# Patient Record
Sex: FEMALE | Race: WHITE | NOT HISPANIC OR LATINO | Employment: OTHER | URBAN - METROPOLITAN AREA
[De-identification: names, ages, dates, MRNs, and addresses within clinical notes are randomized per-mention and may not be internally consistent; named-entity substitution may affect disease eponyms.]

---

## 2017-02-06 ENCOUNTER — TRANSFERRED RECORDS (OUTPATIENT)
Dept: FAMILY MEDICINE | Facility: CLINIC | Age: 52
End: 2017-02-06

## 2017-05-15 ENCOUNTER — TRANSFERRED RECORDS (OUTPATIENT)
Dept: FAMILY MEDICINE | Facility: CLINIC | Age: 52
End: 2017-05-15

## 2017-05-18 DIAGNOSIS — F33.42 MAJOR DEPRESSIVE DISORDER, RECURRENT EPISODE, IN FULL REMISSION (H): ICD-10-CM

## 2017-05-18 RX ORDER — CITALOPRAM HYDROBROMIDE 20 MG/1
20 TABLET ORAL DAILY
Qty: 90 TABLET | Refills: 1 | Status: SHIPPED | OUTPATIENT
Start: 2017-05-18 | End: 2017-11-08

## 2017-05-18 NOTE — TELEPHONE ENCOUNTER
Betty,     Please look at this while Dr. Andrew is away.  Thank you.     Pt is requesting the following prescription and is to return in 1 year which will be 11/16/17.   Last O.V was: 11/16/16    Pending Prescriptions:                       Disp   Refills    citalopram (CELEXA) 20 MG tablet          90 tab*1            Sig: Take 1 tablet (20 mg) by mouth daily    Pharmacy has been selected.     Laly.LORAINE Castillo ([facility])

## 2017-08-21 ENCOUNTER — TRANSFERRED RECORDS (OUTPATIENT)
Dept: FAMILY MEDICINE | Facility: CLINIC | Age: 52
End: 2017-08-21

## 2017-11-08 ENCOUNTER — TELEPHONE (OUTPATIENT)
Dept: FAMILY MEDICINE | Facility: CLINIC | Age: 52
End: 2017-11-08

## 2017-11-08 DIAGNOSIS — F33.42 MAJOR DEPRESSIVE DISORDER, RECURRENT EPISODE, IN FULL REMISSION (H): ICD-10-CM

## 2017-11-08 DIAGNOSIS — E03.8 OTHER SPECIFIED HYPOTHYROIDISM: ICD-10-CM

## 2017-11-08 RX ORDER — LEVOTHYROXINE SODIUM 112 UG/1
112 TABLET ORAL DAILY
Qty: 30 TABLET | Refills: 0 | COMMUNITY
Start: 2017-11-08 | End: 2018-01-04

## 2017-11-08 RX ORDER — CITALOPRAM HYDROBROMIDE 20 MG/1
20 TABLET ORAL DAILY
Qty: 30 TABLET | Refills: 0 | COMMUNITY
Start: 2017-11-08 | End: 2018-02-05

## 2017-11-08 NOTE — TELEPHONE ENCOUNTER
Patient is due for a medication recheck for the following medication Levothyroxine ,and meets the qualifications for a physician approved 30 day extension.    A #30 day refill has been called into the pharmacy listed.  Called into the [facility] in [place].      staff, per the refill protocol can you please call the patient and help assist in setting up a Fasting medication recheck.  Please attempt to reach the patient to schedule that appointment, and if you are unable to reach them, please forward back to the prescribing physician.      Telephone Information:   Mobile [phone] (home)       Thank You  LORAINE Torres ([facility])

## 2017-11-21 ENCOUNTER — TRANSFERRED RECORDS (OUTPATIENT)
Dept: FAMILY MEDICINE | Facility: CLINIC | Age: 52
End: 2017-11-21

## 2017-12-22 ENCOUNTER — TELEPHONE (OUTPATIENT)
Dept: FAMILY MEDICINE | Facility: CLINIC | Age: 52
End: 2017-12-22

## 2018-01-04 DIAGNOSIS — E03.8 OTHER SPECIFIED HYPOTHYROIDISM: ICD-10-CM

## 2018-01-04 RX ORDER — LEVOTHYROXINE SODIUM 112 UG/1
112 TABLET ORAL DAILY
Qty: 90 TABLET | Refills: 0 | Status: SHIPPED | OUTPATIENT
Start: 2018-01-04 | End: 2018-03-01

## 2018-01-04 NOTE — TELEPHONE ENCOUNTER
Pt made an appointment for a CPX March 1st and called clinic support today asking if we could refill her medication until she make it to her appointment.    Pending Prescriptions:                       Disp   Refills    levothyroxine (SYNTHROID/LEVOTHROID) 112 *90 tab*0            Sig: Take 1 tablet (112 mcg) by mouth daily    Will this be ok?   I will call pt back with response.    Thank you  Brittany

## 2018-02-05 DIAGNOSIS — F33.42 MAJOR DEPRESSIVE DISORDER, RECURRENT EPISODE, IN FULL REMISSION (H): ICD-10-CM

## 2018-02-05 RX ORDER — CITALOPRAM HYDROBROMIDE 20 MG/1
20 TABLET ORAL DAILY
Qty: 30 TABLET | Refills: 0 | Status: SHIPPED | OUTPATIENT
Start: 2018-02-05 | End: 2018-03-01

## 2018-02-05 NOTE — TELEPHONE ENCOUNTER
Pt last OV was on 11/16/16. Pt has CPX for 3/1/18. Are you willing to send in another 30 day refill?    Karlene Yun is requesting a refill of:    Pending Prescriptions:                       Disp   Refills    citalopram (CELEXA) 20 MG tablet          30 tab*0            Sig: Take 1 tablet (20 mg) by mouth daily

## 2018-02-06 NOTE — TELEPHONE ENCOUNTER
Call patient- additional 30 day refill  Does she have barriers to schedule an appointment?  Overdue

## 2018-03-01 ENCOUNTER — OFFICE VISIT (OUTPATIENT)
Dept: FAMILY MEDICINE | Facility: CLINIC | Age: 53
End: 2018-03-01

## 2018-03-01 VITALS
WEIGHT: 293 LBS | RESPIRATION RATE: 20 BRPM | HEIGHT: 70 IN | HEART RATE: 72 BPM | SYSTOLIC BLOOD PRESSURE: 122 MMHG | BODY MASS INDEX: 41.95 KG/M2 | TEMPERATURE: 98.2 F | DIASTOLIC BLOOD PRESSURE: 74 MMHG

## 2018-03-01 DIAGNOSIS — E66.01 MORBID OBESITY (H): ICD-10-CM

## 2018-03-01 DIAGNOSIS — Z00.00 ROUTINE HISTORY AND PHYSICAL EXAMINATION OF ADULT: Primary | ICD-10-CM

## 2018-03-01 DIAGNOSIS — E78.2 MIXED HYPERLIPIDEMIA: ICD-10-CM

## 2018-03-01 DIAGNOSIS — F33.42 MAJOR DEPRESSIVE DISORDER, RECURRENT EPISODE, IN FULL REMISSION (H): ICD-10-CM

## 2018-03-01 DIAGNOSIS — E03.9 ACQUIRED HYPOTHYROIDISM: ICD-10-CM

## 2018-03-01 DIAGNOSIS — R73.09 ABNORMAL GLUCOSE: ICD-10-CM

## 2018-03-01 LAB — HBA1C MFR BLD: 5.5 % (ref 4–7)

## 2018-03-01 PROCEDURE — 84439 ASSAY OF FREE THYROXINE: CPT | Mod: 90 | Performed by: FAMILY MEDICINE

## 2018-03-01 PROCEDURE — 83036 HEMOGLOBIN GLYCOSYLATED A1C: CPT | Performed by: FAMILY MEDICINE

## 2018-03-01 PROCEDURE — 80048 BASIC METABOLIC PNL TOTAL CA: CPT | Mod: 90 | Performed by: FAMILY MEDICINE

## 2018-03-01 PROCEDURE — 86803 HEPATITIS C AB TEST: CPT | Mod: 90 | Performed by: FAMILY MEDICINE

## 2018-03-01 PROCEDURE — 99396 PREV VISIT EST AGE 40-64: CPT | Performed by: FAMILY MEDICINE

## 2018-03-01 PROCEDURE — 80061 LIPID PANEL: CPT | Mod: 90 | Performed by: FAMILY MEDICINE

## 2018-03-01 PROCEDURE — 84443 ASSAY THYROID STIM HORMONE: CPT | Mod: 90 | Performed by: FAMILY MEDICINE

## 2018-03-01 PROCEDURE — 36415 COLL VENOUS BLD VENIPUNCTURE: CPT | Performed by: FAMILY MEDICINE

## 2018-03-01 RX ORDER — TRAMADOL HYDROCHLORIDE 200 MG/1
20 TABLET, EXTENDED RELEASE ORAL DAILY
Refills: 0 | COMMUNITY
Start: 2018-02-15 | End: 2019-05-03 | Stop reason: DRUGHIGH

## 2018-03-01 RX ORDER — TRAZODONE HYDROCHLORIDE 50 MG/1
100 TABLET, FILM COATED ORAL AT BEDTIME
Refills: 0 | COMMUNITY
Start: 2017-12-13

## 2018-03-01 RX ORDER — LEVOTHYROXINE SODIUM 112 UG/1
112 TABLET ORAL DAILY
Qty: 90 TABLET | Refills: 3 | Status: SHIPPED | OUTPATIENT
Start: 2018-03-01 | End: 2019-02-22

## 2018-03-01 RX ORDER — CITALOPRAM HYDROBROMIDE 20 MG/1
20 TABLET ORAL DAILY
Qty: 90 TABLET | Refills: 3 | Status: SHIPPED | OUTPATIENT
Start: 2018-03-01 | End: 2019-02-21

## 2018-03-01 NOTE — PROGRESS NOTES
"Chief Complaint: Karlene Yun is an 52 year old woman who presents for preventive health visit.      Besides routine health maintenance,  she would like to discuss:     She has a history of fibromyalgia, which tends to flare during the winter months. She states she has \"good days and bad days.\" Today, she is in a lot of pain. She is still working, tolerates sitting and standing requirements at work.     The patient currently lives alone. She does not cook very often and picks up food from restaurants frequently. Her sister lives in the same apartment complex and they try to cook for each other occasionally. She does not exercise regularly.     Depression symptoms controlled- enjoys living alone with support of family nearby.    Healthy Habits:  Do you get at least three servings of calcium containing foods daily (dairy, green leafy vegetables, etc.)? no  Outside of work or daily activities, how many days per week do you exercise for 30 minutes or longer? 0  Have you had an eye exam in the past two years? yes  Do you see a dentist twice per year? yes    PHQ-2  Over the last two weeks- Have you been bothered by little interest or pleasure in doing things?  Yes  Over the last two weeks- Have you been feeling down, depressed, or hopeless?  Yes    PHQ-9 score:    PHQ-9 SCORE 3/1/2018   Total Score -   Total Score 4       Advance Care Planning 3/1/2018: ACP Review of Chart / Resources Provided:  Reviewed chart for advance care plan.  Karlene Yun has been provided information and resources to begin or update their advance care plan.  Added by Brian Smalls      Social History   Substance Use Topics     Smoking status: Never Smoker     Smokeless tobacco: Never Used     Alcohol use 0.0 oz/week     0 drink(s) per week      Comment: very rare occasions       Reviewed orders with patient.  Reviewed health maintenance and updated orders accordingly - Yes      History of abnormal Pap smear: NO - age 30- 65 PAP every 3 " "years recommended  All Histories reviewed and updated in Epic.      ROS:  C: NEGATIVE for fever, chills. POSITIVE for change in weight-gain.  I: NEGATIVE for worrisome rashes, moles or lesions  E: NEGATIVE for vision changes or irritation  ENT: NEGATIVE for ear, mouth and throat problems  R: NEGATIVE for significant cough or SOB  B: NEGATIVE for masses, tenderness or discharge  CV: NEGATIVE for chest pain, palpitations or peripheral edema  GI: NEGATIVE for nausea, abdominal pain, heartburn, or change in bowel habits  : NEGATIVE for unusual urinary or vaginal symptoms. No vaginal bleeding.  M: POSITIVE for significant arthralgias or myalgia- hx of fibromyalgia  N: NEGATIVE for weakness, dizziness or paresthesias  P: NEGATIVE for changes in mood or affect    PHQ9 scores 4      OBJECTIVE:  /74 (BP Location: Right arm, Patient Position: Chair, Cuff Size: Adult Large)  Pulse 72  Temp 98.2  F (36.8  C) (Oral)  Resp 20  Ht 1.778 m (5' 10\")  Wt (!) 140.6 kg (310 lb)  Breastfeeding? No  BMI 44.48 kg/m2  Constitutional: Alert, oriented, well hydrated. Skin turgor normal.  HEENT: External ears  and canals clear bilaterally. TM's normal bilaterally. Nose normal without lesions or discharge. Oropharynx normal. Neck supple without palpable adenopathy.  Cardiovascular: Regular rate and  rhythm. S1 and S2 normal, no murmurs, clicks, gallops or rubs. No edema or JVD.   Pulmonary: Chest wall normal to inspection and palpation. Good excursion bilaterally. Lungs clear to auscultation. Good air movement bilaterally without rales, wheezes, or rhonchi.  Breast: Breasts are symmetric.  No dominant, discrete, fixed  or suspicious masses are noted.  No skin or nipple changes or axillary nodes. Self exam is taught and encouraged.  Abdomen: The abdomen is soft without tenderness, guarding, mass or organomegaly. Bowel sounds are normal. No CVA tenderness or inguinal adenopathy noted.  : deferred  Neuro: No focal neuro deficets " (reflexes, motor, sensory, cranial nerves, gait, mentation, ) is normal.  Skin: Skin color, temperature, and turgor are normal. No rashes or suspicious skin lesions noted.        COUNSELING:  Reviewed preventive health counseling, as reflected in patient instructions  Special attention given to:        Regular exercise       Healthy diet/nutrition       Vision screening       Consider Hep C screening for patients born between 1945 and 1965       Advance Care Planning       Dental screening  ATP III Guidelines  ICSI Preventive Guidelines    ASSESSMENT/PLAN:  (Z00.00) Routine history and physical examination of adult  (primary encounter diagnosis)  Comment: Patient due for below routine labs today. I will contact her with the results.   Plan: Lipid Profile, BASIC METABOLIC PANEL ([facility]),         Hepatits C antibody (QUEST)            (E03.9) Acquired hypothyroidism  Comment: Due for follow up labs today. Will adjust dose as clinically indicated.   Plan: TSH (QUEST), T4 free ([facility]), VENOUS COLLECTION, levothyroxine (SYNTHROID/LEVOTHROID) 112 MCG         tablet          (E78.2) Mixed hyperlipidemia  Comment: Due for lipid panel today.   Plan: Depending on results, patient may need statin therapy.     (R73.09) Abnormal glucose (elevated fasting glucose)  Comment: A1c is normal today at 5.5%.  Plan: VENOUS COLLECTION, BASIC METABOLIC PANEL         (QUEST), Hemoglobin A1c (BFP)            (F33.42) Major depressive disorder, recurrent episode, in full remission (H)  Comment: Stable. Refill given.   Plan: citalopram (CELEXA) 20 MG tablet            (E66.01) Morbid obesity (H)  Comment: Patient is interested in learning more about weight loss medication and will be referred to the weight loss clinic.   She is not interested in bariatric surgery  She has been obese since the birth of her daughter- over two decades ago.  Despite her attempts of loosing weight with diet and exercise, she has not been successful.  Plan:  "BARIATRIC ADULT REFERRAL          BMI:   Estimated body mass index is 44.48 kg/(m^2) as calculated from the following:    Height as of this encounter: 1.778 m (5' 10\").    Weight as of this encounter: 140.6 kg (310 lb).   Weight management plan: Patient referred to endocrine and/or weight management specialty      Scribe Statement: Brian BARBER, PSS, am scribing for and in the presence of Melissa Andrew MD. 3/1/2018 7:21 AM    Provider Statement: I personally performed this service and the scribe documentation above accurately reflects this service. Melissa Andrew MD 3/1/2018 7:21 AM    "

## 2018-03-01 NOTE — MR AVS SNAPSHOT
After Visit Summary   3/1/2018    Karlene Yun    MRN: [id number]           Patient Information     Date Of Birth          1965        Visit Information        Provider Department      3/1/2018 10:00 AM Melissa Andrew MD [facility]        Today's Diagnoses     Routine history and physical examination of adult    -  1    Acquired hypothyroidism        Mixed hyperlipidemia        Abnormal glucose        Major depressive disorder, recurrent episode, in full remission (H)        Other specified hypothyroidism        Morbid obesity (H)           Follow-ups after your visit        Additional Services     BARIATRIC ADULT REFERRAL       Your provider has referred you to: [facility]: Medical and Surgical Weight Loss Clinic [facility] [phone]. [email or web address]    Zulema: please see if this is covered under [employer] insurance-  Other options     Please be aware that coverage of these services is subject to the terms and limitations of your health insurance plan.  Call member services at your health plan with any benefit or coverage questions.      Please bring the following with you to your appointment:      (1) List of current medications   (2) This referral request   (3) Any documents/labs given to you for this referral                  Who to contact     If you have questions or need follow up information about today's clinic visit or your schedule please contact [facility] directly at [phone].  Normal or non-critical lab and imaging results will be communicated to you by MyChart, letter or phone within 4 business days after the clinic has received the results. If you do not hear from us within 7 days, please contact the clinic through MyChart or phone. If you have a critical or abnormal lab result, we will notify you by phone as soon as possible.  Submit refill requests  "through TwitChat or call your pharmacy and they will forward the refill request to us. Please allow 3 business days for your refill to be completed.          Additional Information About Your Visit        [employer] Information     TwitChat gives you secure access to your electronic health record. If you see a primary care provider, you can also send messages to your care team and make appointments. If you have questions, please call your primary care clinic.  If you do not have a primary care provider, please call [phone] and they will assist you.        Care EveryWhere ID     This is your Care EveryWhere ID. This could be used by other organizations to access your [facility] medical records  [id number]        Your Vitals Were     Pulse Temperature Respirations Height Breastfeeding? BMI (Body Mass Index)    72 98.2  F (36.8  C) (Oral) 20 1.778 m (5' 10\") No 44.48 kg/m2       Blood Pressure from Last 3 Encounters:   03/01/18 122/74   11/16/16 120/80   10/19/15 114/78    Weight from Last 3 Encounters:   03/01/18 (!) 140.6 kg (310 lb)   11/16/16 131.5 kg (290 lb)   10/19/15 132.9 kg (293 lb)              We Performed the Following     BARIATRIC ADULT REFERRAL     BASIC METABOLIC PANEL ([facility])     Hemoglobin A1c (BFP)     Hepatits C antibody (QUEST)     Lipid Profile     T4 free ([facility])     TSH ([facility])     VENOUS COLLECTION          Today's Medication Changes          These changes are accurate as of 3/1/18 10:50 AM.  If you have any questions, ask your nurse or doctor.               These medicines have changed or have updated prescriptions.        Dose/Directions    traZODone 50 MG tablet   Commonly known as:  DESYREL   This may have changed:  Another medication with the same name was removed. Continue taking this medication, and follow the directions you see here.   Changed by:  Melissa Andrew MD        Dose:  100 mg   Take 100 mg by mouth At Bedtime   Refills:  0            Where to get your medicines    "   These medications were sent to [facility] [id number] [facility] - [place], [place] - [address]  [address] [place] [address]     Phone:  [phone]     citalopram 20 MG tablet    levothyroxine 112 MCG tablet                Primary Care Provider Office Phone # Fax #    Melissa Andrew [phone] [address] [facility] [address]  [facility] [address]        Equal Access to Services     [facility] : Hadii aad ku hadasho Soomaali, waaxda luqadaha, qaybta kaalmada adeegyada, waxay idiin hayaan adeeg kharash lasophia . So [facility] [phone].    ATENCIÓN: Si habla español, tiene a maddox disposición servicios gratuitos de asistencia lingüística. [facility] [phone].    We comply with applicable federal civil rights laws and [place] laws. We do not discriminate on the basis of race, color, national origin, age, disability, sex, sexual orientation, or gender identity.            Thank you!     Thank you for choosing [facility]  for your care. Our goal is always to provide you with excellent care. Hearing back from our patients is one way we can continue to improve our services. Please take a few minutes to complete the written survey that you may receive in the mail after your visit with us. Thank you!             Your Updated Medication List - Protect others around you: Learn how to safely use, store and throw away your medicines at www.disposemymeds.org.          This list is accurate as of 3/1/18 10:50 AM.  Always use your most recent med list.                   Brand Name Dispense Instructions for use Diagnosis    aspirin 81 MG tablet      1 TABLET DAILY        citalopram 20 MG tablet    celeXA    90 tablet    Take 1 tablet (20 mg) by mouth daily    Major depressive disorder, recurrent episode, in full remission (H)       folic acid 1 MG tablet    FOLVITE    90 tablet    Take 1 tablet by mouth daily.    Psoriatic arthropathy (H)       gabapentin 300 MG capsule     NEURONTIN     Take 300 mg by mouth 2 times daily        indomethacin 50 MG capsule    INDOCIN    0    1 capsule twice a day        levothyroxine 112 MCG tablet    SYNTHROID/LEVOTHROID    90 tablet    Take 1 tablet (112 mcg) by mouth daily    Other specified hypothyroidism       lidocaine 2 % topical gel    XYLOCAINE     Apply topically as needed for moderate pain        methotrexate 2.5 MG tablet CHEMO      Take 8 tablets by mouth once a week.        * traMADol 50 MG tablet    ULTRAM     Take 50 mg by mouth every 6 hours as needed for moderate pain        * traMADol 200 MG ER-tablet    ULTRAM-ER     Take 20 mg by mouth daily        traZODone 50 MG tablet    DESYREL     Take 100 mg by mouth At Bedtime        vitamin D 1000 UNITS capsule      Take 1 capsule by mouth daily.        * Notice:  This list has 2 medication(s) that are the same as other medications prescribed for you. Read the directions carefully, and ask your doctor or other care provider to review them with you.

## 2018-03-01 NOTE — NURSING NOTE
Karlene ROSALBA Yun is here for a CPX.    Pre-visit planning  Immunizations -up to date  Colonoscopy -is up to date  Mammogram -is due and to be scheduled by patient for later completion  Asthma test --  PHQ9 -  RONALD 7 -    Questioned patient about current smoking habits.  Pt. has never smoked.  Body mass index is 44.48 kg/(m^2).  PULSE regular  My Chart: active  CLASSIFICATION OF OVERWEIGHT AND OBESITY BY BMI                        Obesity Class           BMI(kg/m2)  Underweight                                    < 18.5  Normal                                         18.5-24.9  Overweight                                     25.0-29.9  OBESITY                     I                  30.0-34.9                             II                 35.0-39.9  EXTREME OBESITY             III                >40                            Patient's  BMI Body mass index is 44.48 kg/(m^2).  Http://hin.nhlbi.nih.gov/menuplanner/menu.cgi  ETOH screening:  Questions:  1-How often do you have a drink containing alcohol?                             2 times per year(s)  2-How many drinks containing alcohol do you have on a typical day when you are         Drinking?                              1   3- How often do you have 5 or more drinks on one occasion?                              never per     Have you ever:  None of the patient's responses to the CAGE screening were positive / Negative CAGE score

## 2018-03-02 LAB
BUN SERPL-MCNC: 20 MG/DL (ref 7–25)
BUN/CREATININE RATIO: ABNORMAL (CALC) (ref 6–22)
CALCIUM SERPL-MCNC: 9 MG/DL (ref 8.6–10.4)
CHLORIDE SERPLBLD-SCNC: 102 MMOL/L (ref 98–110)
CHOLEST SERPL-MCNC: 213 MG/DL
CHOLEST/HDLC SERPL: 4.6 (CALC)
CO2 SERPL-SCNC: 22 MMOL/L (ref 20–31)
CREAT SERPL-MCNC: 0.76 MG/DL (ref 0.5–1.05)
EGFR AFRICAN AMERICAN - QUEST: 104 ML/MIN/1.73M2
GFR SERPL CREATININE-BSD FRML MDRD: 90 ML/MIN/1.73M2
GLUCOSE - QUEST: 108 MG/DL (ref 65–99)
HCV AB - QUEST: NORMAL
HDLC SERPL-MCNC: 46 MG/DL
LDLC SERPL CALC-MCNC: 138 MG/DL (CALC)
NONHDLC SERPL-MCNC: 167 MG/DL (CALC)
POTASSIUM SERPL-SCNC: 4.3 MMOL/L (ref 3.5–5.3)
SIGNAL TO CUT OFF - QUEST: 0.04
SODIUM SERPL-SCNC: 137 MMOL/L (ref 135–146)
T4, FREE, NON-DIALYSIS - QUEST: 1.1 NG/DL (ref 0.8–1.8)
TRIGL SERPL-MCNC: 157 MG/DL
TSH SERPL-ACNC: 2.32 MIU/L

## 2018-03-02 ASSESSMENT — PATIENT HEALTH QUESTIONNAIRE - PHQ9: SUM OF ALL RESPONSES TO PHQ QUESTIONS 1-9: 4

## 2018-10-12 ENCOUNTER — TELEPHONE (OUTPATIENT)
Dept: FAMILY MEDICINE | Facility: CLINIC | Age: 53
End: 2018-10-12

## 2018-10-12 NOTE — TELEPHONE ENCOUNTER
I would encourage her to talk to her rheumatologist for alternatives for treatment of her fibromyalgia- which I believe is her pain diagnosis    There are pain clinics that would be out of her network- she should get information from her insurance on out of network benefits.

## 2018-10-12 NOTE — TELEPHONE ENCOUNTER
Do you have any other suggestions on what she can do?    Please advise    Please send to nurse pool

## 2018-10-12 NOTE — TELEPHONE ENCOUNTER
Karlene ROSALBA Yun called the clinic support line with the following:     Was getting tramadol from her Rheumatologist, but insurance will not cover any more coming from them. Looked into pain clinic, the only one in network is for short term pain management.    Her dosage is Tramadol 200 mg XR 1 a day, 50 mg 1-3x a day.    Please advise if you are willing to follow and to help Pt schedule an appointment.  [phone] (home)     Please send to nurse pool.  Thanks,Erika

## 2019-02-21 ENCOUNTER — TELEPHONE (OUTPATIENT)
Dept: FAMILY MEDICINE | Facility: CLINIC | Age: 54
End: 2019-02-21

## 2019-02-21 DIAGNOSIS — F33.42 MAJOR DEPRESSIVE DISORDER, RECURRENT EPISODE, IN FULL REMISSION (H): ICD-10-CM

## 2019-02-21 RX ORDER — CITALOPRAM HYDROBROMIDE 20 MG/1
20 TABLET ORAL DAILY
Qty: 30 TABLET | Refills: 0 | COMMUNITY
Start: 2019-02-21 | End: 2019-05-03

## 2019-02-21 NOTE — TELEPHONE ENCOUNTER
Received incoming refill request for   Pending Prescriptions:                       Disp   Refills    citalopram (CELEXA) 20 MG tablet          90 tab*3            Sig: Take 1 tablet (20 mg) by mouth daily    Patient last had a refill of this medication on 3/1/18 for a year supply and this was the last time the patient was seen in clinic. She is now due so a 30 day supply was faxed into the pharmacy for patient as extension. Routing to  to call and schedule the patient.

## 2019-02-22 DIAGNOSIS — E03.9 ACQUIRED HYPOTHYROIDISM: ICD-10-CM

## 2019-02-22 RX ORDER — LEVOTHYROXINE SODIUM 112 UG/1
112 TABLET ORAL DAILY
Qty: 30 TABLET | Refills: 0 | COMMUNITY
Start: 2019-02-22 | End: 2019-05-03

## 2019-02-22 NOTE — TELEPHONE ENCOUNTER
Received incoming refill request for   Pending Prescriptions:                       Disp   Refills    levothyroxine (SYNTHROID/LEVOTHROID) 112 *90 tab*3            Sig: Take 1 tablet (112 mcg) by mouth daily    Patient last had a refill of this medication on 3/1/18 and that is when the patient last was seen for a medication check. She is now due so a 30 day supply was faxed into the pharmacy.  has already attempted to reach patient to inform that she is due for an office visit from other refill request for other medications.

## 2019-05-03 ENCOUNTER — OFFICE VISIT (OUTPATIENT)
Dept: FAMILY MEDICINE | Facility: CLINIC | Age: 54
End: 2019-05-03

## 2019-05-03 VITALS
BODY MASS INDEX: 45.2 KG/M2 | HEART RATE: 64 BPM | SYSTOLIC BLOOD PRESSURE: 110 MMHG | RESPIRATION RATE: 20 BRPM | DIASTOLIC BLOOD PRESSURE: 78 MMHG | OXYGEN SATURATION: 95 % | WEIGHT: 293 LBS

## 2019-05-03 DIAGNOSIS — E03.9 ACQUIRED HYPOTHYROIDISM: ICD-10-CM

## 2019-05-03 DIAGNOSIS — M79.7 CHRONIC FATIGUE FIBROMYALGIA SYNDROME: ICD-10-CM

## 2019-05-03 DIAGNOSIS — E66.01 MORBID OBESITY (H): ICD-10-CM

## 2019-05-03 DIAGNOSIS — G93.32 CHRONIC FATIGUE FIBROMYALGIA SYNDROME: ICD-10-CM

## 2019-05-03 DIAGNOSIS — F33.42 MAJOR DEPRESSIVE DISORDER, RECURRENT EPISODE, IN FULL REMISSION (H): ICD-10-CM

## 2019-05-03 LAB — HBA1C MFR BLD: 5.7 % (ref 4–7)

## 2019-05-03 PROCEDURE — 83036 HEMOGLOBIN GLYCOSYLATED A1C: CPT | Performed by: FAMILY MEDICINE

## 2019-05-03 PROCEDURE — 36415 COLL VENOUS BLD VENIPUNCTURE: CPT | Performed by: FAMILY MEDICINE

## 2019-05-03 PROCEDURE — 99214 OFFICE O/P EST MOD 30 MIN: CPT | Performed by: FAMILY MEDICINE

## 2019-05-03 RX ORDER — LEVOTHYROXINE SODIUM 112 UG/1
112 TABLET ORAL DAILY
Qty: 90 TABLET | Refills: 3 | Status: SHIPPED | OUTPATIENT
Start: 2019-05-03 | End: 2020-04-22

## 2019-05-03 RX ORDER — CITALOPRAM HYDROBROMIDE 20 MG/1
20 TABLET ORAL DAILY
Qty: 90 TABLET | Refills: 3 | Status: SHIPPED | OUTPATIENT
Start: 2019-05-03 | End: 2020-04-22

## 2019-05-03 ASSESSMENT — PATIENT HEALTH QUESTIONNAIRE - PHQ9: SUM OF ALL RESPONSES TO PHQ QUESTIONS 1-9: 4

## 2019-05-03 NOTE — NURSING NOTE
Chief Complaint   Patient presents with     Recheck Medication     medication check      Pre-visit Screening:  Immunizations:  up to date  Colonoscopy:  is up to date  Mammogram: is up to date  Asthma Action Test/Plan:  NA  PHQ9:  Given today   GAD7:  Given today   Questioned patient about current smoking habits Pt. has never smoked.  Ok to leave detailed message on voice mail for today's visit only Yes, phone # [phone]

## 2019-05-03 NOTE — PROGRESS NOTES
SUBJECTIVE:   Karlene Yun is a 53 year old female who presents to clinic today for the following   health issues:      Depression Followup    Status since last visit: Stable      See PHQ-9 for current symptoms.  Other associated symptoms: None    Complicating factors:   Significant life event:  Yes-  , financial stress  Chronic pain- fibromyalgia   Current substance abuse:  None  Anxiety or Panic symptoms:  No    PHQ 11/16/2016 3/1/2018 5/3/2019   PHQ-9 Total Score 2 4 4   Q9: Thoughts of better off dead/self-harm past 2 weeks Not at all Not at all Not at all        Hypothyroidism Follow-up      Since last visit, patient describes the following symptoms: Weight stable, no hair loss, no skin changes, no constipation, no loose stools    Gets multiple stools, loose once a day- no cramps, no       Amount of exercise or physical activity: sedentary- Sister lives in the same building as her- will walk with her on occasion    Problems taking medications regularly: No    Medication side effects: none    Diet: regular (no restrictions)    TSH 2.79 - lab result at  Rheumatologist office    Other problems  Fibromyalgia:  Currently weaning from tramadol  250 mg daily dose reduced to 150 mg tramadol    PT advised  She would like to participate in [employer] if her insurance- allowed    Additional history: as documented    Reviewed  and updated as needed this visit by clinical staff         Reviewed and updated as needed this visit by Provider         Patient Active Problem List   Diagnosis     Psoriatic arthropathy (H)     Major depressive disorder, recurrent episode, in full remission (H)     Pulmonary embolism and infarction (H)     Hypothyroidism     Allergic rhinitis     Rosacea     Hyperlipidemia     Living will, counseling/discussion     Health Care Home     Chronic fatigue fibromyalgia syndrome     Past Surgical History:   Procedure Laterality Date     COLONOSCOPY  2/11/2016    mild colitis/ repeat 10  "yrs     HC REMOVAL OF TONSILS,<12 Y/O      Tonsils <11y.o.     LASIK  2004    \"lasek\"       Social History     Tobacco Use     Smoking status: Never Smoker     Smokeless tobacco: Never Used   Substance Use Topics     Alcohol use: Yes     Alcohol/week: 0.0 oz     Comment: very rare occasions     Family History   Problem Relation Age of Onset     Cancer Sister         cervical     Arthritis Paternal Grandmother      Diabetes Father          Current Outpatient Medications   Medication Sig Dispense Refill     ASPIRIN 81 MG OR TABS 1 TABLET DAILY       citalopram (CELEXA) 20 MG tablet Take 1 tablet (20 mg) by mouth daily 30 tablet 0     folic acid (FOLVITE) 1 MG tablet Take 1 tablet by mouth daily. 90 tablet 3     gabapentin (NEURONTIN) 300 MG capsule Take 300 mg by mouth 2 times daily       INDOMETHACIN 50 MG OR CAPS 1 capsule twice a day 0 0     levothyroxine (SYNTHROID/LEVOTHROID) 112 MCG tablet Take 1 tablet (112 mcg) by mouth daily 30 tablet 0     methotrexate 2.5 MG tablet Take 8 tablets by mouth once a week.       traMADol (ULTRAM) 50 MG tablet Take 150 mg by mouth every 6 hours as needed for moderate pain        traZODone (DESYREL) 50 MG tablet Take 100 mg by mouth At Bedtime  0     Picky eater- bland-   ROS:  Constitutional, HEENT, cardiovascular, pulmonary, GI, , musculoskeletal, neuro, skin, endocrine and psych systems are negative, except as otherwise noted.    OBJECTIVE:     /78 (BP Location: Left arm, Patient Position: Sitting, Cuff Size: Adult Large)   Pulse 64   Resp 20   Wt 142.9 kg (315 lb)   SpO2 95%   BMI 45.20 kg/m    Body mass index is 45.2 kg/m .     Myofascial tension- upper neck    GENERAL: healthy, alert and no distress  NECK: no adenopathy, no asymmetry, masses, or scars and thyroid normal to palpation  RESP: lungs clear to auscultation - no rales, rhonchi or wheezes  CV: regular rate and rhythm,  no murmur, click or rub, no peripheral edema   MS: no gross musculoskeletal defects " "noted, no edema    Diagnostic Test Results:  Results for orders placed or performed in visit on 05/03/19   Hemoglobin A1c (BFP)   Result Value Ref Range    Hemoglobin A1C 5.7 4.0 - 7.0 %       traszodone refilled by rheumatolgoy    ASSESSMENT/PLAN:     (F33.42) Major depressive disorder, recurrent episode, in full remission (H)  Comment:  Refilled at current dose for one year.  Plan: citalopram (CELEXA) 20 MG tablet             (E03.9) Acquired hypothyroidism  Comment: lab result from rheumatologist in therapeutic range  Levothyroxine refilled at current dose      Plan: levothyroxine (SYNTHROID/LEVOTHROID) 112 MCG         tablet           (E66.01) Morbid obesity (H)  Comment: encouraged to increase her activity as tolerates    Plan: VENOUS COLLECTION, Hemoglobin A1c (BFP)             (R53.82,  M79.7) Chronic fatigue fibromyalgia syndrome  Comment:   Plan: encouraged her to follow up with  PT         BMI:   Estimated body mass index is 45.2 kg/m  as calculated from the following:    Height as of 3/1/18: 1.778 m (5' 10\").    Weight as of this encounter: 142.9 kg (315 lb).         FUTURE APPOINTMENTS:       - Follow-up visit in one year    Melissa Andrew MD  [facility]        "

## 2019-05-12 PROBLEM — E66.01 MORBID OBESITY (H): Status: ACTIVE | Noted: 2019-05-12

## 2019-10-01 ENCOUNTER — HEALTH MAINTENANCE LETTER (OUTPATIENT)
Age: 54
End: 2019-10-01

## 2019-11-08 ENCOUNTER — TRANSFERRED RECORDS (OUTPATIENT)
Dept: FAMILY MEDICINE | Facility: CLINIC | Age: 54
End: 2019-11-08

## 2020-04-14 DIAGNOSIS — E03.9 ACQUIRED HYPOTHYROIDISM: ICD-10-CM

## 2020-04-14 DIAGNOSIS — F33.42 MAJOR DEPRESSIVE DISORDER, RECURRENT EPISODE, IN FULL REMISSION (H): ICD-10-CM

## 2020-04-14 RX ORDER — CITALOPRAM HYDROBROMIDE 20 MG/1
TABLET ORAL
Qty: 90 TABLET | Refills: 3 | COMMUNITY
Start: 2020-04-14

## 2020-04-14 RX ORDER — LEVOTHYROXINE SODIUM 112 UG/1
TABLET ORAL
Qty: 90 TABLET | Refills: 3 | COMMUNITY
Start: 2020-04-14

## 2020-04-14 NOTE — TELEPHONE ENCOUNTER
Karlene Yun is requesting a refill of:    Refused Prescriptions:                       Disp   Refills    citalopram (CELEXA) 20 MG tablet [Pharmacy*90 tab*3        Sig: TAKE 1 TABLET BY MOUTH EVERY DAY  Refused By: LYNDSEY RICH  Reason for Refusal: Patient needs appointment    levothyroxine (SYNTHROID/LEVOTHROID) 112 M*90 tab*3        Sig: TAKE 1 TABLET BY MOUTH EVERY DAY  Refused By: LYNDSEY RICH  Reason for Refusal: Patient needs appointment    Pt last seen 05/03/19 thyroid was not checked at that time. Pt needs OV

## 2020-04-15 DIAGNOSIS — E03.9 ACQUIRED HYPOTHYROIDISM: ICD-10-CM

## 2020-04-15 RX ORDER — CITALOPRAM HYDROBROMIDE 20 MG/1
TABLET ORAL
Qty: 90 TABLET | Refills: 3 | COMMUNITY
Start: 2020-04-15

## 2020-04-15 RX ORDER — LEVOTHYROXINE SODIUM 112 UG/1
TABLET ORAL
Qty: 90 TABLET | Refills: 3 | COMMUNITY
Start: 2020-04-15

## 2020-04-15 NOTE — TELEPHONE ENCOUNTER
Karlene Yun is requesting a refill of:    Refused Prescriptions:                       Disp   Refills    levothyroxine (SYNTHROID/LEVOTHROID) 112 M*90 tab*3        Sig: TAKE 1 TABLET BY MOUTH EVERY DAY  Refused By: LYNDSEY RICH  Reason for Refusal: Patient needs appointment    citalopram (CELEXA) 20 MG tablet [Pharmacy*90 tab*3        Sig: TAKE 1 TABLET BY MOUTH EVERY DAY  Refused By: LYNDSEY RICH  Reason for Refusal: Patient needs appointment

## 2020-04-15 NOTE — TELEPHONE ENCOUNTER
Karlene Yun is requesting a refill of:    Refused Prescriptions:                       Disp   Refills    levothyroxine (SYNTHROID/LEVOTHROID) 112 M*90 tab*3        Sig: TAKE 1 TABLET BY MOUTH EVERY DAY  Refused By: LYNDSEY RICH  Reason for Refusal: Patient needs appointment    Last seen 05/03/19 pt due for yearly med recheck. Thyroid has not been checked since 03/01/18

## 2020-04-17 DIAGNOSIS — E03.9 ACQUIRED HYPOTHYROIDISM: ICD-10-CM

## 2020-04-17 RX ORDER — LEVOTHYROXINE SODIUM 112 UG/1
TABLET ORAL
Qty: 90 TABLET | Refills: 3 | COMMUNITY
Start: 2020-04-17

## 2020-04-17 NOTE — TELEPHONE ENCOUNTER
Karlene Yun is requesting a refill of:    Refused Prescriptions:                       Disp   Refills    levothyroxine (SYNTHROID/LEVOTHROID) 112 M*90 tab*3        Sig: TAKE 1 TABLET BY MOUTH EVERY DAY  Refused By: LYNDSEY RICH  Reason for Refusal: Patient needs appointment  Reason for Refusal Comment: Has upcoming appt scheduled

## 2020-04-22 ENCOUNTER — OFFICE VISIT (OUTPATIENT)
Dept: FAMILY MEDICINE | Facility: CLINIC | Age: 55
End: 2020-04-22

## 2020-04-22 VITALS
BODY MASS INDEX: 41.95 KG/M2 | WEIGHT: 293 LBS | TEMPERATURE: 98.2 F | SYSTOLIC BLOOD PRESSURE: 118 MMHG | HEART RATE: 67 BPM | OXYGEN SATURATION: 97 % | HEIGHT: 70 IN | RESPIRATION RATE: 18 BRPM | DIASTOLIC BLOOD PRESSURE: 82 MMHG

## 2020-04-22 DIAGNOSIS — G47.9 SLEEP DISORDER: ICD-10-CM

## 2020-04-22 DIAGNOSIS — Z79.899 ENCOUNTER FOR LONG-TERM (CURRENT) USE OF MEDICATIONS: ICD-10-CM

## 2020-04-22 DIAGNOSIS — E03.9 ACQUIRED HYPOTHYROIDISM: ICD-10-CM

## 2020-04-22 DIAGNOSIS — L40.50 PSORIATIC ARTHROPATHY (H): ICD-10-CM

## 2020-04-22 DIAGNOSIS — Z23 NEED FOR DTAP VACCINE: ICD-10-CM

## 2020-04-22 DIAGNOSIS — E66.01 MORBID OBESITY (H): ICD-10-CM

## 2020-04-22 DIAGNOSIS — F33.42 MAJOR DEPRESSIVE DISORDER, RECURRENT EPISODE, IN FULL REMISSION (H): Primary | ICD-10-CM

## 2020-04-22 PROBLEM — M79.7 FIBROMYALGIA: Status: ACTIVE | Noted: 2018-11-12

## 2020-04-22 LAB
% GRANULOCYTES: 65.7 %
HCT VFR BLD AUTO: 48 % (ref 35–47)
HEMOGLOBIN: 16.1 G/DL (ref 11.7–15.7)
LYMPHOCYTES NFR BLD AUTO: 26.6 %
MCH RBC QN AUTO: 32.3 PG (ref 26–33)
MCHC RBC AUTO-ENTMCNC: 33.5 G/DL (ref 31–36)
MCV RBC AUTO: 96.4 FL (ref 78–100)
MONOCYTES NFR BLD AUTO: 7.7 %
PLATELET COUNT - QUEST: 207 10^9/L (ref 150–375)
RBC # BLD AUTO: 4.98 10*12/L (ref 3.8–5.2)
WBC # BLD AUTO: 5.7 10*9/L (ref 4–11)

## 2020-04-22 PROCEDURE — 90471 IMMUNIZATION ADMIN: CPT | Performed by: FAMILY MEDICINE

## 2020-04-22 PROCEDURE — 90714 TD VACC NO PRESV 7 YRS+ IM: CPT | Performed by: FAMILY MEDICINE

## 2020-04-22 PROCEDURE — 84443 ASSAY THYROID STIM HORMONE: CPT | Mod: 90 | Performed by: FAMILY MEDICINE

## 2020-04-22 PROCEDURE — 36415 COLL VENOUS BLD VENIPUNCTURE: CPT | Performed by: FAMILY MEDICINE

## 2020-04-22 PROCEDURE — 99214 OFFICE O/P EST MOD 30 MIN: CPT | Mod: 25 | Performed by: FAMILY MEDICINE

## 2020-04-22 PROCEDURE — 85025 COMPLETE CBC W/AUTO DIFF WBC: CPT | Performed by: FAMILY MEDICINE

## 2020-04-22 RX ORDER — CITALOPRAM HYDROBROMIDE 20 MG/1
20 TABLET ORAL DAILY
Qty: 90 TABLET | Refills: 3 | Status: SHIPPED | OUTPATIENT
Start: 2020-04-22

## 2020-04-22 RX ORDER — TRIAMCINOLONE ACETONIDE 1 MG/G
CREAM TOPICAL PRN
COMMUNITY
Start: 2020-01-09

## 2020-04-22 RX ORDER — TACROLIMUS 1 MG/G
OINTMENT TOPICAL PRN
COMMUNITY
Start: 2020-01-09

## 2020-04-22 RX ORDER — LEVOTHYROXINE SODIUM 112 UG/1
112 TABLET ORAL DAILY
Qty: 90 TABLET | Refills: 3 | Status: SHIPPED | OUTPATIENT
Start: 2020-04-22

## 2020-04-22 SDOH — ECONOMIC STABILITY: TRANSPORTATION INSECURITY
IN THE PAST 12 MONTHS, HAS LACK OF TRANSPORTATION KEPT YOU FROM MEETINGS, WORK, OR FROM GETTING THINGS NEEDED FOR DAILY LIVING?: NO

## 2020-04-22 SDOH — ECONOMIC STABILITY: INCOME INSECURITY: HOW HARD IS IT FOR YOU TO PAY FOR THE VERY BASICS LIKE FOOD, HOUSING, MEDICAL CARE, AND HEATING?: NOT VERY HARD

## 2020-04-22 SDOH — ECONOMIC STABILITY: FOOD INSECURITY: WITHIN THE PAST 12 MONTHS, YOU WORRIED THAT YOUR FOOD WOULD RUN OUT BEFORE YOU GOT MONEY TO BUY MORE.: NEVER TRUE

## 2020-04-22 SDOH — ECONOMIC STABILITY: TRANSPORTATION INSECURITY
IN THE PAST 12 MONTHS, HAS THE LACK OF TRANSPORTATION KEPT YOU FROM MEDICAL APPOINTMENTS OR FROM GETTING MEDICATIONS?: NO

## 2020-04-22 SDOH — ECONOMIC STABILITY: FOOD INSECURITY: WITHIN THE PAST 12 MONTHS, THE FOOD YOU BOUGHT JUST DIDN'T LAST AND YOU DIDN'T HAVE MONEY TO GET MORE.: NEVER TRUE

## 2020-04-22 ASSESSMENT — MIFFLIN-ST. JEOR: SCORE: 2181.19

## 2020-04-22 ASSESSMENT — PATIENT HEALTH QUESTIONNAIRE - PHQ9: SUM OF ALL RESPONSES TO PHQ QUESTIONS 1-9: 3

## 2020-04-22 NOTE — NURSING NOTE
Karlene is here today for a med recheck.    Pre-visit Screening:  Immunizations:  up to date  Colonoscopy:  is up to date  Mammogram: is up to date  Asthma Action Test/Plan:  NA  PHQ9:  Done today  GAD7:  Done today  Questioned patient about current smoking habits Pt. has never smoked.  Ok to leave detailed message on voice mail for today's visit only Yes, phone # [phone]

## 2020-04-22 NOTE — PATIENT INSTRUCTIONS
Major depressive disorder, recurrent episode, in full remission (H)  (primary encounter diagnosis)  Comment: continue therapy  Plan: citalopram (CELEXA) 20 MG tablet        I've explained to her that drugs of the SSRI class can have side effects such as weight gain, sexual dysfunction, insomnia, headache, nausea. These medications are generally effective at alleviating symptoms of anxiety and/or depression. Let me know if significant side effects do occur.  Refilled one year    (E03.9) Acquired hypothyroidism  Plan: TSH with free T4 reflex ([facility]), VENOUS         COLLECTION, levothyroxine         (SYNTHROID/LEVOTHROID) 112 MCG tablet        Await labs    (E66.01) Morbid obesity (H)  Plan: PULMONARY MEDICINE REFERRAL        Reviewed concepts of diabetes self-management stressing the primary   role of the patient in monitoring and maintaining control of   diabetes.        (G47.9) Sleep disorder  Comment: encourage getting back on CPAP  Plan: PULMONARY MEDICINE REFERRAL, CL AFF         HEMOGRAM/PLATE/DIFF (BFP)

## 2020-04-22 NOTE — PROGRESS NOTES
Two issues    1. Depression/anxiety  2. Hypothyroid    1. SUBJECTIVE:  Karlene Yun is an 54 year old female who presents for follow-up   of depressive symptoms.  Initially evaluated in her 20's postpartum. Current symptoms include   none. Symptoms that have subjectively improved include depressed mood, hopelessness, diminished interest or pleasure in activities, weight gain, insomnia, psychomotor retardation (slowness of thought and reaction), fatigue, difficulty with concentration, anxiety, irritablility, sleep disturbance, early morning awakening.  Previous and current treatment modalities   employed include individual therapy and medication(s) Zoloft (sertraline) and Celexa (citalopram).     Organic causes of depression present: hypothyroidism    Current Outpatient Medications   Medication     ASPIRIN 81 MG OR TABS     citalopram (CELEXA) 20 MG tablet     folic acid (FOLVITE) 1 MG tablet     gabapentin (NEURONTIN) 300 MG capsule     HUMIRA *CF* PEN-PS/UV STARTER 80 MG/0.8ML & 40MG/0.4ML pen kit     INDOMETHACIN 50 MG OR CAPS     levothyroxine (SYNTHROID/LEVOTHROID) 112 MCG tablet     methotrexate 2.5 MG tablet     tacrolimus (PROTOPIC) 0.1 % external ointment     traZODone (DESYREL) 50 MG tablet     triamcinolone (KENALOG) 0.1 % external cream     No current facility-administered medications for this visit.      Allergies   Allergen Reactions     Sumatriptan Succinate      IMITREX     Side effects of medication: none    Social History     Tobacco Use     Smoking status: Never Smoker     Smokeless tobacco: Never Used   Substance Use Topics     Alcohol use: Yes     Alcohol/week: 0.0 standard drinks     Comment: very rare occasions         Neurologic: negative  Psychiatric: excessive stress-chronic pain psoriasis  Endocrine: thyroid disorder  RESPI: poor sleep/ sleep apnea not treated      OBJECTIVE:  /82 (BP Location: Left arm, Patient Position: Sitting, Cuff Size: Adult Large)   Pulse 67   Temp 98.2  " F (36.8  C) (Oral)   Ht 1.778 m (5' 10\")   Wt (!) 150.6 kg (332 lb)   SpO2 97%   BMI 47.64 kg/m    Mental Status Examination  Posture and motor behavior: negative  Dress, grooming, personal hygiene: negative  Facial expression: negative  Speech: negative  Mood: negative  Coherency and relevance of thought: negative  Thought content: negative  Perceptions: negative  Orientation: negative  Attention and concentration: negative  Memory: : negative  Information: negative  Vocabulary: negative  Abstract reasoning: negative  Judgment: negative    General appearance: healthy, alert, no distress, cooperative, smiling and over weight  Eyes: conjunctivae/corneas clear. PERRL, EOM's intact. Fundi benign  Ears: negative  Oropharynx: Lips, mucosa, and tongue normal. Teeth and gums normal.  Neck: Neck supple. No adenopathy. Thyroid symmetric, normal size,, Carotids without bruits.  Lungs: negative, Percussion normal. Good diaphragmatic excursion. Lungs clear  Heart: negative, PMI normal. No lifts, heaves, or thrills. RRR. No murmurs, clicks gallops or rub  Abdomen: Abdomen soft, non-tender. BS normal. No masses, organomegaly  Neuro: Gait normal. Reflexes normal and symmetric. Sensation grossly WNL.  BMI : Body mass index is 47.64 kg/m .    ASSESSMENT:(F33.42) Major depressive disorder, recurrent episode, in full remission (H)  (primary encounter diagnosis)  Comment: continue therapy  Plan: citalopram (CELEXA) 20 MG tablet        I've explained to her that drugs of the SSRI class can have side effects such as weight gain, sexual dysfunction, insomnia, headache, nausea. These medications are generally effective at alleviating symptoms of anxiety and/or depression. Let me know if significant side effects do occur.  Refilled one year    (E03.9) Acquired hypothyroidism  Plan: TSH with free T4 reflex ([facility]), VENOUS         COLLECTION, levothyroxine         (SYNTHROID/LEVOTHROID) 112 MCG tablet        Await labs    (E66.01) Morbid " obesity (H)  Plan: PULMONARY MEDICINE REFERRAL        Reviewed concepts of diabetes self-management stressing the primary   role of the patient in monitoring and maintaining control of   diabetes.        (G47.9) Sleep disorder  Comment: encourage getting back on CPAP  Plan: PULMONARY MEDICINE REFERRAL, CL AFF         HEMOGRAM/PLATE/DIFF (BFP)            (L40.50) Psoriatic arthropathy (H)  Plan: CL AFF HEMOGRAM/PLATE/DIFF (BFP)        I have reviewed the patient's medical history in detail and updated the computerized patient record.      (Z31.266) Encounter for long-term (current) use of medications  Plan: TSH with free T4 reflex ([facility]), VENOUS         COLLECTION, CL AFF HEMOGRAM/PLATE/DIFF (BFP)                      2. SUBJECTIVE: 54 year old female complaining of hypothyroidism for 25 year(s).   The patient describes feeling well on arthritis medications for chronic pain.   The patient denies a history of thyroid symptoms.   Smoking history: No.   Relevant past medical history: positive for obesity.    OBJECTIVE: The patient appears healthy, alert, no distress, severe distress and smiling.   EARS: negative  NOSE/SINUS: Nares normal. Septum midline. Mucosa normal. No drainage or sinus tenderness.   THROAT: normal   NECK:Neck supple. No adenopathy. Thyroid symmetric, normal size,, Carotids without bruits.   CHEST: Clear

## 2020-04-23 LAB — TSH SERPL-ACNC: 1.96 MIU/L

## 2020-12-31 ENCOUNTER — TELEPHONE (OUTPATIENT)
Dept: FAMILY MEDICINE | Facility: CLINIC | Age: 55
End: 2020-12-31

## 2020-12-31 NOTE — TELEPHONE ENCOUNTER
Karlene Yun called the clinic support line with the following:    Is wondering if she should get a COVID test. Was exposed for less then a few minutes to her niece Both were wearing masks and more then 6ft apart. Advised that she stay home for at least 7 days. If Sx to get tested.     She is fine with this.

## 2021-01-15 ENCOUNTER — HEALTH MAINTENANCE LETTER (OUTPATIENT)
Age: 56
End: 2021-01-15

## 2021-03-23 DIAGNOSIS — E03.9 ACQUIRED HYPOTHYROIDISM: ICD-10-CM

## 2021-03-23 DIAGNOSIS — F33.42 MAJOR DEPRESSIVE DISORDER, RECURRENT EPISODE, IN FULL REMISSION (H): ICD-10-CM

## 2021-03-23 RX ORDER — LEVOTHYROXINE SODIUM 112 UG/1
TABLET ORAL
Qty: 90 TABLET | Refills: 3 | COMMUNITY
Start: 2021-03-23

## 2021-03-23 RX ORDER — CITALOPRAM HYDROBROMIDE 20 MG/1
TABLET ORAL
Qty: 90 TABLET | Refills: 3 | COMMUNITY
Start: 2021-03-23

## 2021-03-23 NOTE — TELEPHONE ENCOUNTER
Karlene Yun is requesting a refill of:    Refused Prescriptions:                       Disp   Refills    levothyroxine (SYNTHROID/LEVOTHROID) 112 M*90 tab*3        Sig: TAKE 1 TABLET BY MOUTH DAILY  Refused By: LYNDSEY RICH  Reason for Refusal: Patient needs appointment  Reason for Refusal Comment: Pt due for OV next month    citalopram (CELEXA) 20 MG tablet [Pharmacy*90 tab*3        Sig: TAKE 1 TABLET BY MOUTH EVERY DAY  Refused By: LYNDSEY RICH  Reason for Refusal: Patient needs appointment  Reason for Refusal Comment: Pt due for OV next month      Pt last seen 04/22/20 due for yearly OV next month

## 2021-09-04 ENCOUNTER — HEALTH MAINTENANCE LETTER (OUTPATIENT)
Age: 56
End: 2021-09-04

## 2021-10-30 ENCOUNTER — HEALTH MAINTENANCE LETTER (OUTPATIENT)
Age: 56
End: 2021-10-30

## 2022-02-19 ENCOUNTER — HEALTH MAINTENANCE LETTER (OUTPATIENT)
Age: 57
End: 2022-02-19

## 2022-10-16 ENCOUNTER — HEALTH MAINTENANCE LETTER (OUTPATIENT)
Age: 57
End: 2022-10-16

## 2023-04-01 ENCOUNTER — HEALTH MAINTENANCE LETTER (OUTPATIENT)
Age: 58
End: 2023-04-01

## 2023-09-18 NOTE — TELEPHONE ENCOUNTER
Denied refill of levothyroxine. Pt was given 30 day extension on 11/8/17. Pt is overdue for yearly OV.   OPERATIVE REPORT  September 18, 2023      PREOPERATIVE DIAGNOSIS:  OAB, mixed urinary incontinence    POSTOPERATIVE DIAGNOSIS: Same    PROCEDURES PERFORMED:   1. Cystourethroscopy with injection of botulinum toxin A 100 units in 10mL sterile saline    STAFF SURGEON: Dr. Mishel Zendejas available for entire case.     RESIDENT(S):  Kavon Al MD    ANESTHESIA: Choice    ESTIMATED BLOOD LOSS: 1mL    DRAINS: None     SIGNIFICANT FINDINGS:  1. Unremarkable botox injection     OPERATIVE INDICATIONS:   Chasity Hodgson is a(n) 75 year old female with a history of neurogenic bladder. The patient was counseled on the alternatives, risks, and benefits and elected to proceed with the above stated procedure.    DESCRIPTION OF PROCEDURE:   After verification of informed consent was obtained, the patient was brought to the operating room, and moved to the operating table. After adequate anesthesia was induced, the patient was repositioned in the dorsal lithotomy position in supportive yellowfin stirrups with care in neural safety in positioning all four extremities.  She was then prepped and draped in the usual sterile fashion. A formal timeout was performed to confirm the correct patient, procedure and operative site.     A 21-Kiswahili Marsh injection cystoscope was placed into the bladder.  The bladder mucosa appeared to be normal without stones, tumors or diverticulum on 360 degree inspection. The ureteral orifices were in the normal orthotopic position bilaterally.  We mixed 1 vials of 100 U botulinum toxin A into 10 mL of injectable saline for a total of (10 units/mL).  We performed a template injection procedure with 5 columns of 4 injections. All injections were placed deep to the mucosa into the detrusor muscle.  We then emptied her bladder to re-inspect our injection sites and found that there was a minimal amount of blood. The patient's bladder was then emptied again. The patient was returned to supine position and  transported to recovery in stable condition.    The procedure was then concluded. The patient was transferred to the postanesthesia care unit in stable condition and tolerated the procedure well.    POSTOP PLAN:  - Follow up for symptom check and PVR in about 4 weeks      Addendum:    I, Mishel Zendejas, was present for the entire case.  I agree with the note as above with changes made as needed.    Mishel Zendejas MD MPH  (she/her/hers)   of Urology  [facility]

## 2023-11-04 ENCOUNTER — HEALTH MAINTENANCE LETTER (OUTPATIENT)
Age: 58
End: 2023-11-04

## 2024-05-01 ENCOUNTER — MEDICAL CORRESPONDENCE (OUTPATIENT)
Dept: HEALTH INFORMATION MANAGEMENT | Facility: CLINIC | Age: 59
End: 2024-05-01
Payer: COMMERCIAL

## 2024-05-02 ENCOUNTER — TRANSCRIBE ORDERS (OUTPATIENT)
Dept: OTHER | Age: 59
End: 2024-05-02

## 2024-05-02 DIAGNOSIS — K74.60 CIRRHOSIS OF LIVER WITHOUT ASCITES, UNSPECIFIED HEPATIC CIRRHOSIS TYPE (H): Primary | ICD-10-CM

## 2024-05-03 ENCOUNTER — TELEPHONE (OUTPATIENT)
Dept: GASTROENTEROLOGY | Facility: CLINIC | Age: 59
End: 2024-05-03
Payer: COMMERCIAL

## 2024-05-03 DIAGNOSIS — K74.60 LIVER CIRRHOSIS SECONDARY TO NASH (H): Primary | ICD-10-CM

## 2024-05-03 DIAGNOSIS — K75.81 LIVER CIRRHOSIS SECONDARY TO NASH (H): Primary | ICD-10-CM

## 2024-05-03 NOTE — TELEPHONE ENCOUNTER
Lab orders faxed to [facility] per pt request.    Sydnie DAN LPN  Hepatology Clinic      Health Call Center    Phone Message    May a detailed message be left on voicemail: yes     Reason for Call: Other: Liver lab orders needed to be sent to  at [address]      Action Taken: Message routed to:  [facility] Hep.     Travel Screening: Not Applicable

## 2024-05-10 NOTE — CONFIDENTIAL NOTE
DIAGNOSIS:   Cirrhosis of liver without ascites, unspecified hepatic cirrhosis type (H)    Appt Date:  07.08.2024     NOTES STATUS DETAILS   OFFICE NOTE from referring provider Care Everywhere 05.01.2024  Lyn Martinez NP [facility]   OFFICE NOTES from other specialists Care Everywhere 02.14.2024 Calli Naidu APRN, CNP  [facility]    01.10.2024 Femi King MD  [facility]   DISCHARGE SUMMARY from hospital     MEDICATION LIST Care Everywhere    LIVER BIOSPY (IF APPLICABLE)      PATHOLOGY REPORTS      IMAGING     ENDOSCOPY (IF AVAILABLE) Care Everywhere 02.09.2024    COLONOSCOPY (IF AVAILABLE)     ULTRASOUND LIVER Care Everywhere 03.28.2024 US Abd RUQ   CT OF ABDOMEN Care Everywhere 10.10.2023, 06.28.2024 CT Abd Pelvis   MRI OF LIVER Care Everywhere 09.06.2023 MR Abd   FIBROSCAN, US ELASTOGRAPHY, FIBROSIS SCAN, MR ELASTOGRAPHY     LABS     HEPATIC PANEL (LIVER PANEL) Care Everywhere 03.28.2024    BASIC METABOLIC PANEL Care Everywhere 03.28.2024    COMPLETE METABOLIC PANEL Care Everywhere 04.03.2024   COMPLETE BLOOD COUNT (CBC) Care Everywhere 04.03.2024   INTERNATIONAL NORMALIZED RATIO (INR) Care Everywhere 04.03.2024   HEPATITIS C ANTIBODY Care Everywhere 08.02.20232   HEPATITIS C VIRAL LOAD/PCR     HEPATITIS C GENOTYPE     HEPATITIS B SURFACE ANTIGEN Care Everywhere 08.02.2022   HEPATITIS B SURFACE ANTIBODY Care Everywhere 08.02.2022   HEPATITIS B DNA QUANT LEVEL     HEPATITIS B CORE ANTIBODY Care Everywhere 01.09.2020

## 2024-05-24 ENCOUNTER — TELEPHONE (OUTPATIENT)
Dept: GASTROENTEROLOGY | Facility: CLINIC | Age: 59
End: 2024-05-24
Payer: COMMERCIAL

## 2024-06-01 ENCOUNTER — HEALTH MAINTENANCE LETTER (OUTPATIENT)
Age: 59
End: 2024-06-01

## 2024-06-26 DIAGNOSIS — K74.60 LIVER CIRRHOSIS SECONDARY TO NASH (H): Primary | ICD-10-CM

## 2024-06-26 DIAGNOSIS — K75.81 LIVER CIRRHOSIS SECONDARY TO NASH (H): Primary | ICD-10-CM

## 2024-07-02 ENCOUNTER — LAB (OUTPATIENT)
Dept: LAB | Facility: CLINIC | Age: 59
End: 2024-07-02
Payer: COMMERCIAL

## 2024-07-02 DIAGNOSIS — K74.60 LIVER CIRRHOSIS SECONDARY TO NASH (H): ICD-10-CM

## 2024-07-02 DIAGNOSIS — K75.81 LIVER CIRRHOSIS SECONDARY TO NASH (H): ICD-10-CM

## 2024-07-02 LAB
AFP SERPL-MCNC: 3.8 NG/ML
ALBUMIN SERPL BCG-MCNC: 2.8 G/DL (ref 3.5–5.2)
ALP SERPL-CCNC: 81 U/L (ref 40–150)
ALT SERPL W P-5'-P-CCNC: 25 U/L (ref 0–50)
ANION GAP SERPL CALCULATED.3IONS-SCNC: 7 MMOL/L (ref 7–15)
AST SERPL W P-5'-P-CCNC: 65 U/L (ref 0–45)
BILIRUB DIRECT SERPL-MCNC: 1.09 MG/DL (ref 0–0.3)
BILIRUB SERPL-MCNC: 3.5 MG/DL
BUN SERPL-MCNC: 8.2 MG/DL (ref 8–23)
CALCIUM SERPL-MCNC: 8.3 MG/DL (ref 8.6–10)
CHLORIDE SERPL-SCNC: 103 MMOL/L (ref 98–107)
CREAT SERPL-MCNC: 0.86 MG/DL (ref 0.51–0.95)
DEPRECATED HCO3 PLAS-SCNC: 26 MMOL/L (ref 22–29)
EGFRCR SERPLBLD CKD-EPI 2021: 77 ML/MIN/1.73M2
ERYTHROCYTE [DISTWIDTH] IN BLOOD BY AUTOMATED COUNT: 15.6 % (ref 10–15)
GLUCOSE SERPL-MCNC: 100 MG/DL (ref 70–99)
HCT VFR BLD AUTO: 38.9 % (ref 35–47)
HGB BLD-MCNC: 13.3 G/DL (ref 11.7–15.7)
MCH RBC QN AUTO: 32.9 PG (ref 26.5–33)
MCHC RBC AUTO-ENTMCNC: 34.2 G/DL (ref 31.5–36.5)
MCV RBC AUTO: 96 FL (ref 78–100)
PLATELET # BLD AUTO: 68 10E3/UL (ref 150–450)
POTASSIUM SERPL-SCNC: 3.6 MMOL/L (ref 3.4–5.3)
PROT SERPL-MCNC: 7 G/DL (ref 6.4–8.3)
RBC # BLD AUTO: 4.04 10E6/UL (ref 3.8–5.2)
SODIUM SERPL-SCNC: 136 MMOL/L (ref 135–145)
WBC # BLD AUTO: 4.9 10E3/UL (ref 4–11)

## 2024-07-02 PROCEDURE — 80053 COMPREHEN METABOLIC PANEL: CPT

## 2024-07-02 PROCEDURE — 82248 BILIRUBIN DIRECT: CPT

## 2024-07-02 PROCEDURE — 36415 COLL VENOUS BLD VENIPUNCTURE: CPT

## 2024-07-02 PROCEDURE — 82105 ALPHA-FETOPROTEIN SERUM: CPT

## 2024-07-02 PROCEDURE — 85610 PROTHROMBIN TIME: CPT

## 2024-07-02 PROCEDURE — 85027 COMPLETE CBC AUTOMATED: CPT

## 2024-07-03 LAB — INR PPP: 1.62 (ref 0.85–1.15)

## 2024-07-08 ENCOUNTER — PRE VISIT (OUTPATIENT)
Dept: GASTROENTEROLOGY | Facility: CLINIC | Age: 59
End: 2024-07-08
Payer: COMMERCIAL

## 2024-07-10 ENCOUNTER — VIRTUAL VISIT (OUTPATIENT)
Dept: GASTROENTEROLOGY | Facility: CLINIC | Age: 59
End: 2024-07-10
Attending: NURSE PRACTITIONER
Payer: COMMERCIAL

## 2024-07-10 VITALS — BODY MASS INDEX: 46.96 KG/M2 | HEIGHT: 71 IN

## 2024-07-10 DIAGNOSIS — K74.60 LIVER CIRRHOSIS SECONDARY TO NASH (H): ICD-10-CM

## 2024-07-10 DIAGNOSIS — G93.32 CHRONIC FATIGUE SYNDROME WITH FIBROMYALGIA: Primary | ICD-10-CM

## 2024-07-10 DIAGNOSIS — L40.50 PSORIATIC ARTHROPATHY (H): ICD-10-CM

## 2024-07-10 DIAGNOSIS — M79.7 CHRONIC FATIGUE SYNDROME WITH FIBROMYALGIA: Primary | ICD-10-CM

## 2024-07-10 DIAGNOSIS — R53.81 PHYSICAL DECONDITIONING: ICD-10-CM

## 2024-07-10 DIAGNOSIS — K75.81 LIVER CIRRHOSIS SECONDARY TO NASH (H): ICD-10-CM

## 2024-07-10 PROCEDURE — 99417 PROLNG OP E/M EACH 15 MIN: CPT | Mod: 95 | Performed by: PHYSICIAN ASSISTANT

## 2024-07-10 PROCEDURE — 99205 OFFICE O/P NEW HI 60 MIN: CPT | Mod: 95 | Performed by: PHYSICIAN ASSISTANT

## 2024-07-10 RX ORDER — BUMETANIDE 1 MG/1
1 TABLET ORAL DAILY
COMMUNITY
Start: 2024-05-10 | End: 2024-07-18

## 2024-07-10 ASSESSMENT — PAIN SCALES - GENERAL: PAINLEVEL: EXTREME PAIN (8)

## 2024-07-10 NOTE — Clinical Note
- EGD + EUS w/ with liver biopsy +/- 1.5 cm subcarinal lymph biopsy to rule out granulomatous disease / lymphoma

## 2024-07-10 NOTE — Clinical Note
Please give a call and apologize for delay.  I believe we discussed  - Increase to bumex 2 mg twice daily  - Start spironolactone 100 mg spironolactone  If she wants to do that, please have her repeat BMP in one week. Please fax Petsandra, HUSAM, A1AT genotype  - Please also [facility]

## 2024-07-10 NOTE — PROGRESS NOTES
Virtual Visit Details    Type of service:  Video Visit   Joined the call at 7/10/2024, 10:45:15 am.  Left the call at 7/10/2024, 11:47:56 am.  Originating Location (pt. Location): Home  Distant Location (provider location):  Off-site  Platform used for Video Visit: [facility]    Hepatology Clinic note  Karlene Yun   Date of Birth 1965    REASON FOR CONSULTATION: cirrhosis   REFERRING PROVIDER:Lyn Martinez          Assessment/plan:   Karlene Yun is a 58 year old female with history of cirrhosis thought to be secondary to metabolic associated steatohepatitis. Metabolic risk factors include: obesity, psoriasis, dyslipidemia, HLD     # Liver dysfunction, moderate:   - MELD 3.0 19 driven by Tbili 3.5 (primarily indirect), Albumin 2.8 and INR of 1.62.     #Metabolic associated fatty liver disease (MALFD):   - Recommend more aggressive optimization blood glucose/insulin resistance as needed. Agree with GLP-1 agonist (semliglutide or liraglutide) for both insulin resistance and help with weight loss or pioglitizone   - Improve nutrition: continue limiting carbohydrates, especially simple carbohydrates, and following Mediterranean eating patten  - Increase physical activity as able, ideally 150 minutes weekly  - Avoid alcohol     #Liver transplantation:   -Discussed indications for liver transplantation.  She currently does not have any decompensating problems aside from some lower extremity fluid overload.   - Discussed importance of improving her overall function status and conditioning    # Protein calorie malnutrition:   - Increase protein intake to  gm protein day    # History of lower extremity edema:  # No history of ascites.    # History of bilateral pleural effusions  - Increase to bumex 2 mg twice daily   - Start spironolactone 100 mg spironolactone   - Repeat BMP in one week     # Variceal screening:   - EGD + EUS w/ with liver biopsy +/- 1.5 cm subcarinal lymph biopsy to rule out  granulomatous disease or infiltrative disease     # History of elevated transaminases, unclear etiology   Risk factors: prediabetes, obesity   - Biopsy as above     # Deconditioning:   - PT for optimization as able     # History of cardiomegaly/ thought to be due to HFpEF:  # Small bilateral pleural effusions w/ groundglass opacity:  # Enlarged mediastinal and 1.5 cm subcarinal lymph node (upper limit of normal):   - Continue optimization of heart function as needed    # History of psoriatic arthritis :  # Osteoarthritis:  - Currently on Skirizi through dermatology   - Recommend following with a rheumatology for more confirmation and optimization of arthropathy as able    # Peth, BMP    # Follow-up in clinic with a hepatologist in 3 months     Latricia Orta PA-C   [facility]at[facility]     Total time for E/M services performed on the date of the encounter 75 minutes.  This included review of previous: clinic visits, hospital records, lab results, imaging studies, and procedural documentation. Time also includes patient visit, documentation and discussion with other providers.  The findings from this review are summarized in the above note.     -----------------------------------------------------       HPI:   Karlene Yun is a 58 year old female  presenting for the evaluation of cirrhosis.    Cirrhosis:  HAMPTON:   Diagnosis: August 2022 on MR elastography through [facility]  EGD: 8/5/2022, normal esophagus without varices.    Patient reports she was diagnosed with cirrhosis about a year and a half ago (2022).  Thought to be due to fatty liver disease.    Skyrizi 150 q 3 months  Wegovy - just started   Gabapentin 300 mg twice daily   Levothyroxine 112 mcg    Diagnosed with cirrhosis within the past few years for the evaluation of persistently low months.  She had MR elastography that showed: This yields an average liver stiffness: 10.5 kPa which indicates cirrhosis.  Nodular appearing liver,  portosystemic collaterals and splenomegaly.    Long history of weight problems.  Per chart speaking 325 pounds, BMI 47.   Appetite is good. Try to limit sodium intake.   Having swelling in legs.   Worked great for awhile   1 mg bumex once daily, about 5-6 days a week.   Can't take arthritis medicine.     Methotrexate - stopped when found out had cirrhosis. 25 years - 2.5 x 8. Currently on SkiRizi for psoriasis and psoriatic arthritis.  Muscle pain, muscle weakness and joint pain (every joint that you have). Some is osteoarthritis. Dermatologist prescribes SkiRizi. Keeps getting harder and harder.     In 2002, had a pulmonary embolism. Was on birth control at that time.   Has rosacea veins and blemishes.     Once daily, formed. Black stools - once a month. Patient also denies hematochezia hematemesis. Has NEREYDA. Need a new mouth piece.     Patient denies jaundice, lower extremity edema, abdominal distension or confusion.  Patient denies fevers, sweats or chills.    PMH:    has a past medical history of Hyperlipidemia (3/17/2005), Hypothyroidism (6/26/2002), Major depressive disorder, recurrent episode, in full remission (H24) (1/21/2002), Morbid obesity (H) (5/12/2019), and Psoriatic arthropathy (H) (1/21/2002).  Fibromyalgia , history of pulmonary embolism, rosacea    SMH:    has a past surgical history that includes REMOVAL OF TONSILS,<11 Y/O; lasik (2004); colonoscopy (2/11/2016); and IR Lumbar Puncture (6/30/2023).     No previous tobacco use. No history of heavy alcohol use. Maybe 1-2 a month previously. No previous IV/IN drug use.  Currently working part-time in yarn shop in retail. Patient currently lives alone. Patrica lives about 15-20 minutes. No known family history of liver disease.       Previous work-up:   Lab Results   Component Value Date    TSH 1.96 04/22/2020    CHOL 213 (H) 03/01/2018    HDL 46 (L) 03/01/2018     (H) 03/01/2018    TRIG 157 (H) 03/01/2018    A1C 5.7 05/03/2019   Chronic hepatitis  C antibody negative  Hepatitis B surface antigen negative  KATINA positive, 1: 640  Smooth muscle antibody negative   AMA negative  A1AT - MZ   IgG - 1655   TTG negative   Iron panel     Recent Labs   Lab Test 07/02/24  1448   ALKPHOS 81   ALT 25   AST 65*   BILITOTAL 3.5*             Allergies:     Allergies   Allergen Reactions    Sumatriptan Succinate      IMITREX            Social History:     Social History     Socioeconomic History    Marital status:      Spouse name: Not on file    Number of children: 1    Years of education: Not on file    Highest education level: Not on file   Occupational History    Not on file   Tobacco Use    Smoking status: Never    Smokeless tobacco: Never   Substance and Sexual Activity    Alcohol use: Yes     Alcohol/week: 0.0 standard drinks of alcohol     Comment: very rare occasions    Drug use: No    Sexual activity: Not on file   Other Topics Concern    Not on file   Social History Narrative    Not on file     Social Determinants of Health     Financial Resource Strain: Low Risk  (4/22/2020)    Overall Financial Resource Strain (CARDIA)     Difficulty of Paying Living Expenses: Not very hard   Food Insecurity: No Food Insecurity (10/11/2023)    Received from [facility]    Hunger Vital Sign     Worried About Running Out of Food in the Last Year: Never true     Ran Out of Food in the Last Year: Never true   Transportation Needs: No Transportation Needs (4/22/2020)    PRAPARE - Transportation     Lack of Transportation (Medical): No     Lack of Transportation (Non-Medical): No   Physical Activity: Not on file   Stress: Not on file   Social Connections: Not on file   Interpersonal Safety: Not on file   Housing Stability: Not on file            Family History:     Family History   Problem Relation Age of Onset    Cancer Sister         cervical    Arthritis Paternal Grandmother     Diabetes Father             Review of Systems:   Gen: See HPI     HEENT: No change in vision or  "hearing, mouth sores, dysphagia, lymph nodes  Resp: No shortness of breath, coughing, hx of asthma  CV: No chest pain, palpitations, syncope   GI: See HPI  : No dysuria, history of stones, urine color    Skin: No rash; no pruritus or psoriasis  MS: No arthralgias, myalgias, joint swelling  Neuro: No memory changes, confusion, numbness    Heme: No difficulty clotting, bruising, bleeding  Psych:  No anxiety, depression, agitation          Physical Exam:   VS:  Ht 1.791 m (5' 10.5\")   BMI 46.96 kg/m        GENERAL: healthy, alert and no distress  EYES: Eyes grossly normal to inspection, conjunctivae and sclerae normal  RESP: no audible wheeze, cough, or visible cyanosis.  No visible retractions or increased work of breathing.  Able to speak fully in complete sentences  NEURO: Cranial nerves grossly intact, mentation intact and speech normal  PSYCH: mentation appears normal, affect normal/bright, judgement and insight intact, normal speech and appearance well-groomed         Data:   Reviewed in person and significant for:    Lab Results   Component Value Date     07/02/2024     03/01/2018      Lab Results   Component Value Date    POTASSIUM 3.6 07/02/2024    POTASSIUM 4.3 03/01/2018     Lab Results   Component Value Date    CHLORIDE 103 07/02/2024    CHLORIDE 102 03/01/2018     Lab Results   Component Value Date    CO2 26 07/02/2024    CO2 22 03/01/2018     Lab Results   Component Value Date    BUN 8.2 07/02/2024    BUN 20 03/01/2018    BUN NOT APPLICABLE 03/01/2018     Lab Results   Component Value Date    CR 0.86 07/02/2024    CR 0.76 03/01/2018       Lab Results   Component Value Date    WBC 4.9 07/02/2024    WBC 5.7 04/22/2020     Lab Results   Component Value Date    HGB 13.3 07/02/2024    HGB 16.1 04/22/2020     Lab Results   Component Value Date    HCT 38.9 07/02/2024    HCT 48.0 04/22/2020     Lab Results   Component Value Date    MCV 96 07/02/2024    MCV 96.4 04/22/2020     Lab Results " "  Component Value Date    PLT 68 07/02/2024     04/22/2020       Lab Results   Component Value Date    AST 65 07/02/2024    AST 35 02/15/2012     Lab Results   Component Value Date    ALT 25 07/02/2024    ALT 40 02/15/2012     No results found for: \"BILICONJ\"   Lab Results   Component Value Date    BILITOTAL 3.5 07/02/2024    BILITOTAL 0.5 06/22/2010       Lab Results   Component Value Date    ALBUMIN 2.8 07/02/2024    ALBUMIN 4.2 11/14/2011     Lab Results   Component Value Date    PROTTOTAL 7.0 07/02/2024    PROTTOTAL 6.6 06/22/2010      Lab Results   Component Value Date    ALKPHOS 81 07/02/2024    ALKPHOS 55 06/22/2010       Lab Results   Component Value Date    INR 1.62 07/02/2024    INR 2.27 06/11/2002         Imaging:      US Abd RUQ Organs    Anatomical Region Laterality Modality   Abdomen -- Ultrasound     Impression    COMPARISON:  CT abdomen and pelvis 10/10/2023, abdominal MRI 09/06/2020. ultrasound 07/27/2022    FINDINGS:    Pancreas: Partially obscured but visualized portions are unremarkable.    Liver: Nodular contour. Inhomogeneous echotexture. No focal hepatic lesion.    Gallbladder: No stones, sludge, wall thickening, or pericholecystic fluid.    Sonographic Dias's Sign: No.    CBD: 0.5 cm.    Right Kidney: Measures 13.0 x 5.7 x 4.8 cm. 3.1 x 3.1 x 2.6 cm cyst in the superior pole.    Ascites: None.    IMPRESSION: Cirrhotic morphology of the liver without a focal hepatic lesion.  Procedure Note    Lin Lee MD - 03/28/2024  Formatting of this note might be different from the original.  IMPRESSION  COMPARISON:  CT abdomen and pelvis 10/10/2023, abdominal MRI 09/06/2020. ultrasound 07/27/2022    FINDINGS:    Pancreas: Partially obscured but visualized portions are unremarkable.    Liver: Nodular contour. Inhomogeneous echotexture. No focal hepatic lesion.    Gallbladder: No stones, sludge, wall thickening, or pericholecystic fluid.    Sonographic Dias's Sign: No.    CBD: 0.5 " cm.    Right Kidney: Measures 13.0 x 5.7 x 4.8 cm. 3.1 x 3.1 x 2.6 cm cyst in the superior pole.    Ascites: None.    IMPRESSION: Cirrhotic morphology of the liver without a focal hepatic lesion.      Findings:       The esophagus was normal.        A few dispersed diminutive erosions with no bleeding        and no stigmata of recent bleeding were found in the        gastric antrum.        The exam of the stomach was otherwise normal.        The examined duodenum was normal.   Moderate Sedation:        Moderate (conscious) sedation was administered by the        endoscopy nurse and supervised by the endoscopist.        The patient's oxygen saturation, heart rate, blood        pressure and response to care were monitored. Total        physician intraservice time was 10 minutes.   Impression:            - Normal esophagus. No varices.                          - Mild erosive gastropathy due to                          daily low dose aspirin and regular                          nsaid use.                          - No gastric varices and no obvious                          PHG.                          - Normal examined duodenum.                          - No specimens collected.       Follow-up MR elastography on August 1, 2022 showed a cirrhotic appearing liver. Average stiffness levels were 10.5 kilopascal consistent with cirrhosis there was no significant iron deposition. She had a sequela portal hypertension including splenomegaly, recanalization of the umbilical vein, and left upper quadrants 1 renal shunts.     COMPARISON: None.     TECHNIQUE: CT of the chest following the administration of 100 cc Isovue-370 intravenous contrast administration per pulmonary embolism protocol. Multiplanar reconstructions.     FINDINGS:     Diagnostic quality: Adequate.     Pulmonary vasculature: Patent. No pulmonary artery embolism.     Right heart strain: None evident.     Lower neck: Unremarkable.     Lungs and pleura: Small  bilateral pleural effusions, both with overlying atelectasis and groundglass opacity. Fluid also extends into the superior aspect of the right major fissure. Scattered smooth interlobular septal thickening in both lungs. No pneumothorax.     Mediastinum/cami/axilla: The heart is enlarged. No pericardial effusion. There are mildly enlarged mediastinal and left axillary lymph nodes, including a 1.5 cm subcarinal lymph node on image 24 of series 14.. Nonenlarged right axillary lymph nodes are noted.     Upper abdomen: See concurrently obtained separately dictated abdomen and pelvis CT.     Musculoskeletal: No aggressive appearing bone lesion.     IMPRESSION:   1. No PE.   2. Small bilateral pleural effusions, both with overlying atelectasis and groundglass opacity.   3. Smooth interlobular septal thickening in both lungs, most likely edema.   4. Enlarged heart.   5. Mildly enlarged mediastinal and left axillary lymph nodes, nonspecific.       COMPARISON:  MRI 02/08/2023.    TECHNIQUE:  Multiplanar, multisequence MR images of the abdomen were obtained before and following the administration of 15 mL GADOBUTROL 1 MMOL/ML IV SOLN with dynamic acquisition.    FINDINGS: Cirrhotic appearance of the liver. Sequelae of portal hypertension include distention of the portal vein and splenomegaly measuring up to approximately 17 cm caudal dimension which is similar to prior. Trace bilateral pleural effusions without ascites. The hepatic and portal veins are patent. Small collateral vessels within the left upper quadrant.    Reticular T2 signal throughout the liver with reticular pattern of enhancement indicating fibrosis. No focal arterial hyperenhancing observation to suggest HCC-spectrum lesion.    Gallbladder unremarkable. No bile duct dilation. Pancreas unremarkable. Adrenals unremarkable. Right renal cortical cyst. Kidneys otherwise unremarkable. No lymph node enlargement or focal osseous lesion.    IMPRESSION:  1. Cirrhotic  liver with portal hypertension, including splenomegaly.  2. No focal liver observation.  3. Trace bilateral pleural effusions.  Exam End: 09/06/23 11:43 AM    Specimen Collected: 09/06/23 10:50 AM Last Resulted: 09/06/23  2:06 PM     NM MPI WITH LEXISCAN  Order: [id number]  Narrative    Summary    1. A regadenoson infusion pharmacologic stress test was performed.    2. The patient experienced dyspnea during the stress test.    3. Negative stress ECG for ST segment depression.    4. Myocardial perfusion images are normal without evidence of infarct or  ischemia.    5. Normal left ventricular systolic function. Calculated LVEF 69 %.    6. Based on this study, the annual cardiovascular mortality rate is low  (less than 1%).    Prior Study Comparison    No prior study for comparison.      Report Signatures  Stress ECG Electronically signed by Pacheco Solano MD on 10/12/2023  12:09 PM  MPI SPECT Electronically signed by Pacheco Solano MD on 10/12/2023  12:09  PM  Exam End: 10/12/23  8:58 AM         echo: EF 55%; nuclear lexiscan negative for ischemia       CT ABDOMEN PELVIS W CONTRAST 10/10/23   Order: [id number]  Impression    COMPARISON: CT 6/28/2023    TECHNIQUE: CT of the abdomen and pelvis with contrast. 100 cc Isovue-370 given intravenously. No oral contrast. Multiplanar reconstructions.    FINDINGS:    Image quality degraded by body habitus.    LOWER CHEST: See concurrently obtained separately dictated CT pulmonary angiogram      ABDOMEN/PELVIS:    Peritoneal cavity and retroperitoneum: No free air or significant free fluid.    Liver: Cirrhotic appearing liver with a nodular surface contour and portosystemic collateralization.    Gallbladder and biliary tree: Unremarkable.    Pancreas: Unremarkable.    Spleen: Spleen measures 16.5 cm in craniocaudad dimension.    Adrenal glands: Unremarkable.    Kidneys and ureters: 3 cm right renal cyst. No hydronephrosis.    Pelvic organs: Suboptimally visualized due  to body habitus related artifact, no definite acute findings.    Stomach and bowel: No bowel obstruction. Mild stranding adjacent to the right colon. There are a few scattered colonic diverticula, without convincing evidence for diverticulitis.    Lymph nodes: No new or enlarging lymph node.    Vasculature: No abdominal aortic aneurysm.    Abdominal wall: Midline ventral wall eventration.      MUSCULOSKELETAL: No aggressive appearing bone lesion.      IMPRESSION:    1. Cirrhotic appearing liver with splenomegaly and portosystemic collateralization, similar to prior.  2. Mild stranding adjacent to the right colon. This may reflect nonspecific colitis or portal colopathy.      MR ABDOMEN W/O & W CONTRAST - 09/06/23   Order: [id number]  Impression    COMPARISON:  MRI 02/08/2023.    TECHNIQUE:  Multiplanar, multisequence MR images of the abdomen were obtained before and following the administration of 15 mL GADOBUTROL 1 MMOL/ML IV SOLN with dynamic acquisition.    FINDINGS: Cirrhotic appearance of the liver. Sequelae of portal hypertension include distention of the portal vein and splenomegaly measuring up to approximately 17 cm caudal dimension which is similar to prior. Trace bilateral pleural effusions without ascites. The hepatic and portal veins are patent. Small collateral vessels within the left upper quadrant.    Reticular T2 signal throughout the liver with reticular pattern of enhancement indicating fibrosis. No focal arterial hyperenhancing observation to suggest HCC-spectrum lesion.    Gallbladder unremarkable. No bile duct dilation. Pancreas unremarkable. Adrenals unremarkable. Right renal cortical cyst. Kidneys otherwise unremarkable. No lymph node enlargement or focal osseous lesion.    IMPRESSION:  1. Cirrhotic liver with portal hypertension, including splenomegaly.  2. No focal liver observation.  3. Trace bilateral pleural effusions.    US Abdomen Limited Portable - 3/28/2024  Impression    COMPARISON:   CT abdomen and pelvis 10/10/2023, abdominal MRI 09/06/2020. ultrasound 07/27/2022    FINDINGS:    Pancreas: Partially obscured but visualized portions are unremarkable.    Liver: Nodular contour. Inhomogeneous echotexture. No focal hepatic lesion.    Gallbladder: No stones, sludge, wall thickening, or pericholecystic fluid.    Sonographic Dias's Sign: No.    CBD: 0.5 cm.    Right Kidney: Measures 13.0 x 5.7 x 4.8 cm. 3.1 x 3.1 x 2.6 cm cyst in the superior pole.    Ascites: None.    IMPRESSION: Cirrhotic morphology of the liver without a focal hepatic lesion.

## 2024-07-10 NOTE — NURSING NOTE
Current patient location: [address] [place] [address] RD   [facility] [address]    Is the patient currently in the state of [place]? YES    Visit mode:VIDEO    If the visit is dropped, the patient can be reconnected by: VIDEO VISIT: Send to e-mail at: [email or web address]    Will anyone else be joining the visit? YES: How would they like to receive their invitation? Text to cell phone: Daughter  (If patient encounters technical issues they should call [phone]    How would you like to obtain your AVS? MyChart    Are changes needed to the allergy or medication list? No    Are refills needed on medications prescribed by this physician? NO    Reason for visit: Consult    Mike HERNANDEZ

## 2024-07-16 NOTE — PATIENT INSTRUCTIONS
- We will have you repeat an EGD (scope of your stomach) and do a liver biopsy and possible lymph node biopsy     You have currently have compensated cirrhosis.    [email or web address]    We look at something called the MELD score and different problems related to cirrhosis (ascites, GI bleeds and confusion) to determine if liver transplantation is ever needed and the benefits outweigh the risks. You are not close to this. We will continue to monitor labs over the years.     - Good nutrition (increase protein to  gm protein daily)   - Sobriety from alcohol remains very important.     - Keep up the good work with weight and optimizing your blood glucose, cholesterol and blood pressure.     [email or web address]    It was a pleasure to see you at your recent visit.   Please let me know if you have any questions or concerns.     Clinic Staff - [phone] option 3     Sincerely,     Latricia Orta PA-C     [address] [facility], [id number]  [place]  [address].

## 2024-07-18 ENCOUNTER — TELEPHONE (OUTPATIENT)
Dept: GASTROENTEROLOGY | Facility: CLINIC | Age: 59
End: 2024-07-18
Payer: COMMERCIAL

## 2024-07-18 DIAGNOSIS — K75.81 LIVER CIRRHOSIS SECONDARY TO NASH (H): Primary | ICD-10-CM

## 2024-07-18 DIAGNOSIS — K74.60 LIVER CIRRHOSIS SECONDARY TO NASH (H): Primary | ICD-10-CM

## 2024-07-18 DIAGNOSIS — R60.0 BILATERAL EDEMA OF LOWER EXTREMITY: ICD-10-CM

## 2024-07-18 RX ORDER — BUMETANIDE 1 MG/1
1 TABLET ORAL DAILY
Qty: 60 TABLET | Refills: 5 | Status: SHIPPED | OUTPATIENT
Start: 2024-07-18

## 2024-07-18 RX ORDER — SPIRONOLACTONE 50 MG/1
100 TABLET, FILM COATED ORAL DAILY
Qty: 60 TABLET | Refills: 0 | Status: SHIPPED | OUTPATIENT
Start: 2024-07-18

## 2024-07-18 NOTE — TELEPHONE ENCOUNTER
Called patient back. Went over that Latricia sent updated prescriptions for both Bumex and Spironolactone to [facility]. Patient aware she will need to get labs checked in one week, orders are in.  Updated patient that one of the [facility] schedulers will reach out to get the biopsy scheduled.    Lin DAN LPN  Hepatology Clinic     ----------------  [facility]    Phone Message    May a detailed message be left on voicemail: yes     Reason for Call: Other: Patient called and had questions for Latricia, in regard to her last visit:  1) liver biopsy referral and 2) prescription for diuretic - spironolactone  Please follow up with patient.     Action Taken: Message routed to:  [facility] ([facility]    Travel Screening: Not Applicable

## 2024-07-19 DIAGNOSIS — K75.81 LIVER CIRRHOSIS SECONDARY TO NASH (H): Primary | ICD-10-CM

## 2024-07-19 DIAGNOSIS — R60.0 BILATERAL EDEMA OF LOWER EXTREMITY: ICD-10-CM

## 2024-07-19 DIAGNOSIS — K74.60 LIVER CIRRHOSIS SECONDARY TO NASH (H): Primary | ICD-10-CM

## 2024-07-19 RX ORDER — SPIRONOLACTONE 50 MG/1
100 TABLET, FILM COATED ORAL DAILY
Qty: 60 TABLET | Refills: 11 | Status: SHIPPED | OUTPATIENT
Start: 2024-07-19

## 2024-07-22 ENCOUNTER — PREP FOR PROCEDURE (OUTPATIENT)
Dept: GASTROENTEROLOGY | Facility: CLINIC | Age: 59
End: 2024-07-22
Payer: COMMERCIAL

## 2024-07-22 ENCOUNTER — PATIENT OUTREACH (OUTPATIENT)
Dept: GASTROENTEROLOGY | Facility: CLINIC | Age: 59
End: 2024-07-22
Payer: COMMERCIAL

## 2024-07-22 DIAGNOSIS — K74.60 CIRRHOSIS OF LIVER (H): Primary | ICD-10-CM

## 2024-07-22 NOTE — PROGRESS NOTES
Please assist in scheduling:     Procedure/Imaging/Clinic: EUS guided liver and lymph node biopsy  Physician: Negrita  Timing: Next available  Scope time: Provider average  Anesthesia: General  Dx: Cirrhosis of liver  Tier:3  Location: [facility]  Patient communication letter header:EUS (Endoscopic Ultrasound) guided liver and lymph node biopsies.

## 2024-07-22 NOTE — PROGRESS NOTES
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     I would like to rule out sarcoidosis / granulomatous and lower suspicion lymphoma.     She has a diagnosis of CHFpEF and cardiomegaly with persistent small bilateral pleural effusion w/ groundglass opacity and mildly enlarged mediastinal and left axillary lymph nodes     MELD 3.0: 19 at 7/2/2024  2:48 PM     Following review of referral per Dr. Rees : Please schedule patient for EUS guided liver biopsy and subcarinal node fine-needle biopsy in the OR. Patient okay to be scheduled when I am on-call.    Please assist in scheduling:     Procedure/Imaging/Clinic: EGD for variceal surveillance and EUS guided liver and lymph node biopsy  Physician: Negrita  Timing: Next available  Scope time: Provider average  Anesthesia: General  Dx: Esophageal variceal surveillance; Cirrhosis of liver  Tier:3  Location: [facility]  Patient communication letter header:EGD (esophagogastroduodenoscopy) and EUS (Endoscopic Ultrasound) guided liver and lymph node biopsies.      Called to discuss with patient. Offered 8/9/24; patient agreeable to schedule.     Patient will need a , someone to stay with them for 24 hours and should stay in town for 24 hours (within 45 min of Hospital) post procedure.    Patient will need a pre-op physical within 30 days of procedure. If outside  Health system, will need physical faxed to number [phone]   If you do not get a preop physical, your procedure could be cancelled, patient voiced understanding*    Preop Plan: PAC scheduling number provided.     Does patient have any history of gastric bypass/gastric surgery/altered panc/bili anatomy? No    Does patient have [employer] insurance? No    Med Review    Blood  thinner -  None  ASA - None  Diabetic - None   Injectable or oral medications for weight loss - None    Patient Education r/t procedure: Discussion / MyChart    A pre-op nurse will call 1-2 days prior to the procedure.    NPO/Prep: No solid food 8 hours prior to arrival at the hospital. Clear liquids okay until 2 hours prior to arrival.     Other specific details/comments: Message to referring provider notes need for EGD for variceal surveillance.      Advised to contact clinic in the event of Covid symptoms or known exposure within 14 days of procedure: Pre-procedure information.     Verbalized understanding of all instructions. All questions answered. Clinic contact and scheduling numbers verified for future questions/concerns. Message routed to OR scheduling.     Fang Snow RN, BSN  Care Coordinator  [facility]

## 2024-07-22 NOTE — NURSING NOTE
PT referral faxed to CAROLIN Joiner @ [phone]. Patient updated.    LYNNETTE MartínezN, RN, PHN  Hepatology Clinic  [facility]  [facility]

## 2024-07-22 NOTE — PROGRESS NOTES
Please assist in scheduling:     Procedure/Imaging/Clinic: EGD for variceal surveillance and EUS guided liver and lymph node biopsy  Physician: Negrita  Timing: Next available  Scope time: Provider average  Anesthesia: General  Dx: Esophageal variceal surveillance; Cirrhosis of liver  Tier:3  Location: [facility]  Patient communication letter header:EGD (esophagogastroduodenoscopy) and EUS (Endoscopic Ultrasound) guided liver and lymph node biopsies.

## 2024-07-23 NOTE — TELEPHONE ENCOUNTER
FUTURE VISIT INFORMATION      SURGERY INFORMATION:  Date: 8/9/24  [facility]  Surgeon:  Guru Jacquelyn Rees MD   Anesthesia Type:  general  Procedure: ESOPHAGOGASTRODUODENOSCOPY for variceal surveillance ESOPHAGOGASTRODUODENOSCOPY, WITH FINE NEEDLE ASPIRATION BIOPSY, WITH ENDOSCOPIC ULTRASOUND GUIDANCE liver and lymph node biopsy     RECORDS REQUESTED FROM:       Primary Care Provider: Femi King MD - [facility]    Most recent EKG+ Tracing: 10/10/23- [facility]    Most recent ECHO: 10/11/23- [facility]    Most recent Sleep Study:  10/31/23- [facility]

## 2024-07-25 ENCOUNTER — LAB (OUTPATIENT)
Dept: LAB | Facility: CLINIC | Age: 59
End: 2024-07-25
Payer: COMMERCIAL

## 2024-07-25 DIAGNOSIS — K74.60 LIVER CIRRHOSIS SECONDARY TO NASH (H): ICD-10-CM

## 2024-07-25 DIAGNOSIS — K75.81 LIVER CIRRHOSIS SECONDARY TO NASH (H): ICD-10-CM

## 2024-07-25 LAB
ANION GAP SERPL CALCULATED.3IONS-SCNC: 5 MMOL/L (ref 7–15)
BUN SERPL-MCNC: 11 MG/DL (ref 8–23)
CALCIUM SERPL-MCNC: 8 MG/DL (ref 8.8–10.4)
CHLORIDE SERPL-SCNC: 105 MMOL/L (ref 98–107)
CREAT SERPL-MCNC: 0.77 MG/DL (ref 0.51–0.95)
EGFRCR SERPLBLD CKD-EPI 2021: 88 ML/MIN/1.73M2
GLUCOSE SERPL-MCNC: 114 MG/DL (ref 70–99)
HCO3 SERPL-SCNC: 25 MMOL/L (ref 22–29)
POTASSIUM SERPL-SCNC: 3.9 MMOL/L (ref 3.4–5.3)
SODIUM SERPL-SCNC: 135 MMOL/L (ref 135–145)

## 2024-07-25 PROCEDURE — G0480 DRUG TEST DEF 1-7 CLASSES: HCPCS | Mod: 90

## 2024-07-25 PROCEDURE — 80048 BASIC METABOLIC PNL TOTAL CA: CPT

## 2024-07-25 PROCEDURE — 36415 COLL VENOUS BLD VENIPUNCTURE: CPT

## 2024-07-27 LAB
LABORATORY REPORT: NORMAL
PETH INTERPRETATION: NORMAL
PLPETH BLD-MCNC: <10 NG/ML
POPETH BLD-MCNC: <10 NG/ML

## 2024-07-29 ENCOUNTER — ANESTHESIA EVENT (OUTPATIENT)
Dept: SURGERY | Facility: CLINIC | Age: 59
End: 2024-07-29
Payer: COMMERCIAL

## 2024-07-29 ENCOUNTER — VIRTUAL VISIT (OUTPATIENT)
Dept: SURGERY | Facility: CLINIC | Age: 59
End: 2024-07-29
Payer: COMMERCIAL

## 2024-07-29 ENCOUNTER — PRE VISIT (OUTPATIENT)
Dept: SURGERY | Facility: CLINIC | Age: 59
End: 2024-07-29

## 2024-07-29 VITALS — WEIGHT: 293 LBS | BODY MASS INDEX: 41.02 KG/M2 | HEIGHT: 71 IN

## 2024-07-29 DIAGNOSIS — Z01.818 PRE-OP EVALUATION: Primary | ICD-10-CM

## 2024-07-29 DIAGNOSIS — K75.81 LIVER CIRRHOSIS SECONDARY TO NASH (H): ICD-10-CM

## 2024-07-29 DIAGNOSIS — R54 FRAILTY: Primary | ICD-10-CM

## 2024-07-29 DIAGNOSIS — K74.60 LIVER CIRRHOSIS SECONDARY TO NASH (H): ICD-10-CM

## 2024-07-29 PROCEDURE — 99204 OFFICE O/P NEW MOD 45 MIN: CPT | Mod: 95 | Performed by: NURSE PRACTITIONER

## 2024-07-29 RX ORDER — SEMAGLUTIDE 1 MG/.5ML
1 INJECTION, SOLUTION SUBCUTANEOUS WEEKLY
COMMUNITY

## 2024-07-29 RX ORDER — RISANKIZUMAB-RZAA 150 MG/ML
150 INJECTION SUBCUTANEOUS
COMMUNITY
Start: 2024-07-15

## 2024-07-29 ASSESSMENT — PAIN SCALES - GENERAL: PAINLEVEL: SEVERE PAIN (7)

## 2024-07-29 ASSESSMENT — ENCOUNTER SYMPTOMS: SEIZURES: 0

## 2024-07-29 ASSESSMENT — LIFESTYLE VARIABLES: TOBACCO_USE: 0

## 2024-07-29 NOTE — PROGRESS NOTES
Karlene is a 58 year old who is being evaluated via a billable video visit.    How would you like to obtain your AVS? MyChart  If the video visit is dropped, the invitation should be resent by: Text to cell phone: [phone]        Objective    Vitals - Patient Reported  Pain Score: Severe Pain (7)  Pain Loc: Low Back

## 2024-07-29 NOTE — PATIENT INSTRUCTIONS
Preparing for Your Surgery      Name:  Karlene Yun   MRN:  [id number]   :  1965   Today's Date:  2024       Arriving for surgery:  Surgery date:  24  Arrival time:  05:30 am        Please come to:      [facility]    [address] [place]  [address]     The [facility] [place] Patient/Visitor Ramp is at [address]. Patients and visitors who self-park will receive the reduced hospital parking rate. If the Patient /Visitor Ramp is full, please follow the signs to the [employer] car park located at the main hospital entrance.       parking is available (24 hours/ 7 days a week)      Discounted parking pass options are available for patients and visitors. They can be purchased at the [employer] desk at the [facility] hospital entrance.     -    Stop at the security desk and they will direct surgery patients to the Surgery Check in and Family Lounge. [phone]        - If you need directions, a wheelchair or an escort please stop at the Information/security desk in the lobby.     What can I eat or drink?  -  You may eat and drink normally up to 8 hours prior to arrival time.   -  You may have clear liquids until 2 hours prior to arrival time.     Examples of clear liquids:  Water  Clear broth  Juices (apple, white grape, white cranberry  and cider) without pulp  Noncarbonated, powder based beverages  (lemonade and Brandyn-Aid)  Sodas (Sprite, 7-Up, ginger ale and seltzer)  Coffee or tea (without milk or cream)  Gatorade    -  No Alcohol or cannabis products for at least 24 hours before surgery.     Which medicines can I take?    Hold Aspirin for 7 days before surgery.   Hold Multivitamins for 7 days before surgery.  Hold Supplements for 7 days before surgery.  Hold Ibuprofen (Advil, Motrin) for 1 day(s) before surgery--unless otherwise directed by surgeon.  Hold Naproxen (Aleve)  for 4 days before surgery.    HOLD WEGOVY X 7 DAYS BEFORE SURGERY - LAST DOSE 8/1/24.    -  DO NOT take these medications the day of surgery:  Bumex and spironolactone.    -  PLEASE TAKE these medications the day of surgery:  Tylenol if needed; take levothyroxine, celexa and gabapentin.    How do I prepare myself?  - Please take 2 showers (one the night prior to surgery and one the morning of surgery) using Scrubcare or Hibiclens soap.    Use this soap only from the neck to your toes.     Leave the soap on your skin for one minute--then rinse thoroughly.      You may use your own shampoo and conditioner. No other hair products.   - Please remove all jewelry and body piercings.  - No lotions, deodorants or fragrance.  - No makeup or fingernail polish.   - Bring your ID and insurance card.    -If you use a CPAP machine, please bring the CPAP machine, tubing, and mask to hospital.    -If you have a Deep Brain Stimulator, Spinal Cord Stimulator, or any Neuro Stimulator device---you must bring the remote control to the hospital.      ALL PATIENTS GOING HOME THE SAME DAY OF SURGERY ARE REQUIRED TO HAVE A RESPONSIBLE ADULT TO DRIVE AND BE IN ATTENDANCE WITH THEM FOR 24 HOURS FOLLOWING SURGERY.    Covid testing policy as of 12/06/2022  Your surgeon will notify and schedule you for a COVID test if one is needed before surgery--please direct any questions or COVID symptoms to your surgeon      Questions or Concerns:    - For any questions regarding the day of surgery or your hospital stay, please contact the Pre Admission Nursing Office at [phone].       - If you have health changes between today and your surgery, please call your surgeon.       - For questions after surgery, please call your surgeons office.           Current Visitor Guidelines    You may have 2 visitors in the pre op area.    Visiting hours: 8 a.m. to 8:30 p.m.    Patients confirmed or suspected to have symptoms of COVID 19 or flu:     No visitors  allowed for adult patients.   Children (under age 18) can have 1 named visitor.     People who are sick or showing symptoms of COVID 19 or flu:    Are not allowed to visit patients--we can only make exceptions in special situations.       Please follow these guidelines for your visit:          Please maintain social distance          Masking is optional--however at times you may be asked to wear a mask for the safety of yourself and others     Clean your hands with alcohol hand . Do this when you arrive at and leave the building and patient room,    And again after you touch your mask or anything in the room.     Go directly to and from the room you are visiting.     Stay in the patient s room during your visit. Limit going to other places in the hospital as much as possible     Leave bags and jackets at home or in the car.     For everyone s health, please don t come and go during your visit. That includes for smoking   during your visit.

## 2024-07-29 NOTE — H&P
Pre-Operative H & P     CC:  Preoperative exam to assess for increased cardiopulmonary risk while undergoing surgery and anesthesia.    Date of Encounter: 7/29/2024  Primary Care Physician:  Femi King     Reason for visit:   Encounter Diagnoses   Name Primary?    Pre-op evaluation Yes    Liver cirrhosis secondary to HAMPTON (H)        HPI  Karlene Yun is a 58 year old female who presents for pre-operative H & P in preparation for  Procedure Information       Case: [id number] Date/Time: 08/09/24 0730    Procedures:       ESOPHAGOGASTRODUODENOSCOPY for variceal surveillance (Esophagus)      ESOPHAGOGASTRODUODENOSCOPY, WITH FINE NEEDLE ASPIRATION BIOPSY, WITH ENDOSCOPIC ULTRASOUND GUIDANCE liver and lymph node biopsy (Esophagus)    Anesthesia type: General    Diagnosis: Cirrhosis of liver (H) [K74.60]    Pre-op diagnosis: Cirrhosis of liver (H) [K74.60]    Location:  OR [address] [place] [address] OR    Providers: Guru Jacquelyn Rees MD            The patient has been scheduled for the procedure as listed above for variceal surveillance.    The patient presents to the PAC virtually today in preparation for the above scheduled procedure with comorbid conditions including history of cirrhosis thought to be secondary to metabolic associated steatohepatitis. Metabolic risk factors include: obesity, psoriasis and dyslipidemia.  Additional comorbid conditions include  hypothyrotid, depression and fibromyalgia.              History is obtained from the patient and chart review    Hx of abnormal bleeding or anti-platelet use: denies    Menstrual history: No LMP recorded (lmp unknown). Patient has had an ablation.:      Past Medical History  Past Medical History:   Diagnosis Date    Hyperlipidemia 3/17/2005     Problem list name updated by automated process. Provider to review    Hypothyroidism 6/26/2002     Problem list name updated by automated process. Provider to review    Major depressive disorder, recurrent  "episode, in full remission (H24) 1/21/2002    Morbid obesity (H) 5/12/2019    Psoriatic arthropathy (H) 1/21/2002       Past Surgical History  Past Surgical History:   Procedure Laterality Date    COLONOSCOPY  2/11/2016    mild colitis/ repeat 10 yrs    HC REMOVAL OF TONSILS,<12 Y/O      Tonsils <11y.o.    IR LUMBAR PUNCTURE  6/30/2023    LASIK  2004    \"lasek\"       Prior to Admission Medications  Current Outpatient Medications   Medication Sig Dispense Refill    bumetanide (BUMEX) 1 MG tablet Take 1 tablet (1 mg) by mouth daily (Patient taking differently: Take 1 mg by mouth every morning) 60 tablet 5    gabapentin (NEURONTIN) 300 MG capsule Take 600 mg by mouth every evening      Semaglutide-Weight Management (WEGOVY) 1 MG/0.5ML pen Inject 1 mg subcutaneously once a week Thursdays      SKYRIZI  MG/ML subcutaneous Inject 150 mg subcutaneously once every twelve weeks      spironolactone (ALDACTONE) 50 MG tablet Take 2 tablets (100 mg) by mouth daily (Patient taking differently: Take 100 mg by mouth every morning) 60 tablet 11    spironolactone (ALDACTONE) 50 MG tablet Take 2 tablets (100 mg) by mouth daily (Patient taking differently: Take 100 mg by mouth every morning) 60 tablet 0    tacrolimus (PROTOPIC) 0.1 % external ointment Apply topically as needed      traZODone (DESYREL) 50 MG tablet Take 100 mg by mouth At Bedtime  0    triamcinolone (KENALOG) 0.1 % external cream Apply topically as needed      citalopram (CELEXA) 20 MG tablet Take 1 tablet (20 mg) by mouth daily (Patient taking differently: Take 20 mg by mouth every morning) 90 tablet 3    levothyroxine (SYNTHROID/LEVOTHROID) 112 MCG tablet Take 1 tablet (112 mcg) by mouth daily (Patient taking differently: Take 112 mcg by mouth every morning) 90 tablet 3       Allergies  Allergies   Allergen Reactions    Sumatriptan Succinate Nausea and Vomiting     IMITREX       Social History  Social History     Socioeconomic History    Marital status:  "     Spouse name: Not on file    Number of children: 1    Years of education: Not on file    Highest education level: Not on file   Occupational History    Not on file   Tobacco Use    Smoking status: Never    Smokeless tobacco: Never   Substance and Sexual Activity    Alcohol use: Not Currently     Comment: very rare occasions    Drug use: No    Sexual activity: Not on file   Other Topics Concern    Not on file   Social History Narrative    Not on file     Social Determinants of Health     Financial Resource Strain: Low Risk  (4/22/2020)    Overall Financial Resource Strain (CARDIA)     Difficulty of Paying Living Expenses: Not very hard   Food Insecurity: No Food Insecurity (10/11/2023)    [facility]    Hunger Vital Sign     Worried About Running Out of Food in the Last Year: Never true     Ran Out of Food in the Last Year: Never true   Transportation Needs: No Transportation Needs (4/22/2020)    PRAPARE - Transportation     Lack of Transportation (Medical): No     Lack of Transportation (Non-Medical): No   Physical Activity: Not on file   Stress: Not on file   Social Connections: Not on file   Interpersonal Safety: Not on file   Housing Stability: Not on file       Family History  Family History   Problem Relation Age of Onset    Cancer Sister         cervical    Arthritis Paternal Grandmother     Diabetes Father        Review of Systems  The complete review of systems is negative other than noted in the HPI or here.   Anesthesia Evaluation   Pt has had prior anesthetic. Type: MAC.    No history of anesthetic complications       ROS/MED HX  ENT/Pulmonary:     (+) sleep apnea, doesn't use CPAP,         allergic rhinitis,                          (-) tobacco use and asthma   Neurologic: Comment: Fibromyalgia- gabapentin   (-) no seizures, no CVA, no TIA and migraines   Cardiovascular:     (+)  - -   -  - -                                 Previous cardiac testing   Echo: Date: 10/2023  Results:  ECHOCARDIOGRAM.   Date: 10/11/2023 Start: 08:00 AM [facility]     CONCLUSIONS   Left ventricular ejection fraction is visually estimated at 55%.   Normal right ventricle size and normal global function.   No hemodynamically significant valvular abnormalities.   There is no pericardial effusion.     Compared with the previous study dated 8/16/23, no significant   interval changes have occurred.     Stress Test:  Date: 10/2023 Results:  NM MPI WITH LEXISCAN  Order: [id number]  Narrative    Summary    1. A regadenoson infusion pharmacologic stress test was performed.    2. The patient experienced dyspnea during the stress test.    3. Negative stress ECG for ST segment depression.    4. Myocardial perfusion images are normal without evidence of infarct or  ischemia.    5. Normal left ventricular systolic function. Calculated LVEF 69 %.    6. Based on this study, the annual cardiovascular mortality rate is low  (less than 1%).    Prior Study Comparison    No prior study for comparison.    ECG Reviewed:  Date: Results:    Cath:  Date: Results:      METS/Exercise Tolerance:  Comment: Works in a yarn shop.     Hematologic:     (+) History of blood clots (h/o pulmonary embolism 2002.  Completed warfarin therapy.),    pt is anticoagulated,        (-) anemia and history of blood transfusion   Musculoskeletal: Comment: Psoriatic arthropathy  Osteoarthritis  (+)  arthritis,             GI/Hepatic: Comment: cirrhosis thought to be secondary to metabolic associated steatohepatitis. Metabolic risk factors include: obesity, psoriasis and dyslipidemia.    (+)             liver disease,    (-) GERD, appendicitis and cholecystitis/cholelithiasis   Renal/Genitourinary:    (-) renal disease and nephrolithiasis   Endo:     (+)          thyroid problem, hypothyroidism,    Obesity,    (-) Type I DM, Type II DM and chronic steroid usage   Psychiatric/Substance Use:     (+) psychiatric history (Stable on  currently stable.) depression    (-) alcohol abuse history and chronic opioid use history   Infectious Disease:  - neg infectious disease ROS     Malignancy:  - neg malignancy ROS     Other:      (+)  , H/O Chronic Pain,, other significant disability (Uses a cane for mobility.) Other (comment)         Virtual visit -  No vitals were obtained    Physical Exam  Constitutional: Awake, alert, cooperative, no apparent distress, and appears stated age.  Eyes: Pupils equal  HENT: Normocephalic  Respiratory: non labored breathing   Neurologic: Awake, alert, oriented to name, place and time.   Neuropsychiatric: Calm, cooperative. Normal affect.      Prior Labs/Diagnostic Studies   All labs and imaging personally reviewed    Latest Reference Range & Units 07/25/24 10:58   Sodium 135 - 145 mmol/L 135   Potassium 3.4 - 5.3 mmol/L 3.9   Chloride 98 - 107 mmol/L 105   Carbon Dioxide (CO2) 22 - 29 mmol/L 25   Urea Nitrogen 8.0 - 23.0 mg/dL 11.0   Creatinine 0.51 - 0.95 mg/dL 0.77   GFR Estimate >60 mL/min/1.73m2 88   Calcium 8.8 - 10.4 mg/dL 8.0 (L)   Anion Gap 7 - 15 mmol/L 5 (L)   Glucose 70 - 99 mg/dL 114 (H)   PEth 16:0/18:1 (POPEth) ng/mL <10   PEth 16:0/18:2 (PLPEth) ng/mL <10   PEth Interpretation  See Comment   EER Phosphatidylethanol (PETH)  See Note   (L): Data is abnormally low  (H): Data is abnormally high     Latest Reference Range & Units 07/02/24 14:48   Sodium 135 - 145 mmol/L 136   Potassium 3.4 - 5.3 mmol/L 3.6   Chloride 98 - 107 mmol/L 103   Carbon Dioxide (CO2) 22 - 29 mmol/L 26   Urea Nitrogen 8.0 - 23.0 mg/dL 8.2   Creatinine 0.51 - 0.95 mg/dL 0.86   GFR Estimate >60 mL/min/1.73m2 77   Calcium 8.6 - 10.0 mg/dL 8.3 (L)   Anion Gap 7 - 15 mmol/L 7   Albumin 3.5 - 5.2 g/dL 2.8 (L)   Protein Total 6.4 - 8.3 g/dL 7.0   Alkaline Phosphatase 40 - 150 U/L 81   ALT 0 - 50 U/L 25   AST 0 - 45 U/L 65 (H)   AFP tumor marker <=8.3 ng/mL 3.8   Bilirubin Direct 0.00 - 0.30 mg/dL 1.09 (H)   Bilirubin Total <=1.2 mg/dL 3.5  (H)   Glucose 70 - 99 mg/dL 100 (H)   WBC 4.0 - 11.0 10e3/uL 4.9   Hemoglobin 11.7 - 15.7 g/dL 13.3   Hematocrit 35.0 - 47.0 % 38.9   Platelet Count 150 - 450 10e3/uL 68 (L)   RBC Count 3.80 - 5.20 10e6/uL 4.04   MCV 78 - 100 fL 96   MCH 26.5 - 33.0 pg 32.9   MCHC 31.5 - 36.5 g/dL 34.2   RDW 10.0 - 15.0 % 15.6 (H)   INR 0.85 - 1.15  1.62 (H)   (L): Data is abnormally low  (H): Data is abnormally high    Hemoglobin A1C   Date Value Ref Range Status   05/03/2019 5.7 4.0 - 7.0 % Final       EKG/ stress test - if available please see in ROS above   No results found.       No data to display                  The patient's records and results personally reviewed by this provider.     Outside records reviewed from: Care Everywhere      Assessment    Karlene Yun is a 58 year old female seen as a PAC referral for risk assessment and optimization for anesthesia.    Plan/Recommendations  Pt will be optimized for the proposed procedure.  See below for details on the assessment, risk, and preoperative recommendations    NEUROLOGY  - No history of TIA, CVA or seizure    - Fibromyalgia   ~ gabapentin    -Post Op delirium risk factors:  No risk identified    ENT  - No current airway concerns.  Will need to be reassessed day of surgery.  Mallampati: Unable to assess  TM: Unable to assess    CARDIAC  - No history of CAD, Hypertension, and Afib    - HFpEF newly diagnosed last October 2023.  Reports ~30 pound weight loss after starting diuretics last fall.  Per cardiology's note, liver disease may play a role also.  Today reports feeling well and weight has remained stable. Hold bumex and spironolactone DOS 2/2 general anesthesia.     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  Lots of bending and walking. Denies cardiac symptoms.     - RCRI-Low risk: Class 2 0.9% complication rate             Total Score: 1    RCRI: CHF     "    PULMONARY  - Obstructive Sleep Apnea, untreated   ~ reports she is in the process of being fitted for a CPAP.     - Denies asthma or inhaler use    - Tobacco History    History   Smoking Status    Never   Smokeless Tobacco    Never       GI  - Cirrhosis of liver   ~ Thought to be secondary to metabolic associated steatohepatitis.   ~ Metabolic risk factors include: obesity, psoriasis and dyslipidemia.    ~ above procedure scheduled     - Denies h/o GERD    PONV High Risk  Total Score: 3           1 AN PONV: Pt is Female    1 AN PONV: Patient is not a current smoker    1 AN PONV: Intended Post Op Opioids        /RENAL  - Baseline Creatinine  see above     ENDOCRINE    - BMI: Estimated body mass index is 45.27 kg/m  as calculated from the following:    Height as of this encounter: 1.791 m (5' 10.5\").    Weight as of this encounter: 145.2 kg (320 lb).  Class 3 Obesity (BMI > 40)   ~ Wegovy taken every Thursday.  Last dose will be on 8/1/2024 prior to procedure    - Thyroid disorder   ~ Continue home replacement while hospitalized.    HEME  - VTE Low Risk 0.5%             Total Score: 4    VTE: BMI greater than 39    VTE: Pt history of VTE      - No history of abnormal bleeding or antiplatelet use.    - Denies a h/o anemia or previous blood transfusion    - Thrombocytopenia in liver disease    MSK  - Psoriatic arthritis   ~ Skirizi prescribed by her dermatologist with last dose on 5/13/2024.  Next dose due in August after  this procedure.     - Patient IS Frail             Total Score: 3    Frailty: Slower walking speed    Frailty: Decrease in strength    Frailty: Overall lack of energy      Due to the patient's risk, a referral to Physical Medicine and Rehabilitation has been made.  We are making you aware, as rehabilitation will be recommended before surgery and all recommendations from the PMR physician should be in collaboration with you as the patient's primary care provider (PCP).    PSYCH  - Depression, " stable on trazodone and Celexa.     Different anesthesia methods/types have been discussed with the patient, but they are aware that the final plan will be decided by the assigned anesthesia provider on the date of service.    The patient is optimized for their procedure. AVS with information on surgery time/arrival time, meds and NPO status given by nursing staff. No further diagnostic testing indicated.    Please refer to the physical examination documented by the anesthesiologist in the anesthesia record on the day of surgery.    Video-Visit Details    Type of service:  Video Visit    Provider received verbal consent for a Video Visit from the patient? Yes     Originating Location (pt. Location): Home    Distant Location (provider location):  Off-site  Mode of Communication:  Video Conference via [employer]  On the day of service:     Prep time: 14 minutes  Visit time: 16 minutes  Documentation time: 15 minutes  ------------------------------------------  Total time: 45 minutes      MERRITT Brown CNP  Preoperative Assessment Center  [facility]  Phone: [phone]  Fax: [phone]

## 2024-08-01 NOTE — PROGRESS NOTES
Contacted patient to offer earlier procedure date of 8/5. Patient is unable to change the date of her appointment as her  has taken off work. Will maintain procedure as scheduled on 8/9/24.    Fang Snow RN Care Coordinator

## 2024-08-09 ENCOUNTER — HOSPITAL ENCOUNTER (OUTPATIENT)
Facility: CLINIC | Age: 59
Discharge: HOME | End: 2024-08-09
Attending: INTERNAL MEDICINE | Admitting: INTERNAL MEDICINE
Payer: COMMERCIAL

## 2024-08-09 ENCOUNTER — ANESTHESIA (OUTPATIENT)
Dept: SURGERY | Facility: CLINIC | Age: 59
End: 2024-08-09
Payer: COMMERCIAL

## 2024-08-09 VITALS
BODY MASS INDEX: 41.95 KG/M2 | SYSTOLIC BLOOD PRESSURE: 129 MMHG | HEIGHT: 70 IN | HEART RATE: 75 BPM | TEMPERATURE: 97.6 F | RESPIRATION RATE: 16 BRPM | DIASTOLIC BLOOD PRESSURE: 58 MMHG | WEIGHT: 293 LBS | OXYGEN SATURATION: 97 %

## 2024-08-09 LAB — UPPER EUS: NORMAL

## 2024-08-09 PROCEDURE — 250N000009 HC RX 250: Performed by: INTERNAL MEDICINE

## 2024-08-09 PROCEDURE — 88313 SPECIAL STAINS GROUP 2: CPT | Mod: 26 | Performed by: PATHOLOGY

## 2024-08-09 PROCEDURE — 272N000001 HC OR GENERAL SUPPLY STERILE: Performed by: INTERNAL MEDICINE

## 2024-08-09 PROCEDURE — 250N000011 HC RX IP 250 OP 636: Performed by: NURSE ANESTHETIST, CERTIFIED REGISTERED

## 2024-08-09 PROCEDURE — 88313 SPECIAL STAINS GROUP 2: CPT | Mod: TC | Performed by: INTERNAL MEDICINE

## 2024-08-09 PROCEDURE — 88173 CYTOPATH EVAL FNA REPORT: CPT | Mod: 26 | Performed by: PATHOLOGY

## 2024-08-09 PROCEDURE — 250N000009 HC RX 250: Performed by: NURSE ANESTHETIST, CERTIFIED REGISTERED

## 2024-08-09 PROCEDURE — 88305 TISSUE EXAM BY PATHOLOGIST: CPT | Mod: 26 | Performed by: PATHOLOGY

## 2024-08-09 PROCEDURE — 360N000083 HC SURGERY LEVEL 3 W/ FLUORO, PER MIN: Performed by: INTERNAL MEDICINE

## 2024-08-09 PROCEDURE — 250N000011 HC RX IP 250 OP 636: Performed by: INTERNAL MEDICINE

## 2024-08-09 PROCEDURE — 250N000011 HC RX IP 250 OP 636: Performed by: ANESTHESIOLOGY

## 2024-08-09 PROCEDURE — 43235 EGD DIAGNOSTIC BRUSH WASH: CPT | Performed by: NURSE ANESTHETIST, CERTIFIED REGISTERED

## 2024-08-09 PROCEDURE — C1889 IMPLANT/INSERT DEVICE, NOC: HCPCS | Performed by: INTERNAL MEDICINE

## 2024-08-09 PROCEDURE — 258N000003 HC RX IP 258 OP 636: Performed by: INTERNAL MEDICINE

## 2024-08-09 PROCEDURE — 999N000141 HC STATISTIC PRE-PROCEDURE NURSING ASSESSMENT: Performed by: INTERNAL MEDICINE

## 2024-08-09 PROCEDURE — 370N000017 HC ANESTHESIA TECHNICAL FEE, PER MIN: Performed by: INTERNAL MEDICINE

## 2024-08-09 PROCEDURE — 710N000012 HC RECOVERY PHASE 2, PER MINUTE: Performed by: INTERNAL MEDICINE

## 2024-08-09 PROCEDURE — 88305 TISSUE EXAM BY PATHOLOGIST: CPT | Mod: TC | Performed by: INTERNAL MEDICINE

## 2024-08-09 PROCEDURE — 43235 EGD DIAGNOSTIC BRUSH WASH: CPT | Performed by: ANESTHESIOLOGY

## 2024-08-09 PROCEDURE — 258N000003 HC RX IP 258 OP 636: Performed by: NURSE ANESTHETIST, CERTIFIED REGISTERED

## 2024-08-09 PROCEDURE — 272N000002 HC OR SUPPLY OTHER OPNP: Performed by: INTERNAL MEDICINE

## 2024-08-09 PROCEDURE — 88307 TISSUE EXAM BY PATHOLOGIST: CPT | Mod: 26 | Performed by: PATHOLOGY

## 2024-08-09 RX ORDER — LIDOCAINE HYDROCHLORIDE 20 MG/ML
INJECTION, SOLUTION INFILTRATION; PERINEURAL PRN
Status: DISCONTINUED | OUTPATIENT
Start: 2024-08-09 | End: 2024-08-09

## 2024-08-09 RX ORDER — ONDANSETRON 4 MG/1
4 TABLET, ORALLY DISINTEGRATING ORAL EVERY 6 HOURS PRN
Status: DISCONTINUED | OUTPATIENT
Start: 2024-08-09 | End: 2024-08-09 | Stop reason: HOSPADM

## 2024-08-09 RX ORDER — NALOXONE HYDROCHLORIDE 0.4 MG/ML
0.1 INJECTION, SOLUTION INTRAMUSCULAR; INTRAVENOUS; SUBCUTANEOUS
Status: DISCONTINUED | OUTPATIENT
Start: 2024-08-09 | End: 2024-08-09 | Stop reason: HOSPADM

## 2024-08-09 RX ORDER — ONDANSETRON 2 MG/ML
INJECTION INTRAMUSCULAR; INTRAVENOUS PRN
Status: DISCONTINUED | OUTPATIENT
Start: 2024-08-09 | End: 2024-08-09

## 2024-08-09 RX ORDER — NALOXONE HYDROCHLORIDE 0.4 MG/ML
0.2 INJECTION, SOLUTION INTRAMUSCULAR; INTRAVENOUS; SUBCUTANEOUS
Status: DISCONTINUED | OUTPATIENT
Start: 2024-08-09 | End: 2024-08-09 | Stop reason: HOSPADM

## 2024-08-09 RX ORDER — OXYCODONE HYDROCHLORIDE 5 MG/1
5 TABLET ORAL
Status: DISCONTINUED | OUTPATIENT
Start: 2024-08-09 | End: 2024-08-09 | Stop reason: HOSPADM

## 2024-08-09 RX ORDER — ONDANSETRON 2 MG/ML
4 INJECTION INTRAMUSCULAR; INTRAVENOUS EVERY 6 HOURS PRN
Status: DISCONTINUED | OUTPATIENT
Start: 2024-08-09 | End: 2024-08-09 | Stop reason: HOSPADM

## 2024-08-09 RX ORDER — FENTANYL CITRATE 50 UG/ML
INJECTION, SOLUTION INTRAMUSCULAR; INTRAVENOUS PRN
Status: DISCONTINUED | OUTPATIENT
Start: 2024-08-09 | End: 2024-08-09

## 2024-08-09 RX ORDER — ONDANSETRON 2 MG/ML
4 INJECTION INTRAMUSCULAR; INTRAVENOUS EVERY 30 MIN PRN
Status: DISCONTINUED | OUTPATIENT
Start: 2024-08-09 | End: 2024-08-09 | Stop reason: HOSPADM

## 2024-08-09 RX ORDER — NALOXONE HYDROCHLORIDE 0.4 MG/ML
0.4 INJECTION, SOLUTION INTRAMUSCULAR; INTRAVENOUS; SUBCUTANEOUS
Status: DISCONTINUED | OUTPATIENT
Start: 2024-08-09 | End: 2024-08-09 | Stop reason: HOSPADM

## 2024-08-09 RX ORDER — SODIUM CHLORIDE, SODIUM LACTATE, POTASSIUM CHLORIDE, CALCIUM CHLORIDE 600; 310; 30; 20 MG/100ML; MG/100ML; MG/100ML; MG/100ML
INJECTION, SOLUTION INTRAVENOUS CONTINUOUS PRN
Status: DISCONTINUED | OUTPATIENT
Start: 2024-08-09 | End: 2024-08-09

## 2024-08-09 RX ORDER — DEXAMETHASONE SODIUM PHOSPHATE 4 MG/ML
4 INJECTION, SOLUTION INTRA-ARTICULAR; INTRALESIONAL; INTRAMUSCULAR; INTRAVENOUS; SOFT TISSUE
Status: DISCONTINUED | OUTPATIENT
Start: 2024-08-09 | End: 2024-08-09 | Stop reason: HOSPADM

## 2024-08-09 RX ORDER — ONDANSETRON 4 MG/1
4 TABLET, ORALLY DISINTEGRATING ORAL EVERY 30 MIN PRN
Status: DISCONTINUED | OUTPATIENT
Start: 2024-08-09 | End: 2024-08-09 | Stop reason: HOSPADM

## 2024-08-09 RX ORDER — OXYCODONE HYDROCHLORIDE 10 MG/1
10 TABLET ORAL
Status: DISCONTINUED | OUTPATIENT
Start: 2024-08-09 | End: 2024-08-09 | Stop reason: HOSPADM

## 2024-08-09 RX ORDER — HEPARIN SODIUM (PORCINE) LOCK FLUSH IV SOLN 100 UNIT/ML 100 UNIT/ML
SOLUTION INTRAVENOUS PRN
Status: DISCONTINUED | OUTPATIENT
Start: 2024-08-09 | End: 2024-08-09 | Stop reason: HOSPADM

## 2024-08-09 RX ORDER — FLUMAZENIL 0.1 MG/ML
0.2 INJECTION, SOLUTION INTRAVENOUS
Status: DISCONTINUED | OUTPATIENT
Start: 2024-08-09 | End: 2024-08-09 | Stop reason: HOSPADM

## 2024-08-09 RX ORDER — LIDOCAINE 40 MG/G
CREAM TOPICAL
Status: DISCONTINUED | OUTPATIENT
Start: 2024-08-09 | End: 2024-08-09 | Stop reason: HOSPADM

## 2024-08-09 RX ORDER — PROPOFOL 10 MG/ML
INJECTION, EMULSION INTRAVENOUS CONTINUOUS PRN
Status: DISCONTINUED | OUTPATIENT
Start: 2024-08-09 | End: 2024-08-09

## 2024-08-09 RX ADMIN — FENTANYL CITRATE 25 MCG: 50 INJECTION INTRAMUSCULAR; INTRAVENOUS at 07:34

## 2024-08-09 RX ADMIN — PROPOFOL 100 MCG/KG/MIN: 10 INJECTION, EMULSION INTRAVENOUS at 07:41

## 2024-08-09 RX ADMIN — MIDAZOLAM 2 MG: 1 INJECTION INTRAMUSCULAR; INTRAVENOUS at 07:27

## 2024-08-09 RX ADMIN — ONDANSETRON 4 MG: 2 INJECTION INTRAMUSCULAR; INTRAVENOUS at 07:34

## 2024-08-09 RX ADMIN — SODIUM CHLORIDE, POTASSIUM CHLORIDE, SODIUM LACTATE AND CALCIUM CHLORIDE: 600; 310; 30; 20 INJECTION, SOLUTION INTRAVENOUS at 07:32

## 2024-08-09 RX ADMIN — SODIUM CHLORIDE, POTASSIUM CHLORIDE, SODIUM LACTATE AND CALCIUM CHLORIDE 1000 ML: 600; 310; 30; 20 INJECTION, SOLUTION INTRAVENOUS at 09:00

## 2024-08-09 RX ADMIN — ONDANSETRON 4 MG: 4 TABLET, ORALLY DISINTEGRATING ORAL at 10:20

## 2024-08-09 RX ADMIN — LIDOCAINE HYDROCHLORIDE 40 MG: 20 INJECTION, SOLUTION INFILTRATION; PERINEURAL at 07:34

## 2024-08-09 RX ADMIN — TOPICAL ANESTHETIC 1 EACH: 200 SPRAY DENTAL; PERIODONTAL at 07:32

## 2024-08-09 ASSESSMENT — ACTIVITIES OF DAILY LIVING (ADL)
ADLS_ACUITY_SCORE: 29
ADLS_ACUITY_SCORE: 30
ADLS_ACUITY_SCORE: 29

## 2024-08-09 ASSESSMENT — LIFESTYLE VARIABLES: TOBACCO_USE: 0

## 2024-08-09 ASSESSMENT — ENCOUNTER SYMPTOMS: SEIZURES: 0

## 2024-08-09 NOTE — ANESTHESIA POSTPROCEDURE EVALUATION
Patient: Karlene Yun    Procedure: Procedure(s):  ESOPHAGOGASTRODUODENOSCOPY for variceal surveillance  ESOPHAGOGASTRODUODENOSCOPY, WITH FINE NEEDLE ASPIRATION BIOPSY, WITH ENDOSCOPIC ULTRASOUND GUIDANCE liver and lymph node biopsy       Anesthesia Type:  MAC    Note:  Disposition: Outpatient   Postop Pain Control: Uneventful            Sign Out: Well controlled pain   PONV: No   Neuro/Psych: Uneventful            Sign Out: Acceptable/Baseline neuro status   Airway/Respiratory: Uneventful            Sign Out: Acceptable/Baseline resp. status   CV/Hemodynamics: Uneventful            Sign Out: Acceptable CV status; No obvious hypovolemia; No obvious fluid overload   Other NRE: NONE   DID A NON-ROUTINE EVENT OCCUR? No           Last vitals:  Vitals Value Taken Time   /69 08/09/24 1015   Temp 36.4  C (97.6  F) 08/09/24 0850   Pulse 75 08/09/24 0850   Resp 15 08/09/24 1015   SpO2 97 % 08/09/24 1015       Electronically Signed By: Camila Jones MD  August 9, 2024  10:23 AM

## 2024-08-09 NOTE — ANESTHESIA PREPROCEDURE EVALUATION
"Anesthesia Pre-Procedure Evaluation    Patient: Karlene Yun   MRN: [id number] : 1965        Procedure : Procedure(s):  ESOPHAGOGASTRODUODENOSCOPY for variceal surveillance  ESOPHAGOGASTRODUODENOSCOPY, WITH FINE NEEDLE ASPIRATION BIOPSY, WITH ENDOSCOPIC ULTRASOUND GUIDANCE liver and lymph node biopsy          Past Medical History:   Diagnosis Date    Hyperlipidemia 3/17/2005     Problem list name updated by automated process. Provider to review    Hypothyroidism 2002     Problem list name updated by automated process. Provider to review    Major depressive disorder, recurrent episode, in full remission (H24) 2002    Morbid obesity (H) 2019    Psoriatic arthropathy (H) 2002      Past Surgical History:   Procedure Laterality Date    COLONOSCOPY  2016    mild colitis/ repeat 10 yrs    HC REMOVAL OF TONSILS,<12 Y/O      Tonsils <11y.o.    IR LUMBAR PUNCTURE  2023    LASIK      \"lasek\"      Allergies   Allergen Reactions    Sumatriptan Succinate Nausea and Vomiting     IMITREX      Social History     Tobacco Use    Smoking status: Never    Smokeless tobacco: Never   Substance Use Topics    Alcohol use: Not Currently     Comment: very rare occasions      Wt Readings from Last 1 Encounters:   24 144.6 kg (318 lb 12.6 oz)        Anesthesia Evaluation   Pt has had prior anesthetic. Type: MAC.    No history of anesthetic complications       ROS/MED HX  ENT/Pulmonary:     (+) sleep apnea, doesn't use CPAP,         allergic rhinitis,                          (-) tobacco use and asthma   Neurologic: Comment: Fibromyalgia- gabapentin   (-) no seizures, no CVA, no TIA and migraines   Cardiovascular:     (+)  - -   -  - -                                 Previous cardiac testing   Echo: Date: 10/2023 Results:  ECHOCARDIOGRAM.   Date: 10/11/2023 Start: 08:00 AM [facility]     CONCLUSIONS   Left ventricular ejection fraction is visually estimated at 55%. "   Normal right ventricle size and normal global function.   No hemodynamically significant valvular abnormalities.   There is no pericardial effusion.     Compared with the previous study dated 8/16/23, no significant   interval changes have occurred.     Stress Test:  Date: 10/2023 Results:  NM MPI WITH LEXISCAN  Order: [id number]  Narrative    Summary    1. A regadenoson infusion pharmacologic stress test was performed.    2. The patient experienced dyspnea during the stress test.    3. Negative stress ECG for ST segment depression.    4. Myocardial perfusion images are normal without evidence of infarct or  ischemia.    5. Normal left ventricular systolic function. Calculated LVEF 69 %.    6. Based on this study, the annual cardiovascular mortality rate is low  (less than 1%).    Prior Study Comparison    No prior study for comparison.    ECG Reviewed:  Date: Results:    Cath:  Date: Results:      METS/Exercise Tolerance:  Comment: Works in a yarn shop.     Hematologic:     (+) History of blood clots (h/o pulmonary embolism 2002.  Completed warfarin therapy.),    pt is anticoagulated,        (-) anemia and history of blood transfusion   Musculoskeletal: Comment: Psoriatic arthropathy  Osteoarthritis  (+)  arthritis,             GI/Hepatic: Comment: cirrhosis thought to be secondary to metabolic associated steatohepatitis. Metabolic risk factors include: obesity, psoriasis and dyslipidemia.    (+)             liver disease,    (-) GERD, appendicitis and cholecystitis/cholelithiasis   Renal/Genitourinary:    (-) renal disease and nephrolithiasis   Endo:     (+)          thyroid problem, hypothyroidism,    Obesity,    (-) Type I DM, Type II DM and chronic steroid usage   Psychiatric/Substance Use:     (+) psychiatric history (Stable on currently stable.) depression    (-) alcohol abuse history and chronic opioid use history   Infectious Disease:  - neg infectious disease ROS     Malignancy:  - neg malignancy ROS    "  Other:      (+)  , H/O Chronic Pain,, other significant disability (Uses a cane for mobility.) Other (comment)         Physical Exam    Airway        Mallampati: II   TM distance: > 3 FB   Neck ROM: full   Mouth opening: > 3 cm    Respiratory Devices and Support         Dental     Comment: wnl        Cardiovascular          Rhythm and rate: regular and normal     Pulmonary           breath sounds clear to auscultation           OUTSIDE LABS:  CBC:   Lab Results   Component Value Date    WBC 4.9 07/02/2024    WBC 5.7 04/22/2020    HGB 13.3 07/02/2024    HGB 16.1 (A) 04/22/2020    HCT 38.9 07/02/2024    HCT 48.0 (A) 04/22/2020    PLT 68 (L) 07/02/2024     04/22/2020     BMP:   Lab Results   Component Value Date     07/25/2024     07/02/2024    POTASSIUM 3.9 07/25/2024    POTASSIUM 3.6 07/02/2024    CHLORIDE 105 07/25/2024    CHLORIDE 103 07/02/2024    CO2 25 07/25/2024    CO2 26 07/02/2024    BUN 11.0 07/25/2024    BUN 8.2 07/02/2024    CR 0.77 07/25/2024    CR 0.86 07/02/2024     (H) 07/25/2024     (H) 07/02/2024     COAGS:   Lab Results   Component Value Date    INR 1.62 (H) 07/02/2024     POC: No results found for: \"BGM\", \"HCG\", \"HCGS\"  HEPATIC:   Lab Results   Component Value Date    ALBUMIN 2.8 (L) 07/02/2024    PROTTOTAL 7.0 07/02/2024    ALT 25 07/02/2024    AST 65 (H) 07/02/2024    ALKPHOS 81 07/02/2024    BILITOTAL 3.5 (H) 07/02/2024     OTHER:   Lab Results   Component Value Date    PH 6.0 06/21/2010    A1C 5.7 05/03/2019    SANDY 8.0 (L) 07/25/2024    TSH 1.96 04/22/2020    T4 1.1 03/01/2018    CRP 0.52 11/14/2011    SED 8 11/14/2011       Anesthesia Plan    ASA Status:  3    NPO Status:  NPO Appropriate    Anesthesia Type: MAC.     - Reason for MAC: straight local not clinically adequate              Consents    Anesthesia Plan(s) and associated risks, benefits, and realistic alternatives discussed. Questions answered and patient/representative(s) expressed understanding.   " "  - Discussed:     - Discussed with:  Patient      - Extended Intubation/Ventilatory Support Discussed: No.      - Patient is DNR/DNI Status: No     Use of blood products discussed: No .     Postoperative Care            Comments:    Other Comments: NPO. Meds reviewed. No recent URI. No CP or SOB. No problems with anesthesia in past. GI MD requesting MAC. GA as backup.           Camila Jones MD    I have reviewed the pertinent notes and labs in the chart from the past 30 days and (re)examined the patient.  Any updates or changes from those notes are reflected in this note.     # Hyponatremia: Lowest Na = 135 mmol/L in last 30 days, will monitor as appropriate          # Severe Obesity: Estimated body mass index is 45.74 kg/m  as calculated from the following:    Height as of this encounter: 1.778 m (5' 10\").    Weight as of this encounter: 144.6 kg (318 lb 12.6 oz).      "

## 2024-08-09 NOTE — ANESTHESIA CARE TRANSFER NOTE
Patient: Karlene Yun    Procedure: Procedure(s):  ESOPHAGOGASTRODUODENOSCOPY for variceal surveillance  ESOPHAGOGASTRODUODENOSCOPY, WITH FINE NEEDLE ASPIRATION BIOPSY, WITH ENDOSCOPIC ULTRASOUND GUIDANCE liver and lymph node biopsy       Diagnosis: Cirrhosis of liver (H) [K74.60]  Diagnosis Additional Information: No value filed.    Anesthesia Type:   MAC     Note:    Oropharynx: oropharynx clear of all foreign objects and spontaneously breathing  Level of Consciousness: awake  Oxygen Supplementation: room air    Independent Airway: airway patency satisfactory and stable  Dentition: dentition unchanged  Vital Signs Stable: post-procedure vital signs reviewed and stable  Report to RN Given: handoff report given  Patient transferred to: Phase II    Handoff Report: Identifed the Patient, Identified the Reponsible Provider, Reviewed the pertinent medical history, Discussed the surgical course, Reviewed Intra-OP anesthesia mangement and issues during anesthesia, Set expectations for post-procedure period and Allowed opportunity for questions and acknowledgement of understanding      Vitals:  Vitals Value Taken Time   /69 08/09/24 1015   Temp 36.4  C (97.6  F) 08/09/24 0850   Pulse 75 08/09/24 0850   Resp 15 08/09/24 1015   SpO2 97 % 08/09/24 1015       Electronically Signed By: MERRITT Lechuga CRNA  August 9, 2024  10:25 AM

## 2024-08-09 NOTE — BRIEF OP NOTE
[facility]    Brief Operative Note    Pre-operative diagnosis: Cirrhosis of liver (H) [K74.60]  Post-operative diagnosis Same as pre-operative diagnosis    Procedure: ESOPHAGOGASTRODUODENOSCOPY for variceal surveillance, N/A - Esophagus  ESOPHAGOGASTRODUODENOSCOPY, WITH FINE NEEDLE ASPIRATION BIOPSY, WITH ENDOSCOPIC ULTRASOUND GUIDANCE liver and lymph node biopsy, N/A - Esophagus    Surgeon: Surgeons and Role:     * Guru Jacquelyn Rees MD - Primary     * Darci Frances MD - Fellow - Assisting  Anesthesia: MAC   Estimated Blood Loss: 0 mL from 8/9/2024  7:30 AM to 8/9/2024  8:45 AM      Drains: None  Specimens:   ID Type Source Tests Collected by Time Destination   1 : Liver Biopsy Biopsy Liver SURGICAL PATHOLOGY EXAM Guru Jacquelyn Rees MD 8/9/2024  8:07 AM    2 : Subcarina Lymph Node Fine Needle Aspiration Lymph Node(s) FINE NEEDLE ASPIRATE Guru Jacquelyn Rees MD 8/9/2024  8:29 AM      Findings:     - EGD: Normal exam    - EUS: Diffuse abnormal echotexture of the liver status post fine needle biopsy using a 19 g needle (sample adequate for site, pathology pending).     Recommendations:  - Await cytology results and await path results.   - Further follow up and management with Grete You JOCELIN  - Repeat upper endoscopy in 2-3 years for variceal surveillance or an earlier endoscopy if there is new decompensation    Complications: None.  Implants: * No implants in log *

## 2024-08-09 NOTE — OR NURSING
Dr. Rees at bedside in phase II. Plan to monitor patient for 2 hours for any signs of bleeding then discharge to home.

## 2024-08-09 NOTE — DISCHARGE INSTRUCTIONS
Recommendations:  - Await cytology results and await path results.   - Further follow up and management with Latricia Orta JOCELIN  - Repeat upper endoscopy in 2-3 years for variceal surveillance or an earlier endoscopy if there is new decompensation    Contacting your Doctor -   To contact a doctor, call Dr. Guru Rees @ [phone] (GI Clinic) or [phone] or:  [phone] and ask for the resident on call for Gastroenterology (answered 24 hours a day)   Emergency Department:  [facility]: [phone] 911 if you are in need of immediate or emergent help

## 2024-08-10 LAB
PATH REPORT.COMMENTS IMP SPEC: NORMAL
PATH REPORT.FINAL DX SPEC: NORMAL
PATH REPORT.GROSS SPEC: NORMAL
PATH REPORT.MICROSCOPIC SPEC OTHER STN: NORMAL
PATH REPORT.RELEVANT HX SPEC: NORMAL
PHOTO IMAGE: NORMAL

## 2024-08-12 LAB
PATH REPORT.COMMENTS IMP SPEC: NORMAL
PATH REPORT.FINAL DX SPEC: NORMAL
PATH REPORT.GROSS SPEC: NORMAL
PATH REPORT.MICROSCOPIC SPEC OTHER STN: NORMAL
PATH REPORT.RELEVANT HX SPEC: NORMAL

## 2024-08-12 NOTE — RESULT ENCOUNTER NOTE
EUS guided liver biopsy suggests stage 4C fibrosis suggestive of MASLD    Will recommend further follow up with Latricia GAINES

## 2024-08-16 ENCOUNTER — TELEPHONE (OUTPATIENT)
Dept: GASTROENTEROLOGY | Facility: CLINIC | Age: 59
End: 2024-08-16
Payer: COMMERCIAL

## 2024-08-16 NOTE — TELEPHONE ENCOUNTER
Patient Contacted for the patient to call back and schedule the following:    Appointment type: Return Liver  Provider: Dr. Nora Morejon  Return date: 10/22/24  Specialty phone number: [phone]  Additional appointment(s) needed: Labs scheduled  Additonal Notes: rescheduled from Dr. Selam Mccoy, per Dr. Morejon

## 2024-08-20 ENCOUNTER — PATIENT OUTREACH (OUTPATIENT)
Dept: GASTROENTEROLOGY | Facility: CLINIC | Age: 59
End: 2024-08-20
Payer: COMMERCIAL

## 2024-08-20 NOTE — PROGRESS NOTES
Post Upper EUS on 8/9/24 with Dr. Rees.      Follow-up recommendations:   - Await cytology results and await path results.   - Will recommend further follow up and management with Latricia Orta JOCELIN   - Repeat upper endoscopy in 2-3 years for variceal surveillance or an earlier endoscopy if there is new decompensation     Following review of cytology, per Dr. Rees:  -No evidence of malignancy in the lymph nodes -EUS guided liver biopsy suggests stage 4C fibrosis suggestive of MASLD   -Will recommend further follow up with Latricia Orta PA     Result notes sent to Latricia Orta to communicate. Patient is scheduled for hepatology follow-up with Dr. Morejon.     Patient states: Unable to reach at this time. LVM and sent MyChart with clinic contact information in the event of any procedural related questions or concerns.     Orders placed: None indicated.      Fang Snow, RN Care Coordinator

## 2024-08-27 NOTE — CONFIDENTIAL NOTE
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

## 2024-10-15 ENCOUNTER — DOCUMENTATION ONLY (OUTPATIENT)
Dept: GASTROENTEROLOGY | Facility: CLINIC | Age: 59
End: 2024-10-15
Payer: COMMERCIAL

## 2024-10-15 DIAGNOSIS — K74.60 LIVER CIRRHOSIS SECONDARY TO NASH (H): Primary | ICD-10-CM

## 2024-10-15 DIAGNOSIS — K75.81 LIVER CIRRHOSIS SECONDARY TO NASH (H): Primary | ICD-10-CM

## 2024-10-22 ENCOUNTER — LAB (OUTPATIENT)
Dept: LAB | Facility: CLINIC | Age: 59
End: 2024-10-22
Attending: INTERNAL MEDICINE
Payer: COMMERCIAL

## 2024-10-22 ENCOUNTER — OFFICE VISIT (OUTPATIENT)
Dept: GASTROENTEROLOGY | Facility: CLINIC | Age: 59
End: 2024-10-22
Attending: PHYSICIAN ASSISTANT
Payer: COMMERCIAL

## 2024-10-22 VITALS
BODY MASS INDEX: 46.32 KG/M2 | OXYGEN SATURATION: 96 % | TEMPERATURE: 98.2 F | SYSTOLIC BLOOD PRESSURE: 122 MMHG | DIASTOLIC BLOOD PRESSURE: 77 MMHG | WEIGHT: 293 LBS | HEART RATE: 71 BPM

## 2024-10-22 DIAGNOSIS — G93.32 CHRONIC FATIGUE SYNDROME WITH FIBROMYALGIA: ICD-10-CM

## 2024-10-22 DIAGNOSIS — K75.81 LIVER CIRRHOSIS SECONDARY TO NASH (H): Primary | ICD-10-CM

## 2024-10-22 DIAGNOSIS — E66.813 CLASS 3 SEVERE OBESITY DUE TO EXCESS CALORIES WITH SERIOUS COMORBIDITY AND BODY MASS INDEX (BMI) OF 45.0 TO 49.9 IN ADULT (H): ICD-10-CM

## 2024-10-22 DIAGNOSIS — K74.60 LIVER CIRRHOSIS SECONDARY TO NASH (H): Primary | ICD-10-CM

## 2024-10-22 DIAGNOSIS — L40.50 PSORIATIC ARTHROPATHY (H): ICD-10-CM

## 2024-10-22 DIAGNOSIS — M79.7 CHRONIC FATIGUE SYNDROME WITH FIBROMYALGIA: ICD-10-CM

## 2024-10-22 DIAGNOSIS — E66.01 CLASS 3 SEVERE OBESITY DUE TO EXCESS CALORIES WITH SERIOUS COMORBIDITY AND BODY MASS INDEX (BMI) OF 45.0 TO 49.9 IN ADULT (H): ICD-10-CM

## 2024-10-22 DIAGNOSIS — K75.81 LIVER CIRRHOSIS SECONDARY TO NASH (H): ICD-10-CM

## 2024-10-22 DIAGNOSIS — K74.60 LIVER CIRRHOSIS SECONDARY TO NASH (H): ICD-10-CM

## 2024-10-22 LAB
ALBUMIN SERPL BCG-MCNC: 2.6 G/DL (ref 3.5–5.2)
ALP SERPL-CCNC: 69 U/L (ref 40–150)
ALT SERPL W P-5'-P-CCNC: 26 U/L (ref 0–50)
ANION GAP SERPL CALCULATED.3IONS-SCNC: 7 MMOL/L (ref 7–15)
AST SERPL W P-5'-P-CCNC: 60 U/L (ref 0–45)
BILIRUB DIRECT SERPL-MCNC: 1.16 MG/DL (ref 0–0.3)
BILIRUB SERPL-MCNC: 3.5 MG/DL
BUN SERPL-MCNC: 11.3 MG/DL (ref 8–23)
CALCIUM SERPL-MCNC: 8.7 MG/DL (ref 8.8–10.4)
CHLORIDE SERPL-SCNC: 104 MMOL/L (ref 98–107)
CREAT SERPL-MCNC: 0.9 MG/DL (ref 0.51–0.95)
EGFRCR SERPLBLD CKD-EPI 2021: 73 ML/MIN/1.73M2
ERYTHROCYTE [DISTWIDTH] IN BLOOD BY AUTOMATED COUNT: 14.8 % (ref 10–15)
GLUCOSE SERPL-MCNC: 114 MG/DL (ref 70–99)
HCO3 SERPL-SCNC: 24 MMOL/L (ref 22–29)
HCT VFR BLD AUTO: 39.6 % (ref 35–47)
HGB BLD-MCNC: 13.9 G/DL (ref 11.7–15.7)
INR PPP: 1.55 (ref 0.85–1.15)
MCH RBC QN AUTO: 33.7 PG (ref 26.5–33)
MCHC RBC AUTO-ENTMCNC: 35.1 G/DL (ref 31.5–36.5)
MCV RBC AUTO: 96 FL (ref 78–100)
PLATELET # BLD AUTO: 67 10E3/UL (ref 150–450)
POTASSIUM SERPL-SCNC: 4.1 MMOL/L (ref 3.4–5.3)
PROT SERPL-MCNC: 7 G/DL (ref 6.4–8.3)
RBC # BLD AUTO: 4.12 10E6/UL (ref 3.8–5.2)
SODIUM SERPL-SCNC: 135 MMOL/L (ref 135–145)
WBC # BLD AUTO: 4 10E3/UL (ref 4–11)

## 2024-10-22 PROCEDURE — 85027 COMPLETE CBC AUTOMATED: CPT | Performed by: PATHOLOGY

## 2024-10-22 PROCEDURE — 99000 SPECIMEN HANDLING OFFICE-LAB: CPT | Performed by: PATHOLOGY

## 2024-10-22 PROCEDURE — 80053 COMPREHEN METABOLIC PANEL: CPT | Performed by: PATHOLOGY

## 2024-10-22 PROCEDURE — 82248 BILIRUBIN DIRECT: CPT | Performed by: PATHOLOGY

## 2024-10-22 PROCEDURE — 36415 COLL VENOUS BLD VENIPUNCTURE: CPT | Performed by: PATHOLOGY

## 2024-10-22 PROCEDURE — 99213 OFFICE O/P EST LOW 20 MIN: CPT | Performed by: INTERNAL MEDICINE

## 2024-10-22 PROCEDURE — 99417 PROLNG OP E/M EACH 15 MIN: CPT | Performed by: INTERNAL MEDICINE

## 2024-10-22 PROCEDURE — 99215 OFFICE O/P EST HI 40 MIN: CPT | Performed by: INTERNAL MEDICINE

## 2024-10-22 PROCEDURE — 86431 RHEUMATOID FACTOR QUANT: CPT | Performed by: INTERNAL MEDICINE

## 2024-10-22 PROCEDURE — 86140 C-REACTIVE PROTEIN: CPT | Performed by: PATHOLOGY

## 2024-10-22 PROCEDURE — 86200 CCP ANTIBODY: CPT | Performed by: INTERNAL MEDICINE

## 2024-10-22 PROCEDURE — 85610 PROTHROMBIN TIME: CPT | Performed by: PATHOLOGY

## 2024-10-22 RX ORDER — LACTULOSE 10 G/15ML
10 SOLUTION ORAL EVERY MORNING
Qty: 946 ML | Refills: 4 | Status: SHIPPED | OUTPATIENT
Start: 2024-10-22

## 2024-10-22 RX ORDER — LACTULOSE 10 G/15ML
20 SOLUTION ORAL 3 TIMES DAILY
Qty: 15 ML | Refills: 3 | Status: SHIPPED | OUTPATIENT
Start: 2024-10-22 | End: 2024-10-22

## 2024-10-22 ASSESSMENT — PAIN SCALES - GENERAL: PAINLEVEL: NO PAIN (0)

## 2024-10-22 NOTE — LETTER
10/22/2024      Karlene Yun  [address]  [facility] [address]      Dear Colleague,    Thank you for referring your patient, Karlene Yun, to the [facility] HEPATOLOGY [facility]INIC [place]. Please see a copy of my visit note below.    Hepatology Clinic note  Karlene Yun   Date of Birth 1965     REASON FOR CONSULTATION:  MASH cirrhosis   REFERRING PROVIDER: Lyn Martinez      Karlene Yun is a 58 year old female with history of compensated MASLD/MASH cirrhosis. Other medical history: obesity BMI 47, psoriasis, fibromyalgia HLD MELD 20 ABO O     MELD is high enough that it is time to consider LT evaluation. Referral placed. Issues will be BMI, deconditioning, question of HFpEF.     Obesity class 3 severe  On GLP-1 agonist  Discussed combined LT/sleeve    Constipation add lactulose 15 ml daily    ANGELICA, long standing   On Bumex and spironolactone. Continue     Variceal screening EUS/EGD 8/9/24 no varices    Deconditioning  PT for optimization as able      Enlarged mediastinal and 1.5 cm subcarinal lymph node EUS 8/9/24 with FNA: Polymorphous population of lymphocyte     NEREYDA not wearing any device and thinks she should. She has seen sleep medicine.     History of cardiomegaly/thought to be due to HFpEF unclear  Small bilateral pleural effusions w/ groundglass opacity on CT 10/10/23 no follow up on file. Will need      Psoriatic arthritis   Osteoarthritis  Currently on Skirizi through dermatology   LV rheum 3/2023     Nora Morejon MD    Hepatology/Liver Transplant  Medical Director, Liver Transplantation  [facility]    Appointments [phone]  Clinic Fax [phone]  Transplant Care [phone] Option 4  Transplant Fax [phone]  Administrative Office [phone]  Administrative Fax [phone]  ===================================================================      -----------------------------------------------------  Karlene  "ROSALBA Yun is a 58 year old female with compensated MASLD cirrhosis. MELD 20 ABO A     She was diagnosed with cirrhosis 2022 via MRE. This was done when she was noted to have thrombocytopenia and elevated LFTs    Today she would like to discuss the possibility of transplant.  In terms of cirrhosis, she states the uncertainty is the biggest issue. No clinical issues    She relates she has has \"brain fog\". She thinks she has had this for long years off and on. Thinks this is getting worse. BM has \"1 if I'm tony\". Since being on Wegovy, she has been constipated.   She also has frequent nose bleeds and if she gets a cut it will \"bleed for hours\"    Obesity  Wegovy starting around April. Has not lost weight. Has not had consultation for weight loss surgery.    ANGELICA  On bumetanide and spirolactone for this  Was orginally told she had CHF, then cardiologist said she does not    Variceal screening:   EGD/EUS on 8/9/24 at [facility]. There were no esophageal of gastric varices. There was a nodular appearing liver which was heterogenous and hyperechoic. EUS guided FNA performed on the liver. There were abnormally enlarged subcarinal nodes and one mediastinal lymph node, FNA performed. Path of the subcarinal lymph node FNA was neg for malignancy. Her liver was biopsied as well which noted cirrhosis stage 4c. She had increased alpha 1 antitrypsin globules which is typically silent, but possible it could have modified rate of fibrosis due to MASH/MASLD.   EGD: 8/5/2022, normal esophagus without varices      RF for MASLD obesity BMI 47, HLD    Psoriatic arthropathy   Methotrexate - stopped when found out had cirrhosis. 25 years - 2.5 x 8. Currently on SkiRizi for psoriasis and psoriatic arthritis.  Muscle pain, muscle weakness and joint pain (every joint that you have). Some is osteoarthritis. Dermatologist prescribes SkiRizi. Keeps getting harder and harder.     Liver Function Studies -   Recent Labs   Lab Test 10/22/24  0843 " "  PROTTOTAL 7.0   ALBUMIN 2.6*   BILITOTAL 3.5*   ALKPHOS 69   AST 60*   ALT 26     CBC RESULTS:   Recent Labs   Lab Test 10/22/24  0843   WBC 4.0   RBC 4.12   HGB 13.9   HCT 39.6   MCV 96   MCH 33.7*   MCHC 35.1   RDW 14.8   PLT 67*     MELD 3.0: 20 at 10/22/2024  8:43 AM  MELD-Na: 18 at 10/22/2024  8:43 AM  Calculated from:  Serum Creatinine: 0.90 mg/dL (Using min of 1 mg/dL) at 10/22/2024  8:43 AM  Serum Sodium: 135 mmol/L at 10/22/2024  8:43 AM  Total Bilirubin: 3.5 mg/dL at 10/22/2024  8:43 AM  Serum Albumin: 2.6 g/dL at 10/22/2024  8:43 AM  INR(ratio): 1.55 at 10/22/2024  8:43 AM  Age at listing (hypothetical): 58 years  Sex: Female at 10/22/2024  8:43 AM            Lab Results   Component Value Date     TSH 1.96 04/22/2020     CHOL 213 (H) 03/01/2018     HDL 46 (L) 03/01/2018      (H) 03/01/2018     TRIG 157 (H) 03/01/2018     A1C 5.7 05/03/2019   Chronic hepatitis C antibody negative  Hepatitis B surface antigen negative  KATINA positive, 1: 640  Smooth muscle antibody negative   AMA negative  A1AT - MZ   IgG - 1655   TTG negative   Iron panel     PMH   HLD  Hypothyroidism   Major depressive disorder  Morbid obesity  Psoriatic arthropathy 1/21/2002  Fibromyalgia  NEREYDA not wearing any device \"but I should be\". Gives her anxiety.   History of pulmonary embolism 200 Was on birth control at that time.   Rosacea  Tonsillectomy   Meds  Skyrizi 150 q 3 months  Wegovy starting around April. Has not lost weight. Has not had consultation for weight loss surgery.  Gabapentin 300 mg twice daily   Levothyroxine 112 mcg  SHX  , lives alone  No previous tobacco use. No history of heavy alcohol use. Maybe 1-2 a month previously. No previous IV/IN drug use.  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 Stopped due to all over body pain. She is doing PT. Not surshe would RTW in any capacity.  Dtr oscar lives about 15-20 minutes.          Family History:    No known family history of liver " disease.   Family History         Family History   Problem Relation Age of Onset     Cancer Sister           cervical     Arthritis Paternal Grandmother       Diabetes Father           EXAM  /77   Pulse 71   Temp 98.2  F (36.8  C) (Oral)   Wt 146.4 kg (322 lb 12.8 oz)   SpO2 96%   BMI 46.32 kg/m     Exam  Gen Alert pleasant NAD  Resp No difficulty breathing. No cough clear  CV RRR  Skin No Jaundice  Abd obese  Neuro MEZA  MSK no muscle wasting  Psyche Pleasant, appropriate. Well groomed.    Time today in minutes 90  Record review 20  Communication with care team 5  Face to face with patient 45  Documentation 20           Again, thank you for allowing me to participate in the care of your patient.        Sincerely,        Nora Morejon MD

## 2024-10-22 NOTE — PROGRESS NOTES
Hepatology Clinic note  Karlene Yun   Date of Birth 1965     REASON FOR CONSULTATION:  MASH cirrhosis   REFERRING PROVIDER: Lyn Martinez      Karlene Yun is a 58 year old female with history of compensated MASLD/MASH cirrhosis. Other medical history: obesity BMI 47, psoriasis, fibromyalgia HLD MELD 20 ABO O     MELD is high enough that it is time to consider LT evaluation. Referral placed. Issues will be BMI, deconditioning, question of HFpEF.     Obesity class 3 severe  On GLP-1 agonist  Discussed combined LT/sleeve    Constipation add lactulose 15 ml daily    ANGELICA, long standing   On Bumex and spironolactone. Continue     Variceal screening EUS/EGD 8/9/24 no varices    Deconditioning  PT for optimization as able      Enlarged mediastinal and 1.5 cm subcarinal lymph node EUS 8/9/24 with FNA: Polymorphous population of lymphocyte     NEREYDA not wearing any device and thinks she should. She has seen sleep medicine.     History of cardiomegaly/thought to be due to HFpEF unclear  Small bilateral pleural effusions w/ groundglass opacity on CT 10/10/23 no follow up on file. Will need      Psoriatic arthritis   Osteoarthritis  Currently on Skirizi through dermatology   LV rheum 3/2023     Nora Morejon MD    Hepatology/Liver Transplant  Medical Director, Liver Transplantation  [facility]    Appointments [phone]  Clinic Fax [phone]  Transplant Care [phone] Option 4  Transplant Fax [phone]  Administrative Office [phone]  Administrative Fax [phone]  ===================================================================      -----------------------------------------------------  Karlene Yun is a 58 year old female with compensated MASLD cirrhosis. MELD 20 ABO A     She was diagnosed with cirrhosis 2022 via MRE. This was done when she was noted to have thrombocytopenia and elevated LFTs    Today she would like to discuss the possibility of  "transplant.  In terms of cirrhosis, she states the uncertainty is the biggest issue. No clinical issues    She relates she has has \"brain fog\". She thinks she has had this for long years off and on. Thinks this is getting worse. BM has \"1 if I'm tony\". Since being on Wegovy, she has been constipated.   She also has frequent nose bleeds and if she gets a cut it will \"bleed for hours\"    Obesity  Wegovy starting around April. Has not lost weight. Has not had consultation for weight loss surgery.    ANGELICA  On bumetanide and spirolactone for this  Was orginally told she had CHF, then cardiologist said she does not    Variceal screening:   EGD/EUS on 8/9/24 at [facility]. There were no esophageal of gastric varices. There was a nodular appearing liver which was heterogenous and hyperechoic. EUS guided FNA performed on the liver. There were abnormally enlarged subcarinal nodes and one mediastinal lymph node, FNA performed. Path of the subcarinal lymph node FNA was neg for malignancy. Her liver was biopsied as well which noted cirrhosis stage 4c. She had increased alpha 1 antitrypsin globules which is typically silent, but possible it could have modified rate of fibrosis due to MASH/MASLD.   EGD: 8/5/2022, normal esophagus without varices      RF for MASLD obesity BMI 47, HLD    Psoriatic arthropathy   Methotrexate - stopped when found out had cirrhosis. 25 years - 2.5 x 8. Currently on SkiRizi for psoriasis and psoriatic arthritis.  Muscle pain, muscle weakness and joint pain (every joint that you have). Some is osteoarthritis. Dermatologist prescribes SkiRizi. Keeps getting harder and harder.     Liver Function Studies -   Recent Labs   Lab Test 10/22/24  0843   PROTTOTAL 7.0   ALBUMIN 2.6*   BILITOTAL 3.5*   ALKPHOS 69   AST 60*   ALT 26     CBC RESULTS:   Recent Labs   Lab Test 10/22/24  0843   WBC 4.0   RBC 4.12   HGB 13.9   HCT 39.6   MCV 96   MCH 33.7*   MCHC 35.1   RDW 14.8   PLT 67*     MELD 3.0: 20 at 10/22/2024  " "8:43 AM  MELD-Na: 18 at 10/22/2024  8:43 AM  Calculated from:  Serum Creatinine: 0.90 mg/dL (Using min of 1 mg/dL) at 10/22/2024  8:43 AM  Serum Sodium: 135 mmol/L at 10/22/2024  8:43 AM  Total Bilirubin: 3.5 mg/dL at 10/22/2024  8:43 AM  Serum Albumin: 2.6 g/dL at 10/22/2024  8:43 AM  INR(ratio): 1.55 at 10/22/2024  8:43 AM  Age at listing (hypothetical): 58 years  Sex: Female at 10/22/2024  8:43 AM            Lab Results   Component Value Date     TSH 1.96 04/22/2020     CHOL 213 (H) 03/01/2018     HDL 46 (L) 03/01/2018      (H) 03/01/2018     TRIG 157 (H) 03/01/2018     A1C 5.7 05/03/2019   Chronic hepatitis C antibody negative  Hepatitis B surface antigen negative  KATINA positive, 1: 640  Smooth muscle antibody negative   AMA negative  A1AT - MZ   IgG - 1655   TTG negative   Iron panel     PMH   HLD  Hypothyroidism   Major depressive disorder  Morbid obesity  Psoriatic arthropathy 1/21/2002  Fibromyalgia  NEREYDA not wearing any device \"but I should be\". Gives her anxiety.   History of pulmonary embolism 200 Was on birth control at that time.   Rosacea  Tonsillectomy   Meds  Skyrizi 150 q 3 months  Wegovy starting around April. Has not lost weight. Has not had consultation for weight loss surgery.  Gabapentin 300 mg twice daily   Levothyroxine 112 mcg  SHX  , lives alone  No previous tobacco use. No history of heavy alcohol use. Maybe 1-2 a month previously. No previous IV/IN drug use.  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 Stopped due to all over body pain. She is doing PT. Not surshe would RTW in any capacity.  Dtr oscar lives about 15-20 minutes.          Family History:    No known family history of liver disease.   Family History         Family History   Problem Relation Age of Onset    Cancer Sister           cervical    Arthritis Paternal Grandmother      Diabetes Father           EXAM  /77   Pulse 71   Temp 98.2  F (36.8  C) (Oral)   Wt 146.4 kg (322 lb 12.8 " oz)   SpO2 96%   BMI 46.32 kg/m     Exam  Gen Alert pleasant NAD  Resp No difficulty breathing. No cough clear  CV RRR  Skin No Jaundice  Abd obese  Neuro MEZA  MSK no muscle wasting  Psyche Pleasant, appropriate. Well groomed.    Time today in minutes 90  Record review 20  Communication with care team 5  Face to face with patient 45  Documentation 20

## 2024-10-22 NOTE — NURSING NOTE
Chief Complaint   Patient presents with    RECHECK     Cirrhosis of liver without ascites       /77   Pulse 71   Temp 98.2  F (36.8  C) (Oral)   Wt 146.4 kg (322 lb 12.8 oz)   SpO2 96%   BMI 46.32 kg/m      Robert Crane on 10/22/2024 at 9:14 AM

## 2024-10-23 ENCOUNTER — TELEPHONE (OUTPATIENT)
Dept: RHEUMATOLOGY | Facility: CLINIC | Age: 59
End: 2024-10-23
Payer: COMMERCIAL

## 2024-10-23 NOTE — TELEPHONE ENCOUNTER
Call placed to patient to complete previsit planning for appointment with Dr. Mccann on 10/24/2024 at 8am. Verified correct patient, date of birth, address, reason for visit and directions to the clinic. Patient stated she will be at the appointment.     Sarah Caputo CMA   10/23/2024 9:14 AM

## 2024-10-24 ENCOUNTER — PRE VISIT (OUTPATIENT)
Dept: RHEUMATOLOGY | Facility: CLINIC | Age: 59
End: 2024-10-24
Payer: COMMERCIAL

## 2024-10-24 ENCOUNTER — TELEPHONE (OUTPATIENT)
Dept: RHEUMATOLOGY | Facility: CLINIC | Age: 59
End: 2024-10-24

## 2024-10-24 ENCOUNTER — OFFICE VISIT (OUTPATIENT)
Dept: RHEUMATOLOGY | Facility: CLINIC | Age: 59
End: 2024-10-24
Attending: INTERNAL MEDICINE
Payer: COMMERCIAL

## 2024-10-24 ENCOUNTER — REFERRAL (OUTPATIENT)
Dept: TRANSPLANT | Facility: CLINIC | Age: 59
End: 2024-10-24

## 2024-10-24 VITALS
OXYGEN SATURATION: 94 % | HEIGHT: 70 IN | DIASTOLIC BLOOD PRESSURE: 70 MMHG | TEMPERATURE: 98.4 F | SYSTOLIC BLOOD PRESSURE: 114 MMHG | BODY MASS INDEX: 41.95 KG/M2 | HEART RATE: 67 BPM | WEIGHT: 293 LBS

## 2024-10-24 DIAGNOSIS — K74.60 LIVER CIRRHOSIS SECONDARY TO NASH (H): ICD-10-CM

## 2024-10-24 DIAGNOSIS — K74.60 CIRRHOSIS (H): ICD-10-CM

## 2024-10-24 DIAGNOSIS — M79.7 CHRONIC FATIGUE SYNDROME WITH FIBROMYALGIA: ICD-10-CM

## 2024-10-24 DIAGNOSIS — K75.81 NASH (NONALCOHOLIC STEATOHEPATITIS): Primary | ICD-10-CM

## 2024-10-24 DIAGNOSIS — G93.32 CHRONIC FATIGUE SYNDROME WITH FIBROMYALGIA: ICD-10-CM

## 2024-10-24 DIAGNOSIS — K75.81 LIVER CIRRHOSIS SECONDARY TO NASH (H): ICD-10-CM

## 2024-10-24 DIAGNOSIS — M17.12 PRIMARY OSTEOARTHRITIS OF LEFT KNEE: Primary | ICD-10-CM

## 2024-10-24 DIAGNOSIS — L40.50 PSORIATIC ARTHROPATHY (H): ICD-10-CM

## 2024-10-24 LAB
CRP SERPL-MCNC: 3.09 MG/L
RHEUMATOID FACT SERPL-ACNC: <10 IU/ML

## 2024-10-24 PROCEDURE — 99213 OFFICE O/P EST LOW 20 MIN: CPT | Performed by: INTERNAL MEDICINE

## 2024-10-24 PROCEDURE — 99205 OFFICE O/P NEW HI 60 MIN: CPT | Performed by: INTERNAL MEDICINE

## 2024-10-24 ASSESSMENT — PAIN SCALES - GENERAL: PAINLEVEL_OUTOF10: MODERATE PAIN (5)

## 2024-10-24 NOTE — TELEPHONE ENCOUNTER
PA Initiation    Medication: TALTZ 80 MG/ML SC SOAJ  Insurance Company: [employer] - Phone [phone] Fax [phone]  Pharmacy Filling the Rx: [facility] - [place], [place] - [facility] [address]  Filling Pharmacy Phone:    Filling Pharmacy Fax:    Start Date: 10/24/2024    SALINAS

## 2024-10-24 NOTE — Clinical Note
Karlene Yun  [address]  [place] [address]          Dear Karlene,    Thank you for your interest in the Transplant Center at [facility]. We look forward to being a part of your care team and assisting you through the transplant process.    As we discussed, your transplant coordinator is Candelario Baker (Liver).  You may call your coordinator at any time with questions or concerns.  Your first scheduled call will be on *** at ***.  If this needs to change, call [phone].    Please complete the following.    1. Fill out and return the enclosed forms    { SOT INTAKE ENCLOSURES:[id number]}    2. Sign up for:    [employer], access to your electronic medical record (see enclosed pamphlet)    [employer], a transplant education website    You can use these tools to learn more about your transplant, communicate with your care team, and track your medical details      Sincerely,      Solid Organ Transplant  [facility]    cc: {UC TRANSPLANT CC LIST:[id number]}

## 2024-10-24 NOTE — LETTER
10/24/2024       RE: Karlene Yun  [address]  [facility] [address]     Dear Colleague,    Thank you for referring your patient, Karlene Yun, to the [facility]HEUM[facility]OLOG[facility] [place] at [facility]. Please see a copy of my visit note below.    Rheumatology Consult Note    Reason for referral: PsA    Referring physician: Latricia Otra        HPI:    Karlene Yun is a 58 year old female who was referred to our clinic for evaluation and management of her PsA.    Has h/o PsA x 30 years.    Has h/o OA.    On Wegovy and not losing weight x 6 months, no weight loss and no SE.    Arthritis is getting worse.    She has h/o cirrhosis.    Psoriasis age 15, diffuse all over scalp, face, arms and legs. Tried topical, photo.    Has some psoriasis spots on eyelids, scalp, it was ok most recently.    About 30 yr ago, was diagnosed with PsA affecting large joints. Not sure if methotrexate helped with joint, rash, but it might have helped.     Today, has pain over knees, ankles, feet. Hard to tell the source of pain with Dx of FMS. No joint swelling.    Fell on L knee 2 winter ago.    Not seeing ortho, no steroid inj.    Steroid helps, wonder drug for her.    AM stiffness is there, not too long, an hour maybe.    Gets hands/feet and hip cramps.    With h/o liver cirrhosis in 2022, came off methotrexate.    On diuretic, ankles came down, swelling is better, no longer uses cane.    Energy level is low.    No oral ulcers, seizures, photosensitivity, CP, SOB, cough, Raynaud's, diarrhea, cramps.    Gets numbness over feet.    Not sure if skyrizi helps with joints, skin.        ROS: A comprehensive ROS was done. Positives are per HPI.        Past Medical History:   Diagnosis Date     Hyperlipidemia 3/17/2005     Problem list name updated by automated process. Provider to review     Hypothyroidism 6/26/2002     Problem list name updated by  "automated process. Provider to review     Major depressive disorder, recurrent episode, in full remission (H) 2002     Morbid obesity (H) 2019     Psoriatic arthropathy (H) 2002   Liver cirrhosis    Past Surgical History:   Procedure Laterality Date     COLONOSCOPY  2016    mild colitis/ repeat 10 yrs     ESOPHAGOSCOPY, GASTROSCOPY, DUODENOSCOPY (EGD), COMBINED N/A 2024    Procedure: ESOPHAGOGASTRODUODENOSCOPY for variceal surveillance;  Surgeon: Guru Jacquelyn Rees MD;  Location: [facility] OR     ESOPHAGOSCOPY, GASTROSCOPY, DUODENOSCOPY (EGD), COMBINED N/A 2024    Procedure: ESOPHAGOGASTRODUODENOSCOPY, WITH FINE NEEDLE ASPIRATION BIOPSY, WITH ENDOSCOPIC ULTRASOUND GUIDANCE liver and lymph node biopsy;  Surgeon: Guru Jacquelyn Rees MD;  Location: [facility] OR     HC REMOVAL OF TONSILS,<11 Y/O      Tonsils <11y.o.     IR LUMBAR PUNCTURE  2023     LASIK      \"lasek\"     Family History   Problem Relation Age of Onset     Cancer Sister         cervical     Arthritis Paternal Grandmother      Diabetes Father    PGM had RA    Parents  young.    Older sister has OA.    No fh/o lupus, IBD.      SHx: no tobacco, ETOH use.      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        Social History     Socioeconomic History     Marital status:      Spouse name: Not on file     Number of children: 1     Years of education: Not on file     Highest education level: Not on file   Occupational History     Not on file   Tobacco Use     Smoking status: Never     Smokeless tobacco: Never   Substance and Sexual Activity     Alcohol use: Not Currently     Comment: very rare occasions     Drug use: No     Sexual activity: Not on file   Other Topics Concern     Not on file   Social History Narrative     Not on file     Social Drivers of Health     Financial Resource Strain: Low Risk  (2020)    Overall Financial Resource Strain (CARDIA)      " Difficulty of Paying Living Expenses: Not very hard   Food Insecurity: No Food Insecurity (10/11/2023)    Received from [facility]    Hunger Vital Sign      Worried About Running Out of Food in the Last Year: Never true      Ran Out of Food in the Last Year: Never true   Transportation Needs: No Transportation Needs (4/22/2020)    PRAPARE - Transportation      Lack of Transportation (Medical): No      Lack of Transportation (Non-Medical): No   Physical Activity: Not on file   Stress: Not on file   Social Connections: Not on file   Interpersonal Safety: Not on file   Housing Stability: Not on file     Patient Active Problem List   Diagnosis     Psoriatic arthropathy (H)     Major depressive disorder, recurrent episode, in full remission (H)     Pulmonary embolism and infarction (H)     Hypothyroidism     Allergic rhinitis     Rosacea     Hyperlipidemia     Living will, counseling/discussion     Chronic fatigue fibromyalgia syndrome     Morbid obesity (H)     Controlled substance agreement signed     Fibromyalgia     Allergies   Allergen Reactions     Sumatriptan Succinate Nausea and Vomiting     IMITREX       Outpatient Encounter Medications as of 10/24/2024   Medication Sig Dispense Refill     bumetanide (BUMEX) 1 MG tablet Take 1 tablet (1 mg) by mouth daily (Patient taking differently: Take 1 mg by mouth every morning.) 60 tablet 5     citalopram (CELEXA) 20 MG tablet Take 1 tablet (20 mg) by mouth daily (Patient taking differently: Take 20 mg by mouth every morning.) 90 tablet 3     gabapentin (NEURONTIN) 300 MG capsule Take 600 mg by mouth every evening       lactulose 20 GM/30ML solution Take 15 mLs (10 g) by mouth every morning. 946 mL 4     levothyroxine (SYNTHROID/LEVOTHROID) 112 MCG tablet Take 1 tablet (112 mcg) by mouth daily (Patient taking differently: Take 112 mcg by mouth every morning.) 90 tablet 3     Semaglutide-Weight Management (WEGOVY) 1 MG/0.5ML pen Inject 1 mg subcutaneously once a week  Thursdays       JEFF  MG/ML subcutaneous Inject 150 mg subcutaneously once every twelve weeks       spironolactone (ALDACTONE) 50 MG tablet Take 2 tablets (100 mg) by mouth daily (Patient taking differently: Take 100 mg by mouth every morning.) 60 tablet 11     tacrolimus (PROTOPIC) 0.1 % external ointment Apply topically as needed       traZODone (DESYREL) 50 MG tablet Take 100 mg by mouth At Bedtime  0     triamcinolone (KENALOG) 0.1 % external cream Apply topically as needed       [DISCONTINUED] spironolactone (ALDACTONE) 50 MG tablet Take 2 tablets (100 mg) by mouth daily (Patient taking differently: Take 100 mg by mouth every morning) 60 tablet 0     No facility-administered encounter medications on file as of 10/24/2024.         Component      Latest Ref Rng 10/22/2024  8:43 AM   Sodium      135 - 145 mmol/L 135    Potassium      3.4 - 5.3 mmol/L 4.1    Chloride      98 - 107 mmol/L 104    Carbon Dioxide (CO2)      22 - 29 mmol/L 24    Anion Gap      7 - 15 mmol/L 7    Urea Nitrogen      8.0 - 23.0 mg/dL 11.3    Creatinine      0.51 - 0.95 mg/dL 0.90    GFR Estimate      >60 mL/min/1.73m2 73    Calcium      8.8 - 10.4 mg/dL 8.7 (L)    Glucose      70 - 99 mg/dL 114 (H)    WBC      4.0 - 11.0 10e3/uL 4.0    RBC Count      3.80 - 5.20 10e6/uL 4.12    Hemoglobin      11.7 - 15.7 g/dL 13.9    Hematocrit      35.0 - 47.0 % 39.6    MCV      78 - 100 fL 96    MCH      26.5 - 33.0 pg 33.7 (H)    MCHC      31.5 - 36.5 g/dL 35.1    RDW      10.0 - 15.0 % 14.8    Platelet Count      150 - 450 10e3/uL 67 (L)    Protein Total      6.4 - 8.3 g/dL 7.0    Albumin      3.5 - 5.2 g/dL 2.6 (L)    Bilirubin Total      <=1.2 mg/dL 3.5 (H)    Alkaline Phosphatase      40 - 150 U/L 69    AST      0 - 45 U/L 60 (H)    ALT      0 - 50 U/L 26    Bilirubin Direct      0.00 - 0.30 mg/dL 1.16 (H)    INR      0.85 - 1.15  1.55 (H)       Legend:  (L) Low  (H) High      Ph.E:    /70   Pulse 67   Temp 98.4  F (36.9  C)   Ht  "1.778 m (5' 10\")   Wt 146.1 kg (322 lb)   SpO2 94%   BMI 46.20 kg/m        Constitutional: WD/WN. Pleasant. In no acute distress.  Eyes: EOM intact, sclera anicteric, conj not injected  HEENT: No oral ulcers or thrush. Normal salivary pool.  Neck: No cervical LAP or thyromegaly  Chest: Clear to auscultation bilaterally  CV: RRR, no murmurs/ rubs or gallops. No edema, clubbing or cyanosis.   GI: Abdomen is soft and non tender.   MS: No synovitis. Cool joints. No tenderness of the joints. OA changes of hands.  Skin: Psoriasis over eyelids.  Neuro: A&O x 3. Grossly non focal, muscular power 5/5 in all ext  Psych: NL affect     Assessment/ plan:    Active psoriasis, PsA, h/o liver cirrhosis. Recommend switching skyrizi to taltz; risks were discussed. Could get assistance to have manjaro covered by switching to taltz, now eight loss on wegovy. Discussed OA mx.    Plan:      Will switch skyrizi to taltz     Loulou our pharmacist will contact you    You could add gabapentin 300 mg in the morning    Recommend Ventura chi, voltaren gel for the knee pain    Refer to ortho for knee pain        Return in about 3-4 months (video)      TT 60 min was spent on date of the encounter doing chart review, history and exam, documentation and further activities as noted above. Any prior notes, outside records, laboratory results, and imaging studies were reviewed if relevant.      Lisette Mccann MD    Again, thank you for allowing me to participate in the care of your patient.      Sincerely,    Lisette Mccann MD    "

## 2024-10-24 NOTE — NURSING NOTE
"Chief Complaint   Patient presents with    RECHECK     Follow up     /70   Pulse 67   Temp 98.4  F (36.9  C)   Ht 1.778 m (5' 10\")   Wt 146.1 kg (322 lb)   SpO2 94%   BMI 46.20 kg/m    Yandy Stringer on 10/24/2024 at 8:29 AM    " - - -

## 2024-10-24 NOTE — PATIENT INSTRUCTIONS
Will switch skyrizi to jasen Jamil our pharmacist will contact you    You could add gabapentin 300 mg in the morning    Recommend Ventura chi, voltaren gel for the knee pain    Refer to ortho for knee pain    Return in about 3-4 months (video)

## 2024-10-24 NOTE — LETTER
Karlene Yun  [address]  [facility] [address]    Dear Karlene,    Thank you for your interest in the Transplant Center at [facility]. We look forward to being a part of your care team and assisting you through the transplant process.    As we discussed, your transplant coordinator is Candelario Baker (Liver).  You may call your coordinator at any time with questions or concerns.  Your first scheduled call will be on 11/11/24 between 10:00 AM- 12:50 PM.  If this needs to change, call [phone].    Please complete the following.    Fill out and return the enclosed forms  Authorization for Electronic Communication  Authorization to Discuss Protected Health Information  Authorization for Release of Protected Health Information    Sign up for:  [employer], access to your electronic medical record (see enclosed pamphlet)  [employer], a transplant education website    My Transplant Place    You can use these tools to learn more about your transplant, communicate with your care team, and track your medical details    Sincerely,  Solid Organ Transplant  [facility]     cc: Referring Physician

## 2024-10-24 NOTE — PROGRESS NOTES
Rheumatology Consult Note    Reason for referral: PsA    Referring physician: Latricia Orta        HPI:    Karlene Yun is a 58 year old female who was referred to our clinic for evaluation and management of her PsA.    Has h/o PsA x 30 years.    Has h/o OA.    On Wegovy and not losing weight x 6 months, no weight loss and no SE.    Arthritis is getting worse.    She has h/o cirrhosis.    Psoriasis age 15, diffuse all over scalp, face, arms and legs. Tried topical, photo.    Has some psoriasis spots on eyelids, scalp, it was ok most recently.    About 30 yr ago, was diagnosed with PsA affecting large joints. Not sure if methotrexate helped with joint, rash, but it might have helped.     Today, has pain over knees, ankles, feet. Hard to tell the source of pain with Dx of FMS. No joint swelling.    Fell on L knee 2 winter ago.    Not seeing ortho, no steroid inj.    Steroid helps, wonder drug for her.    AM stiffness is there, not too long, an hour maybe.    Gets hands/feet and hip cramps.    With h/o liver cirrhosis in 2022, came off methotrexate.    On diuretic, ankles came down, swelling is better, no longer uses cane.    Energy level is low.    No oral ulcers, seizures, photosensitivity, CP, SOB, cough, Raynaud's, diarrhea, cramps.    Gets numbness over feet.    Not sure if skyrizi helps with joints, skin.        ROS: A comprehensive ROS was done. Positives are per HPI.        Past Medical History:   Diagnosis Date    Hyperlipidemia 3/17/2005     Problem list name updated by automated process. Provider to review    Hypothyroidism 6/26/2002     Problem list name updated by automated process. Provider to review    Major depressive disorder, recurrent episode, in full remission (H) 1/21/2002    Morbid obesity (H) 5/12/2019    Psoriatic arthropathy (H) 1/21/2002   Liver cirrhosis    Past Surgical History:   Procedure Laterality Date    COLONOSCOPY  2/11/2016    mild colitis/ repeat 10 yrs     "ESOPHAGOSCOPY, GASTROSCOPY, DUODENOSCOPY (EGD), COMBINED N/A 2024    Procedure: ESOPHAGOGASTRODUODENOSCOPY for variceal surveillance;  Surgeon: Guru Jacquelyn Rees MD;  Location: [facility] OR    ESOPHAGOSCOPY, GASTROSCOPY, DUODENOSCOPY (EGD), COMBINED N/A 2024    Procedure: ESOPHAGOGASTRODUODENOSCOPY, WITH FINE NEEDLE ASPIRATION BIOPSY, WITH ENDOSCOPIC ULTRASOUND GUIDANCE liver and lymph node biopsy;  Surgeon: Guru Jacquelyn Rees MD;  Location: [facility] OR    HC REMOVAL OF TONSILS,<12 Y/O      Tonsils <11y.o.    IR LUMBAR PUNCTURE  2023    LASIK      \"lasek\"     Family History   Problem Relation Age of Onset    Cancer Sister         cervical    Arthritis Paternal Grandmother     Diabetes Father    PGM had RA    Parents  young.    Older sister has OA.    No fh/o lupus, IBD.      SHx: no tobacco, ETOH use.      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        Social History     Socioeconomic History    Marital status:      Spouse name: Not on file    Number of children: 1    Years of education: Not on file    Highest education level: Not on file   Occupational History    Not on file   Tobacco Use    Smoking status: Never    Smokeless tobacco: Never   Substance and Sexual Activity    Alcohol use: Not Currently     Comment: very rare occasions    Drug use: No    Sexual activity: Not on file   Other Topics Concern    Not on file   Social History Narrative    Not on file     Social Drivers of Health     Financial Resource Strain: Low Risk  (2020)    Overall Financial Resource Strain (CARDIA)     Difficulty of Paying Living Expenses: Not very hard   Food Insecurity: No Food Insecurity (10/11/2023)    [facility]    Hunger Vital Sign     Worried About Running Out of Food in the Last Year: Never true     Ran Out of Food in the Last Year: Never true   Transportation Needs: No Transportation Needs (2020)    PRAPARE - " Transportation     Lack of Transportation (Medical): No     Lack of Transportation (Non-Medical): No   Physical Activity: Not on file   Stress: Not on file   Social Connections: Not on file   Interpersonal Safety: Not on file   Housing Stability: Not on file     Patient Active Problem List   Diagnosis    Psoriatic arthropathy (H)    Major depressive disorder, recurrent episode, in full remission (H)    Pulmonary embolism and infarction (H)    Hypothyroidism    Allergic rhinitis    Rosacea    Hyperlipidemia    Living will, counseling/discussion    Chronic fatigue fibromyalgia syndrome    Morbid obesity (H)    Controlled substance agreement signed    Fibromyalgia     Allergies   Allergen Reactions    Sumatriptan Succinate Nausea and Vomiting     IMITREX       Outpatient Encounter Medications as of 10/24/2024   Medication Sig Dispense Refill    bumetanide (BUMEX) 1 MG tablet Take 1 tablet (1 mg) by mouth daily (Patient taking differently: Take 1 mg by mouth every morning.) 60 tablet 5    citalopram (CELEXA) 20 MG tablet Take 1 tablet (20 mg) by mouth daily (Patient taking differently: Take 20 mg by mouth every morning.) 90 tablet 3    gabapentin (NEURONTIN) 300 MG capsule Take 600 mg by mouth every evening      lactulose 20 GM/30ML solution Take 15 mLs (10 g) by mouth every morning. 946 mL 4    levothyroxine (SYNTHROID/LEVOTHROID) 112 MCG tablet Take 1 tablet (112 mcg) by mouth daily (Patient taking differently: Take 112 mcg by mouth every morning.) 90 tablet 3    Semaglutide-Weight Management (WEGOVY) 1 MG/0.5ML pen Inject 1 mg subcutaneously once a week Thursdays      SKYRIZI  MG/ML subcutaneous Inject 150 mg subcutaneously once every twelve weeks      spironolactone (ALDACTONE) 50 MG tablet Take 2 tablets (100 mg) by mouth daily (Patient taking differently: Take 100 mg by mouth every morning.) 60 tablet 11    tacrolimus (PROTOPIC) 0.1 % external ointment Apply topically as needed      traZODone (DESYREL) 50  "MG tablet Take 100 mg by mouth At Bedtime  0    triamcinolone (KENALOG) 0.1 % external cream Apply topically as needed      [DISCONTINUED] spironolactone (ALDACTONE) 50 MG tablet Take 2 tablets (100 mg) by mouth daily (Patient taking differently: Take 100 mg by mouth every morning) 60 tablet 0     No facility-administered encounter medications on file as of 10/24/2024.         Component      Latest Ref Rng 10/22/2024  8:43 AM   Sodium      135 - 145 mmol/L 135    Potassium      3.4 - 5.3 mmol/L 4.1    Chloride      98 - 107 mmol/L 104    Carbon Dioxide (CO2)      22 - 29 mmol/L 24    Anion Gap      7 - 15 mmol/L 7    Urea Nitrogen      8.0 - 23.0 mg/dL 11.3    Creatinine      0.51 - 0.95 mg/dL 0.90    GFR Estimate      >60 mL/min/1.73m2 73    Calcium      8.8 - 10.4 mg/dL 8.7 (L)    Glucose      70 - 99 mg/dL 114 (H)    WBC      4.0 - 11.0 10e3/uL 4.0    RBC Count      3.80 - 5.20 10e6/uL 4.12    Hemoglobin      11.7 - 15.7 g/dL 13.9    Hematocrit      35.0 - 47.0 % 39.6    MCV      78 - 100 fL 96    MCH      26.5 - 33.0 pg 33.7 (H)    MCHC      31.5 - 36.5 g/dL 35.1    RDW      10.0 - 15.0 % 14.8    Platelet Count      150 - 450 10e3/uL 67 (L)    Protein Total      6.4 - 8.3 g/dL 7.0    Albumin      3.5 - 5.2 g/dL 2.6 (L)    Bilirubin Total      <=1.2 mg/dL 3.5 (H)    Alkaline Phosphatase      40 - 150 U/L 69    AST      0 - 45 U/L 60 (H)    ALT      0 - 50 U/L 26    Bilirubin Direct      0.00 - 0.30 mg/dL 1.16 (H)    INR      0.85 - 1.15  1.55 (H)       Legend:  (L) Low  (H) High      Ph.E:    /70   Pulse 67   Temp 98.4  F (36.9  C)   Ht 1.778 m (5' 10\")   Wt 146.1 kg (322 lb)   SpO2 94%   BMI 46.20 kg/m        Constitutional: WD/WN. Pleasant. In no acute distress.  Eyes: EOM intact, sclera anicteric, conj not injected  HEENT: No oral ulcers or thrush. Normal salivary pool.  Neck: No cervical LAP or thyromegaly  Chest: Clear to auscultation bilaterally  CV: RRR, no murmurs/ rubs or gallops. No edema, " clubbing or cyanosis.   GI: Abdomen is soft and non tender.   MS: No synovitis. Cool joints. No tenderness of the joints. OA changes of hands.  Skin: Psoriasis over eyelids.  Neuro: A&O x 3. Grossly non focal, muscular power 5/5 in all ext  Psych: NL affect     Assessment/ plan:    Active psoriasis, PsA, h/o liver cirrhosis. Recommend switching skyrizi to taltz; risks were discussed. Could get assistance to have manaba covered by switching to taltz, now eight loss on wegovy. Discussed OA mx.    Plan:      Will switch skyrizi to taltz     Loulou our pharmacist will contact you    You could add gabapentin 300 mg in the morning    Recommend Ventura chi, voltaren gel for the knee pain    Refer to ortho for knee pain        Return in about 3-4 months (video)      TT 60 min was spent on date of the encounter doing chart review, history and exam, documentation and further activities as noted above. Any prior notes, outside records, laboratory results, and imaging studies were reviewed if relevant.      Lisette Mccann MD

## 2024-10-25 ENCOUNTER — PATIENT OUTREACH (OUTPATIENT)
Dept: CARE COORDINATION | Facility: CLINIC | Age: 59
End: 2024-10-25
Payer: COMMERCIAL

## 2024-10-25 VITALS — HEIGHT: 70 IN | BODY MASS INDEX: 41.95 KG/M2 | WEIGHT: 293 LBS

## 2024-10-25 LAB — CCP AB SER IA-ACNC: 6.2 U/ML

## 2024-10-25 NOTE — TELEPHONE ENCOUNTER
Patient was asked the following questions during liver intake call.     [facility]OT LIVER INTAKE      PCP: Femi King MD    Referring Provider: Lyn Martinez NP   Specialities: Nora Morejon MD  Referring Diagnosis:  MASLD       Insurance information: [employer] ID: [id number] Group: [id number]      1)Do you know why you have liver disease: Yes, Fatty liver disease      If Alcoholic Cirrhosis is present when was your last drink:  3 years ago      Have you ever been through treatment for alcohol: No  2) Presence of Ascites: no Paracentesis: no  3) Presence of Hepatic Encephalopathy: no  Medications:   4) History of GI Bleeding: No  5) Oxygen Use: no  6) EGD: yes, 9/6/23  7) Colonoscopy: 2/11/2016  8) MELD Score: 20  9) Labs available for review from PCP/GI:  Yes    10)HCC Diagnosis: No (if no, go to #11)                                      Referral intake process completed.  Patient is aware that after financial approval is received, medical records will be requested.   Patient confirmed for a callback from transplant coordinator on 9/6/2023.  Tentative evaluation date TBD.     Confirmed coordinator will discuss evaluation process in more detail at the time of their call.   Patient is aware of the need to arrange age appropriate cancer screening, vaccinations, and dental care.  Reminded patient to complete questionnaire, complete medical records release, and review packet prior to evaluation visit .  Assessed patient for special needs (ie--wheelchair, assistance, guardian, and ):   No  Patient instructed to call [phone] with questions.      Patient gave verbal consent during intake call to obtain medical records and documents outside of [facility]:   Yes  Patient agrees to docusign Yes

## 2024-10-25 NOTE — TELEPHONE ENCOUNTER
DIAGNOSIS: Primary osteoarthritis of left knee/Lisette Mccann MD/HP/NO IMAGES     APPOINTMENT DATE: 10.30.24   NOTES STATUS DETAILS   OFFICE NOTE from referring provider Internal 10.24.24  Fabrady  Rheum   MEDICATION LIST Internal

## 2024-10-28 ENCOUNTER — VIRTUAL VISIT (OUTPATIENT)
Dept: PHARMACY | Facility: CLINIC | Age: 59
End: 2024-10-28
Attending: INTERNAL MEDICINE
Payer: COMMERCIAL

## 2024-10-28 DIAGNOSIS — E66.9 OBESITY, UNSPECIFIED CLASS, UNSPECIFIED OBESITY TYPE, UNSPECIFIED WHETHER SERIOUS COMORBIDITY PRESENT: ICD-10-CM

## 2024-10-28 DIAGNOSIS — M25.561 PAIN IN BOTH KNEES, UNSPECIFIED CHRONICITY: Primary | ICD-10-CM

## 2024-10-28 DIAGNOSIS — M19.90 OSTEOARTHRITIS, UNSPECIFIED OSTEOARTHRITIS TYPE, UNSPECIFIED SITE: ICD-10-CM

## 2024-10-28 DIAGNOSIS — L40.50 PSORIATIC ARTHROPATHY (H): Primary | ICD-10-CM

## 2024-10-28 DIAGNOSIS — L40.9 PSORIASIS: ICD-10-CM

## 2024-10-28 DIAGNOSIS — M25.562 PAIN IN BOTH KNEES, UNSPECIFIED CHRONICITY: Primary | ICD-10-CM

## 2024-10-28 RX ORDER — SECUKINUMAB 150 MG/ML
150 INJECTION SUBCUTANEOUS WEEKLY
Qty: 5 ML | Refills: 0 | Status: SHIPPED | OUTPATIENT
Start: 2024-10-28

## 2024-10-28 RX ORDER — SECUKINUMAB 150 MG/ML
150 INJECTION SUBCUTANEOUS
Qty: 3 ML | Refills: 1 | Status: SHIPPED | OUTPATIENT
Start: 2024-10-28

## 2024-10-29 ENCOUNTER — TELEPHONE (OUTPATIENT)
Dept: RHEUMATOLOGY | Facility: CLINIC | Age: 59
End: 2024-10-29
Payer: COMMERCIAL

## 2024-10-29 NOTE — TELEPHONE ENCOUNTER
PA Initiation    Medication: COSENTYX SENSOREADY  MG/ML SC SOAJ  Insurance Company: [email or web address] - Phone [phone] Fax [phone]  Pharmacy Filling the Rx: [facility] - [place], [place] - [address]  Filling Pharmacy Phone:    Filling Pharmacy Fax:    Start Date: 10/29/2024    Faxed request to plan to change to Cosenty[employer].

## 2024-10-29 NOTE — TELEPHONE ENCOUNTER
Prior Authorization Approval    Medication: COSENTYX SENSOREADY  MG/ML SC SOAJ  Authorization Effective Date: 9/29/2024  Authorization Expiration Date: 10/29/2025  Approved Dose/Quantity: Loading dose then 1 per 28 days  Reference #: [id number]   Insurance Company: [employer] - Phone [phone] Fax [phone]  Expected CoPay: $ 0  CoPay Card Available: No    Financial Assistance Needed: Not needed  Which Pharmacy is filling the prescription: [facility] - [place], [place] - [facility] [address]   Pharmacy Notified: Yes

## 2024-10-30 ENCOUNTER — ANCILLARY PROCEDURE (OUTPATIENT)
Dept: GENERAL RADIOLOGY | Facility: CLINIC | Age: 59
End: 2024-10-30
Attending: FAMILY MEDICINE
Payer: COMMERCIAL

## 2024-10-30 ENCOUNTER — PRE VISIT (OUTPATIENT)
Dept: ORTHOPEDICS | Facility: CLINIC | Age: 59
End: 2024-10-30

## 2024-10-30 ENCOUNTER — OFFICE VISIT (OUTPATIENT)
Dept: ORTHOPEDICS | Facility: CLINIC | Age: 59
End: 2024-10-30
Attending: INTERNAL MEDICINE
Payer: COMMERCIAL

## 2024-10-30 DIAGNOSIS — M25.562 PAIN IN BOTH KNEES, UNSPECIFIED CHRONICITY: ICD-10-CM

## 2024-10-30 DIAGNOSIS — L40.50 PSORIATIC ARTHROPATHY (H): ICD-10-CM

## 2024-10-30 DIAGNOSIS — M25.561 PAIN IN BOTH KNEES, UNSPECIFIED CHRONICITY: ICD-10-CM

## 2024-10-30 DIAGNOSIS — M17.12 PATELLOFEMORAL ARTHRITIS OF LEFT KNEE: Primary | ICD-10-CM

## 2024-10-30 PROCEDURE — 20610 DRAIN/INJ JOINT/BURSA W/O US: CPT | Mod: LT | Performed by: FAMILY MEDICINE

## 2024-10-30 PROCEDURE — 73562 X-RAY EXAM OF KNEE 3: CPT | Mod: LT | Performed by: RADIOLOGY

## 2024-10-30 PROCEDURE — 99203 OFFICE O/P NEW LOW 30 MIN: CPT | Mod: 25 | Performed by: FAMILY MEDICINE

## 2024-10-30 RX ORDER — TRIAMCINOLONE ACETONIDE 40 MG/ML
40 INJECTION, SUSPENSION INTRA-ARTICULAR; INTRAMUSCULAR
Status: COMPLETED | OUTPATIENT
Start: 2024-10-30 | End: 2024-10-30

## 2024-10-30 RX ORDER — LIDOCAINE HYDROCHLORIDE 10 MG/ML
4 INJECTION, SOLUTION EPIDURAL; INFILTRATION; INTRACAUDAL; PERINEURAL
Status: COMPLETED | OUTPATIENT
Start: 2024-10-30 | End: 2024-10-30

## 2024-10-30 RX ADMIN — LIDOCAINE HYDROCHLORIDE 4 ML: 10 INJECTION, SOLUTION EPIDURAL; INFILTRATION; INTRACAUDAL; PERINEURAL at 13:27

## 2024-10-30 RX ADMIN — TRIAMCINOLONE ACETONIDE 40 MG: 40 INJECTION, SUSPENSION INTRA-ARTICULAR; INTRAMUSCULAR at 13:27

## 2024-10-30 NOTE — NURSING NOTE
[address] [address]  Dept: [phone]  ______________________________________________________________________________    Patient: Karlene Yun   : 1965   MRN: [id number]   2024    INVASIVE PROCEDURE SAFETY CHECKLIST    Date: 2024   Procedure: Left knee CSI   Patient Name: Karlene Yun  MRN: [id number]  YOB: 1965    Action: Complete sections as appropriate. Any discrepancy results in a HARD COPY until resolved.     PRE PROCEDURE:  Patient ID verified with 2 identifiers (name and  or MRN): Yes  Procedure and site verified with patient/designee (when able): Yes  Accurate consent documentation in medical record: Yes  H&P (or appropriate assessment) documented in medical record: Yes  H&P must be up to 20 days prior to procedure and updates within 24 hours of procedure as applicable: NA  Relevant diagnostic and radiology test results appropriately labeled and displayed as applicable: Yes  Procedure site(s) marked with provider initials: NA    TIMEOUT:  Time-Out performed immediately prior to starting procedure, including verbal and active participation of all team members addressing the following:Yes  * Correct patient identify  * Confirmed that the correct side and site are marked  * An accurate procedure consent form  * Agreement on the procedure to be done  * Correct patient position  * Relevant images and results are properly labeled and appropriately displayed  * The need to administer antibiotics or fluids for irrigation purposes during the procedure as applicable   * Safety precautions based on patient history or medication use    DURING PROCEDURE: Verification of correct person, site, and procedures any time the responsibility for care of the patient is transferred to another member of the care team.       Prior to injection, verified patient identity using patient's name and date of  birth.  Due to injection administration, patient instructed to remain in clinic for 15 minutes  afterwards, and to report any adverse reaction to me immediately.    Joint injection was performed.      Drug Amount Wasted:  Yes: 1 mg/ml lidocaine   Vial/Syringe: Single dose vial  Expiration Date:  04/28       Nadia Weinstein [facility]  October 30, 2024

## 2024-10-30 NOTE — PROGRESS NOTES
Sports Medicine Clinic Visit    PCP: Femi King ROSALBA Yun is a 58 year old female who is seen  in consultation at the request of Dr. Mccann presenting with left knee pain.     Injury: Pain started after a fall in the winter, 2 years ago    Location of Pain: left knee; anterior   Duration of Pain: 2 years ago   Rating of Pain: 6/10  Pain is better with: Nothing  Pain is worse with: squatting and walking   Additional Features: No   Treatment so far consists of: Physical therapy currently, working on core strength  Prior History of related problems: No     There were no vitals taken for this visit.       Patient follows with rheumatology, most recent clinic note 10/24/24 reviewed by me.  Patient history of psoriatic arthritis for the past 30 years as well as osteoarthritis.  Patient has been on a weight loss medicine over the past 6 months, with no significant weight loss to this point.  Past history of cirrhosis.  Patient has active psoriasis and recently has switched medicines from her rheumatologist, from skyrizi to taltz.  Patient referred by rheumatology for left-sided knee DJD.  She was encouraged to consider kaylan chi, and Voltaren gel for the knee.    Patient's medicines include Celexa, gabapentin, Synthroid, Wegovy, Aldactone    10/22/2024 normal CRP, rheumatoid factor, anti-CCP.  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    Patient remote history of pulmonary embolism 1/31/2002.      Imaging studies below reviewed by me:    XR KNEE BILATERAL 3 VIEWS [facility], 3/2/2023    COMPARISON:  None.    FINDINGS:      Right knee: 4 views. Mild patellofemoral compartment narrowing and slight medial compartment narrowing. No significant joint effusion. No fracture or dislocation.    Left knee: 4 views. Mild patellofemoral compartment narrowing and slight medial compartment narrowing. No significant joint effusion. No  fracture or dislocation.      XR PELVIS AND HIP BILATERAL 2 VIEWS [facility], 3/2/2023    COMPARISON:  None.    FINDINGS:  5 images. No displaced fracture and no dislocation. Symmetric appearance of the hip joints with mild degenerative change. Mild sclerosis and spurring inferiorly at the sacroiliac joints. Degenerative change in the lower lumbar spine were imaged. Ossification lateral to the proximal left hip            EXAM: MRI OF THE LUMBAR SPINE WITHOUT CONTRAST 4/4/2023 [facility]    CLINICAL INFORMATION: Mid and low back pain ongoing for 30 years. Patient attempted physical therapy and oral medical therapy.    TECHNICAL INFORMATION: Sagittal fast spin-echo T2-weighted, T1-weighted, STIR , coronal T1-weighted as well as axial fast spin-echo T1 and T2-weighted images of the lumbar spine were obtained on a tree 0.0 Janell MRI scanner. SEDATION: None. CONTRAST: None.    INTERPRETATION: Comparison MRI lumbar spine examination dated 4/3/2023.    Generalized dextrocurvature with 1 mm retrolisthesis of L5 on S1. No acute fracture or spondylolysis, although edematous degenerative endplate changes are demonstrated at L5-S1 and ventrally at L2-3. Conus is normal and terminates at L1. Imaged upper sacrum and paraspinal soft tissue structures are unremarkable save for an incidental right renal cyst. Disc desiccation and annular bulging are demonstrated at L5-S1 through L2-3 with isolated annular bulging at L1-2 and T11-12. Disc space narrowing is moderate at L5-S1, mild at L3-4 and moderate at L2-3.    L5-S1: Unchanged annular bulging without central stenosis or traversing neural compression. Mild bilateral facet hypertrophy with moderate bilateral chronic foraminal narrowing.    L4-5: Unchanged left foraminal annular fissure and 2 mm left inferior foraminal disc protrusion with mild to moderate left foraminal narrowing, but no exiting ganglionic compression. Mild left subarticular recess narrowing without traversing  neural compression or central stenosis. Mild bilateral facet arthrosis with mild right chronic foraminal narrowing.    L3-4: Unchanged annular bulging without central stenosis or traversing neural compression. Mild bilateral facet hypertrophy with mild bilateral chronic foraminal narrowing.    L2-3: Unchanged annular bulging without central stenosis or traversing neural compression. Mild bilateral facet hypertrophy with mild right chronic foraminal narrowing.    L1-2: Unchanged minimal annular bulging without central stenosis or traversing neural compression. Facets and foramina are unremarkable.    T12-L1: Normal posterior disc margins and facets. Patent foramina.    T11-12: No central stenosis or cord compression. Facets and foramina are unremarkable.    CONCLUSION: Multilevel degenerative lumbar spondylosis with the following notable findings:    1. Unchanged, 2 mm left inferior foraminal L4-5 disc protrusion without exiting ganglionic compression.    2. No acute fracture or spondylolysis, although edematous degenerative endplate changes are demonstrated at L5-S1 and ventrally at L2-3.    3. Mild bilateral L4-5 facet arthrosis.    4. Multilevel chronic foraminal narrowing that varies from mild to moderate without ganglionic compression as detailed above.          EXAM: MRI OF THE THORACIC SPINE WITHOUT CONTRAST  4/4/2023    CLINICAL INFORMATION: Mid and low back pain ongoing for 30 years. Patient attempted physical therapy and oral medical therapy.    TECHNICAL INFORMATION: Sagittal fast spin echo T2-weighted, T1-weighted FLAIR, inversion recovery, T2 weighted space, axial T2-weighted images of the thoracic spine were obtained on a 3.0 Janell MR scanner. Axial and coronal reformatted images were generated based on the sagittally acquired T2-weighted space data. SEDATION: None. CONTRAST: None.    INTERPRETATION: Comparison MRI thoracic spine examination dated 12/11/2015.    Normal alignment. No acute fracture.  Chronic Scheuermann's-type changes are demonstrated throughout the mid to lower thoracic spine. Thoracic cord maintains normal signal and the imaged portions of lungs are unremarkable. Imaged paraspinal soft tissue structures are notable for an unchanged, well-circumscribed area of T1 and T2 prolongation on the right adjacent to the T5 and T6 vertebral bodies most apparent on series 6 image 3, series 12 image 24 and series 11 image 67 where it measures 1.1 x 1.3 x 2.1 cm in AP, transverse and craniocaudal dimension. This is likely of lymphatic origin. Annular bulging is demonstrated at T11-12 and T10-11, T8-9 through T4-5, C7-T1 and C6-7.    T12-L1: No central stenosis or cord compression. Facets and foramina are unremarkable.    T11-12: Unchanged annular bulging without central stenosis or cord compression. Facets are unremarkable and the foramina are patent.    T10-11: Unchanged annular bulging and mild right facet hypertrophy. Right chronic foraminal narrowing.    T9-10: Normal posterior disc margins and facets. Patent foramina.    T8-9: Unchanged annular bulging without central stenosis or cord compression. Mild bilateral facet hypertrophy with patent foramina.    T7-8: Unchanged annular bulging without central stenosis or cord compression. Mild bilateral facet hypertrophy with mild left chronic foraminal narrowing.    T6-7: Unchanged mild annular bulging with mild right facet arthrosis and moderate right chronic foraminal narrowing.    T5-6: Unchanged 1 to 2 mm right paracentral disc protrusion without central stenosis or cord compression. Mild bilateral facet hypertrophy with patent foramina.    T4-5: Unchanged minimal annular bulging without central stenosis or cord compression. Mild left facet arthrosis with mild left chronic foraminal narrowing.    T3-4: No central stenosis or cord compression. Mild right facet hypertrophy with patent foramina.    T2-3: Normal posterior disc margins and mild right facet  hypertrophy. Patent foramina.    T1-2: No central stenosis or cord compression. Mild right facet hypertrophy with mild right chronic foraminal narrowing. Localizer images also demonstrate annular bulging at C3-4 through C5-6, but no central stenosis or cord compression are evident at the imaged portions of the cervical spine. Mild right C7-T1 facet arthrosis is evident.    CONCLUSION: Multilevel degenerative facet and lower cervical spondylosis, chronic Scheuermann's-type changes and the following notable findings:    1. Unchanged, 1 to 2 mm right paracentral T5-6 disc protrusion without central stenosis or cord compression.    2. No acute fracture or intrinsic cord lesion.    3. Mild right T6-7, left T4-5 and right C7-T1 facet arthrosis.    4. Multilevel level chronic foraminal narrowing that varies from mild to moderate as detailed above.            EXAM: 1.5 T MRI OF THE CERVICAL SPINE  12/3/2015 [facility]    CLINICAL INFORMATION: Chronic neck and back pain.    TECHNICAL INFORMATION: T1, T2 GRE, T2 FSE and STIR sagittal thin sections through the cervical spine with T2 GRE and FSE axial sections at selected levels. No comparison.    INTERPRETATION: Normal signal intensity within the cervical and upper thoracic cord. No Chiari malformation or syrinx, no intrinsic cord lesion and no intradural mass. Normal flow void within small vertebral arteries.    Cervical and upper thoracic spondylosis with mild disc space narrowing at C7-T1, C6-7 and C5-6, and lordotic alignment of the cervical vertebrae. There is nonspecific thickening and increased signal intensity within the supraspinous ligament at T1. No associated spinous process avulsion.    T3-4, T2-3 and T1-2: Dorsal disc margins normal with mild facet arthropathy on the right at each level and no stenosis or impingement.    C7-T1: Mild dorsal bulging with normal facet joints and no stenosis or impingement.    C6-7 and C5-6: Dorsal bulging and uncovertebral arthrosis with  normal facet joints and no stenosis or impingement.    C4-5, C3-4 and C2-3: Dorsal disc margins unremarkable with mild facet arthropathy bilaterally at C2-3 and no stenosis or impingement.    No degenerative or erosive changes within the atlantoaxial or cervico-occipital joints.    Normal signal intensity within the vertebral marrow spaces. No destructive bone lesions and no paraspinous soft tissue mass.    CONCLUSION:    1. Mild C7-T1, C6-7 and C5-6 disc degeneration and mild facet arthrosis at multiple levels without stenosis or neural impingement.    2. Nonspecific thickening and increased signal intensity within the supraspinous ligament at T1 which may be degenerative or posttraumatic in origin. Correlation with physical exam is recommended.    3. No cord abnormality, and no neoplasm, fracture or infection.                        PMH:  Past Medical History:   Diagnosis Date    Hyperlipidemia 3/17/2005     Problem list name updated by automated process. Provider to review    Hypothyroidism 6/26/2002     Problem list name updated by automated process. Provider to review    Major depressive disorder, recurrent episode, in full remission (H) 1/21/2002    Morbid obesity (H) 5/12/2019    Psoriatic arthropathy (H) 1/21/2002       Active problem list:  Patient Active Problem List   Diagnosis    Psoriatic arthropathy (H)    Major depressive disorder, recurrent episode, in full remission (H)    Pulmonary embolism and infarction (H)    Hypothyroidism    Allergic rhinitis    Rosacea    Hyperlipidemia    Living will, counseling/discussion    Chronic fatigue fibromyalgia syndrome    Morbid obesity (H)    Controlled substance agreement signed    Fibromyalgia       FH:  Family History   Problem Relation Age of Onset    Cancer Sister         cervical    Arthritis Paternal Grandmother     Diabetes Father        SH:  Social History     Socioeconomic History    Marital status:      Spouse name: Not on file    Number of  children: 1    Years of education: Not on file    Highest education level: Not on file   Occupational History    Not on file   Tobacco Use    Smoking status: Never    Smokeless tobacco: Never   Substance and Sexual Activity    Alcohol use: Not Currently     Comment: last drink 3 years ago    Drug use: No    Sexual activity: Not on file   Other Topics Concern    Not on file   Social History Narrative    Not on file     Social Drivers of Health     Financial Resource Strain: Low Risk  (4/22/2020)    Overall Financial Resource Strain (CARDIA)     Difficulty of Paying Living Expenses: Not very hard   Food Insecurity: No Food Insecurity (10/11/2023)    Received from [facility]    Hunger Vital Sign     Worried About Running Out of Food in the Last Year: Never true     Ran Out of Food in the Last Year: Never true   Transportation Needs: No Transportation Needs (4/22/2020)    PRAPARE - Transportation     Lack of Transportation (Medical): No     Lack of Transportation (Non-Medical): No   Physical Activity: Not on file   Stress: Not on file   Social Connections: Not on file   Interpersonal Safety: Not on file   Housing Stability: Not on file       MEDS:  See EMR, reviewed  ALL:  See EMR, reviewed    REVIEW OF SYSTEMS:  CONSTITUTIONAL:NEGATIVE for fever, chills, change in weight  INTEGUMENTARY/SKIN: NEGATIVE for worrisome rashes, moles or lesions  EYES: NEGATIVE for vision changes or irritation  ENT/MOUTH: NEGATIVE for ear, mouth and throat problems  RESP:NEGATIVE for significant cough or SOB  BREAST: NEGATIVE for masses, tenderness or discharge  CV: NEGATIVE for chest pain, palpitations or peripheral edema  GI: NEGATIVE for nausea, abdominal pain, heartburn, or change in bowel habits  :NEGATIVE for frequency, dysuria, or hematuria  :NEGATIVE for frequency, dysuria, or hematuria  NEURO: NEGATIVE for weakness, dizziness or paresthesias  ENDOCRINE: NEGATIVE for temperature intolerance, skin/hair  changes  HEME/ALLERGY/IMMUNE: NEGATIVE for bleeding problems  PSYCHIATRIC: NEGATIVE for changes in mood or affect        Objective: The left knee reveals no effusion.  I can flex and extend it fully.  Mildly tender over the medial and lateral patellar facets.  Nontender over the medial or lateral joint lines.  No swelling in the popliteal space or tenderness in the calf.  Nontender over the patellar tendon or pes anserine bursa.  Overlying skin intact.  Appropriate conversation and affect.    Reviewed with the patient x-rays of the bilateral knees that suggest mild patellofemoral DJD, left greater than right.  Mild tibiofemoral DJD      Assessment: Left-sided knee DJD, primarily patellofemoral.  History of psoriatic arthritis.    Plan: Patient avoids Tylenol and nonsteroidals because of a history of liver dysfunction.  She has been using topical Voltaren.  Patient continues to see physical therapy.  We discussed pull-up knee sleeve, cortisone injection, Synvisc injection.  I did these options she wanted to try cortisone shot.  After informed consent above bleeding, infection, steroid flare after prepping with surgical scrub she was injected in her left knee from a lateral approach in the seated position with 1 cc of Kenalog 40 and 4 cc of 1% lidocaine.  The medicine went in easily, she left the clinic ambulatory, she will look for improved with the injection follow-up as needed.      Large Joint Injection/Arthocentesis: L knee joint    Date/Time: 10/30/2024 1:27 PM    Performed by: Femi Tamayo MD  Authorized by: Femi Tamayo MD    Indications:  Pain and osteoarthritis  Needle Size:  22 G  Guidance: landmark guided    Approach:  Anterolateral  Location:  Knee      Medications:  40 mg triamcinolone 40 MG/ML; 4 mL lidocaine (PF) 1 %  Outcome:  Tolerated well, no immediate complications  Procedure discussed: discussed risks, benefits, and alternatives    Consent Given by:  Patient  Timeout: timeout  called immediately prior to procedure    Prep: patient was prepped and draped in usual sterile fashion

## 2024-10-30 NOTE — LETTER
10/30/2024      RE: Karlene Yun  [address]  [facility] [address]     Dear Colleague,    Thank you for referring your patient, Karlene Yun, to the [facility] [place]. Please see a copy of my visit note below.    Sports Medicine Clinic Visit    PCP: Femi King    Karlene Yun is a 58 year old female who is seen  in consultation at the request of Dr. Mccann presenting with left knee pain.     Injury: Pain started after a fall in the winter, 2 years ago    Location of Pain: left knee; anterior   Duration of Pain: 2 years ago   Rating of Pain: 6/10  Pain is better with: Nothing  Pain is worse with: squatting and walking   Additional Features: No   Treatment so far consists of: Physical therapy currently, working on core strength  Prior History of related problems: No     There were no vitals taken for this visit.       Patient follows with rheumatology, most recent clinic note 10/24/24 reviewed by me.  Patient history of psoriatic arthritis for the past 30 years as well as osteoarthritis.  Patient has been on a weight loss medicine over the past 6 months, with no significant weight loss to this point.  Past history of cirrhosis.  Patient has active psoriasis and recently has switched medicines from her rheumatologist, from skyrizi to taltz.  Patient referred by rheumatology for left-sided knee DJD.  She was encouraged to consider kaylan chi, and Voltaren gel for the knee.    Patient's medicines include Celexa, gabapentin, Synthroid, Wegovy, Aldactone    10/22/2024 normal CRP, rheumatoid factor, anti-CCP.  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    Patient remote history of pulmonary embolism 1/31/2002.      Imaging studies below reviewed by me:    XR KNEE BILATERAL 3 VIEWS [facility], 3/2/2023    COMPARISON:  None.    FINDINGS:      Right knee: 4 views. Mild  patellofemoral compartment narrowing and slight medial compartment narrowing. No significant joint effusion. No fracture or dislocation.    Left knee: 4 views. Mild patellofemoral compartment narrowing and slight medial compartment narrowing. No significant joint effusion. No fracture or dislocation.      XR PELVIS AND HIP BILATERAL 2 VIEWS [facility], 3/2/2023    COMPARISON:  None.    FINDINGS:  5 images. No displaced fracture and no dislocation. Symmetric appearance of the hip joints with mild degenerative change. Mild sclerosis and spurring inferiorly at the sacroiliac joints. Degenerative change in the lower lumbar spine were imaged. Ossification lateral to the proximal left hip            EXAM: MRI OF THE LUMBAR SPINE WITHOUT CONTRAST 4/4/2023 [facility]    CLINICAL INFORMATION: Mid and low back pain ongoing for 30 years. Patient attempted physical therapy and oral medical therapy.    TECHNICAL INFORMATION: Sagittal fast spin-echo T2-weighted, T1-weighted, STIR , coronal T1-weighted as well as axial fast spin-echo T1 and T2-weighted images of the lumbar spine were obtained on a tree 0.0 Janell MRI scanner. SEDATION: None. CONTRAST: None.    INTERPRETATION: Comparison MRI lumbar spine examination dated 4/3/2023.    Generalized dextrocurvature with 1 mm retrolisthesis of L5 on S1. No acute fracture or spondylolysis, although edematous degenerative endplate changes are demonstrated at L5-S1 and ventrally at L2-3. Conus is normal and terminates at L1. Imaged upper sacrum and paraspinal soft tissue structures are unremarkable save for an incidental right renal cyst. Disc desiccation and annular bulging are demonstrated at L5-S1 through L2-3 with isolated annular bulging at L1-2 and T11-12. Disc space narrowing is moderate at L5-S1, mild at L3-4 and moderate at L2-3.    L5-S1: Unchanged annular bulging without central stenosis or traversing neural compression. Mild bilateral facet hypertrophy with moderate bilateral  chronic foraminal narrowing.    L4-5: Unchanged left foraminal annular fissure and 2 mm left inferior foraminal disc protrusion with mild to moderate left foraminal narrowing, but no exiting ganglionic compression. Mild left subarticular recess narrowing without traversing neural compression or central stenosis. Mild bilateral facet arthrosis with mild right chronic foraminal narrowing.    L3-4: Unchanged annular bulging without central stenosis or traversing neural compression. Mild bilateral facet hypertrophy with mild bilateral chronic foraminal narrowing.    L2-3: Unchanged annular bulging without central stenosis or traversing neural compression. Mild bilateral facet hypertrophy with mild right chronic foraminal narrowing.    L1-2: Unchanged minimal annular bulging without central stenosis or traversing neural compression. Facets and foramina are unremarkable.    T12-L1: Normal posterior disc margins and facets. Patent foramina.    T11-12: No central stenosis or cord compression. Facets and foramina are unremarkable.    CONCLUSION: Multilevel degenerative lumbar spondylosis with the following notable findings:    1. Unchanged, 2 mm left inferior foraminal L4-5 disc protrusion without exiting ganglionic compression.    2. No acute fracture or spondylolysis, although edematous degenerative endplate changes are demonstrated at L5-S1 and ventrally at L2-3.    3. Mild bilateral L4-5 facet arthrosis.    4. Multilevel chronic foraminal narrowing that varies from mild to moderate without ganglionic compression as detailed above.          EXAM: MRI OF THE THORACIC SPINE WITHOUT CONTRAST  4/4/2023    CLINICAL INFORMATION: Mid and low back pain ongoing for 30 years. Patient attempted physical therapy and oral medical therapy.    TECHNICAL INFORMATION: Sagittal fast spin echo T2-weighted, T1-weighted FLAIR, inversion recovery, T2 weighted space, axial T2-weighted images of the thoracic spine were obtained on a 3.0 Janell MR  scanner. Axial and coronal reformatted images were generated based on the sagittally acquired T2-weighted space data. SEDATION: None. CONTRAST: None.    INTERPRETATION: Comparison MRI thoracic spine examination dated 12/11/2015.    Normal alignment. No acute fracture. Chronic Scheuermann's-type changes are demonstrated throughout the mid to lower thoracic spine. Thoracic cord maintains normal signal and the imaged portions of lungs are unremarkable. Imaged paraspinal soft tissue structures are notable for an unchanged, well-circumscribed area of T1 and T2 prolongation on the right adjacent to the T5 and T6 vertebral bodies most apparent on series 6 image 3, series 12 image 24 and series 11 image 67 where it measures 1.1 x 1.3 x 2.1 cm in AP, transverse and craniocaudal dimension. This is likely of lymphatic origin. Annular bulging is demonstrated at T11-12 and T10-11, T8-9 through T4-5, C7-T1 and C6-7.    T12-L1: No central stenosis or cord compression. Facets and foramina are unremarkable.    T11-12: Unchanged annular bulging without central stenosis or cord compression. Facets are unremarkable and the foramina are patent.    T10-11: Unchanged annular bulging and mild right facet hypertrophy. Right chronic foraminal narrowing.    T9-10: Normal posterior disc margins and facets. Patent foramina.    T8-9: Unchanged annular bulging without central stenosis or cord compression. Mild bilateral facet hypertrophy with patent foramina.    T7-8: Unchanged annular bulging without central stenosis or cord compression. Mild bilateral facet hypertrophy with mild left chronic foraminal narrowing.    T6-7: Unchanged mild annular bulging with mild right facet arthrosis and moderate right chronic foraminal narrowing.    T5-6: Unchanged 1 to 2 mm right paracentral disc protrusion without central stenosis or cord compression. Mild bilateral facet hypertrophy with patent foramina.    T4-5: Unchanged minimal annular bulging without  central stenosis or cord compression. Mild left facet arthrosis with mild left chronic foraminal narrowing.    T3-4: No central stenosis or cord compression. Mild right facet hypertrophy with patent foramina.    T2-3: Normal posterior disc margins and mild right facet hypertrophy. Patent foramina.    T1-2: No central stenosis or cord compression. Mild right facet hypertrophy with mild right chronic foraminal narrowing. Localizer images also demonstrate annular bulging at C3-4 through C5-6, but no central stenosis or cord compression are evident at the imaged portions of the cervical spine. Mild right C7-T1 facet arthrosis is evident.    CONCLUSION: Multilevel degenerative facet and lower cervical spondylosis, chronic Scheuermann's-type changes and the following notable findings:    1. Unchanged, 1 to 2 mm right paracentral T5-6 disc protrusion without central stenosis or cord compression.    2. No acute fracture or intrinsic cord lesion.    3. Mild right T6-7, left T4-5 and right C7-T1 facet arthrosis.    4. Multilevel level chronic foraminal narrowing that varies from mild to moderate as detailed above.            EXAM: 1.5 T MRI OF THE CERVICAL SPINE  12/3/2015 [facility]    CLINICAL INFORMATION: Chronic neck and back pain.    TECHNICAL INFORMATION: T1, T2 GRE, T2 FSE and STIR sagittal thin sections through the cervical spine with T2 GRE and FSE axial sections at selected levels. No comparison.    INTERPRETATION: Normal signal intensity within the cervical and upper thoracic cord. No Chiari malformation or syrinx, no intrinsic cord lesion and no intradural mass. Normal flow void within small vertebral arteries.    Cervical and upper thoracic spondylosis with mild disc space narrowing at C7-T1, C6-7 and C5-6, and lordotic alignment of the cervical vertebrae. There is nonspecific thickening and increased signal intensity within the supraspinous ligament at T1. No associated spinous process avulsion.    T3-4, T2-3 and  T1-2: Dorsal disc margins normal with mild facet arthropathy on the right at each level and no stenosis or impingement.    C7-T1: Mild dorsal bulging with normal facet joints and no stenosis or impingement.    C6-7 and C5-6: Dorsal bulging and uncovertebral arthrosis with normal facet joints and no stenosis or impingement.    C4-5, C3-4 and C2-3: Dorsal disc margins unremarkable with mild facet arthropathy bilaterally at C2-3 and no stenosis or impingement.    No degenerative or erosive changes within the atlantoaxial or cervico-occipital joints.    Normal signal intensity within the vertebral marrow spaces. No destructive bone lesions and no paraspinous soft tissue mass.    CONCLUSION:    1. Mild C7-T1, C6-7 and C5-6 disc degeneration and mild facet arthrosis at multiple levels without stenosis or neural impingement.    2. Nonspecific thickening and increased signal intensity within the supraspinous ligament at T1 which may be degenerative or posttraumatic in origin. Correlation with physical exam is recommended.    3. No cord abnormality, and no neoplasm, fracture or infection.                        PMH:  Past Medical History:   Diagnosis Date     Hyperlipidemia 3/17/2005     Problem list name updated by automated process. Provider to review     Hypothyroidism 6/26/2002     Problem list name updated by automated process. Provider to review     Major depressive disorder, recurrent episode, in full remission (H) 1/21/2002     Morbid obesity (H) 5/12/2019     Psoriatic arthropathy (H) 1/21/2002       Active problem list:  Patient Active Problem List   Diagnosis     Psoriatic arthropathy (H)     Major depressive disorder, recurrent episode, in full remission (H)     Pulmonary embolism and infarction (H)     Hypothyroidism     Allergic rhinitis     Rosacea     Hyperlipidemia     Living will, counseling/discussion     Chronic fatigue fibromyalgia syndrome     Morbid obesity (H)     Controlled substance agreement signed      Fibromyalgia       FH:  Family History   Problem Relation Age of Onset     Cancer Sister         cervical     Arthritis Paternal Grandmother      Diabetes Father        SH:  Social History     Socioeconomic History     Marital status:      Spouse name: Not on file     Number of children: 1     Years of education: Not on file     Highest education level: Not on file   Occupational History     Not on file   Tobacco Use     Smoking status: Never     Smokeless tobacco: Never   Substance and Sexual Activity     Alcohol use: Not Currently     Comment: last drink 3 years ago     Drug use: No     Sexual activity: Not on file   Other Topics Concern     Not on file   Social History Narrative     Not on file     Social Drivers of Health     Financial Resource Strain: Low Risk  (4/22/2020)    Overall Financial Resource Strain (CARDIA)      Difficulty of Paying Living Expenses: Not very hard   Food Insecurity: No Food Insecurity (10/11/2023)    [facility]    Hunger Vital Sign      Worried About Running Out of Food in the Last Year: Never true      Ran Out of Food in the Last Year: Never true   Transportation Needs: No Transportation Needs (4/22/2020)    PRAPARE - Transportation      Lack of Transportation (Medical): No      Lack of Transportation (Non-Medical): No   Physical Activity: Not on file   Stress: Not on file   Social Connections: Not on file   Interpersonal Safety: Not on file   Housing Stability: Not on file       MEDS:  See EMR, reviewed  ALL:  See EMR, reviewed    REVIEW OF SYSTEMS:  CONSTITUTIONAL:NEGATIVE for fever, chills, change in weight  INTEGUMENTARY/SKIN: NEGATIVE for worrisome rashes, moles or lesions  EYES: NEGATIVE for vision changes or irritation  ENT/MOUTH: NEGATIVE for ear, mouth and throat problems  RESP:NEGATIVE for significant cough or SOB  BREAST: NEGATIVE for masses, tenderness or discharge  CV: NEGATIVE for chest pain, palpitations or peripheral edema  GI: NEGATIVE  for nausea, abdominal pain, heartburn, or change in bowel habits  :NEGATIVE for frequency, dysuria, or hematuria  :NEGATIVE for frequency, dysuria, or hematuria  NEURO: NEGATIVE for weakness, dizziness or paresthesias  ENDOCRINE: NEGATIVE for temperature intolerance, skin/hair changes  HEME/ALLERGY/IMMUNE: NEGATIVE for bleeding problems  PSYCHIATRIC: NEGATIVE for changes in mood or affect        Objective: The left knee reveals no effusion.  I can flex and extend it fully.  Mildly tender over the medial and lateral patellar facets.  Nontender over the medial or lateral joint lines.  No swelling in the popliteal space or tenderness in the calf.  Nontender over the patellar tendon or pes anserine bursa.  Overlying skin intact.  Appropriate conversation and affect.    Reviewed with the patient x-rays of the bilateral knees that suggest mild patellofemoral DJD, left greater than right.  Mild tibiofemoral DJD      Assessment: Left-sided knee DJD, primarily patellofemoral.  History of psoriatic arthritis.    Plan: Patient avoids Tylenol and nonsteroidals because of a history of liver dysfunction.  She has been using topical Voltaren.  Patient continues to see physical therapy.  We discussed pull-up knee sleeve, cortisone injection, Synvisc injection.  I did these options she wanted to try cortisone shot.  After informed consent above bleeding, infection, steroid flare after prepping with surgical scrub she was injected in her left knee from a lateral approach in the seated position with 1 cc of Kenalog 40 and 4 cc of 1% lidocaine.  The medicine went in easily, she left the clinic ambulatory, she will look for improved with the injection follow-up as needed.      Large Joint Injection/Arthocentesis: L knee joint    Date/Time: 10/30/2024 1:27 PM    Performed by: Femi Tamayo MD  Authorized by: Femi Tamayo MD    Indications:  Pain and osteoarthritis  Needle Size:  22 G  Guidance: landmark guided     Approach:  Anterolateral  Location:  Knee      Medications:  40 mg triamcinolone 40 MG/ML; 4 mL lidocaine (PF) 1 %  Outcome:  Tolerated well, no immediate complications  Procedure discussed: discussed risks, benefits, and alternatives    Consent Given by:  Patient  Timeout: timeout called immediately prior to procedure    Prep: patient was prepped and draped in usual sterile fashion                    Again, thank you for allowing me to participate in the care of your patient.      Sincerely,    Femi Tamayo MD

## 2024-11-05 NOTE — PROGRESS NOTES
Medication Therapy Management (MTM) Encounter    ASSESSMENT:                            Medication Adherence/Access: See below for considerations.    Psoriatic Arthritis/Osteoarthritis: Due to current insurance restrictions, unable to get Taltz at this time. Discussed if patient fails Cosentyx after 3 months can attempt Taltz coverage again. Due to this also unable to apply for Mounjaro program at this time. Patient would like to proceed with Cosentyx now and check for potential coverage again in 2025 when she has new insurance. Provided education on Cosentyx today including dosing, general administration, side effects (both common/serious), precautions, monitoring and time to efficacy. Discussed data on malignancy and risk of serious infection in depth. Encouraged indicated non-live vaccines and avoidance of live vaccines. Discussed potential need to hold therapy in the setting of signs/symptoms of active infection. Encouraged her to contact the rheumatology clinic in the event she has questions on this. Would benefit from starting Cosentyx once it arrives and using 150 mg weekly x 5 weeks then 150 mg once every 28 days as directed.     Obesity: Discussed current compounded semaglutide in depth. Unclear if medication is not working or if compounded preparation is not same medication as advertised. Cannot get Mounjaro affordably at this time so advised patient to continue current medication until she runs out then discontinue. Will plan to attempt to get her on Edwige Mounjaro program in 2025.    PLAN:                            1. We are working on obtaining insurance coverage for Cosentyx. Once this is approved the pharmacy will call you to set up delivery so you can start 150 mg (1 injection) at week 0, 1, 2, 3, 4 then 150 mg every 28 days.     2. A common side effect of Cosentyx is injection site reactions (red, raised, itchy spot at injection site). You can use hydrocortisone cream and ice to treat these  reactions if they occur.     3. Continue your current semaglutide compound until you run out then stop. We will look at getting you Mounjaro in 2025 with your new insurance.    Follow-up: Return in 10 weeks (on 1/6/2025) for MTM Pharmacist Visit.    SUBJECTIVE/OBJECTIVE:                          Karlene Yun is a 58 year old female seen for an initial visit. She was referred to me from Lisette Mccann MD.      Reason for visit: Switch to an IL-17 inhibitor, interested in starting Mounjaro    Allergies/ADRs: Reviewed in chart  Past Medical History: Reviewed in chart  Tobacco: She reports that she has never smoked. She has never used smokeless tobacco.  Alcohol: not currently using    Medication Adherence/Access: Medication barriers: affording medications. Notes she cannot afford cash price for GLP-1s. Was told by another healthcare professional that if she is able to get Taltz, she could potentially get Mounjaro for $25 a month. Very interested in getting on this program if able.    Psoriatic Arthritis/Osteoarthritis:   Skyrizi 150 mg every 12 weeks  Gabapentin 600 mg nightly  Diclofenac gel 1% four times daily as needed     Reports she is having several uncontrolled PsA symptoms currently including joint pain in feet, ankles, knees, and hips, swelling in left knee, joint stiffness lasting several hours daily, and psoriasis in eyebrows, eyelids, and scalp. Rheumatologist recommended switching to an IL-17 biologic - initially tried for Taltz coverage due to possible Mounjaro program however insurance prefers Cosentyx. Notes she is open to either option - planning on getting new insurance next year so notes she may have to switch again at that time.    Reviewed baseline pre-biologic screening.   Hep C antibody non-reactive  Hep B surface antigen non-reactive  Hep B core antibody non-reactive  Quantiferon TB Indeterminate     Liver Function Studies -   Recent Labs   Lab Test 10/22/24  0843   PROTTOTAL 7.0   ALBUMIN  2.6*   BILITOTAL 3.5*   ALKPHOS 69   AST 60*   ALT 26      CBC RESULTS:   Recent Labs   Lab Test 10/22/24  0843   WBC 4.0   RBC 4.12   HGB 13.9   HCT 39.6   MCV 96   MCH 33.7*   MCHC 35.1   RDW 14.8   PLT 67*      Obesity:  Compound semaglutide/Vitamin B12 2 mg weekly    Has been getting compounded semaglutide from [facility] pharmacy. Has been on medication for several months and has seen no weight loss or appetite change. Has not had any side effects at all. Notes this has been the only affordable compound she has found - $50 for 3 months of medication.    Wt Readings from Last 4 Encounters:   10/25/24 322 lb (146.1 kg)   10/24/24 322 lb (146.1 kg)   10/22/24 322 lb 12.8 oz (146.4 kg)   08/09/24 318 lb 12.6 oz (144.6 kg)      Due to time constraints of visit, unable to assess all medications and conditions. Will plan to assess additional medications at follow up visit.    Today's Vitals: There were no vitals taken for this visit.  ----------------    I spent 60 minutes with this patient today. All changes were made via collaborative practice agreement with Lisette Mccann. A copy of the visit note was provided to the patient's provider(s).    A summary of these recommendations was sent via [employer].    Loulou Ho, PharmD  Medication Therapy Management Pharmacist  [facility]  Phone: [phone]    Telemedicine Visit Details  The patient's medications can be safely assessed via a telemedicine encounter.  Type of service:  Video Conference via [employer]  Originating Location (pt. Location): Home    Distant Location (provider location):  Off-site  Start Time: 12:30 PM  End Time: 1:30 PM     Medication Therapy Recommendations  Psoriatic arthropathy (H)   1 Current Medication: secukinumab (COSENTYX SENSOREADY PEN) 150 MG/ML Sensoready pen   Current Medication Sig: Inject 1 mL (150 mg) subcutaneously once a week. At weeks 0, 1, 2, 3, and 4 and every 4 weeks there after loading dose.    Rationale: Does not understand instructions - Adherence - Adherence   Recommendation: Provide Education   Status: Patient Agreed - Adherence/Education   Identified Date: 10/28/2024 Completed Date: 10/28/2024

## 2024-11-05 NOTE — PATIENT INSTRUCTIONS
"Recommendations from today's MTM visit:                                                       1. We are working on obtaining insurance coverage for Cosentyx. Once this is approved the pharmacy will call you to set up delivery so you can start 150 mg (1 injection) at week 0, 1, 2, 3, 4 then 150 mg every 28 days.     2. A common side effect of Cosentyx is injection site reactions (red, raised, itchy spot at injection site). You can use hydrocortisone cream and ice to treat these reactions if they occur.     3. Continue your current semaglutide compound until you run out then stop. We will look at getting you Mounjaro in 2025 with your new insurance.    Follow-up: Return in 10 weeks (on 1/6/2025) for MTM Pharmacist Visit.    It was great speaking with you today.  I value your experience and would be very thankful for your time in providing feedback in our clinic survey. In the next few days, you may receive an email or text message from [employer] with a link to a survey related to your  clinical pharmacist.\"     To schedule another MTM appointment, please call the clinic directly or you may call the MTM scheduling line at [phone].    My Clinical Pharmacist's contact information:                                                      Please feel free to contact me with any questions or concerns you have.      Loulou Ho, PharmD  Medication Therapy Management Pharmacist  [facility]  Phone: [phone]     "

## 2024-11-11 PROBLEM — K74.60 CIRRHOSIS OF LIVER NOT DUE TO ALCOHOL (H): Status: ACTIVE | Noted: 2022-08-05

## 2024-11-11 PROBLEM — M47.812 OSTEOARTHRITIS CERVICAL SPINE: Status: ACTIVE | Noted: 2021-01-21

## 2024-11-11 PROBLEM — R87.610 ATYPICAL SQUAMOUS CELLS OF UNDETERMINED SIGNIFICANCE ON CYTOLOGIC SMEAR OF CERVIX (ASC-US): Status: ACTIVE | Noted: 2022-12-01

## 2024-11-11 PROBLEM — D69.3 IDIOPATHIC THROMBOCYTOPENIA (H): Status: ACTIVE | Noted: 2024-11-11

## 2024-11-11 PROBLEM — G47.33 MODERATE OBSTRUCTIVE SLEEP APNEA: Status: ACTIVE | Noted: 2024-02-28

## 2024-11-11 PROBLEM — M51.369 DDD (DEGENERATIVE DISC DISEASE), LUMBAR: Status: ACTIVE | Noted: 2021-01-08

## 2024-11-11 PROBLEM — I26.99 OTHER PULMONARY EMBOLISM WITHOUT ACUTE COR PULMONALE (H): Status: ACTIVE | Noted: 2021-01-08

## 2024-11-11 PROBLEM — I50.30 UNSPECIFIED DIASTOLIC (CONGESTIVE) HEART FAILURE (H): Status: ACTIVE | Noted: 2024-11-11

## 2024-11-11 PROBLEM — M19.90 OSTEOARTHRITIS: Status: ACTIVE | Noted: 2024-11-11

## 2024-11-11 NOTE — TELEPHONE ENCOUNTER
LIVER DISEASE MAS  REFERRING PROVIDER: Lyn Martinez NP   PRIMARY CARE PROVIDER: Femi King MD   HEALTH SYSTEM:   INSURANCE: [employer] (would be $1k/month)  -----------------------------------------------------------------------------------------------------------------------------  MELD 20 as of 10/22/24 (Na 135, Cr 0.9, Albumin 2.6, T-Bili 3.5, INR 1.55) ABO  A    HISTORY OF LIVER DISEASE  FIRST DX WITH LIVER DISEASE .     Ascites: yes/no, Edilberto 100 mg once a day, Bumex   Keshav No  TIPS no  SBP no  HE/Meds: Yes HE brain fog, Yes Meds, Lactulose X1/day,  having one BM/day  Kidney function/Dialysis: none  Variceal screening Last EGD 24, No, varices.  CRC Screenin16,  GI Bleed/Hematemesis: Yes GIB, dark stools,   Willing to accept blood products/transfusions:   HCC Screening Last imaging and location  Alcohol/Drug use/Legal issues/Chem Dep: last drink 2 years ago, mostly drank beer/wine, once/month, no drugs, no legal problem, 24 PEt neg, no chem dep    Hospitalizations  ---------------------------------------------------------------------------------------------------------------------------------  PMH  Abdominal surgery no/yes type: none  Neuro: fibromlayagia, osteoarthritis in back, arthritis,  Cardiac: 1 year ago had SOB, went to ER, question of CHF, did stressed test/passed, question of HFpEF? HLD  Pulmonary/Smoking/O2: previous PE 24 years, NEREYDA not on CPAP. Never smoked.   Cancer: none  PAP: last pap with OBGYN, [facility]  Mammogram: 1 year ago, due in December,  Vaccinations: Yes COVID, Flu, Shingles,   Diabetes: none  Nutrition: obesity, BMI 47, tried Wegovy, not helping. LT/sleeve discussed?  Dentist: has a dentist, up to date on cleanings.   Mental Health: depression, not doing much for. Taking escitalp.  Physical status: deconditioned    CLAIRE lives in [place], originally from [place], moved to [facility] for [facility].  Family/social support: ,  single, lives alone. Daughter Patrica Liriano, no grandkids. Mother passed at 68 & father passed at 63, 30 years ago from heart issues, rheumatic fever as child. 5 siblings, 4 sisters/1 brother. 3 live in [place]. Many close friends.  Finance: money is a concern.  Work: Is/is not currently working. 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    ---------------------------------------------------------------------------------------------------------------------------------  LDLT Discussed: yes, no potential donors immediately identfieid    PLAN Will plan for patient to come for PLE 11/26.  SCHEDULE/AVAILABILITY Moderately flexible  CONTACT VIA PHONE OR [employer]

## 2024-11-25 LAB
ABO/RH(D): NORMAL
ANTIBODY SCREEN: NEGATIVE
SPECIMEN EXPIRATION DATE: NORMAL

## 2024-11-25 NOTE — PROGRESS NOTES
[facility] SOLID ORGAN TRANSPLANT  OUTPATIENT MNT: LIVER TRANSPLANT EVALUATION    Current BMI: 45.4 (HT 71 in,  lbs/148 kg)  BMI is within recommendation of <45 for liver transplant    Handgrip Strength (average of 3 using dominant hand): 25    FraiLT-- Pre frail (LFI: 3.86)  [email or web address]  Reference Range  Robust <3.2  Pre Frail 3.2-4.3  Frail >4.4    Recommendations:  Recommend weight management referral     TIME SPENT: 30 minutes  VISIT TYPE: Initial   REFERRING PHYSICIAN: Reynaldo  PT ACCOMPANIED BY: her daughter     History of previous txp: none    NUTRITION ASSESSMENT  H/o MAFLD  No DM hx. She started on Wegovy in July (prescribed by GP). Reports no prior attempts at weight loss.   Lab Results   Component Value Date    A1C 4.8 11/26/2024    A1C 5.7 05/03/2019    A1C 5.5 03/01/2018     - Meal prep & grocery shopping: pt does   - Previous RD education: not asked   - Appetite: good/baseline  - Food allergies/intolerances: no  - Issues chewing or swallowing: no  - N/V/D/C: no  - Food access concerns: not asked     Vitamins, Supplements, Pertinent Meds: none   Herbal Medicines/Supplements: none   Protein supplements: no protein drinks lately     Weight hx // fluid retention:   - no ANGELICA, controlled on diuretic  - wt loss of water weight, otherwise dry wt appears stable  Wt Readings from Last 10 Encounters:   11/26/24 147.9 kg (326 lb)   10/25/24 146.1 kg (322 lb)   10/24/24 146.1 kg (322 lb)   10/22/24 146.4 kg (322 lb 12.8 oz)   08/09/24 144.6 kg (318 lb 12.6 oz)   07/29/24 145.2 kg (320 lb)   04/22/20 (!) 150.6 kg (332 lb)   05/03/19 142.9 kg (315 lb)   03/01/18 (!) 140.6 kg (310 lb)   11/16/16 131.5 kg (290 lb)     PHYSICAL ACTIVITY   May walk 20 min, but is limited by arthritis, pain, FM  Does own ADLs, sometimes get tired out easily      - Falls: had 1 in summer    - Physical therapy: PT started September for general strength, balance, has 1 more appt; 1 h/week + does exercises at  home daily- feels PT has helped  - Cane/walker/wheelchair: previously was using a cane until earlier this year- was using short term only d/t lower extremity swelling     DIET RECALL  Breakfast Fig or apple cinnamon breakfast-type bar   Lunch Yogurt (Chobani or Oui) and some kind of fruit    Dinner Soup (canned- not LS); eggs x 2 & toast x 1 (butter); sandwich (egg salad, peanut butter) + carrots     Snacks Dessert of some sort- cookies, ice cream, chocolate    Beverages Water/carbonated water, rare Coke zero   Dining out 1x/week      NUTRITION DIAGNOSIS   No nutrition diagnosis identified at this time    NUTRITION INTERVENTION   Nutrition education provided:  Discussed sodium intake (low sodium foods and drinks, seasoning food without salt and tips for low sodium diet).    Reviewed adequate protein intake. Encouraged receiving protein from both animal and plant based sources.   Aim for protein source at each meal time to help with weight loss and general balance diet. Consider swapping BF bar for protein bar in the AM or opting for Greek yogurt or scrambled eggs, etc.   Also consider increasing non starchy veggie intake.     Would recommend pt meet with wt mgmt for medication suggestions, etc. She has no had success with Wegovy so far despite dose increases.     Reviewed post txp diet guidelines in brief (will review in further detail post txp):  (1) Review of proper food safety measures d/t immunosuppressant therapy post-op and increased risk for food-borne illness    (2) Avoid the following post txp d/t risk for rejection, unknown effects on the organs, and/or potential interactions with immunosuppressants:  - Herbal, Chinese, holistic, chiropractic, natural, alternative medicines and supplements  - Detoxes and cleanses  - Weight loss pills  - Protein powders or other products with extracts or herbs (ie green tea extract)    (3) Med regimen and possible side effects    Patient Understanding: Pt verbalized  understanding of education provided.  Expected Engagement: Good  Follow-Up Plans: PRN     NUTRITION GOALS   No nutrition goals identified at this time    Julita Conn, RD, LD, CCTD

## 2024-11-26 ENCOUNTER — APPOINTMENT (OUTPATIENT)
Dept: TRANSPLANT | Facility: CLINIC | Age: 59
End: 2024-11-26
Attending: INTERNAL MEDICINE
Payer: COMMERCIAL

## 2024-11-26 ENCOUNTER — LAB (OUTPATIENT)
Dept: LAB | Facility: CLINIC | Age: 59
End: 2024-11-26
Attending: INTERNAL MEDICINE
Payer: COMMERCIAL

## 2024-11-26 ENCOUNTER — ANCILLARY PROCEDURE (OUTPATIENT)
Dept: ULTRASOUND IMAGING | Facility: CLINIC | Age: 59
End: 2024-11-26
Attending: INTERNAL MEDICINE
Payer: COMMERCIAL

## 2024-11-26 ENCOUNTER — ANCILLARY PROCEDURE (OUTPATIENT)
Dept: GENERAL RADIOLOGY | Facility: CLINIC | Age: 59
End: 2024-11-26
Attending: INTERNAL MEDICINE
Payer: COMMERCIAL

## 2024-11-26 ENCOUNTER — COMMITTEE REVIEW (OUTPATIENT)
Dept: TRANSPLANT | Facility: CLINIC | Age: 59
End: 2024-11-26

## 2024-11-26 VITALS
SYSTOLIC BLOOD PRESSURE: 167 MMHG | HEIGHT: 71 IN | OXYGEN SATURATION: 95 % | BODY MASS INDEX: 41.02 KG/M2 | DIASTOLIC BLOOD PRESSURE: 72 MMHG | HEART RATE: 63 BPM | WEIGHT: 293 LBS

## 2024-11-26 DIAGNOSIS — K74.60 CIRRHOSIS (H): ICD-10-CM

## 2024-11-26 DIAGNOSIS — K75.81 NASH (NONALCOHOLIC STEATOHEPATITIS): ICD-10-CM

## 2024-11-26 DIAGNOSIS — K75.81 NASH (NONALCOHOLIC STEATOHEPATITIS): Primary | ICD-10-CM

## 2024-11-26 DIAGNOSIS — K74.60 HEPATIC CIRRHOSIS, UNSPECIFIED HEPATIC CIRRHOSIS TYPE, UNSPECIFIED WHETHER ASCITES PRESENT (H): Primary | ICD-10-CM

## 2024-11-26 LAB
ABO/RH(D): NORMAL
AFP SERPL-MCNC: 4.6 NG/ML
ALBUMIN MFR UR ELPH: 15.7 MG/DL
ALBUMIN SERPL BCG-MCNC: 2.6 G/DL (ref 3.5–5.2)
ALBUMIN UR-MCNC: 10 MG/DL
ALP SERPL-CCNC: 74 U/L (ref 40–150)
ALT SERPL W P-5'-P-CCNC: 43 U/L (ref 0–50)
ANION GAP SERPL CALCULATED.3IONS-SCNC: 6 MMOL/L (ref 7–15)
ANTIBODY TITER IGM SCREEN: NEGATIVE
APPEARANCE UR: CLEAR
AST SERPL W P-5'-P-CCNC: 69 U/L (ref 0–45)
ATRIAL RATE - MUSE: 63 BPM
B IGG TITR SERPL: 16 {TITER}
B IGM TITR SERPL: 32 {TITER}
BACTERIA #/AREA URNS HPF: ABNORMAL /HPF
BILIRUB DIRECT SERPL-MCNC: 0.9 MG/DL (ref 0–0.3)
BILIRUB SERPL-MCNC: 2.4 MG/DL
BILIRUB UR QL STRIP: NEGATIVE
BUN SERPL-MCNC: 10.3 MG/DL (ref 8–23)
CALCIUM SERPL-MCNC: 8.4 MG/DL (ref 8.8–10.4)
CHLORIDE SERPL-SCNC: 106 MMOL/L (ref 98–107)
CHOLEST SERPL-MCNC: 175 MG/DL
CMV IGG SERPL IA-ACNC: <0.2 U/ML
CMV IGG SERPL IA-ACNC: NORMAL
COLOR UR AUTO: YELLOW
CREAT SERPL-MCNC: 0.82 MG/DL (ref 0.51–0.95)
CREAT UR-MCNC: 257 MG/DL
DIASTOLIC BLOOD PRESSURE - MUSE: NORMAL MMHG
EBV VCA IGG SER IA-ACNC: >750 U/ML
EBV VCA IGG SER IA-ACNC: POSITIVE
EGFRCR SERPLBLD CKD-EPI 2021: 82 ML/MIN/1.73M2
ERYTHROCYTE [DISTWIDTH] IN BLOOD BY AUTOMATED COUNT: 15.3 % (ref 10–15)
EST. AVERAGE GLUCOSE BLD GHB EST-MCNC: 91 MG/DL
FASTING STATUS PATIENT QL REPORTED: YES
FASTING STATUS PATIENT QL REPORTED: YES
FERRITIN SERPL-MCNC: 762 NG/ML (ref 11–328)
GLUCOSE SERPL-MCNC: 131 MG/DL (ref 70–99)
GLUCOSE UR STRIP-MCNC: NEGATIVE MG/DL
HAV AB SER QL IA: REACTIVE
HBA1C MFR BLD: 4.8 %
HBV CORE AB SERPL QL IA: NONREACTIVE
HBV SURFACE AB SERPL IA-ACNC: <3.5 M[IU]/ML
HBV SURFACE AB SERPL IA-ACNC: NONREACTIVE M[IU]/ML
HBV SURFACE AG SERPL QL IA: NONREACTIVE
HCO3 SERPL-SCNC: 24 MMOL/L (ref 22–29)
HCT VFR BLD AUTO: 36.5 % (ref 35–47)
HCV AB SERPL QL IA: NONREACTIVE
HDLC SERPL-MCNC: 58 MG/DL
HGB BLD-MCNC: 12.8 G/DL (ref 11.7–15.7)
HGB UR QL STRIP: NEGATIVE
HIV 1+2 AB+HIV1 P24 AG SERPL QL IA: NONREACTIVE
HYALINE CASTS: 1 /LPF
INR PPP: 1.69 (ref 0.85–1.15)
INTERPRETATION ECG - MUSE: NORMAL
IRON BINDING CAPACITY (ROCHE): 175 UG/DL (ref 240–430)
IRON SATN MFR SERPL: 64 % (ref 15–46)
IRON SERPL-MCNC: 112 UG/DL (ref 37–145)
KETONES UR STRIP-MCNC: ABNORMAL MG/DL
LDLC SERPL CALC-MCNC: 97 MG/DL
LEUKOCYTE ESTERASE UR QL STRIP: NEGATIVE
MCH RBC QN AUTO: 33.8 PG (ref 26.5–33)
MCHC RBC AUTO-ENTMCNC: 35.1 G/DL (ref 31.5–36.5)
MCV RBC AUTO: 96 FL (ref 78–100)
MUCOUS THREADS #/AREA URNS LPF: PRESENT /LPF
NITRATE UR QL: NEGATIVE
NONHDLC SERPL-MCNC: 117 MG/DL
P AXIS - MUSE: 5 DEGREES
PH UR STRIP: 5.5 [PH] (ref 5–7)
PHOSPHATE SERPL-MCNC: 3.4 MG/DL (ref 2.5–4.5)
PLATELET # BLD AUTO: 64 10E3/UL (ref 150–450)
POTASSIUM SERPL-SCNC: 3.7 MMOL/L (ref 3.4–5.3)
PR INTERVAL - MUSE: 158 MS
PROT SERPL-MCNC: 6.5 G/DL (ref 6.4–8.3)
PROT/CREAT 24H UR: 0.06 MG/MG CR (ref 0–0.2)
QRS DURATION - MUSE: 104 MS
QT - MUSE: 448 MS
QTC - MUSE: 458 MS
R AXIS - MUSE: -28 DEGREES
RBC # BLD AUTO: 3.79 10E6/UL (ref 3.8–5.2)
RBC URINE: 1 /HPF
SODIUM SERPL-SCNC: 136 MMOL/L (ref 135–145)
SP GR UR STRIP: 1.03 (ref 1–1.03)
SPECIMEN EXPIRATION DATE: NORMAL
SPECIMEN EXPIRATION DATE: NORMAL
SQUAMOUS EPITHELIAL: 6 /HPF
SYSTOLIC BLOOD PRESSURE - MUSE: NORMAL MMHG
T AXIS - MUSE: 0 DEGREES
T PALLIDUM AB SER QL: NONREACTIVE
TRANSFERRIN SERPL-MCNC: 151 MG/DL (ref 200–360)
TRIGL SERPL-MCNC: 99 MG/DL
TSH SERPL DL<=0.005 MIU/L-ACNC: 0.4 UIU/ML (ref 0.3–4.2)
UROBILINOGEN UR STRIP-MCNC: 3 MG/DL
VENTRICULAR RATE- MUSE: 63 BPM
VIT D+METAB SERPL-MCNC: 22 NG/ML (ref 20–50)
WBC # BLD AUTO: 4.4 10E3/UL (ref 4–11)
WBC URINE: 1 /HPF

## 2024-11-26 PROCEDURE — 84100 ASSAY OF PHOSPHORUS: CPT | Performed by: PATHOLOGY

## 2024-11-26 PROCEDURE — 71046 X-RAY EXAM CHEST 2 VIEWS: CPT | Mod: GC | Performed by: RADIOLOGY

## 2024-11-26 PROCEDURE — 82248 BILIRUBIN DIRECT: CPT | Performed by: PATHOLOGY

## 2024-11-26 PROCEDURE — 86481 TB AG RESPONSE T-CELL SUSP: CPT | Performed by: INTERNAL MEDICINE

## 2024-11-26 PROCEDURE — 99000 SPECIMEN HANDLING OFFICE-LAB: CPT | Performed by: PATHOLOGY

## 2024-11-26 PROCEDURE — 84443 ASSAY THYROID STIM HORMONE: CPT | Performed by: PATHOLOGY

## 2024-11-26 PROCEDURE — 86780 TREPONEMA PALLIDUM: CPT | Performed by: INTERNAL MEDICINE

## 2024-11-26 PROCEDURE — 86886 COOMBS TEST INDIRECT TITER: CPT

## 2024-11-26 PROCEDURE — 93000 ELECTROCARDIOGRAM COMPLETE: CPT | Performed by: INTERNAL MEDICINE

## 2024-11-26 PROCEDURE — 85027 COMPLETE CBC AUTOMATED: CPT | Performed by: PATHOLOGY

## 2024-11-26 PROCEDURE — 82105 ALPHA-FETOPROTEIN SERUM: CPT | Performed by: INTERNAL MEDICINE

## 2024-11-26 PROCEDURE — 86803 HEPATITIS C AB TEST: CPT | Performed by: INTERNAL MEDICINE

## 2024-11-26 PROCEDURE — 85610 PROTHROMBIN TIME: CPT | Performed by: PATHOLOGY

## 2024-11-26 PROCEDURE — 83036 HEMOGLOBIN GLYCOSYLATED A1C: CPT | Performed by: INTERNAL MEDICINE

## 2024-11-26 PROCEDURE — 86900 BLOOD TYPING SEROLOGIC ABO: CPT

## 2024-11-26 PROCEDURE — 93975 VASCULAR STUDY: CPT | Performed by: RADIOLOGY

## 2024-11-26 PROCEDURE — 86706 HEP B SURFACE ANTIBODY: CPT | Performed by: INTERNAL MEDICINE

## 2024-11-26 PROCEDURE — 82306 VITAMIN D 25 HYDROXY: CPT | Performed by: INTERNAL MEDICINE

## 2024-11-26 PROCEDURE — 99213 OFFICE O/P EST LOW 20 MIN: CPT | Performed by: TRANSPLANT SURGERY

## 2024-11-26 PROCEDURE — 82728 ASSAY OF FERRITIN: CPT | Performed by: PATHOLOGY

## 2024-11-26 PROCEDURE — 86665 EPSTEIN-BARR CAPSID VCA: CPT | Performed by: INTERNAL MEDICINE

## 2024-11-26 PROCEDURE — 80061 LIPID PANEL: CPT | Performed by: PATHOLOGY

## 2024-11-26 PROCEDURE — 86644 CMV ANTIBODY: CPT | Performed by: INTERNAL MEDICINE

## 2024-11-26 PROCEDURE — 87340 HEPATITIS B SURFACE AG IA: CPT | Performed by: INTERNAL MEDICINE

## 2024-11-26 PROCEDURE — 99204 OFFICE O/P NEW MOD 45 MIN: CPT | Performed by: TRANSPLANT SURGERY

## 2024-11-26 PROCEDURE — 83540 ASSAY OF IRON: CPT | Performed by: PATHOLOGY

## 2024-11-26 PROCEDURE — 86704 HEP B CORE ANTIBODY TOTAL: CPT | Performed by: INTERNAL MEDICINE

## 2024-11-26 PROCEDURE — 80053 COMPREHEN METABOLIC PANEL: CPT | Performed by: PATHOLOGY

## 2024-11-26 PROCEDURE — 86708 HEPATITIS A ANTIBODY: CPT | Performed by: INTERNAL MEDICINE

## 2024-11-26 PROCEDURE — 36415 COLL VENOUS BLD VENIPUNCTURE: CPT | Performed by: PATHOLOGY

## 2024-11-26 PROCEDURE — 76700 US EXAM ABDOM COMPLETE: CPT | Mod: XU | Performed by: RADIOLOGY

## 2024-11-26 PROCEDURE — 84466 ASSAY OF TRANSFERRIN: CPT | Performed by: INTERNAL MEDICINE

## 2024-11-26 PROCEDURE — 83550 IRON BINDING TEST: CPT | Performed by: PATHOLOGY

## 2024-11-26 PROCEDURE — G0480 DRUG TEST DEF 1-7 CLASSES: HCPCS | Mod: 90 | Performed by: PATHOLOGY

## 2024-11-26 NOTE — LETTER
11/26/2024      Karlene Yun  [address]  [facility] [address]      Dear Colleague,    Thank you for referring your patient, Karlene Yun, to the [facility]. Please see a copy of my visit note below.      Assessment and Plan:  1. liver transplant evaluation - patient is a good candidate overall. Benefits and surgical risks of a liver transplantation were discussed.  2.  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 Portal Vein:Patent  2. Hepatic Artery: Open  3. TIPS: absent  4. Previous Abdominal Surgery: No  5. Hepatocellular Carcinoma: None  6. Ascites: None  7. Costal Angle: narrow  8. Portopulmonary Hypertension: absent ECHO pending  9. Hepatopulmonary Syndrome: absent ECHO pending  10. Cardiac Evaluation: needs coronary CTA and ECHO  11. Nutritional Status: Moderate  12. Diabetes: no  13.Hypertension no but BP in clinic was 167/72  14. Smoker:no  14: Fraility index:not frail  15. Meets guidelines to receive Living Donor  No, would need to lose weight currently BMI 45, wt 326lbs  16. Potential Living donors Yes -   daughter and son-in-law are interested    Recommendations: overall no surgical contra-indications to proceeding with liver transplant.  Would also be a good candidate for combined liver transplant sleeve but there are apparently insurance coverage issues.  She needs to finish cardiac work-up including CTA and echo.        Patients overall evaluation will be discussed at the Liver Transplant selection committee meeting with a final recommendation on the patients suitability for transplant to be made at that time.    Consult Full  Details:  Karlene Yun was seen in consultation at the request of Dr. Morejon/Anjali Orta for evaluation as a potential liver transplant recipient.    Reason for Visit:  Karlene Yun is a 59 year old year old female with nonalcoholic steatophepatitis/MASLD who presents  "for liver transplant evaluation.     HPI:  Presenting complaint: easy fatiguablity and loss of muscle mass    60 yo female with Cirrhosis secondary to MASLD    Patient notes she was seeing her rheumatologist in  for psoriatic arthritis when she was noted to have elevated LFTs    Ultimately referred to se Dr Morejon and Anjali Orta.  Liver biopsy 2024 revealed cirrhosis    Denies issues with ascites or GI bleeding, does report some \"brain fog\" but no overt HE on lactulose    Has tried to lose weight including Wegovy without much success    MELD 18    PMH  Cirrhosis MASLD  NEREYDA  H/p PE >20yrs ago, 6mo anticoag  Depression  Psoriatic arthritis  Fibromyalgia  ? Previous diagnosis of CHF    PSH  No abd surg    Soc  Here with daughter, lives alone in [place] with family nearby    Tob- denies  EtOH- no heavy drinking, no DUI      Fam  Mother-  68, progressive supranuclear palsy  Father-  63 heart disease  4 older sisters, 1 Brother  1 daughter- healthy              Past Medical History:   Diagnosis Date     Hyperlipidemia 3/17/2005     Problem list name updated by automated process. Provider to review     Hypothyroidism 2002     Problem list name updated by automated process. Provider to review     Major depressive disorder, recurrent episode, in full remission (H) 2002     Morbid obesity (H) 2019     Psoriatic arthropathy (H) 2002     Past Surgical History:   Procedure Laterality Date     COLONOSCOPY  2016    mild colitis/ repeat 10 yrs     ESOPHAGOSCOPY, GASTROSCOPY, DUODENOSCOPY (EGD), COMBINED N/A 2024    Procedure: ESOPHAGOGASTRODUODENOSCOPY for variceal surveillance;  Surgeon: Guru Jacquelyn Rees MD;  Location:  OR     ESOPHAGOSCOPY, GASTROSCOPY, DUODENOSCOPY (EGD), COMBINED N/A 2024    Procedure: ESOPHAGOGASTRODUODENOSCOPY, WITH FINE NEEDLE ASPIRATION BIOPSY, WITH ENDOSCOPIC ULTRASOUND GUIDANCE liver and lymph node biopsy;  " "Surgeon: Guru Jacquelyn Rees MD;  Location: [facility] OR     HC REMOVAL OF TONSILS,<13 Y/O      Tonsils <11y.o.     IR LUMBAR PUNCTURE  6/30/2023     LASIK  2004    \"lasek\"     Past Surgical History:   Procedure Laterality Date     COLONOSCOPY  2/11/2016    mild colitis/ repeat 10 yrs     ESOPHAGOSCOPY, GASTROSCOPY, DUODENOSCOPY (EGD), COMBINED N/A 8/9/2024    Procedure: ESOPHAGOGASTRODUODENOSCOPY for variceal surveillance;  Surgeon: Guru Jacquelyn Rees MD;  Location: [facility] OR     ESOPHAGOSCOPY, GASTROSCOPY, DUODENOSCOPY (EGD), COMBINED N/A 8/9/2024    Procedure: ESOPHAGOGASTRODUODENOSCOPY, WITH FINE NEEDLE ASPIRATION BIOPSY, WITH ENDOSCOPIC ULTRASOUND GUIDANCE liver and lymph node biopsy;  Surgeon: Guru Jacquelyn Rees MD;  Location: [facility] OR     HC REMOVAL OF TONSILS,<13 Y/O      Tonsils <11y.o.     IR LUMBAR PUNCTURE  6/30/2023     LASIK  2004    \"lasek\"     Family History   Problem Relation Age of Onset     Cancer Sister         cervical     Arthritis Paternal Grandmother      Diabetes Father      Allergies   Allergen Reactions     Sumatriptan Succinate Nausea and Vomiting     IMITREX     Prior to Admission medications    Medication Sig Start Date End Date Taking? Authorizing Provider   bumetanide (BUMEX) 1 MG tablet Take 1 tablet (1 mg) by mouth daily  Patient taking differently: Take 1 mg by mouth every morning. 7/18/24  Yes Latricia Orta PA-C   citalopram (CELEXA) 20 MG tablet Take 1 tablet (20 mg) by mouth daily  Patient taking differently: Take 20 mg by mouth every morning. 4/22/20  Yes Raine Crawford MD   gabapentin (NEURONTIN) 300 MG capsule Take 600 mg by mouth every evening   Yes Reported, Patient   lactulose 20 GM/30ML solution Take 15 mLs (10 g) by mouth every morning. 10/22/24  Yes Nora Morejon MD   levothyroxine (SYNTHROID/LEVOTHROID) 112 MCG tablet Take 1 tablet (112 mcg) by mouth daily  Patient taking differently: Take 112 mcg by " "mouth every morning. 4/22/20  Yes Raine Crawford MD   secukinumab (COSENTYX SENSOREADY PEN) 150 MG/ML Sensoready pen Inject 1 mL (150 mg) subcutaneously once a week. At weeks 0, 1, 2, 3, and 4 and every 4 weeks there after loading dose. 10/28/24  Yes Lisette Mccann MD   secukinumab (COSENTYX SENSOREADY PEN) 150 MG/ML Sensoready pen Inject 1 mL (150 mg) subcutaneously every 28 days. Hold for signs of infection, and seek medical attention. 10/28/24  Yes Lisette Mccann MD   Semaglutide-Weight Management (WEGOVY) 1 MG/0.5ML pen Inject 1 mg subcutaneously once a week Thursdays   Yes Reported, Patient   spironolactone (ALDACTONE) 50 MG tablet Take 2 tablets (100 mg) by mouth daily  Patient taking differently: Take 100 mg by mouth every morning. 7/19/24  Yes Latricia Orta PA-C   tacrolimus (PROTOPIC) 0.1 % external ointment Apply topically as needed 1/9/20  Yes Reported, Patient   traZODone (DESYREL) 50 MG tablet Take 100 mg by mouth At Bedtime 12/13/17  Yes Reported, Patient   triamcinolone (KENALOG) 0.1 % external cream Apply topically as needed 1/9/20  Yes Reported, Patient       Previous Transplant Hx: No    Cardiovascular Hx:       h/o Cardiac Issues: No       Exercise Tolerance: no chest pain or shortness of breath with exertion.    Potential Donor(s): No    ROS:    REVIEW OF SYSTEMS (check box if normal)  [x]                GENERAL  [x]                  PULMONARY [x]                 GENITOURINARY  [x]                 CNS                 [x]                  CARDIAC  [x]                  ENDOCRINE  [x]                 EARS,NOSE,THROAT [x]                  GASTROINTESTINAL [x]                  NEUROLOGIC    [x]                 MUSCLOSKELTAL  [x]                   HEMATOLOGY    Examination:     Vitals:  BP (!) 167/72   Pulse 63   Ht 1.803 m (5' 11\")   Wt 147.9 kg (326 lb)   SpO2 95%   BMI 45.47 kg/m      GENERAL APPEARANCE: alert and no distress  GENERAL APPEARANCE: no distress  EYES: " PERRL  HENT: mouth without ulcers or lesions  NECK: supple, no adenopathy  RESP: lungs clear to auscultation - no rales, rhonchi or wheezes  CV: regular rhythm, normal rate, no rub   ABDOMEN:  soft, nontender, no HSM or masses and bowel sounds normal  MS: extremities normal- no gross deformities noted, no evidence of inflammation in joints, no muscle tenderness  SKIN: no rash  NEURO: Normal strength and tone, sensory exam grossly normal, mentation intact and speech normal  PSYCH: mentation appears normal. and affect normal/bright      Results:   Recent Results (from the past week)   Antibody titer red cell    Collection Time: 11/26/24  8:00 AM   Result Value Ref Range    Anti-B IgG Titer 16     Anti-B IgM Titer 32     SPECIMEN EXPIRATION DATE [id number]     ANTIBODY TITER IGM SCREEN Negative    Lipid Profile    Collection Time: 11/26/24  8:00 AM   Result Value Ref Range    Cholesterol 175 <200 mg/dL    Triglycerides 99 <150 mg/dL    Direct Measure HDL 58 >=50 mg/dL    LDL Cholesterol Calculated 97 <100 mg/dL    Non HDL Cholesterol 117 <130 mg/dL    Patient Fasting > 8hrs? Yes    Basic metabolic panel    Collection Time: 11/26/24  8:00 AM   Result Value Ref Range    Sodium 136 135 - 145 mmol/L    Potassium 3.7 3.4 - 5.3 mmol/L    Chloride 106 98 - 107 mmol/L    Carbon Dioxide (CO2) 24 22 - 29 mmol/L    Anion Gap 6 (L) 7 - 15 mmol/L    Urea Nitrogen 10.3 8.0 - 23.0 mg/dL    Creatinine 0.82 0.51 - 0.95 mg/dL    GFR Estimate 82 >60 mL/min/1.73m2    Calcium 8.4 (L) 8.8 - 10.4 mg/dL    Glucose 131 (H) 70 - 99 mg/dL    Patient Fasting > 8hrs? Yes    Hepatic panel    Collection Time: 11/26/24  8:00 AM   Result Value Ref Range    Protein Total 6.5 6.4 - 8.3 g/dL    Albumin 2.6 (L) 3.5 - 5.2 g/dL    Bilirubin Total 2.4 (H) <=1.2 mg/dL    Alkaline Phosphatase 74 40 - 150 U/L    AST 69 (H) 0 - 45 U/L    ALT 43 0 - 50 U/L    Bilirubin Direct 0.90 (H) 0.00 - 0.30 mg/dL   AFP tumor marker    Collection Time: 11/26/24  8:00 AM    Result Value Ref Range    AFP tumor marker 4.6 <=8.3 ng/mL   Ferritin    Collection Time: 11/26/24  8:00 AM   Result Value Ref Range    Ferritin 762 (H) 11 - 328 ng/mL   Hemoglobin A1c    Collection Time: 11/26/24  8:00 AM   Result Value Ref Range    Estimated Average Glucose 91 <117 mg/dL    Hemoglobin A1C 4.8 <5.7 %   Iron and iron binding capacity    Collection Time: 11/26/24  8:00 AM   Result Value Ref Range    Iron 112 37 - 145 ug/dL    Iron Binding Capacity 175 (L) 240 - 430 ug/dL    Iron Sat Index 64 (H) 15 - 46 %   Phosphorus    Collection Time: 11/26/24  8:00 AM   Result Value Ref Range    Phosphorus 3.4 2.5 - 4.5 mg/dL   TSH with free T4 reflex    Collection Time: 11/26/24  8:00 AM   Result Value Ref Range    TSH 0.40 0.30 - 4.20 uIU/mL   Transferrin    Collection Time: 11/26/24  8:00 AM   Result Value Ref Range    Transferrin 151.0 (L) 200.0 - 360.0 mg/dL   Vitamin D Deficiency    Collection Time: 11/26/24  8:00 AM   Result Value Ref Range    Vitamin D, Total (25-Hydroxy) 22 20 - 50 ng/mL   INR    Collection Time: 11/26/24  8:00 AM   Result Value Ref Range    INR 1.69 (H) 0.85 - 1.15   CBC with platelets    Collection Time: 11/26/24  8:00 AM   Result Value Ref Range    WBC Count 4.4 4.0 - 11.0 10e3/uL    RBC Count 3.79 (L) 3.80 - 5.20 10e6/uL    Hemoglobin 12.8 11.7 - 15.7 g/dL    Hematocrit 36.5 35.0 - 47.0 %    MCV 96 78 - 100 fL    MCH 33.8 (H) 26.5 - 33.0 pg    MCHC 35.1 31.5 - 36.5 g/dL    RDW 15.3 (H) 10.0 - 15.0 %    Platelet Count 64 (L) 150 - 450 10e3/uL   CMV Antibody IgG    Collection Time: 11/26/24  8:00 AM   Result Value Ref Range    CMV Rachel IgG Instrument Value <0.20 <0.60 U/mL    CMV Antibody IgG No detectable antibody. No detectable antibody.    EBV Capsid Antibody IgG    Collection Time: 11/26/24  8:00 AM   Result Value Ref Range    EBV Capsid Rachel IgG Instrument Value >750.0 (H) <18.0 U/mL    EBV Capsid Antibody IgG Positive (A) No detectable antibody.   Hepatitis A Antibody Total     Collection Time: 11/26/24  8:00 AM   Result Value Ref Range    Hepatitis A Antibody Total Reactive    Hepatitis B core antibody    Collection Time: 11/26/24  8:00 AM   Result Value Ref Range    Hepatitis B Core Antibody Total Nonreactive Nonreactive   Hepatitis B Surface Antibody    Collection Time: 11/26/24  8:00 AM   Result Value Ref Range    Hepatitis B Surface Antibody Nonreactive     Hepatitis B Surface Antibody Instrument Value <3.50 <8.5 m[IU]/mL   Hepatitis B surface antigen    Collection Time: 11/26/24  8:00 AM   Result Value Ref Range    Hepatitis B Surface Antigen Nonreactive Nonreactive   Hepatitis C antibody    Collection Time: 11/26/24  8:00 AM   Result Value Ref Range    Hepatitis C Antibody Nonreactive Nonreactive   HIV Antigen Antibody Combo Pretransplant Cascade    Collection Time: 11/26/24  8:00 AM   Result Value Ref Range    HIV Antigen Antibody Combo Pretransplant Nonreactive Nonreactive   Treponema Abs w Reflex to RPR and Titer    Collection Time: 11/26/24  8:00 AM   Result Value Ref Range    Treponema Antibody Total Nonreactive Nonreactive   Phosphatidylethanol (PEth), Whole Blood    Collection Time: 11/26/24  8:00 AM   Result Value Ref Range    PEth 16:0/18:1 (POPEth) <10 ng/mL    PEth 16:0/18:2 (PLPEth) <10 ng/mL    EER Phosphatidylethanol (PETH) See Note     PEth Interpretation See Comment    Adult Type and Screen    Collection Time: 11/26/24  8:00 AM   Result Value Ref Range    ABO/RH(D) A POS     Antibody Screen Negative Negative    SPECIMEN EXPIRATION DATE [id number]    Quantiferon TB Gold Plus Grey Tube    Collection Time: 11/26/24  8:00 AM    Specimen: Arm, Right; Blood   Result Value Ref Range    Quantiferon Nil Tube 0.37 IU/mL   Quantiferon TB Gold Plus Green Tube    Collection Time: 11/26/24  8:00 AM    Specimen: Arm, Right; Blood   Result Value Ref Range    Quantiferon TB1 Tube 0.32 IU/mL   Quantiferon TB Gold Plus Yellow Tube    Collection Time: 11/26/24  8:00 AM    Specimen:  Arm, Right; Blood   Result Value Ref Range    Quantiferon TB2 Tube 0.32    Quantiferon TB Gold Plus Purple Tube    Collection Time: 11/26/24  8:00 AM    Specimen: Arm, Right; Blood   Result Value Ref Range    Quantiferon Mitogen 6.39 IU/mL   Quantiferon TB Gold Plus    Collection Time: 11/26/24  8:00 AM    Specimen: Arm, Right; Blood   Result Value Ref Range    Quantiferon-TB Gold Plus Negative Negative    TB1 Ag minus Nil Value -0.05 IU/mL    TB2 Ag minus Nil Value -0.05 IU/mL    Mitogen minus Nil Result 6.02 IU/mL    Nil Result 0.37 IU/mL   ABO and Rh    Collection Time: 11/26/24  8:05 AM   Result Value Ref Range    ABO/RH(D) A POS     SPECIMEN EXPIRATION DATE [id number]    EKG 12-lead, tracing only [EKG1]    Collection Time: 11/26/24  8:16 AM   Result Value Ref Range    Systolic Blood Pressure  mmHg    Diastolic Blood Pressure  mmHg    Ventricular Rate 63 BPM    Atrial Rate 63 BPM    WV Interval 158 ms    QRS Duration 104 ms     ms    QTc 458 ms    P Axis 5 degrees    R AXIS -28 degrees    T Axis 0 degrees    Interpretation ECG       Sinus rhythm  Incomplete right bundle branch block  Borderline ECG  No previous ECGs available  Confirmed by MD CUATE, SAMUEL ([id number]) on 11/26/2024 2:51:18 PM     Protein  random urine    Collection Time: 11/26/24  8:19 AM   Result Value Ref Range    Total Protein Urine mg/dL 15.7   mg/dL    Total Protein Urine mg/mg Creat 0.06 0.00 - 0.20 mg/mg Cr    Creatinine Urine mg/dL 257.0 mg/dL   UA Macroscopic with reflex to Microscopic and Culture    Collection Time: 11/26/24  8:19 AM    Specimen: Urine, Midstream   Result Value Ref Range    Color Urine Yellow Colorless, Straw, Light Yellow, Yellow    Appearance Urine Clear Clear    Glucose Urine Negative Negative mg/dL    Bilirubin Urine Negative Negative    Ketones Urine Trace (A) Negative mg/dL    Specific Gravity Urine 1.026 1.003 - 1.035    Blood Urine Negative Negative    pH Urine 5.5 5.0 - 7.0    Protein Albumin Urine 10  (A) Negative mg/dL    Urobilinogen Urine 3.0 (A) Normal, 2.0 mg/dL    Nitrite Urine Negative Negative    Leukocyte Esterase Urine Negative Negative    Bacteria Urine Few (A) None Seen /HPF    Mucus Urine Present (A) None Seen /LPF    RBC Urine 1 <=2 /HPF    WBC Urine 1 <=5 /HPF    Squamous Epithelials Urine 6 (H) <=1 /HPF    Hyaline Casts Urine 1 <=2 /LPF     I had a long discussion with the patient regarding liver transplantation which included but was not limited to  the following points:    Liver transplant selection committee process.  The federal rules for cadaveric waiting list, the size and blood type matching of the organ. The availability of living-related donor transplantation.  The types of donors: brain death donors, non-heart beating donors, partial liver grafts: splits and living donor grafts  Extended criteria  Donors (older age, steasosis) and the increased  risk of primary non-function using the extended criteria donors  [facility] high risk donors,  Risk of donor transmitted infections and donor transmitted malignancy  The liver transplant operation and the associated risks and technical complications which can include intraoperative death, post operative death,  Primary non-function, bleeding requiring re-operations, arterial and biliary complications, bowel perforations, and intra abdominal abscess. Some of these complicaitons may require a second operation.  The postoperative course, the ICU stay and risk of postoperative complications which can include sepsis, MI, stroke, brain injury, pneumonia, pleural effusions, and renal dysfunction.  The current 1 year and 5 year graft and patient survivals.  The need for life long immunosuppressive therapy and the side effects of these medications, including the possibility of toxicity, opportunistic infections, risk of cancer including lymphoma, and the possibility of rejection even if the patient is taking the medication exactly as prescribed.  The need  for compliance with medications and follow-up visits in the clinic and thereafter.  The patient and family understand these risks and wish to proceed to transplantation        Again, thank you for allowing me to participate in the care of your patient.        Sincerely,        Amauri Jacobs MD

## 2024-11-26 NOTE — PATIENT INSTRUCTIONS
"WEIGHT MAINTENANCE STRATEGIES    According to the National Weight Control Registry there are several things that people who have lost weight and kept it off have in common. Some of them are...    1. 3 MEALS A DAY: Make sure you are eating 3 meals each day. No skipping meals. 80% of people who skip meals are overweight or obese. Missing meals slows the metabolism, making it harder to maintain a healthy weight.    2. FOOD DIARY: Although calorie counting may not be ideal, keep a food and exercise diary to help bring awareness to how often you eat/snack, portion sizes, liquid intake, etc. Consider  reverse measuring  foods you eat. For example, plate up a typical meal and put certain food items back into a measuring cup. If you think you only eat 1 cup of rice at a meal, you may be eating more than you realize, even 2-3 cups. Portion size can be deceiving. Consider paying close attention to the portion size of coffee creamers, honey, nuts/nut butters, salad dressings, condiments, etc.      4. HIGH FIBER/LOW FAT: Lean sources of protein (skim milk, skinless, baked or broiled chicken breast, fish, etc.) will help you meet your protein needs while fruits, vegetables, and whole grains will help you get the fiber that your body needs. This is heart healthy eating and helps to keep calorie levels in balance.  **Some lean proteins: chicken, turkey, tuna, salmon, crab, fish, shrimp, scallops, lobster, lean cuts of beef and pork, luncheon meats, veggie burgers, beans (black, lima, garbanzo, uriostegui, kidney, refried), chile, cottage cheese, string cheese, other cheese, eggs, tofu, peanut butter, nuts, vegan crumbles, greek yogurt    5. 8,000 STEPS PER DAY: This is a \"weight maintenance dose. \" It is essential to get your steps in every day, 7 days a week. You don't have to \"work out \" 7 days a week, but throughout the day, getting 8000 steps will help you maintain the weight you have lost. Parking far away, taking the stairs " "instead of the elevator, and pacing while on the phone are some ways to help achieve this goal.    6. Eat at home 90% OF THE TIME: People who maintain a healthy weight eat at home, or meal prepared at home, 90% of the time. Studies show that people consume an average of 770 amanuel when eating out at a restaurant and 440 amanuel when eating a meal prepared at home. This equates to almost 35 pounds of excess weight for a person who eats out once a day for 1 year.    OTHER HELPFUL HABITS    -Minimize liquid calories. Stick to water, flavored water/carbonated water, black coffee, and unsweetened tea. Soda, juice, and sports drinks are not healthy choices.   -Avoiding \"mindless \"eating, i.e., eating at the TV, in the car, in front of the computer, etc.  -Protect your sleep and Manage your stress. It is more than just  the food . Poor sleep and high stress levels impact us greatly, including weight maintenance.   -Limit restaurant, cafeteria, take out, and/or drive thru to 2 times/week or less.     OPTIMIZING YOUR METABOLISM FOR LIFE    1. MUSCLE MAINTENANCE: Muscle burns calories up to 70% better than fat does. As we age our body composition changes and we lose muscle mass. Weight training can help us keep and even build muscle mass. Dumbbells, pushups, resistance band training, and weight machines are all examples of ways to keep and/or build muscle mass.    2. MOVE: 8000 steps daily has been shown to be a weight maintenance dose. Aim for 10,000 steps each day. Helpful habits include taking the stairs instead of the elevator when possible, parking at the far end of the parking lot, pacing while on the phone, and taking the dog for a walk. Of course using a treadmill, stair climber, elliptical , and bicycle are all ways of getting 10,000 steps.    3. 3 MEALS EACH DAY: Make sure to get your 3 meals each day. Skipping breakfast, working through your lunch, or not eating dinner will lead to a slowing of your metabolism. " "Studies show that 80% of people who skip meals are overweight or obese.    4. ADEQUATE PROTEIN INTAKE: Getting adequate protein is beneficial for a number of reasons: to aid the healing process, to blunt cravings immediately after eating and for a period of time after eating, to help keep blood sugars level, and to help you maintain your muscle mass. See #1 above.       Nutrition & Frailty for Transplant Recipients  With end-stage organ disease, you are at higher risk for malnutrition, deconditioning, frailty, and a reduced functional status. The goal is to have a well-maintained nutritional status (or a good nutritional reserve). Once you \"lose\" your nutritional buffer or functional status, it is very hard to get back. The more functional you are, the better you will do with recovering from surgery, etc.     Functional Status:  - Combined with good nutrition, maintaining functional status will help keep you strong enough for surgery.   - Do what activity you can tolerate. Please ask your Physician for a Physical Therapy referral if you would find this helpful.     Nutrition:  - Protein foods are the building blocks of muscle. Try to get protein at each meal and snack time. Including protein before bed is also helpful to carry you through the night when your muscle is more prone to breakdown.   - Sometimes eating protein is more work than drinking liquids. If this is the case, please supplement with a pre-made protein shake or make your own smoothie with protein powder, milk/water, yogurt, fruit, nuts/brenton seeds, etc  - Small volume protein supplements: Prostat or Liquacel (can be found on Amazon). 1 ounce drink has 15 grams of protein.    Sources of Protein  For animal proteins (chicken, beef, fish), deck of cards size is approximately 3 ounces or 24 grams protein.  Food Portion  Grams of Protein   Animal proteins (chicken, turkey, venison, fish/seafood, red meat) 1 ounce  7-9   Egg  1  6   Cottage cheese  1/4 cup  " 7    Cheese  1 ounce (1 slice, 1 cheese stick) 6   Milk (cow's) 4 ounces or 1/2 cup  4   Dry skim milk powder 2 Tbsp  4   Ricotta cheese  1/4 cup  7    Amity Instant Breakfast (made with milk) 1/2 cup  7   Pudding 1/2 cup  4   Yogurt (ie Yoplait) 1/2 cup  5   Greek yogurt 5 oz container 15   Tofu  1/4 cup  5   Soymilk  1/2 cup  3   Tempeh  1/4 cup  8   Lentils  1/4 cup 5   Kidney beans, black beans, etc 1/4 cup  4   Chickpeas 1/4 cup  4   Veggie burger  1 concepcion  7   Soy nuts  1/4 cup  17   Sunflower seeds  2 Tbsp  8   Peanuts  1 ounce 7   Almonds  15 6   Pistachios 25 6   Peanut/almond butter 2 Tbsp  8    My favorite protein bars are Atkins wafer cookie or Power Crunch protein bars- wafer cookie type texture.     Liver Health & Nutrition      Visit https://cirrhosiscare.ca to review valuable information about nutrition and exercise     Nutrition  - For patients with cirrhosis, it is very important to eat the right types and amounts of foods.  We recommend a diet low in carbohydrates/sugars and high in fresh fruits/vegetables, with the right amount of protein.  We typically recommend  grams of protein every day. Your Registered Dietitian will be able to calculate your estimated protein needs based on your weight and degree of muscle wasting associated with your liver disease.  - In regard to protein intake, you can focus on: red meat, poultry, cooked fish and seafood, vegetable-based protein meat products/meat substitutes (Beyond Burgers, etc), tofu and other soy products, eggs, beans/legumes, dairy products (including milk and yogurt and cheese), unsalted nuts, nut butters, etc.  - You should eat at least three meals a day and three to four snacks between meals. Your goal is to include protein at each meal and snack. Focus on eating protein first, then if still hungry, go for the vegetables, fruit, starches, etc.   - Bedtime snacks are especially important (preferrably something with some protein) - toast  "or fruit with peanut butter, spoonful of peanut butter, Greek yogurt, handful of nuts, string cheese stick, boiled egg, protein bar  - Patients with malnutrition and/or loss of muscle mass can improve their nutrition and muscle mass by drinking at least 2 protein drinks per day. A good time of day to drink one of these is before bed, when your body is preparing to be in the \"fasted\" state all night and muscle mass may break down more readily. Some options include: Ensure, Boost, Glucerna, Premier Protein, FairLife, Orgain, or powdered whey protein (or similar supplements) mixed with milk, yogurt, fruit, peanut butter, etc.   - Clear protein drinks: Premier Protein Clear, Ensure Clear, [id number], Boost Breeze Clear, Wicked Whey Protein Powder  - Please avoid eating raw seafood, especially shellfish, because of risk of serious illness  - We recommend all patients with cirrhosis limit their daily sodium intake to less than 2000 mg. Consider tracking your current sodium intake for 3 days on pen/paper or with an humera like I-Market to understand where most of your sodium is coming from, what you might need to modify in your diet to remain within the 2000 mg/day budget, etc. Consider a lower sodium frozen meal (Healthy Choice, Lean Cuisine, Smart Ones) in a pinch.     Online low sodium store:   https://Hashable/    Low Sodium Recipe Blogs/Websites     https://CodeStreet.us/recipe-index/    https://Euroffice.startuply/category/dinner-recipes/    https://www.Convozine.startuply/recipes    https://WeMonitor/recipes    Julita FONTENOT CCTD  Transplant Dietitian             "

## 2024-11-26 NOTE — PROGRESS NOTES
Psychosocial Assessment    Karlene Yun was seen in the [facility]plant [facility] as part of her evaluation as a potential liver transplant recipient .  She presented with her daughter Patrica today in clinic.     Living Situation: Karlene is a 58 year old female who lives in a apartment in [place], [place]. Karlene resides alone. She does not report any concerns related to their living environment. Karlene and Patrica did discuss potentially moving in with her daughter in [place] to reduce the stress of rent.     Education/Employment:  Karlene graduated with a bachelors degree. She is no longer working due to their health.   . Karlene is not a .     Financial /Income: Karlene's primary source of income is through spousal maintenance. She reports that due to not working and more medical bills, she does report financial strain. She identified this as a stressor, and has some payment plans with health systems for her medical bills. [facility] qualify [facility] [place] due to her income.     Health Insurance: She has [employer] through the marketplace. She is planning to switch to [employer].      This writer talked with Karlene about the financial risks of transplant, particularly about the high cost of transplant related medications and the importance of maintaining adequate health insurance coverage.    Family/Social Support: Karlene is single and has one daughter, Patrica who lives in [place], [place]. She has five siblings, and identified a close relationship with four of them. She has one sister in [place], one in [place] and the rest list in [place]. Her parents are .      This writer stressed the importance of having a stable and involved support network before and after transplant.  Provided Karlene with education about the relationship between a stable support system and better surgical and post-transplant outcomes compared to patients with a limited support system.      Functional Status:  Karlene is independent with all ADL/IADLs. She manages their medications. . She continues to drive.     Chemical Dependency:  Karlene does not have a history of misuse of any substance including alcohol, pain medication, or illicit substances. Karlene's last consumption of alcohol was 2+ years ago and consumed alcohol about once a month. She was described being prescribed pain medications in the past, and needed to trapper off them, but no concerns with misuse. Karlene also reported that she obtained a medical marijuana card and utilized this once for pain. She no longer has an active card.     No history of treatment or legal consequences related to substances.     Mental Health: Karlene has a history of depression and is prescribed Celexa by her PCP. She participated in psychotherapy 14+ years ago. No history of other psychiatric care including any hospitalizations. No history of suicidal thoughts even passive ones.     Adjustment to Illness:  This writer provided Karlene with supportive counseling throughout this interview.  This writer also encouraged Karlene to attend the liver transplant support group for additional support and encouragement.     Impression/Recommendations:   Karlene and Patrica verbalizes understanding the psychosocial risks of transplant and teaching provided during this evaluation.    Karlene last consumption of alcohol was over two years ago. They meet the sobriety criteria recommended for listing. At this time, there are no further recommendations. Will continue to assess patient's stability.  PETh Pending.     Karlene's primary caregiver will be her daughter, Patrica. This writer confirmed with Patrica their ability and willingness to provide post transplant caregiver support.     Karlene has adequate finances and health insurance for transplant, and an intact support system. This writer will remain available to assist patient throughout the evaluation process and will follow  patient through transplant if she is listed.  It was a pleasure to evaluate this patient for liver transplant.    Karlene is a acceptable transplant candidate from a psychosocial perspective.      Teaching completed during assessment:  1.     Housing and relocation needs post transplant.  2.     Caregiver needs post transplant.  3.     Financial issues related to transplant.  4.     Risks of alcohol use post transplant.  5.     Common psychosocial stressors pre/post transplant.        6.     Liver Transplant support group availability.        7.     [facility] Directive - Provided a health care directive today             Psychosocial Risks of Transplant Reviewed:  1.     Increased stress related to your emotional, family, social, employment, or   financial situation.  2.     Affect on work and/or disability benefits.  3.     Affect on future health and life insurance.  4.     Transplant outcome expectations may not be met.  5.     Mental Health risks: anxiety, depression, PTSD, guilt, grief and chronic fatigue.     TRESA Tejeda

## 2024-11-26 NOTE — COMMITTEE REVIEW
Liver Committee Review Note     Evaluation Date: 11/26/2024  Committee Review Date: 11/26/2024    Organ being evaluated for: Liver    Transplant Phase: Evaluation  Transplant Status: Active    Transplant Coordinator: Candelario Baker  Transplant Surgeon:   Amauri Jacobs    Referring Physician: Lyn Martinez    Primary Diagnosis: Cirrhosis: Type A  Secondary Diagnosis:     Transplant Eligibility: Cirrhosis with MELD    Committee Review Decision: Approved    Relative Contraindications:     Absolute Contraindications:     Live Donor: No     Committee Chair Leventhal, Thomas Michael, MD verbally attested to the committee's decision.    Committee Discussion Details: see below    Committee Review Members:  Nutrition Julita Conn, RD   Pharmacist Davida Cardenas, [facility]    - Clinical Fang Johnston, [facility], Neisha Freitas, [facility]   Transplant Camila Goldberg, RN, Sheila Manning, RN, Felicia Cortez, RN, Jr Reagan Wang, FRANSICO, Nazia Alicia, APRN CNP, Yvonne Tang, FRANSICO, Dick Castaneda, FRANSICO, Candelario Baker, RN, Shaila Murillo RN   Transplant Hepatology  Verna Ramos MD, Timoteo Loving MD, Nora Morejon MD, Ilana Rnadall PA-C, Selam Mccoy MD, Nabeel Pascal MD, Stephanie Garibay MD, Thomas M. Leventhal, MD   Transplant Surgery Amauri Jacobs MD       Discussed living donor, would need to lose weight. Approve pending completion of eval. See JT in virtual in January.  
23-Apr-2021 21:55

## 2024-11-27 LAB
GAMMA INTERFERON BACKGROUND BLD IA-ACNC: 0.37 IU/ML
M TB IFN-G BLD-IMP: NEGATIVE
M TB IFN-G CD4+ BCKGRND COR BLD-ACNC: 6.02 IU/ML
MITOGEN IGNF BCKGRD COR BLD-ACNC: -0.05 IU/ML
MITOGEN IGNF BCKGRD COR BLD-ACNC: -0.05 IU/ML
QUANTIFERON MITOGEN: 6.39 IU/ML
QUANTIFERON NIL TUBE: 0.37 IU/ML
QUANTIFERON TB1 TUBE: 0.32 IU/ML
QUANTIFERON TB2 TUBE: 0.32

## 2024-11-30 NOTE — PROGRESS NOTES
Assessment and Plan:  1. liver transplant evaluation - patient is a good candidate overall. Benefits and surgical risks of a liver transplantation were discussed.  2.  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 Portal Vein:Patent  2. Hepatic Artery: Open  3. TIPS: absent  4. Previous Abdominal Surgery: No  5. Hepatocellular Carcinoma: None  6. Ascites: None  7. Costal Angle: narrow  8. Portopulmonary Hypertension: absent ECHO pending  9. Hepatopulmonary Syndrome: absent ECHO pending  10. Cardiac Evaluation: needs coronary CTA and ECHO  11. Nutritional Status: Moderate  12. Diabetes: no  13.Hypertension no but BP in clinic was 167/72  14. Smoker:no  14: Fraility index:not frail  15. Meets guidelines to receive Living Donor  No, would need to lose weight currently BMI 45, wt 326lbs  16. Potential Living donors Yes -   daughter and son-in-law are interested    Recommendations: overall no surgical contra-indications to proceeding with liver transplant.  Would also be a good candidate for combined liver transplant sleeve but there are apparently insurance coverage issues.  She needs to finish cardiac work-up including CTA and echo.        Patients overall evaluation will be discussed at the Liver Transplant selection committee meeting with a final recommendation on the patients suitability for transplant to be made at that time.    Consult Full  Details:  Karlene Yun was seen in consultation at the request of Dr. Morejon/Anjali Orta for evaluation as a potential liver transplant recipient.    Reason for Visit:  Karlene Yun is a 59 year old year old female with nonalcoholic steatophepatitis/MASLD who presents for liver transplant evaluation.     HPI:  Presenting complaint: easy fatiguablity and loss of muscle mass    60 yo female with Cirrhosis secondary to MASLD    Patient notes she was seeing her rheumatologist in 2022 for psoriatic arthritis when she was  "noted to have elevated LFTs    Ultimately referred to se Dr Morejon and Anjali Orta.  Liver biopsy 2024 revealed cirrhosis    Denies issues with ascites or GI bleeding, does report some \"brain fog\" but no overt HE on lactulose    Has tried to lose weight including Wegovy without much success    MELD 18    PMH  Cirrhosis MASLD  NEREYDA  H/p PE >20yrs ago, 6mo anticoag  Depression  Psoriatic arthritis  Fibromyalgia  ? Previous diagnosis of CHF    PSH  No abd surg    Soc  Here with daughter, lives alone in [place] with family nearby    Tob- denies  EtOH- no heavy drinking, no DUI      Fam  Mother-  68, progressive supranuclear palsy  Father-  63 heart disease  4 older sisters, 1 Brother  1 daughter- healthy              Past Medical History:   Diagnosis Date    Hyperlipidemia 3/17/2005     Problem list name updated by automated process. Provider to review    Hypothyroidism 2002     Problem list name updated by automated process. Provider to review    Major depressive disorder, recurrent episode, in full remission (H) 2002    Morbid obesity (H) 2019    Psoriatic arthropathy (H) 2002     Past Surgical History:   Procedure Laterality Date    COLONOSCOPY  2016    mild colitis/ repeat 10 yrs    ESOPHAGOSCOPY, GASTROSCOPY, DUODENOSCOPY (EGD), COMBINED N/A 2024    Procedure: ESOPHAGOGASTRODUODENOSCOPY for variceal surveillance;  Surgeon: Guru Jacquelyn Rees MD;  Location:  OR    ESOPHAGOSCOPY, GASTROSCOPY, DUODENOSCOPY (EGD), COMBINED N/A 2024    Procedure: ESOPHAGOGASTRODUODENOSCOPY, WITH FINE NEEDLE ASPIRATION BIOPSY, WITH ENDOSCOPIC ULTRASOUND GUIDANCE liver and lymph node biopsy;  Surgeon: Guru Jacquelyn Rees MD;  Location: [facility] OR     REMOVAL OF TONSILS,<11 Y/O      Tonsils <11y.o.    IR LUMBAR PUNCTURE  2023    LASIK      \"lasek\"     Past Surgical History:   Procedure Laterality Date    COLONOSCOPY  2016    " "mild colitis/ repeat 10 yrs    ESOPHAGOSCOPY, GASTROSCOPY, DUODENOSCOPY (EGD), COMBINED N/A 8/9/2024    Procedure: ESOPHAGOGASTRODUODENOSCOPY for variceal surveillance;  Surgeon: Guru Jacquelyn Rees MD;  Location: [facility] OR    ESOPHAGOSCOPY, GASTROSCOPY, DUODENOSCOPY (EGD), COMBINED N/A 8/9/2024    Procedure: ESOPHAGOGASTRODUODENOSCOPY, WITH FINE NEEDLE ASPIRATION BIOPSY, WITH ENDOSCOPIC ULTRASOUND GUIDANCE liver and lymph node biopsy;  Surgeon: Guru Jacquelyn Rees MD;  Location: [facility] OR    HC REMOVAL OF TONSILS,<11 Y/O      Tonsils <11y.o.    IR LUMBAR PUNCTURE  6/30/2023    LASIK  2004    \"lasek\"     Family History   Problem Relation Age of Onset    Cancer Sister         cervical    Arthritis Paternal Grandmother     Diabetes Father      Allergies   Allergen Reactions    Sumatriptan Succinate Nausea and Vomiting     IMITREX     Prior to Admission medications    Medication Sig Start Date End Date Taking? Authorizing Provider   bumetanide (BUMEX) 1 MG tablet Take 1 tablet (1 mg) by mouth daily  Patient taking differently: Take 1 mg by mouth every morning. 7/18/24  Yes Latricia Orta PA-C   citalopram (CELEXA) 20 MG tablet Take 1 tablet (20 mg) by mouth daily  Patient taking differently: Take 20 mg by mouth every morning. 4/22/20  Yes Raine Crawford MD   gabapentin (NEURONTIN) 300 MG capsule Take 600 mg by mouth every evening   Yes Reported, Patient   lactulose 20 GM/30ML solution Take 15 mLs (10 g) by mouth every morning. 10/22/24  Yes Nora Morejon MD   levothyroxine (SYNTHROID/LEVOTHROID) 112 MCG tablet Take 1 tablet (112 mcg) by mouth daily  Patient taking differently: Take 112 mcg by mouth every morning. 4/22/20  Yes Raine Crawford MD   secukinumab (COSENTYX SENSOREADY PEN) 150 MG/ML Sensoready pen Inject 1 mL (150 mg) subcutaneously once a week. At weeks 0, 1, 2, 3, and 4 and every 4 weeks there after loading dose. 10/28/24  Yes Lisette Mccann MD " "  secukinumab (COSENTYX SENSOREADY PEN) 150 MG/ML Sensoready pen Inject 1 mL (150 mg) subcutaneously every 28 days. Hold for signs of infection, and seek medical attention. 10/28/24  Yes Lisette Mccann MD   Semaglutide-Weight Management (WEGOVY) 1 MG/0.5ML pen Inject 1 mg subcutaneously once a week Thursdays   Yes Reported, Patient   spironolactone (ALDACTONE) 50 MG tablet Take 2 tablets (100 mg) by mouth daily  Patient taking differently: Take 100 mg by mouth every morning. 7/19/24  Yes Latricia Orta PA-C   tacrolimus (PROTOPIC) 0.1 % external ointment Apply topically as needed 1/9/20  Yes Reported, Patient   traZODone (DESYREL) 50 MG tablet Take 100 mg by mouth At Bedtime 12/13/17  Yes Reported, Patient   triamcinolone (KENALOG) 0.1 % external cream Apply topically as needed 1/9/20  Yes Reported, Patient       Previous Transplant Hx: No    Cardiovascular Hx:       h/o Cardiac Issues: No       Exercise Tolerance: no chest pain or shortness of breath with exertion.    Potential Donor(s): No    ROS:    REVIEW OF SYSTEMS (check box if normal)  [x]                GENERAL  [x]                  PULMONARY [x]                 GENITOURINARY  [x]                 CNS                 [x]                  CARDIAC  [x]                  ENDOCRINE  [x]                 EARS,NOSE,THROAT [x]                  GASTROINTESTINAL [x]                  NEUROLOGIC    [x]                 MUSCLOSKELTAL  [x]                   HEMATOLOGY    Examination:     Vitals:  BP (!) 167/72   Pulse 63   Ht 1.803 m (5' 11\")   Wt 147.9 kg (326 lb)   SpO2 95%   BMI 45.47 kg/m      GENERAL APPEARANCE: alert and no distress  GENERAL APPEARANCE: no distress  EYES: PERRL  HENT: mouth without ulcers or lesions  NECK: supple, no adenopathy  RESP: lungs clear to auscultation - no rales, rhonchi or wheezes  CV: regular rhythm, normal rate, no rub   ABDOMEN:  soft, nontender, no HSM or masses and bowel sounds normal  MS: extremities normal- no gross " deformities noted, no evidence of inflammation in joints, no muscle tenderness  SKIN: no rash  NEURO: Normal strength and tone, sensory exam grossly normal, mentation intact and speech normal  PSYCH: mentation appears normal. and affect normal/bright      Results:   Recent Results (from the past week)   Antibody titer red cell    Collection Time: 11/26/24  8:00 AM   Result Value Ref Range    Anti-B IgG Titer 16     Anti-B IgM Titer 32     SPECIMEN EXPIRATION DATE [id number]     ANTIBODY TITER IGM SCREEN Negative    Lipid Profile    Collection Time: 11/26/24  8:00 AM   Result Value Ref Range    Cholesterol 175 <200 mg/dL    Triglycerides 99 <150 mg/dL    Direct Measure HDL 58 >=50 mg/dL    LDL Cholesterol Calculated 97 <100 mg/dL    Non HDL Cholesterol 117 <130 mg/dL    Patient Fasting > 8hrs? Yes    Basic metabolic panel    Collection Time: 11/26/24  8:00 AM   Result Value Ref Range    Sodium 136 135 - 145 mmol/L    Potassium 3.7 3.4 - 5.3 mmol/L    Chloride 106 98 - 107 mmol/L    Carbon Dioxide (CO2) 24 22 - 29 mmol/L    Anion Gap 6 (L) 7 - 15 mmol/L    Urea Nitrogen 10.3 8.0 - 23.0 mg/dL    Creatinine 0.82 0.51 - 0.95 mg/dL    GFR Estimate 82 >60 mL/min/1.73m2    Calcium 8.4 (L) 8.8 - 10.4 mg/dL    Glucose 131 (H) 70 - 99 mg/dL    Patient Fasting > 8hrs? Yes    Hepatic panel    Collection Time: 11/26/24  8:00 AM   Result Value Ref Range    Protein Total 6.5 6.4 - 8.3 g/dL    Albumin 2.6 (L) 3.5 - 5.2 g/dL    Bilirubin Total 2.4 (H) <=1.2 mg/dL    Alkaline Phosphatase 74 40 - 150 U/L    AST 69 (H) 0 - 45 U/L    ALT 43 0 - 50 U/L    Bilirubin Direct 0.90 (H) 0.00 - 0.30 mg/dL   AFP tumor marker    Collection Time: 11/26/24  8:00 AM   Result Value Ref Range    AFP tumor marker 4.6 <=8.3 ng/mL   Ferritin    Collection Time: 11/26/24  8:00 AM   Result Value Ref Range    Ferritin 762 (H) 11 - 328 ng/mL   Hemoglobin A1c    Collection Time: 11/26/24  8:00 AM   Result Value Ref Range    Estimated Average Glucose 91  <117 mg/dL    Hemoglobin A1C 4.8 <5.7 %   Iron and iron binding capacity    Collection Time: 11/26/24  8:00 AM   Result Value Ref Range    Iron 112 37 - 145 ug/dL    Iron Binding Capacity 175 (L) 240 - 430 ug/dL    Iron Sat Index 64 (H) 15 - 46 %   Phosphorus    Collection Time: 11/26/24  8:00 AM   Result Value Ref Range    Phosphorus 3.4 2.5 - 4.5 mg/dL   TSH with free T4 reflex    Collection Time: 11/26/24  8:00 AM   Result Value Ref Range    TSH 0.40 0.30 - 4.20 uIU/mL   Transferrin    Collection Time: 11/26/24  8:00 AM   Result Value Ref Range    Transferrin 151.0 (L) 200.0 - 360.0 mg/dL   Vitamin D Deficiency    Collection Time: 11/26/24  8:00 AM   Result Value Ref Range    Vitamin D, Total (25-Hydroxy) 22 20 - 50 ng/mL   INR    Collection Time: 11/26/24  8:00 AM   Result Value Ref Range    INR 1.69 (H) 0.85 - 1.15   CBC with platelets    Collection Time: 11/26/24  8:00 AM   Result Value Ref Range    WBC Count 4.4 4.0 - 11.0 10e3/uL    RBC Count 3.79 (L) 3.80 - 5.20 10e6/uL    Hemoglobin 12.8 11.7 - 15.7 g/dL    Hematocrit 36.5 35.0 - 47.0 %    MCV 96 78 - 100 fL    MCH 33.8 (H) 26.5 - 33.0 pg    MCHC 35.1 31.5 - 36.5 g/dL    RDW 15.3 (H) 10.0 - 15.0 %    Platelet Count 64 (L) 150 - 450 10e3/uL   CMV Antibody IgG    Collection Time: 11/26/24  8:00 AM   Result Value Ref Range    CMV Rachel IgG Instrument Value <0.20 <0.60 U/mL    CMV Antibody IgG No detectable antibody. No detectable antibody.    EBV Capsid Antibody IgG    Collection Time: 11/26/24  8:00 AM   Result Value Ref Range    EBV Capsid Rachel IgG Instrument Value >750.0 (H) <18.0 U/mL    EBV Capsid Antibody IgG Positive (A) No detectable antibody.   Hepatitis A Antibody Total    Collection Time: 11/26/24  8:00 AM   Result Value Ref Range    Hepatitis A Antibody Total Reactive    Hepatitis B core antibody    Collection Time: 11/26/24  8:00 AM   Result Value Ref Range    Hepatitis B Core Antibody Total Nonreactive Nonreactive   Hepatitis B Surface Antibody     Collection Time: 11/26/24  8:00 AM   Result Value Ref Range    Hepatitis B Surface Antibody Nonreactive     Hepatitis B Surface Antibody Instrument Value <3.50 <8.5 m[IU]/mL   Hepatitis B surface antigen    Collection Time: 11/26/24  8:00 AM   Result Value Ref Range    Hepatitis B Surface Antigen Nonreactive Nonreactive   Hepatitis C antibody    Collection Time: 11/26/24  8:00 AM   Result Value Ref Range    Hepatitis C Antibody Nonreactive Nonreactive   HIV Antigen Antibody Combo Pretransplant Cascade    Collection Time: 11/26/24  8:00 AM   Result Value Ref Range    HIV Antigen Antibody Combo Pretransplant Nonreactive Nonreactive   Treponema Abs w Reflex to RPR and Titer    Collection Time: 11/26/24  8:00 AM   Result Value Ref Range    Treponema Antibody Total Nonreactive Nonreactive   Phosphatidylethanol (PEth), Whole Blood    Collection Time: 11/26/24  8:00 AM   Result Value Ref Range    PEth 16:0/18:1 (POPEth) <10 ng/mL    PEth 16:0/18:2 (PLPEth) <10 ng/mL    EER Phosphatidylethanol (PETH) See Note     PEth Interpretation See Comment    Adult Type and Screen    Collection Time: 11/26/24  8:00 AM   Result Value Ref Range    ABO/RH(D) A POS     Antibody Screen Negative Negative    SPECIMEN EXPIRATION DATE [id number]    Quantiferon TB Gold Plus Grey Tube    Collection Time: 11/26/24  8:00 AM    Specimen: Arm, Right; Blood   Result Value Ref Range    Quantiferon Nil Tube 0.37 IU/mL   Quantiferon TB Gold Plus Green Tube    Collection Time: 11/26/24  8:00 AM    Specimen: Arm, Right; Blood   Result Value Ref Range    Quantiferon TB1 Tube 0.32 IU/mL   Quantiferon TB Gold Plus Yellow Tube    Collection Time: 11/26/24  8:00 AM    Specimen: Arm, Right; Blood   Result Value Ref Range    Quantiferon TB2 Tube 0.32    Quantiferon TB Gold Plus Purple Tube    Collection Time: 11/26/24  8:00 AM    Specimen: Arm, Right; Blood   Result Value Ref Range    Quantiferon Mitogen 6.39 IU/mL   Quantiferon TB Gold Plus    Collection  Time: 11/26/24  8:00 AM    Specimen: Arm, Right; Blood   Result Value Ref Range    Quantiferon-TB Gold Plus Negative Negative    TB1 Ag minus Nil Value -0.05 IU/mL    TB2 Ag minus Nil Value -0.05 IU/mL    Mitogen minus Nil Result 6.02 IU/mL    Nil Result 0.37 IU/mL   ABO and Rh    Collection Time: 11/26/24  8:05 AM   Result Value Ref Range    ABO/RH(D) A POS     SPECIMEN EXPIRATION DATE [id number]    EKG 12-lead, tracing only [EKG1]    Collection Time: 11/26/24  8:16 AM   Result Value Ref Range    Systolic Blood Pressure  mmHg    Diastolic Blood Pressure  mmHg    Ventricular Rate 63 BPM    Atrial Rate 63 BPM    TX Interval 158 ms    QRS Duration 104 ms     ms    QTc 458 ms    P Axis 5 degrees    R AXIS -28 degrees    T Axis 0 degrees    Interpretation ECG       Sinus rhythm  Incomplete right bundle branch block  Borderline ECG  No previous ECGs available  Confirmed by MD CUATE, SAMUEL ([id number]) on 11/26/2024 2:51:18 PM     Protein  random urine    Collection Time: 11/26/24  8:19 AM   Result Value Ref Range    Total Protein Urine mg/dL 15.7   mg/dL    Total Protein Urine mg/mg Creat 0.06 0.00 - 0.20 mg/mg Cr    Creatinine Urine mg/dL 257.0 mg/dL   UA Macroscopic with reflex to Microscopic and Culture    Collection Time: 11/26/24  8:19 AM    Specimen: Urine, Midstream   Result Value Ref Range    Color Urine Yellow Colorless, Straw, Light Yellow, Yellow    Appearance Urine Clear Clear    Glucose Urine Negative Negative mg/dL    Bilirubin Urine Negative Negative    Ketones Urine Trace (A) Negative mg/dL    Specific Gravity Urine 1.026 1.003 - 1.035    Blood Urine Negative Negative    pH Urine 5.5 5.0 - 7.0    Protein Albumin Urine 10 (A) Negative mg/dL    Urobilinogen Urine 3.0 (A) Normal, 2.0 mg/dL    Nitrite Urine Negative Negative    Leukocyte Esterase Urine Negative Negative    Bacteria Urine Few (A) None Seen /HPF    Mucus Urine Present (A) None Seen /LPF    RBC Urine 1 <=2 /HPF    WBC Urine 1 <=5 /HPF     Squamous Epithelials Urine 6 (H) <=1 /HPF    Hyaline Casts Urine 1 <=2 /LPF     I had a long discussion with the patient regarding liver transplantation which included but was not limited to  the following points:    Liver transplant selection committee process.  The federal rules for cadaveric waiting list, the size and blood type matching of the organ. The availability of living-related donor transplantation.  The types of donors: brain death donors, non-heart beating donors, partial liver grafts: splits and living donor grafts  Extended criteria  Donors (older age, steasosis) and the increased  risk of primary non-function using the extended criteria donors  [facility] high risk donors,  Risk of donor transmitted infections and donor transmitted malignancy  The liver transplant operation and the associated risks and technical complications which can include intraoperative death, post operative death,  Primary non-function, bleeding requiring re-operations, arterial and biliary complications, bowel perforations, and intra abdominal abscess. Some of these complicaitons may require a second operation.  The postoperative course, the ICU stay and risk of postoperative complications which can include sepsis, MI, stroke, brain injury, pneumonia, pleural effusions, and renal dysfunction.  The current 1 year and 5 year graft and patient survivals.  The need for life long immunosuppressive therapy and the side effects of these medications, including the possibility of toxicity, opportunistic infections, risk of cancer including lymphoma, and the possibility of rejection even if the patient is taking the medication exactly as prescribed.  The need for compliance with medications and follow-up visits in the clinic and thereafter.  The patient and family understand these risks and wish to proceed to transplantation

## 2024-12-11 ENCOUNTER — TELEPHONE (OUTPATIENT)
Dept: CARDIOLOGY | Facility: CLINIC | Age: 59
End: 2024-12-11
Payer: COMMERCIAL

## 2024-12-11 NOTE — TELEPHONE ENCOUNTER
Left Voicemail (1st Attempt) and Sent Mychart (1st Attempt) for the patient to call back and schedule the following:    Appointment type: new cardio  Provider: Dr. Aguilar  Return date: 02/10  Specialty phone number: [phone] opt 1  Additional appointment(s) needed: N/A  Additonal Notes: Reschedule patient for nest available

## 2024-12-27 ENCOUNTER — ANCILLARY PROCEDURE (OUTPATIENT)
Dept: CARDIOLOGY | Facility: CLINIC | Age: 59
End: 2024-12-27
Attending: INTERNAL MEDICINE
Payer: COMMERCIAL

## 2024-12-27 ENCOUNTER — ANCILLARY PROCEDURE (OUTPATIENT)
Dept: BONE DENSITY | Facility: CLINIC | Age: 59
End: 2024-12-27
Attending: INTERNAL MEDICINE
Payer: COMMERCIAL

## 2024-12-27 ENCOUNTER — HOSPITAL ENCOUNTER (OUTPATIENT)
Dept: CT IMAGING | Facility: CLINIC | Age: 59
Discharge: HOME | End: 2024-12-27
Attending: INTERNAL MEDICINE | Admitting: INTERNAL MEDICINE
Payer: COMMERCIAL

## 2024-12-27 VITALS — DIASTOLIC BLOOD PRESSURE: 53 MMHG | HEART RATE: 58 BPM | RESPIRATION RATE: 17 BRPM | SYSTOLIC BLOOD PRESSURE: 112 MMHG

## 2024-12-27 DIAGNOSIS — K75.81 NASH (NONALCOHOLIC STEATOHEPATITIS): ICD-10-CM

## 2024-12-27 DIAGNOSIS — K74.60 CIRRHOSIS (H): ICD-10-CM

## 2024-12-27 LAB — LVEF ECHO: NORMAL

## 2024-12-27 PROCEDURE — 93306 TTE W/DOPPLER COMPLETE: CPT | Performed by: INTERNAL MEDICINE

## 2024-12-27 PROCEDURE — 77080 DXA BONE DENSITY AXIAL: CPT

## 2024-12-27 PROCEDURE — 75574 CT ANGIO HRT W/3D IMAGE: CPT

## 2024-12-27 PROCEDURE — 250N000011 HC RX IP 250 OP 636: Performed by: INTERNAL MEDICINE

## 2024-12-27 PROCEDURE — 999N000248 HC STATISTIC IV INSERT WITH US BY RN

## 2024-12-27 PROCEDURE — 250N000013 HC RX MED GY IP 250 OP 250 PS 637: Performed by: INTERNAL MEDICINE

## 2024-12-27 RX ORDER — METOPROLOL TARTRATE 25 MG/1
25-100 TABLET, FILM COATED ORAL
Status: COMPLETED | OUTPATIENT
Start: 2024-12-27 | End: 2024-12-27

## 2024-12-27 RX ORDER — IVABRADINE 5 MG/1
5-15 TABLET, FILM COATED ORAL
Status: DISCONTINUED | OUTPATIENT
Start: 2024-12-27 | End: 2024-12-28 | Stop reason: HOSPADM

## 2024-12-27 RX ORDER — ONDANSETRON 2 MG/ML
4 INJECTION INTRAMUSCULAR; INTRAVENOUS
Status: DISCONTINUED | OUTPATIENT
Start: 2024-12-27 | End: 2024-12-28 | Stop reason: HOSPADM

## 2024-12-27 RX ORDER — LIDOCAINE 40 MG/G
CREAM TOPICAL
Status: DISCONTINUED | OUTPATIENT
Start: 2024-12-27 | End: 2024-12-28 | Stop reason: HOSPADM

## 2024-12-27 RX ORDER — IOPAMIDOL 755 MG/ML
120 INJECTION, SOLUTION INTRAVASCULAR ONCE
Status: COMPLETED | OUTPATIENT
Start: 2024-12-27 | End: 2024-12-27

## 2024-12-27 RX ORDER — METOPROLOL TARTRATE 1 MG/ML
5-20 INJECTION, SOLUTION INTRAVENOUS
Status: DISCONTINUED | OUTPATIENT
Start: 2024-12-27 | End: 2024-12-28 | Stop reason: HOSPADM

## 2024-12-27 RX ORDER — NITROGLYCERIN 0.4 MG/1
.4-.8 TABLET SUBLINGUAL
Status: DISCONTINUED | OUTPATIENT
Start: 2024-12-27 | End: 2024-12-28 | Stop reason: HOSPADM

## 2024-12-27 RX ADMIN — METOPROLOL TARTRATE 25 MG: 25 TABLET, FILM COATED ORAL at 11:23

## 2024-12-27 RX ADMIN — NITROGLYCERIN 0.8 MG: 0.4 TABLET SUBLINGUAL at 12:21

## 2024-12-27 RX ADMIN — IOPAMIDOL 120 ML: 755 INJECTION, SOLUTION INTRAVENOUS at 12:16

## 2024-12-27 NOTE — PROGRESS NOTES
Pt arrived for Coronary CT angiogram. Test, meds and side effects reviewed with pt. Resting HR 63 bpm. Given 25 mg PO Metoprolol per verbal order. Administered 0.8 mg SL nitro on CTA table per order. CTA completed. Patient tolerated procedure well and denies symptoms of allergic reaction. Post monitoring completed and VSS. D/C instructions reviewed with pt whom verbalized understanding of need to increase PO fluids today. D/C to gold waiting room accompanied by staff.

## 2025-01-06 ENCOUNTER — VIRTUAL VISIT (OUTPATIENT)
Dept: PHARMACY | Facility: CLINIC | Age: 60
End: 2025-01-06
Attending: INTERNAL MEDICINE
Payer: COMMERCIAL

## 2025-01-06 DIAGNOSIS — L40.50 PSORIATIC ARTHROPATHY (H): Primary | ICD-10-CM

## 2025-01-06 DIAGNOSIS — M19.90 OSTEOARTHRITIS, UNSPECIFIED OSTEOARTHRITIS TYPE, UNSPECIFIED SITE: ICD-10-CM

## 2025-01-06 NOTE — PATIENT INSTRUCTIONS
"Recommendations from today's MTM visit:                                                       1. Continue Cosentyx 150 mg every 28 days.    2. When you contact [facility] later today please ask for the drug formulary and/or if Cosentyx or Taltz is preferred through this plan. Let me know what you find out via "[employer].    Follow-up: Return in about 9 weeks (around 3/10/2025) for MTM Pharmacist Visit.    It was great speaking with you today.  I value your experience and would be very thankful for your time in providing feedback in our clinic survey. In the next few days, you may receive an email or text message from [employer] with a link to a survey related to your  clinical pharmacist.\"     To schedule another MTM appointment, please call the clinic directly or you may call the MTM scheduling line at [phone].    My Clinical Pharmacist's contact information:                                                      Please feel free to contact me with any questions or concerns you have.      Loulou Ho, [place]  Medication Therapy Management Pharmacist  [facility]  Phone: [phone]     "

## 2025-01-06 NOTE — PROGRESS NOTES
Medication Therapy Management (MTM) Encounter    ASSESSMENT:                            Medication Adherence/Access: Reviewed patient should ask [facility] representative during call this afternoon if Cosentyx or Taltz is preferred through her new insurance. If Taltz is preferred will need to follow up in Feb so new orders can be placed. If Cosentyx is preferred, will follow up in March at 3 month carolyne to determine efficacy as planned.    Psoriatic Arthritis/Osteoarthritis: Patient tolerating initial doses of Cosentyx well. Discussed average time to efficacy of 3 months. Recommend patient continue Cosentyx 150 mg every 28 days for at least 3 months to see full effect.    PLAN:                            1. Continue Cosentyx 150 mg every 28 days.    2. When you contact [facility] later today please ask for the drug formulary and/or if Cosentyx or Taltz is preferred through this plan. Let me know what you find out via [employer].    Follow-up: Return in about 9 weeks (around 3/10/2025) for MTM Pharmacist Visit.    SUBJECTIVE/OBJECTIVE:                          Karlene Yun is a 59 year old female seen for a follow-up visit.       Reason for visit: Cosentyx follow up    Allergies/ADRs: Reviewed in chart  Past Medical History: Reviewed in chart  Tobacco: She reports that she has never smoked. She has never used smokeless tobacco.  Alcohol: not currently using    Medication Adherence/Access: Medication barriers: affording medications. Notes she cannot afford cash price for GLP-1s. Was told by another healthcare professional that if she is able to get Taltz, she could potentially get Mounjaro for $25 a month. Getting new [facility] insurance in Feb 2025. Planning on calling [facility] today to finish set up and confirm information for new plan.    Psoriatic Arthritis/Osteoarthritis:   Cosentyx 150 mg at week 0, 1, 2, 3, 4 and every 4 weeks thereafter  Gabapentin 600 mg nightly  Diclofenac gel 1% four times daily as needed      Reports she is  having several uncontrolled PsA symptoms currently including joint pain in feet, ankles, knees, and hips, swelling in left knee, joint stiffness lasting several hours daily, and psoriasis in eyebrows, eyelids, and scalp. Also having significant fatigue - unsure if this is from psoriatic arthritis, Cosentyx, or something else. No side effects noted. Disappointed that she hasn't seen much improvement yet but knows it typically takes longer to see improvement.     Liver Function Studies -   Recent Labs   Lab Test 11/26/24  0800   PROTTOTAL 6.5   ALBUMIN 2.6*   BILITOTAL 2.4*   ALKPHOS 74   AST 69*   ALT 43      CBC RESULTS:   Recent Labs   Lab Test 11/26/24  0800   WBC 4.4   RBC 3.79*   HGB 12.8   HCT 36.5   MCV 96   MCH 33.8*   MCHC 35.1   RDW 15.3*   PLT 64*      Today's Vitals: There were no vitals taken for this visit.  ----------------    I spent 13 minutes with this patient today. All changes were made via collaborative practice agreement with Lisette Mccann MD.     A summary of these recommendations was sent via [employer].    Maynor SeayD  Medication Therapy Management Pharmacist  [facility]  Phone: [phone]    Telemedicine Visit Details  The patient's medications can be safely assessed via a telemedicine encounter.  Type of service:  Video Conference via [employer]  Originating Location (pt. Location): Home    Distant Location (provider location):  Off-site  Start Time: 12:30 PM  End Time: 12:43 PM     Medication Therapy Recommendations  No medication therapy recommendations to display

## 2025-01-20 NOTE — TELEPHONE ENCOUNTER
RECORDS RECEIVED FROM:    DATE RECEIVED:    NOTES STATUS DETAILS   OFFICE NOTE from referring provider  Internal    OFFICE NOTE from other cardiologists  Care Everywhere Dr. Solomon 11-1-23 PN   RECORDS from hospital/ED N/A    MEDICATION LIST Internal    GENERAL CARDIO RECORDS   (ALL APPOINTMENT TYPES)     LABS (CBC,BMP,CMP, TSH) Internal    EKG (STRIPS & REPORTS) Internal 11-26-24   MONITORS (STRIPS & REPORTS) N/A    ECHOS (IMAGES AND REPORTS) Internal 12-27-24   STRESS TESTS (IMAGES AND REPORTS) In process 10-12-23 Requested   cMRI (IMAGES AND REPORTS) N/A    Cardiac cath (IMAGES AND REPORTS) N/A    CT/CTA (IMAGES AND REPORTS) Internal 12-27-24     Action 1/20/25 PN  Fax #[phone]   Action Taken Requested NM myocard 10-12-23

## 2025-01-22 ENCOUNTER — LAB (OUTPATIENT)
Dept: LAB | Facility: CLINIC | Age: 60
End: 2025-01-22
Payer: COMMERCIAL

## 2025-01-22 DIAGNOSIS — K75.81 NASH (NONALCOHOLIC STEATOHEPATITIS): ICD-10-CM

## 2025-01-22 LAB — INR PPP: 1.62 (ref 0.85–1.15)

## 2025-01-22 PROCEDURE — 85610 PROTHROMBIN TIME: CPT

## 2025-01-22 PROCEDURE — 36415 COLL VENOUS BLD VENIPUNCTURE: CPT

## 2025-01-23 LAB
ALBUMIN SERPL BCG-MCNC: 2.6 G/DL (ref 3.5–5.2)
ALP SERPL-CCNC: 71 U/L (ref 40–150)
ALT SERPL W P-5'-P-CCNC: 45 U/L (ref 0–50)
ANION GAP SERPL CALCULATED.3IONS-SCNC: 7 MMOL/L (ref 7–15)
AST SERPL W P-5'-P-CCNC: 78 U/L (ref 0–45)
BILIRUB DIRECT SERPL-MCNC: 1.04 MG/DL (ref 0–0.3)
BILIRUB SERPL-MCNC: 4 MG/DL
BUN SERPL-MCNC: 8.6 MG/DL (ref 8–23)
CALCIUM SERPL-MCNC: 7.9 MG/DL (ref 8.8–10.4)
CHLORIDE SERPL-SCNC: 104 MMOL/L (ref 98–107)
CREAT SERPL-MCNC: 0.79 MG/DL (ref 0.51–0.95)
EGFRCR SERPLBLD CKD-EPI 2021: 85 ML/MIN/1.73M2
GLUCOSE SERPL-MCNC: 124 MG/DL (ref 70–99)
HCO3 SERPL-SCNC: 24 MMOL/L (ref 22–29)
POTASSIUM SERPL-SCNC: 4 MMOL/L (ref 3.4–5.3)
PROT SERPL-MCNC: 6.7 G/DL (ref 6.4–8.3)
SODIUM SERPL-SCNC: 135 MMOL/L (ref 135–145)

## 2025-01-28 ENCOUNTER — TELEPHONE (OUTPATIENT)
Dept: GASTROENTEROLOGY | Facility: CLINIC | Age: 60
End: 2025-01-28
Payer: COMMERCIAL

## 2025-01-28 NOTE — PROGRESS NOTES
January 28, 2025 4:19 AM - Candelario Baker RN:     Overbook request for RTC in 3-4 months submitted. Message sent to Dr. Morejon and [facility] requesting update on bariatric surgery insurance coverage (patient previously has not had coverage for consult).

## 2025-01-29 ENCOUNTER — OFFICE VISIT (OUTPATIENT)
Dept: CARDIOLOGY | Facility: CLINIC | Age: 60
End: 2025-01-29
Attending: INTERNAL MEDICINE
Payer: COMMERCIAL

## 2025-01-29 VITALS
DIASTOLIC BLOOD PRESSURE: 79 MMHG | SYSTOLIC BLOOD PRESSURE: 138 MMHG | HEART RATE: 75 BPM | WEIGHT: 293 LBS | OXYGEN SATURATION: 95 % | BODY MASS INDEX: 47.42 KG/M2

## 2025-01-29 DIAGNOSIS — K74.60 CIRRHOSIS OF LIVER WITHOUT ASCITES, UNSPECIFIED HEPATIC CIRRHOSIS TYPE (H): ICD-10-CM

## 2025-01-29 DIAGNOSIS — K75.81 NASH (NONALCOHOLIC STEATOHEPATITIS): Primary | ICD-10-CM

## 2025-01-29 DIAGNOSIS — K75.81 NASH (NONALCOHOLIC STEATOHEPATITIS): ICD-10-CM

## 2025-01-29 PROCEDURE — 99213 OFFICE O/P EST LOW 20 MIN: CPT | Performed by: HOSPITALIST

## 2025-01-29 ASSESSMENT — PAIN SCALES - GENERAL: PAINLEVEL_OUTOF10: NO PAIN (0)

## 2025-01-29 NOTE — LETTER
1/29/2025      RE: Karlene Yun  [address]  [place] [address]       Dear Colleague,    Thank you for the opportunity to participate in the care of your patient, Karlene Yun, at the [facility] [place] [facility]. Please see a copy of my visit note below.    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   Patient denies any known cardiac condition.   Walks 4 times a week and 15 minutes at a time. Has difficulty walking up flights of stairs.   States that her SBP normally runs in 110s.     SHx: never smoker, no ETOH, no illicit drugs.  FHx: father had rheumatic fever with rheumatic heart disease.  Meds: reviewed.     PAST MEDICAL HISTORY:     Past Medical History:   Diagnosis Date     Hyperlipidemia 3/17/2005     Problem list name updated by automated process. Provider to review     Hypothyroidism 6/26/2002     Problem list name updated by automated process. Provider to review     Major depressive disorder, recurrent episode, in full remission 1/21/2002     Morbid obesity (H) 5/12/2019     Psoriatic arthropathy (H) 1/21/2002        PAST SURGICAL HISTORY:     Past Surgical History:   Procedure Laterality Date     COLONOSCOPY  2/11/2016    mild colitis/ repeat 10 yrs     ESOPHAGOSCOPY, GASTROSCOPY, DUODENOSCOPY (EGD), COMBINED N/A 8/9/2024    Procedure: ESOPHAGOGASTRODUODENOSCOPY for variceal surveillance;  Surgeon: Guru Jacquelyn Rees MD;  Location:  OR     ESOPHAGOSCOPY, GASTROSCOPY, DUODENOSCOPY (EGD), COMBINED N/A 8/9/2024     "Procedure: ESOPHAGOGASTRODUODENOSCOPY, WITH FINE NEEDLE ASPIRATION BIOPSY, WITH ENDOSCOPIC ULTRASOUND GUIDANCE liver and lymph node biopsy;  Surgeon: Guru Jacquelyn Rees MD;  Location: [facility] OR     HC REMOVAL OF TONSILS,<10 Y/O      Tonsils <11y.o.     IR LUMBAR PUNCTURE  6/30/2023     LASIK  2004    \"lasek\"        CURRENT MEDICATIONS:     Current Outpatient Medications   Medication Sig Dispense Refill     bumetanide (BUMEX) 1 MG tablet Take 1 tablet (1 mg) by mouth daily. 90 tablet 3     citalopram (CELEXA) 20 MG tablet Take 1 tablet (20 mg) by mouth daily 90 tablet 3     gabapentin (NEURONTIN) 300 MG capsule Take 600 mg by mouth every evening       lactulose 20 GM/30ML solution Take 15 mLs (10 g) by mouth every morning. 946 mL 4     levothyroxine (SYNTHROID/LEVOTHROID) 112 MCG tablet Take 1 tablet (112 mcg) by mouth daily 90 tablet 3     secukinumab (COSENTYX SENSOREADY PEN) 150 MG/ML Sensoready pen Inject 1 mL (150 mg) subcutaneously once a week. At weeks 0, 1, 2, 3, and 4 and every 4 weeks there after loading dose. 5 mL 0     secukinumab (COSENTYX SENSOREADY PEN) 150 MG/ML Sensoready pen Inject 1 mL (150 mg) subcutaneously every 28 days. Hold for signs of infection, and seek medical attention. 3 mL 1     spironolactone (ALDACTONE) 50 MG tablet Take 2 tablets (100 mg) by mouth daily. 180 tablet 3     tacrolimus (PROTOPIC) 0.1 % external ointment Apply topically as needed       traZODone (DESYREL) 50 MG tablet Take 100 mg by mouth At Bedtime  0     triamcinolone (KENALOG) 0.1 % external cream Apply topically as needed          ALLERGIES:     Allergies   Allergen Reactions     Sumatriptan Succinate Nausea and Vomiting     IMITREX        FAMILY HISTORY:     Family History   Problem Relation Age of Onset     Cancer Sister         cervical     Arthritis Paternal Grandmother      Diabetes Father         SOCIAL HISTORY:     Social History     Socioeconomic History     Marital status:      Spouse " name: None     Number of children: 1     Years of education: None     Highest education level: None   Tobacco Use     Smoking status: Never     Smokeless tobacco: Never   Substance and Sexual Activity     Alcohol use: Not Currently     Comment: last drink 3 years ago     Drug use: No     Social Drivers of Health     Financial Resource Strain: Low Risk  (4/22/2020)    Overall Financial Resource Strain (CARDIA)      Difficulty of Paying Living Expenses: Not very hard   Food Insecurity: No Food Insecurity (10/11/2023)    [facility]      Worried About Running Out of Food in the Last Year: Never true      Ran Out of Food in the Last Year: Never true   Transportation Needs: No Transportation Needs (4/22/2020)    PRAPARE - Transportation      Lack of Transportation (Medical): No      Lack of Transportation (Non-Medical): No     Karlene  reports that she does not currently use alcohol. and  reports that she has never smoked. She has never used smokeless tobacco..     REVIEW OF SYSTEMS:   A comprehensive review of systems was performed and negative unless otherwise noted in the HPI above.      PHYSICAL EXAMINATION:   /79 (BP Location: Right arm, Patient Position: Chair, Cuff Size: Adult Regular)   Pulse 75   Wt (!) 154.2 kg (340 lb)   SpO2 95%   BMI 47.42 kg/m    Body mass index is 47.42 kg/m .  Wt Readings from Last 2 Encounters:   01/29/25 (!) 154.2 kg (340 lb)   01/24/25 (!) 145.2 kg (320 lb)     Constitutional: no acute distress, pleasant and cooperative, appears overall well.  Eyes: EOMI, PERRLA, sclera white, conjunctiva clear, without icterus or pallor   Ears/Nose/Mouth/Throat: Pinna, tragus, and external canal non-tender, tympanic membranes normal, Nares patent b/l, olfaction intact, dentition good, oropharyx clear  Cardiovascular: RRR nl S1S2, JVP not elevated, extremities with no edema or cyanosis  Respiratory: clear to auscultation and percussion bilaterally  anterior and posterior  Gastrointestinal: soft, nontender, non distended, no hepatosplenomegaly or masses  Musculoskeletal: normal muscle bulk and tone, joints   Skin: normal skin appearance without worrisome lesions.   Neurologic: AOx3,   Psychiatric: appropriate affect, eye contact, intact thought and speech     LABORATORY DATA:     LIPID RESULTS:  Recent Labs   Lab Test 24  0800 18  1054 16  0917   CHOL 175 213* 236*   HDL 58 46* 49   LDL 97 138* 156*   TRIG 99 157* 156*   CHOLHDLRATIO  --  4.6 4.8        LIVER ENZYME RESULTS:  Recent Labs   Lab Test 25  1423 24  0800   AST 78* 69*   ALT 45 43       CBC RESULTS:  Recent Labs   Lab Test 24  0800 10/22/24  0843   WBC 4.4 4.0   HGB 12.8 13.9   HCT 36.5 39.6   PLT 64* 67*       BMP RESULTS:  Recent Labs   Lab Test 25  1423 24  0800    136   POTASSIUM 4.0 3.7   CHLORIDE 104 106   CO2    ANIONGAP 7 6*   * 131*   BUN 8.6 10.3   CR 0.79 0.82   SANDY 7.9* 8.4*       A1C RESULTS:  Lab Results   Component Value Date    A1C 4.8 2024    A1C 5.7 2019       INR RESULTS:  Recent Labs   Lab Test 25  1423 24  0800   INR 1.62* 1.69*          PROCEDURES & FURTHER ASSESSMENTS:     EK2024:  NSR at 63 bpm, incomplete RBBB, no ischemic ST-T changes    ECHO: 2024:  Global and regional left ventricular function is normal with an EF of 60-65%.  Global right ventricular function is normal.  No significant valvular abnormalities present.  Pulmonary artery systolic pressure is normal.  Estimated mean right atrial pressure is normal.  No pericardial effusion is present.    CTA angiogram: 2024  1.  The coronary arteries are widely patent without detectable  atherosclerosis or stenosis.  2.  Total Agatston score 0 placing the patient in the lowest  percentile when compared to an age- and gender-matched control group.  3.  Please review the separate Radiology report for incidental  noncardiac  findings.    CARDIAC CATH:  none    CABG/ Cardiac surgery: none       CLINICAL IMPRESSION:     Karlene Yun is a 59 year old female presents for pre-op cardiovascular evaluation prior to liver transplant.    Revised Cardiac Risk Index (RCRI) of 1, corresponding to 1% perioperative rate of cardiac death, nonfatal myocardial infarction, and nonfatal cardiac arrest.    DIAGNOSES:  Patient Active Problem List    Diagnosis Date Noted     Idiopathic thrombocytopenia (H) 11/11/2024     Priority: Medium     Osteoarthritis 11/11/2024     Priority: Medium     Unspecified diastolic (congestive) heart failure (H) 11/11/2024     Priority: Medium     Moderate obstructive sleep apnea 02/28/2024     Priority: Medium     Setting: Auto 5/15  Supplied by: [employer]  PSG done: 1-17-24  AHI 27  RDI 28  Lowest O2 Sat:  81%       Atypical squamous cells of undetermined significance on cytologic smear of cervix (ASC-US) 12/01/2022     Priority: Medium     CCSM Review:    History:  04/2007: ASCUS  10/2007: ASCUS HPV neg  04/2008: ASCUS HPV neg  10/2008: NILM  06/2009: NILM  06/2010: ASCUS HPV neg  12/2010: NILM  12/2012: ASCUS HPV neg  07/2013: ASCUS HPV neg  01/2014: NILM   09/2014: ASCUS HPV neg  04/2015: ASCUS HPV neg  11/2016: NILM  11/2022: ASCUS HPV neg    Plan, per ASCCP guidelines: Repeat co-test in 3 years (11/2025)       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 Brown. Designated pharmacy: [facility]. Ph #: [phone]. Addison Correia MA  11/12/2018, 11:01 AM       Fibromyalgia 11/12/2018      Priority: Medium     Chronic fatigue fibromyalgia syndrome 12/27/2012     Priority: Medium     Muscle pain 08/20/2012     Priority: Medium     Hyperlipidemia 03/17/2005     Priority: Medium     Problem list name updated by automated process. Provider to review       Rosacea 03/09/2004     Priority: Medium     Allergic rhinitis 09/03/2003     Priority: Medium     Problem list name updated by automated process. Provider to review       Hypothyroidism 06/26/2002     Priority: Medium     Problem list name updated by automated process. Provider to review       Pulmonary embolism and infarction (H) 01/31/2002     Priority: Medium     Problem list name updated by automated process. Provider to review       Psoriatic arthropathy (H) 01/21/2002     Priority: Medium     Major depressive disorder, recurrent episode, in full remission 01/21/2002     Priority: Medium     Living will, counseling/discussion 12/19/2012     Priority: Low     Advance Care Planning:   ACP Review and Resources Provided:  Reviewed chart for advance care plan.  Karlene Yun has no plan or code status on file. Discussed available resources and provided with information. Confirmed code status reflects current choices pending further ACP discussions.  Confirmed/documented designated decision maker(s). See permanent comments section of demographics in clinical tab.   Added by Sue Parada on 9/29/2014                  PLAN:  -Ms. Yun does not have contraindication to the liver transplantation and does not need further cardiovascular evaluation for the transplantation unless her condition significantly changes before the transplant.    Follow-up: as needed    Thank you for allowing us to take part in the care of this very pleasant patient.  Please do not hesitate to call if any further questions or concerns arise.    I spent 30 min reviewing the medical record, meeting with the patient, and completing this note.    Ilhwan Yeo,  MD  Interventional/Critical Care Cardiology    January 29, 2025    CC  Patient Care Team:  Fmei King MD as PCP - General (Internal Medicine)  Nora Morejon MD as MD (Gastroenterology)  Lyn Martinez NP Thomson, Mary, MD as MD (Gastroenterology)  Lyn Martinez NP Goetz, Grete Elizabeth, PA-C as Physician Assistant (Gastroenterology)  Noemi Gupta, APRN CNP as Nurse Practitioner (Anesthesiology)  Loulou Ho, [facility] as Pharmacist (Pharmacist)  Candelario Baker, RN as Transplant Coordinator (Transplant Hepatology )  Amauri Jacobs MD (Transplant Surgery)  Sujatha Conn RD as Registered Dietitian (Dietitian, Registered)  Neisha Freitas, [facility] as   Jacinto Aguilar MD as MD (Cardiovascular Disease)  Loulou Ho, [facility] as Assigned MTM Pharmacist  Femi Tamayo MD as Assigned Musculoskeletal Provider  Lisette Mccann MD as Assigned Rheumatology Provider  José Stinson MD as MD (Cardiovascular Disease)  Amauri Jacobs MD as Assigned Surgical Provider  SELF, REFERRED       Please do not hesitate to contact me if you have any questions/concerns.     Sincerely,     Ilhwan Yeo, MD

## 2025-01-29 NOTE — PROGRESS NOTES
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   Patient denies any known cardiac condition.   Walks 4 times a week and 15 minutes at a time. Has difficulty walking up flights of stairs.   States that her SBP normally runs in 110s.     SHx: never smoker, no ETOH, no illicit drugs.  FHx: father had rheumatic fever with rheumatic heart disease.  Meds: reviewed.     PAST MEDICAL HISTORY:     Past Medical History:   Diagnosis Date    Hyperlipidemia 3/17/2005     Problem list name updated by automated process. Provider to review    Hypothyroidism 6/26/2002     Problem list name updated by automated process. Provider to review    Major depressive disorder, recurrent episode, in full remission 1/21/2002    Morbid obesity (H) 5/12/2019    Psoriatic arthropathy (H) 1/21/2002        PAST SURGICAL HISTORY:     Past Surgical History:   Procedure Laterality Date    COLONOSCOPY  2/11/2016    mild colitis/ repeat 10 yrs    ESOPHAGOSCOPY, GASTROSCOPY, DUODENOSCOPY (EGD), COMBINED N/A 8/9/2024    Procedure: ESOPHAGOGASTRODUODENOSCOPY for variceal surveillance;  Surgeon: Guru Jacquelyn Rees MD;  Location:  OR    ESOPHAGOSCOPY, GASTROSCOPY, DUODENOSCOPY (EGD), COMBINED N/A 8/9/2024    Procedure: ESOPHAGOGASTRODUODENOSCOPY, WITH FINE NEEDLE ASPIRATION BIOPSY, WITH ENDOSCOPIC ULTRASOUND GUIDANCE liver and lymph node biopsy;  Surgeon: Guru Jacquelyn Rees MD;  Location: [facility] OR     REMOVAL OF TONSILS,<10 Y/O      Tonsils <11y.o.    IR LUMBAR PUNCTURE  6/30/2023    LASIK  2004    \"lasek\"        CURRENT MEDICATIONS:     Current " Outpatient Medications   Medication Sig Dispense Refill    bumetanide (BUMEX) 1 MG tablet Take 1 tablet (1 mg) by mouth daily. 90 tablet 3    citalopram (CELEXA) 20 MG tablet Take 1 tablet (20 mg) by mouth daily 90 tablet 3    gabapentin (NEURONTIN) 300 MG capsule Take 600 mg by mouth every evening      lactulose 20 GM/30ML solution Take 15 mLs (10 g) by mouth every morning. 946 mL 4    levothyroxine (SYNTHROID/LEVOTHROID) 112 MCG tablet Take 1 tablet (112 mcg) by mouth daily 90 tablet 3    secukinumab (COSENTYX SENSOREADY PEN) 150 MG/ML Sensoready pen Inject 1 mL (150 mg) subcutaneously once a week. At weeks 0, 1, 2, 3, and 4 and every 4 weeks there after loading dose. 5 mL 0    secukinumab (COSENTYX SENSOREADY PEN) 150 MG/ML Sensoready pen Inject 1 mL (150 mg) subcutaneously every 28 days. Hold for signs of infection, and seek medical attention. 3 mL 1    spironolactone (ALDACTONE) 50 MG tablet Take 2 tablets (100 mg) by mouth daily. 180 tablet 3    tacrolimus (PROTOPIC) 0.1 % external ointment Apply topically as needed      traZODone (DESYREL) 50 MG tablet Take 100 mg by mouth At Bedtime  0    triamcinolone (KENALOG) 0.1 % external cream Apply topically as needed          ALLERGIES:     Allergies   Allergen Reactions    Sumatriptan Succinate Nausea and Vomiting     IMITREX        FAMILY HISTORY:     Family History   Problem Relation Age of Onset    Cancer Sister         cervical    Arthritis Paternal Grandmother     Diabetes Father         SOCIAL HISTORY:     Social History     Socioeconomic History    Marital status:      Spouse name: None    Number of children: 1    Years of education: None    Highest education level: None   Tobacco Use    Smoking status: Never    Smokeless tobacco: Never   Substance and Sexual Activity    Alcohol use: Not Currently     Comment: last drink 3 years ago    Drug use: No     Social Drivers of Health     Financial Resource Strain: Low Risk  (4/22/2020)    Overall Financial  Resource Strain (CARDIA)     Difficulty of Paying Living Expenses: Not very hard   Food Insecurity: No Food Insecurity (10/11/2023)    [facility] Vital Sign     Worried About Running Out of Food in the Last Year: Never true     Ran Out of Food in the Last Year: Never true   Transportation Needs: No Transportation Needs (4/22/2020)    PRAPARE - Transportation     Lack of Transportation (Medical): No     Lack of Transportation (Non-Medical): No     Karlene  reports that she does not currently use alcohol. and  reports that she has never smoked. She has never used smokeless tobacco..     REVIEW OF SYSTEMS:   A comprehensive review of systems was performed and negative unless otherwise noted in the HPI above.      PHYSICAL EXAMINATION:   /79 (BP Location: Right arm, Patient Position: Chair, Cuff Size: Adult Regular)   Pulse 75   Wt (!) 154.2 kg (340 lb)   SpO2 95%   BMI 47.42 kg/m    Body mass index is 47.42 kg/m .  Wt Readings from Last 2 Encounters:   01/29/25 (!) 154.2 kg (340 lb)   01/24/25 (!) 145.2 kg (320 lb)     Constitutional: no acute distress, pleasant and cooperative, appears overall well.  Eyes: EOMI, PERRLA, sclera white, conjunctiva clear, without icterus or pallor   Ears/Nose/Mouth/Throat: Pinna, tragus, and external canal non-tender, tympanic membranes normal, Nares patent b/l, olfaction intact, dentition good, oropharyx clear  Cardiovascular: RRR nl S1S2, JVP not elevated, extremities with no edema or cyanosis  Respiratory: clear to auscultation and percussion bilaterally anterior and posterior  Gastrointestinal: soft, nontender, non distended, no hepatosplenomegaly or masses  Musculoskeletal: normal muscle bulk and tone, joints   Skin: normal skin appearance without worrisome lesions.   Neurologic: AOx3,   Psychiatric: appropriate affect, eye contact, intact thought and speech     LABORATORY DATA:     LIPID RESULTS:  Recent Labs   Lab Test 11/26/24  0800  18  1054 16  0917   CHOL 175 213* 236*   HDL 58 46* 49   LDL 97 138* 156*   TRIG 99 157* 156*   CHOLHDLRATIO  --  4.6 4.8        LIVER ENZYME RESULTS:  Recent Labs   Lab Test 25  1423 24  0800   AST 78* 69*   ALT 45 43       CBC RESULTS:  Recent Labs   Lab Test 24  0800 10/22/24  0843   WBC 4.4 4.0   HGB 12.8 13.9   HCT 36.5 39.6   PLT 64* 67*       BMP RESULTS:  Recent Labs   Lab Test 25  1423 24  0800    136   POTASSIUM 4.0 3.7   CHLORIDE 104 106   CO2  24   ANIONGAP 7 6*   * 131*   BUN 8.6 10.3   CR 0.79 0.82   SANDY 7.9* 8.4*       A1C RESULTS:  Lab Results   Component Value Date    A1C 4.8 2024    A1C 5.7 2019       INR RESULTS:  Recent Labs   Lab Test 25  1423 24  0800   INR 1.62* 1.69*          PROCEDURES & FURTHER ASSESSMENTS:     EK2024:  NSR at 63 bpm, incomplete RBBB, no ischemic ST-T changes    ECHO: 2024:  Global and regional left ventricular function is normal with an EF of 60-65%.  Global right ventricular function is normal.  No significant valvular abnormalities present.  Pulmonary artery systolic pressure is normal.  Estimated mean right atrial pressure is normal.  No pericardial effusion is present.    CTA angiogram: 2024  1.  The coronary arteries are widely patent without detectable  atherosclerosis or stenosis.  2.  Total Agatston score 0 placing the patient in the lowest  percentile when compared to an age- and gender-matched control group.  3.  Please review the separate Radiology report for incidental  noncardiac findings.    CARDIAC CATH:  none    CABG/ Cardiac surgery: none       CLINICAL IMPRESSION:     Karlene Yun is a 59 year old female presents for pre-op cardiovascular evaluation prior to liver transplant.    Revised Cardiac Risk Index (RCRI) of 1, corresponding to 1% perioperative rate of cardiac death, nonfatal myocardial infarction, and nonfatal cardiac  arrest.    DIAGNOSES:  Patient Active Problem List    Diagnosis Date Noted    Idiopathic thrombocytopenia (H) 11/11/2024     Priority: Medium    Osteoarthritis 11/11/2024     Priority: Medium    Unspecified diastolic (congestive) heart failure (H) 11/11/2024     Priority: Medium    Moderate obstructive sleep apnea 02/28/2024     Priority: Medium     Setting: Auto 5/15  Supplied by: [facility]  PSG done: 1-17-24  AHI 27  RDI 28  Lowest O2 Sat:  81%      Atypical squamous cells of undetermined significance on cytologic smear of cervix (ASC-US) 12/01/2022     Priority: Medium     CCSM Review:    History:  04/2007: ASCUS  10/2007: ASCUS HPV neg  04/2008: ASCUS HPV neg  10/2008: NILM  06/2009: NILM  06/2010: ASCUS HPV neg  12/2010: NILM  12/2012: ASCUS HPV neg  07/2013: ASCUS HPV neg  01/2014: NILM   09/2014: ASCUS HPV neg  04/2015: ASCUS HPV neg  11/2016: NILM  11/2022: ASCUS HPV neg    Plan, per ASCCP guidelines: Repeat co-test in 3 years (11/2025)      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 Brown. Designated pharmacy: [facility]. Ph #: [phone]. Addison Correia MA  11/12/2018, 11:01 AM      Fibromyalgia 11/12/2018     Priority: Medium    Chronic fatigue fibromyalgia syndrome 12/27/2012     Priority: Medium    Muscle pain 08/20/2012     Priority: Medium    Hyperlipidemia 03/17/2005     Priority: Medium     Problem list name updated by automated process. Provider to review      Rosacea 03/09/2004     Priority: Medium    Allergic rhinitis 09/03/2003     Priority: Medium     Problem list name  updated by automated process. Provider to review      Hypothyroidism 06/26/2002     Priority: Medium     Problem list name updated by automated process. Provider to review      Pulmonary embolism and infarction (H) 01/31/2002     Priority: Medium     Problem list name updated by automated process. Provider to review      Psoriatic arthropathy (H) 01/21/2002     Priority: Medium    Major depressive disorder, recurrent episode, in full remission 01/21/2002     Priority: Medium    Living will, counseling/discussion 12/19/2012     Priority: Low     Advance Care Planning:   ACP Review and Resources Provided:  Reviewed chart for advance care plan.  Karlene Yun has no plan or code status on file. Discussed available resources and provided with information. Confirmed code status reflects current choices pending further ACP discussions.  Confirmed/documented designated decision maker(s). See permanent comments section of demographics in clinical tab.   Added by Sue Parada on 9/29/2014                  PLAN:  -Ms. Yun does not have contraindication to the liver transplantation and does not need further cardiovascular evaluation for the transplantation unless her condition significantly changes before the transplant.    Follow-up: as needed    Thank you for allowing us to take part in the care of this very pleasant patient.  Please do not hesitate to call if any further questions or concerns arise.    I spent 30 min reviewing the medical record, meeting with the patient, and completing this note.    Ilhwan Yeo, MD  Interventional/Critical Care Cardiology    January 29, 2025    CC  Patient Care Team:  Femi King MD as PCP - General (Internal Medicine)  Nora Morejon MD as MD (Gastroenterology)  Lyn Martinez NP Thomson, Mary, MD as MD (Gastroenterology)  Lyn Martinez NP Goetz, Grete Elizabeth, PA-C as Physician Assistant (Gastroenterology)  Noemi Gupta APRN CNP as Nurse  Practitioner (Anesthesiology)  Loulou Ho RPH as Pharmacist (Pharmacist)  Candelario Baker, RN as Transplant Coordinator (Transplant Hepatology )  Amauri Jacobs MD (Transplant Surgery)  Sujatha Conn, ANETA as Registered Dietitian (Dietitian, Registered)  Neihsa Freitas [facility] as   Jacinto Aguilar MD as MD (Cardiovascular Disease)  Loulou Ho RPH as Assigned MTM Pharmacist  Femi Tamayo MD as Assigned Musculoskeletal Provider  Lisette Mccann MD as Assigned Rheumatology Provider  José Stinson MD as MD (Cardiovascular Disease)  Amauri Jacobs MD as Assigned Surgical Provider  SELF, REFERRED

## 2025-01-29 NOTE — PATIENT INSTRUCTIONS
Patient Instructions:  It was a pleasure to see you in the cardiology clinic today.      If you have any questions, call  Teressa Chen RN, at [phone].   [facility] Cardiology Clinics.  To schedule an appointment or to leave a message for your Care Team Press #1  If you are a physician calling for another physician Press #2  For Billing Press #3  For Medical Records Press #4  We are encouraging the use of [employer] to communicate with your HealthCare Provider    Note the new medications: none  Stop the following medications: none    The results from today include: none  Please follow up with solid organ transplant      If you have an urgent need after hours (8:00 am to 4:30 pm) please call [phone] and ask for the cardiology fellow on call.

## 2025-01-29 NOTE — NURSING NOTE
Chief Complaint   Patient presents with    New Patient     NEW CARDIOLOGY     Vitals were taken and medications reconciled.    Amauri Stewart, EMT  12:57 PM

## 2025-01-30 ENCOUNTER — PRE VISIT (OUTPATIENT)
Dept: CARDIOLOGY | Facility: CLINIC | Age: 60
End: 2025-01-30
Payer: COMMERCIAL

## 2025-02-03 ENCOUNTER — DOCUMENTATION ONLY (OUTPATIENT)
Dept: TRANSPLANT | Facility: CLINIC | Age: 60
End: 2025-02-03
Payer: COMMERCIAL

## 2025-02-03 NOTE — PROGRESS NOTES
Pre-Liver Transplant Evaluation/Teaching    TEACHING TOPICS: Evaluation Process, Evaluation Items, Diagnostic Studies, Consultation, Chemical Dependency Policy, CD Eval, Donor Source, Liver Allocation, MELD System, UNOS, Waiting List, Follow up while on transplant list, Follow up after transplantation, Infection and Rejection, Immunosuppression , Medication Teaching, Lab Recording after transplant, Laboratory Frequency after transplant , Consent for evaluation and One year survival rates    INSTRUCTIONAL MATERIAL USED/GIVEN:   Liver Transplant Handbook, MELD Booklet, Donor Booklet, Web Sites Options, Verbal instructions, Multiple Listing Brochure , Consent for evaluation signed, One year survival rates and SRTR (Scientific Registry) Data    Person(s) involved in teaching: patient, daughter, son-in-law  Asks Questions: YES  Eager to Learn: YES  Cooperative: YES  Receptive (willing/able to accept information): YES  Reason for the appointment, diagnosis and treatment plan: YES  Knowledge of proper use of medications and conditions for which they are ordered (with special attention to potential side effects or drug interactions): YES  Which situations necessitate calling provider and whom to contact: YES    Teaching Concerns Addressed   Comments: very attentive, asking good questions  Nutritional needs and diet plan: YES  How and/when to access community resources: YES  Patient is aware of and agrees to required commitment to post-op care and long term follow-up: YES    Patient open to all Extended Criteria organ offers (underutilized donors): Yes or no: Yes  Details: Open to All Extended Criteria Offers.    Patient has name and phone number of transplant coordinator.   Time Spent over-the-phone teachin minutes.

## 2025-02-04 ENCOUNTER — COMMITTEE REVIEW (OUTPATIENT)
Dept: TRANSPLANT | Facility: CLINIC | Age: 60
End: 2025-02-04
Payer: COMMERCIAL

## 2025-02-04 NOTE — COMMITTEE REVIEW
Liver Committee Review Note     Evaluation Date: 11/26/2024  Committee Review Date: 2/4/2025    Organ being evaluated for: Liver    Transplant Phase: Evaluation  Transplant Status: Active    Transplant Coordinator: Candelario Baker  Transplant Surgeon:   Amauri Jacobs    Referring Physician: Lyn Martinez    Primary Diagnosis: Cirrhosis: Type A  Secondary Diagnosis:     Transplant Eligibility: Cirrhosis with MELD    Committee Review Decision: Approved    Relative Contraindications:     Absolute Contraindications:     Live Donor: No     Committee Chair Selam Mccoy MD verbally attested to the committee's decision.    Committee Discussion Details: Approved for Listing, not eligible for living donor with current weight.    Committee Review Members:  Anesthesiology Colt Aguilera, DO   Nutrition Julita Conn, RD   Pharmacist Jael Ruiz, [facility]    - Clinical Fang Johnston MSW, Daphney Kimble MSW, Neisha Freitas, [facility], Guy Iniguez, [facility]   Transplant Camila Goldberg, RN, Cate Jeong, RN, Krystyna Sandhu, RN, Felicia Cortez, FRANSICO, Tanya Muñoz, FRANSICO, Jr Reagan Wang, FRANSICO, Nazia Alicia APRN CNP, Yvonne Tang, RN, Dick Castaneda, RN, Candelario Baker, RN, Shaila Murillo, RN   Transplant Hepatology  Verna Ramos MD, Nora oMrejon MD, GREG LI MD, Selam Mccoy MD, Thomas M. Leventhal, MD   Transplant Surgery Amauri Jacobs MD, Stefan Tolentino MD, Pedro Luis Perez MD, Max Rebolledo MD

## 2025-02-07 ENCOUNTER — TELEPHONE (OUTPATIENT)
Dept: RHEUMATOLOGY | Facility: CLINIC | Age: 60
End: 2025-02-07

## 2025-02-07 NOTE — TELEPHONE ENCOUNTER
PA Initiation    Medication: TALTZ 80 MG/ML SC SOAJ  Insurance Company: [facility] - Phone [phone] Fax [phone]  Pharmacy Filling the Rx: [facility] - [place], [place] - [address]  Filling Pharmacy Phone:    Filling Pharmacy Fax:    Start Date: 2/7/2025    [id number]

## 2025-02-08 ENCOUNTER — DOCUMENTATION ONLY (OUTPATIENT)
Dept: TRANSPLANT | Facility: CLINIC | Age: 60
End: 2025-02-08
Payer: COMMERCIAL

## 2025-02-08 NOTE — PROGRESS NOTES
NEW LIVER LISTING    Listing MELD: 21  ABO: A  Diagnosis: MASLD    Patient is Open to All Extended Criteria. Not Eligible for Living Donor.

## 2025-02-08 NOTE — LETTER
February 8, 2025    Karlene ROSALBA Jama  [address]  [place] [address]    Dear Ms. Yun,    This letter is sent to confirm that you have completed your transplant work-up and you are a candidate in the liver transplant program at the [facility].  You were placed on the liver active waitlist on 2/8/2025.    Now that the evaluation process is complete, the general hepatology team will take over the management of your cares. This team also works with your hepatologist and will be your main contact for symptom management, appointments, medications, and any questions that are not specific to transplant. You can reach this team by calling [phone] or [employer] message your hepatology provider. The transplant team will continue to monitor MELD labs and notify you when MELD labs are due.       When you are active on the waitlist and an organ becomes available, a coordinator will need to speak to you immediately.  You could be contacted at any time during the day or night as an organ could become available at any time.  Please make certain our office always has your current telephone numbers and address.      Items we will need from you:    We have received approval from your insurance company for the transplant procedure.  It is critical that you notify us if there is any change in your insurance.  It is also important that you familiarize yourself with the details of your specific insurance policy.  Our patient  is available to assist you if you should have any questions regarding your coverage.    You will need to have labs drawn frequently while you are listed. The frequency is based on your MELD score. Your current listing MELD is 21. Below is a chart to help you understand how often to have labs drawn.  If you are uncertain of your MELD score/how often you need labs, please contact your Transplant Coordinator.  Additionally, if  you would like to have labs drawn outside the [facility] system, please notify me to make arrangements to have labs ordered.  It will be your responsibility to remind your physician to forward your results to the Transplant Office.                 MELD 25 and greater - Weekly labs              MELD 18-24 - Monthly labs        MELD 17 or less- every 3 months    During this waiting period, we may request additional periodic laboratory tests with your primary physician.  It will be your responsibility to remind your physician to forward your results to the Transplant Office.    We need to be kept informed of any changes in your medical condition such as:  changes in your medications,   significant changes in your health  significant infections (such as pneumonia or abscesses)  blood transfusions  any condition which requires hospitalization  any surgery    Remember to complete any routine cancer screening tests required before your transplant.  This includes colonoscopy; prostate screening for men, and mammogram and gynecologic testing for women, as well as dental work.  Your primary care clinic can assist you with arranging for these exams.  Remind your caregivers to forward copies of the records and final reports.    We want you to know that the [facility] Transplant Program has physician and surgeon coverage 24 hours a day, 365 days a year, and is readily available to facilitate organ acceptance, procurement, and transplantation in a timely manner. Our additional transplant surgeons and physicians are credentialed by the hospital to provide transplant services and can independently manage the care of transplant patients. Our additional transplant surgeons are also able to independently perform transplant operations and organ procurement procedures. Additionally, our transplant surgeons and physicians will not be on call for two or more transplant programs more than 30 miles apart unless the  circumstances have been reviewed and approved by the United Network for Organ Sharing (UNOS) Membership and Professional Standards Committee (MPSC). Finally, our primary physicians and primary surgeons are not designated as the primary surgeon or primary physician at more than 1 transplant hospital. If this coverage changes or there are substantial program changes, you will be notified in writing by letter.    Attached is a letter from UNOS that describes the services and information offered to patients by UNOS and the Organ Procurement and Transplantation Network (OPTN).    We appreciate having had the opportunity to participate in your care.  If you have questions, please feel free to call the Transplant Office at [phone] or [phone].    Sincerely,  Candelario Baker RN  Pre-Liver Transplant Coordinator  [phone] (direct)  [phone] (fax)  Solid Organ Transplant  [facility], [facility]  Enclosures: UNOS Letter  cc: Care Team    The Organ Procurement and Transplantation Network Toll-free patient services line:  1-894.666.6332  Your resource for organ transplant information    Staffed 8:30 am - 5:00 pm ET Monday - Friday Leave a message 24/7 to receive a call back    The Organ Procurement and Transplantation Network (OPTN) is the national transplant system. It makes the policies that decide how donated organs are matched to patients waiting for a transplant. The OPTN:    Makes sure donated organs get matched to people on the transplant waiting list  Tells people about the donation and transplant processes  Makes sure that the public knows about the need for more organ and tissue donations    The OPTN has a free patient services line that you can call to:  Get more information about:  Organ donation and organ transplants  Donation and transplant policies  Get an information kit with:  A list of transplant  hospitals  Waiting list information  Talk about any questions you may have about your transplant hospital or organ procurement organization. The staff will do their best to help you or point you to others who may help.  Find out how you can volunteer with the OPTN and help shape transplant policy     The patient services line number is: 5-277-817-0289    Patient services line staff CANNOT answer questions about your own medical care, including:  Waiting list status  Test results  Medical records  You will need to call your transplant hospital for this information.    The following websites have more information about transplantation and donation:    OPTN: https://optn.transplant.hrsa.gov/    For potential living donors and transplant recipients:    Living with transplant: https://www.transplantliving.org/    Living donation process: https://optn.transplant.hrsa.gov/living-donation/    Financial assistance: https://www.livingdonorassistance.org/    Transplantation data: https://www.srtr.org/    Organ donation: https://www.organdonor.gov/    Volunteer with the OPTN: https://optn.transplant.hrsa.gov/get-involved/

## 2025-02-12 ENCOUNTER — DOCUMENTATION ONLY (OUTPATIENT)
Dept: TRANSPLANT | Facility: CLINIC | Age: 60
End: 2025-02-12

## 2025-02-12 ENCOUNTER — TELEPHONE (OUTPATIENT)
Dept: RHEUMATOLOGY | Facility: CLINIC | Age: 60
End: 2025-02-12

## 2025-02-12 ENCOUNTER — ANCILLARY PROCEDURE (OUTPATIENT)
Dept: CT IMAGING | Facility: CLINIC | Age: 60
End: 2025-02-12
Attending: INTERNAL MEDICINE
Payer: COMMERCIAL

## 2025-02-12 DIAGNOSIS — K75.81 NASH (NONALCOHOLIC STEATOHEPATITIS): ICD-10-CM

## 2025-02-12 PROCEDURE — 71250 CT THORAX DX C-: CPT | Mod: GC | Performed by: RADIOLOGY

## 2025-02-12 NOTE — TELEPHONE ENCOUNTER
[employer] (1st Attempt) and Patient Contacted for the patient to call back and schedule the following:    Appointment type: Return Rheumatology Video  Provider: Dr. Mccann  Return date: June 2025  Specialty phone number: [phone]  Additional appointment(s) needed: NA  Additonal Notes: Pt was unable to schedule

## 2025-02-14 ENCOUNTER — TELEPHONE (OUTPATIENT)
Dept: RHEUMATOLOGY | Facility: CLINIC | Age: 60
End: 2025-02-14
Payer: COMMERCIAL

## 2025-02-14 NOTE — TELEPHONE ENCOUNTER
[facility] Call Center    Phone Message    May a detailed message be left on voicemail: yes     Reason for Call: Pt was referred by Dr. Mccann to an orthopedic clinic back in October 2024, where she subsequently had a cortisone injection in her left knee. That knee is now bothering her again a great deal, pt is wondering if she would need to have a new referral in order to have another injection? Or where can she go to have this done?    Action Taken: Other: Rheum    Travel Screening: Not Applicable

## 2025-02-17 ENCOUNTER — DOCUMENTATION ONLY (OUTPATIENT)
Dept: TRANSPLANT | Facility: CLINIC | Age: 60
End: 2025-02-17

## 2025-02-17 ENCOUNTER — PRE VISIT (OUTPATIENT)
Dept: PULMONOLOGY | Facility: CLINIC | Age: 60
End: 2025-02-17

## 2025-02-17 DIAGNOSIS — R93.89 ABNORMAL CHEST CT: ICD-10-CM

## 2025-02-17 PROCEDURE — 94729 DIFFUSING CAPACITY: CPT | Performed by: INTERNAL MEDICINE

## 2025-02-17 PROCEDURE — 94150 VITAL CAPACITY TEST: CPT | Performed by: INTERNAL MEDICINE

## 2025-02-17 PROCEDURE — 94726 PLETHYSMOGRAPHY LUNG VOLUMES: CPT | Performed by: INTERNAL MEDICINE

## 2025-02-17 PROCEDURE — 94375 RESPIRATORY FLOW VOLUME LOOP: CPT | Performed by: INTERNAL MEDICINE

## 2025-02-17 NOTE — TELEPHONE ENCOUNTER
Placed call to patient. Left detailed message with call back number regarding knee injection on self-identified VM. Advised that she contact sports medicine clinic as she was seen by them in October.       Kenna Conroy RN  [facility]

## 2025-02-17 NOTE — PROGRESS NOTES
Karlene Yun comes into clinic today at the request of Dr. Voss Ordering Provider for PFT      Timothy Saenz, RRT

## 2025-02-17 NOTE — CONFIDENTIAL NOTE
RECORDS RECEIVED FROM: internal    DATE RECEIVED: 2.17.25    NOTES STATUS DETAILS   OFFICE NOTE from referring provider internal    Nora Morejon MD      DISCHARGE REPORT from the ER internal  10.10.23 Ro[place]    MEDICATION LIST internal     IMAGING  (NEED IMAGES AND REPORTS)     CT SCAN internal  internal 2.12.25,     HP- 10.10.23    CHEST XRAY (CXR) internal  internal 11.26.24    HP- 10.10.23    TESTS     PULMONARY FUNCTION TESTING (PFT) internal  Scheduled 2.17.25

## 2025-02-17 NOTE — PROGRESS NOTES
"Per Dr. Morejon result note messages, patient placed inactive on the list until Pulmonary clears the abnormal Chest CT results.    Pulm order in with requested appt within 4 weeks (by mid-March)    \"Nora Morejon MD Klint, Thomas Jr., RN  Place on hold until we have understanding of this\"      "

## 2025-02-18 LAB
DLCOUNC-%PRED-PRE: 79 %
DLCOUNC-PRE: 18.39 ML/MIN/MMHG
DLCOUNC-PRED: 23.09 ML/MIN/MMHG
ERV-%PRED-PRE: 39 %
ERV-PRE: 0.54 L
ERV-PRED: 1.36 L
EXPTIME-PRE: 6.06 SEC
FEF2575-%PRED-PRE: 95 %
FEF2575-PRE: 2.34 L/SEC
FEF2575-PRED: 2.46 L/SEC
FEFMAX-%PRED-PRE: 112 %
FEFMAX-PRE: 8.26 L/SEC
FEFMAX-PRED: 7.33 L/SEC
FEV1-%PRED-PRE: 82 %
FEV1-PRE: 2.34 L
FEV1FEV6-PRE: 81 %
FEV1FEV6-PRED: 81 %
FEV1FVC-PRE: 80 %
FEV1FVC-PRED: 79 %
FEV1SVC-PRE: 77 %
FEV1SVC-PRED: 72 %
FIFMAX-PRE: 4.3 L/SEC
FRCPLETH-%PRED-PRE: 64 %
FRCPLETH-PRE: 2.22 L
FRCPLETH-PRED: 3.45 L
FVC-%PRED-PRE: 80 %
FVC-PRE: 2.92 L
FVC-PRED: 3.62 L
IC-%PRED-PRE: 92 %
IC-PRE: 2.51 L
IC-PRED: 2.73 L
RVPLETH-%PRED-PRE: 76 %
RVPLETH-PRE: 1.68 L
RVPLETH-PRED: 2.2 L
TLCPLETH-%PRED-PRE: 78 %
TLCPLETH-PRE: 4.73 L
TLCPLETH-PRED: 6.03 L
VA-%PRED-PRE: 81 %
VA-PRE: 4.74 L
VC-%PRED-PRE: 77 %
VC-PRE: 3.05 L
VC-PRED: 3.93 L

## 2025-02-18 NOTE — TELEPHONE ENCOUNTER
Prior Authorization Approval    Medication: TALTZ 80 MG/ML SC SOAJ  Authorization Effective Date: 2/18/2025  Authorization Expiration Date: 2/13/2026  Approved Dose/Quantity: 1  Reference #: [id number]   Insurance Company: [employer] - Phone [phone] Fax [phone]  Expected CoPay: $    CoPay Card Available: No    Financial Assistance Needed: no  [facility] [place] [address]  Pharmacy Notified: yes  Patient Notified: yes

## 2025-02-19 ENCOUNTER — OFFICE VISIT (OUTPATIENT)
Dept: ORTHOPEDICS | Facility: CLINIC | Age: 60
End: 2025-02-19
Payer: COMMERCIAL

## 2025-02-19 DIAGNOSIS — M17.12 PATELLOFEMORAL ARTHRITIS OF LEFT KNEE: Primary | ICD-10-CM

## 2025-02-19 DIAGNOSIS — L40.50 PSORIATIC ARTHROPATHY (H): ICD-10-CM

## 2025-02-19 NOTE — LETTER
2/19/2025      RE: Karlene Yun  [address]  [facility] [address]     Dear Colleague,    Thank you for referring your patient, Karlene Yun, to the [facility] [place]. Please see a copy of my visit note below.      Sports Medicine Clinic Visit  Left knee weightbearing pain, history of DJD, requesting repeat cortisone injection.    Cortisone junction 10/30/2024, four months ago, helpful at that time.  Pt had a subsequent fall on the the anterior left knee a few weeks after the injection.  Pt thinks the injection would have lasted long had she not fallen on the knee.  No recurrent swelling, locking, instability.      Patient follows with rheumatology, most recent clinic note 10/24/24 reviewed by me.  Patient history of psoriatic arthritis for the past 30 years as well as osteoarthritis.  Patient has active psoriasis and recently has switched medicines from her rheumatologist, from skyrizi to taltz.  Patient referred by rheumatology for left-sided knee DJD.  She was encouraged to consider kaylan chi, and Voltaren gel for the knee.    Patient's medicines include Celexa, gabapentin, Synthroid, Wegovy, Aldactone    10/22/2024 normal CRP, rheumatoid factor, anti-CCP.  BUN 11.3 creatinine 0.9, AST 60 ALT 26    Patient has worked part-time in the past at a [employer] shop, not working there currently.    Patient remote history of pulmonary embolism 1/31/2002.          Imaging studies below reviewed by me:    3 views right and left knee radiographs 10/30/2024 12:53 PM     History: Pain in both knees, unspecified chronicity; Pain in both  knees, unspecified chronicity     Comparison: None available.     Findings:     Standing AP view of bilateral knees, 3 right, 1 left lateral and  patellofemoral views of the right and left knee were obtained.      Left: No acute osseous abnormality.  No joint effusion.     Mild medial compartment joint space loss.     Medial patellar  subluxation with associated joint space loss. Soft  tissue is unremarkable.     Right:  No acute osseous abnormality.  No joint effusion.     Mild medial compartment joint space loss. Tiny patellar discopathy.     No patellar tilt or lateral subluxation. Soft tissue is unremarkable.                                                                      Impression:  1. No acute osseous abnormality.  2. Mild medial compartment joint space loss bilaterally.  3. Left medial patellar subluxation with associated joint space loss.     JUAN MIGUEL BOX         XR KNEE BILATERAL 3 VIEWS [facility], 3/2/2023    COMPARISON:  None.    FINDINGS:      Right knee: 4 views. Mild patellofemoral compartment narrowing and slight medial compartment narrowing. No significant joint effusion. No fracture or dislocation.    Left knee: 4 views. Mild patellofemoral compartment narrowing and slight medial compartment narrowing. No significant joint effusion. No fracture or dislocation.      XR PELVIS AND HIP BILATERAL 2 VIEWS [facility], 3/2/2023    COMPARISON:  None.    FINDINGS:  5 images. No displaced fracture and no dislocation. Symmetric appearance of the hip joints with mild degenerative change. Mild sclerosis and spurring inferiorly at the sacroiliac joints. Degenerative change in the lower lumbar spine were imaged. Ossification lateral to the proximal left hip            EXAM: MRI OF THE LUMBAR SPINE WITHOUT CONTRAST 4/4/2023 [facility]    CLINICAL INFORMATION: Mid and low back pain ongoing for 30 years. Patient attempted physical therapy and oral medical therapy.    TECHNICAL INFORMATION: Sagittal fast spin-echo T2-weighted, T1-weighted, STIR , coronal T1-weighted as well as axial fast spin-echo T1 and T2-weighted images of the lumbar spine were obtained on a tree 0.0 Janell MRI scanner. SEDATION: None. CONTRAST: None.    INTERPRETATION: Comparison MRI lumbar spine examination dated 4/3/2023.    Generalized dextrocurvature with 1 mm  retrolisthesis of L5 on S1. No acute fracture or spondylolysis, although edematous degenerative endplate changes are demonstrated at L5-S1 and ventrally at L2-3. Conus is normal and terminates at L1. Imaged upper sacrum and paraspinal soft tissue structures are unremarkable save for an incidental right renal cyst. Disc desiccation and annular bulging are demonstrated at L5-S1 through L2-3 with isolated annular bulging at L1-2 and T11-12. Disc space narrowing is moderate at L5-S1, mild at L3-4 and moderate at L2-3.    L5-S1: Unchanged annular bulging without central stenosis or traversing neural compression. Mild bilateral facet hypertrophy with moderate bilateral chronic foraminal narrowing.    L4-5: Unchanged left foraminal annular fissure and 2 mm left inferior foraminal disc protrusion with mild to moderate left foraminal narrowing, but no exiting ganglionic compression. Mild left subarticular recess narrowing without traversing neural compression or central stenosis. Mild bilateral facet arthrosis with mild right chronic foraminal narrowing.    L3-4: Unchanged annular bulging without central stenosis or traversing neural compression. Mild bilateral facet hypertrophy with mild bilateral chronic foraminal narrowing.    L2-3: Unchanged annular bulging without central stenosis or traversing neural compression. Mild bilateral facet hypertrophy with mild right chronic foraminal narrowing.    L1-2: Unchanged minimal annular bulging without central stenosis or traversing neural compression. Facets and foramina are unremarkable.    T12-L1: Normal posterior disc margins and facets. Patent foramina.    T11-12: No central stenosis or cord compression. Facets and foramina are unremarkable.    CONCLUSION: Multilevel degenerative lumbar spondylosis with the following notable findings:    1. Unchanged, 2 mm left inferior foraminal L4-5 disc protrusion without exiting ganglionic compression.    2. No acute fracture or  spondylolysis, although edematous degenerative endplate changes are demonstrated at L5-S1 and ventrally at L2-3.    3. Mild bilateral L4-5 facet arthrosis.    4. Multilevel chronic foraminal narrowing that varies from mild to moderate without ganglionic compression as detailed above.          EXAM: MRI OF THE THORACIC SPINE WITHOUT CONTRAST  4/4/2023    CLINICAL INFORMATION: Mid and low back pain ongoing for 30 years. Patient attempted physical therapy and oral medical therapy.    TECHNICAL INFORMATION: Sagittal fast spin echo T2-weighted, T1-weighted FLAIR, inversion recovery, T2 weighted space, axial T2-weighted images of the thoracic spine were obtained on a 3.0 Janell MR scanner. Axial and coronal reformatted images were generated based on the sagittally acquired T2-weighted space data. SEDATION: None. CONTRAST: None.    INTERPRETATION: Comparison MRI thoracic spine examination dated 12/11/2015.    Normal alignment. No acute fracture. Chronic Scheuermann's-type changes are demonstrated throughout the mid to lower thoracic spine. Thoracic cord maintains normal signal and the imaged portions of lungs are unremarkable. Imaged paraspinal soft tissue structures are notable for an unchanged, well-circumscribed area of T1 and T2 prolongation on the right adjacent to the T5 and T6 vertebral bodies most apparent on series 6 image 3, series 12 image 24 and series 11 image 67 where it measures 1.1 x 1.3 x 2.1 cm in AP, transverse and craniocaudal dimension. This is likely of lymphatic origin. Annular bulging is demonstrated at T11-12 and T10-11, T8-9 through T4-5, C7-T1 and C6-7.    T12-L1: No central stenosis or cord compression. Facets and foramina are unremarkable.    T11-12: Unchanged annular bulging without central stenosis or cord compression. Facets are unremarkable and the foramina are patent.    T10-11: Unchanged annular bulging and mild right facet hypertrophy. Right chronic foraminal narrowing.    T9-10: Normal  posterior disc margins and facets. Patent foramina.    T8-9: Unchanged annular bulging without central stenosis or cord compression. Mild bilateral facet hypertrophy with patent foramina.    T7-8: Unchanged annular bulging without central stenosis or cord compression. Mild bilateral facet hypertrophy with mild left chronic foraminal narrowing.    T6-7: Unchanged mild annular bulging with mild right facet arthrosis and moderate right chronic foraminal narrowing.    T5-6: Unchanged 1 to 2 mm right paracentral disc protrusion without central stenosis or cord compression. Mild bilateral facet hypertrophy with patent foramina.    T4-5: Unchanged minimal annular bulging without central stenosis or cord compression. Mild left facet arthrosis with mild left chronic foraminal narrowing.    T3-4: No central stenosis or cord compression. Mild right facet hypertrophy with patent foramina.    T2-3: Normal posterior disc margins and mild right facet hypertrophy. Patent foramina.    T1-2: No central stenosis or cord compression. Mild right facet hypertrophy with mild right chronic foraminal narrowing. Localizer images also demonstrate annular bulging at C3-4 through C5-6, but no central stenosis or cord compression are evident at the imaged portions of the cervical spine. Mild right C7-T1 facet arthrosis is evident.    CONCLUSION: Multilevel degenerative facet and lower cervical spondylosis, chronic Scheuermann's-type changes and the following notable findings:    1. Unchanged, 1 to 2 mm right paracentral T5-6 disc protrusion without central stenosis or cord compression.    2. No acute fracture or intrinsic cord lesion.    3. Mild right T6-7, left T4-5 and right C7-T1 facet arthrosis.    4. Multilevel level chronic foraminal narrowing that varies from mild to moderate as detailed above.            EXAM: 1.5 T MRI OF THE CERVICAL SPINE  12/3/2015 [facility]    CLINICAL INFORMATION: Chronic neck and back pain.    TECHNICAL INFORMATION:  T1, T2 GRE, T2 FSE and STIR sagittal thin sections through the cervical spine with T2 GRE and FSE axial sections at selected levels. No comparison.    INTERPRETATION: Normal signal intensity within the cervical and upper thoracic cord. No Chiari malformation or syrinx, no intrinsic cord lesion and no intradural mass. Normal flow void within small vertebral arteries.    Cervical and upper thoracic spondylosis with mild disc space narrowing at C7-T1, C6-7 and C5-6, and lordotic alignment of the cervical vertebrae. There is nonspecific thickening and increased signal intensity within the supraspinous ligament at T1. No associated spinous process avulsion.    T3-4, T2-3 and T1-2: Dorsal disc margins normal with mild facet arthropathy on the right at each level and no stenosis or impingement.    C7-T1: Mild dorsal bulging with normal facet joints and no stenosis or impingement.    C6-7 and C5-6: Dorsal bulging and uncovertebral arthrosis with normal facet joints and no stenosis or impingement.    C4-5, C3-4 and C2-3: Dorsal disc margins unremarkable with mild facet arthropathy bilaterally at C2-3 and no stenosis or impingement.    No degenerative or erosive changes within the atlantoaxial or cervico-occipital joints.    Normal signal intensity within the vertebral marrow spaces. No destructive bone lesions and no paraspinous soft tissue mass.    CONCLUSION:    1. Mild C7-T1, C6-7 and C5-6 disc degeneration and mild facet arthrosis at multiple levels without stenosis or neural impingement.    2. Nonspecific thickening and increased signal intensity within the supraspinous ligament at T1 which may be degenerative or posttraumatic in origin. Correlation with physical exam is recommended.    3. No cord abnormality, and no neoplasm, fracture or infection.                        PMH:  Past Medical History:   Diagnosis Date     Hyperlipidemia 3/17/2005     Problem list name updated by automated process. Provider to review      Hypothyroidism 6/26/2002     Problem list name updated by automated process. Provider to review     Major depressive disorder, recurrent episode, in full remission 1/21/2002     Morbid obesity (H) 5/12/2019     Psoriatic arthropathy (H) 1/21/2002       Active problem list:  Patient Active Problem List   Diagnosis     Psoriatic arthropathy (H)     Major depressive disorder, recurrent episode, in full remission     Pulmonary embolism and infarction (H)     Hypothyroidism     Allergic rhinitis     Rosacea     Hyperlipidemia     Living will, counseling/discussion     Chronic fatigue fibromyalgia syndrome     Morbid obesity (H)     Controlled substance agreement signed     Fibromyalgia     Idiopathic thrombocytopenia (H)     Osteoarthritis cervical spine     Moderate obstructive sleep apnea     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)     DDD (degenerative disc disease), lumbar     Atypical squamous cells of undetermined significance on cytologic smear of cervix (ASC-US)     Muscle pain     Osteoarthritis     Other pulmonary embolism without acute cor pulmonale (H)     Unspecified diastolic (congestive) heart failure (H)       FH:  Family History   Problem Relation Age of Onset     Cancer Sister         cervical     Arthritis Paternal Grandmother      Diabetes Father        SH:  Social History     Socioeconomic History     Marital status:      Spouse name: Not on file     Number of children: 1     Years of education: Not on file     Highest education level: Not on file   Occupational History     Not on file   Tobacco Use     Smoking status: Never     Smokeless tobacco: Never   Substance and Sexual Activity     Alcohol use: Not Currently     Comment: last drink 3 years ago     Drug use: No     Sexual activity: Not on file   Other Topics Concern     Not on file   Social History Narrative     Not on file     Social Drivers of Health     Financial Resource Strain: Low Risk  (4/22/2020)    Overall Financial Resource  Strain (CARDIA)      Difficulty of Paying Living Expenses: Not very hard   Food Insecurity: No Food Insecurity (10/11/2023)    Received from [facility]    Hunger Vital Sign      Worried About Running Out of Food in the Last Year: Never true      Ran Out of Food in the Last Year: Never true   Transportation Needs: No Transportation Needs (4/22/2020)    PRAPARE - Transportation      Lack of Transportation (Medical): No      Lack of Transportation (Non-Medical): No   Physical Activity: Not on file   Stress: Not on file   Social Connections: Not on file   Interpersonal Safety: Not on file   Housing Stability: Not on file       MEDS:  See EMR, reviewed  ALL:  See EMR, reviewed    REVIEW OF SYSTEMS:  CONSTITUTIONAL:NEGATIVE for fever, chills, change in weight  INTEGUMENTARY/SKIN: NEGATIVE for worrisome rashes, moles or lesions  EYES: NEGATIVE for vision changes or irritation  ENT/MOUTH: NEGATIVE for ear, mouth and throat problems  RESP:NEGATIVE for significant cough or SOB  BREAST: NEGATIVE for masses, tenderness or discharge  CV: NEGATIVE for chest pain, palpitations or peripheral edema  GI: NEGATIVE for nausea, abdominal pain, heartburn, or change in bowel habits  :NEGATIVE for frequency, dysuria, or hematuria  :NEGATIVE for frequency, dysuria, or hematuria  NEURO: NEGATIVE for weakness, dizziness or paresthesias  ENDOCRINE: NEGATIVE for temperature intolerance, skin/hair changes  HEME/ALLERGY/IMMUNE: NEGATIVE for bleeding problems  PSYCHIATRIC: NEGATIVE for changes in mood or affect        Objective: Left knee reveals no effusion.  Patient can do an active straight leg raise in the supine position with no extensor lag.  She is nontender over the patellar tendon and nontender over the bony patella.  Nontender over the quadriceps tendon.  I can flex and extend it fully.  Mildly tender over the medial and lateral patellar facets.  Mildly tender over the medial and lateral joint lines.  No swelling in the popliteal  space or tenderness in the calf.    Overlying skin intact.  Above ideal weight.  Appropriate conversation and affect.    X-rays of the bilateral knees that suggest mild patellofemoral DJD, left greater than right.  Mild tibiofemoral DJD      Assessment: Left-sided knee DJD, primarily patellofemoral.  History of psoriatic arthritis.    Plan: Patient avoids Tylenol and nonsteroidals because of a history of liver dysfunction.  She has used topical Voltaren.  Patient has seen physical therapy.  We have discussed pull-up knee sleeve, cortisone injection, Synvisc injection.  Patient would like to try repeat cortisone shot as it was previously helpful.  After informed consent above bleeding, infection, steroid flare after prep with surgical scrub she was injected in the left knee from a lateral approach in the seated position with 1 cc of Kenalog 40 and 4 cc of 1% lidocaine.  The medicine went in easily, she left clinic ambulatory, she will look for improvement the injection follow-up as needed.                    Again, thank you for allowing me to participate in the care of your patient.      Sincerely,    Femi Tamayo MD

## 2025-02-19 NOTE — NURSING NOTE
[address] [address]  Dept: [phone]  ______________________________________________________________________________    Patient: Karlene Yun   : 1965   MRN: [id number]   2025    INVASIVE PROCEDURE SAFETY CHECKLIST    Date: 25   Procedure: Left knee IA CSI  Patient Name: Karlene Yun  MRN: [id number]  YOB: 1965    Action: Complete sections as appropriate. Any discrepancy results in a HARD COPY until resolved.     PRE PROCEDURE:  Patient ID verified with 2 identifiers (name and  or MRN): Yes  Procedure and site verified with patient/designee (when able): Yes  Accurate consent documentation in medical record: Yes  H&P (or appropriate assessment) documented in medical record: Yes  H&P must be up to 20 days prior to procedure and updates within 24 hours of procedure as applicable: NA  Relevant diagnostic and radiology test results appropriately labeled and displayed as applicable: Yes  Procedure site(s) marked with provider initials: NA    TIMEOUT:  Time-Out performed immediately prior to starting procedure, including verbal and active participation of all team members addressing the following:Yes  * Correct patient identify  * Confirmed that the correct side and site are marked  * An accurate procedure consent form  * Agreement on the procedure to be done  * Correct patient position  * Relevant images and results are properly labeled and appropriately displayed  * The need to administer antibiotics or fluids for irrigation purposes during the procedure as applicable   * Safety precautions based on patient history or medication use    DURING PROCEDURE: Verification of correct person, site, and procedures any time the responsibility for care of the patient is transferred to another member of the care team.       Prior to injection, verified patient identity using patient's name and date of  birth.  Due to injection administration, patient instructed to remain in clinic for 15 minutes  afterwards, and to report any adverse reaction to me immediately.    Joint injection was performed.      Lidocaine Amount Wasted:  Yes: 1 mg/ml   Vial/Syringe: Multi dose vial  Expiration Date:  7/1/28      Ole Holt, [facility]  February 19, 2025

## 2025-02-19 NOTE — PROGRESS NOTES
Large Joint Injection: L knee joint    Date/Time: 2/19/2025 4:46 PM    Performed by: Femi Tamayo MD  Authorized by: Femi Tamayo MD    Indications:  Pain and osteoarthritis  Needle Size:  25 G  Guidance: landmark guided    Approach:  Anterolateral  Location:  Knee      Medications:  40 mg triamcinolone 40 MG/ML; 4 mL lidocaine (PF) 1 %  Outcome:  Tolerated well, no immediate complications  Procedure discussed: discussed risks, benefits, and alternatives    Consent Given by:  Patient  Timeout: timeout called immediately prior to procedure    Prep: patient was prepped and draped in usual sterile fashion       Ole Holt M.A., LAT, ATC  Certified Athletic Trainer

## 2025-02-19 NOTE — PROGRESS NOTES
Sports Medicine Clinic Visit  Left knee weightbearing pain, history of DJD, requesting repeat cortisone injection.    Cortisone junction 10/30/2024, four months ago, helpful at that time.  Pt had a subsequent fall on the the anterior left knee a few weeks after the injection.  Pt thinks the injection would have lasted long had she not fallen on the knee.  No recurrent swelling, locking, instability.      Patient follows with rheumatology, most recent clinic note 10/24/24 reviewed by me.  Patient history of psoriatic arthritis for the past 30 years as well as osteoarthritis.  Patient has active psoriasis and recently has switched medicines from her rheumatologist, from skyrizi to taltz.  Patient referred by rheumatology for left-sided knee DJD.  She was encouraged to consider kaylan chi, and Voltaren gel for the knee.    Patient's medicines include Celexa, gabapentin, Synthroid, Wegovy, Aldactone    10/22/2024 normal CRP, rheumatoid factor, anti-CCP.  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    Patient remote history of pulmonary embolism 1/31/2002.          Imaging studies below reviewed by me:    3 views right and left knee radiographs 10/30/2024 12:53 PM     History: Pain in both knees, unspecified chronicity; Pain in both  knees, unspecified chronicity     Comparison: None available.     Findings:     Standing AP view of bilateral knees, 3 right, 1 left lateral and  patellofemoral views of the right and left knee were obtained.      Left: No acute osseous abnormality.  No joint effusion.     Mild medial compartment joint space loss.     Medial patellar subluxation with associated joint space loss. Soft  tissue is unremarkable.     Right:  No acute osseous abnormality.  No joint effusion.     Mild medial compartment joint space loss. Tiny patellar discopathy.     No patellar tilt or lateral subluxation. Soft tissue is  unremarkable.                                                                      Impression:  1. No acute osseous abnormality.  2. Mild medial compartment joint space loss bilaterally.  3. Left medial patellar subluxation with associated joint space loss.     JUAN MIGUEL BOX         XR KNEE BILATERAL 3 VIEWS [facility], 3/2/2023    COMPARISON:  None.    FINDINGS:      Right knee: 4 views. Mild patellofemoral compartment narrowing and slight medial compartment narrowing. No significant joint effusion. No fracture or dislocation.    Left knee: 4 views. Mild patellofemoral compartment narrowing and slight medial compartment narrowing. No significant joint effusion. No fracture or dislocation.      XR PELVIS AND HIP BILATERAL 2 VIEWS [facility], 3/2/2023    COMPARISON:  None.    FINDINGS:  5 images. No displaced fracture and no dislocation. Symmetric appearance of the hip joints with mild degenerative change. Mild sclerosis and spurring inferiorly at the sacroiliac joints. Degenerative change in the lower lumbar spine were imaged. Ossification lateral to the proximal left hip            EXAM: MRI OF THE LUMBAR SPINE WITHOUT CONTRAST 4/4/2023 [facility]    CLINICAL INFORMATION: Mid and low back pain ongoing for 30 years. Patient attempted physical therapy and oral medical therapy.    TECHNICAL INFORMATION: Sagittal fast spin-echo T2-weighted, T1-weighted, STIR , coronal T1-weighted as well as axial fast spin-echo T1 and T2-weighted images of the lumbar spine were obtained on a tree 0.0 Janell MRI scanner. SEDATION: None. CONTRAST: None.    INTERPRETATION: Comparison MRI lumbar spine examination dated 4/3/2023.    Generalized dextrocurvature with 1 mm retrolisthesis of L5 on S1. No acute fracture or spondylolysis, although edematous degenerative endplate changes are demonstrated at L5-S1 and ventrally at L2-3. Conus is normal and terminates at L1. Imaged upper sacrum and paraspinal soft tissue structures are  unremarkable save for an incidental right renal cyst. Disc desiccation and annular bulging are demonstrated at L5-S1 through L2-3 with isolated annular bulging at L1-2 and T11-12. Disc space narrowing is moderate at L5-S1, mild at L3-4 and moderate at L2-3.    L5-S1: Unchanged annular bulging without central stenosis or traversing neural compression. Mild bilateral facet hypertrophy with moderate bilateral chronic foraminal narrowing.    L4-5: Unchanged left foraminal annular fissure and 2 mm left inferior foraminal disc protrusion with mild to moderate left foraminal narrowing, but no exiting ganglionic compression. Mild left subarticular recess narrowing without traversing neural compression or central stenosis. Mild bilateral facet arthrosis with mild right chronic foraminal narrowing.    L3-4: Unchanged annular bulging without central stenosis or traversing neural compression. Mild bilateral facet hypertrophy with mild bilateral chronic foraminal narrowing.    L2-3: Unchanged annular bulging without central stenosis or traversing neural compression. Mild bilateral facet hypertrophy with mild right chronic foraminal narrowing.    L1-2: Unchanged minimal annular bulging without central stenosis or traversing neural compression. Facets and foramina are unremarkable.    T12-L1: Normal posterior disc margins and facets. Patent foramina.    T11-12: No central stenosis or cord compression. Facets and foramina are unremarkable.    CONCLUSION: Multilevel degenerative lumbar spondylosis with the following notable findings:    1. Unchanged, 2 mm left inferior foraminal L4-5 disc protrusion without exiting ganglionic compression.    2. No acute fracture or spondylolysis, although edematous degenerative endplate changes are demonstrated at L5-S1 and ventrally at L2-3.    3. Mild bilateral L4-5 facet arthrosis.    4. Multilevel chronic foraminal narrowing that varies from mild to moderate without ganglionic compression as  detailed above.          EXAM: MRI OF THE THORACIC SPINE WITHOUT CONTRAST  4/4/2023    CLINICAL INFORMATION: Mid and low back pain ongoing for 30 years. Patient attempted physical therapy and oral medical therapy.    TECHNICAL INFORMATION: Sagittal fast spin echo T2-weighted, T1-weighted FLAIR, inversion recovery, T2 weighted space, axial T2-weighted images of the thoracic spine were obtained on a 3.0 Janell MR scanner. Axial and coronal reformatted images were generated based on the sagittally acquired T2-weighted space data. SEDATION: None. CONTRAST: None.    INTERPRETATION: Comparison MRI thoracic spine examination dated 12/11/2015.    Normal alignment. No acute fracture. Chronic Scheuermann's-type changes are demonstrated throughout the mid to lower thoracic spine. Thoracic cord maintains normal signal and the imaged portions of lungs are unremarkable. Imaged paraspinal soft tissue structures are notable for an unchanged, well-circumscribed area of T1 and T2 prolongation on the right adjacent to the T5 and T6 vertebral bodies most apparent on series 6 image 3, series 12 image 24 and series 11 image 67 where it measures 1.1 x 1.3 x 2.1 cm in AP, transverse and craniocaudal dimension. This is likely of lymphatic origin. Annular bulging is demonstrated at T11-12 and T10-11, T8-9 through T4-5, C7-T1 and C6-7.    T12-L1: No central stenosis or cord compression. Facets and foramina are unremarkable.    T11-12: Unchanged annular bulging without central stenosis or cord compression. Facets are unremarkable and the foramina are patent.    T10-11: Unchanged annular bulging and mild right facet hypertrophy. Right chronic foraminal narrowing.    T9-10: Normal posterior disc margins and facets. Patent foramina.    T8-9: Unchanged annular bulging without central stenosis or cord compression. Mild bilateral facet hypertrophy with patent foramina.    T7-8: Unchanged annular bulging without central stenosis or cord compression.  Mild bilateral facet hypertrophy with mild left chronic foraminal narrowing.    T6-7: Unchanged mild annular bulging with mild right facet arthrosis and moderate right chronic foraminal narrowing.    T5-6: Unchanged 1 to 2 mm right paracentral disc protrusion without central stenosis or cord compression. Mild bilateral facet hypertrophy with patent foramina.    T4-5: Unchanged minimal annular bulging without central stenosis or cord compression. Mild left facet arthrosis with mild left chronic foraminal narrowing.    T3-4: No central stenosis or cord compression. Mild right facet hypertrophy with patent foramina.    T2-3: Normal posterior disc margins and mild right facet hypertrophy. Patent foramina.    T1-2: No central stenosis or cord compression. Mild right facet hypertrophy with mild right chronic foraminal narrowing. Localizer images also demonstrate annular bulging at C3-4 through C5-6, but no central stenosis or cord compression are evident at the imaged portions of the cervical spine. Mild right C7-T1 facet arthrosis is evident.    CONCLUSION: Multilevel degenerative facet and lower cervical spondylosis, chronic Scheuermann's-type changes and the following notable findings:    1. Unchanged, 1 to 2 mm right paracentral T5-6 disc protrusion without central stenosis or cord compression.    2. No acute fracture or intrinsic cord lesion.    3. Mild right T6-7, left T4-5 and right C7-T1 facet arthrosis.    4. Multilevel level chronic foraminal narrowing that varies from mild to moderate as detailed above.            EXAM: 1.5 T MRI OF THE CERVICAL SPINE  12/3/2015 [facility]    CLINICAL INFORMATION: Chronic neck and back pain.    TECHNICAL INFORMATION: T1, T2 GRE, T2 FSE and STIR sagittal thin sections through the cervical spine with T2 GRE and FSE axial sections at selected levels. No comparison.    INTERPRETATION: Normal signal intensity within the cervical and upper thoracic cord. No Chiari malformation or syrinx,  no intrinsic cord lesion and no intradural mass. Normal flow void within small vertebral arteries.    Cervical and upper thoracic spondylosis with mild disc space narrowing at C7-T1, C6-7 and C5-6, and lordotic alignment of the cervical vertebrae. There is nonspecific thickening and increased signal intensity within the supraspinous ligament at T1. No associated spinous process avulsion.    T3-4, T2-3 and T1-2: Dorsal disc margins normal with mild facet arthropathy on the right at each level and no stenosis or impingement.    C7-T1: Mild dorsal bulging with normal facet joints and no stenosis or impingement.    C6-7 and C5-6: Dorsal bulging and uncovertebral arthrosis with normal facet joints and no stenosis or impingement.    C4-5, C3-4 and C2-3: Dorsal disc margins unremarkable with mild facet arthropathy bilaterally at C2-3 and no stenosis or impingement.    No degenerative or erosive changes within the atlantoaxial or cervico-occipital joints.    Normal signal intensity within the vertebral marrow spaces. No destructive bone lesions and no paraspinous soft tissue mass.    CONCLUSION:    1. Mild C7-T1, C6-7 and C5-6 disc degeneration and mild facet arthrosis at multiple levels without stenosis or neural impingement.    2. Nonspecific thickening and increased signal intensity within the supraspinous ligament at T1 which may be degenerative or posttraumatic in origin. Correlation with physical exam is recommended.    3. No cord abnormality, and no neoplasm, fracture or infection.                        PMH:  Past Medical History:   Diagnosis Date    Hyperlipidemia 3/17/2005     Problem list name updated by automated process. Provider to review    Hypothyroidism 6/26/2002     Problem list name updated by automated process. Provider to review    Major depressive disorder, recurrent episode, in full remission 1/21/2002    Morbid obesity (H) 5/12/2019    Psoriatic arthropathy (H) 1/21/2002       Active problem  list:  Patient Active Problem List   Diagnosis    Psoriatic arthropathy (H)    Major depressive disorder, recurrent episode, in full remission    Pulmonary embolism and infarction (H)    Hypothyroidism    Allergic rhinitis    Rosacea    Hyperlipidemia    Living will, counseling/discussion    Chronic fatigue fibromyalgia syndrome    Morbid obesity (H)    Controlled substance agreement signed    Fibromyalgia    Idiopathic thrombocytopenia (H)    Osteoarthritis cervical spine    Moderate obstructive sleep apnea    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)    DDD (degenerative disc disease), lumbar    Atypical squamous cells of undetermined significance on cytologic smear of cervix (ASC-US)    Muscle pain    Osteoarthritis    Other pulmonary embolism without acute cor pulmonale (H)    Unspecified diastolic (congestive) heart failure (H)       FH:  Family History   Problem Relation Age of Onset    Cancer Sister         cervical    Arthritis Paternal Grandmother     Diabetes Father        SH:  Social History     Socioeconomic History    Marital status:      Spouse name: Not on file    Number of children: 1    Years of education: Not on file    Highest education level: Not on file   Occupational History    Not on file   Tobacco Use    Smoking status: Never    Smokeless tobacco: Never   Substance and Sexual Activity    Alcohol use: Not Currently     Comment: last drink 3 years ago    Drug use: No    Sexual activity: Not on file   Other Topics Concern    Not on file   Social History Narrative    Not on file     Social Drivers of Health     Financial Resource Strain: Low Risk  (4/22/2020)    Overall Financial Resource Strain (CARDIA)     Difficulty of Paying Living Expenses: Not very hard   Food Insecurity: No Food Insecurity (10/11/2023)    Received from [facility]    Hunger Vital Sign     Worried About Running Out of Food in the Last Year: Never true     Ran Out of Food in the Last Year: Never true    Transportation Needs: No Transportation Needs (4/22/2020)    PRAPARE - Transportation     Lack of Transportation (Medical): No     Lack of Transportation (Non-Medical): No   Physical Activity: Not on file   Stress: Not on file   Social Connections: Not on file   Interpersonal Safety: Not on file   Housing Stability: Not on file       MEDS:  See EMR, reviewed  ALL:  See EMR, reviewed    REVIEW OF SYSTEMS:  CONSTITUTIONAL:NEGATIVE for fever, chills, change in weight  INTEGUMENTARY/SKIN: NEGATIVE for worrisome rashes, moles or lesions  EYES: NEGATIVE for vision changes or irritation  ENT/MOUTH: NEGATIVE for ear, mouth and throat problems  RESP:NEGATIVE for significant cough or SOB  BREAST: NEGATIVE for masses, tenderness or discharge  CV: NEGATIVE for chest pain, palpitations or peripheral edema  GI: NEGATIVE for nausea, abdominal pain, heartburn, or change in bowel habits  :NEGATIVE for frequency, dysuria, or hematuria  :NEGATIVE for frequency, dysuria, or hematuria  NEURO: NEGATIVE for weakness, dizziness or paresthesias  ENDOCRINE: NEGATIVE for temperature intolerance, skin/hair changes  HEME/ALLERGY/IMMUNE: NEGATIVE for bleeding problems  PSYCHIATRIC: NEGATIVE for changes in mood or affect        Objective: Left knee reveals no effusion.  Patient can do an active straight leg raise in the supine position with no extensor lag.  She is nontender over the patellar tendon and nontender over the bony patella.  Nontender over the quadriceps tendon.  I can flex and extend it fully.  Mildly tender over the medial and lateral patellar facets.  Mildly tender over the medial and lateral joint lines.  No swelling in the popliteal space or tenderness in the calf.    Overlying skin intact.  Above ideal weight.  Appropriate conversation and affect.    X-rays of the bilateral knees that suggest mild patellofemoral DJD, left greater than right.  Mild tibiofemoral DJD      Assessment: Left-sided knee DJD, primarily  patellofemoral.  History of psoriatic arthritis.    Plan: Patient avoids Tylenol and nonsteroidals because of a history of liver dysfunction.  She has used topical Voltaren.  Patient has seen physical therapy.  We have discussed pull-up knee sleeve, cortisone injection, Synvisc injection.  Patient would like to try repeat cortisone shot as it was previously helpful.  After informed consent above bleeding, infection, steroid flare after prep with surgical scrub she was injected in the left knee from a lateral approach in the seated position with 1 cc of Kenalog 40 and 4 cc of 1% lidocaine.  The medicine went in easily, she left clinic ambulatory, she will look for improvement the injection follow-up as needed.

## 2025-02-24 ENCOUNTER — TELEPHONE (OUTPATIENT)
Dept: PULMONOLOGY | Facility: CLINIC | Age: 60
End: 2025-02-24
Payer: COMMERCIAL

## 2025-02-24 NOTE — TELEPHONE ENCOUNTER
Patient confirmed scheduled appointment:  Date: 04/02/2025  Time: 1:00PM  Visit type: RETURN PULMONARY  Provider: MELI  Location: [facility]  Testing/imaging: N/A  Additional notes: N/A    Jasmina Dukes on 2/24/2025 at 2:24 PM

## 2025-02-25 ENCOUNTER — VIRTUAL VISIT (OUTPATIENT)
Dept: PHARMACY | Facility: CLINIC | Age: 60
End: 2025-02-25
Attending: INTERNAL MEDICINE
Payer: COMMERCIAL

## 2025-02-25 DIAGNOSIS — M19.90 OSTEOARTHRITIS, UNSPECIFIED OSTEOARTHRITIS TYPE, UNSPECIFIED SITE: ICD-10-CM

## 2025-02-25 DIAGNOSIS — L40.50 PSORIATIC ARTHROPATHY (H): Primary | ICD-10-CM

## 2025-02-25 DIAGNOSIS — L40.0 PLAQUE PSORIASIS: ICD-10-CM

## 2025-02-27 NOTE — PATIENT INSTRUCTIONS
"Recommendations from today's MTM visit:                                                       1. Call [facility] at [phone] to order your Taltz. You will need to provide them the information on your Taltz copay card when you call.    2. Discuss starting Zepbound with Dr. Hanson next week at your primary care visit.    3. After you fill your Taltz prescription and the Zepbound is sent to a pharmacy of your choice:  - Sign up for a Zepbound copay card at [email or web address]  - Take the copay card to your pharmacy and have them use this information instead of your insurance to fill the Zepbound for $25 per month    Follow-up: Return in about 3 months (around 5/25/2025) for MTM Pharmacist Visit.    It was great speaking with you today.  I value your experience and would be very thankful for your time in providing feedback in our clinic survey. In the next few days, you may receive an email or text message from [employer] with a link to a survey related to your  clinical pharmacist.\"     To schedule another MTM appointment, please call the clinic directly or you may call the MTM scheduling line at [phone].    My Clinical Pharmacist's contact information:                                                      Please feel free to contact me with any questions or concerns you have.      Loulou Ho, PharmD  Medication Therapy Management Pharmacist  [facility]  Phone: [phone]     "

## 2025-02-27 NOTE — PROGRESS NOTES
Medication Therapy Management (MTM) Encounter    ASSESSMENT:                            Medication Adherence/Access: See below for considerations.    Psoriatic Arthritis/Psoriasis/Osteoarthritis: Provided education on Taltz today including dosing, general administration, side effects (both common/serious), precautions, monitoring and time to efficacy. Discussed data on malignancy and risk of serious infection in depth. Encouraged indicated non-live vaccines and avoidance of live vaccines. Discussed potential need to hold therapy in the setting of signs/symptoms of active infection. Encouraged her to contact the rheumatology clinic in the event she has questions on this. Would benefit from starting Taltz once it arrives and using 160 mg once then 80 mg at week 2, 4, 6, 10, 12, then 80 mg every 4 weeks as directed. Provided instructions on signing up for Zepbound Savings for Taltz Patients savings card and encouraged patient to work with PCP to discuss and possibly prescribe Zepbound.    PLAN:                            1. Call [facility] at [phone] to order your Taltz. You will need to provide them the information on your Taltz copay card when you call.    2. Discuss starting Zepbound with Dr. Hanson next week at your primary care visit.    3. After you fill your Taltz prescription and the Zepbound is sent to a pharmacy of your choice:  - Sign up for a Zepbound copay card at [email or web address]  - Take the copay card to your pharmacy and have them use this information instead of your insurance to fill the Zepbound for $25 per month    Follow-up: Return in about 3 months (around 5/25/2025) for MTM Pharmacist Visit.    SUBJECTIVE/OBJECTIVE:                          Karlene Yun is a 59 year old female seen for a follow-up visit.       Reason for visit: Taltz education, questions about starting Zepbound through [employer]  program    Allergies/ADRs: Reviewed in chart  Past Medical History: Reviewed in chart  Tobacco: She reports that she has never smoked. She has never used smokeless tobacco.  Alcohol: not currently using    Medication Adherence/Access: Medication barriers: affording medications. Interested in using Zepbound Savings for Taltz Patients savings card program to get lower cost Zepbound without insurance. Wondering how to sign up for this and if she qualifies. Signed up for Taltz copay card already.    Psoriatic Arthritis/Psoriasis/Osteoarthritis:   Taltz 160 mg once then 80 mg at week 2, 4, 6, 10, 12, then 80 mg every 4 weeks (not started yet)  Gabapentin 300 mg every morning and 600 mg every night  Diclofenac gel 1% four times daily as needed      Reports she is having several uncontrolled PsA symptoms currently including joint pain in feet, ankles, knees, and hips, swelling in left knee, joint stiffness lasting several hours daily, and psoriasis in eyebrows, eyelids, and scalp. Also having significant fatigue. Rheumatologist recommended switching from Cosentyx to Taltz to help with these symptoms. Would like to review Taltz in depth today including side effects and how to order the medication.    Pre-Biologic Screening:   Hep B Surface Antibody Non-reactive (11/26/24)    Hep B Core Antibody  Non-reactive (11/26/24)    Hep B Surface Antigen Non-reactive (11/26/24)    Hep C Antibody  Non-reactive (11/26/24)    HIV Antigen Antibody Non-reactive (11/26/24)    Quantiferon TB Gold Negative (11/26/24)      Last lab monitoring completed: 2/7/25    Today's Vitals: There were no vitals taken for this visit.  ----------------    I spent 25 minutes with this patient today. All changes were made via collaborative practice agreement with Lisette Mccann.     A summary of these recommendations was sent via [employer].    Loulou Ho, PharmD  Medication Therapy Management Pharmacist  [facility]  Phone:  [phone]    Telemedicine Visit Details  The patient's medications can be safely assessed via a telemedicine encounter.  Type of service:  Telephone visit  Originating Location (pt. Location): Home    Distant Location (provider location):  Off-site  Start Time: 11:30 AM  End Time: 11:55 AM     Medication Therapy Recommendations  Plaque psoriasis   1 Current Medication: ixekizumab (TALTZ) 80 MG/ML SOAJ auto-injector   Current Medication Sig: 160 mg once, followed by 80 mg at weeks 2, 4, 6, 8, 10, and 12, and then 80 mg every 4 weeks   Rationale: Does not understand instructions - Adherence - Adherence   Recommendation: Provide Education - Taltz education and [employer] Savings for Taltz Patients program reviewed   Status: Patient Agreed - Adherence/Education   Identified Date: 2/25/2025 Completed Date: 2/25/2025

## 2025-03-04 ASSESSMENT — PATIENT HEALTH QUESTIONNAIRE - PHQ9: SUM OF ALL RESPONSES TO PHQ QUESTIONS 1-9: 6

## 2025-03-06 ENCOUNTER — HOSPITAL ENCOUNTER (OUTPATIENT)
Facility: AMBULATORY SURGERY CENTER | Age: 60
Discharge: HOME | End: 2025-03-06
Attending: RADIOLOGY
Payer: COMMERCIAL

## 2025-03-06 ENCOUNTER — LAB (OUTPATIENT)
Dept: LAB | Facility: CLINIC | Age: 60
End: 2025-03-06
Payer: COMMERCIAL

## 2025-03-06 ENCOUNTER — ANCILLARY PROCEDURE (OUTPATIENT)
Dept: RADIOLOGY | Facility: AMBULATORY SURGERY CENTER | Age: 60
End: 2025-03-06
Attending: STUDENT IN AN ORGANIZED HEALTH CARE EDUCATION/TRAINING PROGRAM
Payer: COMMERCIAL

## 2025-03-06 VITALS
RESPIRATION RATE: 17 BRPM | WEIGHT: 293 LBS | DIASTOLIC BLOOD PRESSURE: 52 MMHG | HEIGHT: 71 IN | BODY MASS INDEX: 41.02 KG/M2 | OXYGEN SATURATION: 96 % | SYSTOLIC BLOOD PRESSURE: 133 MMHG | HEART RATE: 67 BPM | TEMPERATURE: 97.6 F

## 2025-03-06 DIAGNOSIS — J90 PLEURAL EFFUSION: ICD-10-CM

## 2025-03-06 DIAGNOSIS — K75.81 NASH (NONALCOHOLIC STEATOHEPATITIS): ICD-10-CM

## 2025-03-06 LAB
ALBUMIN SERPL BCG-MCNC: 2.9 G/DL (ref 3.5–5.2)
ALP SERPL-CCNC: 83 U/L (ref 40–150)
ALT SERPL W P-5'-P-CCNC: 48 U/L (ref 0–50)
ANION GAP SERPL CALCULATED.3IONS-SCNC: 7 MMOL/L (ref 7–15)
APPEARANCE FLD: ABNORMAL
AST SERPL W P-5'-P-CCNC: 76 U/L (ref 0–45)
BILIRUB DIRECT SERPL-MCNC: 1.97 MG/DL (ref 0–0.3)
BILIRUB SERPL-MCNC: 4.9 MG/DL
BUN SERPL-MCNC: 12.9 MG/DL (ref 8–23)
CALCIUM SERPL-MCNC: 8.8 MG/DL (ref 8.8–10.4)
CHLORIDE SERPL-SCNC: 102 MMOL/L (ref 98–107)
COLOR FLD: YELLOW
CREAT SERPL-MCNC: 0.86 MG/DL (ref 0.51–0.95)
EGFRCR SERPLBLD CKD-EPI 2021: 77 ML/MIN/1.73M2
EOSINOPHIL NFR FLD MANUAL: 1 %
ERYTHROCYTE [DISTWIDTH] IN BLOOD BY AUTOMATED COUNT: 16.3 % (ref 10–15)
GLUCOSE BODY FLUID SOURCE: NORMAL
GLUCOSE FLD-MCNC: 128 MG/DL
GLUCOSE SERPL-MCNC: 95 MG/DL (ref 70–99)
GRAM STAIN RESULT: NORMAL
GRAM STAIN RESULT: NORMAL
HCO3 SERPL-SCNC: 27 MMOL/L (ref 22–29)
HCT VFR BLD AUTO: 40.3 % (ref 35–47)
HGB BLD-MCNC: 13.9 G/DL (ref 11.7–15.7)
HOLD SPECIMEN: NORMAL
INR PPP: 1.62 (ref 0.85–1.15)
LD BODY BODY FLUID SOURCE: NORMAL
LDH FLD L TO P-CCNC: 166 U/L
LYMPHOCYTES NFR FLD MANUAL: 38 %
MCH RBC QN AUTO: 33 PG (ref 26.5–33)
MCHC RBC AUTO-ENTMCNC: 34.5 G/DL (ref 31.5–36.5)
MCV RBC AUTO: 96 FL (ref 78–100)
MONOS+MACROS NFR FLD MANUAL: 0 %
NEUTS BAND NFR FLD MANUAL: 1 %
OTHER CELLS FLD MANUAL: 60 %
PLATELET # BLD AUTO: 70 10E3/UL (ref 150–450)
POTASSIUM SERPL-SCNC: 4 MMOL/L (ref 3.4–5.3)
PROT FLD-MCNC: 1.7 G/DL
PROT SERPL-MCNC: 7.3 G/DL (ref 6.4–8.3)
PROTEIN BODY FLUID SOURCE: NORMAL
RBC # BLD AUTO: 4.21 10E6/UL (ref 3.8–5.2)
SODIUM SERPL-SCNC: 136 MMOL/L (ref 135–145)
SPECIMEN SOURCE FLD: ABNORMAL
WBC # BLD AUTO: 6.4 10E3/UL (ref 4–11)
WBC # FLD AUTO: 1898 /UL

## 2025-03-06 PROCEDURE — 88305 TISSUE EXAM BY PATHOLOGIST: CPT | Mod: TC

## 2025-03-06 PROCEDURE — 88305 TISSUE EXAM BY PATHOLOGIST: CPT | Mod: 26 | Performed by: PATHOLOGY

## 2025-03-06 PROCEDURE — 82945 GLUCOSE OTHER FLUID: CPT

## 2025-03-06 PROCEDURE — 83615 LACTATE (LD) (LDH) ENZYME: CPT

## 2025-03-06 PROCEDURE — 80053 COMPREHEN METABOLIC PANEL: CPT | Performed by: PATHOLOGY

## 2025-03-06 PROCEDURE — 32555 ASPIRATE PLEURA W/ IMAGING: CPT | Mod: LT | Performed by: RADIOLOGY

## 2025-03-06 PROCEDURE — 36415 COLL VENOUS BLD VENIPUNCTURE: CPT | Performed by: PATHOLOGY

## 2025-03-06 PROCEDURE — 85610 PROTHROMBIN TIME: CPT | Performed by: PATHOLOGY

## 2025-03-06 PROCEDURE — 87070 CULTURE OTHR SPECIMN AEROBIC: CPT

## 2025-03-06 PROCEDURE — 88112 CYTOPATH CELL ENHANCE TECH: CPT | Mod: 26 | Performed by: PATHOLOGY

## 2025-03-06 PROCEDURE — 88112 CYTOPATH CELL ENHANCE TECH: CPT | Mod: TC

## 2025-03-06 PROCEDURE — 87205 SMEAR GRAM STAIN: CPT

## 2025-03-06 PROCEDURE — 89050 BODY FLUID CELL COUNT: CPT

## 2025-03-06 PROCEDURE — 84157 ASSAY OF PROTEIN OTHER: CPT

## 2025-03-06 PROCEDURE — 99000 SPECIMEN HANDLING OFFICE-LAB: CPT | Performed by: PATHOLOGY

## 2025-03-06 PROCEDURE — 82248 BILIRUBIN DIRECT: CPT | Performed by: PATHOLOGY

## 2025-03-06 PROCEDURE — 89051 BODY FLUID CELL COUNT: CPT

## 2025-03-06 RX ORDER — LIDOCAINE 40 MG/G
CREAM TOPICAL
Status: ACTIVE | OUTPATIENT
Start: 2025-03-06

## 2025-03-06 NOTE — OR NURSING
Pt complains of sharp, spasm pain on left lung where thoracentesis was performed. Lung sounds clear, no SOB, surgical site with small amount of drainage unchanged since arrival to pacu. Dr Martinez notified with no new orders. Pt instructed if pain where to worsen or if any SOB were to develop, to present to emergency room. Pt in agreement with plan.     Glo Goins RN

## 2025-03-06 NOTE — DISCHARGE INSTRUCTIONS
A collaboration between [facility] and [facility]  Experts in minimally invasive, targeted treatments performed using imaging guidance    Paracentesis or Thoracentesis    Today you had extra fluid removed from your abdomen (belly) or chest.    After you go home:  Take pain medicine as directed.  You may remove the dressing 24 - 48 hours after the procedure.  Do not perform strenuous activities for the next 48 hours.    Call our Interventional Radiology (IR) service if:  You start bleeding from the procedure site.  If you do start to bleed from the site, lie down and hold some pressure on the site.  Our radiology provider can help you decide if you need to return to the hospital.  You have more than a small amount of fluid leaking from the puncture site.  You have new or worsening pain related to the procedure.  You have pronounced swelling at the procedure site.  You develop persist nausea or vomiting.  You develop hives or a rash or any unexplained itching.  You have a fever of greater than 100.4  F and chills in the first 5 days after procedure.  You have trouble breating.  You have any other concerns related to your procedure.      [facility]  Interventional Radiology (IR)  [address]  [address] [facility] [place] [address]    Contact Number:  [phone]  (IR control desk)  Monday - Friday 8:00 am - 4:30 pm    After hours for urgent concerns:  [phone]  After 4:30 pm Monday - Friday, Weekends and Holidays.   Ask for Interventional Radiology on-call.  Someone is available 24 hours a day.  [facility] toll free number:  [phone]

## 2025-03-06 NOTE — BRIEF OP NOTE
[facility]    Brief Operative Note    Pre-operative diagnosis: Pleural effusion [J90]  Post-operative diagnosis Same as pre-operative diagnosis    Procedure: Thoracentesis, Left - Back    Surgeon: Surgeons and Role:     * Jaden Martinez MD - Primary  Anesthesia: Local   Estimated Blood Loss: Minimal    Drains: None  Specimens: * No specimens in log *  Findings:   None.  Complications: None.  Implants: * No implants in log *        5 [email or web address] Yueh needle pigtail catheter placed in left effusion under ultrasound guidance. 180 mL aspirated and submitted for the requested labs. Total of 240 mL aspirated.    Sterile airtight dressing applied. No immediate complication.

## 2025-03-11 LAB
BACTERIA PLR CULT: NO GROWTH
GRAM STAIN RESULT: NORMAL
GRAM STAIN RESULT: NORMAL

## 2025-03-19 ASSESSMENT — PATIENT HEALTH QUESTIONNAIRE - PHQ9
10. IF YOU CHECKED OFF ANY PROBLEMS, HOW DIFFICULT HAVE THESE PROBLEMS MADE IT FOR YOU TO DO YOUR WORK, TAKE CARE OF THINGS AT HOME, OR GET ALONG WITH OTHER PEOPLE: VERY DIFFICULT
SUM OF ALL RESPONSES TO PHQ QUESTIONS 1-9: 6

## 2025-03-22 ENCOUNTER — HEALTH MAINTENANCE LETTER (OUTPATIENT)
Age: 60
End: 2025-03-22

## 2025-03-31 ENCOUNTER — OFFICE VISIT (OUTPATIENT)
Dept: INTERNAL MEDICINE | Facility: CLINIC | Age: 60
End: 2025-03-31
Payer: COMMERCIAL

## 2025-03-31 VITALS
BODY MASS INDEX: 45.87 KG/M2 | DIASTOLIC BLOOD PRESSURE: 70 MMHG | WEIGHT: 293 LBS | SYSTOLIC BLOOD PRESSURE: 116 MMHG | OXYGEN SATURATION: 95 % | HEART RATE: 70 BPM

## 2025-03-31 DIAGNOSIS — K74.60 CIRRHOSIS OF LIVER NOT DUE TO ALCOHOL (H): Primary | ICD-10-CM

## 2025-03-31 DIAGNOSIS — Z23 NEED FOR PNEUMOCOCCAL VACCINATION: ICD-10-CM

## 2025-03-31 DIAGNOSIS — F33.42 MAJOR DEPRESSIVE DISORDER, RECURRENT EPISODE, IN FULL REMISSION: ICD-10-CM

## 2025-03-31 DIAGNOSIS — Z71.85 IMMUNIZATION COUNSELING: ICD-10-CM

## 2025-03-31 DIAGNOSIS — Z23 NEED FOR COVID-19 VACCINE: ICD-10-CM

## 2025-03-31 DIAGNOSIS — F51.04 PSYCHOPHYSIOLOGICAL INSOMNIA: ICD-10-CM

## 2025-03-31 DIAGNOSIS — E03.9 ACQUIRED HYPOTHYROIDISM: ICD-10-CM

## 2025-03-31 PROCEDURE — 99203 OFFICE O/P NEW LOW 30 MIN: CPT | Mod: 25 | Performed by: INTERNAL MEDICINE

## 2025-03-31 PROCEDURE — 90480 ADMN SARSCOV2 VAC 1/ONLY CMP: CPT | Performed by: INTERNAL MEDICINE

## 2025-03-31 PROCEDURE — 90677 PCV20 VACCINE IM: CPT | Performed by: INTERNAL MEDICINE

## 2025-03-31 PROCEDURE — 90471 IMMUNIZATION ADMIN: CPT | Performed by: INTERNAL MEDICINE

## 2025-03-31 PROCEDURE — 91320 SARSCV2 VAC 30MCG TRS-SUC IM: CPT | Performed by: INTERNAL MEDICINE

## 2025-03-31 PROCEDURE — 3074F SYST BP LT 130 MM HG: CPT | Performed by: INTERNAL MEDICINE

## 2025-03-31 PROCEDURE — 3078F DIAST BP <80 MM HG: CPT | Performed by: INTERNAL MEDICINE

## 2025-03-31 RX ORDER — CITALOPRAM HYDROBROMIDE 20 MG/1
20 TABLET ORAL DAILY
Qty: 90 TABLET | Refills: 3 | Status: SHIPPED | OUTPATIENT
Start: 2025-03-31

## 2025-03-31 RX ORDER — TRAZODONE HYDROCHLORIDE 100 MG/1
100 TABLET ORAL AT BEDTIME
Qty: 90 TABLET | Refills: 3 | Status: SHIPPED | OUTPATIENT
Start: 2025-03-31

## 2025-03-31 RX ORDER — LEVOTHYROXINE SODIUM 112 UG/1
112 TABLET ORAL DAILY
Qty: 90 TABLET | Refills: 3 | Status: SHIPPED | OUTPATIENT
Start: 2025-03-31

## 2025-03-31 NOTE — PROGRESS NOTES
"  Assessment & Plan     Major depressive disorder, recurrent episode, in full remission  Patient reports that she is doing well on current dose of antidepressant.  Refill was sent to patient's pharmacy.  - citalopram (CELEXA) 20 MG tablet; Take 1 tablet (20 mg) by mouth daily.    Acquired hypothyroidism  Patient is clinically euthyroid.  Recommend to continue with current dose of levothyroxine without any changes.  Patient is in agreement with the plan.  Recent TSH is within acceptable range.  - levothyroxine (SYNTHROID/LEVOTHROID) 112 MCG tablet; Take 1 tablet (112 mcg) by mouth daily.    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  She is on liver transplant.  Recommend checking for measles immunity.  Patient is immunosuppressed and not a candidate for 5 vaccines.  Exposure to measles may warrant treatment with immunoglobulin if patient is not immune.  - Rubeola Antibody IgG; Future    Need for pneumococcal vaccination    - Pneumococcal 20 Valent Conjugate (PCV20)  - REVIEW OF HEALTH MAINTENANCE PROTOCOL ORDERS    Psychophysiological insomnia  Discussed management of insomnia.  Recommend to continue with trazodone as she has been able to tolerate it without any issues.  - traZODone (DESYREL) 100 MG tablet; Take 1 tablet (100 mg) by mouth at bedtime.    Immunization counseling    - COVID-19 12+ (PFIZER)  I spent a total of 30 minutes on the day of the visit.   Time spent by me today doing chart review, history and exam, documentation and further activities per the note      BMI  Estimated body mass index is 45.87 kg/m  as calculated from the following:    Height as of 3/6/25: 1.803 m (5' 11\").    Weight as of this encounter: 149.2 kg (328 lb 14.4 oz).       No follow-ups on file.    Subjective   Karlene Yun is a 59 year old female that presents in clinic today for the following:     Chief Complaint   Patient presents with    [facility]   1      3/31/2025     8:17 " AM   Additional Questions   Roomed by Nu     History of Present Illness   She consumes 0 sweetened beverage(s) daily. She exercises with enough effort to increase her heart rate 3 or less days per week.   She is taking medications regularly.        Patient is here to establish care. She is currently followed by Hepatology and listed for liver transplant.  She is overall very stable.  She denies any acute concerns at present time.  She recently had blood tests and wondering if she should continue with the same medications for thyroid.      Objective    /70 (BP Location: Right arm, Patient Position: Sitting, Cuff Size: Adult Regular)   Pulse 70   Wt (!) 149.2 kg (328 lb 14.4 oz)   SpO2 95%   BMI 45.87 kg/m    Body mass index is 45.87 kg/m .  Physical Exam   GENERAL: alert and no distress  EYES: Eyes grossly normal to inspection, PERRL and conjunctivae and sclerae normal  RESP: lungs clear to auscultation - no rales, rhonchi or wheezes  CV: regular rate and rhythm, normal S1 S2, no S3 or S4, soft systolic murmur at RUSB, no click or rub, no peripheral edema  ABDOMEN: soft, nontender and bowel sounds normal  MS: no gross musculoskeletal defects noted, no edema  SKIN: no suspicious lesions or rashes  NEURO: Normal strength and tone, mentation intact and speech normal            Signed Electronically by: María Hanson MD

## 2025-04-02 ENCOUNTER — OFFICE VISIT (OUTPATIENT)
Dept: PULMONOLOGY | Facility: CLINIC | Age: 60
End: 2025-04-02
Attending: STUDENT IN AN ORGANIZED HEALTH CARE EDUCATION/TRAINING PROGRAM
Payer: COMMERCIAL

## 2025-04-02 ENCOUNTER — LAB (OUTPATIENT)
Dept: LAB | Facility: CLINIC | Age: 60
End: 2025-04-02
Payer: COMMERCIAL

## 2025-04-02 VITALS — HEART RATE: 70 BPM | DIASTOLIC BLOOD PRESSURE: 79 MMHG | OXYGEN SATURATION: 94 % | SYSTOLIC BLOOD PRESSURE: 149 MMHG

## 2025-04-02 DIAGNOSIS — R93.89 ABNORMAL CHEST CT: Primary | ICD-10-CM

## 2025-04-02 DIAGNOSIS — K74.60 CIRRHOSIS OF LIVER NOT DUE TO ALCOHOL (H): ICD-10-CM

## 2025-04-02 DIAGNOSIS — J90 PLEURAL EFFUSION: ICD-10-CM

## 2025-04-02 DIAGNOSIS — K75.81 NASH (NONALCOHOLIC STEATOHEPATITIS): ICD-10-CM

## 2025-04-02 LAB
ALBUMIN SERPL BCG-MCNC: 2.5 G/DL (ref 3.5–5.2)
ALP SERPL-CCNC: 66 U/L (ref 40–150)
ALT SERPL W P-5'-P-CCNC: 53 U/L (ref 0–50)
ANION GAP SERPL CALCULATED.3IONS-SCNC: 5 MMOL/L (ref 7–15)
AST SERPL W P-5'-P-CCNC: 79 U/L (ref 0–45)
BILIRUB DIRECT SERPL-MCNC: 1.67 MG/DL (ref 0–0.3)
BILIRUB SERPL-MCNC: 3.5 MG/DL
BUN SERPL-MCNC: 12.2 MG/DL (ref 8–23)
CALCIUM SERPL-MCNC: 8.1 MG/DL (ref 8.8–10.4)
CHLORIDE SERPL-SCNC: 105 MMOL/L (ref 98–107)
CREAT SERPL-MCNC: 0.76 MG/DL (ref 0.51–0.95)
EGFRCR SERPLBLD CKD-EPI 2021: 89 ML/MIN/1.73M2
GLUCOSE SERPL-MCNC: 172 MG/DL (ref 70–99)
HCO3 SERPL-SCNC: 22 MMOL/L (ref 22–29)
INR PPP: 1.8 (ref 0.85–1.15)
POTASSIUM SERPL-SCNC: 3.9 MMOL/L (ref 3.4–5.3)
PROT SERPL-MCNC: 6.2 G/DL (ref 6.4–8.3)
SODIUM SERPL-SCNC: 132 MMOL/L (ref 135–145)

## 2025-04-02 PROCEDURE — 85610 PROTHROMBIN TIME: CPT | Performed by: PATHOLOGY

## 2025-04-02 PROCEDURE — G0463 HOSPITAL OUTPT CLINIC VISIT: HCPCS | Performed by: STUDENT IN AN ORGANIZED HEALTH CARE EDUCATION/TRAINING PROGRAM

## 2025-04-02 PROCEDURE — 36415 COLL VENOUS BLD VENIPUNCTURE: CPT | Performed by: PATHOLOGY

## 2025-04-02 PROCEDURE — 82248 BILIRUBIN DIRECT: CPT | Performed by: PATHOLOGY

## 2025-04-02 PROCEDURE — 86765 RUBEOLA ANTIBODY: CPT | Performed by: INTERNAL MEDICINE

## 2025-04-02 PROCEDURE — 1125F AMNT PAIN NOTED PAIN PRSNT: CPT | Performed by: STUDENT IN AN ORGANIZED HEALTH CARE EDUCATION/TRAINING PROGRAM

## 2025-04-02 PROCEDURE — 3078F DIAST BP <80 MM HG: CPT | Performed by: STUDENT IN AN ORGANIZED HEALTH CARE EDUCATION/TRAINING PROGRAM

## 2025-04-02 PROCEDURE — 99215 OFFICE O/P EST HI 40 MIN: CPT | Performed by: STUDENT IN AN ORGANIZED HEALTH CARE EDUCATION/TRAINING PROGRAM

## 2025-04-02 PROCEDURE — 3077F SYST BP >= 140 MM HG: CPT | Performed by: STUDENT IN AN ORGANIZED HEALTH CARE EDUCATION/TRAINING PROGRAM

## 2025-04-02 PROCEDURE — 80053 COMPREHEN METABOLIC PANEL: CPT | Performed by: PATHOLOGY

## 2025-04-02 PROCEDURE — 99000 SPECIMEN HANDLING OFFICE-LAB: CPT | Performed by: PATHOLOGY

## 2025-04-02 ASSESSMENT — PAIN SCALES - GENERAL: PAINLEVEL_OUTOF10: MODERATE PAIN (5)

## 2025-04-02 NOTE — NURSING NOTE
Chief Complaint   Patient presents with    Return pulmonary     Medications reviewed and vital signs taken.   Ruiz Ibarra, EMT

## 2025-04-02 NOTE — PROGRESS NOTES
Pulmonology Clinic Appointment     Karlene Yun MRN# [id number]   Age: 59 year old YOB: 1965       Reason for consult:    Karlene Yun is a 59 year old year old female who is being seen for Return pulmonary        Requesting/Referring physician:    Cooper Voss           Assessment and Plan:     1.Pleural effusion, Left, Exudative  2.Candidacy for liver transplant    Again is extremely unclear what the underlying etiology of the pleural effusion on the left that appears to have developed between December 2024 and the February 2025 CT scans.  The pleural fluid studies based on her serum upper limit of normal for her would suggest a fluid LDH of 0.69 relative to the upper limit of normal serum level, which would be consistent with an exudate.  However pleural protein levels relatively low somewhat contrary to what would be expected an exudative process.  Her total nucleated cell were elevated.  However she does not have any constitutional symptoms or smoking history, nor parenchymal lesion that would suggest a malignant process, nor any clinical history that is consistent with infection, and cytology was negative for malignant cells, though a single sampling may be under representative/less sensitive.     -Would favor a nonneoplastic/benign etiology  - Will repeat CT scan of the chest.  If effusion has enlarged or is persistent will consider repeat sampling.  If there are any notable lesions will consider referral for biopsies.  Otherwise, if effusion resolved and no other lesions, would not see any definitive contraindication to liver transplant.         History of Present Illness:   Karlene Yun is a 59-year-old female with osteoarthritis, psoriasis, obesity, NEREYDA, cirrhosis, pending liver transplant, referred due to prior abnormal chest CT for which review is requested and pulmonary clearance requested for liver transplant. After last visit I referred her for sampling of a pleural  effusion and she is here in follow up.      Occupation: worked retail  Smoking: never smoked   Says has intentionally lost maybe around 10lb over course of several months. Denies night sweats, chills.   Again patient denies having had any recent respiratory illness.    Had pulmonary embolism in 2002 and was on a blood thinner for a brief period.   Otherwise without other current respiratory complaints or hx of respiratory disease.   Pulmonary function test only show a mild restrictive defect, normal diffusion and no obstruction.   CT chest available from 2005 show Parenchyma grossly normal, a couple of small micro nodules, 1 in the posterior right upper lobe, and the other at the right lung base.    Review of imaging shows that she has previously had bilateral pleural effusions in October 2023.  With associated dependent zone atelectasis versus less likely consolidation particularly on the left.  Recently a coronary CTA was done December 27.  Interpretation suggested borderline enlarged subcarinal and right hilar lymphadenopathy.  Dedicated chest CT performed 2/12/2025, shows Most notably a left lower lobe possible consolidation vs atelectasis with adjacent pleural effusion.  There is some trace perihepatic ascites as well. There are notably enlarged right paratracheal lymph nodes there is also notably a little bit of interstitial thickening in the right apex.  Patient has a negative prior QuantiFERON in November  Appears that patient has previously had lymph node biopsy of the subcarinal lymph node in August 2024 (via EUS) which only showed polymorphous lymphocytes and no evidence of malignancy.    After last visit had left pleural effusion sampling with interventional radiology on 3/6/2025   pleural Effusion Characteristics:  LDH: pleural 166   Protein: pleural 1.7   Glucose: 128  Gram stain/Culture: negative  Cell count: 1898 TNC  Differential    Cytology:   - No morphologic evidence of malignancy  - Mixed  "inflammation  - Please correlate with microbiologic workup    LDH:             Past Medical History:     Past Medical History:   Diagnosis Date    Hyperlipidemia 3/17/2005     Problem list name updated by automated process. Provider to review    Hypothyroidism 6/26/2002     Problem list name updated by automated process. Provider to review    Major depressive disorder, recurrent episode, in full remission 1/21/2002    Morbid obesity (H) 5/12/2019    Psoriatic arthropathy (H) 1/21/2002          Past Surgical History:     Past Surgical History:   Procedure Laterality Date    COLONOSCOPY  2/11/2016    mild colitis/ repeat 10 yrs    ESOPHAGOSCOPY, GASTROSCOPY, DUODENOSCOPY (EGD), COMBINED N/A 8/9/2024    Procedure: ESOPHAGOGASTRODUODENOSCOPY for variceal surveillance;  Surgeon: Guru Jacquelyn Rees MD;  Location:  OR    ESOPHAGOSCOPY, GASTROSCOPY, DUODENOSCOPY (EGD), COMBINED N/A 8/9/2024    Procedure: ESOPHAGOGASTRODUODENOSCOPY, WITH FINE NEEDLE ASPIRATION BIOPSY, WITH ENDOSCOPIC ULTRASOUND GUIDANCE liver and lymph node biopsy;  Surgeon: Guru Jacquelyn Rees MD;  Location:  OR     REMOVAL OF TONSILS,<11 Y/O      Tonsils <11y.o.    IR LUMBAR PUNCTURE  6/30/2023    IR THORACENTESIS  3/6/2025    LASIK  2004    \"lasek\"    THORACENTESIS Left 3/6/2025    Procedure: Thoracentesis;  Surgeon: Jaden Martinez MD;  Location: [facility] [place] OR          Social History:     Social History     Socioeconomic History    Marital status:      Spouse name: Not on file    Number of children: 1    Years of education: Not on file    Highest education level: Not on file   Occupational History    Not on file   Tobacco Use    Smoking status: Never    Smokeless tobacco: Never   Substance and Sexual Activity    Alcohol use: Not Currently     Comment: last drink 3 years ago    Drug use: Never    Sexual activity: Not Currently     Partners: Male   Other Topics Concern    Parent/sibling w/ CABG, MI or " angioplasty before 65F 55M? Yes     Comment: Father   Social History Narrative    Not on file     Social Drivers of Health     Financial Resource Strain: Low Risk  (3/26/2025)    Financial Resource Strain     Within the past 12 months, have you or your family members you live with been unable to get utilities (heat, electricity) when it was really needed?: No   Food Insecurity: High Risk (3/26/2025)    Food Insecurity     Within the past 12 months, did you worry that your food would run out before you got money to buy more?: Yes     Within the past 12 months, did the food you bought just not last and you didn t have money to get more?: Yes   Transportation Needs: Low Risk  (3/26/2025)    Transportation Needs     Within the past 12 months, has lack of transportation kept you from medical appointments, getting your medicines, non-medical meetings or appointments, work, or from getting things that you need?: No   Physical Activity: Not on file   Stress: Not on file   Social Connections: Not on file   Interpersonal Safety: Low Risk  (3/6/2025)    Interpersonal Safety     Do you feel physically and emotionally safe where you currently live?: Yes     Within the past 12 months, have you been hit, slapped, kicked or otherwise physically hurt by someone?: No     Within the past 12 months, have you been humiliated or emotionally abused in other ways by your partner or ex-partner?: No   Housing Stability: Low Risk  (3/26/2025)    Housing Stability     Do you have housing? : Yes     Are you worried about losing your housing?: No           Family History:     Family History   Problem Relation Age of Onset    Cancer Sister         cervical    Arthritis Paternal Grandmother     Diabetes Father            Immunizations:     Immunization History   Administered Date(s) Administered    COVID-19 12+ (MODERNA) 10/05/2023    COVID-19 12+ (Pfizer) 09/26/2024, 03/31/2025    COVID-19 Bivalent 12+ (Pfizer) 09/20/2022    COVID-19 Monovalent  18+ (Moderna) 03/10/2021, 04/07/2021, 08/01/2021, 08/25/2021, 05/23/2022    Hep B, Adult (Heplisav- B) 09/20/2022    INFLUENZA,TRIVALENT (FLUCELVAX) 09/12/2024    Influenza (IIV3) PF 09/15/2011, 10/19/2012, 10/20/2013    Influenza Vaccine >6 months,quad, PF 09/29/2014, 09/12/2016, 10/03/2019, 10/15/2020, 09/20/2022    Influenza Vaccine, 6+MO IM (QUADRIVALENT W/PRESERVATIVES) 10/05/2016, 09/26/2017    Influenza,INJ,MDCK,PF,Quad >6mo(Flucelvax) 10/09/2018, 10/05/2023    Nasal Influenza Vaccine 2-49 (FluMist) 09/19/2015    Pneumococcal 20 valent Conjugate (Prevnar 20) 03/31/2025    Pneumococcal 23 valent 11/16/2016    TD,PF 7+ (Tenivac) 01/01/1999, 04/22/2020    TDAP Vaccine (Boostrix) 02/12/2010    Zoster recombinant adjuvanted (Shingrix) 11/11/2022, 01/16/2023          Allergies:     Allergies   Allergen Reactions    Sumatriptan Succinate Nausea and Vomiting     IMITREX          Medications:     Current Outpatient Medications   Medication Sig Dispense Refill    bumetanide (BUMEX) 1 MG tablet Take 1 tablet (1 mg) by mouth daily. 90 tablet 3    citalopram (CELEXA) 20 MG tablet Take 1 tablet (20 mg) by mouth daily. 90 tablet 3    gabapentin (NEURONTIN) 300 MG capsule 300 mg qam, 600 mg qpm 270 capsule 1    ixekizumab (TALTZ) 80 MG/ML SOAJ auto-injector 160 mg once, followed by 80 mg at weeks 2, 4, 6, 8, 10, and 12, and then 80 mg every 4 weeks 9 mL 0    lactulose 20 GM/30ML solution Take 15 mLs (10 g) by mouth every morning. 946 mL 4    levothyroxine (SYNTHROID/LEVOTHROID) 112 MCG tablet Take 1 tablet (112 mcg) by mouth daily. 90 tablet 3    spironolactone (ALDACTONE) 50 MG tablet Take 2 tablets (100 mg) by mouth daily. 180 tablet 3    tacrolimus (PROTOPIC) 0.1 % external ointment Apply topically as needed      traZODone (DESYREL) 100 MG tablet Take 1 tablet (100 mg) by mouth at bedtime. 90 tablet 3    traZODone (DESYREL) 50 MG tablet Take 100 mg by mouth At Bedtime  0    triamcinolone (KENALOG) 0.1 % external cream  Apply topically as needed       No current facility-administered medications for this visit.          Review of Systems:   CONSTITUTIONAL: NEGATIVE for fever, chills, change in weight  ENT/MOUTH: NEGATIVE for ear, mouth and throat problems  RESP: NEGATIVE for significant cough or SOB  CV: NEGATIVE for chest pain, palpitations or peripheral edema       Physical Exam:   BP (!) 149/79 (BP Location: Right arm, Patient Position: Sitting, Cuff Size: Adult Regular)   Pulse 70   SpO2 94%   Patient declined being weighed.     GENERAL APPEARANCE:  alert, in no apparent distress.  HEENT: PERRL, EOMI   NECK: supple.  RESP: Good air flow throughout.  Clear to auscultation. No crackles. No rhonchi. No wheezes.   CV: +JVD,  Normal S1, S2, regular rhythm, normal rate. No murmur.    MS: extremities grossly normal. No clubbing. No cyanosis.  SKIN: no rash on limited exam   NEURO: speech normal, normal strength and tone, normal gait and stance  PSYCH: normal mood and affect         Data:   All laboratory data reviewed  All cardiac studies reviewed by me.  All imaging studies reviewed by me.    Recent Results (from the past 26 weeks)   INR    Collection Time: 10/22/24  8:43 AM   Result Value Ref Range    INR 1.55 (H) 0.85 - 1.15   Hepatic function panel    Collection Time: 10/22/24  8:43 AM   Result Value Ref Range    Protein Total 7.0 6.4 - 8.3 g/dL    Albumin 2.6 (L) 3.5 - 5.2 g/dL    Bilirubin Total 3.5 (H) <=1.2 mg/dL    Alkaline Phosphatase 69 40 - 150 U/L    AST 60 (H) 0 - 45 U/L    ALT 26 0 - 50 U/L    Bilirubin Direct 1.16 (H) 0.00 - 0.30 mg/dL   Basic metabolic panel    Collection Time: 10/22/24  8:43 AM   Result Value Ref Range    Sodium 135 135 - 145 mmol/L    Potassium 4.1 3.4 - 5.3 mmol/L    Chloride 104 98 - 107 mmol/L    Carbon Dioxide (CO2) 24 22 - 29 mmol/L    Anion Gap 7 7 - 15 mmol/L    Urea Nitrogen 11.3 8.0 - 23.0 mg/dL    Creatinine 0.90 0.51 - 0.95 mg/dL    GFR Estimate 73 >60 mL/min/1.73m2    Calcium 8.7 (L)  8.8 - 10.4 mg/dL    Glucose 114 (H) 70 - 99 mg/dL   CBC with platelets    Collection Time: 10/22/24  8:43 AM   Result Value Ref Range    WBC Count 4.0 4.0 - 11.0 10e3/uL    RBC Count 4.12 3.80 - 5.20 10e6/uL    Hemoglobin 13.9 11.7 - 15.7 g/dL    Hematocrit 39.6 35.0 - 47.0 %    MCV 96 78 - 100 fL    MCH 33.7 (H) 26.5 - 33.0 pg    MCHC 35.1 31.5 - 36.5 g/dL    RDW 14.8 10.0 - 15.0 %    Platelet Count 67 (L) 150 - 450 10e3/uL   Rheumatoid factor    Collection Time: 10/22/24  8:43 AM   Result Value Ref Range    Rheumatoid Factor <10 <14 IU/mL   Cyclic Citrullinated Peptide Antibody IgG    Collection Time: 10/22/24  8:43 AM   Result Value Ref Range    Cyclic Citrullinated Peptide Antibody IgG 6.2 <7.0 U/mL   CRP inflammation    Collection Time: 10/22/24  8:43 AM   Result Value Ref Range    CRP Inflammation 3.09 <5.00 mg/L   Antibody titer red cell    Collection Time: 11/26/24  8:00 AM   Result Value Ref Range    Anti-B IgG Titer 16     Anti-B IgM Titer 32     SPECIMEN EXPIRATION DATE [id number]     ANTIBODY TITER IGM SCREEN Negative    Lipid Profile    Collection Time: 11/26/24  8:00 AM   Result Value Ref Range    Cholesterol 175 <200 mg/dL    Triglycerides 99 <150 mg/dL    Direct Measure HDL 58 >=50 mg/dL    LDL Cholesterol Calculated 97 <100 mg/dL    Non HDL Cholesterol 117 <130 mg/dL    Patient Fasting > 8hrs? Yes    Basic metabolic panel    Collection Time: 11/26/24  8:00 AM   Result Value Ref Range    Sodium 136 135 - 145 mmol/L    Potassium 3.7 3.4 - 5.3 mmol/L    Chloride 106 98 - 107 mmol/L    Carbon Dioxide (CO2) 24 22 - 29 mmol/L    Anion Gap 6 (L) 7 - 15 mmol/L    Urea Nitrogen 10.3 8.0 - 23.0 mg/dL    Creatinine 0.82 0.51 - 0.95 mg/dL    GFR Estimate 82 >60 mL/min/1.73m2    Calcium 8.4 (L) 8.8 - 10.4 mg/dL    Glucose 131 (H) 70 - 99 mg/dL    Patient Fasting > 8hrs? Yes    Hepatic panel    Collection Time: 11/26/24  8:00 AM   Result Value Ref Range    Protein Total 6.5 6.4 - 8.3 g/dL    Albumin 2.6 (L)  3.5 - 5.2 g/dL    Bilirubin Total 2.4 (H) <=1.2 mg/dL    Alkaline Phosphatase 74 40 - 150 U/L    AST 69 (H) 0 - 45 U/L    ALT 43 0 - 50 U/L    Bilirubin Direct 0.90 (H) 0.00 - 0.30 mg/dL   AFP tumor marker    Collection Time: 11/26/24  8:00 AM   Result Value Ref Range    AFP tumor marker 4.6 <=8.3 ng/mL   Ferritin    Collection Time: 11/26/24  8:00 AM   Result Value Ref Range    Ferritin 762 (H) 11 - 328 ng/mL   Hemoglobin A1c    Collection Time: 11/26/24  8:00 AM   Result Value Ref Range    Estimated Average Glucose 91 <117 mg/dL    Hemoglobin A1C 4.8 <5.7 %   Iron and iron binding capacity    Collection Time: 11/26/24  8:00 AM   Result Value Ref Range    Iron 112 37 - 145 ug/dL    Iron Binding Capacity 175 (L) 240 - 430 ug/dL    Iron Sat Index 64 (H) 15 - 46 %   Phosphorus    Collection Time: 11/26/24  8:00 AM   Result Value Ref Range    Phosphorus 3.4 2.5 - 4.5 mg/dL   TSH with free T4 reflex    Collection Time: 11/26/24  8:00 AM   Result Value Ref Range    TSH 0.40 0.30 - 4.20 uIU/mL   Transferrin    Collection Time: 11/26/24  8:00 AM   Result Value Ref Range    Transferrin 151.0 (L) 200.0 - 360.0 mg/dL   Vitamin D Deficiency    Collection Time: 11/26/24  8:00 AM   Result Value Ref Range    Vitamin D, Total (25-Hydroxy) 22 20 - 50 ng/mL   INR    Collection Time: 11/26/24  8:00 AM   Result Value Ref Range    INR 1.69 (H) 0.85 - 1.15   CBC with platelets    Collection Time: 11/26/24  8:00 AM   Result Value Ref Range    WBC Count 4.4 4.0 - 11.0 10e3/uL    RBC Count 3.79 (L) 3.80 - 5.20 10e6/uL    Hemoglobin 12.8 11.7 - 15.7 g/dL    Hematocrit 36.5 35.0 - 47.0 %    MCV 96 78 - 100 fL    MCH 33.8 (H) 26.5 - 33.0 pg    MCHC 35.1 31.5 - 36.5 g/dL    RDW 15.3 (H) 10.0 - 15.0 %    Platelet Count 64 (L) 150 - 450 10e3/uL   CMV Antibody IgG    Collection Time: 11/26/24  8:00 AM   Result Value Ref Range    CMV Rachel IgG Instrument Value <0.20 <0.60 U/mL    CMV Antibody IgG No detectable antibody. No detectable antibody.     EBV Capsid Antibody IgG    Collection Time: 11/26/24  8:00 AM   Result Value Ref Range    EBV Capsid Rachel IgG Instrument Value >750.0 (H) <18.0 U/mL    EBV Capsid Antibody IgG Positive (A) No detectable antibody.   Hepatitis A Antibody Total    Collection Time: 11/26/24  8:00 AM   Result Value Ref Range    Hepatitis A Antibody Total Reactive    Hepatitis B core antibody    Collection Time: 11/26/24  8:00 AM   Result Value Ref Range    Hepatitis B Core Antibody Total Nonreactive Nonreactive   Hepatitis B Surface Antibody    Collection Time: 11/26/24  8:00 AM   Result Value Ref Range    Hepatitis B Surface Antibody Nonreactive     Hepatitis B Surface Antibody Instrument Value <3.50 <8.5 m[IU]/mL   Hepatitis B surface antigen    Collection Time: 11/26/24  8:00 AM   Result Value Ref Range    Hepatitis B Surface Antigen Nonreactive Nonreactive   Hepatitis C antibody    Collection Time: 11/26/24  8:00 AM   Result Value Ref Range    Hepatitis C Antibody Nonreactive Nonreactive   HIV Antigen Antibody Combo Pretransplant Cascade    Collection Time: 11/26/24  8:00 AM   Result Value Ref Range    HIV Antigen Antibody Combo Pretransplant Nonreactive Nonreactive   Treponema Abs w Reflex to RPR and Titer    Collection Time: 11/26/24  8:00 AM   Result Value Ref Range    Treponema Antibody Total Nonreactive Nonreactive   Phosphatidylethanol (PEth), Whole Blood    Collection Time: 11/26/24  8:00 AM   Result Value Ref Range    PEth 16:0/18:1 (POPEth) <10 ng/mL    PEth 16:0/18:2 (PLPEth) <10 ng/mL    EER Phosphatidylethanol (PETH) See Note     PEth Interpretation See Comment    Adult Type and Screen    Collection Time: 11/26/24  8:00 AM   Result Value Ref Range    ABO/RH(D) A POS     Antibody Screen Negative Negative    SPECIMEN EXPIRATION DATE [id number]    Quantiferon TB Gold Plus Grey Tube    Collection Time: 11/26/24  8:00 AM    Specimen: Arm, Right; Blood   Result Value Ref Range    Quantiferon Nil Tube 0.37 IU/mL    Quantiferon TB Gold Plus Green Tube    Collection Time: 11/26/24  8:00 AM    Specimen: Arm, Right; Blood   Result Value Ref Range    Quantiferon TB1 Tube 0.32 IU/mL   Quantiferon TB Gold Plus Yellow Tube    Collection Time: 11/26/24  8:00 AM    Specimen: Arm, Right; Blood   Result Value Ref Range    Quantiferon TB2 Tube 0.32    Quantiferon TB Gold Plus Purple Tube    Collection Time: 11/26/24  8:00 AM    Specimen: Arm, Right; Blood   Result Value Ref Range    Quantiferon Mitogen 6.39 IU/mL   Quantiferon TB Gold Plus    Collection Time: 11/26/24  8:00 AM    Specimen: Arm, Right; Blood   Result Value Ref Range    Quantiferon-TB Gold Plus Negative Negative    TB1 Ag minus Nil Value -0.05 IU/mL    TB2 Ag minus Nil Value -0.05 IU/mL    Mitogen minus Nil Result 6.02 IU/mL    Nil Result 0.37 IU/mL   ABO and Rh    Collection Time: 11/26/24  8:05 AM   Result Value Ref Range    ABO/RH(D) A POS     SPECIMEN EXPIRATION DATE [id number]    EKG 12-lead, tracing only [EKG1]    Collection Time: 11/26/24  8:16 AM   Result Value Ref Range    Systolic Blood Pressure  mmHg    Diastolic Blood Pressure  mmHg    Ventricular Rate 63 BPM    Atrial Rate 63 BPM    NC Interval 158 ms    QRS Duration 104 ms     ms    QTc 458 ms    P Axis 5 degrees    R AXIS -28 degrees    T Axis 0 degrees    Interpretation ECG       Sinus rhythm  Incomplete right bundle branch block  Borderline ECG  No previous ECGs available  Confirmed by MD CUATE, SAMUEL ([id number]) on 11/26/2024 2:51:18 PM     Protein  random urine    Collection Time: 11/26/24  8:19 AM   Result Value Ref Range    Total Protein Urine mg/dL 15.7   mg/dL    Total Protein Urine mg/mg Creat 0.06 0.00 - 0.20 mg/mg Cr    Creatinine Urine mg/dL 257.0 mg/dL   UA Macroscopic with reflex to Microscopic and Culture    Collection Time: 11/26/24  8:19 AM    Specimen: Urine, Midstream   Result Value Ref Range    Color Urine Yellow Colorless, Straw, Light Yellow, Yellow    Appearance Urine Clear  Clear    Glucose Urine Negative Negative mg/dL    Bilirubin Urine Negative Negative    Ketones Urine Trace (A) Negative mg/dL    Specific Gravity Urine 1.026 1.003 - 1.035    Blood Urine Negative Negative    pH Urine 5.5 5.0 - 7.0    Protein Albumin Urine 10 (A) Negative mg/dL    Urobilinogen Urine 3.0 (A) Normal, 2.0 mg/dL    Nitrite Urine Negative Negative    Leukocyte Esterase Urine Negative Negative    Bacteria Urine Few (A) None Seen /HPF    Mucus Urine Present (A) None Seen /LPF    RBC Urine 1 <=2 /HPF    WBC Urine 1 <=5 /HPF    Squamous Epithelials Urine 6 (H) <=1 /HPF    Hyaline Casts Urine 1 <=2 /LPF   Echocardiogram Complete    Collection Time: 12/27/24 10:21 AM   Result Value Ref Range    LVEF  60-65%    Basic metabolic panel    Collection Time: 01/22/25  2:23 PM   Result Value Ref Range    Sodium 135 135 - 145 mmol/L    Potassium 4.0 3.4 - 5.3 mmol/L    Chloride 104 98 - 107 mmol/L    Carbon Dioxide (CO2) 24 22 - 29 mmol/L    Anion Gap 7 7 - 15 mmol/L    Urea Nitrogen 8.6 8.0 - 23.0 mg/dL    Creatinine 0.79 0.51 - 0.95 mg/dL    GFR Estimate 85 >60 mL/min/1.73m2    Calcium 7.9 (L) 8.8 - 10.4 mg/dL    Glucose 124 (H) 70 - 99 mg/dL   Hepatic panel    Collection Time: 01/22/25  2:23 PM   Result Value Ref Range    Protein Total 6.7 6.4 - 8.3 g/dL    Albumin 2.6 (L) 3.5 - 5.2 g/dL    Bilirubin Total 4.0 (H) <=1.2 mg/dL    Alkaline Phosphatase 71 40 - 150 U/L    AST 78 (H) 0 - 45 U/L    ALT 45 0 - 50 U/L    Bilirubin Direct 1.04 (H) 0.00 - 0.30 mg/dL   INR    Collection Time: 01/22/25  2:23 PM   Result Value Ref Range    INR 1.62 (H) 0.85 - 1.15   Basic metabolic panel    Collection Time: 02/07/25  2:08 PM   Result Value Ref Range    Sodium 135 135 - 145 mmol/L    Potassium 4.1 3.4 - 5.3 mmol/L    Chloride 101 98 - 107 mmol/L    Carbon Dioxide (CO2) 22 22 - 29 mmol/L    Anion Gap 12 7 - 15 mmol/L    Urea Nitrogen 10.4 8.0 - 23.0 mg/dL    Creatinine 0.85 0.51 - 0.95 mg/dL    GFR Estimate 78 >60 mL/min/1.73m2     Calcium 8.5 (L) 8.8 - 10.4 mg/dL    Glucose 96 70 - 99 mg/dL   Hepatic panel    Collection Time: 02/07/25  2:08 PM   Result Value Ref Range    Protein Total 6.8 6.4 - 8.3 g/dL    Albumin 2.6 (L) 3.5 - 5.2 g/dL    Bilirubin Total 4.4 (H) <=1.2 mg/dL    Alkaline Phosphatase 70 40 - 150 U/L    AST 81 (H) 0 - 45 U/L    ALT 41 0 - 50 U/L    Bilirubin Direct 1.24 (H) 0.00 - 0.30 mg/dL   INR    Collection Time: 02/07/25  2:08 PM   Result Value Ref Range    INR 1.69 (H) 0.85 - 1.15   Pulmonary function test    Collection Time: 02/17/25  2:43 PM   Result Value Ref Range    FVC-Pred 3.62 L    FVC-Pre 2.92 L    FVC-%Pred-Pre 80 %    FEV1-Pre 2.34 L    FEV1-%Pred-Pre 82 %    FEV1FVC-Pred 79 %    FEV1FVC-Pre 80 %    FEFMax-Pred 7.33 L/sec    FEFMax-Pre 8.26 L/sec    FEFMax-%Pred-Pre 112 %    FEF2575-Pred 2.46 L/sec    FEF2575-Pre 2.34 L/sec    KKF0198-%Pred-Pre 95 %    ExpTime-Pre 6.06 sec    FIFMax-Pre 4.30 L/sec    VC-Pred 3.93 L    VC-Pre 3.05 L    VC-%Pred-Pre 77 %    IC-Pred 2.73 L    IC-Pre 2.51 L    IC-%Pred-Pre 92 %    ERV-Pred 1.36 L    ERV-Pre 0.54 L    ERV-%Pred-Pre 39 %    FEV1FEV6-Pred 81 %    FEV1FEV6-Pre 81 %    FRCPleth-Pred 3.45 L    FRCPleth-Pre 2.22 L    FRCPleth-%Pred-Pre 64 %    RVPleth-Pred 2.20 L    RVPleth-Pre 1.68 L    RVPleth-%Pred-Pre 76 %    TLCPleth-Pred 6.03 L    TLCPleth-Pre 4.73 L    TLCPleth-%Pred-Pre 78 %    DLCOunc-Pred 23.09 ml/min/mmHg    DLCOunc-Pre 18.39 ml/min/mmHg    DLCOunc-%Pred-Pre 79 %    VA-Pre 4.74 L    VA-%Pred-Pre 81 %    FEV1SVC-Pred 72 %    FEV1SVC-Pre 77 %   CBC with platelets    Collection Time: 03/06/25 10:09 AM   Result Value Ref Range    WBC Count 6.4 4.0 - 11.0 10e3/uL    RBC Count 4.21 3.80 - 5.20 10e6/uL    Hemoglobin 13.9 11.7 - 15.7 g/dL    Hematocrit 40.3 35.0 - 47.0 %    MCV 96 78 - 100 fL    MCH 33.0 26.5 - 33.0 pg    MCHC 34.5 31.5 - 36.5 g/dL    RDW 16.3 (H) 10.0 - 15.0 %    Platelet Count 70 (L) 150 - 450 10e3/uL   Cytology, non-gynecologic    Collection Time:  03/06/25 11:43 AM   Result Value Ref Range    Final Diagnosis       PLEURAL CAVITY, PLEURAL FLUID:  Interpretation -   - No morphologic evidence of malignancy  - Mixed inflammation  - Please correlate with microbiologic workup    Adequacy: Satisfactory for evaluation      Clinical Information       59-year-old woman with left effusion       Gross Description       A(A). Pleural Cavity, :A. Pleural Cavity, , Pleural Fluid:  Received 50 ml of hazy, orange fluid, processed as 1 Pap stained Autocyte,  1 Fontaine stained cytospin and one hematoxylin and eosin stained cell block.       Microscopic Description       Microscopic examination is performed.  The cellblock and SurePath preparation are cellular and are composed of reactive appearing mesothelial cells arranged individually and in clusters.  Background lymphocytes, macrophages and eosinophils are also identified, no distinct two cell epithelioid population is appreciated.    Case was reviewed by the following:  Pathology Fellow: Queta Graham MD  Pathology Fellow: Negrita Marinelli DO  Resident Pathologist: Marvin Segovia MD  A resident or fellow in a training program was involved in the initial review, preparation, and/or interpretation of this case.  I, as the senior physician, attest that I have personally reviewed all specimens and or slides, including the listed special stains, and used them with my medical judgement to determine the final diagnosis.              Performing Labs       The technical component of this testing was completed at [facility].     Stain controls for all stains resulted within this report have been reviewed and show appropriate reactivity.      Lactate dehydrogenase fluid    Collection Time: 03/06/25 11:43 AM   Result Value Ref Range    LD Fluid Source Pleural Cavity, Left     Lactate dehydrogenase fluid 166 U/L   Cell Count Body Fluid    Collection  Time: 03/06/25 11:43 AM   Result Value Ref Range    Color Yellow Colorless, Yellow    Clarity Cloudy (A) Clear    Cell Count Fluid Source Pleural Cavity, Left     Total Nucleated Cells 1,898 /uL   Protein fluid    Collection Time: 03/06/25 11:43 AM   Result Value Ref Range    Protein Fluid Source Pleural Cavity, Left     Protein Total Fluid 1.7 g/dL   Pleural fluid Aerobic Bacterial Culture Routine With Gram Stain    Collection Time: 03/06/25 11:43 AM    Specimen: Pleural Cavity; Pleural fluid   Result Value Ref Range    Culture No Growth     Gram Stain Result No organisms seen     Gram Stain Result 4+ WBC seen    Glucose fluid    Collection Time: 03/06/25 11:43 AM   Result Value Ref Range    Glucose Fluid Source Pleural Cavity, Left     Glucose fluid 128 mg/dL   Differential Body Fluid    Collection Time: 03/06/25 11:43 AM   Result Value Ref Range    % Neutrophils 1 %    % Lymphocytes 38 %    % Monocyte/Macrophages 0 %    % Eosinophils 1 %    % Other Cells 60 %   Extra Body Fluid/CSF Collection    Collection Time: 03/06/25 11:45 AM   Result Value Ref Range    Hold Specimen [facility]    Basic metabolic panel    Collection Time: 03/06/25  1:05 PM   Result Value Ref Range    Sodium 136 135 - 145 mmol/L    Potassium 4.0 3.4 - 5.3 mmol/L    Chloride 102 98 - 107 mmol/L    Carbon Dioxide (CO2) 27 22 - 29 mmol/L    Anion Gap 7 7 - 15 mmol/L    Urea Nitrogen 12.9 8.0 - 23.0 mg/dL    Creatinine 0.86 0.51 - 0.95 mg/dL    GFR Estimate 77 >60 mL/min/1.73m2    Calcium 8.8 8.8 - 10.4 mg/dL    Glucose 95 70 - 99 mg/dL   Hepatic panel    Collection Time: 03/06/25  1:05 PM   Result Value Ref Range    Protein Total 7.3 6.4 - 8.3 g/dL    Albumin 2.9 (L) 3.5 - 5.2 g/dL    Bilirubin Total 4.9 (H) <=1.2 mg/dL    Alkaline Phosphatase 83 40 - 150 U/L    AST 76 (H) 0 - 45 U/L    ALT 48 0 - 50 U/L    Bilirubin Direct 1.97 (H) 0.00 - 0.30 mg/dL   INR    Collection Time: 03/06/25  1:05 PM   Result Value Ref Range    INR 1.62 (H) 0.85 - 1.15                I spent a total of 47 minutes on the day of the encounter dedicated to the office visit with Karlene Yun.    This included review of vitals, labs, imaging, other diagnostic tests (I.e. PFTs, ECHO), face-to-face interaction, physical exam, counseling the patient +/- family members, and/or coordinating care.  This excludes time spent on lung cancer screening or smoking cessation counseling.    Cooper Voss MD

## 2025-04-02 NOTE — LETTER
4/2/2025      Karlene Yun  [address]  [facility] [address]      Dear Colleague,    Thank you for referring your patient, Karlene Yun, to the [facility] [place]. Please see a copy of my visit note below.      Pulmonology Clinic Appointment     Karlene Yun MRN# [id number]   Age: 59 year old YOB: 1965       Reason for consult:    Karlene Yun is a 59 year old year old female who is being seen for Return pulmonary        Requesting/Referring physician:    Cooper Voss           Assessment and Plan:     1.Pleural effusion, Left, Exudative  2.Candidacy for liver transplant    Again is extremely unclear what the underlying etiology of the pleural effusion on the left that appears to have developed between December 2024 and the February 2025 CT scans.  The pleural fluid studies based on her serum upper limit of normal for her would suggest a fluid LDH of 0.69 relative to the upper limit of normal serum level, which would be consistent with an exudate.  However pleural protein levels relatively low somewhat contrary to what would be expected an exudative process.  Her total nucleated cell were elevated.  However she does not have any constitutional symptoms or smoking history, nor parenchymal lesion that would suggest a malignant process, nor any clinical history that is consistent with infection, and cytology was negative for malignant cells, though a single sampling may be under representative/less sensitive.     -Would favor a nonneoplastic/benign etiology  - Will repeat CT scan of the chest.  If effusion has enlarged or is persistent will consider repeat sampling.  If there are any notable lesions will consider referral for biopsies.  Otherwise, if effusion resolved and no other lesions, would not see any definitive contraindication to liver transplant.         History of Present Illness:   Karlene Yun is a  59-year-old female with osteoarthritis, psoriasis, obesity, NEREYDA, cirrhosis, pending liver transplant, referred due to prior abnormal chest CT for which review is requested and pulmonary clearance requested for liver transplant. After last visit I referred her for sampling of a pleural effusion and she is here in follow up.      Occupation: worked retail  Smoking: never smoked   Says has intentionally lost maybe around 10lb over course of several months. Denies night sweats, chills.   Again patient denies having had any recent respiratory illness.    Had pulmonary embolism in 2002 and was on a blood thinner for a brief period.   Otherwise without other current respiratory complaints or hx of respiratory disease.   Pulmonary function test only show a mild restrictive defect, normal diffusion and no obstruction.   CT chest available from 2005 show Parenchyma grossly normal, a couple of small micro nodules, 1 in the posterior right upper lobe, and the other at the right lung base.    Review of imaging shows that she has previously had bilateral pleural effusions in October 2023.  With associated dependent zone atelectasis versus less likely consolidation particularly on the left.  Recently a coronary CTA was done December 27.  Interpretation suggested borderline enlarged subcarinal and right hilar lymphadenopathy.  Dedicated chest CT performed 2/12/2025, shows Most notably a left lower lobe possible consolidation vs atelectasis with adjacent pleural effusion.  There is some trace perihepatic ascites as well. There are notably enlarged right paratracheal lymph nodes there is also notably a little bit of interstitial thickening in the right apex.  Patient has a negative prior QuantiFERON in November  Appears that patient has previously had lymph node biopsy of the subcarinal lymph node in August 2024 (via EUS) which only showed polymorphous lymphocytes and no evidence of malignancy.    After last visit had left pleural  "effusion sampling with interventional radiology on 3/6/2025   pleural Effusion Characteristics:  LDH: pleural 166   Protein: pleural 1.7   Glucose: 128  Gram stain/Culture: negative  Cell count: 1898 TNC  Differential    Cytology:   - No morphologic evidence of malignancy  - Mixed inflammation  - Please correlate with microbiologic workup    LDH:             Past Medical History:     Past Medical History:   Diagnosis Date     Hyperlipidemia 3/17/2005     Problem list name updated by automated process. Provider to review     Hypothyroidism 6/26/2002     Problem list name updated by automated process. Provider to review     Major depressive disorder, recurrent episode, in full remission 1/21/2002     Morbid obesity (H) 5/12/2019     Psoriatic arthropathy (H) 1/21/2002          Past Surgical History:     Past Surgical History:   Procedure Laterality Date     COLONOSCOPY  2/11/2016    mild colitis/ repeat 10 yrs     ESOPHAGOSCOPY, GASTROSCOPY, DUODENOSCOPY (EGD), COMBINED N/A 8/9/2024    Procedure: ESOPHAGOGASTRODUODENOSCOPY for variceal surveillance;  Surgeon: Guru Jacquelyn Rees MD;  Location:  OR     ESOPHAGOSCOPY, GASTROSCOPY, DUODENOSCOPY (EGD), COMBINED N/A 8/9/2024    Procedure: ESOPHAGOGASTRODUODENOSCOPY, WITH FINE NEEDLE ASPIRATION BIOPSY, WITH ENDOSCOPIC ULTRASOUND GUIDANCE liver and lymph node biopsy;  Surgeon: Guru Jacquelyn Rees MD;  Location:  OR      REMOVAL OF TONSILS,<11 Y/O      Tonsils <12y.o.     IR LUMBAR PUNCTURE  6/30/2023     IR THORACENTESIS  3/6/2025     LASIK  2004    \"lasek\"     THORACENTESIS Left 3/6/2025    Procedure: Thoracentesis;  Surgeon: Jaden Martinez MD;  Location: [facility] OR          Social History:     Social History     Socioeconomic History     Marital status:      Spouse name: Not on file     Number of children: 1     Years of education: Not on file     Highest education level: Not on file   Occupational History     Not on file "   Tobacco Use     Smoking status: Never     Smokeless tobacco: Never   Substance and Sexual Activity     Alcohol use: Not Currently     Comment: last drink 3 years ago     Drug use: Never     Sexual activity: Not Currently     Partners: Male   Other Topics Concern     Parent/sibling w/ CABG, MI or angioplasty before 65F 55M? Yes     Comment: Father   Social History Narrative     Not on file     Social Drivers of Health     Financial Resource Strain: Low Risk  (3/26/2025)    Financial Resource Strain      Within the past 12 months, have you or your family members you live with been unable to get utilities (heat, electricity) when it was really needed?: No   Food Insecurity: High Risk (3/26/2025)    Food Insecurity      Within the past 12 months, did you worry that your food would run out before you got money to buy more?: Yes      Within the past 12 months, did the food you bought just not last and you didn t have money to get more?: Yes   Transportation Needs: Low Risk  (3/26/2025)    Transportation Needs      Within the past 12 months, has lack of transportation kept you from medical appointments, getting your medicines, non-medical meetings or appointments, work, or from getting things that you need?: No   Physical Activity: Not on file   Stress: Not on file   Social Connections: Not on file   Interpersonal Safety: Low Risk  (3/6/2025)    Interpersonal Safety      Do you feel physically and emotionally safe where you currently live?: Yes      Within the past 12 months, have you been hit, slapped, kicked or otherwise physically hurt by someone?: No      Within the past 12 months, have you been humiliated or emotionally abused in other ways by your partner or ex-partner?: No   Housing Stability: Low Risk  (3/26/2025)    Housing Stability      Do you have housing? : Yes      Are you worried about losing your housing?: No           Family History:     Family History   Problem Relation Age of Onset     Cancer Sister          cervical     Arthritis Paternal Grandmother      Diabetes Father            Immunizations:     Immunization History   Administered Date(s) Administered     COVID-19 12+ (MODERNA) 10/05/2023     COVID-19 12+ (Pfizer) 09/26/2024, 03/31/2025     COVID-19 Bivalent 12+ (Pfizer) 09/20/2022     COVID-19 Monovalent 18+ (Moderna) 03/10/2021, 04/07/2021, 08/01/2021, 08/25/2021, 05/23/2022     Hep B, Adult (Heplisav- B) 09/20/2022     INFLUENZA,TRIVALENT (FLUCELVAX) 09/12/2024     Influenza (IIV3) PF 09/15/2011, 10/19/2012, 10/20/2013     Influenza Vaccine >6 months,quad, PF 09/29/2014, 09/12/2016, 10/03/2019, 10/15/2020, 09/20/2022     Influenza Vaccine, 6+MO IM (QUADRIVALENT W/PRESERVATIVES) 10/05/2016, 09/26/2017     Influenza,INJ,MDCK,PF,Quad >6mo(Flucelvax) 10/09/2018, 10/05/2023     Nasal Influenza Vaccine 2-49 (FluMist) 09/19/2015     Pneumococcal 20 valent Conjugate (Prevnar 20) 03/31/2025     Pneumococcal 23 valent 11/16/2016     TD,PF 7+ (Tenivac) 01/01/1999, 04/22/2020     TDAP Vaccine (Boostrix) 02/12/2010     Zoster recombinant adjuvanted (Shingrix) 11/11/2022, 01/16/2023          Allergies:     Allergies   Allergen Reactions     Sumatriptan Succinate Nausea and Vomiting     IMITREX          Medications:     Current Outpatient Medications   Medication Sig Dispense Refill     bumetanide (BUMEX) 1 MG tablet Take 1 tablet (1 mg) by mouth daily. 90 tablet 3     citalopram (CELEXA) 20 MG tablet Take 1 tablet (20 mg) by mouth daily. 90 tablet 3     gabapentin (NEURONTIN) 300 MG capsule 300 mg qam, 600 mg qpm 270 capsule 1     ixekizumab (TALTZ) 80 MG/ML SOAJ auto-injector 160 mg once, followed by 80 mg at weeks 2, 4, 6, 8, 10, and 12, and then 80 mg every 4 weeks 9 mL 0     lactulose 20 GM/30ML solution Take 15 mLs (10 g) by mouth every morning. 946 mL 4     levothyroxine (SYNTHROID/LEVOTHROID) 112 MCG tablet Take 1 tablet (112 mcg) by mouth daily. 90 tablet 3     spironolactone (ALDACTONE) 50 MG tablet Take 2  tablets (100 mg) by mouth daily. 180 tablet 3     tacrolimus (PROTOPIC) 0.1 % external ointment Apply topically as needed       traZODone (DESYREL) 100 MG tablet Take 1 tablet (100 mg) by mouth at bedtime. 90 tablet 3     traZODone (DESYREL) 50 MG tablet Take 100 mg by mouth At Bedtime  0     triamcinolone (KENALOG) 0.1 % external cream Apply topically as needed       No current facility-administered medications for this visit.          Review of Systems:   CONSTITUTIONAL: NEGATIVE for fever, chills, change in weight  ENT/MOUTH: NEGATIVE for ear, mouth and throat problems  RESP: NEGATIVE for significant cough or SOB  CV: NEGATIVE for chest pain, palpitations or peripheral edema       Physical Exam:   BP (!) 149/79 (BP Location: Right arm, Patient Position: Sitting, Cuff Size: Adult Regular)   Pulse 70   SpO2 94%   Patient declined being weighed.     GENERAL APPEARANCE:  alert, in no apparent distress.  HEENT: PERRL, EOMI   NECK: supple.  RESP: Good air flow throughout.  Clear to auscultation. No crackles. No rhonchi. No wheezes.   CV: +JVD,  Normal S1, S2, regular rhythm, normal rate. No murmur.    MS: extremities grossly normal. No clubbing. No cyanosis.  SKIN: no rash on limited exam   NEURO: speech normal, normal strength and tone, normal gait and stance  PSYCH: normal mood and affect         Data:   All laboratory data reviewed  All cardiac studies reviewed by me.  All imaging studies reviewed by me.    Recent Results (from the past 26 weeks)   INR    Collection Time: 10/22/24  8:43 AM   Result Value Ref Range    INR 1.55 (H) 0.85 - 1.15   Hepatic function panel    Collection Time: 10/22/24  8:43 AM   Result Value Ref Range    Protein Total 7.0 6.4 - 8.3 g/dL    Albumin 2.6 (L) 3.5 - 5.2 g/dL    Bilirubin Total 3.5 (H) <=1.2 mg/dL    Alkaline Phosphatase 69 40 - 150 U/L    AST 60 (H) 0 - 45 U/L    ALT 26 0 - 50 U/L    Bilirubin Direct 1.16 (H) 0.00 - 0.30 mg/dL   Basic metabolic panel    Collection Time:  10/22/24  8:43 AM   Result Value Ref Range    Sodium 135 135 - 145 mmol/L    Potassium 4.1 3.4 - 5.3 mmol/L    Chloride 104 98 - 107 mmol/L    Carbon Dioxide (CO2) 24 22 - 29 mmol/L    Anion Gap 7 7 - 15 mmol/L    Urea Nitrogen 11.3 8.0 - 23.0 mg/dL    Creatinine 0.90 0.51 - 0.95 mg/dL    GFR Estimate 73 >60 mL/min/1.73m2    Calcium 8.7 (L) 8.8 - 10.4 mg/dL    Glucose 114 (H) 70 - 99 mg/dL   CBC with platelets    Collection Time: 10/22/24  8:43 AM   Result Value Ref Range    WBC Count 4.0 4.0 - 11.0 10e3/uL    RBC Count 4.12 3.80 - 5.20 10e6/uL    Hemoglobin 13.9 11.7 - 15.7 g/dL    Hematocrit 39.6 35.0 - 47.0 %    MCV 96 78 - 100 fL    MCH 33.7 (H) 26.5 - 33.0 pg    MCHC 35.1 31.5 - 36.5 g/dL    RDW 14.8 10.0 - 15.0 %    Platelet Count 67 (L) 150 - 450 10e3/uL   Rheumatoid factor    Collection Time: 10/22/24  8:43 AM   Result Value Ref Range    Rheumatoid Factor <10 <14 IU/mL   Cyclic Citrullinated Peptide Antibody IgG    Collection Time: 10/22/24  8:43 AM   Result Value Ref Range    Cyclic Citrullinated Peptide Antibody IgG 6.2 <7.0 U/mL   CRP inflammation    Collection Time: 10/22/24  8:43 AM   Result Value Ref Range    CRP Inflammation 3.09 <5.00 mg/L   Antibody titer red cell    Collection Time: 11/26/24  8:00 AM   Result Value Ref Range    Anti-B IgG Titer 16     Anti-B IgM Titer 32     SPECIMEN EXPIRATION DATE [id number]     ANTIBODY TITER IGM SCREEN Negative    Lipid Profile    Collection Time: 11/26/24  8:00 AM   Result Value Ref Range    Cholesterol 175 <200 mg/dL    Triglycerides 99 <150 mg/dL    Direct Measure HDL 58 >=50 mg/dL    LDL Cholesterol Calculated 97 <100 mg/dL    Non HDL Cholesterol 117 <130 mg/dL    Patient Fasting > 8hrs? Yes    Basic metabolic panel    Collection Time: 11/26/24  8:00 AM   Result Value Ref Range    Sodium 136 135 - 145 mmol/L    Potassium 3.7 3.4 - 5.3 mmol/L    Chloride 106 98 - 107 mmol/L    Carbon Dioxide (CO2) 24 22 - 29 mmol/L    Anion Gap 6 (L) 7 - 15 mmol/L     Urea Nitrogen 10.3 8.0 - 23.0 mg/dL    Creatinine 0.82 0.51 - 0.95 mg/dL    GFR Estimate 82 >60 mL/min/1.73m2    Calcium 8.4 (L) 8.8 - 10.4 mg/dL    Glucose 131 (H) 70 - 99 mg/dL    Patient Fasting > 8hrs? Yes    Hepatic panel    Collection Time: 11/26/24  8:00 AM   Result Value Ref Range    Protein Total 6.5 6.4 - 8.3 g/dL    Albumin 2.6 (L) 3.5 - 5.2 g/dL    Bilirubin Total 2.4 (H) <=1.2 mg/dL    Alkaline Phosphatase 74 40 - 150 U/L    AST 69 (H) 0 - 45 U/L    ALT 43 0 - 50 U/L    Bilirubin Direct 0.90 (H) 0.00 - 0.30 mg/dL   AFP tumor marker    Collection Time: 11/26/24  8:00 AM   Result Value Ref Range    AFP tumor marker 4.6 <=8.3 ng/mL   Ferritin    Collection Time: 11/26/24  8:00 AM   Result Value Ref Range    Ferritin 762 (H) 11 - 328 ng/mL   Hemoglobin A1c    Collection Time: 11/26/24  8:00 AM   Result Value Ref Range    Estimated Average Glucose 91 <117 mg/dL    Hemoglobin A1C 4.8 <5.7 %   Iron and iron binding capacity    Collection Time: 11/26/24  8:00 AM   Result Value Ref Range    Iron 112 37 - 145 ug/dL    Iron Binding Capacity 175 (L) 240 - 430 ug/dL    Iron Sat Index 64 (H) 15 - 46 %   Phosphorus    Collection Time: 11/26/24  8:00 AM   Result Value Ref Range    Phosphorus 3.4 2.5 - 4.5 mg/dL   TSH with free T4 reflex    Collection Time: 11/26/24  8:00 AM   Result Value Ref Range    TSH 0.40 0.30 - 4.20 uIU/mL   Transferrin    Collection Time: 11/26/24  8:00 AM   Result Value Ref Range    Transferrin 151.0 (L) 200.0 - 360.0 mg/dL   Vitamin D Deficiency    Collection Time: 11/26/24  8:00 AM   Result Value Ref Range    Vitamin D, Total (25-Hydroxy) 22 20 - 50 ng/mL   INR    Collection Time: 11/26/24  8:00 AM   Result Value Ref Range    INR 1.69 (H) 0.85 - 1.15   CBC with platelets    Collection Time: 11/26/24  8:00 AM   Result Value Ref Range    WBC Count 4.4 4.0 - 11.0 10e3/uL    RBC Count 3.79 (L) 3.80 - 5.20 10e6/uL    Hemoglobin 12.8 11.7 - 15.7 g/dL    Hematocrit 36.5 35.0 - 47.0 %    MCV 96 78 -  100 fL    MCH 33.8 (H) 26.5 - 33.0 pg    MCHC 35.1 31.5 - 36.5 g/dL    RDW 15.3 (H) 10.0 - 15.0 %    Platelet Count 64 (L) 150 - 450 10e3/uL   CMV Antibody IgG    Collection Time: 11/26/24  8:00 AM   Result Value Ref Range    CMV Rachel IgG Instrument Value <0.20 <0.60 U/mL    CMV Antibody IgG No detectable antibody. No detectable antibody.    EBV Capsid Antibody IgG    Collection Time: 11/26/24  8:00 AM   Result Value Ref Range    EBV Capsid Rachel IgG Instrument Value >750.0 (H) <18.0 U/mL    EBV Capsid Antibody IgG Positive (A) No detectable antibody.   Hepatitis A Antibody Total    Collection Time: 11/26/24  8:00 AM   Result Value Ref Range    Hepatitis A Antibody Total Reactive    Hepatitis B core antibody    Collection Time: 11/26/24  8:00 AM   Result Value Ref Range    Hepatitis B Core Antibody Total Nonreactive Nonreactive   Hepatitis B Surface Antibody    Collection Time: 11/26/24  8:00 AM   Result Value Ref Range    Hepatitis B Surface Antibody Nonreactive     Hepatitis B Surface Antibody Instrument Value <3.50 <8.5 m[IU]/mL   Hepatitis B surface antigen    Collection Time: 11/26/24  8:00 AM   Result Value Ref Range    Hepatitis B Surface Antigen Nonreactive Nonreactive   Hepatitis C antibody    Collection Time: 11/26/24  8:00 AM   Result Value Ref Range    Hepatitis C Antibody Nonreactive Nonreactive   HIV Antigen Antibody Combo Pretransplant Cascade    Collection Time: 11/26/24  8:00 AM   Result Value Ref Range    HIV Antigen Antibody Combo Pretransplant Nonreactive Nonreactive   Treponema Abs w Reflex to RPR and Titer    Collection Time: 11/26/24  8:00 AM   Result Value Ref Range    Treponema Antibody Total Nonreactive Nonreactive   Phosphatidylethanol (PEth), Whole Blood    Collection Time: 11/26/24  8:00 AM   Result Value Ref Range    PEth 16:0/18:1 (POPEth) <10 ng/mL    PEth 16:0/18:2 (PLPEth) <10 ng/mL    EER Phosphatidylethanol (PETH) See Note     PEth Interpretation See Comment    Adult Type and Screen     Collection Time: 11/26/24  8:00 AM   Result Value Ref Range    ABO/RH(D) A POS     Antibody Screen Negative Negative    SPECIMEN EXPIRATION DATE [id number]    Quantiferon TB Gold Plus Grey Tube    Collection Time: 11/26/24  8:00 AM    Specimen: Arm, Right; Blood   Result Value Ref Range    Quantiferon Nil Tube 0.37 IU/mL   Quantiferon TB Gold Plus Green Tube    Collection Time: 11/26/24  8:00 AM    Specimen: Arm, Right; Blood   Result Value Ref Range    Quantiferon TB1 Tube 0.32 IU/mL   Quantiferon TB Gold Plus Yellow Tube    Collection Time: 11/26/24  8:00 AM    Specimen: Arm, Right; Blood   Result Value Ref Range    Quantiferon TB2 Tube 0.32    Quantiferon TB Gold Plus Purple Tube    Collection Time: 11/26/24  8:00 AM    Specimen: Arm, Right; Blood   Result Value Ref Range    Quantiferon Mitogen 6.39 IU/mL   Quantiferon TB Gold Plus    Collection Time: 11/26/24  8:00 AM    Specimen: Arm, Right; Blood   Result Value Ref Range    Quantiferon-TB Gold Plus Negative Negative    TB1 Ag minus Nil Value -0.05 IU/mL    TB2 Ag minus Nil Value -0.05 IU/mL    Mitogen minus Nil Result 6.02 IU/mL    Nil Result 0.37 IU/mL   ABO and Rh    Collection Time: 11/26/24  8:05 AM   Result Value Ref Range    ABO/RH(D) A POS     SPECIMEN EXPIRATION DATE [id number]    EKG 12-lead, tracing only [EKG1]    Collection Time: 11/26/24  8:16 AM   Result Value Ref Range    Systolic Blood Pressure  mmHg    Diastolic Blood Pressure  mmHg    Ventricular Rate 63 BPM    Atrial Rate 63 BPM    MN Interval 158 ms    QRS Duration 104 ms     ms    QTc 458 ms    P Axis 5 degrees    R AXIS -28 degrees    T Axis 0 degrees    Interpretation ECG       Sinus rhythm  Incomplete right bundle branch block  Borderline ECG  No previous ECGs available  Confirmed by MD CAUTE, SAMUEL ([id number]) on 11/26/2024 2:51:18 PM     Protein  random urine    Collection Time: 11/26/24  8:19 AM   Result Value Ref Range    Total Protein Urine mg/dL 15.7   mg/dL    Total  Protein Urine mg/mg Creat 0.06 0.00 - 0.20 mg/mg Cr    Creatinine Urine mg/dL 257.0 mg/dL   UA Macroscopic with reflex to Microscopic and Culture    Collection Time: 11/26/24  8:19 AM    Specimen: Urine, Midstream   Result Value Ref Range    Color Urine Yellow Colorless, Straw, Light Yellow, Yellow    Appearance Urine Clear Clear    Glucose Urine Negative Negative mg/dL    Bilirubin Urine Negative Negative    Ketones Urine Trace (A) Negative mg/dL    Specific Gravity Urine 1.026 1.003 - 1.035    Blood Urine Negative Negative    pH Urine 5.5 5.0 - 7.0    Protein Albumin Urine 10 (A) Negative mg/dL    Urobilinogen Urine 3.0 (A) Normal, 2.0 mg/dL    Nitrite Urine Negative Negative    Leukocyte Esterase Urine Negative Negative    Bacteria Urine Few (A) None Seen /HPF    Mucus Urine Present (A) None Seen /LPF    RBC Urine 1 <=2 /HPF    WBC Urine 1 <=5 /HPF    Squamous Epithelials Urine 6 (H) <=1 /HPF    Hyaline Casts Urine 1 <=2 /LPF   Echocardiogram Complete    Collection Time: 12/27/24 10:21 AM   Result Value Ref Range    LVEF  60-65%    Basic metabolic panel    Collection Time: 01/22/25  2:23 PM   Result Value Ref Range    Sodium 135 135 - 145 mmol/L    Potassium 4.0 3.4 - 5.3 mmol/L    Chloride 104 98 - 107 mmol/L    Carbon Dioxide (CO2) 24 22 - 29 mmol/L    Anion Gap 7 7 - 15 mmol/L    Urea Nitrogen 8.6 8.0 - 23.0 mg/dL    Creatinine 0.79 0.51 - 0.95 mg/dL    GFR Estimate 85 >60 mL/min/1.73m2    Calcium 7.9 (L) 8.8 - 10.4 mg/dL    Glucose 124 (H) 70 - 99 mg/dL   Hepatic panel    Collection Time: 01/22/25  2:23 PM   Result Value Ref Range    Protein Total 6.7 6.4 - 8.3 g/dL    Albumin 2.6 (L) 3.5 - 5.2 g/dL    Bilirubin Total 4.0 (H) <=1.2 mg/dL    Alkaline Phosphatase 71 40 - 150 U/L    AST 78 (H) 0 - 45 U/L    ALT 45 0 - 50 U/L    Bilirubin Direct 1.04 (H) 0.00 - 0.30 mg/dL   INR    Collection Time: 01/22/25  2:23 PM   Result Value Ref Range    INR 1.62 (H) 0.85 - 1.15   Basic metabolic panel    Collection Time:  02/07/25  2:08 PM   Result Value Ref Range    Sodium 135 135 - 145 mmol/L    Potassium 4.1 3.4 - 5.3 mmol/L    Chloride 101 98 - 107 mmol/L    Carbon Dioxide (CO2) 22 22 - 29 mmol/L    Anion Gap 12 7 - 15 mmol/L    Urea Nitrogen 10.4 8.0 - 23.0 mg/dL    Creatinine 0.85 0.51 - 0.95 mg/dL    GFR Estimate 78 >60 mL/min/1.73m2    Calcium 8.5 (L) 8.8 - 10.4 mg/dL    Glucose 96 70 - 99 mg/dL   Hepatic panel    Collection Time: 02/07/25  2:08 PM   Result Value Ref Range    Protein Total 6.8 6.4 - 8.3 g/dL    Albumin 2.6 (L) 3.5 - 5.2 g/dL    Bilirubin Total 4.4 (H) <=1.2 mg/dL    Alkaline Phosphatase 70 40 - 150 U/L    AST 81 (H) 0 - 45 U/L    ALT 41 0 - 50 U/L    Bilirubin Direct 1.24 (H) 0.00 - 0.30 mg/dL   INR    Collection Time: 02/07/25  2:08 PM   Result Value Ref Range    INR 1.69 (H) 0.85 - 1.15   Pulmonary function test    Collection Time: 02/17/25  2:43 PM   Result Value Ref Range    FVC-Pred 3.62 L    FVC-Pre 2.92 L    FVC-%Pred-Pre 80 %    FEV1-Pre 2.34 L    FEV1-%Pred-Pre 82 %    FEV1FVC-Pred 79 %    FEV1FVC-Pre 80 %    FEFMax-Pred 7.33 L/sec    FEFMax-Pre 8.26 L/sec    FEFMax-%Pred-Pre 112 %    FEF2575-Pred 2.46 L/sec    FEF2575-Pre 2.34 L/sec    [id number]-%Pred-Pre 95 %    ExpTime-Pre 6.06 sec    FIFMax-Pre 4.30 L/sec    VC-Pred 3.93 L    VC-Pre 3.05 L    VC-%Pred-Pre 77 %    IC-Pred 2.73 L    IC-Pre 2.51 L    IC-%Pred-Pre 92 %    ERV-Pred 1.36 L    ERV-Pre 0.54 L    ERV-%Pred-Pre 39 %    FEV1FEV6-Pred 81 %    FEV1FEV6-Pre 81 %    FRCPleth-Pred 3.45 L    FRCPleth-Pre 2.22 L    FRCPleth-%Pred-Pre 64 %    RVPleth-Pred 2.20 L    RVPleth-Pre 1.68 L    RVPleth-%Pred-Pre 76 %    TLCPleth-Pred 6.03 L    TLCPleth-Pre 4.73 L    TLCPleth-%Pred-Pre 78 %    DLCOunc-Pred 23.09 ml/min/mmHg    DLCOunc-Pre 18.39 ml/min/mmHg    DLCOunc-%Pred-Pre 79 %    VA-Pre 4.74 L    VA-%Pred-Pre 81 %    FEV1SVC-Pred 72 %    FEV1SVC-Pre 77 %   CBC with platelets    Collection Time: 03/06/25 10:09 AM   Result Value Ref Range    WBC Count 6.4  4.0 - 11.0 10e3/uL    RBC Count 4.21 3.80 - 5.20 10e6/uL    Hemoglobin 13.9 11.7 - 15.7 g/dL    Hematocrit 40.3 35.0 - 47.0 %    MCV 96 78 - 100 fL    MCH 33.0 26.5 - 33.0 pg    MCHC 34.5 31.5 - 36.5 g/dL    RDW 16.3 (H) 10.0 - 15.0 %    Platelet Count 70 (L) 150 - 450 10e3/uL   Cytology, non-gynecologic    Collection Time: 03/06/25 11:43 AM   Result Value Ref Range    Final Diagnosis       PLEURAL CAVITY, PLEURAL FLUID:  Interpretation -   - No morphologic evidence of malignancy  - Mixed inflammation  - Please correlate with microbiologic workup    Adequacy: Satisfactory for evaluation      Clinical Information       59-year-old woman with left effusion       Gross Description       A(A). Pleural Cavity, :A. Pleural Cavity, , Pleural Fluid:  Received 50 ml of hazy, orange fluid, processed as 1 Pap stained Autocyte,  1 Fontaine stained cytospin and one hematoxylin and eosin stained cell block.       Microscopic Description       Microscopic examination is performed.  The cellblock and SurePath preparation are cellular and are composed of reactive appearing mesothelial cells arranged individually and in clusters.  Background lymphocytes, macrophages and eosinophils are also identified, no distinct two cell epithelioid population is appreciated.    Case was reviewed by the following:  Pathology Fellow: Queta Graham MD  Pathology Fellow: Negrita Marinelli DO  Resident Pathologist: Marvin Segovia MD  A resident or fellow in a training program was involved in the initial review, preparation, and/or interpretation of this case.  I, as the senior physician, attest that I have personally reviewed all specimens and or slides, including the listed special stains, and used them with my medical judgement to determine the final diagnosis.              Performing Labs       The technical component of this testing was completed at [facility] [employer].      Stain controls for all stains resulted within this report have been reviewed and show appropriate reactivity.      Lactate dehydrogenase fluid    Collection Time: 03/06/25 11:43 AM   Result Value Ref Range    LD Fluid Source Pleural Cavity, Left     Lactate dehydrogenase fluid 166 U/L   Cell Count Body Fluid    Collection Time: 03/06/25 11:43 AM   Result Value Ref Range    Color Yellow Colorless, Yellow    Clarity Cloudy (A) Clear    Cell Count Fluid Source Pleural Cavity, Left     Total Nucleated Cells 1,898 /uL   Protein fluid    Collection Time: 03/06/25 11:43 AM   Result Value Ref Range    Protein Fluid Source Pleural Cavity, Left     Protein Total Fluid 1.7 g/dL   Pleural fluid Aerobic Bacterial Culture Routine With Gram Stain    Collection Time: 03/06/25 11:43 AM    Specimen: Pleural Cavity; Pleural fluid   Result Value Ref Range    Culture No Growth     Gram Stain Result No organisms seen     Gram Stain Result 4+ WBC seen    Glucose fluid    Collection Time: 03/06/25 11:43 AM   Result Value Ref Range    Glucose Fluid Source Pleural Cavity, Left     Glucose fluid 128 mg/dL   Differential Body Fluid    Collection Time: 03/06/25 11:43 AM   Result Value Ref Range    % Neutrophils 1 %    % Lymphocytes 38 %    % Monocyte/Macrophages 0 %    % Eosinophils 1 %    % Other Cells 60 %   Extra Body Fluid/CSF Collection    Collection Time: 03/06/25 11:45 AM   Result Value Ref Range    Hold Specimen [facility]    Basic metabolic panel    Collection Time: 03/06/25  1:05 PM   Result Value Ref Range    Sodium 136 135 - 145 mmol/L    Potassium 4.0 3.4 - 5.3 mmol/L    Chloride 102 98 - 107 mmol/L    Carbon Dioxide (CO2) 27 22 - 29 mmol/L    Anion Gap 7 7 - 15 mmol/L    Urea Nitrogen 12.9 8.0 - 23.0 mg/dL    Creatinine 0.86 0.51 - 0.95 mg/dL    GFR Estimate 77 >60 mL/min/1.73m2    Calcium 8.8 8.8 - 10.4 mg/dL    Glucose 95 70 - 99 mg/dL   Hepatic panel    Collection Time: 03/06/25  1:05 PM   Result Value Ref Range    Protein Total  7.3 6.4 - 8.3 g/dL    Albumin 2.9 (L) 3.5 - 5.2 g/dL    Bilirubin Total 4.9 (H) <=1.2 mg/dL    Alkaline Phosphatase 83 40 - 150 U/L    AST 76 (H) 0 - 45 U/L    ALT 48 0 - 50 U/L    Bilirubin Direct 1.97 (H) 0.00 - 0.30 mg/dL   INR    Collection Time: 03/06/25  1:05 PM   Result Value Ref Range    INR 1.62 (H) 0.85 - 1.15               I spent a total of 47 minutes on the day of the encounter dedicated to the office visit with Karlene Yun.    This included review of vitals, labs, imaging, other diagnostic tests (I.e. PFTs, ECHO), face-to-face interaction, physical exam, counseling the patient +/- family members, and/or coordinating care.  This excludes time spent on lung cancer screening or smoking cessation counseling.    Cooper Voss MD                  Again, thank you for allowing me to participate in the care of your patient.        Sincerely,        Cooper Voss MD    Electronically signed

## 2025-04-03 LAB
MEV IGG SER IA-ACNC: >300 AU/ML
MEV IGG SER IA-ACNC: POSITIVE

## 2025-04-08 ENCOUNTER — COMMITTEE REVIEW (OUTPATIENT)
Dept: TRANSPLANT | Facility: CLINIC | Age: 60
End: 2025-04-08
Payer: COMMERCIAL

## 2025-04-08 NOTE — COMMITTEE REVIEW
Liver Committee Review Note     Evaluation Date: 11/26/2024  Committee Review Date: 4/8/2025    Organ being evaluated for: Liver    Transplant Phase: Waitlist  Transplant Status: Inactive    Transplant Coordinator: Candelario Baker  Transplant Surgeon:   Amauri Jacobs    Referring Physician: Lyn Martinez    Primary Diagnosis: Cirrhosis: Type A  Secondary Diagnosis:     Transplant Eligibility: Cirrhosis with MELD    Committee Review Decision: Make Active    Relative Contraindications:     Absolute Contraindications:     Live Donor: No     Committee Chair Nora Morejon MD verbally attested to the committee's decision.    Committee Discussion Details: Make Active once 4/15 Chest CT complete/okay.    Committee Review Members:  Nutrition Julita Conn, RD   Pharmacist Jael Ruiz, [facility]    - Clinical Fangpatricio Johnston, MSW, Daphney Kimble MSW, Neisha Freitas, [facility]   Transplant Camila Goldberg, FRANSICO, Pamela Beckett LPN, Krystyna Sandhu, RN, Tanya Muñoz, RN, Jr Reagan Wang, FRANSICO, Dick Castaneda, FRANSICO, Candelario Baker, FRANSICO, Lin eGrard, JENNIFERN, Sydnie Bundy LPN   Transplant Hepatology  Verna Ramos MD, CAROL KOCH MD, Timoteo Loving MD, Nora Morejon MD, GREG LI MD, Ilana Randall PA-C, Selam Mccoy MD, Lilian Craig MD   Transplant Surgery Grzegorz Castro MD, Gala Rodriguez NP, Martinez London MD

## 2025-04-15 ENCOUNTER — ANCILLARY PROCEDURE (OUTPATIENT)
Dept: CT IMAGING | Facility: CLINIC | Age: 60
End: 2025-04-15
Attending: INTERNAL MEDICINE
Payer: COMMERCIAL

## 2025-04-15 DIAGNOSIS — R93.89 ABNORMAL CHEST CT: ICD-10-CM

## 2025-04-15 PROCEDURE — 71250 CT THORAX DX C-: CPT | Performed by: RADIOLOGY

## 2025-04-21 ENCOUNTER — TELEPHONE (OUTPATIENT)
Dept: TRANSPLANT | Facility: CLINIC | Age: 60
End: 2025-04-21
Payer: COMMERCIAL

## 2025-04-21 NOTE — TELEPHONE ENCOUNTER
Please call patient;  She was inquiring about recent CT Chest 04/15/2025.      Or / question;  Should patient call Pulmonary under Dr. Cooper Voss ?     .    37

## 2025-05-15 ENCOUNTER — LAB (OUTPATIENT)
Dept: LAB | Facility: CLINIC | Age: 60
End: 2025-05-15
Payer: COMMERCIAL

## 2025-05-15 DIAGNOSIS — K75.81 NASH (NONALCOHOLIC STEATOHEPATITIS): ICD-10-CM

## 2025-05-15 LAB
INR PPP: 1.73 (ref 0.85–1.15)
PROTHROMBIN TIME: 20.6 SECONDS (ref 11.8–14.8)

## 2025-05-24 ENCOUNTER — HEALTH MAINTENANCE LETTER (OUTPATIENT)
Age: 60
End: 2025-05-24

## 2025-05-29 DIAGNOSIS — K75.81 NASH (NONALCOHOLIC STEATOHEPATITIS): Primary | ICD-10-CM

## 2025-05-30 PROBLEM — M79.7 FIBROMYALGIA: Status: RESOLVED | Noted: 2018-11-12 | Resolved: 2025-05-30

## 2025-05-30 PROBLEM — R18.8 CIRRHOSIS OF LIVER WITH ASCITES (H): Status: ACTIVE | Noted: 2022-08-05

## 2025-05-30 PROBLEM — K75.81 METABOLIC DYSFUNCTION-ASSOCIATED STEATOHEPATITIS (MASH): Status: ACTIVE | Noted: 2025-05-30

## 2025-05-30 PROBLEM — M19.90 OSTEOARTHRITIS: Status: RESOLVED | Noted: 2024-11-11 | Resolved: 2025-05-30

## 2025-05-30 PROBLEM — K74.60 CIRRHOSIS OF LIVER WITH ASCITES (H): Status: ACTIVE | Noted: 2022-08-05

## 2025-05-31 ENCOUNTER — LAB (OUTPATIENT)
Dept: LAB | Facility: CLINIC | Age: 60
End: 2025-05-31
Payer: COMMERCIAL

## 2025-05-31 DIAGNOSIS — K75.81 NASH (NONALCOHOLIC STEATOHEPATITIS): ICD-10-CM

## 2025-05-31 LAB
ALBUMIN SERPL BCG-MCNC: 2.4 G/DL (ref 3.5–5.2)
ALP SERPL-CCNC: 83 U/L (ref 40–150)
ALT SERPL W P-5'-P-CCNC: 38 U/L (ref 0–50)
ANION GAP SERPL CALCULATED.3IONS-SCNC: 7 MMOL/L (ref 7–15)
AST SERPL W P-5'-P-CCNC: 82 U/L (ref 0–45)
BILIRUB SERPL-MCNC: 3.6 MG/DL
BILIRUBIN DIRECT (ROCHE PRO & PURE): 1.86 MG/DL (ref 0–0.45)
BUN SERPL-MCNC: 10.4 MG/DL (ref 8–23)
CALCIUM SERPL-MCNC: 8.1 MG/DL (ref 8.8–10.4)
CHLORIDE SERPL-SCNC: 102 MMOL/L (ref 98–107)
CREAT SERPL-MCNC: 0.95 MG/DL (ref 0.51–0.95)
EGFRCR SERPLBLD CKD-EPI 2021: 68 ML/MIN/1.73M2
GLUCOSE SERPL-MCNC: 100 MG/DL (ref 70–99)
HCO3 SERPL-SCNC: 24 MMOL/L (ref 22–29)
INR PPP: 1.75 (ref 0.85–1.15)
POTASSIUM SERPL-SCNC: 3.9 MMOL/L (ref 3.4–5.3)
PROT SERPL-MCNC: 6.6 G/DL (ref 6.4–8.3)
PROTHROMBIN TIME: 20.5 SECONDS (ref 11.8–14.8)
SODIUM SERPL-SCNC: 133 MMOL/L (ref 135–145)

## 2025-05-31 PROCEDURE — 85610 PROTHROMBIN TIME: CPT | Performed by: PATHOLOGY

## 2025-05-31 PROCEDURE — 36415 COLL VENOUS BLD VENIPUNCTURE: CPT | Performed by: PATHOLOGY

## 2025-05-31 PROCEDURE — 82248 BILIRUBIN DIRECT: CPT | Performed by: PATHOLOGY

## 2025-05-31 PROCEDURE — 80053 COMPREHEN METABOLIC PANEL: CPT | Performed by: PATHOLOGY

## 2025-06-03 ENCOUNTER — PATIENT OUTREACH (OUTPATIENT)
Dept: CARE COORDINATION | Facility: CLINIC | Age: 60
End: 2025-06-03
Payer: COMMERCIAL

## 2025-06-06 ENCOUNTER — TELEPHONE (OUTPATIENT)
Dept: GASTROENTEROLOGY | Facility: CLINIC | Age: 60
End: 2025-06-06

## 2025-06-06 DIAGNOSIS — R18.8 CIRRHOSIS OF LIVER WITH ASCITES, UNSPECIFIED HEPATIC CIRRHOSIS TYPE (H): ICD-10-CM

## 2025-06-06 DIAGNOSIS — K75.81 METABOLIC DYSFUNCTION-ASSOCIATED STEATOHEPATITIS (MASH): Primary | ICD-10-CM

## 2025-06-06 DIAGNOSIS — K74.60 CIRRHOSIS OF LIVER WITH ASCITES, UNSPECIFIED HEPATIC CIRRHOSIS TYPE (H): ICD-10-CM

## 2025-06-07 ENCOUNTER — HOSPITAL ENCOUNTER (INPATIENT)
Facility: CLINIC | Age: 60
End: 2025-06-07
Attending: EMERGENCY MEDICINE | Admitting: INTERNAL MEDICINE
Payer: COMMERCIAL

## 2025-06-07 ENCOUNTER — NURSE TRIAGE (OUTPATIENT)
Dept: NURSING | Facility: CLINIC | Age: 60
End: 2025-06-07
Payer: COMMERCIAL

## 2025-06-07 DIAGNOSIS — R60.1 ANASARCA: Primary | ICD-10-CM

## 2025-06-07 DIAGNOSIS — R06.02 SHORTNESS OF BREATH: ICD-10-CM

## 2025-06-07 DIAGNOSIS — E87.70 HYPERVOLEMIA, UNSPECIFIED HYPERVOLEMIA TYPE: ICD-10-CM

## 2025-06-07 DIAGNOSIS — K75.81 METABOLIC DYSFUNCTION-ASSOCIATED STEATOHEPATITIS (MASH): ICD-10-CM

## 2025-06-07 DIAGNOSIS — R94.31 PROLONGED Q-T INTERVAL ON ECG: ICD-10-CM

## 2025-06-07 DIAGNOSIS — R79.89 ELEVATED D-DIMER: ICD-10-CM

## 2025-06-07 LAB
ALBUMIN SERPL BCG-MCNC: 2.7 G/DL (ref 3.5–5.2)
ALP SERPL-CCNC: 81 U/L (ref 40–150)
ALT SERPL W P-5'-P-CCNC: 41 U/L (ref 0–50)
ANION GAP SERPL CALCULATED.3IONS-SCNC: 9 MMOL/L (ref 7–15)
AST SERPL W P-5'-P-CCNC: ABNORMAL U/L
BASOPHILS # BLD AUTO: 0.1 10E3/UL (ref 0–0.2)
BASOPHILS NFR BLD AUTO: 2 %
BILIRUB SERPL-MCNC: 4.3 MG/DL
BUN SERPL-MCNC: 10.2 MG/DL (ref 8–23)
C PNEUM DNA SPEC QL NAA+PROBE: NOT DETECTED
CALCIUM SERPL-MCNC: 7.9 MG/DL (ref 8.8–10.4)
CHLORIDE SERPL-SCNC: 100 MMOL/L (ref 98–107)
CREAT SERPL-MCNC: 0.98 MG/DL (ref 0.51–0.95)
D DIMER PPP FEU-MCNC: 8.9 UG/ML FEU (ref 0–0.5)
EGFRCR SERPLBLD CKD-EPI 2021: 66 ML/MIN/1.73M2
EOSINOPHIL # BLD AUTO: 0.3 10E3/UL (ref 0–0.7)
EOSINOPHIL NFR BLD AUTO: 4 %
ERYTHROCYTE [DISTWIDTH] IN BLOOD BY AUTOMATED COUNT: 16.7 % (ref 10–15)
FLUAV H1 2009 PAND RNA SPEC QL NAA+PROBE: NOT DETECTED
FLUAV H1 RNA SPEC QL NAA+PROBE: NOT DETECTED
FLUAV H3 RNA SPEC QL NAA+PROBE: NOT DETECTED
FLUAV RNA SPEC QL NAA+PROBE: NEGATIVE
FLUAV RNA SPEC QL NAA+PROBE: NOT DETECTED
FLUBV RNA RESP QL NAA+PROBE: NEGATIVE
FLUBV RNA SPEC QL NAA+PROBE: NOT DETECTED
GLUCOSE SERPL-MCNC: 96 MG/DL (ref 70–99)
HADV DNA SPEC QL NAA+PROBE: NOT DETECTED
HCO3 SERPL-SCNC: 25 MMOL/L (ref 22–29)
HCOV PNL SPEC NAA+PROBE: NOT DETECTED
HCT VFR BLD AUTO: 34.5 % (ref 35–47)
HGB BLD-MCNC: 11.7 G/DL (ref 11.7–15.7)
HMPV RNA SPEC QL NAA+PROBE: NOT DETECTED
HPIV1 RNA SPEC QL NAA+PROBE: NOT DETECTED
HPIV2 RNA SPEC QL NAA+PROBE: NOT DETECTED
HPIV3 RNA SPEC QL NAA+PROBE: NOT DETECTED
HPIV4 RNA SPEC QL NAA+PROBE: NOT DETECTED
IMM GRANULOCYTES # BLD: 0 10E3/UL
IMM GRANULOCYTES NFR BLD: 0 %
INR PPP: 1.87 (ref 0.85–1.15)
LACTATE SERPL-SCNC: 2 MMOL/L (ref 0.7–2)
LYMPHOCYTES # BLD AUTO: 1.4 10E3/UL (ref 0.8–5.3)
LYMPHOCYTES NFR BLD AUTO: 22 %
M PNEUMO DNA SPEC QL NAA+PROBE: NOT DETECTED
MCH RBC QN AUTO: 32.2 PG (ref 26.5–33)
MCHC RBC AUTO-ENTMCNC: 33.9 G/DL (ref 31.5–36.5)
MCV RBC AUTO: 95 FL (ref 78–100)
MONOCYTES # BLD AUTO: 0.4 10E3/UL (ref 0–1.3)
MONOCYTES NFR BLD AUTO: 6 %
NEUTROPHILS # BLD AUTO: 4.3 10E3/UL (ref 1.6–8.3)
NEUTROPHILS NFR BLD AUTO: 66 %
NRBC # BLD AUTO: 0 10E3/UL
NRBC BLD AUTO-RTO: 0 /100
NT-PROBNP SERPL-MCNC: 344 PG/ML (ref 0–226)
PLATELET # BLD AUTO: 70 10E3/UL (ref 150–450)
POTASSIUM SERPL-SCNC: 4.2 MMOL/L (ref 3.4–5.3)
PROT SERPL-MCNC: 7 G/DL (ref 6.4–8.3)
PROTHROMBIN TIME: 21.7 SECONDS (ref 11.8–14.8)
RBC # BLD AUTO: 3.63 10E6/UL (ref 3.8–5.2)
RSV RNA SPEC NAA+PROBE: NEGATIVE
RSV RNA SPEC QL NAA+PROBE: NOT DETECTED
RSV RNA SPEC QL NAA+PROBE: NOT DETECTED
RV+EV RNA SPEC QL NAA+PROBE: NOT DETECTED
SARS-COV-2 RNA RESP QL NAA+PROBE: NEGATIVE
SODIUM SERPL-SCNC: 134 MMOL/L (ref 135–145)
TROPONIN T SERPL HS-MCNC: 11 NG/L
WBC # BLD AUTO: 6.4 10E3/UL (ref 4–11)

## 2025-06-07 PROCEDURE — 99285 EMERGENCY DEPT VISIT HI MDM: CPT | Mod: 25 | Performed by: EMERGENCY MEDICINE

## 2025-06-07 PROCEDURE — 36415 COLL VENOUS BLD VENIPUNCTURE: CPT | Performed by: EMERGENCY MEDICINE

## 2025-06-07 PROCEDURE — 93010 ELECTROCARDIOGRAM REPORT: CPT | Performed by: EMERGENCY MEDICINE

## 2025-06-07 PROCEDURE — 99285 EMERGENCY DEPT VISIT HI MDM: CPT | Performed by: EMERGENCY MEDICINE

## 2025-06-07 PROCEDURE — 999N000007 HC SITE CHECK

## 2025-06-07 PROCEDURE — 84155 ASSAY OF PROTEIN SERUM: CPT | Performed by: EMERGENCY MEDICINE

## 2025-06-07 PROCEDURE — 83880 ASSAY OF NATRIURETIC PEPTIDE: CPT | Performed by: EMERGENCY MEDICINE

## 2025-06-07 PROCEDURE — 85610 PROTHROMBIN TIME: CPT | Performed by: EMERGENCY MEDICINE

## 2025-06-07 PROCEDURE — 83605 ASSAY OF LACTIC ACID: CPT | Performed by: EMERGENCY MEDICINE

## 2025-06-07 PROCEDURE — 84484 ASSAY OF TROPONIN QUANT: CPT | Performed by: EMERGENCY MEDICINE

## 2025-06-07 PROCEDURE — 84443 ASSAY THYROID STIM HORMONE: CPT

## 2025-06-07 PROCEDURE — 85379 FIBRIN DEGRADATION QUANT: CPT | Performed by: EMERGENCY MEDICINE

## 2025-06-07 PROCEDURE — 87637 SARSCOV2&INF A&B&RSV AMP PRB: CPT | Performed by: EMERGENCY MEDICINE

## 2025-06-07 PROCEDURE — 85025 COMPLETE CBC W/AUTO DIFF WBC: CPT | Performed by: EMERGENCY MEDICINE

## 2025-06-07 PROCEDURE — 84439 ASSAY OF FREE THYROXINE: CPT

## 2025-06-07 PROCEDURE — 87581 M.PNEUMON DNA AMP PROBE: CPT | Performed by: EMERGENCY MEDICINE

## 2025-06-07 PROCEDURE — 120N000002 HC R&B MED SURG/OB UMMC

## 2025-06-07 PROCEDURE — 999N000128 HC STATISTIC PERIPHERAL IV START W/O US GUIDANCE

## 2025-06-07 PROCEDURE — 93005 ELECTROCARDIOGRAM TRACING: CPT | Performed by: EMERGENCY MEDICINE

## 2025-06-07 ASSESSMENT — ACTIVITIES OF DAILY LIVING (ADL)
ADLS_ACUITY_SCORE: 44

## 2025-06-07 ASSESSMENT — COLUMBIA-SUICIDE SEVERITY RATING SCALE - C-SSRS
6. HAVE YOU EVER DONE ANYTHING, STARTED TO DO ANYTHING, OR PREPARED TO DO ANYTHING TO END YOUR LIFE?: NO
2. HAVE YOU ACTUALLY HAD ANY THOUGHTS OF KILLING YOURSELF IN THE PAST MONTH?: NO
1. IN THE PAST MONTH, HAVE YOU WISHED YOU WERE DEAD OR WISHED YOU COULD GO TO SLEEP AND NOT WAKE UP?: NO

## 2025-06-07 NOTE — ED TRIAGE NOTES
Patient to triage accompanied by daughter with c/o cough and fluid retention. Patient stated this started about a week ago and she feels like she is retaining fluid. She reported edema on the extremities and swollen knees. Patient regularly takes her diuretics but stated in the past week, she has less output. Patient is concerned she might have fluids in her lungs. Patient is on the list for liver transplant.     Triage Assessment (Adult)       Row Name 06/07/25 1831          Triage Assessment    Airway WDL WDL        Respiratory WDL    Respiratory WDL X;cough        Peripheral/Neurovascular WDL    Peripheral Neurovascular WDL WDL        Cognitive/Neuro/Behavioral WDL    Cognitive/Neuro/Behavioral WDL WDL        Joice Coma Scale    Best Eye Response 4-->(E4) spontaneous     Best Motor Response 6-->(M6) obeys commands     Best Verbal Response 5-->(V5) oriented     Joice Coma Scale Score 15

## 2025-06-07 NOTE — TELEPHONE ENCOUNTER
Nurse Triage SBAR    Is this a 2nd Level Triage? NO    Situation: Patient calling.     Background: Breathing difficulty.     Assessment: Awaiting liver transplant.  Her diuretic is not working.  She feels she has excess fluid on her body.  She has a cough and is wheezing, which began one week ago.  Diarrhea began two weeks ago.     Protocol Recommended Disposition:   Go to ED Now    Recommendation: Go to ED now.  Pt agreeable.           Does the patient meet one of the following criteria for ADS visit consideration? 16+ years old, with an [facility] PCP     TIP  Providers, please consider if this condition is appropriate for management at one of our Acute and Diagnostic Services sites.     If patient is a good candidate, please use dotphrase <dot>triageresponse and select Refer to ADS to document.    Reason for Disposition   [1] MODERATE difficulty breathing (e.g., speaks in phrases, SOB even at rest, pulse 100-120) AND [2] NEW-onset or WORSE than normal    Additional Information   Negative: SEVERE difficulty breathing (e.g., struggling for each breath, speaks in single words)   Negative: [1] Breathing stopped AND [2] hasn't returned   Negative: Choking on something   Negative: Bluish (or gray) lips or face now   Negative: Difficult to awaken or acting confused (e.g., disoriented, slurred speech)   Negative: Passed out (i.e., lost consciousness, collapsed and was not responding)   Negative: Wheezing started suddenly after medicine, an allergic food or bee sting   Negative: Stridor (harsh sound while breathing in)   Negative: Slow, shallow and weak breathing   Negative: Sounds like a life-threatening emergency to the triager    Protocols used: Breathing Difficulty-A-

## 2025-06-08 ENCOUNTER — APPOINTMENT (OUTPATIENT)
Dept: CARDIOLOGY | Facility: CLINIC | Age: 60
DRG: 442 | End: 2025-06-08
Payer: COMMERCIAL

## 2025-06-08 ENCOUNTER — APPOINTMENT (OUTPATIENT)
Dept: CT IMAGING | Facility: CLINIC | Age: 60
DRG: 442 | End: 2025-06-08
Attending: EMERGENCY MEDICINE
Payer: COMMERCIAL

## 2025-06-08 ENCOUNTER — APPOINTMENT (OUTPATIENT)
Dept: PHYSICAL THERAPY | Facility: CLINIC | Age: 60
End: 2025-06-08
Payer: COMMERCIAL

## 2025-06-08 LAB
ALBUMIN SERPL BCG-MCNC: 2.4 G/DL (ref 3.5–5.2)
ALBUMIN UR-MCNC: NEGATIVE MG/DL
ALP SERPL-CCNC: 69 U/L (ref 40–150)
ALT SERPL W P-5'-P-CCNC: 32 U/L (ref 0–50)
ANION GAP SERPL CALCULATED.3IONS-SCNC: 10 MMOL/L (ref 7–15)
ANION GAP SERPL CALCULATED.3IONS-SCNC: 8 MMOL/L (ref 7–15)
ANION GAP SERPL CALCULATED.3IONS-SCNC: 9 MMOL/L (ref 7–15)
APPEARANCE UR: CLEAR
APTT PPP: 42 SECONDS (ref 22–38)
AST SERPL W P-5'-P-CCNC: 68 U/L (ref 0–45)
BILIRUB SERPL-MCNC: 4.4 MG/DL
BILIRUB UR QL STRIP: NEGATIVE
BUN SERPL-MCNC: 10.5 MG/DL (ref 8–23)
BUN SERPL-MCNC: 10.8 MG/DL (ref 8–23)
BUN SERPL-MCNC: 11.8 MG/DL (ref 8–23)
CALCIUM SERPL-MCNC: 8 MG/DL (ref 8.8–10.4)
CALCIUM SERPL-MCNC: 8 MG/DL (ref 8.8–10.4)
CALCIUM SERPL-MCNC: 8.1 MG/DL (ref 8.8–10.4)
CHLORIDE SERPL-SCNC: 100 MMOL/L (ref 98–107)
CHLORIDE SERPL-SCNC: 98 MMOL/L (ref 98–107)
CHLORIDE SERPL-SCNC: 99 MMOL/L (ref 98–107)
COLOR UR AUTO: YELLOW
CREAT SERPL-MCNC: 0.98 MG/DL (ref 0.51–0.95)
CREAT SERPL-MCNC: 0.98 MG/DL (ref 0.51–0.95)
CREAT SERPL-MCNC: 1.04 MG/DL (ref 0.51–0.95)
EGFRCR SERPLBLD CKD-EPI 2021: 61 ML/MIN/1.73M2
EGFRCR SERPLBLD CKD-EPI 2021: 66 ML/MIN/1.73M2
EGFRCR SERPLBLD CKD-EPI 2021: 66 ML/MIN/1.73M2
ERYTHROCYTE [DISTWIDTH] IN BLOOD BY AUTOMATED COUNT: 16.7 % (ref 10–15)
FIBRINOGEN PPP-MCNC: 128 MG/DL (ref 170–510)
GLUCOSE SERPL-MCNC: 110 MG/DL (ref 70–99)
GLUCOSE SERPL-MCNC: 139 MG/DL (ref 70–99)
GLUCOSE SERPL-MCNC: 91 MG/DL (ref 70–99)
GLUCOSE UR STRIP-MCNC: NEGATIVE MG/DL
HCO3 SERPL-SCNC: 24 MMOL/L (ref 22–29)
HCO3 SERPL-SCNC: 25 MMOL/L (ref 22–29)
HCO3 SERPL-SCNC: 26 MMOL/L (ref 22–29)
HCT VFR BLD AUTO: 32.2 % (ref 35–47)
HGB BLD-MCNC: 10.9 G/DL (ref 11.7–15.7)
HGB UR QL STRIP: NEGATIVE
INR PPP: 2.07 (ref 0.85–1.15)
KETONES UR STRIP-MCNC: NEGATIVE MG/DL
LEUKOCYTE ESTERASE UR QL STRIP: NEGATIVE
LVEF ECHO: NORMAL
MAGNESIUM SERPL-MCNC: 1.6 MG/DL (ref 1.7–2.3)
MAGNESIUM SERPL-MCNC: 1.6 MG/DL (ref 1.7–2.3)
MCH RBC QN AUTO: 32.4 PG (ref 26.5–33)
MCHC RBC AUTO-ENTMCNC: 33.9 G/DL (ref 31.5–36.5)
MCV RBC AUTO: 96 FL (ref 78–100)
NITRATE UR QL: NEGATIVE
PH UR STRIP: 5.5 [PH] (ref 5–7)
PLATELET # BLD AUTO: 61 10E3/UL (ref 150–450)
POTASSIUM SERPL-SCNC: 3.5 MMOL/L (ref 3.4–5.3)
PROT SERPL-MCNC: 6.3 G/DL (ref 6.4–8.3)
PROTHROMBIN TIME: 23.4 SECONDS (ref 11.8–14.8)
RBC # BLD AUTO: 3.36 10E6/UL (ref 3.8–5.2)
SODIUM SERPL-SCNC: 132 MMOL/L (ref 135–145)
SODIUM SERPL-SCNC: 133 MMOL/L (ref 135–145)
SODIUM SERPL-SCNC: 134 MMOL/L (ref 135–145)
SP GR UR STRIP: 1.01 (ref 1–1.03)
T4 FREE SERPL-MCNC: 1.47 NG/DL (ref 0.9–1.7)
TROPONIN T SERPL HS-MCNC: 12 NG/L
TSH SERPL DL<=0.005 MIU/L-ACNC: 4.89 UIU/ML (ref 0.3–4.2)
UROBILINOGEN UR STRIP-MCNC: NORMAL MG/DL
WBC # BLD AUTO: 5.7 10E3/UL (ref 4–11)

## 2025-06-08 PROCEDURE — 93308 TTE F-UP OR LMTD: CPT | Mod: 26 | Performed by: INTERNAL MEDICINE

## 2025-06-08 PROCEDURE — 85610 PROTHROMBIN TIME: CPT

## 2025-06-08 PROCEDURE — 93325 DOPPLER ECHO COLOR FLOW MAPG: CPT | Mod: 26 | Performed by: INTERNAL MEDICINE

## 2025-06-08 PROCEDURE — 36415 COLL VENOUS BLD VENIPUNCTURE: CPT

## 2025-06-08 PROCEDURE — 36415 COLL VENOUS BLD VENIPUNCTURE: CPT | Performed by: STUDENT IN AN ORGANIZED HEALTH CARE EDUCATION/TRAINING PROGRAM

## 2025-06-08 PROCEDURE — 85730 THROMBOPLASTIN TIME PARTIAL: CPT | Performed by: STUDENT IN AN ORGANIZED HEALTH CARE EDUCATION/TRAINING PROGRAM

## 2025-06-08 PROCEDURE — 80048 BASIC METABOLIC PNL TOTAL CA: CPT | Performed by: STUDENT IN AN ORGANIZED HEALTH CARE EDUCATION/TRAINING PROGRAM

## 2025-06-08 PROCEDURE — 85027 COMPLETE CBC AUTOMATED: CPT

## 2025-06-08 PROCEDURE — 81003 URINALYSIS AUTO W/O SCOPE: CPT

## 2025-06-08 PROCEDURE — 71275 CT ANGIOGRAPHY CHEST: CPT | Mod: 26 | Performed by: RADIOLOGY

## 2025-06-08 PROCEDURE — 85384 FIBRINOGEN ACTIVITY: CPT | Performed by: STUDENT IN AN ORGANIZED HEALTH CARE EDUCATION/TRAINING PROGRAM

## 2025-06-08 PROCEDURE — 999N000127 HC STATISTIC PERIPHERAL IV START W US GUIDANCE

## 2025-06-08 PROCEDURE — 120N000002 HC R&B MED SURG/OB UMMC

## 2025-06-08 PROCEDURE — 93325 DOPPLER ECHO COLOR FLOW MAPG: CPT

## 2025-06-08 PROCEDURE — 250N000011 HC RX IP 250 OP 636

## 2025-06-08 PROCEDURE — 83735 ASSAY OF MAGNESIUM: CPT | Performed by: STUDENT IN AN ORGANIZED HEALTH CARE EDUCATION/TRAINING PROGRAM

## 2025-06-08 PROCEDURE — 99223 1ST HOSP IP/OBS HIGH 75: CPT | Mod: GC | Performed by: STUDENT IN AN ORGANIZED HEALTH CARE EDUCATION/TRAINING PROGRAM

## 2025-06-08 PROCEDURE — 250N000011 HC RX IP 250 OP 636: Mod: JZ | Performed by: STUDENT IN AN ORGANIZED HEALTH CARE EDUCATION/TRAINING PROGRAM

## 2025-06-08 PROCEDURE — 99255 IP/OBS CONSLTJ NEW/EST HI 80: CPT | Mod: GC | Performed by: INTERNAL MEDICINE

## 2025-06-08 PROCEDURE — 255N000002 HC RX 255 OP 636: Performed by: INTERNAL MEDICINE

## 2025-06-08 PROCEDURE — 93321 DOPPLER ECHO F-UP/LMTD STD: CPT | Mod: 26 | Performed by: INTERNAL MEDICINE

## 2025-06-08 PROCEDURE — 97530 THERAPEUTIC ACTIVITIES: CPT | Mod: GP

## 2025-06-08 PROCEDURE — 71275 CT ANGIOGRAPHY CHEST: CPT

## 2025-06-08 PROCEDURE — 250N000011 HC RX IP 250 OP 636: Performed by: EMERGENCY MEDICINE

## 2025-06-08 PROCEDURE — 97110 THERAPEUTIC EXERCISES: CPT | Mod: GP

## 2025-06-08 PROCEDURE — 250N000013 HC RX MED GY IP 250 OP 250 PS 637

## 2025-06-08 PROCEDURE — 84484 ASSAY OF TROPONIN QUANT: CPT

## 2025-06-08 PROCEDURE — 84155 ASSAY OF PROTEIN SERUM: CPT

## 2025-06-08 PROCEDURE — 97161 PT EVAL LOW COMPLEX 20 MIN: CPT | Mod: GP

## 2025-06-08 RX ORDER — CHLOROTHIAZIDE SODIUM 500 MG/1
500 INJECTION INTRAVENOUS ONCE
Status: COMPLETED | OUTPATIENT
Start: 2025-06-08 | End: 2025-06-08

## 2025-06-08 RX ORDER — GABAPENTIN 300 MG/1
300 CAPSULE ORAL EVERY MORNING
Status: DISCONTINUED | OUTPATIENT
Start: 2025-06-08 | End: 2025-06-15 | Stop reason: HOSPADM

## 2025-06-08 RX ORDER — GABAPENTIN 600 MG/1
600 TABLET ORAL ONCE
Status: COMPLETED | OUTPATIENT
Start: 2025-06-08 | End: 2025-06-08

## 2025-06-08 RX ORDER — IOPAMIDOL 755 MG/ML
100 INJECTION, SOLUTION INTRAVASCULAR ONCE
Status: COMPLETED | OUTPATIENT
Start: 2025-06-08 | End: 2025-06-08

## 2025-06-08 RX ORDER — CALCIUM CARBONATE 500 MG/1
1000 TABLET, CHEWABLE ORAL 4 TIMES DAILY PRN
Status: DISCONTINUED | OUTPATIENT
Start: 2025-06-08 | End: 2025-06-15 | Stop reason: HOSPADM

## 2025-06-08 RX ORDER — SPIRONOLACTONE 50 MG/1
50 TABLET, FILM COATED ORAL DAILY
Status: DISCONTINUED | OUTPATIENT
Start: 2025-06-08 | End: 2025-06-15

## 2025-06-08 RX ORDER — GABAPENTIN 300 MG/1
600 CAPSULE ORAL EVERY EVENING
Status: DISCONTINUED | OUTPATIENT
Start: 2025-06-08 | End: 2025-06-15 | Stop reason: HOSPADM

## 2025-06-08 RX ORDER — BUMETANIDE 0.25 MG/ML
2 INJECTION, SOLUTION INTRAMUSCULAR; INTRAVENOUS ONCE
Status: COMPLETED | OUTPATIENT
Start: 2025-06-08 | End: 2025-06-08

## 2025-06-08 RX ORDER — BUMETANIDE 0.25 MG/ML
1 INJECTION, SOLUTION INTRAMUSCULAR; INTRAVENOUS
Status: DISCONTINUED | OUTPATIENT
Start: 2025-06-08 | End: 2025-06-09

## 2025-06-08 RX ORDER — LACTULOSE 10 G/15ML
10 SOLUTION ORAL EVERY MORNING
Status: DISCONTINUED | OUTPATIENT
Start: 2025-06-08 | End: 2025-06-15 | Stop reason: HOSPADM

## 2025-06-08 RX ORDER — SPIRONOLACTONE 100 MG/1
100 TABLET, FILM COATED ORAL DAILY
Status: ON HOLD | COMMUNITY
End: 2025-06-15

## 2025-06-08 RX ORDER — LIDOCAINE 40 MG/G
CREAM TOPICAL
Status: DISCONTINUED | OUTPATIENT
Start: 2025-06-08 | End: 2025-06-15 | Stop reason: HOSPADM

## 2025-06-08 RX ORDER — AMOXICILLIN 250 MG
1 CAPSULE ORAL 2 TIMES DAILY PRN
Status: DISCONTINUED | OUTPATIENT
Start: 2025-06-08 | End: 2025-06-15 | Stop reason: HOSPADM

## 2025-06-08 RX ORDER — AMOXICILLIN 250 MG
2 CAPSULE ORAL 2 TIMES DAILY PRN
Status: DISCONTINUED | OUTPATIENT
Start: 2025-06-08 | End: 2025-06-15 | Stop reason: HOSPADM

## 2025-06-08 RX ORDER — MAGNESIUM SULFATE HEPTAHYDRATE 40 MG/ML
2 INJECTION, SOLUTION INTRAVENOUS ONCE
Status: COMPLETED | OUTPATIENT
Start: 2025-06-08 | End: 2025-06-08

## 2025-06-08 RX ORDER — LEVOTHYROXINE SODIUM 112 UG/1
112 TABLET ORAL DAILY
Status: DISCONTINUED | OUTPATIENT
Start: 2025-06-08 | End: 2025-06-15 | Stop reason: HOSPADM

## 2025-06-08 RX ORDER — CITALOPRAM HYDROBROMIDE 20 MG/1
20 TABLET ORAL DAILY
Status: DISCONTINUED | OUTPATIENT
Start: 2025-06-08 | End: 2025-06-15 | Stop reason: HOSPADM

## 2025-06-08 RX ORDER — TRAZODONE HYDROCHLORIDE 100 MG/1
100 TABLET ORAL AT BEDTIME
Status: DISCONTINUED | OUTPATIENT
Start: 2025-06-08 | End: 2025-06-15 | Stop reason: HOSPADM

## 2025-06-08 RX ORDER — BUMETANIDE 0.5 MG/1
1 TABLET ORAL DAILY
Status: DISCONTINUED | OUTPATIENT
Start: 2025-06-08 | End: 2025-06-15

## 2025-06-08 RX ADMIN — HUMAN ALBUMIN MICROSPHERES AND PERFLUTREN 5 ML: 10; .22 INJECTION, SOLUTION INTRAVENOUS at 10:47

## 2025-06-08 RX ADMIN — RIFAXIMIN 550 MG: 550 TABLET ORAL at 22:13

## 2025-06-08 RX ADMIN — CHLOROTHIAZIDE SODIUM 500 MG: 500 INJECTION, POWDER, LYOPHILIZED, FOR SOLUTION INTRAVENOUS at 04:15

## 2025-06-08 RX ADMIN — IOPAMIDOL 100 ML: 755 INJECTION, SOLUTION INTRAVENOUS at 00:21

## 2025-06-08 RX ADMIN — GABAPENTIN 600 MG: 600 TABLET, FILM COATED ORAL at 05:45

## 2025-06-08 RX ADMIN — GABAPENTIN 300 MG: 300 CAPSULE ORAL at 08:38

## 2025-06-08 RX ADMIN — LEVOTHYROXINE SODIUM 112 MCG: 0.11 TABLET ORAL at 08:38

## 2025-06-08 RX ADMIN — GABAPENTIN 600 MG: 300 CAPSULE ORAL at 22:12

## 2025-06-08 RX ADMIN — BUMETANIDE 2 MG: 0.25 INJECTION INTRAMUSCULAR; INTRAVENOUS at 05:12

## 2025-06-08 RX ADMIN — TRAZODONE HYDROCHLORIDE 100 MG: 100 TABLET ORAL at 22:13

## 2025-06-08 RX ADMIN — MAGNESIUM SULFATE HEPTAHYDRATE 2 G: 2 INJECTION, SOLUTION INTRAVENOUS at 22:13

## 2025-06-08 RX ADMIN — BUMETANIDE 1 MG: 0.25 INJECTION INTRAMUSCULAR; INTRAVENOUS at 14:04

## 2025-06-08 RX ADMIN — CITALOPRAM HYDROBROMIDE 20 MG: 20 TABLET ORAL at 08:38

## 2025-06-08 ASSESSMENT — ACTIVITIES OF DAILY LIVING (ADL)
ADLS_ACUITY_SCORE: 44
ADLS_ACUITY_SCORE: 33
ADLS_ACUITY_SCORE: 29
ADLS_ACUITY_SCORE: 33
ADLS_ACUITY_SCORE: 29
ADLS_ACUITY_SCORE: 33
ADLS_ACUITY_SCORE: 29
ADLS_ACUITY_SCORE: 33
ADLS_ACUITY_SCORE: 29
ADLS_ACUITY_SCORE: 44
ADLS_ACUITY_SCORE: 37
ADLS_ACUITY_SCORE: 33
ADLS_ACUITY_SCORE: 44
ADLS_ACUITY_SCORE: 46
ADLS_ACUITY_SCORE: 33
ADLS_ACUITY_SCORE: 44
ADLS_ACUITY_SCORE: 33

## 2025-06-08 NOTE — ED PROVIDER NOTES
History     Chief Complaint   Patient presents with    Cough    Fluid Retention     HPI  Karlene Yun is a 59 year old female who has a PMH notable for  MASH cirrhosis, some chronic left-sided pleural effusion, psoriatic arthritis, HLD, hypothyroidism, NEREYDA, prior PE, idiopathic thrombocytopenia, amongst others presenting today with concern for fluid retention, weight gain (30 pounds) and now some new shortness of breath.    Patient has a known history of MASH cirrhosis, is followed by the Hepatology team and is reportedly on the liver transplant list, but has not yet had a liver transplant.  Because of chronic fluid issue she is on 2 diuretics at baseline (Bumex, spironolactone)    With patient comes in today with her daughter and concern for worsening fluid retention and is up about 30 pounds from her previous baseline based on a recent outpatient clinic visit where they rechecked her weight.  She is noticing swelling in both legs, both arms, some fullness in her abdomen (but without notable abdominal discomfort), and of chief concern presenting today is now developing some shortness of breath and wheezing sensation.  No notable cough/sputum production, no fevers.  She is on the 2 diuretics, but not having as much urine output as previously.  No new traumas or falls.  No fevers or chills.  No new rashes or skin changes.  Otherwise no chest notable chest discomfort, just that shortness of breath, etc.  She is not sure why she would be having wheezing.      No other new symptoms or concerns reported at this time. Full ROS completed w/o additional findings.         Past Medical History  Past Medical History:   Diagnosis Date    Cirrhosis of liver with ascites (H) 08/05/2022    Fibromyalgia 11/12/2018    Hyperlipidemia 03/17/2005     Problem list name updated by automated process. Provider to review    Hypothyroidism 06/26/2002     Problem list name updated by automated process. Provider to review    Major  "depressive disorder, recurrent episode, in full remission 01/21/2002    Metabolic dysfunction-associated steatohepatitis (MASH) 05/30/2025    Morbid obesity (H) 05/12/2019    Muscle pain 08/20/2012    Osteoarthritis 11/11/2024    Psoriatic arthropathy (H) 01/21/2002    Pulmonary embolism and infarction (H) 01/31/2002    Problem list name updated by automated process. Provider to review       Past Surgical History:   Procedure Laterality Date    BIOPSY  Aug 2024    COLONOSCOPY  02/11/2016    mild colitis/ repeat 10 yrs    ESOPHAGOSCOPY, GASTROSCOPY, DUODENOSCOPY (EGD), COMBINED N/A 08/09/2024    Procedure: ESOPHAGOGASTRODUODENOSCOPY for variceal surveillance;  Surgeon: Guru Jacquelyn Rees MD;  Location: [facility] OR    ESOPHAGOSCOPY, GASTROSCOPY, DUODENOSCOPY (EGD), COMBINED N/A 08/09/2024    Procedure: ESOPHAGOGASTRODUODENOSCOPY, WITH FINE NEEDLE ASPIRATION BIOPSY, WITH ENDOSCOPIC ULTRASOUND GUIDANCE liver and lymph node biopsy;  Surgeon: Guru Jacquelyn Rees MD;  Location: [facility] OR     REMOVAL OF TONSILS,<11 Y/O      Tonsils <11y.o.    IR LUMBAR PUNCTURE  06/30/2023    IR THORACENTESIS  03/06/2025    LASIK  01/01/2004    \"lasek\"    THORACENTESIS Left 03/06/2025    Procedure: Thoracentesis;  Surgeon: Jaden Martinez MD;  Location: [facility] OR     No current outpatient medications on file.    Allergies   Allergen Reactions    Sumatriptan Succinate Nausea and Vomiting     IMITREX     Family History  Family History   Problem Relation Age of Onset    Cancer Sister         cervical    Arthritis Paternal Grandmother     Diabetes Father     Asthma Sister     Thyroid Disease Sister     Asthma Daughter     Thyroid Disease Sister      Social History   Social History     Tobacco Use    Smoking status: Never    Smokeless tobacco: Never   Substance Use Topics    Alcohol use: Not Currently     Comment: last drink 3 years ago    Drug use: Never              REVIEW OF SYSTEMS  A complete review of systems " "was performed with pertinent positives and negatives noted in the HPI, and all other systems negative.    Physical Exam   BP: 129/60  Pulse: 69  Temp: 97.8  F (36.6  C)  Resp: 18  Height: 180.3 cm (5' 11\")  Weight: (!) 149 kg (328 lb 7.8 oz)  SpO2: 99 %      Physical Exam  CONSTITUTIONAL: Awake and alert. Non-toxic appearance. No acute distress. Patient is visibly edematous particularly of the lower extremities but also of the upper extremities.  That said has normal work of breathing at rest.  HENT:   - Head: Normocephalic and atraumatic.   - Ears: External ear grossly normal.   - Nose: Nose normal. No rhinorrhea. No epistaxis.   - Mouth/Throat: MMM  EYES: Conjunctivae and lids are normal. No scleral icterus.   NECK: Normal range of motion and phonation normal. Neck supple.  No tracheal deviation, no stridor. No edema or erythema noted.  CARDIOVASCULAR: Normal rate, regular rhythm and no appreciable abnormal heart sounds.  PULMONARY/CHEST: Normal work of breathing. No accessory muscle usage or stridor. No respiratory distress.  No appreciable abnormal breath sounds.  ABDOMEN: Soft. No tenderness.  No palpable masses or abnormal pulsatility appreciated. Does have some mild fullness, perhaps very mild ascites wlong w/ surrounding subcutaneous edema but no discomfort. No peritoneal findings, no rigidity, rebound or guarding.  No clinical findings for SBP  MUSCULOSKELETAL: Extremities warm and seemingly well perfused. Pitting edema noted to all 4 extremities, more notable in the lower extremity than the upper extremities, but no asymmetry from right to left appreciated.  Seemingly warm and well-perfused.  No acute joint finding/effusions, no abnormal warmth or acute skin changes, normal compartments.  NEUROLOGIC: Awake, alert. Not disoriented. No seizure activity. GCS 15  SKIN: Skin is warm and dry. No diaphoresis. No pallor. No rash/acute appearing skin changes noted.  PSYCHIATRIC: Normal mood and affect. Speech and " behavior normal. Thought processes linear. Cognition and memory are normal. No SI/HI reported.    ED Course            EKG Interpretation:      Interpreted by Leona Malhotra MD  Time reviewed: 2057  Symptoms at time of EKG: Shortness of breath, fluid retention/swelling, weight gain  Rhythm: Sinus  Rate: Normal  No obvious ectopy  Conduction: QTc 488 ms, low voltage  ST Segments/ T Waves: Low voltage, no joe ST elevation though does have nonspecific ST/T wave findings  Comparison to prior: Compared to EKG from 26 November 2024, the T wave inversion in lead III is less notable voltage is somewhat decreased from previous, otherwise morphology appears relatively similar to prior  Clinical Impression: sinus, prolonged QTc, low voltage, nonspecific ST/T wave findings      ----------------------------------------------------------------------------------------------------------------------  Critical care was not performed.     Medical Decision Making  The patient's presentation was of high complexity ( ).    The patient's evaluation involved:  review of 3+ test result(s) ordered prior to this encounter (see separate area of note for details)  ordering and/or review of 3+ test(s) in this encounter (see separate area of note for details)  discussion of management or test interpretation with another health professional (see separate area of note for details)    The patient's management necessitated high risk (a decision regarding hospitalization) and further care after sign-out to admitting IM team  (see their note for further management).    Assessments & Plan (with Medical Decision Making)     IMPRESSION:   59 year old female w/ PMH notable for  MASH cirrhosis, some chronic left-sided pleural effusion, psoriatic arthritis, HLD, hypothyroidism, NEREYDA, prior PE, idiopathic thrombocytopenia, amongst others presenting today with concern for fluid retention, weight gain (30 pounds) and now some new shortness of  breath.    Clinically, patient appears fluid overloaded with essentially anasarca and while she is reporting some subjective breathing symptoms her lungs are actually pretty clear, no notable wheezing at this time, protecting airway, no other chest symptoms, no peritonitic findings for the abdomen no other acute findings.    DDx includes, but not limited to, fluid overload in the setting of worsening renal disease, have some additional resistance to previous diuretics, worsening kidney disease or hepatorenal syndrome, considered a primary cardiac etiology/heart failure but think that to be of lower likelihood, considered DVT/PE which may be harder to assess given the patient has somewhat swelling anyway (though I think the swelling is probably, likely just related to fluid overload), amongst others. No peritonitic findings currently to make me think she would have SBP or need emergent diagnostic tap overnight    PLAN:   - Laboratory studies, EKG, imaging of the chest  - I think the patient will likely need IV diuresis, getting the see the CT scan to make sure there is no PE and then given her worsening I think she will likely need admission to medicine for judicious diuresis, etc.  - Specialty evaluation  - Risks/benefits of pursuing imaging reviewed and accepted.   - Dispo pending ED Course    RESULTS:  Labs:   - D-dimer elevated up to 8.9  - WBC 6.4, Hgb of 11.7 is down from previous back in March but again she is quite fluid up  - No obvious infectious findings on influenza/COVID/RSV or respiratory viral panel swabs  - Troponin normal range at 11,   - Lactate 2.0  - Creatinine is 0.98 is relatively near previous  - INR 1.87 is near previous  Imaging: Pending    DISCUSSIONS:  - w/ IM: Reviewed patient/presentation, current state of workup/any pending studies. They will admit for further evaluation/management, F/U pending studies as needed, coordinate w/ consulting services as needed. No additional  requests/recommendations for workup/management for in the ED at this time.      DISPOSITION/PLANNING:  IMPRESSION:   - Fluid overload with anasarca in the setting of MASH cirrhosis  - Elevated D-dimer  - Prolonged QT, low voltage  DISPOSITION:  - Admit to IM for further evaluation management  PENDING:   - Imaging  OTHER RECOMMENDATIONS  - likely diuresis  - Further management of any abnormal CT findings once those results return  - GI/hepatology consult  - Further specialty evaluations as needed  ______________________________________________________________________    I, Noam Chaney, am serving as a trained medical scribe to document services personally performed by Leona Malhotra MD, based on the provider's statements to me.     I, Leona Perez MD, was physically present and have reviewed and verified the accuracy of this note documented by Noam Chaney.       Leona Malhotra MD  6/7/2025   [facility] EMERGENCY DEPARTMENT       Leona Malhotra MD  06/09/25 3175

## 2025-06-08 NOTE — PLAN OF CARE
"RN [id number]:    Vital signs: /48 (BP Location: Right arm)   Pulse 76   Temp 98  F (36.7  C) (Oral)   Resp 18   Ht 1.803 m (5' 11\")   Wt (!) 154.4 kg (340 lb 4.8 oz)   SpO2 92%   BMI 47.46 kg/m      Status: admitted for dyspnea and anasarca   Neuro: AOx4  Pain/Nausea: pain managed with scheduled gabapentin and heat packs. Denies nausea  Cardiac: WNL  Respiratory: WNL on room air  Mobility: SBA with cane  Diet: regular, tolerating  Labs: reviewed  LDAs: L PIV SL  Skin/incisions: no new deficits found  GI/: primofit catheter, continent of BM  Plan: continue with plan of care  "

## 2025-06-08 NOTE — PHARMACY-ADMISSION MEDICATION HISTORY
Pharmacist Admission Medication History    Admission medication history is complete. The information provided in this note is only as accurate as the sources available at the time of the update.    Information Source(s): Patient, Clinic records, and [facility] via in-person    Pertinent Information:   Karlene knew her medications very well  Was struggling with diarrhea so she decreased her lactulose to 5g daily and did not take it for several days  Was prescribed rifaximin but has not picked it up due to concerns about cost. If her copay is very expensive, she does not plan to start this.  Gabapentin prescribed as 300 mg QAM and 600 mg QPM, but she often will omit the morning dose. Always takes the PM dose.  Last dose of Taltz was about a month ago. She is unsure what the plan is for this medication going forward.    Changes made to PTA medication list:  Added: None  Deleted:   Tacrolimus capsules (must have been added in error, has never taken this)  Vitamin D (no longer taking)  Changed:   Spironolactone from 50 mg daily to 100 mg daily per patient/fill history  Lidocaine patches to PRN    Allergies reviewed with patient and updates made in EHR: yes    Medication History Completed By: Ezequiel Gonzalez [facility] 6/8/2025 12:56 PM    PTA Med List   Medication Sig Last Dose/Taking    bumetanide (BUMEX) 1 MG tablet Take 1 tablet (1 mg) by mouth daily. 6/7/2025 Morning    citalopram (CELEXA) 20 MG tablet Take 1 tablet (20 mg) by mouth daily. 6/7/2025 Morning    gabapentin (NEURONTIN) 300 MG capsule 300 mg qam, 600 mg qpm 6/6/2025 Bedtime    ixekizumab (TALTZ) 80 MG/ML SOAJ auto-injector 160 mg once, followed by 80 mg at weeks 2, 4, 6, 8, 10, and 12, and then 80 mg every 4 weeks More than a month    lactulose 20 GM/30ML solution Take 15 mLs (10 g) by mouth every morning. (Patient taking differently: Take 5 g by mouth every morning.) Past Week    levothyroxine (SYNTHROID/LEVOTHROID) 112 MCG tablet Take 1  tablet (112 mcg) by mouth daily. 6/7/2025 Morning    Lidocaine (LIDOCARE) 4 % Patch Place 1 patch onto the skin daily as needed for moderate pain. Taking As Needed    mometasone (ELOCON) 0.1 % external solution Apply thin layer twice a day as needed to rash on scalp Taking as needed    spironolactone (ALDACTONE) 100 MG tablet Take 100 mg by mouth daily. 6/7/2025 Morning    tacrolimus (PROTOPIC) 0.1 % external ointment Apply topically as needed Taking As Needed    traZODone (DESYREL) 100 MG tablet Take 1 tablet (100 mg) by mouth at bedtime. 6/6/2025 Bedtime    triamcinolone (KENALOG) 0.1 % external cream Apply topically as needed Taking As Needed        36.6

## 2025-06-08 NOTE — PLAN OF CARE
Admission to   Diagnosis: anasarca, dyspnea, and edema.  Admitted from: [facility]   Via: stretcher   Accompanied by: family   Belongings: Placed in closet; valuables to be sent home with daughter today.  Admission paperwork: complete   Teaching: Call don't fall, use of console, calling for meals,  orientation to room and the importance of safety.   Access: PIV   Wt.: complete   Two nurse head-to-toe skin assessment performed by FRANSICO Muñoz and myself.  Skin issues noted: slight redness under left breast..    .adm

## 2025-06-08 NOTE — H&P
[facility]    History and Physical - Medicine Service, [facility] TEAM        Date of Admission:  6/7/2025    Assessment & Plan      60 yo female w/ MASH cirrhosis, chronic left-sided pleural effusion, psoriatic arthritis admitted for dyspnea and anasarca.    #dyspnea  #pleural effusion, left-sided, chronic of unclear etiology  Follows pulm, pleural effusion appeared between Dec 2024 and Feb 2025. 3/6/25 pleural fluid studies have been consistent with exudate; however pleural protein levels relatively low. No apparent malignant process with negative cytology. PFTs with mild restriction No signs of infection. CT/PE today negative for PE w/ large left pleural effusion and ascites. . Trops neg, low c/f ACS. Clinically non-toxic appearing. RVP negative. Admitted on RA.  - HELD PTA diuretics (see below)   - If no improvement tomorrow, consider CAPS consult for diagnostic and therapeutic thoracentesis  - spO2 spot checks    #anasarca LE > UE  #chronic LE edema  Recently decreased spironolactone dosage 2/2 hand cramping. TTE 12/27/24 WNL. Pt reports 30 lb weight gain in a few weeks. Consideration for GI loss given diarrhea for past 2 weeks, worsened with lactulose. No hypotension or signs of anaphylaxis. Prior   Dx:  - UA for consideration of renal causes  - Repeat TTE ordered though Dec 2024 WNL  - Added on TSH  - Nutrition consulted given poor albumin and anasarca c/f malnutrition  Tx:  - IV diuresis: IV Diuril 500 mg x1 then IV Bumex 2 mg  - HOLD PTA Bumex 1 mg, spironolactone 50 mg  - Strict I/O  - Purewick given difficulty w/ mobility and increased urination w/ IV diuresis    #cirrhosis 2/2 MASH c/b ascites  #hyperbilirubinemia (around baseline)  #thrombocytopenia (around baseline, 70)  Follows hepatology, on liver transplant list since April 2025. Immunosuppressed and not a candidate for vaccines. MELD 3.0: 23, MELD-NA: 21.  - Cramping: HOLD PTA magnesium, zinc,  vit E supplements  - Constipation: PTA lactulose 15 mL every day  - Tremors: PTA rifaximin 550 mg BID  - Hepatology consulted given patient's status on transplant list, if unnecessary, please cancel    #psoriatic arthritis  #psoriasis  Was on methotrexate for 20+ years, d/c'd d/t cirrhosis. On adalimumab in 2020 (ineffective), risankizumab 2020  - HOLD PTA ixekizumab (IL-17 monoclonal antibody) qMonthly    #obesity  #deconditioning  - PT consulted    #UE pain  #back and neck pain, chronic  #fibromyalgia  Sanchez tenderness when palpating R upper arm> L upper arm  - Bilateral UE duplex    #hx of pulmonary embolism ()  On warfarin briefly, not currently on DOAC. Was on birth control at that time    #acquired hypothyroidism  Clinically euthyroid per PCP. TSH WNL  - PTA levothyroxine 112 mcg qD    #MDD  #insomnia  - PTA citalopram 20 mg every day  - PTA insomnia 100 mg at bedtime    #NEREYDA, moderate  Has seen sleep medicine. Recs APAP 5-15 cm H2O. Does not use CPAP, it gives her anxiety  - Placed CPAP order    #enlarged mediastinal and 1.5 cm subcarinal lymph nodes  EUS 24 w/ FNA: polymorphous lymphocytes      MELD 3.0: 23 at 2025  8:41 PM  MELD-Na: 21 at 2025  8:41 PM  Calculated from:  Serum Creatinine: 0.98 mg/dL (Using min of 1 mg/dL) at 2025  8:41 PM  Serum Sodium: 134 mmol/L at 2025  8:41 PM  Total Bilirubin: 4.3 mg/dL at 2025  8:41 PM  Serum Albumin: 2.7 g/dL at 2025  8:41 PM  INR(ratio): 1.87 at 2025  8:41 PM  Age at listin years  Sex: Female at 2025  8:41 PM        Diet: Combination Diet Regular Diet Adult  DVT Prophylaxis: Pneumatic Compression Devices  Rodriguez Catheter: Not present  Fluids: None  Lines: None     Cardiac Monitoring: None  Code Status: Full Code    Clinically Significant Risk Factors Present on Admission         # Hyponatremia: Lowest Na = 134 mmol/L in last 2 days, will monitor as appropriate       # Hypoalbuminemia: Lowest albumin = 2.4 g/dL at  "6/8/2025  3:49 AM, will monitor as appropriate    # Coagulation Defect: INR = 2.07 (Ref range: 0.85 - 1.15) and/or PTT = N/A, will monitor for bleeding  # Thrombocytopenia: Lowest platelets = 61 in last 2 days, will monitor for bleeding    # Acute heart failure with preserved ejection fraction: heart failure noted on problem list, last echo with EF >50%, and receiving IV diuretics     # Anemia: based on hgb <11       # Morbid Obesity: Estimated body mass index is 45.81 kg/m  as calculated from the following:    Height as of this encounter: 1.803 m (5' 11\").    Weight as of this encounter: 149 kg (328 lb 7.8 oz).              Disposition Plan      Expected Discharge Date: 06/09/2025                Patient to be formally staffed in AM      Antoinette Heath MD  Medicine Service, [facility]  Securely message with [employer] (more info)  Text page via [facility] Paging/Directory   See signed in provider for up to date coverage information  ______________________________________________________________________    Chief Complaint   Shortness of breath    History is obtained from the patient. Daughter also at bedside    History of Present Illness   58 yo female w/ MASH cirrhosis, chronic left-sided pleural effusion, psoriatic arthritis admitted for dyspnea and anasarca.    She reports 1 week ago she started having coughing and wheezing. In AM, she feels that it is productive green sputum. Later in day, drier. She has never been able to sleep on her back d/t chronic back pain. She denies increased orthopnea. She also reports 2 weeks of loose to watery stools coinciding with lactulose usage. Over the past 3 days, she would count the number of bowel movements as 3. On average. She recently stepped on a scale and thinks she's gone up 30 lbs in a couple weeks. She specifically  has noticed water retention and swelling of her extremities (her legs especially but also her upper arms) " "over the past week. She has intermittent chills chronically. She denies shortness of breath at rest, endorses dyspnea with exertion.    She denies sick contacts, headaches, vision changes, fevers, chest pain, abdominal pain, dysuria.    Social:  Has daughter  Lives alone  Occupation: prior retail  Smoking: never  ETOH: hasn't in years  Recreational use: None  Code: Full      Past Medical History    Past Medical History:   Diagnosis Date    Cirrhosis of liver with ascites (H) 08/05/2022    Fibromyalgia 11/12/2018    Hyperlipidemia 03/17/2005     Problem list name updated by automated process. Provider to review    Hypothyroidism 06/26/2002     Problem list name updated by automated process. Provider to review    Major depressive disorder, recurrent episode, in full remission 01/21/2002    Metabolic dysfunction-associated steatohepatitis (MASH) 05/30/2025    Morbid obesity (H) 05/12/2019    Muscle pain 08/20/2012    Osteoarthritis 11/11/2024    Psoriatic arthropathy (H) 01/21/2002    Pulmonary embolism and infarction (H) 01/31/2002    Problem list name updated by automated process. Provider to review         Past Surgical History   Past Surgical History:   Procedure Laterality Date    BIOPSY  Aug 2024    COLONOSCOPY  02/11/2016    mild colitis/ repeat 10 yrs    ESOPHAGOSCOPY, GASTROSCOPY, DUODENOSCOPY (EGD), COMBINED N/A 08/09/2024    Procedure: ESOPHAGOGASTRODUODENOSCOPY for variceal surveillance;  Surgeon: Guru Jacquelyn Rees MD;  Location:  OR    ESOPHAGOSCOPY, GASTROSCOPY, DUODENOSCOPY (EGD), COMBINED N/A 08/09/2024    Procedure: ESOPHAGOGASTRODUODENOSCOPY, WITH FINE NEEDLE ASPIRATION BIOPSY, WITH ENDOSCOPIC ULTRASOUND GUIDANCE liver and lymph node biopsy;  Surgeon: Guru Jacquelyn Rees MD;  Location: [facility] OR     REMOVAL OF TONSILS,<10 Y/O      Tonsils <11y.o.    IR LUMBAR PUNCTURE  06/30/2023    IR THORACENTESIS  03/06/2025    LASIK  01/01/2004    \"lasek\"    THORACENTESIS Left " 2025    Procedure: Thoracentesis;  Surgeon: Jaden Martinez MD;  Location: [facility] OR       Prior to Admission Medications   Prior to Admission Medications   Prescriptions Last Dose Informant Patient Reported? Taking?   Lidocaine (LIDOCARE) 4 % Patch   Yes No   PROGRAF (BRAND) 1 MG capsule   Yes No   bumetanide (BUMEX) 1 MG tablet   No No   Sig: Take 1 tablet (1 mg) by mouth daily.   citalopram (CELEXA) 20 MG tablet   No No   Sig: Take 1 tablet (20 mg) by mouth daily.   gabapentin (NEURONTIN) 300 MG capsule   No No   Si mg qam, 600 mg qpm   ixekizumab (TALTZ) 80 MG/ML SOAJ auto-injector   No No   Sig: 160 mg once, followed by 80 mg at weeks 2, 4, 6, 8, 10, and 12, and then 80 mg every 4 weeks   lactulose 20 GM/30ML solution   No No   Sig: Take 15 mLs (10 g) by mouth every morning.   levothyroxine (SYNTHROID/LEVOTHROID) 112 MCG tablet   No No   Sig: Take 1 tablet (112 mcg) by mouth daily.   mometasone (ELOCON) 0.1 % external solution   Yes No   Sig: Apply thin layer twice a day as needed to rash on scalp   rifaximin (XIFAXAN) 550 MG TABS tablet   No No   Sig: Take 1 tablet (550 mg) by mouth 2 times daily.   spironolactone (ALDACTONE) 50 MG tablet   No No   Sig: Take 1 tablet (50 mg) by mouth daily.   study drug cholecalciferol, , 15 mcg (600 units) CAPS capsule   Yes No   tacrolimus (PROTOPIC) 0.1 % external ointment   Yes No   Sig: Apply topically as needed   traZODone (DESYREL) 100 MG tablet   No No   Sig: Take 1 tablet (100 mg) by mouth at bedtime.   triamcinolone (KENALOG) 0.1 % external cream   Yes No   Sig: Apply topically as needed      Facility-Administered Medications: None          Physical Exam   Vital Signs: Temp: 98  F (36.7  C) Temp src: Oral BP: 127/61 Pulse: 71   Resp: 18 SpO2: 99 % O2 Device: None (Room air)    Weight: 328 lbs 7.77 oz    General: NAD, laying in bed, large body habitus, able to sit up unassisted  HEENT: Atraumatic, normocephalic, MMM, EOMI, PERRLA, no JVD  appreciated, spider angiomas on back of neck/upper traps  Cards: RRR, no m/r/g appreciated  Resp: CTAB, no WOB at rest, no crackles appreciated  Abd: soft, non-distended, non-tender to palpation, mild fluid wave  MSK/Skin: warm, dry, no rashes or lesions appreciated on visible skin  Pain on palpation of upper extremities R>L  3+ pitting edema of the LE, 1+ pitting edema of the UE  Neuro: A&O x3. No focal deficits. Gait not observed

## 2025-06-08 NOTE — PROGRESS NOTES
Medicine Progress Note - Medicine Service, [facility] TEAM 4    Name: Sujatha Yun  Date of Admission:  6/7/2025    Assessment & Plan     Sujatha is A 59-yo female with a history of MASH cirrhosis, chronic left pleural effusion, psoriatic arthritis who presented who presented with cough, dyspnea on exertion, chronic back pain, extremity swelling and weight gain. She was admitted for dyspnea and anasarca.     # Anarsaca  Patient with a history of MASH cirrhosis. Generalized body swelling, bilateral pitting edema grade 4 on both feet. She gained about 30Ib in few weeks despite diarrhea for past 2 weeks 2/2 lactulose. Anasarca most probably due to history of MASH cirrhosis with decompensation (evidence of ascitis on CT chest). Cardiac contribution is possible because of cough and exertional dyspnea but less likely in the absence of orthopnea, PND and heart changes on CT chest. Absence of urinary symptoms and normal UA also makes renal causes unlikely. Patient has not been recently hospitalized nor received massive IV infusion. Patient received a stat dose of bumetanide 2mg this and diuril morning. Overnight urine output 3400ml (furosemide and bumetanide currently on hold -per MAR)  - Resume bumetanide 2mg daily  - Trend electrolytes  - continue with strict I/O  - continue with daily weight     # Cirrhosis 2/2 MASH  # Ascitis  Patient's cirrhosis is decompensated with ascitis. There is no evidence of esophageal varices or UGI bleed, Liver enzymes minimally elevated (AST) but ALP normal. Patient is currently wait-listed for liver transplant  . Resume bumetanide 2mg daily  - Continue to hold spirinolactone for muscle cramps  - Diagnostic paracenthesis  - Hepatology consult.  - Pulmonology consult    # Pleural effusion  Patient has cough and dyspnea on exertion. Also a history of chronic left sided pleural effusion- previously tapped once). Unilateral L pleural effusion possibly related to liver cirrhosis considering  decompensation with ascitis. Pneumonia is possible, given the unilateral nature but not supported by CT chest. There is also no evidence of other local lesion like diaphragmatic irritation on chest CT nor upper abdominal pain referral to the shoulder. CT also showed no evidence of tumor. Mammography 6 months ago was normal. Bilateral pleural effusion would rather be expected from cardiac and renal causes. Pleural effusion has worsened on CT compared to previous (4/2/25)  - Diagnostic thoracocenthesis    # Poor night sleep  Patient reported poor sleep last night. Sleep disruption was outside of her baseline and was attributed to missing her trazodone last night. Change of environment, illness and hospital admission could be contributory.  - Continue with trazodone 100mg at bed time.  - Explore sleep hygiene measures as possible (less practicable in the setting of hospital admission)    # Anemia  Recent HB of 10.9. Anemia could most likely be dilutional given recent onset conciding with development of anasarca. Na is also low at 132mmol/L. last normal Hb 11.7 on 6/7/25. Other possibilities are chronic illness/inflammation.(Patient has decompensated liver cirrhosis and a history of chronic inflammatory disorders: psoriatic arthritis and fibromyalgia), Iron deficiency given the RBC RDW of 16.7, or both. Folate and B12 deficiencies are unlikely as patient is on adequate regular adult diet and with red cell MCV 96)  - Trend CBC daily  - Iron studies (Serum iron, ferritin, TBIC, hepcidin).    # Heart murmur:  Incidental finding on chest exam, likely transient and due to increased fluid. Patient has anasarca. She has no prior history of heart disease and has never been told she has a murmur.  - Continue with diuresis  - Re-evaluate when anasarca is resolved.    Diet: Combination Diet Regular Diet Adult    DVT Prophylaxis: Pneumatic Compression Devices  Rodriguez Catheter: Not present  Lines: Peripheral IV line    Cardiac  Monitoring: None  Code Status: Full Code      The patient's care was discussed with the Attending Physician, Dr. Perez.    Tiff Sarkar  ([employer]ID[employer] Student)  __________________________________________________________    Interval History   Sujatha is A 59-yo female with a history of MASH cirrhosis, chronic left pleural effusion, psoriatic arthritis who presented who presented to the ED on 6/7/25 with cough, dyspnea on exertion, chronic back pain, extremity swelling and weight gain. Cough started a week ago and was initially productive of greenish sputum but soon became dryer. Cough was associated with exertional dyspnea but no SOB at rest, orthopnea or PND. Her chronic back pain has been present for a while now and makes it difficult to lie on her back. Patient also reported frequent loose stools 3x per day which is related to her lactulose intake. She also gained 30Ib in few weeks and also noticed increasing swelling of her legs and arms. She reports feeling chills all the time but denies fever, chest pain, headache, visual changes, abdominal pain, dysuria or sick contacts.  Overnight, she felt better and has no new concerns. She complained of poor sleep last night after missing her trazodone. She states she can't sleep without it. She has no BN overnight, appetite is good and she is on urinary drain.     Physical Exam   Vital Signs: Temp: 97.7  F (36.5  C) Temp src: Oral BP: 128/59 Pulse: 71   Resp: 20 SpO2: 92 % O2 Device: None (Room air)    Weight: 340 lbs 4.8 oz  General: Patient was lying quietly in bed and not in any distress.  HEENT: Mild conjunctival pallor, anicteric  Lungs: diminished breath sound on L Lower lobe, few scattered crackles  Heart: Added heart sounds S3?  Abdomen: large habitus, BS active all 4 quadrants, no area of tenderness.  Extremities: Grade 4 pitting edema both feet. (legs >> arms)    Labs:   CBC: Hb 10.9 (11.7 on admission).   WBC 5.7 (down from 6.4  Platelet: 61 (down from  70)  Chemistry: Na 134 (unchanged)    K 3.5 (down from 4.2)    Cl 100 (unchanged)    Creatinine 0.98 (unchanged)    Ca 8.0 (7.9)  Liver:   Albumin 2.4 (down from 2.7)   ALT 32 (down from 41)   AST 68   Total Sascha 4.4   INR 2.07 (up from 1.07)   PT 23.4 (Up from 21.7)  UA: Negative  Virology panel: Negative    Imaging:   Chest CT:  Negative for pulmonary embolism.   Large L. Pleural effusion (increased compared 4/2/25)  Cirrhosis-appearing liver with ascitis  .  Cardiac Echo: Ordered this morning, pending

## 2025-06-08 NOTE — CONSULTS
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     #MASH Cirrhosis  #Ascites  MELD 3.0: 25 at 2025 12:10 PM  MELD-Na: 23 at 2025 12:10 PM  Calculated from:  Serum Creatinine: 0.98 mg/dL (Using min of 1 mg/dL) at 2025 12:10 PM  Serum Sodium: 132 mmol/L at 2025 12:10 PM  Total Bilirubin: 4.4 mg/dL at 2025  3:49 AM  Serum Albumin: 2.4 g/dL at 2025  3:49 AM  INR(ratio): 2.07 at 2025  3:49 AM  Age at listin years  Sex: Female at 2025 12:10 PM    Patient is significantly volume overloaded on clinical examination. She is noted to have ascites on imaging at the time of admission, never has had a paracentesis before. She has pleural effusions, largest on the left with prior thoracentesis (exudative). No HE on exam. Other laboratory findings are stable.     Plan:   - Recommend Bumex 1 mg TID, goal 1-2 L net negative fluid balance  - Continue Lactulose, okay for patient to refuse if having BM. Goal 1-2 /day  - Continue Rifaximin  - Recommend diagnostic paracentesis   > Send cell count/differential, fluid albumin/protein, fluid culture, cytology  - Pain/nausea management per primary   - Okay to hold PTA spironolactone   - Recommend taurine supplement to help with muscle cramping associated with diuretics    Above was discussed in detail with patient, who agrees with above assessment and plan.    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     The patient states that she has been gaining significant weight over the past few weeks. She has been taking her diuretics but has been limited in its efficacy as she continues to have significant cramping associated with it. The patient endorses loose stools (up to 5 times a day) in the past few weeks. She states that she came into the hospital yesterday for evaluation as she was feeling a bit short of breath. She does not reports any fevers, chills, nausea, vomiting, or blood in the stool. She has no further acute complaints or concerns at this time.             Past Medical History:   Reviewed and edited as appropriate  Past Medical History:   Diagnosis Date    Cirrhosis of liver with ascites (H) 08/05/2022    Fibromyalgia 11/12/2018    Hyperlipidemia 03/17/2005     Problem list name updated by automated process. Provider to review    Hypothyroidism 06/26/2002     Problem list name updated by automated process. Provider to review    Major depressive disorder, recurrent episode, in full remission 01/21/2002    Metabolic dysfunction-associated steatohepatitis (MASH) 05/30/2025    Morbid obesity (H) 05/12/2019    Muscle pain 08/20/2012    Osteoarthritis 11/11/2024    Psoriatic arthropathy (H) 01/21/2002    Pulmonary embolism and infarction (H) 01/31/2002    Problem list name updated by automated process. Provider to review              Past Surgical History:   Reviewed and edited as appropriate   Past Surgical History:   Procedure Laterality Date    BIOPSY  Aug 2024    COLONOSCOPY  02/11/2016    mild colitis/ repeat 10 yrs    ESOPHAGOSCOPY, GASTROSCOPY, DUODENOSCOPY (EGD), COMBINED N/A 08/09/2024    Procedure: ESOPHAGOGASTRODUODENOSCOPY for variceal surveillance;  Surgeon: Guru Jacquelyn Rees MD;  Location:  OR    ESOPHAGOSCOPY, GASTROSCOPY, DUODENOSCOPY (EGD), COMBINED N/A 08/09/2024    Procedure: ESOPHAGOGASTRODUODENOSCOPY, WITH FINE NEEDLE  "ASPIRATION BIOPSY, WITH ENDOSCOPIC ULTRASOUND GUIDANCE liver and lymph node biopsy;  Surgeon: Guru Jacquelyn Rees MD;  Location: [facility] OR     REMOVAL OF TONSILS,<10 Y/O      Tonsils <11y.o.    IR LUMBAR PUNCTURE  06/30/2023    IR THORACENTESIS  03/06/2025    LASIK  01/01/2004    \"lasek\"    THORACENTESIS Left 03/06/2025    Procedure: Thoracentesis;  Surgeon: Jaden Martinez MD;  [place] [facility] [place]              Social History:     Social History     Tobacco Use    Smoking status: Never    Smokeless tobacco: Never   Substance Use Topics    Alcohol use: Not Currently     Comment: last drink 3 years ago    Drug use: Never              Family History:   Reviewed and edited as appropriate  Family History   Problem Relation Age of Onset    Cancer Sister         cervical    Arthritis Paternal Grandmother     Diabetes Father     Asthma Sister     Thyroid Disease Sister     Asthma Daughter     Thyroid Disease Sister              Allergies:   Reviewed and edited as appropriate     Allergies   Allergen Reactions    Sumatriptan Succinate Nausea and Vomiting     IMITREX            Medications:     Medications Prior to Admission   Medication Sig Dispense Refill Last Dose/Taking    bumetanide (BUMEX) 1 MG tablet Take 1 tablet (1 mg) by mouth daily. 90 tablet 3 6/7/2025 Morning    citalopram (CELEXA) 20 MG tablet Take 1 tablet (20 mg) by mouth daily. 90 tablet 3 6/7/2025 Morning    gabapentin (NEURONTIN) 300 MG capsule 300 mg qam, 600 mg qpm 270 capsule 1 6/6/2025 Bedtime    ixekizumab (TALTZ) 80 MG/ML SOAJ auto-injector 160 mg once, followed by 80 mg at weeks 2, 4, 6, 8, 10, and 12, and then 80 mg every 4 weeks 9 mL 0 More than a month    lactulose 20 GM/30ML solution Take 15 mLs (10 g) by mouth every morning. (Patient taking differently: Take 5 g by mouth every morning.) 946 mL 4 Past Week    levothyroxine (SYNTHROID/LEVOTHROID) 112 MCG tablet Take 1 tablet (112 mcg) by mouth daily. 90 tablet 3 6/7/2025 Morning "    Lidocaine (LIDOCARE) 4 % Patch Place 1 patch onto the skin daily as needed for moderate pain.   Taking As Needed    mometasone (ELOCON) 0.1 % external solution Apply thin layer twice a day as needed to rash on scalp   Taking    spironolactone (ALDACTONE) 100 MG tablet Take 100 mg by mouth daily.   6/7/2025 Morning    tacrolimus (PROTOPIC) 0.1 % external ointment Apply topically as needed   Taking As Needed    traZODone (DESYREL) 100 MG tablet Take 1 tablet (100 mg) by mouth at bedtime. 90 tablet 3 6/6/2025 Bedtime    triamcinolone (KENALOG) 0.1 % external cream Apply topically as needed   Taking As Needed    rifaximin (XIFAXAN) 550 MG TABS tablet Take 1 tablet (550 mg) by mouth 2 times daily. 180 tablet 3              Review of Systems:     A complete 10 pt review of systems was performed and is negative except as noted in the HPI           Physical Exam:   Temp: 97.9  F (36.6  C) Temp src: Oral BP: 125/47 Pulse: 74   Resp: 18 SpO2: 96 % O2 Device: None (Room air)    Wt:   Wt Readings from Last 2 Encounters:   06/08/25 (!) 154.4 kg (340 lb 4.8 oz)   03/31/25 (!) 149.2 kg (328 lb 14.4 oz)        General: female in NAD.  Answers appropriately.  Obese habitus.   HEENT: Head is AT/NC. Mild conjunctival icterus   Neck: Full ROM, no cervical lymphadenopathy  Lungs: No respiratory distress   Heart: RRR  Abdomen:  Soft, obese abdomen. Non tender.  No rebound or peritoneal signs  Extremities: 2-3+ pitting edema bilaterally   MSK: Moving all extremities spontaneously   Skin: WWP  Neurologic: Grossly non-focal.            Data:   Labs and imaging below were independently reviewed and interpreted    LAB WORK:    Results for orders placed or performed during the hospital encounter of 06/07/25 (from the past 24 hours)   CBC with platelets differential    Narrative    The following orders were created for panel order CBC with platelets differential.  Procedure                               Abnormality         Status                      ---------                               -----------         ------                     CBC with platelets and ...[[id number]]  Abnormal            Final result                 Please view results for these tests on the individual orders.   INR   Result Value Ref Range    INR 1.87 (H) 0.85 - 1.15    PT 21.7 (H) 11.8 - 14.8 Seconds   Comprehensive metabolic panel   Result Value Ref Range    Sodium 134 (L) 135 - 145 mmol/L    Potassium 4.2 3.4 - 5.3 mmol/L    Carbon Dioxide (CO2) 25 22 - 29 mmol/L    Anion Gap 9 7 - 15 mmol/L    Urea Nitrogen 10.2 8.0 - 23.0 mg/dL    Creatinine 0.98 (H) 0.51 - 0.95 mg/dL    GFR Estimate 66 >60 mL/min/1.73m2    Calcium 7.9 (L) 8.8 - 10.4 mg/dL    Chloride 100 98 - 107 mmol/L    Glucose 96 70 - 99 mg/dL    Alkaline Phosphatase 81 40 - 150 U/L    AST      ALT 41 0 - 50 U/L    Protein Total 7.0 6.4 - 8.3 g/dL    Albumin 2.7 (L) 3.5 - 5.2 g/dL    Bilirubin Total 4.3 (H) <=1.2 mg/dL   Lactic acid whole blood with 1x repeat in 2 hr when >2   Result Value Ref Range    Lactic Acid, Initial 2.0 0.7 - 2.0 mmol/L   Troponin T, High Sensitivity   Result Value Ref Range    Troponin T, High Sensitivity 11 <=14 ng/L   NT-proBNP   Result Value Ref Range    NT-proBNP 344 (H) 0 - 226 pg/mL   Influenza A/B, RSV and SARS-CoV2 PCR (COVID-19) Nasopharyngeal    Specimen: Nasopharyngeal; Swab   Result Value Ref Range    Influenza A PCR Negative Negative    Influenza B PCR Negative Negative    RSV PCR Negative Negative    SARS CoV2 PCR Negative Negative    Narrative    Testing was performed using the Xpert Xpress CoV2/Flu/RSV Assay on the Cepheid GeneXpert Instrument. This test should be ordered for the detection of SARS-CoV2, influenza, and RSV viruses in individuals with signs and symptoms of respiratory tract infection. This test is for in vitro diagnostic use under the [place] FDA for laboratories certified under CLIA to perform high or moderate complexity testing. This test has been [place] FDA cleared. A  negative result does not rule out the presence of PCR inhibitors in the specimen or target RNA in concentration below the limit of detection for the assay. If only one viral target is positive but coinfection with multiple targets is suspected, the sample should be re-tested with another FDA cleared, approved, or authorized test, if coninfection would change clinical management. This test was validated by the [facility]. These laboratories are certified under the Clinical Laboratory Improvement Amendments of 1988 (CLIA-88) as qualified to perfom high complexity laboratory testing.   Respiratory Panel PCR    Specimen: Nasopharyngeal; Swab   Result Value Ref Range    Adenovirus Not Detected Not Detected    Coronavirus Not Detected Not Detected    Human Metapneumovirus Not Detected Not Detected    Human Rhin/Enterovirus Not Detected Not Detected    Influenza A Not Detected Not Detected    Influenza A, H1 Not Detected Not Detected    Influenza A 2009 H1N1 Not Detected Not Detected    Influenza A, H3 Not Detected Not Detected    Influenza B Not Detected Not Detected    Parainfluenza Virus 1 Not Detected Not Detected    Parainfluenza Virus 2 Not Detected Not Detected    Parainfluenza Virus 3 Not Detected Not Detected    Parainfluenza Virus 4 Not Detected Not Detected    Respiratory Syncytial Virus A Not Detected Not Detected    Respiratory Syncytial Virus B Not Detected Not Detected    Chlamydia Pneumoniae Not Detected Not Detected    Mycoplasma Pneumoniae Not Detected Not Detected    Narrative    The ePlex Respiratory Panel is a qualitative nucleic acid, multiplex, in vitro diagnostic test for the simultaneous detection and identification of multiple respiratory viral and bacterial nucleic acids in nasopharyngeal swabs collected in viral transport media from individual exhibiting signs and symptoms of respiratory infection. The assay has received FDA approval for the testing of nasopharyngeal (NP)  swabs only. This test is used for clinical purposes and should not be regarded as investigational or for research. This laboratory is certified under the Clinical Laboratory Improvement Amendments of 1988 (CLIA-88) as qualified to perform high complexity clinical laboratory testing.   D dimer quantitative   Result Value Ref Range    D-Dimer Quantitative 8.90 (H) 0.00 - 0.50 ug/mL FEU    Narrative    This D-dimer assay is intended for use in conjunction with a clinical pretest probability assessment model to exclude pulmonary embolism (PE) and deep venous thrombosis (DVT) in outpatients suspected of PE or DVT. The cut-off value is 0.50 ug/mL FEU.    For patients 50 years of age or older, the application of age-adjusted cut-off values for D-Dimer may increase the specificity without significant effect on sensitivity. The literature suggested calculation age adjusted cut-off in ug/L = age in years x 10 ug/L. The results in this laboratory are reported as ug/mL rather than ug/L. The calculation for age adjusted cut off in ug/mL= age in years x 0.01 ug/mL. For example, the cut off for a 76 year old male is 75 x 0.01 ug/mL = 0.76 ug/mL (760 ug/L).    M Brittanyhidonte et al. Age adjusted D-dimer cut-off levels to rule out pulmonary embolism: The ADJUST-PE Study. LUCINDA 2014;311:3160-9701.; HJ Butch et al. Diagnostic accuracy of conventional or age adjusted D-dimer cutoff values in older patients with suspected venous thromboembolism. Systemic review and meta-analysis. BMJ 2013:346:f2492.   CBC with platelets and differential   Result Value Ref Range    WBC Count 6.4 4.0 - 11.0 10e3/uL    RBC Count 3.63 (L) 3.80 - 5.20 10e6/uL    Hemoglobin 11.7 11.7 - 15.7 g/dL    Hematocrit 34.5 (L) 35.0 - 47.0 %    MCV 95 78 - 100 fL    MCH 32.2 26.5 - 33.0 pg    MCHC 33.9 31.5 - 36.5 g/dL    RDW 16.7 (H) 10.0 - 15.0 %    Platelet Count 70 (L) 150 - 450 10e3/uL    % Neutrophils 66 %    % Lymphocytes 22 %    % Monocytes 6 %    % Eosinophils 4 %     % Basophils 2 %    % Immature Granulocytes 0 %    NRBCs per 100 WBC 0 <1 /100    Absolute Neutrophils 4.3 1.6 - 8.3 10e3/uL    Absolute Lymphocytes 1.4 0.8 - 5.3 10e3/uL    Absolute Monocytes 0.4 0.0 - 1.3 10e3/uL    Absolute Eosinophils 0.3 0.0 - 0.7 10e3/uL    Absolute Basophils 0.1 0.0 - 0.2 10e3/uL    Absolute Immature Granulocytes 0.0 <=0.4 10e3/uL    Absolute NRBCs 0.0 10e3/uL   TSH with free T4 reflex   Result Value Ref Range    TSH 4.89 (H) 0.30 - 4.20 uIU/mL   T4 free   Result Value Ref Range    Free T4 1.47 0.90 - 1.70 ng/dL   EKG 12-lead, tracing only   Result Value Ref Range    Systolic Blood Pressure  mmHg    Diastolic Blood Pressure  mmHg    Ventricular Rate 70 BPM    Atrial Rate 70 BPM    AZ Interval 118 ms    QRS Duration 96 ms     ms    QTc 488 ms    P Axis 9 degrees    R AXIS -5 degrees    T Axis 13 degrees    Interpretation ECG       Sinus rhythm  Low voltage QRS  QTcB >= 480 msec  Abnormal ECG    Unconfirmed report - interpretation of this ECG is computer generated - see medical record for final interpretation     CT Chest Pulmonary Embolism w Contrast    Narrative    EXAM: CT CHEST PULMONARY EMBOLISM W CONTRAST  LOCATION: [facility]  DATE: 6/8/2025    INDICATION: Shortness of breath, elevated D-dimer, LE edema, question PE or other abnormality (history of liver disease, on transplant list).  COMPARISON: 4/15/2025.  TECHNIQUE: CT chest pulmonary angiogram during arterial phase injection of IV contrast. Multiplanar reformats and MIP reconstructions were performed. Dose reduction techniques were used.   CONTRAST: Isovue 370    FINDINGS:  ANGIOGRAM CHEST: Pulmonary arteries are normal caliber and negative for pulmonary emboli. Thoracic aorta is negative for dissection. No CT evidence of right heart strain.    LUNGS AND PLEURA: Interval large left pleural effusion with associated compressive atelectasis. Right lung clear. No concerning or  actionable pulmonary nodules.    MEDIASTINUM/AXILLAE: No lymphadenopathy. Normal esophagus. No significant pericardial effusion. No thoracic aortic aneurysm.    CORONARY ARTERY CALCIFICATION: None.    UPPER ABDOMEN: Ascites. Cirrhotic appearing liver.    MUSCULOSKELETAL: Mild spondylosis.      Impression    IMPRESSION:  1.  Negative for pulmonary embolism.  2.  Interval large left pleural effusion with associated compressive atelectasis.  3.  Cirrhotic appearing liver with ascites.     Troponin T, High Sensitivity   Result Value Ref Range    Troponin T, High Sensitivity 12 <=14 ng/L   Comprehensive metabolic panel   Result Value Ref Range    Sodium 134 (L) 135 - 145 mmol/L    Potassium 3.5 3.4 - 5.3 mmol/L    Carbon Dioxide (CO2) 24 22 - 29 mmol/L    Anion Gap 10 7 - 15 mmol/L    Urea Nitrogen 10.5 8.0 - 23.0 mg/dL    Creatinine 0.98 (H) 0.51 - 0.95 mg/dL    GFR Estimate 66 >60 mL/min/1.73m2    Calcium 8.0 (L) 8.8 - 10.4 mg/dL    Chloride 100 98 - 107 mmol/L    Glucose 91 70 - 99 mg/dL    Alkaline Phosphatase 69 40 - 150 U/L    AST 68 (H) 0 - 45 U/L    ALT 32 0 - 50 U/L    Protein Total 6.3 (L) 6.4 - 8.3 g/dL    Albumin 2.4 (L) 3.5 - 5.2 g/dL    Bilirubin Total 4.4 (H) <=1.2 mg/dL   CBC with platelets   Result Value Ref Range    WBC Count 5.7 4.0 - 11.0 10e3/uL    RBC Count 3.36 (L) 3.80 - 5.20 10e6/uL    Hemoglobin 10.9 (L) 11.7 - 15.7 g/dL    Hematocrit 32.2 (L) 35.0 - 47.0 %    MCV 96 78 - 100 fL    MCH 32.4 26.5 - 33.0 pg    MCHC 33.9 31.5 - 36.5 g/dL    RDW 16.7 (H) 10.0 - 15.0 %    Platelet Count 61 (L) 150 - 450 10e3/uL   INR   Result Value Ref Range    INR 2.07 (H) 0.85 - 1.15    PT 23.4 (H) 11.8 - 14.8 Seconds   UA Macroscopic with reflex to Microscopic and Culture    Specimen: Urine, Clean Catch   Result Value Ref Range    Color Urine Yellow Colorless, Straw, Light Yellow, Yellow    Appearance Urine Clear Clear    Glucose Urine Negative Negative mg/dL    Bilirubin Urine Negative Negative    Ketones Urine  Negative Negative mg/dL    Specific Gravity Urine 1.015 1.003 - 1.035    Blood Urine Negative Negative    pH Urine 5.5 5.0 - 7.0    Protein Albumin Urine Negative Negative mg/dL    Urobilinogen Urine Normal Normal mg/dL    Nitrite Urine Negative Negative    Leukocyte Esterase Urine Negative Negative    Narrative    Microscopic not indicated   Extra Tube ([place] Draw)    Narrative    The following orders were created for panel order Extra Tube ([place] Draw).  Procedure                               Abnormality         Status                     ---------                               -----------         ------                     Extra Green Top (Lithiu...[[id number]]                      In process                 Extra Purple Top Tube[[id number]]                           In process                   Please view results for these tests on the individual orders.   Partial thromboplastin time   Result Value Ref Range    aPTT 42 (H) 22 - 38 Seconds   Fibrinogen activity   Result Value Ref Range    Fibrinogen Activity 128 (L) 170 - 510 mg/dL   Echocardiogram Limited   Result Value Ref Range    LVEF  60-65%     Narrative    [id number]  [id number]  [id number]  [id number]^MICHELLE^BEV^     [facility],[place]  Echocardiography Laboratory  [address] [place] [id number]     Name: JOHNSON NUNES  MRN: [id number]  : 1965  Study Date: 2025 10:42 AM  Age: 59 yrs  Gender: Female  Patient Location: [facility]  Reason For Study: Edema, CHF  Ordering Physician: BEV MARTINEZ  Performed By: Cathie Tatum     BSA: 2.6 m2  Height: 71 in  Weight: 328 lb  BP: 127/61 mmHg  ______________________________________________________________________________  Procedure  Limited Echocardiogram with two-dimensional, color and spectral Doppler.  Contrast Optison. Optison (NDC #5580-7739-62) given intravenously. Patient was  given 5 ml mixture of 3 ml Optison and 6 ml saline. 4 ml  wasted.  ______________________________________________________________________________  Interpretation Summary  Global and regional left ventricular function is normal with an EF of 60-65%.  Global right ventricular function is normal.  No significant valvular abnormalities present.  Pulmonary artery systolic pressure is normal.  The inferior vena cava was normal in size with preserved respiratory  variability.  No pericardial effusion is present.  ______________________________________________________________________________  Left Ventricle  Left ventricular size is normal. Left ventricular wall thickness is normal.  Global and regional left ventricular function is normal with an EF of 60-65%.  No regional wall motion abnormalities are seen.     Right Ventricle  The right ventricle is normal size. Global right ventricular function is  normal.     Atria  Both atria appear normal.     Mitral Valve  The mitral valve is normal.     Aortic Valve  Aortic valve is normal in structure and function.     Tricuspid Valve  The tricuspid valve is normal. Trace to mild tricuspid insufficiency is  present. The right ventricular systolic pressure is approximated at 25.1 mmHg  plus the right atrial pressure. Pulmonary artery systolic pressure is normal.     Pulmonic Valve  The pulmonic valve is normal.     Vessels  The aorta root is normal. The inferior vena cava was normal in size with  preserved respiratory variability.     Pericardium  No pericardial effusion is present.     Miscellaneous  Bilateral pleural effusions persent. No significant valvular abnormalities  present.     Compared to Previous Study  This study was compared with the study from 12.2024 . Biventricular function  is unchanged.  ______________________________________________________________________________  MMode/2D Measurements & Calculations  IVSd: 1.0 cm  LVIDd: 5.1 cm  LVIDs: 3.2 cm  LVPWd: 1.1 cm  FS: 38.6 %  LV mass(C)d: 200.1 grams  LV mass(C)dI: 76.9  grams/m2  RWT: 0.41     Doppler Measurements & Calculations  TR max graham: 250.2 cm/sec  TR max P.1 mmHg     ______________________________________________________________________________  Report approved by: Hui Newman MD on 2025 12:44 PM         Basic metabolic panel   Result Value Ref Range    Sodium 132 (L) 135 - 145 mmol/L    Potassium 3.5 3.4 - 5.3 mmol/L    Chloride 99 98 - 107 mmol/L    Carbon Dioxide (CO2) 25 22 - 29 mmol/L    Anion Gap 8 7 - 15 mmol/L    Urea Nitrogen 10.8 8.0 - 23.0 mg/dL    Creatinine 0.98 (H) 0.51 - 0.95 mg/dL    GFR Estimate 66 >60 mL/min/1.73m2    Calcium 8.1 (L) 8.8 - 10.4 mg/dL    Glucose 110 (H) 70 - 99 mg/dL   Magnesium   Result Value Ref Range    Magnesium 1.6 (L) 1.7 - 2.3 mg/dL         =======================================================================

## 2025-06-08 NOTE — PLAN OF CARE
Goal Outcome Evaluation:      Plan of Care Reviewed With: patient    Overall Patient Progress: improvingOverall Patient Progress: improving    Outcome Evaluation: Pt diuresing well after diuril and bumex. Edema 3 generalized-4+pedal. Gabapentin 600mg ordered by team at her request for chronic pain. Up independently althought she has a cane. Lungs Cta on R/A. VSS. Afebrile. Cont to monitor and notify team w/needs.

## 2025-06-08 NOTE — PROGRESS NOTES
"CLINICAL NUTRITION SERVICES - ASSESSMENT NOTE    RECOMMENDATIONS FOR MDs/PROVIDERS TO ORDER:  -Albumin: 2.4 (L), note Albumin is not used as a reliable indicator of nutrition status. Pt does not currently meet criteria for malnutrition.     Registered Dietitian Interventions:  -nutrition education: provided ONS list for [facility]     Future/Additional Recommendations:  -Monitor PO intake  -Monitor weight trends     REASON FOR ASSESSMENT  Provider order - anasarca and low albumin c/f malnutrtion    INFORMATION OBTAINED  Assessed patient in room. Visit limited today due to pt having an ECHO done at end of visit.    NUTRITION HISTORY  -Per H&P, \"60 yo female w/ MASH cirrhosis, chronic left-sided pleural effusion, psoriatic arthritis admitted for dyspnea and anasarca.\"    Pt confirms NKFA and reports she eats 3 meals + 2 snacks daily at home. Pt previously had ~1 [employer] protein shake daily, however stopped drinking these 6 months ago due to cost. Pt denies N/V, taste changes, mouth pain, or difficulties chewing/swallowing.     CURRENT NUTRITION ORDERS  Diet: Regular      CURRENT INTAKE/TOLERANCE  75% at breakfast this morning     LABS  Nutrition-relevant labs: Reviewed   06/08/25 03:49   Sodium 134 (L)   Creatinine 0.98 (H)   AST 68 (H)   Bilirubin Total 4.4 (H)   (L): Data is abnormally low  (H): Data is abnormally high    MEDICATIONS  Nutrition-relevant medications: Reviewed  Celexa, Lactulose, Synthroid  PRN Tums, Senokot     ANTHROPOMETRICS  Height: 180.3 cm (5' 11\")  Admission Weight: (!) 149 kg (328 lb 7.8 oz) (06/07/25 1826)   Most Recent Weight: (!) 154.4 kg (340 lb 4.8 oz)   IBW: 70.5 kg (219%)  BMI: Body mass index is 48.13 kg/m .   Weight History:   Wt Readings from Last 15 Encounters:   06/08/25 (!) 154.4 kg (340 lb 4.8 oz)   03/31/25 (!) 149.2 kg (328 lb 14.4 oz)   03/06/25 (!) 145.2 kg (320 lb)   02/17/25 (!) 151.6 kg (334 lb 3.2 oz)   02/06/25 (!) 149.7 kg (330 lb)   01/29/25 (!) 154.2 kg (340 lb)   01/24/25 " (!) 145.2 kg (320 lb)   11/26/24 147.9 kg (326 lb)   10/25/24 146.1 kg (322 lb)   10/24/24 146.1 kg (322 lb)   10/22/24 146.4 kg (322 lb 12.8 oz)   08/09/24 144.6 kg (318 lb 12.6 oz)   07/29/24 145.2 kg (320 lb)   04/22/20 (!) 150.6 kg (332 lb)   05/03/19 142.9 kg (315 lb)     Dosing Weight: 91.4 kg, based on adjusted wt    ASSESSED NUTRITION NEEDS  Estimated Energy Needs: 0574-0240 kcals/day (20 - 25 kcals/kg)  Justification: Maintenance and Obese  Estimated Protein Needs:  grams protein/day (1 - 1.2 grams of pro/kg)  Justification: Maintenance  Estimated Fluid Needs: 7603-7527 mL/day (20-25 mL/kg)  Justification: Maintenance    SYSTEM AND PHYSICAL FINDINGS    GI symptoms: Reviewed, no stools recorded so far this admission   Skin/wounds: Reviewed    MALNUTRITION  % Intake: No decreased intake noted  % Weight Loss: Unable to assess - difficult to ascertain w/edema   Subcutaneous Fat Loss: None observed- visual only as pt having an echo done at end of visit   Muscle Loss: None observed- visual only as pt having an echo done at end of visit   Fluid Accumulation/Edema: Moderate to severe, 2-4+  Malnutrition Diagnosis: Patient does not meet two of the established criteria necessary for diagnosing malnutrition but is at risk for malnutrition  Malnutrition Present on Admission: No    NUTRITION DIAGNOSIS  No nutrition diagnosis at this time     INTERVENTIONS  Medical food supplement therapy- provided ONS options list for [facility]     GOALS  Patient to consume % of nutritionally adequate meal trays TID, or the equivalent with supplements/snacks.     MONITORING/EVALUATION  Progress toward goals will be monitored and evaluated per policy.    Brittany Salinas (Maggie), ANETA, LD- 5C Clinical Dietitian   Available on [employer]  No longer available by paging

## 2025-06-09 ENCOUNTER — RESULTS FOLLOW-UP (OUTPATIENT)
Dept: OBGYN | Facility: CLINIC | Age: 60
End: 2025-06-09

## 2025-06-09 ENCOUNTER — APPOINTMENT (OUTPATIENT)
Dept: PHYSICAL THERAPY | Facility: CLINIC | Age: 60
End: 2025-06-09
Payer: COMMERCIAL

## 2025-06-09 ENCOUNTER — RESULTS FOLLOW-UP (OUTPATIENT)
Dept: TRANSPLANT | Facility: CLINIC | Age: 60
End: 2025-06-09

## 2025-06-09 ENCOUNTER — APPOINTMENT (OUTPATIENT)
Dept: GENERAL RADIOLOGY | Facility: CLINIC | Age: 60
DRG: 442 | End: 2025-06-09
Payer: COMMERCIAL

## 2025-06-09 DIAGNOSIS — K74.60 CIRRHOSIS OF LIVER WITH ASCITES, UNSPECIFIED HEPATIC CIRRHOSIS TYPE (H): ICD-10-CM

## 2025-06-09 DIAGNOSIS — K75.81 METABOLIC DYSFUNCTION-ASSOCIATED STEATOHEPATITIS (MASH): Primary | ICD-10-CM

## 2025-06-09 DIAGNOSIS — R18.8 CIRRHOSIS OF LIVER WITH ASCITES, UNSPECIFIED HEPATIC CIRRHOSIS TYPE (H): ICD-10-CM

## 2025-06-09 LAB
ALBUMIN SERPL BCG-MCNC: 2.1 G/DL (ref 3.5–5.2)
ALBUMIN SERPL BCG-MCNC: 2.2 G/DL (ref 3.5–5.2)
ALP SERPL-CCNC: 68 U/L (ref 40–150)
ALT SERPL W P-5'-P-CCNC: 29 U/L (ref 0–50)
ANION GAP SERPL CALCULATED.3IONS-SCNC: 10 MMOL/L (ref 7–15)
ANION GAP SERPL CALCULATED.3IONS-SCNC: 9 MMOL/L (ref 7–15)
APPEARANCE FLD: ABNORMAL
AST SERPL W P-5'-P-CCNC: 68 U/L (ref 0–45)
BILIRUB SERPL-MCNC: 3.6 MG/DL
BUN SERPL-MCNC: 11.4 MG/DL (ref 8–23)
BUN SERPL-MCNC: 12.2 MG/DL (ref 8–23)
CALCIUM SERPL-MCNC: 8.1 MG/DL (ref 8.8–10.4)
CALCIUM SERPL-MCNC: 8.7 MG/DL (ref 8.8–10.4)
CHLORIDE SERPL-SCNC: 99 MMOL/L (ref 98–107)
CHLORIDE SERPL-SCNC: 99 MMOL/L (ref 98–107)
COLOR FLD: ABNORMAL
CREAT SERPL-MCNC: 0.96 MG/DL (ref 0.51–0.95)
CREAT SERPL-MCNC: 1.04 MG/DL (ref 0.51–0.95)
EGFRCR SERPLBLD CKD-EPI 2021: 61 ML/MIN/1.73M2
EGFRCR SERPLBLD CKD-EPI 2021: 67 ML/MIN/1.73M2
ERYTHROCYTE [DISTWIDTH] IN BLOOD BY AUTOMATED COUNT: 16.6 % (ref 10–15)
GLUCOSE SERPL-MCNC: 143 MG/DL (ref 70–99)
GLUCOSE SERPL-MCNC: 96 MG/DL (ref 70–99)
HCO3 SERPL-SCNC: 24 MMOL/L (ref 22–29)
HCO3 SERPL-SCNC: 26 MMOL/L (ref 22–29)
HCT VFR BLD AUTO: 29.4 % (ref 35–47)
HGB BLD-MCNC: 10 G/DL (ref 11.7–15.7)
INR PPP: 2.02 (ref 0.85–1.15)
LD BODY BODY FLUID SOURCE: NORMAL
LDH FLD L TO P-CCNC: 108 U/L
LDH SERPL L TO P-CCNC: 290 U/L (ref 0–250)
LYMPHOCYTES NFR FLD MANUAL: 47 %
MCH RBC QN AUTO: 32.2 PG (ref 26.5–33)
MCHC RBC AUTO-ENTMCNC: 34 G/DL (ref 31.5–36.5)
MCV RBC AUTO: 95 FL (ref 78–100)
MONOS+MACROS NFR FLD MANUAL: 0 %
NEUTS BAND NFR FLD MANUAL: 11 %
OTHER CELLS FLD MANUAL: 43 %
PH BODY FLUID SOURCE: NORMAL
PH FLD: 7.8 PH
PLATELET # BLD AUTO: 50 10E3/UL (ref 150–450)
POTASSIUM SERPL-SCNC: 3.4 MMOL/L (ref 3.4–5.3)
POTASSIUM SERPL-SCNC: 3.7 MMOL/L (ref 3.4–5.3)
PROT FLD-MCNC: 1.4 G/DL
PROT SERPL-MCNC: 5.8 G/DL (ref 6.4–8.3)
PROT SERPL-MCNC: 5.8 G/DL (ref 6.4–8.3)
PROTEIN BODY FLUID SOURCE: NORMAL
PROTHROMBIN TIME: 23 SECONDS (ref 11.8–14.8)
RBC # BLD AUTO: 3.11 10E6/UL (ref 3.8–5.2)
SODIUM SERPL-SCNC: 132 MMOL/L (ref 135–145)
SODIUM SERPL-SCNC: 135 MMOL/L (ref 135–145)
SPECIMEN SOURCE FLD: ABNORMAL
WBC # BLD AUTO: 4.7 10E3/UL (ref 4–11)
WBC # FLD AUTO: 495 /UL

## 2025-06-09 PROCEDURE — 76705 ECHO EXAM OF ABDOMEN: CPT | Performed by: STUDENT IN AN ORGANIZED HEALTH CARE EDUCATION/TRAINING PROGRAM

## 2025-06-09 PROCEDURE — 32555 ASPIRATE PLEURA W/ IMAGING: CPT | Performed by: STUDENT IN AN ORGANIZED HEALTH CARE EDUCATION/TRAINING PROGRAM

## 2025-06-09 PROCEDURE — 97140 MANUAL THERAPY 1/> REGIONS: CPT | Mod: GP

## 2025-06-09 PROCEDURE — 89050 BODY FLUID CELL COUNT: CPT | Performed by: STUDENT IN AN ORGANIZED HEALTH CARE EDUCATION/TRAINING PROGRAM

## 2025-06-09 PROCEDURE — 88112 CYTOPATH CELL ENHANCE TECH: CPT | Mod: 26 | Performed by: PATHOLOGY

## 2025-06-09 PROCEDURE — 85014 HEMATOCRIT: CPT | Performed by: STUDENT IN AN ORGANIZED HEALTH CARE EDUCATION/TRAINING PROGRAM

## 2025-06-09 PROCEDURE — 99233 SBSQ HOSP IP/OBS HIGH 50: CPT | Mod: GC | Performed by: STUDENT IN AN ORGANIZED HEALTH CARE EDUCATION/TRAINING PROGRAM

## 2025-06-09 PROCEDURE — 84157 ASSAY OF PROTEIN OTHER: CPT | Performed by: STUDENT IN AN ORGANIZED HEALTH CARE EDUCATION/TRAINING PROGRAM

## 2025-06-09 PROCEDURE — 71045 X-RAY EXAM CHEST 1 VIEW: CPT | Mod: 26 | Performed by: RADIOLOGY

## 2025-06-09 PROCEDURE — 82947 ASSAY GLUCOSE BLOOD QUANT: CPT

## 2025-06-09 PROCEDURE — 36415 COLL VENOUS BLD VENIPUNCTURE: CPT

## 2025-06-09 PROCEDURE — 87070 CULTURE OTHR SPECIMN AEROBIC: CPT | Performed by: STUDENT IN AN ORGANIZED HEALTH CARE EDUCATION/TRAINING PROGRAM

## 2025-06-09 PROCEDURE — 0W9B3ZX DRAINAGE OF LEFT PLEURAL CAVITY, PERCUTANEOUS APPROACH, DIAGNOSTIC: ICD-10-PCS | Performed by: STUDENT IN AN ORGANIZED HEALTH CARE EDUCATION/TRAINING PROGRAM

## 2025-06-09 PROCEDURE — 250N000011 HC RX IP 250 OP 636

## 2025-06-09 PROCEDURE — 82040 ASSAY OF SERUM ALBUMIN: CPT | Performed by: STUDENT IN AN ORGANIZED HEALTH CARE EDUCATION/TRAINING PROGRAM

## 2025-06-09 PROCEDURE — 120N000002 HC R&B MED SURG/OB UMMC

## 2025-06-09 PROCEDURE — 85610 PROTHROMBIN TIME: CPT | Performed by: STUDENT IN AN ORGANIZED HEALTH CARE EDUCATION/TRAINING PROGRAM

## 2025-06-09 PROCEDURE — 83615 LACTATE (LD) (LDH) ENZYME: CPT | Performed by: STUDENT IN AN ORGANIZED HEALTH CARE EDUCATION/TRAINING PROGRAM

## 2025-06-09 PROCEDURE — 84155 ASSAY OF PROTEIN SERUM: CPT | Performed by: STUDENT IN AN ORGANIZED HEALTH CARE EDUCATION/TRAINING PROGRAM

## 2025-06-09 PROCEDURE — 250N000011 HC RX IP 250 OP 636: Mod: JZ | Performed by: STUDENT IN AN ORGANIZED HEALTH CARE EDUCATION/TRAINING PROGRAM

## 2025-06-09 PROCEDURE — 83986 ASSAY PH BODY FLUID NOS: CPT | Performed by: STUDENT IN AN ORGANIZED HEALTH CARE EDUCATION/TRAINING PROGRAM

## 2025-06-09 PROCEDURE — 88305 TISSUE EXAM BY PATHOLOGIST: CPT | Mod: 26 | Performed by: PATHOLOGY

## 2025-06-09 PROCEDURE — 80053 COMPREHEN METABOLIC PANEL: CPT | Performed by: STUDENT IN AN ORGANIZED HEALTH CARE EDUCATION/TRAINING PROGRAM

## 2025-06-09 PROCEDURE — 87102 FUNGUS ISOLATION CULTURE: CPT | Performed by: STUDENT IN AN ORGANIZED HEALTH CARE EDUCATION/TRAINING PROGRAM

## 2025-06-09 PROCEDURE — 71045 X-RAY EXAM CHEST 1 VIEW: CPT

## 2025-06-09 PROCEDURE — 88112 CYTOPATH CELL ENHANCE TECH: CPT | Mod: TC | Performed by: STUDENT IN AN ORGANIZED HEALTH CARE EDUCATION/TRAINING PROGRAM

## 2025-06-09 PROCEDURE — 36415 COLL VENOUS BLD VENIPUNCTURE: CPT | Performed by: STUDENT IN AN ORGANIZED HEALTH CARE EDUCATION/TRAINING PROGRAM

## 2025-06-09 PROCEDURE — 250N000013 HC RX MED GY IP 250 OP 250 PS 637

## 2025-06-09 PROCEDURE — 99232 SBSQ HOSP IP/OBS MODERATE 35: CPT | Mod: GC | Performed by: INTERNAL MEDICINE

## 2025-06-09 PROCEDURE — 250N000011 HC RX IP 250 OP 636: Mod: JZ

## 2025-06-09 RX ORDER — BUMETANIDE 0.25 MG/ML
2 INJECTION, SOLUTION INTRAMUSCULAR; INTRAVENOUS 3 TIMES DAILY
Status: DISCONTINUED | OUTPATIENT
Start: 2025-06-09 | End: 2025-06-15

## 2025-06-09 RX ORDER — BUMETANIDE 0.25 MG/ML
1 INJECTION, SOLUTION INTRAMUSCULAR; INTRAVENOUS 3 TIMES DAILY
Status: DISCONTINUED | OUTPATIENT
Start: 2025-06-09 | End: 2025-06-09

## 2025-06-09 RX ORDER — CHLOROTHIAZIDE SODIUM 500 MG/1
500 INJECTION INTRAVENOUS ONCE
Status: COMPLETED | OUTPATIENT
Start: 2025-06-09 | End: 2025-06-09

## 2025-06-09 RX ADMIN — BUMETANIDE 2 MG: 0.25 INJECTION INTRAMUSCULAR; INTRAVENOUS at 19:41

## 2025-06-09 RX ADMIN — RIFAXIMIN 550 MG: 550 TABLET ORAL at 19:40

## 2025-06-09 RX ADMIN — CHLOROTHIAZIDE SODIUM 500 MG: 500 INJECTION, POWDER, LYOPHILIZED, FOR SOLUTION INTRAVENOUS at 21:30

## 2025-06-09 RX ADMIN — LEVOTHYROXINE SODIUM 112 MCG: 0.11 TABLET ORAL at 08:57

## 2025-06-09 RX ADMIN — GABAPENTIN 600 MG: 300 CAPSULE ORAL at 19:40

## 2025-06-09 RX ADMIN — CITALOPRAM HYDROBROMIDE 20 MG: 20 TABLET ORAL at 08:57

## 2025-06-09 RX ADMIN — BUMETANIDE 1 MG: 0.25 INJECTION INTRAMUSCULAR; INTRAVENOUS at 14:02

## 2025-06-09 RX ADMIN — GABAPENTIN 300 MG: 300 CAPSULE ORAL at 08:57

## 2025-06-09 RX ADMIN — TRAZODONE HYDROCHLORIDE 100 MG: 100 TABLET ORAL at 21:30

## 2025-06-09 RX ADMIN — BUMETANIDE 1 MG: 0.25 INJECTION INTRAMUSCULAR; INTRAVENOUS at 08:57

## 2025-06-09 RX ADMIN — LACTULOSE 10 G: 20 SOLUTION ORAL at 08:57

## 2025-06-09 RX ADMIN — RIFAXIMIN 550 MG: 550 TABLET ORAL at 08:57

## 2025-06-09 ASSESSMENT — ACTIVITIES OF DAILY LIVING (ADL)
ADLS_ACUITY_SCORE: 37
ADLS_ACUITY_SCORE: 39
ADLS_ACUITY_SCORE: 37
ADLS_ACUITY_SCORE: 39
ADLS_ACUITY_SCORE: 37
ADLS_ACUITY_SCORE: 37
ADLS_ACUITY_SCORE: 39
ADLS_ACUITY_SCORE: 37
ADLS_ACUITY_SCORE: 39
ADLS_ACUITY_SCORE: 37
ADLS_ACUITY_SCORE: 39
ADLS_ACUITY_SCORE: 39
ADLS_ACUITY_SCORE: 37
ADLS_ACUITY_SCORE: 39

## 2025-06-09 NOTE — PROCEDURES
[facility]  CAPS PROCEDURE NOTE  Date of Admission:  6/7/2025  Consult Requested by: Medicine  Reason for Consult: diagnostic evaluation of pleural effusion    Indication/HPI: Pleural effusion    Pre-Procedure Diagnosis: Left Pleural Effusion    Post-Procedure Diagnosis: Left Pleural Effusion    Risk Assessment: Average risk, Technically straightforward; patient's anticoagulation has been held according to guidelines based on the agent and platelets and coags are within guidelines    Procedure Outcome:  Diagnostic thoracentesis performed. Discussed with primary team.  See additional procedure details below.    The primary covering service should follow up and address any lab results as appropriate.    Benny Worthy MD  [facility]  Securely message with Vocera (more info)  Text page via [employer] Paging/Directory   See signed in provider for up to date coverage information      [facility]    Thoracentesis at Bedside    Date/Time: 6/9/2025 3:33 PM    Performed by: Benny Worthy MD  Authorized by: Benny Worthy MD      UNIVERSAL PROTOCOL   Site Marked: Yes  Prior Images Obtained and Reviewed:  Yes  Required items: Required blood products, implants, devices and special equipment available    Patient identity confirmed:  Verbally with patient, arm band, provided demographic data and hospital-assigned identification number  NA - No sedation, light sedation, or local anesthesia  Confirmation Checklist:  Patient's identity using two indicators, relevant allergies, procedure was appropriate and matched the consent or emergent situation and correct equipment/implants were available  Time out: Immediately prior to the procedure a time out was called    Universal Protocol: the Joint Commission Universal Protocol was followed    Preparation: Patient was prepped and draped in usual sterile  fashion    ESBL (mL):  1    Procedure purpose: diagnostic  Indications: pleural effusion       ANESTHESIA    Anesthesia:  Local infiltration  Local Anesthetic:  Lidocaine 1% without epinephrine      SEDATION    Patient Sedated: No      PROCEDURE DETAILS   Preparation: skin prepped with ChloraPrep  Patient position: sitting  Ultrasound guidance: yes  Location: left posterior  Intercostal space: 7th  Puncture method: over-the-needle catheter  Needle gauge: 19.  Catheter size: 5Fr.  Number of attempts: 1  Drainage amount: 100 ml  Drainage characteristics: serous  Chest x-ray performed: no      PROCEDURE    Length of time physician/provider present for 1:1 monitoring during sedation: 0        POC US GUIDE FOR THORACENTESIS     Impression  Limited chest ultrasound was performed and demonstrated an adequate fluid collection on the left hemithorax.    Thoracentesis Indication:pleural effusion    Doppler of the skin demonstrated an area at this site without significant vasculature.  A thoracentesis at this site was subsequently performed.    Benny Worthy MD

## 2025-06-09 NOTE — CONSULTS
[facility]  CAPS NOTE  Date of Admission:  6/7/2025  Consult Requested by: Medicine  Reason for Consult: diagnostic evaluation of ascites    POC US GUIDE FOR THORACENTESIS     Impression  Limited chest ultrasound was performed and demonstrated an adequate fluid collection on the left hemithorax.    Thoracentesis Indication:pleural effusion    Doppler of the skin demonstrated an area at this site without significant vasculature.  A thoracentesis at this site was subsequently performed.    Benny Worthy MD        Assessment and Plan:  Requested procedure was not performed.  Insufficient fluid for safe bedside paracentesis.  Discussed with primary team.    Benny Worthy MD  [facility]  Securely message with [employer] (more info)  Text page via [facility] Paging/Directory   See signed in provider for up to date coverage information

## 2025-06-09 NOTE — PROGRESS NOTES
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      Decompensated cirrhosis (MELD 25)   Etiology: masld, follows with Dr. Ro   EV: no prior EGD, had EUS - EGD due 8/2025   Ascites: present, no prior para - home diuretics: Bumex 1 mg BID, spironolactone 100 mg daily   HE: A&O x4, on lactulose and rifaximin   HCC: Due, none on last U/S 11/2024    Anasarca   Patient is significantly volume overloaded on clinical examination. She is noted to have ascites on imaging at the time of admission, never has had a paracentesis before. She has pleural effusions, largest on the left with prior thoracentesis (exudative). Echocardiogram 6/8 showing normal EF and no valvular or right heart abnormalities (does have hx cardiomegaly).      Recommendations:   - Bumex IV for goal 1-2 L net negative fluid balance  - Recommend diagnostic paracentesis: cell count/differential, fluid albumin/protein, fluid culture, cytology  - Rifaximin and lactulose for goal 1-2 BM a day  - Hold PTA spironolactone   - Recommend taurine supplement to help with muscle cramping associated with diuretics    Patient discussed with attending physician, Dr. Leventhal.      Sergio Carlisle MD  Gastroenterology Fellow        Interval events:    - Patient is net negative 1.3L, doing ok this morning   - No nausea or vomiting, no abdominal pain, no changes in breathing          Medications:     Current Facility-Administered Medications   Medication Dose Route Frequency Provider Last Rate Last Admin    benzocaine-menthol (CHLORASEPTIC MAX) 15-10 MG lozenge 1 lozenge  1 lozenge Buccal Q1H PRN Antoinette Heath MD        bumetanide (BUMEX) injection 1 mg  1 mg Intravenous bid 08 & 14 Rajesh Perez MD   1 mg at 06/09/25 0857    [Held  "by provider] bumetanide (BUMEX) tablet 1 mg  1 mg Oral Daily Antoinette Heath MD        calcium carbonate (TUMS) chewable tablet 1,000 mg  1,000 mg Oral 4x Daily PRN Antoinette Heath MD        citalopram (celeXA) tablet 20 mg  20 mg Oral Daily Antoinette Heath MD   20 mg at 06/09/25 0857    gabapentin (NEURONTIN) capsule 300 mg  300 mg Oral QAM Antoinette Heath MD   300 mg at 06/09/25 0857    And    gabapentin (NEURONTIN) capsule 600 mg  600 mg Oral QPM Antoinette Heath MD   600 mg at 06/08/25 2212    lactulose (CHRONULAC) solution 10 g  10 g Oral JAMEELM Antoinette Heath MD   10 g at 06/09/25 0857    levothyroxine (SYNTHROID/LEVOTHROID) tablet 112 mcg  112 mcg Oral Daily Antoinette Heath MD   112 mcg at 06/09/25 0857    lidocaine (LMX4) cream   Topical Q1H PRN Antoinette Heath MD        lidocaine 1 % 0.1-1 mL  0.1-1 mL Other Q1H PRN Antoinette Heath MD        melatonin tablet 5 mg  5 mg Oral At Bedtime PRN Antoinette Heath MD        rifaximin (XIFAXAN) tablet 550 mg  550 mg Oral BID Antoinette Heath MD   550 mg at 06/09/25 0857    senna-docusate (SENOKOT-S/PERICOLACE) 8.6-50 MG per tablet 1 tablet  1 tablet Oral BID PRN Antoinette Heath MD        Or    senna-docusate (SENOKOT-S/PERICOLACE) 8.6-50 MG per tablet 2 tablet  2 tablet Oral BID PRN Antoinette Heath MD        sodium chloride (PF) 0.9% PF flush 3 mL  3 mL Intracatheter Q8H DARINEL Antoinette Heath MD   3 mL at 06/08/25 2213    sodium chloride (PF) 0.9% PF flush 3 mL  3 mL Intracatheter q1 min prn Antoinette Heath MD        [Held by provider] spironolactone (ALDACTONE) tablet 50 mg  50 mg Oral Daily Antoinette Heath MD        traZODone (DESYREL) tablet 100 mg  100 mg Oral At Bedtime Antoinette Heath MD   100 mg at 06/08/25 3025            Physical Exam:   VS:  /58 (BP Location: Left arm, Cuff Size: Adult Large)   Pulse 87   Temp 97.8  F (36.6  C) (Oral)   Resp 18   Ht 1.803 m (5' 11\")   Wt (!) 155.2 kg (342 lb 1.6 oz)   SpO2 97%   BMI 47.71 kg/m      Wt:   Wt Readings from Last 2 Encounters:   06/09/25 (!) 155.2 kg (342 lb 1.6 oz) " "  25 (!) 149.2 kg (328 lb 14.4 oz)      Physical Examination:  Gen: sitting up in chair, in no acute distress  HEENT: +icteric conjunctiva, moist mucous membranes  Pulm: normal work of breathing on ambient air   Abd: soft, distended, NT, no rebound or guarding   Ext: moving spontaneously   Skin: warm, well perfused   Neuro: alert and oriented, conversant         Reviewed data:     MELD 3.0: 25 at 2025  8:21 AM  MELD-Na: 22 at 2025  5:55 AM  Calculated from:  Serum Creatinine: 1.04 mg/dL at 2025  5:55 AM  Serum Sodium: 132 mmol/L at 2025  5:55 AM  Total Bilirubin: 3.6 mg/dL at 2025  5:55 AM  Serum Albumin: 2.1 g/dL at 2025  8:21 AM  INR(ratio): 2.02 at 2025  5:55 AM  Age at listin years  Sex: Female at 2025  8:21 AM    No ultrasound this admission     CXR 25  \"IMPRESSION:  1. Stable moderate left pleural effusion when compared to CTA  Pulmonary angiogram  2025.  2. Mild pulmonary edema\"    "

## 2025-06-09 NOTE — PROGRESS NOTES
"   06/09/25 1411   Appointment Info   Signing Clinician's Name / Credentials (PT) Verna Vergara DPT   Rehab Comments (PT) PT/Edema   Quick Adds   Quick Adds Edema Eval   General Information   Onset of Illness/Injury or Date of Surgery 06/07/25   Referring Physician Antoinette Heath MD   Pertinent History of Current Problem (include personal factors and/or comorbidities that impact the POC) per chart; 60 yo female w/ MASH cirrhosis, chronic left-sided pleural effusion, psoriatic arthritis admitted for dyspnea and anasarca.   Cognition   Affect/Mental Status (Cognition) WFL   Orientation Status (Cognition) oriented x 4   Follows Commands (Cognition) WFL   Integumentary/Edema   Integumentary/Edema Comments pt with anasarca; increased pitting edema in BLEs and dorsum of bilateral feet   Onset of Edema   (pt reports history of edema off and on)   Affected Body Part(s) Left LE;Right LE;Other (see comments)  (generalized full body anasarca)   Edema Etiology Other (see comments)  (hypoalbuminemia, reduced muscle pump)   Edema Precautions Other (see comments)  (chronic pleural effusion, liver failure with chronic ascites)   Edema Precaution Comments low platelets (50)   General Comments/Previous Edema Treatment/Edema Equipment Pt reports trial of compression stocking in the past, but \"given my size\" reports difficult to find compression stockings that fit appropraiately   Edema Examination/Assessment   Skin Condition Pitting;Dryness;Intact   Scar No   Dorsal Pedal Pulse Symmetrical   Skin Integrity intact bilaterally, no scarring   Pitting Assessment +2-3 pitting bilaterally in anterior shin and bilateral dorsum of feet   Clinical Impression   Criteria for Skilled Therapeutic Intervention Yes, treatment indicated   PT Diagnosis (PT) limited mobility secondary too anasarca   Edema: Patient Presentation Edema   Influenced by the following impairments increased volume status, deconditioning, low lab counts, generalized " weakness, decreased activity tolerance   Functional limitations due to impairments transfers, difficulty/decreased independence with ambulation, reduced activity tolerance for community mobility   Clinical Presentation (PT Evaluation Complexity) stable   Clinical Presentation Rationale clinical judgement   Clinical Decision Making (Complexity) low complexity   Planned Therapy Interventions (PT) balance training;bed mobility training;cryotherapy;gait training;home exercise program;neuromuscular re-education;patient/family education;ROM (range of motion);stair training;strengthening;stretching;progressive activity/exercise;transfer training;home program guidelines;manual therapy techniques   Edema: Planned Interventions Manual lymph drainage;Gradient compression bandaging;Fit for compression garment;Edema exercises;Education;Precautions to prevent infection/exacerbation;Manual therapy;ADL training   Risk & Benefits of therapy have been explained evaluation/treatment results reviewed;risks/benefits reviewed;care plan/treatment goals reviewed;current/potential barriers reviewed;participants voiced agreement with care plan;participants included;patient   Physical Therapy Goals   PT Goals Edema   PT: Edema education to increase ability to manage edema after discharge from the hospital Patient;Caregiver;Verbalize;Demonstrate;[place];Skin care routine;signs/symptoms of intolerance;wear schedule;limb positioning;discharge recommendations;garment/bandage care   PT: Management of edema bandages Patient;Caregiver;Verbalize;Demonstrate;[place];garment(s);quick wrap   PT: Functional edema exercise program to reduce limb volume, increase activity tolerance and improve independence with ADL Patient;Verbalize;Caregiver;Demonstrates;[place];HEP;walking program   Interventions   Interventions Quick Adds Manual Therapy   Therapeutic Activity   Treatment Detail/Skilled Intervention In AM; attempted to see pt for PT/Edema  "and interrupted for bedside para/thoracentesis. Returned in PM for edema GCBs - see below   Manual Therapy   Manual Therapy Minutes (16921) 20   Symptoms Noted During/After Treatment Fatigue   Treatment Detail/Skilled Intervention Pt educated on lymphatic system anatomy/physiology, precautions, and treatment options. See evaluation above for description of BLE edema and skin. Pt is appropriate for use of GCBs to promote fluid mobilization and edema management, for improved skin integrity, functional mobility, and ADL independence. Pt's LE's washed and lotioned with Sween 24. Donned Size L TGSoft base layer, followed by 1 x 8cm short stretch bandage from MTP's to distal shin and 1 x 10 cm short stretch bandage from ankle to knee crease using \"quick wrap technique\" with 50% overlap and 25% stretch - indicating d/t low platelet counts (50 at time of eval) Stretch mesh added over tape for increased hold. Pt reporting comfort. Pt educated on wear 24-48 hr wear schedule and indications for removal (pain, numbness, tingling, soiled, or loose/falling off). Pt educated in conservative strategies for managing BLE edema, including elevation and muscle pump activation. Pt verbalized understanding of all education. Patient care order entered.   PT Discharge Planning   PT Plan Amb OOR, progress activity tolerance, check in on HEP / discuss OP PT- see if pt agreeable (edu on benefit to increase strength in advance of liver transplant), practice tub transfer // G2 check   PT Discharge Recommendation (DC Rec) home with outpatient physical therapy;home   PT Rationale for DC Rec Patient is mobilizing well overall, able to perform bed mobility and transfers mod ind, ambulation with SBA in room without AD; she appears slightly below her baseline with new use of cane in past ~1 week, corresponding with increased edema & also with reduced activity tolerance, though pt reports activity tolerance has declined over the past year or so. She " likely would benefit from OP PT to progress activity tolerance & build strength as well as address chronic back pain. Did not yet discuss rec for OP PT with patient, will plan to do so in future sessions.   Physical Therapy Time and Intention   Timed Code Treatment Minutes 20   Total Session Time (sum of timed and untimed services) 20

## 2025-06-09 NOTE — PLAN OF CARE
"/58 (BP Location: Left arm, Cuff Size: Adult Large)   Pulse 87   Temp 97.8  F (36.6  C) (Oral)   Resp 18   Ht 1.803 m (5' 11\")   Wt (!) 154.4 kg (340 lb 4.8 oz)   SpO2 97%   BMI 47.46 kg/m        Outcome Evaluation: A&O x 4. Reports pain 6/10. Gave scheduled gabapentin and t-pump in use. SBA w/ cane. [place]- michelle colored OP. PIV SL. Brusing on left forearm-previous PIV. Generalized edema.    Goal Outcome Evaluation: Ongoing      Plan of Care Reviewed With: patient    Overall Patient Progress: no change          Problem: Adult Inpatient Plan of Care  Goal: Absence of Hospital-Acquired Illness or Injury  Intervention: Identify and Manage Fall Risk  Recent Flowsheet Documentation  Taken 6/8/2025 2216 by Ana Monson, RN  Safety Promotion/Fall Prevention:   activity supervised   assistive device/personal items within reach   clutter free environment maintained   lighting adjusted   room near nurse's station   room organization consistent   safety round/check completed     Problem: Adult Inpatient Plan of Care  Goal: Absence of Hospital-Acquired Illness or Injury  Intervention: Prevent and Manage VTE (Venous Thromboembolism) Risk  Recent Flowsheet Documentation  Taken 6/8/2025 2216 by Ana Monson RN  VTE Prevention/Management: SCDs off (sequential compression devices)     Problem: Adult Inpatient Plan of Care  Goal: Absence of Hospital-Acquired Illness or Injury  Intervention: Prevent Infection  Recent Flowsheet Documentation  Taken 6/8/2025 2216 by Ana Monson, RN  Infection Prevention:   cohorting utilized   rest/sleep promoted   single patient room provided     Problem: Adult Inpatient Plan of Care  Goal: Optimal Comfort and Wellbeing  Intervention: Monitor Pain and Promote Comfort  Recent Flowsheet Documentation  Taken 6/8/2025 2216 by Ana Monson, RN  Pain Management Interventions: medication (see MAR)               "

## 2025-06-09 NOTE — PLAN OF CARE
Goal Outcome Evaluation:      Plan of Care Reviewed With: patient, family    Overall Patient Progress: no changeOverall Patient Progress: no change    Temp: 98.3  F (36.8  C) Temp src: Oral BP: 135/52 Pulse: 74   Resp: 18 SpO2: 96 % O2 Device: None (Room air)    NEURO: A&Ox4, pleasant/cooperative, able to make needs known  RESPIRATORY: WNL on RA  CARDIAC: WNL, denies chest pain  GI/: Primafit in place for urgency, AUOP, LBM 6/8, denies nausea & abd discomfort  SKIN: +3/+4 generalized and dependent edema, LUE bruise from IV infiltration site  DIET: Regular  PAIN/NAUSEA: Chronic back pain adequately managed per pt w scheduled gabapentin and repositioning  IV ACCESS: R-PIV SL  ACTIVITY: SBA w/ cane  LAB: Reviewed, thoracentesis labs resulted  PLAN: Continue w/ POC, dx thoracentesis done today, discharge plan pending

## 2025-06-09 NOTE — PROGRESS NOTES
[facility]    Medicine Progress Note     Assessment & Plan     60 yo female w/ MASH cirrhosis, chronic left-sided pleural effusion, psoriatic arthritis admitted for dyspnea and anasarca.    Updates:   - Increase Bumex 1g TID with goal -2L   - S/p diagnostic thoracentesis (transudative); unable to do paracentesis given no safe pocket and limited fluid  - Discussed with dietician - no taurine available in hospital      #dyspnea, improved  #pleural effusion, left-sided, chronic of unclear etiology  Follows pulm, pleural effusion appeared between Dec 2024 and Feb 2025. 3/6/25 pleural fluid studies have been consistent with exudate; however pleural protein levels relatively low. No apparent malignant process with negative cytology. PFTs with mild restriction. No signs of infection. CT/PE negative for PE w/ large left pleural effusion and ascites. . Trops neg, low c/f ACS. Clinically non-toxic appearing. RVP negative. Admitted on RA. Improving with diuresis  - Bumex 1g TID   - S/p diagnostic thoracentesis - fluid appears transudate consistent with hepatic hydrothorax      #anasarca LE > UE  #chronic LE edema  #Cramping   Recently decreased spironolactone dosage 2/2 hand cramping. TTE 12/27/24 WNL. Pt reports 30 lb weight gain in a few weeks. TSH mildly elevated at 4.89, less likely to be etiology. Repeat TTE with normal EF. Suspect all 2/2 liver failure. UA withut proteinuria.   - Nutrition consulted given poor albumin and anasarca c/f malnutrition  - IV diuresis: PTA Bumex 1 mg  TID   - Hold PTA spironolactone 50 mg  - Strict I/O  - Purewick given difficulty w/ mobility and increased urination w/ IV diuresis  - Would benefit from taurine supplement as an outpatient; cannot get inpatient   - Unable to do paracentesis due limited windown     #cirrhosis 2/2 MASH c/b ascites  #hyperbilirubinemia (around baseline)  #thrombocytopenia (around baseline, 70)  Follows  hepatology, on liver transplant list since April 2025. Immunosuppressed and not a candidate for vaccines. MELD 3.0: 23, MELD-NA: 21.  - Cramping: HOLD PTA magnesium, zinc, vit E supplements  - Constipation: PTA lactulose 15 mL every day  - Tremors: PTA rifaximin 550 mg BID  - Hepatology consulted     #psoriatic arthritis  #psoriasis  Was on methotrexate for 20+ years, d/c'd d/t cirrhosis. On adalimumab in 1/2020 (ineffective), risankizumab 11/2020  - HOLD PTA ixekizumab (IL-17 monoclonal antibody) qMonthly     #obesity  #deconditioning  - PT consulted     #UE pain  #back and neck pain, chronic  #fibromyalgia  - Tylenol with 2 gram limit     #hx of pulmonary embolism (2002)  On warfarin briefly, not currently on DOAC. Was on birth control at that time     #acquired hypothyroidism  Clinically euthyroid per PCP. TSH WNL  - PTA levothyroxine 112 mcg qD     #MDD  #insomnia  - PTA citalopram 20 mg every day  - PTA insomnia 100 mg at bedtime     #NEREYDA, moderate  Has seen sleep medicine. Recs APAP 5-15 cm H2O. Does not use CPAP, it gives her anxiety  - Placed CPAP order     #enlarged mediastinal and 1.5 cm subcarinal lymph nodes  EUS 8/9/24 w/ FNA: polymorphous lymphocytes         Diet: Combination Diet Regular Diet Adult    DVT Prophylaxis:  holding d/t low platelets  Rodriguez Catheter: Not present  Lines: None     Cardiac Monitoring: None  Code Status: Full Code      Clinically Significant Risk Factors         # Hyponatremia: Lowest Na = 132 mmol/L in last 2 days, will monitor as appropriate     # Hypomagnesemia: Lowest Mg = 1.6 mg/dL in last 2 days, will replace as needed   # Hypoalbuminemia: Lowest albumin = 2.1 g/dL at 6/9/2025  8:21 AM, will monitor as appropriate  # Coagulation Defect: INR = 2.02 (Ref range: 0.85 - 1.15) and/or PTT = 42 Seconds (Ref range: 22 - 38 Seconds), will monitor for bleeding  # Thrombocytopenia: Lowest platelets = 50 in last 2 days, will monitor for bleeding    # Acute heart failure with  "preserved ejection fraction: heart failure noted on problem list, last echo with EF >50%, and receiving IV diuretics           # Morbid Obesity: Estimated body mass index is 47.71 kg/m  as calculated from the following:    Height as of this encounter: 1.803 m (5' 11\").    Weight as of this encounter: 155.2 kg (342 lb 1.6 oz)., PRESENT ON ADMISSION            Social Drivers of Health   Food Insecurity: High Risk (3/26/2025)    Food Insecurity     Within the past 12 months, did you worry that your food would run out before you got money to buy more?: Yes     Within the past 12 months, did the food you bought just not last and you didn t have money to get more?: Yes         Disposition Plan      Expected Discharge Date: 06/12/2025                The patient's care was discussed with the Attending Physician, Dr. Perez.      Deja Coulter MD    ______________________________________________________________________      Interval History   No acute events overnight. Received bumex and was net negatie -1.5L. Continues to endorse significant lower extremity edema. Cramps are improved.     Physical Exam   Vital Signs: Temp: 98.3  F (36.8  C) Temp src: Oral BP: 135/52 Pulse: 74   Resp: 18 SpO2: 96 % O2 Device: None (Room air)    Weight: 342 lbs 1.6 oz    General Appearance: Resting comfortably in bed, lying flat  Respiratory: Lungs clear to asculation bilaterally  Cardiovascular: RRR, Normal S1/S2   GI: Abdomen soft, non-tender, +1/2 pitting edema  Skin: +2 pitting edema in bilateral legs      Medical Decision Making       Please see A&P for additional details of medical decision making.      Data       I have personally reviewed the following data over the past 24 hrs:    4.7  \   10.0 (L)   / 50 (L)     132 (L) 99 12.2 /  96   3.7 24 1.04 (H) \     ALT: 29 AST: 68 (H) AP: 68 TBILI: 3.6 (H)   ALB: 2.1 (L) TOT PROTEIN: 5.8 (L); 5.8 (L) LIPASE: N/A     INR:  2.02 (H) PTT:  N/A   D-dimer:  N/A Fibrinogen:  N/A     Ferritin:  " N/A % Retic:  N/A LDH:  290 (H)       Imaging results reviewed over the past 24 hrs:   Recent Results (from the past 24 hours)   XR Chest Port 1 View    Narrative    EXAM:  XR CHEST PORT 1 VIEW    INDICATION: eval for improvement in pleural effusion    COMPARISON:  CT PE 6/8/2025    FINDINGS:  Single AP view of the chest.    Cardiomediastinal silhouette within normal limits. Bilateral  interstitial prominence. Retrocardiac opacity silhouetting the  hemidiaphragm. No pneumothorax.  Left pleural effusion.       Impression    IMPRESSION:  1. Stable moderate left pleural effusion when compared to CTA  Pulmonary angiogram  6/8/2025.  2. Mild pulmonary edema    I have personally reviewed the examination and initial interpretation  and I agree with the findings.    AMADOR HERRERA MD         SYSTEM ID:  [id number]

## 2025-06-10 ENCOUNTER — APPOINTMENT (OUTPATIENT)
Dept: GENERAL RADIOLOGY | Facility: CLINIC | Age: 60
DRG: 442 | End: 2025-06-10
Payer: COMMERCIAL

## 2025-06-10 ENCOUNTER — APPOINTMENT (OUTPATIENT)
Dept: PHYSICAL THERAPY | Facility: CLINIC | Age: 60
End: 2025-06-10
Payer: COMMERCIAL

## 2025-06-10 ENCOUNTER — COMMITTEE REVIEW (OUTPATIENT)
Dept: TRANSPLANT | Facility: CLINIC | Age: 60
End: 2025-06-10

## 2025-06-10 ENCOUNTER — TELEPHONE (OUTPATIENT)
Dept: GASTROENTEROLOGY | Facility: CLINIC | Age: 60
End: 2025-06-10
Payer: COMMERCIAL

## 2025-06-10 DIAGNOSIS — K75.81 METABOLIC DYSFUNCTION-ASSOCIATED STEATOHEPATITIS (MASH): Primary | ICD-10-CM

## 2025-06-10 DIAGNOSIS — K74.60 CIRRHOSIS OF LIVER WITH ASCITES, UNSPECIFIED HEPATIC CIRRHOSIS TYPE (H): ICD-10-CM

## 2025-06-10 DIAGNOSIS — K74.60 CIRRHOSIS OF LIVER NOT DUE TO ALCOHOL (H): Primary | ICD-10-CM

## 2025-06-10 DIAGNOSIS — R18.8 CIRRHOSIS OF LIVER WITH ASCITES, UNSPECIFIED HEPATIC CIRRHOSIS TYPE (H): ICD-10-CM

## 2025-06-10 DIAGNOSIS — K76.82 HEPATIC ENCEPHALOPATHY (H): ICD-10-CM

## 2025-06-10 LAB
ALBUMIN SERPL BCG-MCNC: 2.2 G/DL (ref 3.5–5.2)
ALP SERPL-CCNC: 69 U/L (ref 40–150)
ALT SERPL W P-5'-P-CCNC: 28 U/L (ref 0–50)
ANION GAP SERPL CALCULATED.3IONS-SCNC: 6 MMOL/L (ref 7–15)
ANION GAP SERPL CALCULATED.3IONS-SCNC: 6 MMOL/L (ref 7–15)
ANION GAP SERPL CALCULATED.3IONS-SCNC: 7 MMOL/L (ref 7–15)
AST SERPL W P-5'-P-CCNC: 61 U/L (ref 0–45)
BILIRUB SERPL-MCNC: 3.1 MG/DL
BUN SERPL-MCNC: 12.6 MG/DL (ref 8–23)
BUN SERPL-MCNC: 13.3 MG/DL (ref 8–23)
BUN SERPL-MCNC: 13.3 MG/DL (ref 8–23)
CALCIUM SERPL-MCNC: 7.7 MG/DL (ref 8.8–10.4)
CALCIUM SERPL-MCNC: 7.7 MG/DL (ref 8.8–10.4)
CALCIUM SERPL-MCNC: 7.8 MG/DL (ref 8.8–10.4)
CHLORIDE SERPL-SCNC: 98 MMOL/L (ref 98–107)
CHLORIDE SERPL-SCNC: 99 MMOL/L (ref 98–107)
CHLORIDE SERPL-SCNC: 99 MMOL/L (ref 98–107)
CREAT SERPL-MCNC: 1.02 MG/DL (ref 0.51–0.95)
CREAT SERPL-MCNC: 1.05 MG/DL (ref 0.51–0.95)
CREAT SERPL-MCNC: 1.05 MG/DL (ref 0.51–0.95)
EGFRCR SERPLBLD CKD-EPI 2021: 61 ML/MIN/1.73M2
EGFRCR SERPLBLD CKD-EPI 2021: 61 ML/MIN/1.73M2
EGFRCR SERPLBLD CKD-EPI 2021: 63 ML/MIN/1.73M2
ERYTHROCYTE [DISTWIDTH] IN BLOOD BY AUTOMATED COUNT: 16.7 % (ref 10–15)
GLUCOSE SERPL-MCNC: 117 MG/DL (ref 70–99)
GLUCOSE SERPL-MCNC: 117 MG/DL (ref 70–99)
GLUCOSE SERPL-MCNC: 154 MG/DL (ref 70–99)
HCO3 SERPL-SCNC: 28 MMOL/L (ref 22–29)
HCT VFR BLD AUTO: 29.2 % (ref 35–47)
HGB BLD-MCNC: 9.9 G/DL (ref 11.7–15.7)
MAGNESIUM SERPL-MCNC: 1.7 MG/DL (ref 1.7–2.3)
MCH RBC QN AUTO: 31.9 PG (ref 26.5–33)
MCHC RBC AUTO-ENTMCNC: 33.9 G/DL (ref 31.5–36.5)
MCV RBC AUTO: 94 FL (ref 78–100)
PATH REPORT.COMMENTS IMP SPEC: NORMAL
PATH REPORT.FINAL DX SPEC: NORMAL
PATH REPORT.GROSS SPEC: NORMAL
PATH REPORT.MICROSCOPIC SPEC OTHER STN: NORMAL
PATH REPORT.RELEVANT HX SPEC: NORMAL
PLATELET # BLD AUTO: 57 10E3/UL (ref 150–450)
POTASSIUM SERPL-SCNC: 3.4 MMOL/L (ref 3.4–5.3)
POTASSIUM SERPL-SCNC: 3.5 MMOL/L (ref 3.4–5.3)
POTASSIUM SERPL-SCNC: 3.5 MMOL/L (ref 3.4–5.3)
PROT SERPL-MCNC: 5.7 G/DL (ref 6.4–8.3)
RBC # BLD AUTO: 3.1 10E6/UL (ref 3.8–5.2)
SODIUM SERPL-SCNC: 133 MMOL/L (ref 135–145)
WBC # BLD AUTO: 4 10E3/UL (ref 4–11)

## 2025-06-10 PROCEDURE — 99233 SBSQ HOSP IP/OBS HIGH 50: CPT | Mod: GC | Performed by: STUDENT IN AN ORGANIZED HEALTH CARE EDUCATION/TRAINING PROGRAM

## 2025-06-10 PROCEDURE — 71045 X-RAY EXAM CHEST 1 VIEW: CPT | Mod: 26 | Performed by: RADIOLOGY

## 2025-06-10 PROCEDURE — 36415 COLL VENOUS BLD VENIPUNCTURE: CPT

## 2025-06-10 PROCEDURE — 82310 ASSAY OF CALCIUM: CPT

## 2025-06-10 PROCEDURE — 82040 ASSAY OF SERUM ALBUMIN: CPT | Performed by: STUDENT IN AN ORGANIZED HEALTH CARE EDUCATION/TRAINING PROGRAM

## 2025-06-10 PROCEDURE — 71045 X-RAY EXAM CHEST 1 VIEW: CPT

## 2025-06-10 PROCEDURE — 97530 THERAPEUTIC ACTIVITIES: CPT | Mod: GP

## 2025-06-10 PROCEDURE — 85027 COMPLETE CBC AUTOMATED: CPT | Performed by: STUDENT IN AN ORGANIZED HEALTH CARE EDUCATION/TRAINING PROGRAM

## 2025-06-10 PROCEDURE — 999N000127 HC STATISTIC PERIPHERAL IV START W US GUIDANCE

## 2025-06-10 PROCEDURE — 97140 MANUAL THERAPY 1/> REGIONS: CPT | Mod: GP

## 2025-06-10 PROCEDURE — 99232 SBSQ HOSP IP/OBS MODERATE 35: CPT | Mod: GC | Performed by: INTERNAL MEDICINE

## 2025-06-10 PROCEDURE — 83735 ASSAY OF MAGNESIUM: CPT | Performed by: STUDENT IN AN ORGANIZED HEALTH CARE EDUCATION/TRAINING PROGRAM

## 2025-06-10 PROCEDURE — 250N000013 HC RX MED GY IP 250 OP 250 PS 637

## 2025-06-10 PROCEDURE — 250N000011 HC RX IP 250 OP 636: Mod: JZ

## 2025-06-10 PROCEDURE — 120N000002 HC R&B MED SURG/OB UMMC

## 2025-06-10 RX ADMIN — LEVOTHYROXINE SODIUM 112 MCG: 0.11 TABLET ORAL at 08:04

## 2025-06-10 RX ADMIN — TRAZODONE HYDROCHLORIDE 100 MG: 100 TABLET ORAL at 22:10

## 2025-06-10 RX ADMIN — BUMETANIDE 2 MG: 0.25 INJECTION INTRAMUSCULAR; INTRAVENOUS at 19:58

## 2025-06-10 RX ADMIN — LACTULOSE 10 G: 20 SOLUTION ORAL at 08:04

## 2025-06-10 RX ADMIN — RIFAXIMIN 550 MG: 550 TABLET ORAL at 19:58

## 2025-06-10 RX ADMIN — BUMETANIDE 2 MG: 0.25 INJECTION INTRAMUSCULAR; INTRAVENOUS at 08:05

## 2025-06-10 RX ADMIN — GABAPENTIN 600 MG: 300 CAPSULE ORAL at 19:58

## 2025-06-10 RX ADMIN — GABAPENTIN 300 MG: 300 CAPSULE ORAL at 08:04

## 2025-06-10 RX ADMIN — CITALOPRAM HYDROBROMIDE 20 MG: 20 TABLET ORAL at 08:04

## 2025-06-10 RX ADMIN — BUMETANIDE 2 MG: 0.25 INJECTION INTRAMUSCULAR; INTRAVENOUS at 14:26

## 2025-06-10 RX ADMIN — RIFAXIMIN 550 MG: 550 TABLET ORAL at 08:03

## 2025-06-10 ASSESSMENT — ACTIVITIES OF DAILY LIVING (ADL)
ADLS_ACUITY_SCORE: 34
ADLS_ACUITY_SCORE: 40
ADLS_ACUITY_SCORE: 34
ADLS_ACUITY_SCORE: 40
ADLS_ACUITY_SCORE: 34
ADLS_ACUITY_SCORE: 38
ADLS_ACUITY_SCORE: 40
ADLS_ACUITY_SCORE: 39
ADLS_ACUITY_SCORE: 40
ADLS_ACUITY_SCORE: 34
ADLS_ACUITY_SCORE: 34
ADLS_ACUITY_SCORE: 38
ADLS_ACUITY_SCORE: 39
ADLS_ACUITY_SCORE: 34
ADLS_ACUITY_SCORE: 38
ADLS_ACUITY_SCORE: 34
ADLS_ACUITY_SCORE: 39
ADLS_ACUITY_SCORE: 39

## 2025-06-10 NOTE — PROGRESS NOTES
[id number]  Status: Admitted for dyspnea and anasarca. Hx of MASH cirrhosis, chronic L sided pleural effusion, psoratic arthritis.   Vitals: VSS on RA.  Neuros: A&Ox4. Pleasant.   IV: R PIV SL  Labs/Electrolytes: Sodium 133, K=3.5  Resp/trach: on RA. Intermittent SOB per pt.   Diet: regular  with good appetite. Strict I&Os  Bowel status: BS+, BM X 3 this shift   : Voiding with urgency, primafit in place. Large output, on diuretics.   Skin: 3-4+ edema t/out. LUE bruise from IV infiltration site. Thoracentesis site, WNL. Lymphedema wrap in place.  Pain: Chronic back pain adequately managed with scheduled meds and repositioning   Activity: SBA/GB/cane.  Social: patient resting in between cares.  Plan: Continue to monitor.

## 2025-06-10 NOTE — PLAN OF CARE
[id number]  Goal Outcome Evaluation:      Plan of Care Reviewed With: patient    Overall Patient Progress: no changeOverall Patient Progress: no change     Vital signs:  Temp: 98.1  F (36.7  C) Temp src: Oral BP: 128/63 Pulse: 76   Resp: 18 SpO2: (!) 87 % O2 Device: None (Room air)      Pt VSS on RA, A&O x4, calm and cooperative. SBA. Up to bedside. Denies pain, nausea, chest pain and SOB. PIV SL. Voiding with purewick due to urgency and frequency, increased UOP due to diuretics. Per pt statement edema seems to be decreasing/ improving. Generalized edema +3/+4. Lymph wraps in BLE. BUE  bruises from lab draws.     No acute changes this shift. Continue POC and Continue with I/O monitoring goal of -2L

## 2025-06-10 NOTE — TELEPHONE ENCOUNTER
Prior Authorization Retail Medication Request    Medication/Dose: Xifaxan 550 mg tablets   Diagnosis and ICD code (if different than what is on RX):  K76.82  New/renewal/insurance change PA/secondary ins. PA:  Previously Tried and Failed:  lactulose alone  Rationale:  Xifaxan helps to lower the toxin build up in the blood while lactulose works by cleansing the toxin build up in the liver. Adjunctive therapy: http://onlinelibrary.cheema.com/doi/10.1111/heydi.57444/full       Insurance   Primary:   Insurance ID:      Secondary (if applicable):  Insurance ID:      Pharmacy Information (if different than what is on RX)  Name:    Phone:    Fax:    Clinic Information  Preferred routing pool for dept communication:  p Hepatology Adult [facility]

## 2025-06-10 NOTE — TELEPHONE ENCOUNTER
Central Prior Authorization Team - Phone: [phone]     PRIOR AUTHORIZATION DENIED    Medication: XIFAXAN 550 MG PO TABS  Insurance Company: [employer] - Phone [phone] Fax [phone]  Denial Date: 6/6/2025  Denial Reason(s):       Appeal Information:       Patient Notified: NO  Unfortunately, we cannot call the patient with denials because we do not know what next steps the MD will take nor can we give medical advice, please notify the patient of what they are to expect for the continuation of their therapy from the provider.

## 2025-06-10 NOTE — PROGRESS NOTES
Hepatology Inpatient Progress Note:          Assessment and Plan:   Karlene Yun is a 59 year old female with a history of decompensated cirrhosis due to metabolic dysfunction associated steatohepatitis complicated by ascites who is listed for liver transplant and presented to the hospital for management of fluid overload. MELD 24. ABO A+.      Decompensated cirrhosis (MELD 25)   Etiology: mash, follows with Dr. Ro   EV: no prior EGD, had EUS - EGD due 8/2025   Ascites: present, no prior para - home diuretics: Bumex 1 mg BID, spironolactone 100 mg daily   HE: A&O x4, on lactulose and rifaximin   HCC: Due, none on last U/S 11/2024    Anasarca   Patient is significantly volume overloaded on clinical examination. She is noted to have ascites on imaging at the time of admission, never has had a paracentesis before. She has pleural effusions, largest on the left with prior thoracentesis (exudative). Echocardiogram 6/8 showing normal EF and no valvular or right heart abnormalities (does have hx cardiomegaly).     L pleural effusion   Thoracentesis 6/9 labs suggesting transudative effusion. Cytology negative for malignancy.      Recommendations:   - Bumex IV for goal 1-2 L net negative fluid balance  - Reassess fluid for diagnostic paracentesis   - Rifaximin and lactulose for goal 1-2 BM a day  - Hold PTA spironolactone   - 2g low sodium diet, high protein supplements TID and at bedtime  - Daily MELD labs   - Recommend taurine supplement to help with muscle cramping associated with diuretics    Patient discussed with attending physician, Dr. Leventhal.      Sergio Carlisle MD  Gastroenterology Fellow        Interval events:    - Patient is net negative 2.2L doing well today   - She had thoracentesis with 100mL drained, no safe pocket for paracentesis   - No nausea or vomiting, no abdominal pain, no changes in breathing          Medications:     Current Facility-Administered Medications   Medication Dose  Route Frequency Provider Last Rate Last Admin    benzocaine-menthol (CHLORASEPTIC MAX) 15-10 MG lozenge 1 lozenge  1 lozenge Buccal Q1H PRN Antoinette Heath MD        bumetanide (BUMEX) injection 2 mg  2 mg Intravenous TID Deja Coulter MD   2 mg at 06/10/25 0805    [Held by provider] bumetanide (BUMEX) tablet 1 mg  1 mg Oral Daily Antoinette Heath MD        calcium carbonate (TUMS) chewable tablet 1,000 mg  1,000 mg Oral 4x Daily PRN Antoinette Heath MD        citalopram (celeXA) tablet 20 mg  20 mg Oral Daily Antoinette Heath MD   20 mg at 06/10/25 0804    gabapentin (NEURONTIN) capsule 300 mg  300 mg Oral QAM Antoinette Heath MD   300 mg at 06/10/25 0804    And    gabapentin (NEURONTIN) capsule 600 mg  600 mg Oral QPM Antoinette Heath MD   600 mg at 06/09/25 1940    lactulose (CHRONULAC) solution 10 g  10 g Oral JAMEELM Antoinette Heath MD   10 g at 06/10/25 0804    levothyroxine (SYNTHROID/LEVOTHROID) tablet 112 mcg  112 mcg Oral Daily Antoinette Heath MD   112 mcg at 06/10/25 0804    lidocaine (LMX4) cream   Topical Q1H PRN Antoinette Heath MD        lidocaine 1 % 0.1-1 mL  0.1-1 mL Other Q1H PRN Antoinette Heath MD        melatonin tablet 5 mg  5 mg Oral At Bedtime PRN Antoinette Heath MD        rifaximin (XIFAXAN) tablet 550 mg  550 mg Oral BID Antoinette Heath MD   550 mg at 06/10/25 0803    senna-docusate (SENOKOT-S/PERICOLACE) 8.6-50 MG per tablet 1 tablet  1 tablet Oral BID PRN Antoinette Heath MD        Or    senna-docusate (SENOKOT-S/PERICOLACE) 8.6-50 MG per tablet 2 tablet  2 tablet Oral BID PRN Antoinette Heath MD        sodium chloride (PF) 0.9% PF flush 3 mL  3 mL Intracatheter Q8H DARINEL Antoinette Heath MD   3 mL at 06/10/25 0807    sodium chloride (PF) 0.9% PF flush 3 mL  3 mL Intracatheter q1 min prn Antoinette Heath MD   3 mL at 06/09/25 2130    [Held by provider] spironolactone (ALDACTONE) tablet 50 mg  50 mg Oral Daily Antoinette Heath MD        traZODone (DESYREL) tablet 100 mg  100 mg Oral At Bedtime Antoinette Heath MD   100 mg at 06/09/25 2130            Physical Exam:   VS:  BP  "128/47 (BP Location: Right arm)   Pulse 73   Temp 98.1  F (36.7  C) (Oral)   Resp 16   Ht 1.803 m (5' 11\")   Wt (!) 152 kg (335 lb)   SpO2 (!) 87%   BMI 46.72 kg/m      Wt:   Wt Readings from Last 2 Encounters:   06/10/25 (!) 152 kg (335 lb)   25 (!) 149.2 kg (328 lb 14.4 oz)      Physical Examination:  Gen: sitting up in chair, in no acute distress  HEENT: +icteric conjunctiva, moist mucous membranes  Pulm: normal work of breathing on ambient air   Abd: soft, distended, NT, no rebound or guarding   Ext: moving spontaneously   Skin: warm, well perfused   Neuro: alert and oriented, conversant         Reviewed data:     MELD 3.0: 24 at 6/10/2025  6:08 AM  MELD-Na: 22 at 6/10/2025  6:08 AM  Calculated from:  Serum Creatinine: 1.05 mg/dL at 6/10/2025  6:08 AM  Serum Sodium: 133 mmol/L at 6/10/2025  6:08 AM  Total Bilirubin: 3.1 mg/dL at 6/10/2025  6:08 AM  Serum Albumin: 2.2 g/dL at 6/10/2025  6:08 AM  INR(ratio): 2.02 at 2025  5:55 AM  Age at listin years  Sex: Female at 6/10/2025  6:08 AM    No ultrasound this admission     CXR 25  \"IMPRESSION:  1. Stable moderate left pleural effusion when compared to CTA  Pulmonary angiogram  2025.  2. Mild pulmonary edema\"    "

## 2025-06-10 NOTE — COMMITTEE REVIEW
Liver Committee Review Note     Evaluation Date: 11/26/2024  Committee Review Date: 6/10/2025    Organ being evaluated for: Liver    Transplant Phase: Waitlist  Transplant Status: Active    Transplant Coordinator: Reagan Wang Jr.  Transplant Surgeon:   Amauri Jacobs    Referring Physician: Lyn Martinez    Primary Diagnosis: Cirrhosis: Type A  Secondary Diagnosis:     Transplant Eligibility: Cirrhosis with MELD, [place]    Committee Review Decision: Remain Active    Relative Contraindications:     Absolute Contraindications:     Live Donor: Unchanged status     Committee Chair Leventhal, Thomas Michael, MD verbally attested to the committee's decision.    Committee Discussion Details: Remain active, but have consultation with Transplant Surgery and Physical Therapy Consult to review with Primary Hepatology.    Committee Review Members:  Pharmacist Jael Ruiz, [facility]    - Clinical Fang Johnston MSW, Daphney Kimble MSW, Neisha Freitas, [facility]   Transplant Camila Goldberg, FRANSICO, Cate Jeong, RN, Pamela Beckett, LPN, Krystyna Sandhu, RN, Felicia Cortez, FRANSICO, Jael Jacobs, FRANSICO, Nazia Alicia, APRN CNP, Dick Castaneda, FRANSICO, Shaila Murillo, RN   Transplant Hepatology  Verna Ramos MD, Timoteo Loving MD, Nora Morejon MD, GREG LI MD, Ilana Randall PA-C, Selam Mccoy MD, Thomas M. Leventhal, MD   Transplant Surgery Nadine Crane PA-C, Francoise Mcclure PA-C, Pedro Luis Perez MD

## 2025-06-10 NOTE — PLAN OF CARE
"[id number]    BP (!) 145/56 (BP Location: Right arm)   Pulse 72   Temp 97.9  F (36.6  C) (Oral)   Resp 18   Ht 1.803 m (5' 11\")   Wt (!) 152 kg (335 lb)   SpO2 93%   BMI 46.72 kg/m      Goal Outcome Evaluation:      Plan of Care Reviewed With: patient    Overall Patient Progress: no changeOverall Patient Progress: no change     A&Ox4. VSS on RA. Pain managed w/ heating pad. PIV SL. Denies nausea. No BM this shift. Purewick intact. Regular diet. SBA. Labs reviewed.           "

## 2025-06-10 NOTE — TELEPHONE ENCOUNTER
Previous prescription had incorrect diagnosis code and previous prior authorization did not include pertinent documentation. New prescription sent and new prior authorization initiated.

## 2025-06-10 NOTE — PLAN OF CARE
Goal Outcome Evaluation:      Plan of Care Reviewed With: patient    Overall Patient Progress: no changeOverall Patient Progress: no change    Outcome Evaluation: increased bumex and one time dose of IV chlorothiazide given.      [id number]  Status: Admitted for dyspnea and anasarca. Hxof MASH cirrhosis, chronic L sided pleural effusion, psoratic arthritis.   Vitals: VSS on RA.  Neuros: A&Ox4. Pleasant.   IV: R PIV SL  Labs/Electrolytes: BMP trending q12hr, 1900 Na+ improved. Next @ 0700.   Resp/trach: on RA. Intermittent SOB per pt.   Diet: regular, Strict I&Os  Bowel status: BS+, LBM 6/8.   : Voiding with urgency, primafit in place. Large output, on diuretics.   Skin: 3-4+ edema t/out. LUE bruise from IV infiltration site. Thoracentesis site, WNL. Lymphedema wrap in place.  Pain: Chronic back pain adequately managed with scheduled meds and repositioning   Activity: SBA/GB/cane.  Social: patient resting in between cares.  Plan: continue to monitor and follow POC. Increased IV bumex dose and given one time dose IV chlorothiazide. Continue with I/O monitoring goal of -2L. To discharge home with outpatient PT when ready.

## 2025-06-10 NOTE — PROGRESS NOTES
[facility]    Medicine Progress Note - Maroon 4     Assessment & Plan     58 yo female w/ MASH cirrhosis, chronic left-sided pleural effusion, psoriatic arthritis admitted for dyspnea and anasarca improving with IV diuresis.     Updates:   - Increase Bumex 2g TID with goal -2L   - lymphedema consult for leg swelling     #dyspnea, improved  #pleural effusion, left-sided, chronic of unclear etiology  Follows pulm, pleural effusion appeared between Dec 2024 and Feb 2025. 3/6/25 pleural fluid studies have been consistent with exudate; however pleural protein levels relatively low. No apparent malignant process with negative cytology. PFTs with mild restriction. No signs of infection. CT/PE negative for PE w/ large left pleural effusion and ascites. . Trops neg, low c/f ACS. Clinically non-toxic appearing. RVP negative. Admitted on RA. Improving with diuresis. 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   - Bumex 2g TID goal net negative -2L   - Q12H BMP    #anasarca LE > UE  #chronic LE edema  #Cramping, resolved   Recently decreased spironolactone dosage 2/2 hand cramping. TTE 12/27/24 WNL. Pt reports 30 lb weight gain in a few weeks. TSH mildly elevated at 4.89, less likely to be etiology. Repeat TTE with normal EF. Suspect all 2/2 liver failure. UA withut proteinuria.   - Nutrition consulted given poor albumin and anasarca c/f malnutrition  - IV diuresis:   - Hold PTA PO Bumex 1 mg TID   - Hold PTA spironolactone 50 mg  - Strict I/O  - Purewick given difficulty w/ mobility and increased urination w/ IV diuresis  - Would benefit from taurine supplement as an outpatient; cannot get inpatient   - Unable to do paracentesis due limited windown     #cirrhosis 2/2 MASH c/b ascites  #hyperbilirubinemia (around baseline)  #thrombocytopenia (around baseline, 70)  Follows hepatology, on  liver transplant list since April 2025. Immunosuppressed and not a candidate for vaccines. MELD 3.0: 23, MELD-NA: 21.  - Cramping: HOLD PTA magnesium, zinc, vit E supplements  - Constipation: PTA lactulose 15 mL every day  - Tremors: PTA rifaximin 550 mg BID  - Hepatology consulted     #psoriatic arthritis  #psoriasis  Was on methotrexate for 20+ years, d/c'd d/t cirrhosis. On adalimumab in 1/2020 (ineffective), risankizumab 11/2020  - HOLD PTA ixekizumab (IL-17 monoclonal antibody) qMonthly     #obesity  #deconditioning  - PT consulted     #UE pain  #back and neck pain, chronic  #fibromyalgia  - Tylenol with 2 gram limit     #hx of pulmonary embolism (2002)  On warfarin briefly, not currently on DOAC. Was on birth control at that time     #acquired hypothyroidism  Clinically euthyroid per PCP. TSH WNL  - PTA levothyroxine 112 mcg qD     #MDD  #insomnia  - PTA citalopram 20 mg every day  - PTA insomnia 100 mg at bedtime     #NEREYDA, moderate  Has seen sleep medicine. Recs APAP 5-15 cm H2O. Does not use CPAP, it gives her anxiety  - Placed CPAP order     #enlarged mediastinal and 1.5 cm subcarinal lymph nodes  EUS 8/9/24 w/ FNA: polymorphous lymphocytes         Diet: Combination Diet Regular Diet Adult    DVT Prophylaxis:  holding d/t low platelets  Rodriguez Catheter: Not present  Lines: None     Cardiac Monitoring: None  Code Status: Full Code      Clinically Significant Risk Factors         # Hyponatremia: Lowest Na = 132 mmol/L in last 2 days, will monitor as appropriate     # Hypomagnesemia: Lowest Mg = 1.6 mg/dL in last 2 days, will replace as needed   # Hypoalbuminemia: Lowest albumin = 2.1 g/dL at 6/9/2025  8:21 AM, will monitor as appropriate    # Coagulation Defect: INR = 2.02 (Ref range: 0.85 - 1.15) and/or PTT = 42 Seconds (Ref range: 22 - 38 Seconds), will monitor for bleeding  # Thrombocytopenia: Lowest platelets = 50 in last 2 days, will monitor for bleeding    # Acute heart failure with preserved ejection  "fraction: heart failure noted on problem list, last echo with EF >50%, and receiving IV diuretics           # Morbid Obesity: Estimated body mass index is 46.72 kg/m  as calculated from the following:    Height as of this encounter: 1.803 m (5' 11\").    Weight as of this encounter: 152 kg (335 lb)., PRESENT ON ADMISSION            Social Drivers of Health   Food Insecurity: High Risk (3/26/2025)    Food Insecurity     Within the past 12 months, did you worry that your food would run out before you got money to buy more?: Yes     Within the past 12 months, did the food you bought just not last and you didn t have money to get more?: Yes         Disposition Plan      Expected Discharge Date: 06/12/2025                The patient's care was discussed with the Attending Physician, Dr. Hylton.      Deja Coulter MD    ______________________________________________________________________      Interval History   No acute events overnight. Received bumex and additional diuril overnight with net -2L. This morning feeling well and reports leg swelling slightly improved. No shortness of breath.      Physical Exam   Vital Signs: Temp: 98.1  F (36.7  C) Temp src: Oral BP: 128/47 Pulse: 73   Resp: 16 SpO2: (!) 87 % O2 Device: None (Room air)    Weight: 335 lbs 0 oz    General Appearance: Resting comfortably in bed, lying flat  Respiratory: Lungs clear to asculation bilaterally with decreased breath sounds in the L base   Cardiovascular: RRR, Normal S1/S2   GI: Abdomen soft, non-tender  Skin: +2 pitting edema in bilateral legs  (mild improvement compared to yesterday)     Medical Decision Making       Please see A&P for additional details of medical decision making.      Data       I have personally reviewed the following data over the past 24 hrs:    4.0  \   9.9 (L)   / 57 (L)     133 (L); 133 (L) 99; 99 13.3; 13.3 /  117 (H); 117 (H)   3.5; 3.5 28; 28 1.05 (H); 1.05 (H) \     ALT: 28 AST: 61 (H) AP: 69 TBILI: 3.1 (H) "   ALB: 2.2 (L) TOT PROTEIN: 5.7 (L) LIPASE: N/A       Imaging results reviewed over the past 24 hrs:   No results found for this or any previous visit (from the past 24 hours).

## 2025-06-11 LAB
ALBUMIN SERPL BCG-MCNC: 2.2 G/DL (ref 3.5–5.2)
ALP SERPL-CCNC: 64 U/L (ref 40–150)
ALT SERPL W P-5'-P-CCNC: 28 U/L (ref 0–50)
ANION GAP SERPL CALCULATED.3IONS-SCNC: 6 MMOL/L (ref 7–15)
ANION GAP SERPL CALCULATED.3IONS-SCNC: 7 MMOL/L (ref 7–15)
AST SERPL W P-5'-P-CCNC: 63 U/L (ref 0–45)
BILIRUB SERPL-MCNC: 3.9 MG/DL
BILIRUBIN DIRECT (ROCHE PRO & PURE): 1.92 MG/DL (ref 0–0.45)
BUN SERPL-MCNC: 12.8 MG/DL (ref 8–23)
BUN SERPL-MCNC: 14.2 MG/DL (ref 8–23)
CALCIUM SERPL-MCNC: 7.8 MG/DL (ref 8.8–10.4)
CALCIUM SERPL-MCNC: 7.8 MG/DL (ref 8.8–10.4)
CHLORIDE SERPL-SCNC: 97 MMOL/L (ref 98–107)
CHLORIDE SERPL-SCNC: 98 MMOL/L (ref 98–107)
CREAT SERPL-MCNC: 0.93 MG/DL (ref 0.51–0.95)
CREAT SERPL-MCNC: 0.96 MG/DL (ref 0.51–0.95)
CYSTATIN C (ROCHE): 1.7 MG/L (ref 0.6–1)
EGFRCR SERPLBLD CKD-EPI 2021: 67 ML/MIN/1.73M2
EGFRCR SERPLBLD CKD-EPI 2021: 70 ML/MIN/1.73M2
ERYTHROCYTE [DISTWIDTH] IN BLOOD BY AUTOMATED COUNT: 16.4 % (ref 10–15)
GFR/BSA.PRED SERPLBLD CYS-BASED-ARV: 36 ML/MIN/1.73M2
GLUCOSE SERPL-MCNC: 142 MG/DL (ref 70–99)
GLUCOSE SERPL-MCNC: 99 MG/DL (ref 70–99)
HCO3 SERPL-SCNC: 28 MMOL/L (ref 22–29)
HCO3 SERPL-SCNC: 29 MMOL/L (ref 22–29)
HCT VFR BLD AUTO: 30.6 % (ref 35–47)
HGB BLD-MCNC: 10.4 G/DL (ref 11.7–15.7)
INR PPP: 2.01 (ref 0.85–1.15)
MAGNESIUM SERPL-MCNC: 1.4 MG/DL (ref 1.7–2.3)
MAGNESIUM SERPL-MCNC: 2 MG/DL (ref 1.7–2.3)
MCH RBC QN AUTO: 32.4 PG (ref 26.5–33)
MCHC RBC AUTO-ENTMCNC: 34 G/DL (ref 31.5–36.5)
MCV RBC AUTO: 95 FL (ref 78–100)
PLATELET # BLD AUTO: 55 10E3/UL (ref 150–450)
POTASSIUM SERPL-SCNC: 3.2 MMOL/L (ref 3.4–5.3)
POTASSIUM SERPL-SCNC: 3.6 MMOL/L (ref 3.4–5.3)
POTASSIUM SERPL-SCNC: 3.6 MMOL/L (ref 3.4–5.3)
PROT SERPL-MCNC: 5.8 G/DL (ref 6.4–8.3)
PROTHROMBIN TIME: 22.9 SECONDS (ref 11.8–14.8)
RBC # BLD AUTO: 3.21 10E6/UL (ref 3.8–5.2)
SODIUM SERPL-SCNC: 131 MMOL/L (ref 135–145)
SODIUM SERPL-SCNC: 134 MMOL/L (ref 135–145)
WBC # BLD AUTO: 3.8 10E3/UL (ref 4–11)

## 2025-06-11 PROCEDURE — 80048 BASIC METABOLIC PNL TOTAL CA: CPT

## 2025-06-11 PROCEDURE — 250N000011 HC RX IP 250 OP 636: Mod: JZ

## 2025-06-11 PROCEDURE — 250N000011 HC RX IP 250 OP 636: Performed by: STUDENT IN AN ORGANIZED HEALTH CARE EDUCATION/TRAINING PROGRAM

## 2025-06-11 PROCEDURE — 36415 COLL VENOUS BLD VENIPUNCTURE: CPT

## 2025-06-11 PROCEDURE — 250N000011 HC RX IP 250 OP 636

## 2025-06-11 PROCEDURE — 84132 ASSAY OF SERUM POTASSIUM: CPT | Performed by: STUDENT IN AN ORGANIZED HEALTH CARE EDUCATION/TRAINING PROGRAM

## 2025-06-11 PROCEDURE — 85610 PROTHROMBIN TIME: CPT | Performed by: STUDENT IN AN ORGANIZED HEALTH CARE EDUCATION/TRAINING PROGRAM

## 2025-06-11 PROCEDURE — 85018 HEMOGLOBIN: CPT | Performed by: STUDENT IN AN ORGANIZED HEALTH CARE EDUCATION/TRAINING PROGRAM

## 2025-06-11 PROCEDURE — 82248 BILIRUBIN DIRECT: CPT

## 2025-06-11 PROCEDURE — 120N000002 HC R&B MED SURG/OB UMMC

## 2025-06-11 PROCEDURE — 250N000013 HC RX MED GY IP 250 OP 250 PS 637: Performed by: STUDENT IN AN ORGANIZED HEALTH CARE EDUCATION/TRAINING PROGRAM

## 2025-06-11 PROCEDURE — 83735 ASSAY OF MAGNESIUM: CPT | Performed by: STUDENT IN AN ORGANIZED HEALTH CARE EDUCATION/TRAINING PROGRAM

## 2025-06-11 PROCEDURE — 82610 CYSTATIN C: CPT | Performed by: STUDENT IN AN ORGANIZED HEALTH CARE EDUCATION/TRAINING PROGRAM

## 2025-06-11 PROCEDURE — 250N000013 HC RX MED GY IP 250 OP 250 PS 637

## 2025-06-11 PROCEDURE — 99232 SBSQ HOSP IP/OBS MODERATE 35: CPT | Mod: GC | Performed by: STUDENT IN AN ORGANIZED HEALTH CARE EDUCATION/TRAINING PROGRAM

## 2025-06-11 RX ORDER — MAGNESIUM SULFATE HEPTAHYDRATE 40 MG/ML
4 INJECTION, SOLUTION INTRAVENOUS ONCE
Status: COMPLETED | OUTPATIENT
Start: 2025-06-11 | End: 2025-06-11

## 2025-06-11 RX ORDER — POTASSIUM CHLORIDE 750 MG/1
40 TABLET, EXTENDED RELEASE ORAL ONCE
Status: COMPLETED | OUTPATIENT
Start: 2025-06-11 | End: 2025-06-11

## 2025-06-11 RX ORDER — CHLOROTHIAZIDE SODIUM 500 MG/1
500 INJECTION INTRAVENOUS ONCE
Status: COMPLETED | OUTPATIENT
Start: 2025-06-11 | End: 2025-06-11

## 2025-06-11 RX ADMIN — LACTULOSE 10 G: 20 SOLUTION ORAL at 09:31

## 2025-06-11 RX ADMIN — RIFAXIMIN 550 MG: 550 TABLET ORAL at 19:45

## 2025-06-11 RX ADMIN — CITALOPRAM HYDROBROMIDE 20 MG: 20 TABLET ORAL at 09:32

## 2025-06-11 RX ADMIN — GABAPENTIN 300 MG: 300 CAPSULE ORAL at 09:31

## 2025-06-11 RX ADMIN — LEVOTHYROXINE SODIUM 112 MCG: 0.11 TABLET ORAL at 09:32

## 2025-06-11 RX ADMIN — BUMETANIDE 2 MG: 0.25 INJECTION INTRAMUSCULAR; INTRAVENOUS at 19:45

## 2025-06-11 RX ADMIN — MAGNESIUM SULFATE HEPTAHYDRATE 4 G: 40 INJECTION, SOLUTION INTRAVENOUS at 16:16

## 2025-06-11 RX ADMIN — CHLOROTHIAZIDE SODIUM 500 MG: 500 INJECTION, POWDER, LYOPHILIZED, FOR SOLUTION INTRAVENOUS at 21:31

## 2025-06-11 RX ADMIN — BUMETANIDE 2 MG: 0.25 INJECTION INTRAMUSCULAR; INTRAVENOUS at 16:13

## 2025-06-11 RX ADMIN — TRAZODONE HYDROCHLORIDE 100 MG: 100 TABLET ORAL at 19:45

## 2025-06-11 RX ADMIN — RIFAXIMIN 550 MG: 550 TABLET ORAL at 09:31

## 2025-06-11 RX ADMIN — GABAPENTIN 600 MG: 300 CAPSULE ORAL at 19:45

## 2025-06-11 RX ADMIN — BUMETANIDE 2 MG: 0.25 INJECTION INTRAMUSCULAR; INTRAVENOUS at 09:32

## 2025-06-11 RX ADMIN — POTASSIUM CHLORIDE 40 MEQ: 750 TABLET, EXTENDED RELEASE ORAL at 15:24

## 2025-06-11 ASSESSMENT — ACTIVITIES OF DAILY LIVING (ADL)
ADLS_ACUITY_SCORE: 38

## 2025-06-11 NOTE — TELEPHONE ENCOUNTER
Central Prior Authorization Team - Phone: [phone]     [facility] appeal already in process- please refer to original TE dated 6/6/25 for appeal tracking.  Thank you.

## 2025-06-11 NOTE — PROGRESS NOTES
"CLINICAL NUTRITION SERVICES - REASSESSMENT NOTE    Transplant team requested RD visit patient (per pt request)     RECOMMENDATIONS FOR MDs/PROVIDERS TO ORDER:  Recommend patient aim for ~110 grams protein daily.     Consider adding on 2 gram sodium restriction using \"combination diet\" function in EPIC.     Registered Dietitian Interventions:  Provided protein content and sodium content of menu items.     Provided education on protein sources/needs, tips to increase protein intake.     Reviewed importance of sodium restriction.     Add Ensure Max Protein BID (with breakfast, HS snack time).  Provided coupons for supplements for outpatient.        INFORMATION OBTAINED  Assessed patient in room.    CURRENT NUTRITION ORDERS  Diet: High Kcal/High Protein + 2500 ml fluid restriction    CURRENT INTAKE/TOLERANCE  Pt reports mainly wanting to eat fruits/vegetables to eat a healthier diet.  Will eat some meat/fish, but not much.  Was using [employer] protein drinks but stopped d/t the cost.     Eating well, 100% of 3 meals/day.  Average protein intake over the last 3 days from food as been ~70 grams daily (68% needs).       NEW FINDINGS  GI symptoms: Reviewed    Skin/wounds: Reviewed    Nutrition-relevant labs: Na 134 (low), Albumin 2.2 (not true measurement of nutritional status, influenced by fluid status/inflammation)  Nutrition-relevant medications: Bumex TID, Aldactone, Lactulose  Weight: 152 kg, which is above admission weight of 149 kg.  Of note, had ~30# weight gain in a few weeks - likely this is fluid related and dry weight is much lower.      ASSESSED NUTRITION NEEDS  Estimated Energy Needs: 2465-2136 kcals/day (20 - 25 kcals/kg)  Justification: Maintenance and Obese  Estimated Protein Needs: 109-137 grams protein/day (1.2-1.5 grams of pro/kg)+  Justification: Maintenance  Estimated Fluid Needs: per MD    MALNUTRITION  % Intake: No decreased intake noted  % Weight Loss: None noted  Subcutaneous Fat Loss: None " observed  Muscle Loss: Difficult to assess d/t fluid accumulation  Fluid Accumulation/Edema: Moderate to severe, 2-4+  Malnutrition Diagnosis: Patient does not meet two of the established criteria necessary for diagnosing malnutrition  Malnutrition Present on Admission: No    EVALUATION OF THE PROGRESS TOWARD GOALS   Previous Goals  Patient to consume % of nutritionally adequate meal trays TID, or the equivalent with supplements/snacks.   Evaluation: Progressing - not met for protein    Previous Nutrition Diagnosis  None    NUTRITION DIAGNOSIS  Inadequate protein intake related to food preferences as evidenced by patient getting ~68% protein needs met daily in 3 meals/day.     INTERVENTIONS  Medical food supplement therapy  Nutrition education content    GOALS  Patient to consume % of nutritionally adequate meal trays TID, or the equivalent with supplements/snacks.     MONITORING/EVALUATION  Progress toward goals will be monitored and evaluated per policy.    Gala Patel, MS, RD, LD, CCTD, [facility]  7A + 7B (beds 7640-8374)  Available on Vocera [7A or 7B Clinical Dietitian]   Weekend/Holiday: Vocera [Weekend Clinical Dietitian]

## 2025-06-11 NOTE — PLAN OF CARE
"Goal Outcome Evaluation:      Plan of Care Reviewed With: patient    Overall Patient Progress: no change      Shift: 9685-4635    Blood pressure 138/49, pulse 70, temperature 97.9  F (36.6  C), temperature source Oral, resp. rate 18, height 1.803 m (5' 11\"), weight (!) 152 kg (335 lb), SpO2 95%, not currently breastfeeding.      Reason for admission: Admitted on 6/7 for dyspnea, fluid retention, weight gain (30 lb), SOB. Hx: MASH cirrhosis, chronic left-sided pleural effusion, and psoriatic arthritis, HLD, hypothyroidism, NEREYDA, prior PE, idiopathic thrombocytopenia    Iso: n/a  Pain: 5/10, tolerable. Pain located in back for chronic back pain.    Neuro: Alert and oriented x 4              Cardiac: WDL        Respiratory: WDL, on RA   Labs: K+ and mag e- protocol. Awaiting AM lab redraw  GI: 1 BM during shift, BS+   : Uses primafit, AUOP  Diet: Combination R diet   Activity: SBA with cane  Lines: R PIV SL   Wounds/Incisions/ Drains: Bruising on upper extremities. Lymph wraps on BLE.      Shift Updates: Possible discharge tomorrow. POC ongoing    "

## 2025-06-11 NOTE — PROGRESS NOTES
Liver transplant progress note    Agreed with continued diuresis and physical therapy  Insurance denied sleeve gastrectomy  Ok for continued listing    Transplant surgery will continue to follow    Seen with Dr. Anna Castro MD  Transplant Surgery Fellow

## 2025-06-11 NOTE — PROGRESS NOTES
[facility]    Medicine Progress Note - Maroon 4     Assessment & Plan     60 yo female w/ MASH cirrhosis, chronic left-sided pleural effusion, psoriatic arthritis admitted for dyspnea and anasarca improving with IV diuresis.     Updates:   - Continue Bumex 2mg TID; goal negative -2L today     #dyspnea, improved  #pleural effusion, left-sided, chronic of unclear etiology  Follows pulm, pleural effusion appeared between Dec 2024 and Feb 2025. 3/6/25 pleural fluid studies have been consistent with exudate; however pleural protein levels relatively low. No apparent malignant process with negative cytology. PFTs with mild restriction. No signs of infection. CT/PE negative for PE w/ large left pleural effusion and ascites. . Trops neg, low c/f ACS. Clinically non-toxic appearing. RVP negative. Admitted on RA. Improving with diuresis. 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 Patient reports her EDW may be around 330lbs.   - Bumex 2g TID goal net negative -2L   - Q12H BMP    #anasarca LE > UE  #chronic LE edema  #Cramping, resolved   Recently decreased spironolactone dosage 2/2 hand cramping. TTE 12/27/24 WNL. Pt reports 30 lb weight gain in a few weeks. TSH mildly elevated at 4.89, less likely to be etiology. Repeat TTE with normal EF. Suspect all 2/2 liver failure. UA withut proteinuria.   - Nutrition consulted given poor albumin and anasarca c/f malnutrition  - IV diuresis:   - Hold PTA PO Bumex 1 mg TID   - Hold PTA spironolactone 50 mg  - Strict I/O  - Purewick given difficulty w/ mobility and increased urination w/ IV diuresis  - Would benefit from taurine supplement as an outpatient; cannot get inpatient   - Unable to do paracentesis due limited windown     #cirrhosis 2/2 MASH c/b ascites  #hyperbilirubinemia (around baseline)  #thrombocytopenia (around baseline,  70)  Follows hepatology, on liver transplant list since April 2025. Immunosuppressed and not a candidate for vaccines. MELD 3.0: 23, MELD-NA: 21.  - Cramping: HOLD PTA magnesium, zinc, vit E supplements  - Constipation: PTA lactulose 15 mL every day  - Tremors: PTA rifaximin 550 mg BID  - Hepatology consulted     #psoriatic arthritis  #psoriasis  Was on methotrexate for 20+ years, d/c'd d/t cirrhosis. On adalimumab in 1/2020 (ineffective), risankizumab 11/2020  - HOLD PTA ixekizumab (IL-17 monoclonal antibody) qMonthly     #obesity  #deconditioning  - PT consulted     #UE pain  #back and neck pain, chronic  #fibromyalgia  - Tylenol with 2 gram limit     #hx of pulmonary embolism (2002)  On warfarin briefly, not currently on DOAC. Was on birth control at that time     #acquired hypothyroidism  Clinically euthyroid per PCP. TSH WNL  - PTA levothyroxine 112 mcg qD     #MDD  #insomnia  - PTA citalopram 20 mg every day  - PTA insomnia 100 mg at bedtime     #NEREYDA, moderate  Has seen sleep medicine. Recs APAP 5-15 cm H2O. Does not use CPAP, it gives her anxiety  - Placed CPAP order     #enlarged mediastinal and 1.5 cm subcarinal lymph nodes  EUS 8/9/24 w/ FNA: polymorphous lymphocytes         Diet: Fluid restriction 2000 ML FLUID  High Kcal/High Protein Diet, ADULT    DVT Prophylaxis:  holding d/t low platelets  Rodriguez Catheter: Not present  Lines: None     Cardiac Monitoring: None  Code Status: Full Code      Clinically Significant Risk Factors        # Hypokalemia: Lowest K = 3.2 mmol/L in last 2 days, will replace as needed  # Hyponatremia: Lowest Na = 133 mmol/L in last 2 days, will monitor as appropriate     # Hypomagnesemia: Lowest Mg = 1.4 mg/dL in last 2 days, will replace as needed   # Hypoalbuminemia: Lowest albumin = 2.1 g/dL at 6/9/2025  8:21 AM, will monitor as appropriate    # Coagulation Defect: INR = 2.01 (Ref range: 0.85 - 1.15) and/or PTT = 42 Seconds (Ref range: 22 - 38 Seconds), will monitor for  "bleeding  # Thrombocytopenia: Lowest platelets = 55 in last 2 days, will monitor for bleeding    # Acute heart failure with preserved ejection fraction: heart failure noted on problem list, last echo with EF >50%, and receiving IV diuretics           # Morbid Obesity: Estimated body mass index is 46.72 kg/m  as calculated from the following:    Height as of this encounter: 1.803 m (5' 11\").    Weight as of this encounter: 152 kg (335 lb)., PRESENT ON ADMISSION            Social Drivers of Health   Food Insecurity: High Risk (3/26/2025)    Food Insecurity     Within the past 12 months, did you worry that your food would run out before you got money to buy more?: Yes     Within the past 12 months, did the food you bought just not last and you didn t have money to get more?: Yes         Disposition Plan      Expected Discharge Date: 06/11/2025                The patient's care was discussed with the Attending Physician, Dr. Hylton.      Deja Coulter MD    ______________________________________________________________________      Interval History   No acute events overnight. Doing well. Reports leg swelling improved. Having significant UOP with diuretics.       Physical Exam   Vital Signs: Temp: 97.6  F (36.4  C) Temp src: Oral BP: 99/80 Pulse: 70   Resp: 18 SpO2: 98 % O2 Device: None (Room air)    Weight: 335 lbs 0 oz    General Appearance: Resting comfortably in bed, lying flat  Respiratory: Lungs clear to asculation bilaterally with decreased breath sounds in the L base   Cardiovascular: RRR, Normal S1/S2   GI: Abdomen soft, non-tender  Skin: +2 pitting edema in bilateral legs up to the mid leg (improvement compared to yesterday)     Medical Decision Making       Please see A&P for additional details of medical decision making.      Data       I have personally reviewed the following data over the past 24 hrs:    3.8 (L)  \   10.4 (L)   / 55 (L)     134 (L) 98 12.8 /  99   3.2 (L) 29 0.93 \     ALT: 28 AST: 63 " (H) AP: 64 TBILI: 3.9 (H)   ALB: 2.2 (L) TOT PROTEIN: 5.8 (L) LIPASE: N/A     INR:  2.01 (H) PTT:  N/A   D-dimer:  N/A Fibrinogen:  N/A       Imaging results reviewed over the past 24 hrs:   No results found for this or any previous visit (from the past 24 hours).

## 2025-06-11 NOTE — PLAN OF CARE
Goal Outcome Evaluation:      Plan of Care Reviewed With: patient    Overall Patient Progress: no changeOverall Patient Progress: no change     [id number]  Afebrile. VSS on RA. Denies shortness of breath or chest pain. +3 BLE edema. +2 BUE edema. Lymph wraps in place. Voiding via purewick. X2 BMs during shift. 2.5 L FR w/ ongoing duresis. Pt aware of plan. Plan on K+ and Mag replacements this afternoon. Continue POC

## 2025-06-11 NOTE — TELEPHONE ENCOUNTER
Central Prior Authorization Team - Phone: [phone]     Medication Appeal Initiation    Medication: XIFAXAN 550 MG PO TABS  Appeal Start Date:  6/11/2025  Insurance Company: [employer]  Insurance Phone: [phone]  Insurance Fax: [phone]  Comments:   LMN SENT VIA FAX

## 2025-06-12 ENCOUNTER — TELEPHONE (OUTPATIENT)
Dept: OBGYN | Facility: CLINIC | Age: 60
End: 2025-06-12
Payer: COMMERCIAL

## 2025-06-12 ENCOUNTER — APPOINTMENT (OUTPATIENT)
Dept: PHYSICAL THERAPY | Facility: CLINIC | Age: 60
End: 2025-06-12
Payer: COMMERCIAL

## 2025-06-12 ENCOUNTER — PATIENT OUTREACH (OUTPATIENT)
Dept: OBGYN | Facility: CLINIC | Age: 60
End: 2025-06-12
Payer: COMMERCIAL

## 2025-06-12 VITALS
WEIGHT: 293 LBS | RESPIRATION RATE: 18 BRPM | TEMPERATURE: 99 F | SYSTOLIC BLOOD PRESSURE: 133 MMHG | OXYGEN SATURATION: 91 % | HEIGHT: 71 IN | BODY MASS INDEX: 41.02 KG/M2 | HEART RATE: 74 BPM | DIASTOLIC BLOOD PRESSURE: 45 MMHG

## 2025-06-12 PROBLEM — R87.610 ATYPICAL SQUAMOUS CELLS OF UNDETERMINED SIGNIFICANCE ON CYTOLOGIC SMEAR OF CERVIX (ASC-US): Status: ACTIVE | Noted: 2022-12-01

## 2025-06-12 LAB
ALBUMIN SERPL BCG-MCNC: 2.2 G/DL (ref 3.5–5.2)
ALP SERPL-CCNC: 67 U/L (ref 40–150)
ALT SERPL W P-5'-P-CCNC: 28 U/L (ref 0–50)
ANION GAP SERPL CALCULATED.3IONS-SCNC: 5 MMOL/L (ref 7–15)
ANION GAP SERPL CALCULATED.3IONS-SCNC: 7 MMOL/L (ref 7–15)
AST SERPL W P-5'-P-CCNC: 60 U/L (ref 0–45)
ATRIAL RATE - MUSE: 70 BPM
BACTERIA PLR CULT: NORMAL
BACTERIA PLR CULT: NORMAL
BILIRUB SERPL-MCNC: 3.6 MG/DL
BILIRUBIN DIRECT (ROCHE PRO & PURE): 1.9 MG/DL (ref 0–0.45)
BUN SERPL-MCNC: 16.2 MG/DL (ref 8–23)
BUN SERPL-MCNC: 18.7 MG/DL (ref 8–23)
CALCIUM SERPL-MCNC: 7.9 MG/DL (ref 8.8–10.4)
CALCIUM SERPL-MCNC: 8.1 MG/DL (ref 8.8–10.4)
CHLORIDE SERPL-SCNC: 97 MMOL/L (ref 98–107)
CHLORIDE SERPL-SCNC: 97 MMOL/L (ref 98–107)
CREAT SERPL-MCNC: 1.01 MG/DL (ref 0.51–0.95)
CREAT SERPL-MCNC: 1.02 MG/DL (ref 0.51–0.95)
DIASTOLIC BLOOD PRESSURE - MUSE: NORMAL MMHG
EGFRCR SERPLBLD CKD-EPI 2021: 63 ML/MIN/1.73M2
EGFRCR SERPLBLD CKD-EPI 2021: 63 ML/MIN/1.73M2
GLUCOSE SERPL-MCNC: 102 MG/DL (ref 70–99)
GLUCOSE SERPL-MCNC: 119 MG/DL (ref 70–99)
GRAM STAIN RESULT: NORMAL
GRAM STAIN RESULT: NORMAL
HCO3 SERPL-SCNC: 27 MMOL/L (ref 22–29)
HCO3 SERPL-SCNC: 28 MMOL/L (ref 22–29)
INR PPP: 2.13 (ref 0.85–1.15)
INTERPRETATION ECG - MUSE: NORMAL
P AXIS - MUSE: 9 DEGREES
POTASSIUM SERPL-SCNC: 3.4 MMOL/L (ref 3.4–5.3)
POTASSIUM SERPL-SCNC: 3.7 MMOL/L (ref 3.4–5.3)
POTASSIUM SERPL-SCNC: 3.9 MMOL/L (ref 3.4–5.3)
PR INTERVAL - MUSE: 118 MS
PROT SERPL-MCNC: 5.8 G/DL (ref 6.4–8.3)
PROTHROMBIN TIME: 24 SECONDS (ref 11.8–14.8)
QRS DURATION - MUSE: 96 MS
QT - MUSE: 452 MS
QTC - MUSE: 488 MS
R AXIS - MUSE: -5 DEGREES
SODIUM SERPL-SCNC: 130 MMOL/L (ref 135–145)
SODIUM SERPL-SCNC: 131 MMOL/L (ref 135–145)
SYSTOLIC BLOOD PRESSURE - MUSE: NORMAL MMHG
T AXIS - MUSE: 13 DEGREES
VENTRICULAR RATE- MUSE: 70 BPM

## 2025-06-12 PROCEDURE — 99233 SBSQ HOSP IP/OBS HIGH 50: CPT | Mod: GC | Performed by: INTERNAL MEDICINE

## 2025-06-12 PROCEDURE — 80048 BASIC METABOLIC PNL TOTAL CA: CPT

## 2025-06-12 PROCEDURE — 85610 PROTHROMBIN TIME: CPT | Performed by: STUDENT IN AN ORGANIZED HEALTH CARE EDUCATION/TRAINING PROGRAM

## 2025-06-12 PROCEDURE — 84132 ASSAY OF SERUM POTASSIUM: CPT | Performed by: STUDENT IN AN ORGANIZED HEALTH CARE EDUCATION/TRAINING PROGRAM

## 2025-06-12 PROCEDURE — 99233 SBSQ HOSP IP/OBS HIGH 50: CPT | Mod: GC | Performed by: STUDENT IN AN ORGANIZED HEALTH CARE EDUCATION/TRAINING PROGRAM

## 2025-06-12 PROCEDURE — 97110 THERAPEUTIC EXERCISES: CPT | Mod: GP

## 2025-06-12 PROCEDURE — 250N000011 HC RX IP 250 OP 636: Mod: JZ

## 2025-06-12 PROCEDURE — 36415 COLL VENOUS BLD VENIPUNCTURE: CPT | Performed by: STUDENT IN AN ORGANIZED HEALTH CARE EDUCATION/TRAINING PROGRAM

## 2025-06-12 PROCEDURE — 97140 MANUAL THERAPY 1/> REGIONS: CPT | Mod: GP | Performed by: PHYSICAL THERAPIST

## 2025-06-12 PROCEDURE — 36415 COLL VENOUS BLD VENIPUNCTURE: CPT

## 2025-06-12 PROCEDURE — 250N000013 HC RX MED GY IP 250 OP 250 PS 637

## 2025-06-12 PROCEDURE — 250N000013 HC RX MED GY IP 250 OP 250 PS 637: Performed by: STUDENT IN AN ORGANIZED HEALTH CARE EDUCATION/TRAINING PROGRAM

## 2025-06-12 PROCEDURE — 120N000002 HC R&B MED SURG/OB UMMC

## 2025-06-12 PROCEDURE — 82248 BILIRUBIN DIRECT: CPT | Performed by: INTERNAL MEDICINE

## 2025-06-12 RX ORDER — POTASSIUM CHLORIDE 750 MG/1
40 TABLET, EXTENDED RELEASE ORAL ONCE
Status: COMPLETED | OUTPATIENT
Start: 2025-06-12 | End: 2025-06-12

## 2025-06-12 RX ORDER — MAGNESIUM OXIDE 400 MG/1
400 TABLET ORAL EVERY 4 HOURS
Status: COMPLETED | OUTPATIENT
Start: 2025-06-12 | End: 2025-06-12

## 2025-06-12 RX ADMIN — LACTULOSE 10 G: 20 SOLUTION ORAL at 09:07

## 2025-06-12 RX ADMIN — GABAPENTIN 600 MG: 300 CAPSULE ORAL at 19:47

## 2025-06-12 RX ADMIN — BUMETANIDE 2 MG: 0.25 INJECTION INTRAMUSCULAR; INTRAVENOUS at 14:16

## 2025-06-12 RX ADMIN — MAGNESIUM OXIDE TAB 400 MG (241.3 MG ELEMENTAL MG) 400 MG: 400 (241.3 MG) TAB at 12:27

## 2025-06-12 RX ADMIN — RIFAXIMIN 550 MG: 550 TABLET ORAL at 09:07

## 2025-06-12 RX ADMIN — MAGNESIUM OXIDE TAB 400 MG (241.3 MG ELEMENTAL MG) 400 MG: 400 (241.3 MG) TAB at 09:08

## 2025-06-12 RX ADMIN — GABAPENTIN 300 MG: 300 CAPSULE ORAL at 09:07

## 2025-06-12 RX ADMIN — BUMETANIDE 2 MG: 0.25 INJECTION INTRAMUSCULAR; INTRAVENOUS at 09:07

## 2025-06-12 RX ADMIN — CITALOPRAM HYDROBROMIDE 20 MG: 20 TABLET ORAL at 09:07

## 2025-06-12 RX ADMIN — POTASSIUM CHLORIDE 40 MEQ: 750 TABLET, EXTENDED RELEASE ORAL at 09:07

## 2025-06-12 RX ADMIN — RIFAXIMIN 550 MG: 550 TABLET ORAL at 19:47

## 2025-06-12 RX ADMIN — LEVOTHYROXINE SODIUM 112 MCG: 0.11 TABLET ORAL at 09:07

## 2025-06-12 RX ADMIN — BUMETANIDE 2 MG: 0.25 INJECTION INTRAMUSCULAR; INTRAVENOUS at 19:47

## 2025-06-12 RX ADMIN — TRAZODONE HYDROCHLORIDE 100 MG: 100 TABLET ORAL at 19:47

## 2025-06-12 ASSESSMENT — ACTIVITIES OF DAILY LIVING (ADL)
ADLS_ACUITY_SCORE: 38
ADLS_ACUITY_SCORE: 48
ADLS_ACUITY_SCORE: 38
ADLS_ACUITY_SCORE: 48
ADLS_ACUITY_SCORE: 38
ADLS_ACUITY_SCORE: 38
ADLS_ACUITY_SCORE: 48
ADLS_ACUITY_SCORE: 38
ADLS_ACUITY_SCORE: 48
ADLS_ACUITY_SCORE: 38
ADLS_ACUITY_SCORE: 48
ADLS_ACUITY_SCORE: 48
ADLS_ACUITY_SCORE: 38
ADLS_ACUITY_SCORE: 48
ADLS_ACUITY_SCORE: 48
ADLS_ACUITY_SCORE: 38
ADLS_ACUITY_SCORE: 38
ADLS_ACUITY_SCORE: 48
ADLS_ACUITY_SCORE: 38

## 2025-06-12 NOTE — PROGRESS NOTES
[facility]    Medicine Progress Note - Maroon 4     Assessment & Plan     60 yo female w/ MASH cirrhosis, chronic left-sided pleural effusion, psoriatic arthritis admitted for dyspnea and anasarca improving with IV diuresis.     Updates:   - Continue Bumex 2mg TID; goal negative -2L today    > received diuril x1 yesterday with improvement, weight downtrending   - Anticipate 2-3 more days of diuresis until at dry weight then will transition to PO diuretics     #dyspnea, improved  #pleural effusion, left-sided, chronic of unclear etiology  Follows pulm, pleural effusion appeared between Dec 2024 and Feb 2025. 3/6/25 pleural fluid studies have been consistent with exudate; however pleural protein levels relatively low. No apparent malignant process with negative cytology. PFTs with mild restriction. No signs of infection. CT/PE negative for PE w/ large left pleural effusion and ascites. . Trops neg, low c/f ACS. Clinically non-toxic appearing. RVP negative. Admitted on RA. Improving with diuresis. 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 Patient reports her EDW may be around 330lbs.   - Bumex 2g TID goal net negative -2L   > Give additional Diuril PRN   - Q12H BMP    #anasarca LE > UE  #chronic LE edema  #Cramping, resolved   Recently decreased spironolactone dosage 2/2 hand cramping. TTE 12/27/24 WNL. Pt reports 30 lb weight gain in a few weeks. TSH mildly elevated at 4.89, less likely to be etiology. Repeat TTE with normal EF. Suspect all 2/2 liver failure. UA withut proteinuria.   - Nutrition consulted given poor albumin and anasarca c/f malnutrition  - IV diuresis:   - Hold PTA PO Bumex 1 mg TID   - Hold PTA spironolactone 50 mg  - Strict I/O  - Purewick given difficulty w/ mobility and increased urination w/ IV diuresis  - Would benefit from taurine supplement as an  outpatient; cannot get inpatient   - Unable to do paracentesis due limited windown     #cirrhosis 2/2 MASH c/b ascites  #hyperbilirubinemia (around baseline)  #thrombocytopenia (around baseline, 70)  Follows hepatology, on liver transplant list since April 2025. Immunosuppressed and not a candidate for vaccines. MELD 3.0: 23, MELD-NA: 21.  - Cramping: HOLD PTA magnesium, zinc, vit E supplements  - Constipation: PTA lactulose 15 mL every day  - Tremors: PTA rifaximin 550 mg BID  - Hepatology consulted     #psoriatic arthritis  #psoriasis  Was on methotrexate for 20+ years, d/c'd d/t cirrhosis. On adalimumab in 1/2020 (ineffective), risankizumab 11/2020  - HOLD PTA ixekizumab (IL-17 monoclonal antibody) qMonthly     #obesity  #deconditioning  - PT consulted     #UE pain  #back and neck pain, chronic  #fibromyalgia  - Tylenol with 2 gram limit     #hx of pulmonary embolism (2002)  On warfarin briefly, not currently on DOAC. Was on birth control at that time     #acquired hypothyroidism  Clinically euthyroid per PCP. TSH WNL  - PTA levothyroxine 112 mcg qD     #MDD  #insomnia  - PTA citalopram 20 mg every day  - PTA insomnia 100 mg at bedtime     #NEREYDA, moderate  Has seen sleep medicine. Recs APAP 5-15 cm H2O. Does not use CPAP, it gives her anxiety  - Placed CPAP order     #enlarged mediastinal and 1.5 cm subcarinal lymph nodes  EUS 8/9/24 w/ FNA: polymorphous lymphocytes         Diet: Fluid restriction OTHER (SEE COMMENTS) (2500 mL)  Snacks/Supplements Adult: Ensure Max Protein (bariatric); With Meals  2 Gram Sodium Diet    DVT Prophylaxis:  holding d/t low platelets  Rodriguez Catheter: Not present  Lines: None     Cardiac Monitoring: None  Code Status: Full Code      Clinically Significant Risk Factors        # Hypokalemia: Lowest K = 3.2 mmol/L in last 2 days, will replace as needed  # Hyponatremia: Lowest Na = 130 mmol/L in last 2 days, will monitor as appropriate  # Hypochloremia: Lowest Cl = 97 mmol/L in last 2 days,  "will monitor as appropriate    # Hypomagnesemia: Lowest Mg = 1.4 mg/dL in last 2 days, will replace as needed   # Hypoalbuminemia: Lowest albumin = 2.1 g/dL at 6/9/2025  8:21 AM, will monitor as appropriate    # Coagulation Defect: INR = 2.13 (Ref range: 0.85 - 1.15) and/or PTT = 42 Seconds (Ref range: 22 - 38 Seconds), will monitor for bleeding  # Thrombocytopenia: Lowest platelets = 55 in last 2 days, will monitor for bleeding    # Acute heart failure with preserved ejection fraction: heart failure noted on problem list, last echo with EF >50%, and receiving IV diuretics           # Morbid Obesity: Estimated body mass index is 46.44 kg/m  as calculated from the following:    Height as of this encounter: 1.803 m (5' 11\").    Weight as of this encounter: 151 kg (333 lb).             Social Drivers of Health   Food Insecurity: High Risk (3/26/2025)    Food Insecurity     Within the past 12 months, did you worry that your food would run out before you got money to buy more?: Yes     Within the past 12 months, did the food you bought just not last and you didn t have money to get more?: Yes         Disposition Plan      Expected Discharge Date: 06/15/2025                The patient's care was discussed with the Attending Physician, Dr. Hylton.      Deja Coulter MD    ______________________________________________________________________      Interval History   No acute events overnight. Doing well. Reports leg swelling improved - states the leg wraps are itchy. Having significant UOP with diuretics - required diuril overnight.       Physical Exam   Vital Signs: Temp: 98.1  F (36.7  C) Temp src: Oral BP: 128/45 Pulse: 69   Resp: 18 SpO2: 95 % O2 Device: None (Room air)    Weight: 333 lbs 0 oz    General Appearance: Resting comfortably in bed, lying flat  Respiratory: Lungs clear to asculation bilaterally with decreased breath sounds in the L base   Cardiovascular: RRR, Normal S1/S2   GI: Abdomen soft, " non-tender  Skin: +2 pitting edema in bilateral legs up to the mid leg (similar compared to yesterday)     Medical Decision Making       Please see A&P for additional details of medical decision making.      Data       I have personally reviewed the following data over the past 24 hrs:    N/A  \   N/A   / N/A     130 (L) 97 (L) 16.2 /  102 (H)   3.4 28 1.02 (H) \     ALT: 28 AST: 60 (H) AP: 67 TBILI: 3.6 (H)   ALB: 2.2 (L) TOT PROTEIN: 5.8 (L) LIPASE: N/A     INR:  2.13 (H) PTT:  N/A   D-dimer:  N/A Fibrinogen:  N/A       Imaging results reviewed over the past 24 hrs:   No results found for this or any previous visit (from the past 24 hours).

## 2025-06-12 NOTE — PROGRESS NOTES
Hepatology Inpatient Progress Note:          Assessment and Plan:   Karlene Yun is a 59 year old female with a history of decompensated cirrhosis due to metabolic dysfunction associated steatohepatitis complicated by ascites who is listed for liver transplant and presented to the hospital for management of fluid overload. MELD 26. ABO A+.      Decompensated cirrhosis (MELD 26)   Etiology: mash, follows with Dr. Ro   EV: no prior EGD, had EUS - EGD due 8/2025   Ascites: present, no prior para - home diuretics: Bumex 1 mg BID, spironolactone 100 mg daily   HE: A&O x4, on lactulose and rifaximin   HCC: Due, none on last U/S 11/2024  Transplant candidacy: listed     Anasarca   Patient is significantly volume overloaded on clinical examination. She is noted to have ascites on imaging at the time of admission, never has had a paracentesis before. She has pleural effusions, largest on the left with prior thoracentesis (exudative). Echocardiogram 6/8 showing normal EF and no valvular or right heart abnormalities (does have hx cardiomegaly).     L pleural effusion   Thoracentesis 6/9 labs suggesting transudative effusion. Cytology negative for malignancy.      Recommendations:   - Encouraged ambulation three times daily   - Bumex IV for goal 1-2 L net negative fluid balance  - Rifaximin and lactulose for goal 1-2 BM a day  - Hold PTA spironolactone   - 2g low sodium diet, high protein supplements TID and at bedtime  - Daily MELD labs   - Hepatology will continue to follow     Patient discussed with attending physician, Dr. Ramos.      Sergio Carlisle MD  Gastroenterology Fellow        Interval events:    - Patient is net negative 1.3L   - Seen by transplant surgery team yesterday   - She feels well today, has been walking around the room   - Denies any abdominal pain, N/V         Medications:     Current Facility-Administered Medications   Medication Dose Route Frequency Provider Last Rate Last Admin     benzocaine-menthol (CHLORASEPTIC MAX) 15-10 MG lozenge 1 lozenge  1 lozenge Buccal Q1H PRN Antoinette Heath MD        bumetanide (BUMEX) injection 2 mg  2 mg Intravenous TID Deja Coulter MD   2 mg at 06/11/25 1945    [Held by provider] bumetanide (BUMEX) tablet 1 mg  1 mg Oral Daily Antoinette Heath MD        calcium carbonate (TUMS) chewable tablet 1,000 mg  1,000 mg Oral 4x Daily PRN Antoinette Heath MD        citalopram (celeXA) tablet 20 mg  20 mg Oral Daily Antoinette Heath MD   20 mg at 06/11/25 0932    gabapentin (NEURONTIN) capsule 300 mg  300 mg Oral QAM Antoinette Heath MD   300 mg at 06/11/25 0931    And    gabapentin (NEURONTIN) capsule 600 mg  600 mg Oral QPM Antoinette Heath MD   600 mg at 06/11/25 1945    lactulose (CHRONULAC) solution 10 g  10 g Oral JAMEELM Antoinette Heath MD   10 g at 06/11/25 0931    levothyroxine (SYNTHROID/LEVOTHROID) tablet 112 mcg  112 mcg Oral Daily Antoinette Heath MD   112 mcg at 06/11/25 0932    lidocaine (LMX4) cream   Topical Q1H PRN Antoinette Heath MD        lidocaine 1 % 0.1-1 mL  0.1-1 mL Other Q1H PRN Antoinette Heath MD        magnesium oxide (MAG-OX) tablet 400 mg  400 mg Oral Q4H Sydney Hylton MD        melatonin tablet 5 mg  5 mg Oral At Bedtime PRN Antoinette Heath MD        potassium chloride avery ER (KLOR-CON M10) CR tablet 40 mEq  40 mEq Oral Once Sydney Hylton MD        rifaximin (XIFAXAN) tablet 550 mg  550 mg Oral BID Antoinette Heath MD   550 mg at 06/11/25 1945    senna-docusate (SENOKOT-S/PERICOLACE) 8.6-50 MG per tablet 1 tablet  1 tablet Oral BID PRN Antoinette Heath MD        Or    senna-docusate (SENOKOT-S/PERICOLACE) 8.6-50 MG per tablet 2 tablet  2 tablet Oral BID PRN Antoinette Heath MD        sodium chloride (PF) 0.9% PF flush 3 mL  3 mL Intracatheter Q8H DARINEL Antoinette Heath MD   3 mL at 06/11/25 1946    sodium chloride (PF) 0.9% PF flush 3 mL  3 mL Intracatheter q1 min prn Antoinette Heath MD   3 mL at 06/09/25 2130    [Held by provider] spironolactone (ALDACTONE) tablet 50 mg  50 mg Oral Daily Antoinette Heath MD     "    traZODone (DESYREL) tablet 100 mg  100 mg Oral At Bedtime Antoinette Heath MD   100 mg at 25            Physical Exam:   VS:  /45 (BP Location: Left arm)   Pulse 69   Temp 98.1  F (36.7  C) (Oral)   Resp 18   Ht 1.803 m (5' 11\")   Wt (!) 152 kg (335 lb)   SpO2 95%   BMI 46.72 kg/m      Wt:   Wt Readings from Last 2 Encounters:   06/10/25 (!) 152 kg (335 lb)   25 (!) 149.2 kg (328 lb 14.4 oz)      Physical Examination:  Gen: sitting up in bed, in no acute distress  HEENT: +icteric conjunctiva, moist mucous membranes  Pulm: normal work of breathing on ambient air   Abd: soft, distended, NT, no rebound or guarding   Ext: moving spontaneously   Skin: warm, well perfused   Neuro: alert and oriented, conversant         Reviewed data:     MELD 3.0: 26 at 2025  6:28 AM  MELD-Na: 25 at 2025  6:28 AM  Calculated from:  Serum Creatinine: 1.02 mg/dL at 2025  6:28 AM  Serum Sodium: 130 mmol/L at 2025  6:28 AM  Total Bilirubin: 3.9 mg/dL at 2025  5:37 AM  Serum Albumin: 2.2 g/dL at 2025  5:37 AM  INR(ratio): 2.13 at 2025  6:28 AM  Age at listin years  Sex: Female at 2025  6:28 AM    No ultrasound this admission     CXR 25  \"IMPRESSION:  1. Stable moderate left pleural effusion when compared to CTA  Pulmonary angiogram  2025.  2. Mild pulmonary edema\"    "

## 2025-06-12 NOTE — PROGRESS NOTES
[facility]    Medicine Progress Note - Medicine Service, NASRIN TEAM 4    Date of Admission:  6/7/2025    Assessment & Plan   58 yo female w/ MASH cirrhosis, chronic left-sided pleural effusion, psoriatic arthritis admitted for dyspnea and anasarca improving with IV diuresis.     Updates:   - Continue Bumex 2mg TID; goal negative -2L today  - Diuril 500mg Q24h PRN for net 24h-output <2L    #Anasarca, improving  #dyspnea, improved  #left-sided pleural effusion, s/p thoracocenthesis  #Ascitis, mild (insufficient for paracentesis)  Patient admitted for anasarca in the setting of MASH cirrhosis. Improving on IV diuretics.Pleural fluid consistent with transudate per diagnostic thoracocentesis, likely cirrhosis-related.  BNP was elevated at 344 but cardiac echo negative for heart failure (normal chambers, no valvular or wall motion dysfunction, EF 60-65%). Creatinine 1.02, UA negative for protein and blood. Serum albumin 2.2, possibly dilutional or due to malnutrition. Patient improving on IV diuretics. Wgt 151kg (156.5Kg on admission). Currently on IV bumetanide 2g Q8h with goal of -2L/day. Diuril 500mg Q24h PRN added for goal <-2L. Received diuril yesterday. Net output past 24h is 2085 ml   - Bumex 2g TID goal net negative -2L   - Diuril 500 mg Q24 PRN if net goal negative < -2L  - Q12H BMP  - Strict I/O  - continue to follow with nephrology     #Cirrhosis 2/2 [facility], decompensated  Patient with MASH cirrhosis complicated by ascites, and on Liver transplant wait list. Liver transplant following and reviewed yesterday. Noted that patient's insurance denied sleeve gastrectomy but still okay with transplant plans. LFT stable. Low concerns for esophageal varies, SBP or encephalopathy.   - continue to follow with hepatology  - continue with rifaximin 550mg Q12h  - Trend creatinine every 3 days     #Other chronic conditions  psoriatic arthritis  psoriasis  acquired  hypothyroidism  Obesity  MDD  Insomnia.  -Not in focus in this admission    Diet: Fluid restriction OTHER (SEE COMMENTS) (2500 mL)  Snacks/Supplements Adult: Ensure Max Protein (bariatric); With Meals  2 Gram Sodium Diet  High Kcal/High Protein Diet, ADULT    DVT Prophylaxis: pneumatic compressive device  Rodriguez Catheter: Not present, on purewick external catheter  Lines: None     Cardiac Monitoring: None  Code Status: Full Code      This patient was seen and discussed with the attending physician, Dr Hylton.      Tiff Sarkar  ([facility]  _________________________________________    Interval History   58 yo female w/ MASH cirrhosis, chronic left-sided pleural effusion, psoriatic arthritis admitted for dyspnea and anasarca improving with IV diuresis.   Overnight, she has no new concerns. Had a bowel movement once yesterday. Still has some cough but not out of baseline. Patient is able to ambulate. She denied fever, chest pain, SOB, abdominal pain, melena stool or dysuria.     Physical Exam   Vital Signs: Temp: 98.1  F (36.7  C) Temp src: Oral BP: 128/45 Pulse: 69   Resp: 18 SpO2: 95 % O2 Device: None (Room air)    Weight: 333 lbs 0 oz    General: lying quietly in bed, not in any distress.  HEENT: anicteric, mildly pale  Lungs: few bilateral crackles lower lobes  Heart: RRR, S1 and S2 only  Abdomen: benign  Extremities: Grade 3 LE edema    Labs:   CBC (6/11/2025): Hb 10.4, WBC 3.8, Platelet 55  Chemistry (6/12/2025): Na 130, K 3.4, Cl 97, CO2 28, Urea Nitrogen 16.2, Creatinine 1.02, Glucose 102  LFT: INR 2.13, PT 24.0, ALT 28, AST 60, Albumin 2.2, total protein 5.8, total bilirubin 3.6

## 2025-06-12 NOTE — PLAN OF CARE
Problem: Adult Inpatient Plan of Care  Goal: Optimal Comfort and Wellbeing  Outcome: Progressing   2300 to 0700 Shift  B/P: 128/45, T: 98.1, P: 69, R: 18, Denied any pain. Up to chair x1. Lymph wraps in place. Has purewick in place overnight, with adequate UOP. Slept in between cares. Continue to monitor and notify MD with any significant changes.

## 2025-06-12 NOTE — PLAN OF CARE
"Problem: Adult Inpatient Plan of Care  Goal: Plan of Care Review    Outcome: Progressing   Goal Outcome Evaluation:    Pt A&Ox4. SBA. Was up in recliner & back to bed. Tolerating high kcal/protein diet & FR of 2500 mL-poor appetite. Denies nausea/vomiting/Cardiac chest pain/SOB. Bi LS clear w/ diminished breath sounds in L LL. Mg & K+ replaced. R PIV infusing Mg/TKO. External cath/purewick intact w/ AUOP. 2 BMs this shift. +3 BLE edema. +2 BUE edema. Lymph wraps in place. VSS on RA. Continue w/ POC.      /63 (BP Location: Left arm)   Pulse 73   Temp 98.1  F (36.7  C) (Oral)   Resp 18   Ht 1.803 m (5' 11\")   Wt (!) 152 kg (335 lb)   SpO2 100%   BMI 46.72 kg/m        "

## 2025-06-12 NOTE — TELEPHONE ENCOUNTER
[facility] Call Center    Phone Message    May a detailed message be left on voicemail: yes     Reason for Call: Appointment Intake    Referring Provider Name: none  Diagnosis and/or Symptoms: colposcopy     Action Taken: Other: ump whs    Travel Screening: Not Applicable     Date of Service:                         Advised to send a TE per secure chat . Please reach back out to pt to schedule.

## 2025-06-13 ENCOUNTER — APPOINTMENT (OUTPATIENT)
Dept: PHYSICAL THERAPY | Facility: CLINIC | Age: 60
End: 2025-06-13
Payer: COMMERCIAL

## 2025-06-13 LAB
ALBUMIN SERPL BCG-MCNC: 2.2 G/DL (ref 3.5–5.2)
ALP SERPL-CCNC: 68 U/L (ref 40–150)
ALT SERPL W P-5'-P-CCNC: 27 U/L (ref 0–50)
ANION GAP SERPL CALCULATED.3IONS-SCNC: 5 MMOL/L (ref 7–15)
ANION GAP SERPL CALCULATED.3IONS-SCNC: 7 MMOL/L (ref 7–15)
AST SERPL W P-5'-P-CCNC: 58 U/L (ref 0–45)
BILIRUB SERPL-MCNC: 3.2 MG/DL
BILIRUBIN DIRECT (ROCHE PRO & PURE): 1.71 MG/DL (ref 0–0.45)
BUN SERPL-MCNC: 21.1 MG/DL (ref 8–23)
BUN SERPL-MCNC: 21.7 MG/DL (ref 8–23)
CALCIUM SERPL-MCNC: 7.9 MG/DL (ref 8.8–10.4)
CALCIUM SERPL-MCNC: 8 MG/DL (ref 8.8–10.4)
CHLORIDE SERPL-SCNC: 95 MMOL/L (ref 98–107)
CHLORIDE SERPL-SCNC: 99 MMOL/L (ref 98–107)
CREAT SERPL-MCNC: 1 MG/DL (ref 0.51–0.95)
CREAT SERPL-MCNC: 1.07 MG/DL (ref 0.51–0.95)
EGFRCR SERPLBLD CKD-EPI 2021: 59 ML/MIN/1.73M2
EGFRCR SERPLBLD CKD-EPI 2021: 64 ML/MIN/1.73M2
ERYTHROCYTE [DISTWIDTH] IN BLOOD BY AUTOMATED COUNT: 16.9 % (ref 10–15)
GLUCOSE SERPL-MCNC: 102 MG/DL (ref 70–99)
GLUCOSE SERPL-MCNC: 107 MG/DL (ref 70–99)
HCO3 SERPL-SCNC: 28 MMOL/L (ref 22–29)
HCO3 SERPL-SCNC: 28 MMOL/L (ref 22–29)
HCT VFR BLD AUTO: 28.8 % (ref 35–47)
HGB BLD-MCNC: 10 G/DL (ref 11.7–15.7)
INR PPP: 2.13 (ref 0.85–1.15)
MAGNESIUM SERPL-MCNC: 1.8 MG/DL (ref 1.7–2.3)
MCH RBC QN AUTO: 32.6 PG (ref 26.5–33)
MCHC RBC AUTO-ENTMCNC: 34.7 G/DL (ref 31.5–36.5)
MCV RBC AUTO: 94 FL (ref 78–100)
PLATELET # BLD AUTO: 57 10E3/UL (ref 150–450)
POTASSIUM SERPL-SCNC: 3.6 MMOL/L (ref 3.4–5.3)
POTASSIUM SERPL-SCNC: 3.7 MMOL/L (ref 3.4–5.3)
PROT SERPL-MCNC: 5.9 G/DL (ref 6.4–8.3)
PROTHROMBIN TIME: 23.4 SECONDS (ref 11.8–14.8)
RBC # BLD AUTO: 3.07 10E6/UL (ref 3.8–5.2)
SODIUM SERPL-SCNC: 130 MMOL/L (ref 135–145)
SODIUM SERPL-SCNC: 132 MMOL/L (ref 135–145)
WBC # BLD AUTO: 4.5 10E3/UL (ref 4–11)

## 2025-06-13 PROCEDURE — 36415 COLL VENOUS BLD VENIPUNCTURE: CPT

## 2025-06-13 PROCEDURE — 250N000011 HC RX IP 250 OP 636

## 2025-06-13 PROCEDURE — 120N000002 HC R&B MED SURG/OB UMMC

## 2025-06-13 PROCEDURE — 85014 HEMATOCRIT: CPT

## 2025-06-13 PROCEDURE — 250N000011 HC RX IP 250 OP 636: Performed by: STUDENT IN AN ORGANIZED HEALTH CARE EDUCATION/TRAINING PROGRAM

## 2025-06-13 PROCEDURE — 99232 SBSQ HOSP IP/OBS MODERATE 35: CPT | Mod: GC | Performed by: INTERNAL MEDICINE

## 2025-06-13 PROCEDURE — 83735 ASSAY OF MAGNESIUM: CPT | Performed by: STUDENT IN AN ORGANIZED HEALTH CARE EDUCATION/TRAINING PROGRAM

## 2025-06-13 PROCEDURE — 99233 SBSQ HOSP IP/OBS HIGH 50: CPT | Mod: GC | Performed by: STUDENT IN AN ORGANIZED HEALTH CARE EDUCATION/TRAINING PROGRAM

## 2025-06-13 PROCEDURE — 82248 BILIRUBIN DIRECT: CPT

## 2025-06-13 PROCEDURE — 97140 MANUAL THERAPY 1/> REGIONS: CPT | Mod: GP

## 2025-06-13 PROCEDURE — 250N000013 HC RX MED GY IP 250 OP 250 PS 637: Performed by: INTERNAL MEDICINE

## 2025-06-13 PROCEDURE — 85610 PROTHROMBIN TIME: CPT | Performed by: STUDENT IN AN ORGANIZED HEALTH CARE EDUCATION/TRAINING PROGRAM

## 2025-06-13 PROCEDURE — 250N000013 HC RX MED GY IP 250 OP 250 PS 637

## 2025-06-13 PROCEDURE — 250N000013 HC RX MED GY IP 250 OP 250 PS 637: Performed by: STUDENT IN AN ORGANIZED HEALTH CARE EDUCATION/TRAINING PROGRAM

## 2025-06-13 RX ORDER — MAGNESIUM SULFATE HEPTAHYDRATE 40 MG/ML
2 INJECTION, SOLUTION INTRAVENOUS ONCE
Status: COMPLETED | OUTPATIENT
Start: 2025-06-13 | End: 2025-06-13

## 2025-06-13 RX ORDER — CHLOROTHIAZIDE SODIUM 500 MG/1
500 INJECTION INTRAVENOUS ONCE
Status: COMPLETED | OUTPATIENT
Start: 2025-06-13 | End: 2025-06-13

## 2025-06-13 RX ORDER — POTASSIUM CHLORIDE 20MEQ/15ML
20 LIQUID (ML) ORAL ONCE
Status: COMPLETED | OUTPATIENT
Start: 2025-06-13 | End: 2025-06-13

## 2025-06-13 RX ADMIN — BUMETANIDE 2 MG: 0.25 INJECTION INTRAMUSCULAR; INTRAVENOUS at 08:34

## 2025-06-13 RX ADMIN — TRAZODONE HYDROCHLORIDE 100 MG: 100 TABLET ORAL at 22:25

## 2025-06-13 RX ADMIN — BUMETANIDE 2 MG: 0.25 INJECTION INTRAMUSCULAR; INTRAVENOUS at 19:53

## 2025-06-13 RX ADMIN — LACTULOSE 10 G: 20 SOLUTION ORAL at 08:25

## 2025-06-13 RX ADMIN — RIFAXIMIN 550 MG: 550 TABLET ORAL at 08:26

## 2025-06-13 RX ADMIN — POTASSIUM CHLORIDE 20 MEQ: 1.5 SOLUTION ORAL at 12:23

## 2025-06-13 RX ADMIN — LEVOTHYROXINE SODIUM 112 MCG: 0.11 TABLET ORAL at 08:25

## 2025-06-13 RX ADMIN — CITALOPRAM HYDROBROMIDE 20 MG: 20 TABLET ORAL at 08:25

## 2025-06-13 RX ADMIN — GABAPENTIN 300 MG: 300 CAPSULE ORAL at 08:25

## 2025-06-13 RX ADMIN — CHLOROTHIAZIDE SODIUM 500 MG: 500 INJECTION, POWDER, LYOPHILIZED, FOR SOLUTION INTRAVENOUS at 16:14

## 2025-06-13 RX ADMIN — MAGNESIUM SULFATE HEPTAHYDRATE 2 G: 2 INJECTION, SOLUTION INTRAVENOUS at 12:15

## 2025-06-13 RX ADMIN — BUMETANIDE 2 MG: 0.25 INJECTION INTRAMUSCULAR; INTRAVENOUS at 14:38

## 2025-06-13 RX ADMIN — RIFAXIMIN 550 MG: 550 TABLET ORAL at 19:54

## 2025-06-13 RX ADMIN — GABAPENTIN 600 MG: 300 CAPSULE ORAL at 19:53

## 2025-06-13 ASSESSMENT — ACTIVITIES OF DAILY LIVING (ADL)
ADLS_ACUITY_SCORE: 48
ADLS_ACUITY_SCORE: 47
ADLS_ACUITY_SCORE: 44
ADLS_ACUITY_SCORE: 47
ADLS_ACUITY_SCORE: 44
ADLS_ACUITY_SCORE: 48
ADLS_ACUITY_SCORE: 47
ADLS_ACUITY_SCORE: 44
ADLS_ACUITY_SCORE: 44
ADLS_ACUITY_SCORE: 47
ADLS_ACUITY_SCORE: 44
ADLS_ACUITY_SCORE: 47
ADLS_ACUITY_SCORE: 44
ADLS_ACUITY_SCORE: 47
ADLS_ACUITY_SCORE: 44
ADLS_ACUITY_SCORE: 47
ADLS_ACUITY_SCORE: 44

## 2025-06-13 NOTE — PLAN OF CARE
"Problem: Adult Inpatient Plan of Care  Goal: Plan of Care Review    Outcome: Progressing   Goal Outcome Evaluation:  Pt A&Ox4. SBA. Tolerating 2g sodium diet & FR of 2500 mL-poor appetite. Denies nausea/vomiting/Cardiac chest pain/SOB. R PIV SL. External cath/purewick intact w/ AUOP-tea colored urine. 1 BM this shift. Lymph wraps in place. VSS on RA. Continue w/ POC.      /45 (BP Location: Right arm)   Pulse 74   Temp 99  F (37.2  C) (Oral)   Resp 18   Ht 1.803 m (5' 11\")   Wt (!) 151 kg (333 lb)   SpO2 (!) 91%   BMI 46.44 kg/m      "

## 2025-06-13 NOTE — PLAN OF CARE
Goal Outcome Evaluation:    Plan of Care Reviewed With: patient    Overall Patient Progress: no change    Outcome Evaluation: A&Ox4. AVSS on RA. Denies nausea, SOB, or dizziness. Pain controlled. Bumex IVP given BID with good urine output via primafit. BUE and BLE +2/+3 edema. BLE lymph wraps reapplied in afternoon. Refusing up to chair.  Ambulated SBA in halls 1x with PT. 1x BM this shift. K & Mg replaced. Continue diuresing and reinforcing 2L fluid restriction. Liver transplant TBD.    Problem: Adult Inpatient Plan of Care  Goal: Absence of Hospital-Acquired Illness or Injury  Outcome: Progressing  Intervention: Identify and Manage Fall Risk  Recent Flowsheet Documentation  Taken 6/12/2025 1200 by Shaila Altman RN  Safety Promotion/Fall Prevention:   activity supervised   assistive device/personal items within reach   clutter free environment maintained   increased rounding and observation   increase visualization of patient   lighting adjusted   mobility aid in reach   nonskid shoes/slippers when out of bed   patient and family education   room near nurse's station   room organization consistent   safety round/check completed   supervised activity   toileting scheduled   treat reversible contributory factors   treat underlying cause  Taken 6/12/2025 0800 by Shaila Altman, RN  Safety Promotion/Fall Prevention:   activity supervised   assistive device/personal items within reach   clutter free environment maintained   increased rounding and observation   increase visualization of patient   lighting adjusted   mobility aid in reach   nonskid shoes/slippers when out of bed   patient and family education   room near nurse's station   room organization consistent   safety round/check completed   supervised activity   toileting scheduled   treat reversible contributory factors   treat underlying cause  Intervention: Prevent Skin Injury  Recent Flowsheet Documentation  Taken 6/12/2025 1200 by Shaila Altman  RN  Body Position: (declined recliner)   position changed independently   supine, legs elevated   weight shifting  Skin Protection:   adhesive use limited   incontinence pads utilized   protective footwear used   pulse oximeter probe site changed   tubing/devices free from skin contact   transparent dressing maintained  Taken 6/12/2025 0800 by Shaila Altman RN  Body Position:   position changed independently   supine, legs elevated   weight shifting  Skin Protection:   adhesive use limited   incontinence pads utilized   protective footwear used   pulse oximeter probe site changed   tubing/devices free from skin contact   transparent dressing maintained  Intervention: Prevent and Manage VTE (Venous Thromboembolism) Risk  Recent Flowsheet Documentation  Taken 6/12/2025 1400 by Shaila Altman RN  VTE Prevention/Management: (lymph wraps reapplied)   other (see comments)   compression stockings on  Taken 6/12/2025 1200 by Shaila Altman RN  VTE Prevention/Management: compression stockings off  Taken 6/12/2025 0900 by Shaila Altman RN  VTE Prevention/Management: (lymph wraps removed per pt request d/t itchiness & discomfort) compression stockings off  Taken 6/12/2025 0800 by Shaila Altman RN  VTE Prevention/Management: (lymph wraps)   compression stockings on   other (see comments)  Intervention: Prevent Infection  Recent Flowsheet Documentation  Taken 6/12/2025 1200 by Shaila Altman RN  Infection Prevention:   environmental surveillance performed   equipment surfaces disinfected   hand hygiene promoted   personal protective equipment utilized   single patient room provided   rest/sleep promoted   visitors restricted/screened  Taken 6/12/2025 0800 by Shaila Altman RN  Infection Prevention:   environmental surveillance performed   equipment surfaces disinfected   hand hygiene promoted   personal protective equipment utilized   single patient room provided   rest/sleep promoted   visitors  restricted/screened  Goal: Optimal Comfort and Wellbeing  Outcome: Progressing  Intervention: Monitor Pain and Promote Comfort  Recent Flowsheet Documentation  Taken 6/12/2025 0900 by Shaila Altman, RN  Pain Management Interventions:   repositioned   rest   medication (see MAR)  Goal: Readiness for Transition of Care  Outcome: Progressing

## 2025-06-13 NOTE — PLAN OF CARE
"/47 (BP Location: Right arm)   Pulse 64   Temp 97.8  F (36.6  C) (Oral)   Resp 18   Ht 1.803 m (5' 11\")   Wt (!) 151 kg (333 lb)   SpO2 97%   BMI 46.44 kg/m        Neuro: A&Ox4. Able to make needs known.   Respiratory: Pt satting high 90's on RA.  Cardiac: WDL  GI/: Pt voiding adequately into purewick (while sleeping/in bed) and voiding spontaneously when up. 3 BM's this shift and passing flatus.   Diet: Regular diet.    Pain: denies   Incisions/Drains: Lymph wraps on bilateral lower extremities.   IV Access: R PIV  Labs: replaced magnesium and potassium. Redraw in AM.   Activity: Up SBA        New changes this shift: No acute changes   Plan:  Continue POC    "

## 2025-06-13 NOTE — PLAN OF CARE
Problem: Adult Inpatient Plan of Care  Goal: Optimal Comfort and Wellbeing  Outcome: Progressing   11pm to 7am shift  B/P: 133/45, T: 99, P: 74, R: 18 Denies pain. AOx4. Purewick at night. Voiding adequate amounts. Lymph wraps in place. Appeared to sleep in between cares. Continue to monitor and notify MD with any significant changes.

## 2025-06-13 NOTE — PROGRESS NOTES
[facility]    Medicine Progress Note - Maroon 4     Assessment & Plan     60 yo female w/ MASH cirrhosis, chronic left-sided pleural effusion, psoriatic arthritis admitted for dyspnea and anasarca improving with IV diuresis.     Updates:   - Continue Bumex 2mg TID; goal negative -2L today   - Anticipate 2-3 more days of diuresis until at dry weight then will transition to PO diuretics     #dyspnea, improved  #pleural effusion, left-sided, chronic of unclear etiology  Follows pulm, pleural effusion appeared between Dec 2024 and Feb 2025. 3/6/25 pleural fluid studies have been consistent with exudate; however pleural protein levels relatively low. No apparent malignant process with negative cytology. PFTs with mild restriction. No signs of infection. CT/PE negative for PE w/ large left pleural effusion and ascites. . Trops neg, low c/f ACS. Clinically non-toxic appearing. RVP negative. Admitted on RA. Improving with diuresis. 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 Patient reports her EDW may be around 330lbs.   - Bumex 2g TID goal net negative -2L   > Give additional Diuril PRN   - Q12H BMP    #anasarca LE > UE  #chronic LE edema  #Cramping, resolved   Recently decreased spironolactone dosage 2/2 hand cramping. TTE 12/27/24 WNL. Pt reports 30 lb weight gain in a few weeks. TSH mildly elevated at 4.89, less likely to be etiology. Repeat TTE with normal EF. Suspect all 2/2 liver failure. UA withut proteinuria.   - Nutrition consulted given poor albumin and anasarca c/f malnutrition  - IV diuresis:   - Hold PTA PO Bumex 1 mg TID   - Hold PTA spironolactone 50 mg  - Strict I/O  - Purewick given difficulty w/ mobility and increased urination w/ IV diuresis  - Would benefit from taurine supplement as an outpatient; cannot get inpatient   - Unable to do paracentesis due limited  windown     #cirrhosis 2/2 [facility] c/b ascites  #hyperbilirubinemia (around baseline)  #thrombocytopenia (around baseline, 70)  Follows hepatology, on liver transplant list since April 2025. Immunosuppressed and not a candidate for vaccines. MELD 3.0: 23, MELD-NA: 21.  - Cramping: HOLD PTA magnesium, zinc, vit E supplements  - Constipation: PTA lactulose 15 mL every day  - Tremors: PTA rifaximin 550 mg BID  - Hepatology consulted     #psoriatic arthritis  #psoriasis  Was on methotrexate for 20+ years, d/c'd d/t cirrhosis. On adalimumab in 1/2020 (ineffective), risankizumab 11/2020  - HOLD PTA ixekizumab (IL-17 monoclonal antibody) qMonthly     #obesity  #deconditioning  - PT consulted     #UE pain  #back and neck pain, chronic  #fibromyalgia  - Tylenol with 2 gram limit     #hx of pulmonary embolism (2002)  On warfarin briefly, not currently on DOAC. Was on birth control at that time     #acquired hypothyroidism  Clinically euthyroid per PCP. TSH WNL  - PTA levothyroxine 112 mcg qD     #MDD  #insomnia  - PTA citalopram 20 mg every day  - PTA insomnia 100 mg at bedtime     #NEREYDA, moderate  Has seen sleep medicine. Recs APAP 5-15 cm H2O. Does not use CPAP, it gives her anxiety  - Placed CPAP order     #enlarged mediastinal and 1.5 cm subcarinal lymph nodes  EUS 8/9/24 w/ FNA: polymorphous lymphocytes         Diet: Fluid restriction OTHER (SEE COMMENTS) (2500 mL)  Snacks/Supplements Adult: Ensure Max Protein (bariatric); With Meals  2 Gram Sodium Diet    DVT Prophylaxis:  holding d/t low platelets  Rodriguez Catheter: Not present  Lines: None     Cardiac Monitoring: None  Code Status: Full Code      Clinically Significant Risk Factors         # Hyponatremia: Lowest Na = 130 mmol/L in last 2 days, will monitor as appropriate  # Hypochloremia: Lowest Cl = 97 mmol/L in last 2 days, will monitor as appropriate      # Hypoalbuminemia: Lowest albumin = 2.1 g/dL at 6/9/2025  8:21 AM, will monitor as appropriate    # Coagulation  "Defect: INR = 2.13 (Ref range: 0.85 - 1.15) and/or PTT = 42 Seconds (Ref range: 22 - 38 Seconds), will monitor for bleeding  # Thrombocytopenia: Lowest platelets = 57 in last 2 days, will monitor for bleeding    # Acute heart failure with preserved ejection fraction: heart failure noted on problem list, last echo with EF >50%, and receiving IV diuretics           # Morbid Obesity: Estimated body mass index is 46.44 kg/m  as calculated from the following:    Height as of this encounter: 1.803 m (5' 11\").    Weight as of this encounter: 151 kg (333 lb).             Social Drivers of Health   Food Insecurity: High Risk (6/13/2025)    Food Insecurity     Within the past 12 months, did you worry that your food would run out before you got money to buy more?: Yes     Within the past 12 months, did the food you bought just not last and you didn t have money to get more?: No   Housing Stability: High Risk (6/13/2025)    Housing Stability     Do you have housing? : Yes     Are you worried about losing your housing?: Yes         Disposition Plan      Expected Discharge Date: 06/16/2025                The patient's care was discussed with the Attending Physician, Dr. Hylton.      Deja Coulter MD    ______________________________________________________________________      Interval History   No acute events overnight. Doing well. Continues to remain for diuresis. She is hoping to go home in several days.       Physical Exam   Vital Signs: Temp: 97.8  F (36.6  C) Temp src: Oral BP: 111/47 Pulse: 64   Resp: 18 SpO2: 97 % O2 Device: None (Room air)    Weight: 333 lbs 0 oz    General Appearance: Resting comfortably in bed, lying flat  Respiratory: Lungs clear to asculation bilaterally with decreased breath sounds in the L base   Cardiovascular: RRR, Normal S1/S2   GI: Abdomen soft, non-tender  Skin: +2 pitting edema in bilateral legs up to the mid leg (improved compared to yesterday); compression stocking in place    Medical " Decision Making       Please see A&P for additional details of medical decision making.      Data       I have personally reviewed the following data over the past 24 hrs:    4.5  \   10.0 (L)   / 57 (L)     132 (L) 99 21.1 /  102 (H)   3.7 28 1.00 (H) \     ALT: 27 AST: 58 (H) AP: 68 TBILI: 3.2 (H)   ALB: 2.2 (L) TOT PROTEIN: 5.9 (L) LIPASE: N/A     INR:  2.13 (H) PTT:  N/A   D-dimer:  N/A Fibrinogen:  N/A       Imaging results reviewed over the past 24 hrs:   No results found for this or any previous visit (from the past 24 hours).

## 2025-06-13 NOTE — PROGRESS NOTES
Hepatology Inpatient Progress Note:          Assessment and Plan:   Karlene Yun is a 59 year old female with a history of decompensated cirrhosis due to metabolic dysfunction associated steatohepatitis complicated by ascites who is listed for liver transplant and presented to the hospital for management of fluid overload. MELD 25. ABO A+.      Decompensated cirrhosis (MELD 25)   Etiology: mash, follows with Dr. Ro   EV: no prior EGD, had EUS - EGD due 8/2025   Ascites: present, no prior para - home diuretics: Bumex 1 mg BID, spironolactone 100 mg daily   HE: A&O x4, on lactulose and rifaximin   HCC: Due, none on last U/S 11/2024  Hydrothorax: see below   Transplant candidacy: listed; ASCUS on pap smear with plan for colposcopy (does not preclude being active on the liver transplant list)    Anasarca   Patient is significantly volume overloaded on clinical examination. She is noted to have ascites on imaging at the time of admission, never has had a paracentesis before. Echocardiogram 6/8 showing normal EF and no valvular or right heart abnormalities (does have hx cardiomegaly). Most likely related to cirrhosis.     L pleural effusion   Thoracentesis 6/9 labs suggesting transudative effusion without evidence of infection. Cytology negative for malignancy.      Recommendations:   - Encouraged ambulation three times daily   - Continue diuresis with bumex IV for goal 1-2 L net negative fluid balance  - Rifaximin and lactulose for goal 1-2 BM a day  - Hold PTA spironolactone   - 2g low sodium diet, high protein supplements TID and at bedtime  - Daily MELD labs   - Hepatology will continue to follow     Patient discussed with attending physician, Dr. Ramos.      Sergio Carlisle MD  Gastroenterology Fellow        Interval events:    - 1.2L negative, doing well today   - Drinking protein supplements, took a walk 2x yesterday  - Encouraged continued ambulation          Medications:     Current  Facility-Administered Medications   Medication Dose Route Frequency Provider Last Rate Last Admin    benzocaine-menthol (CHLORASEPTIC MAX) 15-10 MG lozenge 1 lozenge  1 lozenge Buccal Q1H PRN Antoinette Heath MD        bumetanide (BUMEX) injection 2 mg  2 mg Intravenous TID Deja Coulter MD   2 mg at 06/13/25 0834    [Held by provider] bumetanide (BUMEX) tablet 1 mg  1 mg Oral Daily Antoinette Heath MD        calcium carbonate (TUMS) chewable tablet 1,000 mg  1,000 mg Oral 4x Daily PRN Antoinette Heath MD        citalopram (celeXA) tablet 20 mg  20 mg Oral Daily Antoinette Heath MD   20 mg at 06/13/25 0825    gabapentin (NEURONTIN) capsule 300 mg  300 mg Oral QAM Antoinette Heath MD   300 mg at 06/13/25 0825    And    gabapentin (NEURONTIN) capsule 600 mg  600 mg Oral QPM Antoinette Heath MD   600 mg at 06/12/25 1947    lactulose (CHRONULAC) solution 10 g  10 g Oral QAM Verna Ramos MD   10 g at 06/13/25 0825    levothyroxine (SYNTHROID/LEVOTHROID) tablet 112 mcg  112 mcg Oral Daily Antoinette Heath MD   112 mcg at 06/13/25 0825    lidocaine (LMX4) cream   Topical Q1H PRN Antoinette Heath MD        lidocaine 1 % 0.1-1 mL  0.1-1 mL Other Q1H PRN Antoinette Heath MD        melatonin tablet 5 mg  5 mg Oral At Bedtime PRN Antoinette Heath MD        rifaximin (XIFAXAN) tablet 550 mg  550 mg Oral BID Antoinette Heath MD   550 mg at 06/13/25 0826    senna-docusate (SENOKOT-S/PERICOLACE) 8.6-50 MG per tablet 1 tablet  1 tablet Oral BID PRN Antoinette Heath MD        Or    senna-docusate (SENOKOT-S/PERICOLACE) 8.6-50 MG per tablet 2 tablet  2 tablet Oral BID PRN Antoinette Heath MD        sodium chloride (PF) 0.9% PF flush 3 mL  3 mL Intracatheter Q8H DARINEL Antoinette Heath MD   3 mL at 06/13/25 0538    sodium chloride (PF) 0.9% PF flush 3 mL  3 mL Intracatheter q1 min prn Antoinette Heath MD   3 mL at 06/09/25 2130    [Held by provider] spironolactone (ALDACTONE) tablet 50 mg  50 mg Oral Daily Antoinette Heath MD        traZODone (DESYREL) tablet 100 mg  100 mg Oral At Bedtime Ivana  "MD Antoinette   100 mg at 25            Physical Exam:   VS:  /47 (BP Location: Right arm)   Pulse 64   Temp 97.8  F (36.6  C) (Oral)   Resp 18   Ht 1.803 m (5' 11\")   Wt (!) 151 kg (333 lb)   SpO2 97%   BMI 46.44 kg/m      Wt:   Wt Readings from Last 2 Encounters:   25 (!) 151 kg (333 lb)   25 (!) 149.2 kg (328 lb 14.4 oz)      Physical Examination:  Gen: sitting up in bed, in no acute distress  HEENT: +icteric conjunctiva, moist mucous membranes  Pulm: normal work of breathing on ambient air   Abd: soft, distended, NT, no rebound or guarding   Ext: moving spontaneously   Skin: warm, well perfused   Neuro: alert and oriented, conversant         Reviewed data:     MELD 3.0: 25 at 2025  6:22 AM  MELD-Na: 22 at 2025  6:22 AM  Calculated from:  Serum Creatinine: 1 mg/dL at 2025  6:22 AM  Serum Sodium: 132 mmol/L at 2025  6:22 AM  Total Bilirubin: 3.2 mg/dL at 2025  6:22 AM  Serum Albumin: 2.2 g/dL at 2025  6:22 AM  INR(ratio): 2.13 at 2025  6:22 AM  Age at listin years  Sex: Female at 2025  6:22 AM    No ultrasound this admission     CXR 25  \"IMPRESSION:  1. Stable moderate left pleural effusion when compared to CTA  Pulmonary angiogram  2025.  2. Mild pulmonary edema\"    "

## 2025-06-14 LAB
ALBUMIN SERPL BCG-MCNC: 2 G/DL (ref 3.5–5.2)
ALP SERPL-CCNC: 62 U/L (ref 40–150)
ALT SERPL W P-5'-P-CCNC: 27 U/L (ref 0–50)
ANION GAP SERPL CALCULATED.3IONS-SCNC: 10 MMOL/L (ref 7–15)
ANION GAP SERPL CALCULATED.3IONS-SCNC: 7 MMOL/L (ref 7–15)
AST SERPL W P-5'-P-CCNC: 65 U/L (ref 0–45)
BACTERIA PLR CULT: NO GROWTH
BILIRUB SERPL-MCNC: 3.7 MG/DL
BILIRUBIN DIRECT (ROCHE PRO & PURE): 1.62 MG/DL (ref 0–0.45)
BUN SERPL-MCNC: 23.3 MG/DL (ref 8–23)
BUN SERPL-MCNC: 27.9 MG/DL (ref 8–23)
CALCIUM SERPL-MCNC: 8 MG/DL (ref 8.8–10.4)
CALCIUM SERPL-MCNC: 8.2 MG/DL (ref 8.8–10.4)
CHLORIDE SERPL-SCNC: 93 MMOL/L (ref 98–107)
CHLORIDE SERPL-SCNC: 96 MMOL/L (ref 98–107)
CREAT SERPL-MCNC: 1.02 MG/DL (ref 0.51–0.95)
CREAT SERPL-MCNC: 1.1 MG/DL (ref 0.51–0.95)
EGFRCR SERPLBLD CKD-EPI 2021: 57 ML/MIN/1.73M2
EGFRCR SERPLBLD CKD-EPI 2021: 63 ML/MIN/1.73M2
ERYTHROCYTE [DISTWIDTH] IN BLOOD BY AUTOMATED COUNT: 17 % (ref 10–15)
GLUCOSE SERPL-MCNC: 110 MG/DL (ref 70–99)
GLUCOSE SERPL-MCNC: 194 MG/DL (ref 70–99)
GRAM STAIN RESULT: NORMAL
GRAM STAIN RESULT: NORMAL
HCO3 SERPL-SCNC: 26 MMOL/L (ref 22–29)
HCO3 SERPL-SCNC: 27 MMOL/L (ref 22–29)
HCT VFR BLD AUTO: 28 % (ref 35–47)
HGB BLD-MCNC: 9.6 G/DL (ref 11.7–15.7)
INR PPP: 2.05 (ref 0.85–1.15)
MAGNESIUM SERPL-MCNC: 1.9 MG/DL (ref 1.7–2.3)
MCH RBC QN AUTO: 32.3 PG (ref 26.5–33)
MCHC RBC AUTO-ENTMCNC: 34.3 G/DL (ref 31.5–36.5)
MCV RBC AUTO: 94 FL (ref 78–100)
PLAT MORPH BLD: ABNORMAL
PLATELET # BLD AUTO: 40 10E3/UL (ref 150–450)
POLYCHROMASIA BLD QL SMEAR: SLIGHT
POTASSIUM SERPL-SCNC: 3.3 MMOL/L (ref 3.4–5.3)
POTASSIUM SERPL-SCNC: 3.9 MMOL/L (ref 3.4–5.3)
PROT SERPL-MCNC: 5.6 G/DL (ref 6.4–8.3)
PROTHROMBIN TIME: 22.8 SECONDS (ref 11.8–14.8)
RBC # BLD AUTO: 2.97 10E6/UL (ref 3.8–5.2)
RBC MORPH BLD: ABNORMAL
SODIUM SERPL-SCNC: 129 MMOL/L (ref 135–145)
SODIUM SERPL-SCNC: 130 MMOL/L (ref 135–145)
WBC # BLD AUTO: 4.5 10E3/UL (ref 4–11)

## 2025-06-14 PROCEDURE — 99233 SBSQ HOSP IP/OBS HIGH 50: CPT | Mod: GC | Performed by: STUDENT IN AN ORGANIZED HEALTH CARE EDUCATION/TRAINING PROGRAM

## 2025-06-14 PROCEDURE — 99232 SBSQ HOSP IP/OBS MODERATE 35: CPT | Mod: GC | Performed by: INTERNAL MEDICINE

## 2025-06-14 PROCEDURE — 82310 ASSAY OF CALCIUM: CPT

## 2025-06-14 PROCEDURE — 250N000013 HC RX MED GY IP 250 OP 250 PS 637

## 2025-06-14 PROCEDURE — 36415 COLL VENOUS BLD VENIPUNCTURE: CPT

## 2025-06-14 PROCEDURE — 85610 PROTHROMBIN TIME: CPT | Performed by: STUDENT IN AN ORGANIZED HEALTH CARE EDUCATION/TRAINING PROGRAM

## 2025-06-14 PROCEDURE — 120N000002 HC R&B MED SURG/OB UMMC

## 2025-06-14 PROCEDURE — 250N000013 HC RX MED GY IP 250 OP 250 PS 637: Performed by: INTERNAL MEDICINE

## 2025-06-14 PROCEDURE — 85018 HEMOGLOBIN: CPT

## 2025-06-14 PROCEDURE — 250N000011 HC RX IP 250 OP 636: Performed by: STUDENT IN AN ORGANIZED HEALTH CARE EDUCATION/TRAINING PROGRAM

## 2025-06-14 PROCEDURE — 250N000011 HC RX IP 250 OP 636: Mod: JZ

## 2025-06-14 PROCEDURE — 84155 ASSAY OF PROTEIN SERUM: CPT

## 2025-06-14 PROCEDURE — 83735 ASSAY OF MAGNESIUM: CPT | Performed by: STUDENT IN AN ORGANIZED HEALTH CARE EDUCATION/TRAINING PROGRAM

## 2025-06-14 RX ORDER — CHLOROTHIAZIDE SODIUM 500 MG/1
500 INJECTION INTRAVENOUS ONCE
Status: COMPLETED | OUTPATIENT
Start: 2025-06-14 | End: 2025-06-14

## 2025-06-14 RX ORDER — MAGNESIUM SULFATE HEPTAHYDRATE 40 MG/ML
2 INJECTION, SOLUTION INTRAVENOUS ONCE
Status: COMPLETED | OUTPATIENT
Start: 2025-06-14 | End: 2025-06-14

## 2025-06-14 RX ORDER — POTASSIUM CHLORIDE 750 MG/1
40 TABLET, EXTENDED RELEASE ORAL ONCE
Status: COMPLETED | OUTPATIENT
Start: 2025-06-14 | End: 2025-06-15

## 2025-06-14 RX ADMIN — CHLOROTHIAZIDE SODIUM 500 MG: 500 INJECTION, POWDER, LYOPHILIZED, FOR SOLUTION INTRAVENOUS at 11:37

## 2025-06-14 RX ADMIN — TRAZODONE HYDROCHLORIDE 100 MG: 100 TABLET ORAL at 21:38

## 2025-06-14 RX ADMIN — CITALOPRAM HYDROBROMIDE 20 MG: 20 TABLET ORAL at 09:05

## 2025-06-14 RX ADMIN — LEVOTHYROXINE SODIUM 112 MCG: 0.11 TABLET ORAL at 09:05

## 2025-06-14 RX ADMIN — BUMETANIDE 2 MG: 0.25 INJECTION INTRAMUSCULAR; INTRAVENOUS at 09:05

## 2025-06-14 RX ADMIN — MAGNESIUM SULFATE HEPTAHYDRATE 2 G: 2 INJECTION, SOLUTION INTRAVENOUS at 11:38

## 2025-06-14 RX ADMIN — GABAPENTIN 600 MG: 300 CAPSULE ORAL at 21:38

## 2025-06-14 RX ADMIN — GABAPENTIN 300 MG: 300 CAPSULE ORAL at 09:05

## 2025-06-14 RX ADMIN — RIFAXIMIN 550 MG: 550 TABLET ORAL at 09:05

## 2025-06-14 RX ADMIN — BUMETANIDE 2 MG: 0.25 INJECTION INTRAMUSCULAR; INTRAVENOUS at 21:39

## 2025-06-14 RX ADMIN — BUMETANIDE 2 MG: 0.25 INJECTION INTRAMUSCULAR; INTRAVENOUS at 14:06

## 2025-06-14 RX ADMIN — LACTULOSE 10 G: 20 SOLUTION ORAL at 09:05

## 2025-06-14 RX ADMIN — RIFAXIMIN 550 MG: 550 TABLET ORAL at 21:38

## 2025-06-14 ASSESSMENT — ACTIVITIES OF DAILY LIVING (ADL)
ADLS_ACUITY_SCORE: 47

## 2025-06-14 NOTE — PLAN OF CARE
Goal Outcome Evaluation:      Plan of Care Reviewed With: patient    Overall Patient Progress: no changeOverall Patient Progress: no change     1900-0730   Afebrile. VSS on RA. Denies shortness of breath or chest pain. Neuros intact. Voiding via purewick w/ great UOP. Pt reported x3 BMs throughout the day. Adhering to 2.5 L FR. BLE w/ lymph wraps. Reporting manageable chronic back pain. No PRNs given. R PIV SL w/ bumex given. Up w/ SBA. Continue POC

## 2025-06-14 NOTE — PROGRESS NOTES
{Smartlist below should be selected and completed by the supervising physician (resident, fellow or attending) present with the patient; ***}  {Resident, fellow or attending ATTESTATION:[id number]}    [facility]    Medicine Progress Note - Medicine Service, NASRIN TEAM 4    Name: Sujatha Yun  Date of Admission:  6/7/2025    Assessment & Plan     Sujatha is A 59-yo female with a history of MASH cirrhosis, chronic left pleural effusion, psoriatic arthritis who presented who presented with cough, dyspnea on exertion, chronic back pain, extremity swelling and weight gain. She was admitted for dyspnea and anasarca.     # Anarsaca - Improving  Patient with a history of MASH cirrhosis. Generalized body swelling, bilateral pitting edema grade 3 on both feet and gaining about 30Ib in few weeks. Anasarca most probably due to decompensated cirrhosis with decompensation (Ascitis on CT chest). No orthopnea, PND and heart changes on CT chest. Also, no hematuria or proteinuria, low concerns for CHF and Renal causes. Patient improving on IV bumetanide with goal of net -2L/day (and diuril for net output <2L/day). Weight down-trending at 150.1 kg (156.5 kg on 6/8)  - continue with IV bumetanide 2mg Q12H daily  - continue with Diuril 500mg Q24h PRN (for net < -2L/day)  - Trend electrolytes  - continue with strict I/O  - continue with daily weight     # Cirrhosis 2/2 MASH  # Ascitis - improving  Patient's cirrhosis is decompensated with ascitis. Patient is waitlisted for liver transplant. Follows hepatology, on liver transplant list since April 2025. Immunosuppressed and not a candidate for vaccines. MELD 3.0: 23, MELD-NA: 21. There is no evidence of esophageal varices or UGI bleed, Liver enzymes minimally elevated (AST) but ALP normal. Patient is currently wait-listed for liver transplant. Ascitis not enough for diagnostic paracentesis.   . Continue with bumetanide 2mg Q12H daily  -  continue PTA rifaximin  - continue PTA lactulose as needed  - continue to follow Hepatology  .  # Pleural effusion  Patient admitted with cough and dyspnea on exertion. Also a history of chronic left sided pleural effusion- previously tapped once. Differentials include pneumonia given the unilateral nature but not supported by CT chest. There is no evidence diaphragmatic irritation on chest CT nor upper abdominal pain referral to the shoulder. CT also showed no evidence of malignancy. Mammography 6 months ago was normal. Bilateral pleural effusion would rather be expected from cardiac and renal causes. Pleural effusion worsened on CT compared to previous (4/2/25). Diagnostic thoracocentesis indicated transudative fluid likely related to cirrhosis decompensation. Patient has improved clinically.  -continue with diuretics    # Poor night sleep - improved  - Continue with trazodone 100mg at bed time.    # Anemia  Recent HB of 10.9. Anemia could most likely be dilutional given recent onset conciding with development of anasarca and persistent hyponatremia. last normal Hb 9.6 (6//25. Other possibilities are chronic illness/inflammation.(Patient has decompensated liver cirrhosis and a history of chronic inflammatory disorders: psoriatic arthritis and fibromyalgia), Iron deficiency given the RBC RDW of 16.7, or both. Folate and B12 deficiencies are unlikely as patient is on adequate regular adult diet and with red cell MCV 96)  - Trend CBC daily  - Iron studies (Serum iron, ferritin, TBIC, hepcidin).    #thrombocytopenia (around baseline, 70)  - Trend CBC     #psoriatic arthritis  #psoriasis  Was on methotrexate for 20+ years, d/c'd d/t cirrhosis. On adalimumab in 1/2020 (ineffective), risankizumab 11/2020  - HOLD PTA ixekizumab (IL-17 monoclonal antibody) qMonthly     #obesity  #deconditioning  - PT consulted     #UE pain  #back and neck pain, chronic  #fibromyalgia  - Tylenol with 2 gram limit      #hx of pulmonary  embolism (2002)  On warfarin briefly, not currently on DOAC. Was on birth control at that time     #acquired hypothyroidism  Clinically euthyroid per PCP. TSH WNL  - PTA levothyroxine 112 mcg qD     #MDD  #insomnia  - PTA citalopram 20 mg every day  - PTA insomnia 100 mg at bedtime     #NEREYDA, moderate  Has seen sleep medicine. Recs APAP 5-15 cm H2O. Does not use CPAP, it gives her anxiety  - Placed CPAP order     #enlarged mediastinal and 1.5 cm subcarinal lymph nodes  EUS 8/9/24 w/ FNA: polymorphous lymphocytes     Diet: Combination Diet Regular Diet Adult    DVT Prophylaxis: Pneumatic Compression Devices  Rodriguez Catheter: Not present  Lines: Peripheral IV line    Cardiac Monitoring: None  Code Status: Full Code      The patient's care was discussed with the Attending Physician, Dr. Warner [facility]arkar  [facility] [place] [facility]  __________________________________________________________    Interval History   Sujatha is A 59-yo female with a history of MASH cirrhosis, chronic left pleural effusion, psoriatic arthritis who presented who presented to the ED on 6/7/25 with cough, dyspnea on exertion, chronic back pain, extremity swelling and weight gain. Overnight, she felt better and has no new concerns. She complained of pain with the leg bandage but feels no tingling. She had 3 bowel motions yesterday. Appetite is good and she ambulates without support.    Vitals:   B/P: 103/86, T: 98.4, P: 68, R: 16    Physical Exam   General: Lying quietly in bed and not in any distress.  HEENT: Mild conjunctival pallor, anicteric  Lungs: Few scattered crackles lower lung bases  Heart: RRR, S1 and S2 only  Abdomen: large habitus, BS active all 4 quadrants, no area of tenderness.  Extremities: Grade 3 pitting edema both feet. Dorsal pedis pulse present both feet    Lab:  Recent Labs (CBC)   Lab Test 06/14/25  0708   WBC 4.5   RBC 2.97*   HGB 9.6*   HCT 28.0*   MCV 94   MCH 32.3   MCHC 34.3   RDW 17.0*   PLT 40*     Recent Labs    Lab Test 06/14/25  0708 06/13/25 2015   * 130*   POTASSIUM 3.9 3.6   CHLORIDE 96* 95*   CO2 27 28   ANIONGAP 7 7   * 107*   BUN 23.3* 21.7   CR 1.02* 1.07*   SANDY 8.0* 8.0*     ALT 27, AST 65, Sascha total 3.7, INR 2.05, PT 22.8

## 2025-06-14 NOTE — PROGRESS NOTES
[facility]    Medicine Progress Note - Maroon 4     Assessment & Plan     58 yo female w/ MASH cirrhosis, chronic left-sided pleural effusion, psoriatic arthritis admitted for dyspnea and anasarca improving with IV diuresis.     Updates:   - Continue Bumex 2mg TID; goal negative -2L today    > Redosed with diuril this AM   - Anticipate 2-3 more days of diuresis until at dry weight then will transition to PO diuretics   > Will discuss with hepatology if safe to discharge on above maintenance PO diuretics to continue diuresis at home with close outpatient follow up and labs     #dyspnea, improved  #pleural effusion, left-sided, chronic of unclear etiology  Follows pulm, pleural effusion appeared between Dec 2024 and Feb 2025. 3/6/25 pleural fluid studies have been consistent with exudate; however pleural protein levels relatively low. No apparent malignant process with negative cytology. PFTs with mild restriction. No signs of infection. CT/PE negative for PE w/ large left pleural effusion and ascites. . Trops neg, low c/f ACS. Clinically non-toxic appearing. RVP negative. Admitted on RA. Improving with diuresis. 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 Patient reports her EDW may be around 330lbs.   - Bumex 2g TID goal net negative -2L   > Give additional Diuril PRN   - Q12H BMP    #anasarca LE > UE  #chronic LE edema  #Cramping, resolved   Recently decreased spironolactone dosage 2/2 hand cramping. TTE 12/27/24 WNL. Pt reports 30 lb weight gain in a few weeks. TSH mildly elevated at 4.89, less likely to be etiology. Repeat TTE with normal EF. Suspect all 2/2 liver failure. UA withut proteinuria.   - Nutrition consulted given poor albumin and anasarca c/f malnutrition  - IV diuresis:   - Hold PTA PO Bumex 1 mg TID   - Hold PTA spironolactone 50 mg  - Strict I/O  - Rock  given difficulty w/ mobility and increased urination w/ IV diuresis  - Would benefit from taurine supplement as an outpatient; cannot get inpatient   - Unable to do paracentesis due limited windown     #cirrhosis 2/2 MASH c/b ascites  #hyperbilirubinemia (around baseline)  #thrombocytopenia (around baseline, 70)  Follows hepatology, on liver transplant list since April 2025. Immunosuppressed and not a candidate for vaccines. MELD 3.0: 23, MELD-NA: 21.  - Cramping: HOLD PTA magnesium, zinc, vit E supplements  - Constipation: PTA lactulose 15 mL every day  - Tremors: PTA rifaximin 550 mg BID  - Hepatology consulted     #psoriatic arthritis  #psoriasis  Was on methotrexate for 20+ years, d/c'd d/t cirrhosis. On adalimumab in 1/2020 (ineffective), risankizumab 11/2020  - HOLD PTA ixekizumab (IL-17 monoclonal antibody) qMonthly     #obesity  #deconditioning  - PT consulted     #UE pain  #back and neck pain, chronic  #fibromyalgia  - Tylenol with 2 gram limit     #hx of pulmonary embolism (2002)  On warfarin briefly, not currently on DOAC. Was on birth control at that time     #acquired hypothyroidism  Clinically euthyroid per PCP. TSH WNL  - PTA levothyroxine 112 mcg qD     #MDD  #insomnia  - PTA citalopram 20 mg every day  - PTA insomnia 100 mg at bedtime     #NEREYDA, moderate  Has seen sleep medicine. Recs APAP 5-15 cm H2O. Does not use CPAP, it gives her anxiety  - Placed CPAP order     #enlarged mediastinal and 1.5 cm subcarinal lymph nodes  EUS 8/9/24 w/ FNA: polymorphous lymphocytes         Diet: Fluid restriction OTHER (SEE COMMENTS) (2500 mL)  Snacks/Supplements Adult: Ensure Max Protein (bariatric); With Meals  2 Gram Sodium Diet    DVT Prophylaxis:  holding d/t low platelets  Rodriguez Catheter: Not present  Lines: None     Cardiac Monitoring: None  Code Status: Full Code      Clinically Significant Risk Factors         # Hyponatremia: Lowest Na = 130 mmol/L in last 2 days, will monitor as appropriate  #  "Hypochloremia: Lowest Cl = 95 mmol/L in last 2 days, will monitor as appropriate      # Hypoalbuminemia: Lowest albumin = 2 g/dL at 6/14/2025  7:08 AM, will monitor as appropriate    # Coagulation Defect: INR = 2.05 (Ref range: 0.85 - 1.15) and/or PTT = 42 Seconds (Ref range: 22 - 38 Seconds), will monitor for bleeding  # Thrombocytopenia: Lowest platelets = 40 in last 2 days, will monitor for bleeding    # Acute heart failure with preserved ejection fraction: heart failure noted on problem list, last echo with EF >50%, and receiving IV diuretics           # Morbid Obesity: Estimated body mass index is 46.65 kg/m  as calculated from the following:    Height as of this encounter: 1.803 m (5' 11\").    Weight as of this encounter: 151.7 kg (334 lb 8 oz).             Social Drivers of Health   Food Insecurity: High Risk (6/13/2025)    Food Insecurity     Within the past 12 months, did you worry that your food would run out before you got money to buy more?: Yes     Within the past 12 months, did the food you bought just not last and you didn t have money to get more?: No   Housing Stability: High Risk (6/13/2025)    Housing Stability     Do you have housing? : Yes     Are you worried about losing your housing?: Yes         Disposition Plan     Expected Discharge Date: 06/16/2025                The patient's care was discussed with the Attending Physician, Dr. Hylton.      Deja Coulter MD    ______________________________________________________________________      Interval History   No acute events overnight. Doing well. Continues to remain for diuresis. Legs are slightly itchy today. Has been ambulating around the hospital with improvement.       Physical Exam   Vital Signs: Temp: 98.2  F (36.8  C) Temp src: Oral BP: 131/40 Pulse: 72   Resp: 18 SpO2: 97 % O2 Device: None (Room air)    Weight: 334 lbs 8 oz    General Appearance: Resting comfortably in bed, lying in bed  Respiratory: No increased work of breathing, " on room air  Cardiovascular: RRR, Normal S1/S2   GI: Abdomen soft, non-tender  Skin: +2 pitting edema in bilateral legs up to the mid leg (improved compared to yesterday); compression stocking in place    Medical Decision Making       Please see A&P for additional details of medical decision making.      Data       I have personally reviewed the following data over the past 24 hrs:    4.5  \   9.6 (L)   / 40 (LL)     130 (L) 96 (L) 23.3 (H) /  110 (H)   3.9 27 1.02 (H) \     ALT: 27 AST: 65 (H) AP: 62 TBILI: 3.7 (H)   ALB: 2.0 (L) TOT PROTEIN: 5.6 (L) LIPASE: N/A     INR:  2.05 (H) PTT:  N/A   D-dimer:  N/A Fibrinogen:  N/A       Imaging results reviewed over the past 24 hrs:   No results found for this or any previous visit (from the past 24 hours).

## 2025-06-14 NOTE — PROGRESS NOTES
Hepatology Inpatient Progress Note:          Assessment and Plan:   Karlene Yun is a 59 year old female with a history of decompensated cirrhosis due to metabolic dysfunction associated steatohepatitis complicated by ascites who is listed for liver transplant and presented to the hospital for management of fluid overload. MELD 26. ABO A+.      Decompensated cirrhosis (MELD 26)   Etiology: mash, follows with Dr. Ro   EV: no prior EGD, had EUS - EGD due 8/2025   Ascites: present, no prior para - home diuretics: Bumex 1 mg BID, spironolactone 100 mg daily   HE: A&O x4, on lactulose and rifaximin   HCC: Due, none on last U/S 11/2024  Hydrothorax: see below   Transplant candidacy: listed; ASCUS on pap smear with plan for colposcopy (does not preclude being active on the liver transplant list)    Anasarca   Patient is significantly volume overloaded on clinical examination. She is noted to have ascites on imaging at the time of admission, never has had a paracentesis before. Echocardiogram 6/8 showing normal EF and no valvular or right heart abnormalities (does have hx cardiomegaly). Most likely related to cirrhosis.     L pleural effusion   Thoracentesis 6/9 labs suggesting transudative effusion without evidence of infection. Cytology negative for malignancy.      Recommendations:   - Continue diuresis with bumex - goal weight around 325 lbs  - Encouraged ambulation three times daily   - Rifaximin and lactulose for goal 1-2 BM a day  - Hold PTA spironolactone   - 2g low sodium diet, high protein supplements TID and at bedtime  - Daily MELD labs   - Hepatology will continue to follow     Patient discussed with attending physician, Dr. Ramos.      Sergio Carlisle MD  Gastroenterology Fellow        Interval events:    - 2.2L negative - doing well, eating and drinking  - No nausea or vomiting          Medications:     Current Facility-Administered Medications   Medication Dose Route Frequency Provider Last Rate  Last Admin    benzocaine-menthol (CHLORASEPTIC MAX) 15-10 MG lozenge 1 lozenge  1 lozenge Buccal Q1H PRN Antoinette Heath MD        bumetanide (BUMEX) injection 2 mg  2 mg Intravenous TID Deja Coulter MD   2 mg at 06/14/25 0905    [Held by provider] bumetanide (BUMEX) tablet 1 mg  1 mg Oral Daily Antoinette Heath MD        calcium carbonate (TUMS) chewable tablet 1,000 mg  1,000 mg Oral 4x Daily PRN Antoinette Heath MD        citalopram (celeXA) tablet 20 mg  20 mg Oral Daily Antoinette Heath MD   20 mg at 06/14/25 0905    gabapentin (NEURONTIN) capsule 300 mg  300 mg Oral QAM Atnoinette Heath MD   300 mg at 06/14/25 0905    And    gabapentin (NEURONTIN) capsule 600 mg  600 mg Oral QPM Antoinette Heath MD   600 mg at 06/13/25 1953    lactulose (CHRONULAC) solution 10 g  10 g Oral QAM Verna Ramos MD   10 g at 06/14/25 0905    levothyroxine (SYNTHROID/LEVOTHROID) tablet 112 mcg  112 mcg Oral Daily Antoinette Heath MD   112 mcg at 06/14/25 0905    lidocaine (LMX4) cream   Topical Q1H PRN Antoinette Heath MD        lidocaine 1 % 0.1-1 mL  0.1-1 mL Other Q1H PRN Antoinette Heath MD        melatonin tablet 5 mg  5 mg Oral At Bedtime PRN Antoinette Heath MD        rifaximin (XIFAXAN) tablet 550 mg  550 mg Oral BID Antoinette Heath MD   550 mg at 06/14/25 0905    senna-docusate (SENOKOT-S/PERICOLACE) 8.6-50 MG per tablet 1 tablet  1 tablet Oral BID PRN Antoinette Heath MD        Or    senna-docusate (SENOKOT-S/PERICOLACE) 8.6-50 MG per tablet 2 tablet  2 tablet Oral BID PRN Antoinette Heath MD        sodium chloride (PF) 0.9% PF flush 3 mL  3 mL Intracatheter Q8H DARINEL Antoinette Heath MD   3 mL at 06/13/25 1439    sodium chloride (PF) 0.9% PF flush 3 mL  3 mL Intracatheter q1 min prn Antoinette Heath MD   3 mL at 06/09/25 2130    [Held by provider] spironolactone (ALDACTONE) tablet 50 mg  50 mg Oral Daily Antoinette Heath MD        traZODone (DESYREL) tablet 100 mg  100 mg Oral At Bedtime Antoinette Heath MD   100 mg at 06/13/25 2225            Physical Exam:   VS:  /40 (BP  "Location: Right arm)   Pulse 72   Temp 98.2  F (36.8  C) (Oral)   Resp 18   Ht 1.803 m (5' 11\")   Wt (!) 150.1 kg (331 lb)   SpO2 97%   BMI 46.17 kg/m      Wt:   Wt Readings from Last 2 Encounters:   25 (!) 150.1 kg (331 lb)   25 (!) 149.2 kg (328 lb 14.4 oz)      Physical Examination:  Gen: sitting up in bed, in no acute distress  HEENT: +icteric conjunctiva, moist mucous membranes  Pulm: normal work of breathing on ambient air   Abd: soft, distended, NT, no rebound or guarding   Ext: moving spontaneously   Skin: warm, well perfused   Neuro: alert and oriented, conversant         Reviewed data:     MELD 3.0: 26 at 2025  7:08 AM  MELD-Na: 25 at 2025  7:08 AM  Calculated from:  Serum Creatinine: 1.02 mg/dL at 2025  7:08 AM  Serum Sodium: 130 mmol/L at 2025  7:08 AM  Total Bilirubin: 3.7 mg/dL at 2025  7:08 AM  Serum Albumin: 2 g/dL at 2025  7:08 AM  INR(ratio): 2.05 at 2025  7:08 AM  Age at listin years  Sex: Female at 2025  7:08 AM    No ultrasound this admission     CXR 25  \"IMPRESSION:  1. Stable moderate left pleural effusion when compared to CTA  Pulmonary angiogram  2025.  2. Mild pulmonary edema\"    "

## 2025-06-14 NOTE — PLAN OF CARE
"/86 (BP Location: Right arm)   Pulse 68   Temp 98.4  F (36.9  C) (Oral)   Resp 16   Ht 1.803 m (5' 11\")   Wt (!) 150.1 kg (331 lb)   SpO2 92%   BMI 46.17 kg/m      Neuro: A&Ox4. Able to make needs known.   Respiratory: WDL on RA   Cardiac: WDL   GI/: Pt voiding adequately into primafit/into toilet. One BM this shift, passing flatus.   Diet: Tolerating regular diet   Pain: denies   Incisions/Drains: Pt requested lymph wraps to be removed d/t extreme itching. Legs elevated on pillows.   IV Access: R PIV   Labs:Mag replaced- recheck in AM. Platelets 40 (MD notified).    Activity: Up SBA       New changes this shift: PRN Diuril added.   Plan:  Continue POC    "

## 2025-06-15 ENCOUNTER — APPOINTMENT (OUTPATIENT)
Dept: PHYSICAL THERAPY | Facility: CLINIC | Age: 60
End: 2025-06-15
Payer: COMMERCIAL

## 2025-06-15 VITALS
HEART RATE: 68 BPM | WEIGHT: 293 LBS | HEIGHT: 71 IN | DIASTOLIC BLOOD PRESSURE: 65 MMHG | SYSTOLIC BLOOD PRESSURE: 138 MMHG | BODY MASS INDEX: 41.02 KG/M2 | OXYGEN SATURATION: 95 % | RESPIRATION RATE: 18 BRPM | TEMPERATURE: 98.7 F

## 2025-06-15 LAB
ALBUMIN SERPL BCG-MCNC: 2.3 G/DL (ref 3.5–5.2)
ALP SERPL-CCNC: 70 U/L (ref 40–150)
ALT SERPL W P-5'-P-CCNC: 31 U/L (ref 0–50)
ANION GAP SERPL CALCULATED.3IONS-SCNC: 8 MMOL/L (ref 7–15)
AST SERPL W P-5'-P-CCNC: 70 U/L (ref 0–45)
BILIRUB SERPL-MCNC: 4.1 MG/DL
BILIRUBIN DIRECT (ROCHE PRO & PURE): 1.88 MG/DL (ref 0–0.45)
BUN SERPL-MCNC: 27.5 MG/DL (ref 8–23)
CALCIUM SERPL-MCNC: 8.3 MG/DL (ref 8.8–10.4)
CHLORIDE SERPL-SCNC: 95 MMOL/L (ref 98–107)
CREAT SERPL-MCNC: 1 MG/DL (ref 0.51–0.95)
EGFRCR SERPLBLD CKD-EPI 2021: 64 ML/MIN/1.73M2
ERYTHROCYTE [DISTWIDTH] IN BLOOD BY AUTOMATED COUNT: 16.9 % (ref 10–15)
GLUCOSE SERPL-MCNC: 100 MG/DL (ref 70–99)
HCO3 SERPL-SCNC: 27 MMOL/L (ref 22–29)
HCT VFR BLD AUTO: 31.2 % (ref 35–47)
HGB BLD-MCNC: 10.9 G/DL (ref 11.7–15.7)
INR PPP: 1.89 (ref 0.85–1.15)
MAGNESIUM SERPL-MCNC: 1.8 MG/DL (ref 1.7–2.3)
MCH RBC QN AUTO: 32.2 PG (ref 26.5–33)
MCHC RBC AUTO-ENTMCNC: 34.9 G/DL (ref 31.5–36.5)
MCV RBC AUTO: 92 FL (ref 78–100)
PLATELET # BLD AUTO: 70 10E3/UL (ref 150–450)
POTASSIUM SERPL-SCNC: 3.8 MMOL/L (ref 3.4–5.3)
POTASSIUM SERPL-SCNC: 4.1 MMOL/L (ref 3.4–5.3)
PROT SERPL-MCNC: 6.4 G/DL (ref 6.4–8.3)
PROTHROMBIN TIME: 21.4 SECONDS (ref 11.8–14.8)
RBC # BLD AUTO: 3.39 10E6/UL (ref 3.8–5.2)
SODIUM SERPL-SCNC: 130 MMOL/L (ref 135–145)
WBC # BLD AUTO: 6.4 10E3/UL (ref 4–11)

## 2025-06-15 PROCEDURE — 250N000013 HC RX MED GY IP 250 OP 250 PS 637

## 2025-06-15 PROCEDURE — 97535 SELF CARE MNGMENT TRAINING: CPT | Mod: GP | Performed by: REHABILITATION PRACTITIONER

## 2025-06-15 PROCEDURE — 250N000013 HC RX MED GY IP 250 OP 250 PS 637: Performed by: INTERNAL MEDICINE

## 2025-06-15 PROCEDURE — 83735 ASSAY OF MAGNESIUM: CPT | Performed by: STUDENT IN AN ORGANIZED HEALTH CARE EDUCATION/TRAINING PROGRAM

## 2025-06-15 PROCEDURE — 85610 PROTHROMBIN TIME: CPT | Performed by: STUDENT IN AN ORGANIZED HEALTH CARE EDUCATION/TRAINING PROGRAM

## 2025-06-15 PROCEDURE — 82947 ASSAY GLUCOSE BLOOD QUANT: CPT

## 2025-06-15 PROCEDURE — 250N000013 HC RX MED GY IP 250 OP 250 PS 637: Performed by: STUDENT IN AN ORGANIZED HEALTH CARE EDUCATION/TRAINING PROGRAM

## 2025-06-15 PROCEDURE — 84155 ASSAY OF PROTEIN SERUM: CPT

## 2025-06-15 PROCEDURE — 85027 COMPLETE CBC AUTOMATED: CPT

## 2025-06-15 PROCEDURE — 80048 BASIC METABOLIC PNL TOTAL CA: CPT | Performed by: STUDENT IN AN ORGANIZED HEALTH CARE EDUCATION/TRAINING PROGRAM

## 2025-06-15 PROCEDURE — 36415 COLL VENOUS BLD VENIPUNCTURE: CPT

## 2025-06-15 PROCEDURE — 99232 SBSQ HOSP IP/OBS MODERATE 35: CPT | Mod: GC | Performed by: INTERNAL MEDICINE

## 2025-06-15 PROCEDURE — 36415 COLL VENOUS BLD VENIPUNCTURE: CPT | Performed by: STUDENT IN AN ORGANIZED HEALTH CARE EDUCATION/TRAINING PROGRAM

## 2025-06-15 PROCEDURE — 99239 HOSP IP/OBS DSCHRG MGMT >30: CPT | Performed by: STUDENT IN AN ORGANIZED HEALTH CARE EDUCATION/TRAINING PROGRAM

## 2025-06-15 PROCEDURE — 250N000011 HC RX IP 250 OP 636: Performed by: STUDENT IN AN ORGANIZED HEALTH CARE EDUCATION/TRAINING PROGRAM

## 2025-06-15 RX ORDER — POTASSIUM CHLORIDE 750 MG/1
20 TABLET, EXTENDED RELEASE ORAL ONCE
Status: COMPLETED | OUTPATIENT
Start: 2025-06-15 | End: 2025-06-15

## 2025-06-15 RX ORDER — BUMETANIDE 2 MG/1
2 TABLET ORAL
Status: DISCONTINUED | OUTPATIENT
Start: 2025-06-15 | End: 2025-06-15

## 2025-06-15 RX ORDER — SPIRONOLACTONE 50 MG/1
50 TABLET, FILM COATED ORAL DAILY
Status: DISCONTINUED | OUTPATIENT
Start: 2025-06-15 | End: 2025-06-15 | Stop reason: HOSPADM

## 2025-06-15 RX ORDER — MAGNESIUM SULFATE HEPTAHYDRATE 40 MG/ML
2 INJECTION, SOLUTION INTRAVENOUS ONCE
Status: COMPLETED | OUTPATIENT
Start: 2025-06-15 | End: 2025-06-15

## 2025-06-15 RX ORDER — BUMETANIDE 2 MG/1
2 TABLET ORAL
Qty: 120 TABLET | Refills: 0 | Status: ON HOLD | OUTPATIENT
Start: 2025-06-15 | End: 2025-07-01

## 2025-06-15 RX ORDER — BUMETANIDE 2 MG/1
2 TABLET ORAL
Status: DISCONTINUED | OUTPATIENT
Start: 2025-06-15 | End: 2025-06-15 | Stop reason: HOSPADM

## 2025-06-15 RX ORDER — SPIRONOLACTONE 50 MG/1
50 TABLET, FILM COATED ORAL DAILY
Qty: 60 TABLET | Refills: 0 | Status: ON HOLD | OUTPATIENT
Start: 2025-06-15 | End: 2025-07-01

## 2025-06-15 RX ADMIN — RIFAXIMIN 550 MG: 550 TABLET ORAL at 08:19

## 2025-06-15 RX ADMIN — LACTULOSE 10 G: 20 SOLUTION ORAL at 08:19

## 2025-06-15 RX ADMIN — MAGNESIUM SULFATE HEPTAHYDRATE 2 G: 2 INJECTION, SOLUTION INTRAVENOUS at 12:30

## 2025-06-15 RX ADMIN — SPIRONOLACTONE 50 MG: 50 TABLET, FILM COATED ORAL at 12:29

## 2025-06-15 RX ADMIN — LEVOTHYROXINE SODIUM 112 MCG: 0.11 TABLET ORAL at 08:19

## 2025-06-15 RX ADMIN — GABAPENTIN 300 MG: 300 CAPSULE ORAL at 08:19

## 2025-06-15 RX ADMIN — BUMETANIDE 2 MG: 2 TABLET ORAL at 08:22

## 2025-06-15 RX ADMIN — POTASSIUM CHLORIDE 20 MEQ: 750 TABLET, EXTENDED RELEASE ORAL at 08:19

## 2025-06-15 RX ADMIN — POTASSIUM CHLORIDE 40 MEQ: 750 TABLET, EXTENDED RELEASE ORAL at 00:11

## 2025-06-15 RX ADMIN — CITALOPRAM HYDROBROMIDE 20 MG: 20 TABLET ORAL at 08:19

## 2025-06-15 ASSESSMENT — ACTIVITIES OF DAILY LIVING (ADL)
ADLS_ACUITY_SCORE: 47

## 2025-06-15 NOTE — PROGRESS NOTES
Hepatology Inpatient Progress Note:          Assessment and Plan:   Karlene Yun is a 59 year old female with a history of decompensated cirrhosis due to metabolic dysfunction associated steatohepatitis complicated by ascites who is listed for liver transplant and presented to the hospital for management of fluid overload. MELD 25. ABO A+.      Decompensated cirrhosis (MELD 25)   Etiology: mash, follows with Dr. Ro   EV: no prior EGD, had EUS - EGD due 8/2025   Ascites: present, no prior para - home diuretics: Bumex 1 mg BID, spironolactone 100 mg daily   HE: A&O x4, on lactulose and rifaximin   HCC: Due, none on last U/S 11/2024  Hydrothorax: see below   Transplant candidacy: listed; ASCUS on pap smear with plan for colposcopy (does not preclude being active on the liver transplant list)    Anasarca   Patient is significantly volume overloaded on clinical examination. She is noted to have ascites on imaging at the time of admission, never has had a paracentesis before. Echocardiogram 6/8 showing normal EF and no valvular or right heart abnormalities (does have hx cardiomegaly). Most likely related to cirrhosis.     L pleural effusion   Thoracentesis 6/9 labs suggesting transudative effusion without evidence of infection. Cytology negative for malignancy.      Recommendations:   - Transition to oral Bumex 2 mg twice daily with initiation of spironolactone 50 mg - goal weight around 325 lbs  - Okay to discharge from GI perspective, will need labs this week (we can arrange)  - Encouraged ambulation three times daily   - Rifaximin and lactulose for goal 1-2 BM a day  - 2g low sodium diet, high protein supplements TID and at bedtime  - Daily MELD labs   - Hepatology will continue to follow     Outpatient follow-up: Dr. Nora Morejon 8/26/2025    Patient discussed with attending physician, Dr. Ramos.      Sergio Carlisle MD  Gastroenterology Fellow        Interval events:    - Patient feels well today,  wondering if she can go home sometime soon  - She is net -2.8 L yesterday and overall 14L this admission  - She is eating breakfast without any abdominal pain         Medications:     Current Facility-Administered Medications   Medication Dose Route Frequency Provider Last Rate Last Admin    benzocaine-menthol (CHLORASEPTIC MAX) 15-10 MG lozenge 1 lozenge  1 lozenge Buccal Q1H PRN Antoinette Heath MD        [Held by provider] bumetanide (BUMEX) tablet 1 mg  1 mg Oral Daily Antoinette Heath MD        bumetanide (BUMEX) tablet 2 mg  2 mg Oral TID Sydney Hylton MD   2 mg at 06/15/25 0822    calcium carbonate (TUMS) chewable tablet 1,000 mg  1,000 mg Oral 4x Daily PRN Antoinette Heath MD        citalopram (celeXA) tablet 20 mg  20 mg Oral Daily Antoinette Heath MD   20 mg at 06/15/25 0819    gabapentin (NEURONTIN) capsule 300 mg  300 mg Oral QAM Antoinette Heath MD   300 mg at 06/15/25 0819    And    gabapentin (NEURONTIN) capsule 600 mg  600 mg Oral QPM Antoinette Heath MD   600 mg at 06/14/25 2138    lactulose (CHRONULAC) solution 10 g  10 g Oral QAM Verna Ramos MD   10 g at 06/15/25 0819    levothyroxine (SYNTHROID/LEVOTHROID) tablet 112 mcg  112 mcg Oral Daily Antoinette Heath MD   112 mcg at 06/15/25 0819    lidocaine (LMX4) cream   Topical Q1H PRN Antoinette Haeth MD        lidocaine 1 % 0.1-1 mL  0.1-1 mL Other Q1H PRN Antoinette Heath MD        melatonin tablet 5 mg  5 mg Oral At Bedtime PRN Antoinette Heath MD        rifaximin (XIFAXAN) tablet 550 mg  550 mg Oral BID Antoinette Heath MD   550 mg at 06/15/25 0819    senna-docusate (SENOKOT-S/PERICOLACE) 8.6-50 MG per tablet 1 tablet  1 tablet Oral BID PRN Antoinette Heath MD        Or    senna-docusate (SENOKOT-S/PERICOLACE) 8.6-50 MG per tablet 2 tablet  2 tablet Oral BID PRN Antoinette Heath MD        sodium chloride (PF) 0.9% PF flush 3 mL  3 mL Intracatheter Q8H DARINEL Antoinette Heath MD   3 mL at 06/15/25 0820    sodium chloride (PF) 0.9% PF flush 3 mL  3 mL Intracatheter q1 min prn Antoinette Heath MD   3 mL  "at 25    [Held by provider] spironolactone (ALDACTONE) tablet 50 mg  50 mg Oral Daily Antoinette Heath MD        traZODone (DESYREL) tablet 100 mg  100 mg Oral At Bedtime Antoinette Heath MD   100 mg at 25            Physical Exam:   VS:  /56 (BP Location: Right arm)   Pulse 71   Temp 98  F (36.7  C) (Oral)   Resp 18   Ht 1.803 m (5' 11\")   Wt (!) 150.1 kg (331 lb)   SpO2 93%   BMI 46.17 kg/m      Wt:   Wt Readings from Last 2 Encounters:   25 (!) 150.1 kg (331 lb)   25 (!) 149.2 kg (328 lb 14.4 oz)      Physical Examination:  Gen: sitting up in bed, in no acute distress  HEENT: +icteric conjunctiva, moist mucous membranes  Pulm: normal work of breathing on ambient air   Abd: soft, distended, NT, no rebound or guarding   Ext: moving spontaneously   Skin: warm, well perfused   Neuro: alert and oriented, conversant         Reviewed data:     MELD 3.0: 25 at 6/15/2025  7:26 AM  MELD-Na: 24 at 6/15/2025  7:26 AM  Calculated from:  Serum Creatinine: 1 mg/dL at 6/15/2025  7:26 AM  Serum Sodium: 130 mmol/L at 6/15/2025  7:26 AM  Total Bilirubin: 4.1 mg/dL at 6/15/2025  7:26 AM  Serum Albumin: 2.3 g/dL at 6/15/2025  7:26 AM  INR(ratio): 1.89 at 6/15/2025  7:26 AM  Age at listin years  Sex: Female at 6/15/2025  7:26 AM    No ultrasound this admission     CXR 25  \"IMPRESSION:  1. Stable moderate left pleural effusion when compared to CTA  Pulmonary angiogram  2025.  2. Mild pulmonary edema\"    "

## 2025-06-15 NOTE — PLAN OF CARE
Patient discharged home following hospital stay for: fluid overload     Vital signs stable  Oxygen status: stable on RA   Patient tolerating 2GM NA diet     Belongings sent with patient. IV site discontinued. Discharge meds to discharge pharmacy. AVS and education gone over with patient. Pt to follow up as outpatient and with PCP. Pt verbalized understanding of all education and information.

## 2025-06-15 NOTE — DISCHARGE SUMMARY
"[facility]  Hospitalist Discharge Summary      Date of Admission:  6/7/2025  Date of Discharge:  6/15/2025  Discharging Provider: Sydney Chin MD  Discharge Service: Medicine Service, NASRIN TEAM 4    Discharge Diagnoses   See below    Clinically Significant Risk Factors     # Morbid Obesity: Estimated body mass index is 45.68 kg/m  as calculated from the following:    Height as of this encounter: 1.803 m (5' 11\").    Weight as of this encounter: 148.6 kg (327 lb 8 oz).       Follow-ups Needed After Discharge   Follow-up Appointments       ADULT [facility] Specialty Follow-up and recommended labs and tests      Follow up with your hepatology team as planned.  You will need weekly lab monitoring that your transplant team will arrange.  Follow up in lymphedema clinic.    Appointments on [place] and/or [facility] (with [facility] or [facility] provider or service). Call [phone] if you haven't heard regarding these appointments within 7 days of discharge.                Unresulted Labs Ordered in the Past 30 Days of this Admission       Date and Time Order Name Status Description    6/9/2025  7:57 AM Fungus Culture, non-blood Preliminary         These results will be followed up by hospitalist pool    Discharge Disposition   Discharged to home  Condition at discharge: Stable    Hospital Course   60 yo female w/ MASH cirrhosis, chronic left-sided pleural effusion, psoriatic arthritis admitted for dyspnea and anasarca.     Dyspnea, improved  Pleural effusion, left-sided, chronic of unclear etiology  Follows pulm, pleural effusion appeared between Dec 2024 and Feb 2025. 3/6/25 pleural fluid studies have been consistent with exudate; however pleural protein levels relatively low. No apparent malignant process with negative cytology. PFTs with mild restriction. No signs of infection. CT/PE this admission was negative for PE and showed large left pleural effusion and ascites. 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 No respiratory distress and satting well on room air at time of discharge.    Anasarca  Pt reports 30 lb weight gain in a few weeks pta. TTE this admission with normal EF, UA without proteinuria. Suspect 2/2 liver failure. Managed with aggressive IV diuresis during admission with some improvement. In discussion with hepatology team, will transition her to 2 mg bumex BID + 50 mg spironolactone daily on discharge. She will be getting weekly labs per the transplant team for ongoing monitoring and was instructed on weighing herself daily. Also referred to lymphedema therapy appointment for ongoing LE wrapping.     Cirrhosis 2/2 MASH c/b ascites  Hyperbilirubinemia (around baseline)  Thrombocytopenia (around baseline, 70)  Follows with hepatology, on liver transplant list since April 2025. Immunosuppressed and not a candidate for vaccines. Hepatology followed during admission. Continued on lactulose and rifaximin.      Psoriatic arthritis  Psoriasis  Was on methotrexate for 20+ years, discontinued d/t cirrhosis. Currently getting monthly ixekizumab injections. Defer ongoing management of this to her outpatient providers.     Obesity  Deconditioning  PT consulted during admission.      Acquired hypothyroidism  Continued levothyroxine.     MDD  Insomnia  Continued citalopram and trazodone.     NEREYDA, moderate  Has seen sleep medicine. Recs APAP 5-15 cm H2O. Does not use CPAP, it gives her anxiety.    Enlarged mediastinal and 1.5 cm subcarinal lymph nodes  EUS 8/9/24 w/ FNA: polymorphous lymphocytes. Not addressed this admission.       Consultations This Hospital Stay   NURSING TO CONSULT FOR VASCULAR ACCESS CARE IP CONSULT  PHYSICAL THERAPY ADULT IP CONSULT  NUTRITION SERVICES ADULT IP CONSULT  GI HEPATOLOGY ADULT IP CONSULT  NURSING TO CONSULT FOR VASCULAR ACCESS CARE IP CONSULT  INTERNAL MEDICINE  PROCEDURE TEAM ADULT IP CONSULT - PARACENTESIS  INTERNAL MEDICINE PROCEDURE TEAM ADULT IP CONSULT - THORACENTESIS  NURSING TO CONSULT FOR VASCULAR ACCESS CARE IP CONSULT    Code Status   Full Code    Time Spent on this Encounter   I, Sydney Chin MD, personally saw the patient today and spent greater than 30 minutes discharging this patient.       Sydney Chin MD  [facility] [address] [address]  Phone: [phone]  ______________________________________________________________________    Physical Exam   Vital Signs: Temp: 98  F (36.7  C) Temp src: Oral BP: 126/56 Pulse: 71   Resp: 18 SpO2: 93 % O2 Device: Nasal cannula Oxygen Delivery: 2 LPM  Weight: 327 lbs 8 oz  General Appearance: Sitting up in bed, NAD  Respiratory: Breathing non-labored on RA, CTAB  Cardiovascular: RRR  GI: Soft, non-tender  Other: 3+ bilateral LE edema, being wrapped with PT        Primary Care Physician   María Hanson    Discharge Orders      Physical Therapy  Referral      Reason for your hospital stay    You were hospitalized for volume overload and treated with IV diuretic medications. You will continue with oral diuretics after discharge.     Activity    Your activity upon discharge: activity as tolerated     ADULT [facility] Specialty Follow-up and recommended labs and tests    Follow up with your hepatology team as planned.  You will need weekly lab monitoring that your transplant team will arrange.  Follow up in lymphedema clinic.    Appointments on [place] and/or [facility] (with [facility] or [facility] provider or service). Call [phone] if you haven't heard regarding these appointments within 7 days of discharge.     Diet    Follow this diet upon discharge: Current Diet:Orders Placed This Encounter      Fluid restriction OTHER (SEE COMMENTS) (2500 mL)      Snacks/Supplements Adult: Ensure Max Protein (bariatric); With Meals      2 Gram Sodium Diet        Significant Results and Procedures   Most Recent 3 CBC's:  Recent Labs   Lab Test 06/15/25  0726 06/14/25  0708 06/13/25  0622   WBC 6.4 4.5 4.5   HGB 10.9* 9.6* 10.0*   MCV 92 94 94   PLT 70* 40* 57*     Most Recent 3 BMP's:  Recent Labs   Lab Test 06/15/25  0726 06/15/25  0327 06/14/25 2000 06/14/25  0708   *  --  129* 130*   POTASSIUM 4.1 3.8 3.3* 3.9   CHLORIDE 95*  --  93* 96*   CO2 27  --  26 27   BUN 27.5*  --  27.9* 23.3*   CR 1.00*  --  1.10* 1.02*   ANIONGAP 8  --  10 7   SANDY 8.3*  --  8.2* 8.0*   *  --  194* 110*     Most Recent 2 LFT's:  Recent Labs   Lab Test 06/15/25  0726 06/14/25  0708   AST 70* 65*   ALT 31 27   ALKPHOS 70 62   BILITOTAL 4.1* 3.7*   ,   Results for orders placed or performed during the hospital encounter of 06/07/25   CT Chest Pulmonary Embolism w Contrast    Narrative    EXAM: CT CHEST PULMONARY EMBOLISM W CONTRAST  LOCATION: [facility]  DATE: 6/8/2025    INDICATION: Shortness of breath, elevated D-dimer, LE edema, question PE or other abnormality (history of liver disease, on transplant list).  COMPARISON: 4/15/2025.  TECHNIQUE: CT chest pulmonary angiogram during arterial phase injection of IV contrast. Multiplanar reformats and MIP reconstructions were performed. Dose reduction techniques were used.   CONTRAST: Isovue 370    FINDINGS:  ANGIOGRAM CHEST: Pulmonary arteries are normal caliber and negative for pulmonary emboli. Thoracic aorta is negative for dissection. No CT evidence of right heart strain.    LUNGS AND PLEURA: Interval large left pleural effusion with associated compressive atelectasis. Right lung clear. No concerning or actionable pulmonary nodules.    MEDIASTINUM/AXILLAE: No lymphadenopathy. Normal esophagus. No significant pericardial effusion. No thoracic aortic aneurysm.    CORONARY ARTERY CALCIFICATION: None.    UPPER ABDOMEN: Ascites. Cirrhotic appearing liver.    MUSCULOSKELETAL: Mild  spondylosis.      Impression    IMPRESSION:  1.  Negative for pulmonary embolism.  2.  Interval large left pleural effusion with associated compressive atelectasis.  3.  Cirrhotic appearing liver with ascites.     XR Chest Port 1 View    Narrative    EXAM:  XR CHEST PORT 1 VIEW    INDICATION: eval for improvement in pleural effusion    COMPARISON:  CT PE 6/8/2025    FINDINGS:  Single AP view of the chest.    Cardiomediastinal silhouette within normal limits. Bilateral  interstitial prominence. Retrocardiac opacity silhouetting the  hemidiaphragm. No pneumothorax.  Left pleural effusion.       Impression    IMPRESSION:  1. Stable moderate left pleural effusion when compared to CTA  Pulmonary angiogram  6/8/2025.  2. Mild pulmonary edema    I have personally reviewed the examination and initial interpretation  and I agree with the findings.    AMADOR HERRERA MD         SYSTEM ID:  [id number]   POC US GUIDE FOR PARACENTESIS    Impression    US Indication: abdominal distension    Limited abdominal ultrasound was performed to evaluate for ascites and need for paracentesis.     Views/Acquisition: hepatorenal/RUQ, right lower quadrant, parasplenic/LUQ, left lower quadrant, and suprapubic    Findings/Interpretation: No significant ascites    Benny Worthy MD    POC US GUIDE FOR THORACENTESIS    Impression    Limited chest ultrasound was performed and demonstrated an adequate fluid collection on the left hemithorax.     Thoracentesis Indication:pleural effusion    Doppler of the skin demonstrated an area at this site without significant vasculature.  A thoracentesis at this site was subsequently performed.    Benny Worthy MD    XR Chest Port 1 View    Narrative    Exam: XR CHEST PORT 1 VIEW, 6/10/2025 11:16 AM    Comparison: Prior chest x-rays including 6/9/2025     History: s/p thoracentesis eval for pneumo    Findings:  AP semiupright portable radiograph of the chest. Mediastinum is within  normal limits. Stable  enlarged cardiac silhouette. Retrocardiac  opacity silhouetting the left hemidiaphragm. No acute airspace  disease. Mild blunting of the left costophrenic angle, unchanged. No  discernible pneumothorax. No right pleural effusion.      Impression    Impression: Layering left pleural effusion, no significant change  compared to yesterday. Likely associated retrocardiac atelectasis. No  pneumothorax.    I have personally reviewed the examination and initial interpretation  and I agree with the findings.    DEANA CISNEROS MD         SYSTEM ID:  [id number]   Echocardiogram Limited     Value    LVEF  60-65%    Narrative    [id number]  [id number]  [id number]  [id number]^MICHELLE^BEV^     [facility]  Echocardiography Laboratory  [address] [place] [address]     Name: JOHNSON NUNES  MRN: [id number]  : 1965  Study Date: 2025 10:42 AM  Age: 59 yrs  Gender: Female  Patient Location: [facility]  Reason For Study: Edema, CHF  Ordering Physician: BEV MARTINEZ  Performed By: Cathie Tatum     BSA: 2.6 m2  Height: 71 in  Weight: 328 lb  BP: 127/61 mmHg  ______________________________________________________________________________  Procedure  Limited Echocardiogram with two-dimensional, color and spectral Doppler.  Contrast Optison. Optison (NDC #6518-0108-29) given intravenously. Patient was  given 5 ml mixture of 3 ml Optison and 6 ml saline. 4 ml wasted.  ______________________________________________________________________________  Interpretation Summary  Global and regional left ventricular function is normal with an EF of 60-65%.  Global right ventricular function is normal.  No significant valvular abnormalities present.  Pulmonary artery systolic pressure is normal.  The inferior vena cava was normal in size with preserved respiratory  variability.  No pericardial effusion is  present.  ______________________________________________________________________________  Left Ventricle  Left ventricular size is normal. Left ventricular wall thickness is normal.  Global and regional left ventricular function is normal with an EF of 60-65%.  No regional wall motion abnormalities are seen.     Right Ventricle  The right ventricle is normal size. Global right ventricular function is  normal.     Atria  Both atria appear normal.     Mitral Valve  The mitral valve is normal.     Aortic Valve  Aortic valve is normal in structure and function.     Tricuspid Valve  The tricuspid valve is normal. Trace to mild tricuspid insufficiency is  present. The right ventricular systolic pressure is approximated at 25.1 mmHg  plus the right atrial pressure. Pulmonary artery systolic pressure is normal.     Pulmonic Valve  The pulmonic valve is normal.     Vessels  The aorta root is normal. The inferior vena cava was normal in size with  preserved respiratory variability.     Pericardium  No pericardial effusion is present.     Miscellaneous  Bilateral pleural effusions persent. No significant valvular abnormalities  present.     Compared to Previous Study  This study was compared with the study from  . Biventricular function  is unchanged.  ______________________________________________________________________________  MMode/2D Measurements & Calculations  IVSd: 1.0 cm  LVIDd: 5.1 cm  LVIDs: 3.2 cm  LVPWd: 1.1 cm  FS: 38.6 %  LV mass(C)d: 200.1 grams  LV mass(C)dI: 76.9 grams/m2  RWT: 0.41     Doppler Measurements & Calculations  TR max graham: 250.2 cm/sec  TR max P.1 mmHg     ______________________________________________________________________________  Report approved by: Hui Newman MD on 2025 12:44 PM             Discharge Medications   Current Discharge Medication List        CONTINUE these medications which have CHANGED    Details   bumetanide (BUMEX) 2 MG tablet Take 1 tablet (2 mg) by  mouth 2 times daily.  Qty: 120 tablet, Refills: 0    Associated Diagnoses: Anasarca      spironolactone (ALDACTONE) 50 MG tablet Take 1 tablet (50 mg) by mouth daily.  Qty: 60 tablet, Refills: 0    Associated Diagnoses: Anasarca           CONTINUE these medications which have NOT CHANGED    Details   citalopram (CELEXA) 20 MG tablet Take 1 tablet (20 mg) by mouth daily.  Qty: 90 tablet, Refills: 3    Associated Diagnoses: Major depressive disorder, recurrent episode, in full remission      gabapentin (NEURONTIN) 300 MG capsule 300 mg qam, 600 mg qpm  Qty: 270 capsule, Refills: 1    Associated Diagnoses: Chronic fatigue syndrome with fibromyalgia; Primary osteoarthritis of left knee      ixekizumab (TALTZ) 80 MG/ML SOAJ auto-injector 160 mg once, followed by 80 mg at weeks 2, 4, 6, 8, 10, and 12, and then 80 mg every 4 weeks  Qty: 9 mL, Refills: 0    Comments: MTM  Associated Diagnoses: Plaque psoriasis      lactulose 20 GM/30ML solution Take 15 mLs (10 g) by mouth every morning.  Qty: 946 mL, Refills: 4    Associated Diagnoses: Liver cirrhosis secondary to HAMPTON (H)      levothyroxine (SYNTHROID/LEVOTHROID) 112 MCG tablet Take 1 tablet (112 mcg) by mouth daily.  Qty: 90 tablet, Refills: 3    Associated Diagnoses: Acquired hypothyroidism      Lidocaine (LIDOCARE) 4 % Patch Place 1 patch onto the skin daily as needed for moderate pain.      mometasone (ELOCON) 0.1 % external solution Apply thin layer twice a day as needed to rash on scalp      tacrolimus (PROTOPIC) 0.1 % external ointment Apply topically as needed    Associated Diagnoses: Psoriatic arthropathy (H)      traZODone (DESYREL) 100 MG tablet Take 1 tablet (100 mg) by mouth at bedtime.  Qty: 90 tablet, Refills: 3    Associated Diagnoses: Psychophysiological insomnia      triamcinolone (KENALOG) 0.1 % external cream Apply topically as needed    Associated Diagnoses: Psoriatic arthropathy (H)      rifaximin (XIFAXAN) 550 MG TABS tablet Take 1 tablet (550 mg) by  mouth 2 times daily.  Qty: 180 tablet, Refills: 3    Associated Diagnoses: Hepatic encephalopathy (H)           Allergies   Allergies   Allergen Reactions    Sumatriptan Succinate Nausea and Vomiting     IMITREX

## 2025-06-15 NOTE — PLAN OF CARE
"[id number]:    Vital signs:  Temp: 98  F (36.7  C) Temp src: Oral BP: 135/47 Pulse: 72   Resp: 16 SpO2: 93 % (pt reported sleep apnea) O2 Device: Nasal cannula Oxygen Delivery: 2 LPM Height: 180.3 cm (5' 11\") Weight: (!) 150.1 kg (331 lb)    Problem: Adult Inpatient Plan of Care  Goal: Absence of Hospital-Acquired Illness or Injury  Intervention: Identify and Manage Fall Risk  Recent Flowsheet Documentation  Taken 6/14/2025 2216 by Aretha Hartley RN  Safety Promotion/Fall Prevention:   activity supervised   lighting adjusted   patient and family education   room near nurse's station   room organization consistent   safety round/check completed   supervised activity     Problem: Adult Inpatient Plan of Care  Goal: Optimal Comfort and Wellbeing  Intervention: Monitor Pain and Promote Comfort  Recent Flowsheet Documentation  Taken 6/14/2025 2217 by Aretha Hartley RN  Pain Management Interventions:   heat applied   medication (see MAR)    Activity: Repositions self in bed.  Neuros: A & O x4. Neuro intact. PERRLA.   Cardiac: WDL. Asymptomatic.   Respiratory: LS diminished in bases. Encouraged deep breathing and coughing. De-sats below 88% when sleeping, has hx of sleep apnea, applied 2 L/min NC, O2 sats remained above 92%, Dr. Shabbir notified. Denies SOB. Unlabored.   GI/: BS+, passing flatus, no BM this shift. Primafit in place, has adequate clear michelle urine output.  Diet: 2gm Na diet, 2500 mL fluid restriction, patient aware.  Skin: Scattered bruising. Moderate pitting edema BLE and mild edema BUE.  Lines: PIV saline locked.  Incisions/Drains: None.  Labs: Reviewed. K+ 3.3 at 2000, administered 40 mEq potassium chloride, recheck K+ 3.8, awaiting pharmacy to verify 20 mEq K+ dose, will pass onto next shift RN to administer.  Pain/nausea: 6/10 sharp lower back pain treated with heat packs helpful, denies further pain, slept in between cares. Denies nausea.   New changes this shift: Replaced K+ per replacement protocol and " applied 2 L/min NC to keep O2 above 92%.  Plan: Continue POC.

## 2025-06-16 ENCOUNTER — PATIENT OUTREACH (OUTPATIENT)
Dept: CARE COORDINATION | Facility: CLINIC | Age: 60
End: 2025-06-16
Payer: COMMERCIAL

## 2025-06-16 ENCOUNTER — TELEPHONE (OUTPATIENT)
Dept: TRANSPLANT | Facility: CLINIC | Age: 60
End: 2025-06-16
Payer: COMMERCIAL

## 2025-06-16 DIAGNOSIS — K75.81 METABOLIC DYSFUNCTION-ASSOCIATED STEATOHEPATITIS (MASH): Primary | ICD-10-CM

## 2025-06-16 DIAGNOSIS — R18.8 CIRRHOSIS OF LIVER WITH ASCITES, UNSPECIFIED HEPATIC CIRRHOSIS TYPE (H): ICD-10-CM

## 2025-06-16 DIAGNOSIS — K74.60 CIRRHOSIS OF LIVER WITH ASCITES, UNSPECIFIED HEPATIC CIRRHOSIS TYPE (H): ICD-10-CM

## 2025-06-16 NOTE — PROGRESS NOTES
Clinic Care Coordination Contact  Situation: Patient chart reviewed by RN.    Background: Patient identified via recently discharged list.     Assessment: Per chart review, patient had hospital follow up call with SOT team.     Plan/Recommendations: RN will not complete TCM call due to duplication of outreach.    PABLO PAULSON RN on 6/16/2025 at 2:33 PM

## 2025-06-16 NOTE — TELEPHONE ENCOUNTER
Called patient to check in.  She was just discharged from the hospital last evening.  No shortness of breath today.  No edema.  Will weigh herself every day and record her weights.  Instructed to call transplant coordinator if she gains >3lbs in a day or go to ER if she develops shortness of breath.    She understands that she needs to get labs on Friday, will go to [facility].  Will follow up on Friday afternoon.

## 2025-06-16 NOTE — DISCHARGE SUMMARY
"[facility]  Discharge Summary - Medicine & Pediatrics       Date of Admission:  6/7/2025  Date of Discharge:  6/15/2025  3:50 PM  Discharging Provider: Dr Hylton  Discharge Service: Medicine Service, NASRIN TEAM 4    Discharge Diagnoses   - Anasarca   - Dyspnea     Clinically Significant Risk Factors     # Morbid Obesity: Estimated body mass index is 45.68 kg/m  as calculated from the following:    Height as of this encounter: 1.803 m (5' 11\").    Weight as of this encounter: 148.6 kg (327 lb 8 oz).       Follow-ups Needed After Discharge   Follow-up Appointments       ADULT [facility] Specialty Follow-up and recommended labs and tests      Follow up with your hepatology team as planned.  You will need weekly lab monitoring that your transplant team will arrange.  Follow up in lymphedema clinic.    Appointments on [place] and/or [facility] (with [facility] or [facility] provider or service). Call [phone] if you haven't heard regarding these appointments within 7 days of discharge.            {Additional important follow-up instructions/to-do's for PCP    As needed    Unresulted Labs Ordered in the Past 30 Days of this Admission       Date and Time Order Name Status Description    6/9/2025  7:57 AM Fungus Culture, non-blood Preliminary         These results will be followed up by Hepatology team    Discharge Disposition   Discharged to home  Condition at discharge: Stable    Hospital Course   Karlene Yun was admitted on 6/7/2025 for anasarca and dyspnea in the setting of decompensated MASH cirrhosis.  The following problems were addressed during her hospitalization;  -# Anasarca:  -# Left pleural effusion  -# Ascitis  -# Cramping 2/2 spironolactone  -# Nutrition  Gained about 30LB in few weeks with massive fluid retention. Managed with IV diuretics (bumetanide and Diuril). Diagnostic thoracocentesis was done with 100 ml of fluid removed. Fluid showed transudative " pattern and no evidence of infection. Improved with diuretics. Patient returned close to baseline weight prior to discharge.  CBC, electrolytes, liver, and kidney functions were trended. Waitlisted for liver transplant, following up with hepatology..    Consultations This Hospital Stay   NURSING TO CONSULT FOR VASCULAR ACCESS CARE IP CONSULT  PHYSICAL THERAPY ADULT IP CONSULT  NUTRITION SERVICES ADULT IP CONSULT  GI HEPATOLOGY ADULT IP CONSULT  NURSING TO CONSULT FOR VASCULAR ACCESS CARE IP CONSULT  INTERNAL MEDICINE PROCEDURE TEAM ADULT IP CONSULT - PARACENTESIS  INTERNAL MEDICINE PROCEDURE TEAM ADULT IP CONSULT - THORACENTESIS  NURSING TO CONSULT FOR VASCULAR ACCESS CARE IP CONSULT    Code Status   Prior: Full    The patient was discussed with Dr. Warner Sarkar  ([facility] Student)  ______________________________________________________________________    Physical Exam   Vital Signs: Temp: 98.7  F (37.1  C) Temp src: Oral BP: 138/65 Pulse: 68   Resp: 18 SpO2: 95 % O2 Device: None (Room air)    Weight: 327 lbs 8 oz  Constitutional: alert and cooperative  Eyes: icteric bilaterally and pale  Respiratory: No increased work of breathing.   Cardiovascular: regular rate and rhythm and normal S1 and S2  GI: large habitus, normal bowel sounds and non-tender  Musculoskeletal: Lower extremity pitting edema present  there is no redness, warmth, or swelling of the joints  full range of motion noted  Neurologic: Grossly normal      Primary Care Physician   María Hanson    Discharge Orders      Physical Therapy  Referral      Reason for your hospital stay    You were hospitalized for volume overload and treated with IV diuretic medications. You will continue with oral diuretics after discharge.     Activity    Your activity upon discharge: activity as tolerated     ADULT [facility] Specialty Follow-up and recommended labs and tests    Follow up with your hepatology team as planned.  You will need weekly  lab monitoring that your transplant team will arrange.  Follow up in lymphedema clinic.    Appointments on [place] and/or [facility] (with [facility] or [facility] provider or service). Call [phone] if you haven't heard regarding these appointments within 7 days of discharge.     Diet    Follow this diet upon discharge: Current Diet:Orders Placed This Encounter      Fluid restriction OTHER (SEE COMMENTS) (2500 mL)      Snacks/Supplements Adult: Ensure Max Protein (bariatric); With Meals      2 Gram Sodium Diet       Significant Results and Procedures   Most Recent 3 CBC's:  Recent Labs   Lab Test 06/15/25  0726 06/14/25  0708 06/13/25  0622   WBC 6.4 4.5 4.5   HGB 10.9* 9.6* 10.0*   MCV 92 94 94   PLT 70* 40* 57*     Most Recent 3 BMP's:  Recent Labs   Lab Test 06/15/25  0726 06/15/25  0327 06/14/25 2000 06/14/25  0708   *  --  129* 130*   POTASSIUM 4.1 3.8 3.3* 3.9   CHLORIDE 95*  --  93* 96*   CO2 27  --  26 27   BUN 27.5*  --  27.9* 23.3*   CR 1.00*  --  1.10* 1.02*   ANIONGAP 8  --  10 7   SANDY 8.3*  --  8.2* 8.0*   *  --  194* 110*     Most Recent 2 LFT's:  Recent Labs   Lab Test 06/15/25  0726 06/14/25  0708   AST 70* 65*   ALT 31 27   ALKPHOS 70 62   BILITOTAL 4.1* 3.7*     Most Recent 3 Creatinines:  Recent Labs   Lab Test 06/15/25  0726 06/14/25 2000 06/14/25  0708   CR 1.00* 1.10* 1.02*     Most Recent 3 Hemoglobins:  Recent Labs   Lab Test 06/15/25  0726 06/14/25  0708 06/13/25  0622   HGB 10.9* 9.6* 10.0*     Most Recent 6 glucoses:  Recent Labs   Lab Test 06/15/25  0726 06/14/25 2000 06/14/25  0708 06/13/25 2015 06/13/25  0622 06/12/25  1832   * 194* 110* 107* 102* 119*     Most Recent Anemia Panel:  Recent Labs   Lab Test 06/15/25  0726 03/06/25  1009 11/26/24  0800   WBC 6.4   < > 4.4   HGB 10.9*   < > 12.8   HCT 31.2*   < > 36.5   MCV 92   < > 96   PLT 70*   < > 64*   IRON  --   --  112   IRONSAT  --   --  64*   FEB  --   --  175*   LUDY  --   --  762*    < > = values in this  interval not displayed.     Procedures:  Thoracocenthesis (Diagnostic) - 100ml      Discharge Medications      Review of your medicines        CHANGE how you take these medications        Dose / Directions   bumetanide 2 MG tablet  Commonly known as: BUMEX  This may have changed:   Used for: Anasarca      Dose: 2 mg  Take 1 tablet (2 mg) by mouth 2 times daily.  Quantity: 120 tablet  Refills: 0     spironolactone 50 MG tablet  Commonly known as: ALDACTONE  Used for: Anasarca      Dose: 50 mg  Take 1 tablet (50 mg) by mouth daily.  Quantity: 60 tablet  Refills: 0       CONTINUE these medicines which have NOT CHANGED        Dose / Directions   citalopram 20 MG tablet  Commonly known as: celeXA  Used for: Major depressive disorder, recurrent episode, in full remission      Dose: 20 mg  Take 1 tablet (20 mg) by mouth daily.  Quantity: 90 tablet  Refills: 3     gabapentin 300 MG capsule  Commonly known as: NEURONTIN  Used for: Chronic fatigue syndrome with fibromyalgia, Primary osteoarthritis of left knee      300 mg qam, 600 mg qpm  Quantity: 270 capsule  Refills: 1     ixekizumab 80 MG/ML Soaj auto-injector  Commonly known as: TALTZ  Used for: Plaque psoriasis      160 mg once, followed by 80 mg at weeks 2, 4, 6, 8, 10, and 12, and then 80 mg every 4 weeks  Quantity: 9 mL  Refills: 0     lactulose 20 GM/30ML solution  Used for: Liver cirrhosis secondary to HAMPTON (H)      Dose: 10 g  Take 15 mLs (10 g) by mouth every morning.  Quantity: 946 mL  Refills: 4     levothyroxine 112 MCG tablet  Commonly known as: SYNTHROID/LEVOTHROID  Used for: Acquired hypothyroidism      Dose: 112 mcg  Take 1 tablet (112 mcg) by mouth daily.  Quantity: 90 tablet  Refills: 3     Lidocaine 4 % Patch  Commonly known as: LIDOCARE      Dose: 1 patch  Place 1 patch onto the skin daily as needed for moderate pain.  Refills: 0     mometasone 0.1 % external solution  Commonly known as: ELOCON      Apply thin layer twice a day as needed to rash on  scalp  Refills: 0     rifaximin 550 MG Tabs tablet  Commonly known as: XIFAXAN  Used for: Hepatic encephalopathy (H)      Dose: 550 mg  Take 1 tablet (550 mg) by mouth 2 times daily.  Quantity: 180 tablet  Refills: 3     tacrolimus 0.1 % external ointment  Commonly known as: PROTOPIC  Used for: Psoriatic arthropathy (H)      Apply topically as needed  Refills: 0     traZODone 100 MG tablet  Commonly known as: DESYREL  Used for: Psychophysiological insomnia      Dose: 100 mg  Take 1 tablet (100 mg) by mouth at bedtime.  Quantity: 90 tablet  Refills: 3     triamcinolone 0.1 % external cream  Commonly known as: KENALOG  Used for: Psoriatic arthropathy (H)      Apply topically as needed  Refills: 0       Where to get your medicines     These medications were sent to [id number] - [place], [place] - [address]  [address] [facility] [address]      Phone: [phone]   rifaximin 550 MG Tabs tablet       These medications were sent to [facility] [place], [place] - [address]  [address] [facility] [address] [address]      Phone: [phone]   bumetanide 2 MG tablet  spironolactone 50 MG tablet       Allergies   Allergies   Allergen Reactions    Sumatriptan Succinate Nausea and Vomiting     IMITREX

## 2025-06-16 NOTE — PLAN OF CARE
Lymphedema Discharge Summary    Reason for therapy discharge:    Discharged to home with outpatient therapy.    Progress towards therapy goal(s). See goals on Care Plan in [facility] electronic health record for goal details.  Goals met  Patient was able to recall edema signs/sxs of intolerance as well as wear schedule    Therapy recommendation(s):    Continued therapy is recommended.  Rationale/Recommendations:  Recommend OP Edema (order should say PT or OT eval and treat) to follow up and address anasarca and potential for custom LE garments for edema management.

## 2025-06-18 ENCOUNTER — ANESTHESIA EVENT (OUTPATIENT)
Dept: SURGERY | Facility: CLINIC | Age: 60
End: 2025-06-18
Payer: COMMERCIAL

## 2025-06-18 ENCOUNTER — HOSPITAL ENCOUNTER (OUTPATIENT)
Facility: CLINIC | Age: 60
DRG: 005 | End: 2025-06-18
Attending: TRANSPLANT SURGERY | Admitting: TRANSPLANT SURGERY
Payer: COMMERCIAL

## 2025-06-18 ENCOUNTER — APPOINTMENT (OUTPATIENT)
Dept: GENERAL RADIOLOGY | Facility: CLINIC | Age: 60
DRG: 005 | End: 2025-06-18
Payer: COMMERCIAL

## 2025-06-18 ENCOUNTER — HOSPITAL ENCOUNTER (INPATIENT)
Facility: CLINIC | Age: 60
Setting detail: SURGERY ADMIT
End: 2025-06-18
Attending: TRANSPLANT SURGERY | Admitting: TRANSPLANT SURGERY
Payer: COMMERCIAL

## 2025-06-18 ENCOUNTER — TELEPHONE (OUTPATIENT)
Dept: TRANSPLANT | Facility: CLINIC | Age: 60
End: 2025-06-18
Payer: COMMERCIAL

## 2025-06-18 ENCOUNTER — ORGAN (OUTPATIENT)
Dept: TRANSPLANT | Facility: CLINIC | Age: 60
End: 2025-06-18

## 2025-06-18 DIAGNOSIS — R18.8 CIRRHOSIS OF LIVER WITH ASCITES, UNSPECIFIED HEPATIC CIRRHOSIS TYPE (H): ICD-10-CM

## 2025-06-18 DIAGNOSIS — K74.60 CIRRHOSIS OF LIVER WITH ASCITES, UNSPECIFIED HEPATIC CIRRHOSIS TYPE (H): ICD-10-CM

## 2025-06-18 DIAGNOSIS — K75.81 METABOLIC DYSFUNCTION-ASSOCIATED STEATOHEPATITIS (MASH): Primary | ICD-10-CM

## 2025-06-18 PROCEDURE — 71046 X-RAY EXAM CHEST 2 VIEWS: CPT | Mod: 26 | Performed by: RADIOLOGY

## 2025-06-18 PROCEDURE — 71046 X-RAY EXAM CHEST 2 VIEWS: CPT

## 2025-06-18 RX ORDER — DEXMEDETOMIDINE HYDROCHLORIDE 4 UG/ML
.1-1.2 INJECTION, SOLUTION INTRAVENOUS CONTINUOUS
Status: CANCELLED | OUTPATIENT
Start: 2025-06-18

## 2025-06-18 RX ORDER — NOREPINEPHRINE BITARTRATE 0.06 MG/ML
.01-.6 INJECTION, SOLUTION INTRAVENOUS CONTINUOUS
Status: CANCELLED | OUTPATIENT
Start: 2025-06-18

## 2025-06-18 RX ORDER — FIBRINOGEN (HUMAN) 700-1300MG
1 KIT INTRAVENOUS
Status: CANCELLED | OUTPATIENT
Start: 2025-06-18

## 2025-06-18 RX ORDER — EPINEPHRINE IN 0.9 % SOD CHLOR 5 MG/250ML
.01-.3 PLASTIC BAG, INJECTION (ML) INTRAVENOUS CONTINUOUS
Status: CANCELLED | OUTPATIENT
Start: 2025-06-18

## 2025-06-18 ASSESSMENT — ACTIVITIES OF DAILY LIVING (ADL)
ADLS_ACUITY_SCORE: 40
ADLS_ACUITY_SCORE: 40
ADLS_ACUITY_SCORE: 58

## 2025-06-18 ASSESSMENT — LIFESTYLE VARIABLES: TOBACCO_USE: 0

## 2025-06-18 ASSESSMENT — ENCOUNTER SYMPTOMS: SEIZURES: 0

## 2025-06-18 NOTE — TELEPHONE ENCOUNTER
"TRANSPLANT OR REPORT    Organ: Liver  Laterality (if known): N/A  Organ Location: Local    UN ID: [id number]  Donor OR Time: 0300 6/19  Expected/Actual [place] Time: 0430 6/19  Expected Organ Arrival Time: In-House    Surgeon: Dr. Mayorga  Time in OR: 0500 on 6/19  Time in 3C: 0400 on 6/19    Recipient Details  Admission ETA: 1800   Unit: 7A  Isolation: None  Latex Allergy: None  : N/A  Diagnosis: ESLD    Liver Transplants  Bypass/Perfusion: Yes  Cellsaver: Yes  Hemodialysis: No  ~ \"RENAL STAFF TEACHING SERVICE MEDICINE\" : Remind LI Fellow to discuss with nephrology on call.  Fellow Contacted/Date/Time: No  ~ CRRT Resource Nurse: N/A  (Telephone Number for CRRT [phone]. When prompted, the caller should say  CRRT Resource 1\")    Kidney/Panc Transplants  XM Status (Need to wait for XM?): No    Liver or KP/PA Recipients - Vessel Banking:  Donor has positive serologies for HIV/HCV/HBV: No  Donor has risk criteria for HIV/HCV/HBV: No      Transplant Coordinator Contact Info: Amarilys [phone] until 0700 on 6/19      Vessel Bank Information  Transplant hospitals must not store a donor s extra vessels if the donor has tested positive for any of the following:   - HIV by antibody, antigen, or nucleic acid test (EMILY)   - Hepatitis B surface antigen (HBsAg)   - Hepatitis B (HBV) by EMILY   - Hepatitis C (HCV) by antibody or EMILY     Extra vessels from donors that do not test positive for HIV, HBV, or HCV as above may be stored    "

## 2025-06-18 NOTE — H&P
[facility], [place]    Transplant Surgery  History and Physical    Karlene Yun  : 1965  MRN # [id number]    ADMIT DATE: 2025    PCP: María Hanson    CHIEF COMPLAINT: liver transplant    HPI: Karlene Yun is a 59 year old female with PMH notable for liver cirrhosis 2/2 MASH, hypothyroidism, h/o PE (~, was on anticoagulation), HLD, MDD, obesity, OA, fibromyalgia, and psoriasis with arthritis, who presents for admission for liver transplant. The patient is excited for surgery. Her daughter Patrica and son-in-law Bill are present. They are supportive and are eager to stay engaged throughout her care, particularly postop. Their phone numbers are available in the chart. Karlene has advanced directives which are at home, but Bill is going to get them. In the meantime, she would like the team to know that she is full code but if it seemed that there was no hope then she would not want to continue restorative cares. Her daughter Patrica is her POA should she be unable to make her own decisions.    She is on the Taltz injections (now on monthly schedule) and her last one was approx 2 weeks ago.      ROS:   CONSTITUTIONAL: Denies fever, chills, fatigue, or weight loss  HEAD: Denies new/changed headaches  EENT: Denies vision changes or sore throat.   CV:Denies chest pain.   PULMONARY:Denies shortness of breath, cough  GI:Denies diarrhea, and abdominal pain, hematochezia   :Denies urinary alterations, dysuria, urinary frequency, hematuria, and abnormal drainage.   EXT:Denies pitting/major lower extremity edema. Mild edema with being on her feet all day  SKIN:she has occasional psoriasis rashes but hasn't had many flares recently. She has psoriasis of her scalp. Denies abnormal rashes or lesions.   MUSCULOSKELETAL:Denies upper or lower extremity weakness and pain.   HEMATOLOGICAL:No abnormal bruising or bleeding.     PMH:  Past Medical History:   Diagnosis Date     "Cirrhosis of liver with ascites (H) 08/05/2022    Fibromyalgia 11/12/2018    Hyperlipidemia 03/17/2005     Problem list name updated by automated process. Provider to review    Hypothyroidism 06/26/2002     Problem list name updated by automated process. Provider to review    Major depressive disorder, recurrent episode, in full remission 01/21/2002    Metabolic dysfunction-associated steatohepatitis (MASH) 05/30/2025    Morbid obesity (H) 05/12/2019    Muscle pain 08/20/2012    Osteoarthritis 11/11/2024    Psoriatic arthropathy (H) 01/21/2002    Pulmonary embolism and infarction (H) 01/31/2002    Problem list name updated by automated process. Provider to review         PSH:  Past Surgical History:   Procedure Laterality Date    BIOPSY  Aug 2024    COLONOSCOPY  02/11/2016    mild colitis/ repeat 10 yrs    ESOPHAGOSCOPY, GASTROSCOPY, DUODENOSCOPY (EGD), COMBINED N/A 08/09/2024    Procedure: ESOPHAGOGASTRODUODENOSCOPY for variceal surveillance;  Surgeon: Guru Jacquelyn Rees MD;  Location: [facility] OR    ESOPHAGOSCOPY, GASTROSCOPY, DUODENOSCOPY (EGD), COMBINED N/A 08/09/2024    Procedure: ESOPHAGOGASTRODUODENOSCOPY, WITH FINE NEEDLE ASPIRATION BIOPSY, WITH ENDOSCOPIC ULTRASOUND GUIDANCE liver and lymph node biopsy;  Surgeon: Guru Jacquelyn Rees MD;  Location: [facility] OR     REMOVAL OF TONSILS,<11 Y/O      Tonsils <11y.o.    IR LUMBAR PUNCTURE  06/30/2023    IR THORACENTESIS  03/06/2025    LASIK  01/01/2004    \"lasek\"    THORACENTESIS Left 03/06/2025    Procedure: Thoracentesis;  Surgeon: Jaden Martinez MD;  Location: [facility] OR       MEDICATIONS:  Prior to Admission Medications   Prescriptions Last Dose Informant Patient Reported? Taking?   Lidocaine (LIDOCARE) 4 % Patch   Yes Yes   Sig: Place 1 patch onto the skin daily as needed for moderate pain.   bumetanide (BUMEX) 2 MG tablet   No Yes   Sig: Take 1 tablet (2 mg) by mouth 2 times daily.   citalopram (CELEXA) 20 MG tablet   No Yes   Sig: " Take 1 tablet (20 mg) by mouth daily.   gabapentin (NEURONTIN) 300 MG capsule   No Yes   Si mg qam, 600 mg qpm   ixekizumab (TALTZ) 80 MG/ML SOAJ auto-injector   No Yes   Sig: 160 mg once, followed by 80 mg at weeks 2, 4, 6, 8, 10, and 12, and then 80 mg every 4 weeks   lactulose 20 GM/30ML solution   No Yes   Sig: Take 15 mLs (10 g) by mouth every morning.   levothyroxine (SYNTHROID/LEVOTHROID) 112 MCG tablet   No Yes   Sig: Take 1 tablet (112 mcg) by mouth daily.   mometasone (ELOCON) 0.1 % external solution   Yes Yes   Sig: Apply thin layer twice a day as needed to rash on scalp   rifaximin (XIFAXAN) 550 MG TABS tablet   No Yes   Sig: Take 1 tablet (550 mg) by mouth 2 times daily.   spironolactone (ALDACTONE) 50 MG tablet   No Yes   Sig: Take 1 tablet (50 mg) by mouth daily.   tacrolimus (PROTOPIC) 0.1 % external ointment   Yes Yes   Sig: Apply topically as needed   traZODone (DESYREL) 100 MG tablet   No Yes   Sig: Take 1 tablet (100 mg) by mouth at bedtime.   triamcinolone (KENALOG) 0.1 % external cream   Yes Yes   Sig: Apply topically as needed      Facility-Administered Medications: None        ALLERGIES:     Allergies   Allergen Reactions    Sumatriptan Succinate Nausea and Vomiting     IMITREX       FAMILY HISTORY:  Family History   Problem Relation Age of Onset    Cancer Sister         cervical    Arthritis Paternal Grandmother     Diabetes Father     Asthma Sister     Thyroid Disease Sister     Asthma Daughter     Thyroid Disease Sister        SOCIAL HISTORY:  Social History     Socioeconomic History    Marital status:      Spouse name: Not on file    Number of children: 1    Years of education: Not on file    Highest education level: Not on file   Occupational History    Not on file   Tobacco Use    Smoking status: Never    Smokeless tobacco: Never   Substance and Sexual Activity    Alcohol use: Not Currently     Comment: last drink 3 years ago    Drug use: Never    Sexual activity: Not  Currently     Partners: Male   Other Topics Concern    Parent/sibling w/ CABG, MI or angioplasty before 65F 55M? Yes     Comment: Father   Social History Narrative    Not on file     Social Drivers of Health     Financial Resource Strain: Low Risk  (6/13/2025)    Financial Resource Strain     Within the past 12 months, have you or your family members you live with been unable to get utilities (heat, electricity) when it was really needed?: No   Food Insecurity: High Risk (6/13/2025)    Food Insecurity     Within the past 12 months, did you worry that your food would run out before you got money to buy more?: Yes     Within the past 12 months, did the food you bought just not last and you didn t have money to get more?: No   Transportation Needs: Low Risk  (6/13/2025)    Transportation Needs     Within the past 12 months, has lack of transportation kept you from medical appointments, getting your medicines, non-medical meetings or appointments, work, or from getting things that you need?: No   Physical Activity: Not on file   Stress: Not on file   Social Connections: Not on file   Interpersonal Safety: Low Risk  (6/13/2025)    Interpersonal Safety     Do you feel physically and emotionally safe where you currently live?: Yes     Within the past 12 months, have you been hit, slapped, kicked or otherwise physically hurt by someone?: No     Within the past 12 months, have you been humiliated or emotionally abused in other ways by your partner or ex-partner?: No   Housing Stability: High Risk (6/13/2025)    Housing Stability     Do you have housing? 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 rate 18, SpO2 98%, not currently breastfeeding.  GENERAL: Appears alert and oriented appropriately  HEENT: Eye symmetrical  "and free of discharge bilaterally. Mucous membranes moist and without lesions.  CV: RRR, S1S2 present without murmur, rub, or gallop.   RESPIRATORY: Respirations regular, even, and unlabored. Lungs CTA throughout. RA  GI: Soft and non distended. No tenderness, rebound, guarding.  EXTREMITIES: mild nonpitting peripheral edema. wwp  NEUROLOGIC: CN II-XII intact. No focal deficits. Motor 5/5 to BUE & BLE.   MUSCULOSKELETAL: No joint swelling or tenderness. Grossly normal  SKIN: No jaundice. No rashes or lesions. Scalp grossly normal     LABS:  CBC:  Recent Labs   Lab Test 06/15/25  0726   WBC 6.4   RBC 3.39*   HGB 10.9*   HCT 31.2*   MCV 92   MCH 32.2   MCHC 34.9   RDW 16.9*   PLT 70*       CMP:  Recent Labs   Lab Test 06/15/25  0726   *   POTASSIUM 4.1   CHLORIDE 95*   SANDY 8.3*   CO2 27   BUN 27.5*   CR 1.00*   *   AST 70*   ALT 31   BILITOTAL 4.1*   ALBUMIN 2.3*   PROTTOTAL 6.4   ALKPHOS 70       IMAGING:  EKG - prolonged Qtc (present 6/7, though in lesser degree), otherwise NSR  CXR - pending        ASSESSMENT & PLAN:  Pt is a 60 yo F here for liver transplant tomorrow AM. Her home meds were continued. All questions answered. Consent completed. Proceed as trace for tomorrow AM.     CV:   - prolonged Qtc - CTM, tele  - EF 60-65% 6/8/25    FEN: NPO [place]   PROPHY:  hold dvt ppx   LINES:  IV   DISPO:  IP, transplant tomorrow  CODE STATUS:  full  CONSENT: completed - electronically signed     D/w fellow   - - - - - - - - - - - - - - - - - -  Loulou MICHELLE PGY-1  06/18/2025    See [facility] for on-call pager information: [facility] Paging/Directory   \"SURGERY TRANSPLANT ABDOMINAL KIDNEY/PANCREAS/LIVER/LIVING DONOR/[facility]/ \"            " "pager information: [facility] Paging/Directory   \"SURGERY TRANSPLANT ABDOMINAL KIDNEY/PANCREAS/LIVER/LIVING DONOR/[facility]/ \"            "

## 2025-06-18 NOTE — TELEPHONE ENCOUNTER
[facility] Call Center    Phone Message    May a detailed message be left on voicemail: yes     Reason for Call: Other: LM for patient. MD is requesting surgeon visit on 8/25 be moved up to July before 7/18. Any available surgeon is ok.      Action Taken: Message routed to:  [facility] ([facility] NA    Travel Screening: Not Applicable     Date of Service:

## 2025-06-18 NOTE — ANESTHESIA PREPROCEDURE EVALUATION
"Anesthesia Pre-Procedure Evaluation    Patient: Karlene Yun   MRN: [id number] : 1965          Procedure : Procedure(s):  Transplant liver recipient  donor         Past Medical History:   Diagnosis Date    Cirrhosis of liver with ascites (H) 2022    Fibromyalgia 2018    Hyperlipidemia 2005     Problem list name updated by automated process. Provider to review    Hypothyroidism 2002     Problem list name updated by automated process. Provider to review    Major depressive disorder, recurrent episode, in full remission 2002    Metabolic dysfunction-associated steatohepatitis (MASH) 2025    Morbid obesity (H) 2019    Muscle pain 2012    Osteoarthritis 2024    Psoriatic arthropathy (H) 2002    Pulmonary embolism and infarction (H) 2002    Problem list name updated by automated process. Provider to review        Past Surgical History:   Procedure Laterality Date    BIOPSY  Aug 2024    COLONOSCOPY  2016    mild colitis/ repeat 10 yrs    ESOPHAGOSCOPY, GASTROSCOPY, DUODENOSCOPY (EGD), COMBINED N/A 2024    Procedure: ESOPHAGOGASTRODUODENOSCOPY for variceal surveillance;  Surgeon: Guru Jacquelyn Rees MD;  Location: [facility] OR    ESOPHAGOSCOPY, GASTROSCOPY, DUODENOSCOPY (EGD), COMBINED N/A 2024    Procedure: ESOPHAGOGASTRODUODENOSCOPY, WITH FINE NEEDLE ASPIRATION BIOPSY, WITH ENDOSCOPIC ULTRASOUND GUIDANCE liver and lymph node biopsy;  Surgeon: Guru Jacquelyn Rees MD;  Location: [facility] OR    HC REMOVAL OF TONSILS,<13 Y/O      Tonsils <11y.o.    IR LUMBAR PUNCTURE  2023    IR THORACENTESIS  2025    LASIK  2004    \"lasek\"    THORACENTESIS Left 2025    Procedure: Thoracentesis;  Surgeon: Jaden Martinez MD;  Location: [facility] OR      Allergies   Allergen Reactions    Sumatriptan Succinate Nausea and Vomiting     IMITREX      Social History     Tobacco Use    Smoking status: " Never    Smokeless tobacco: Never   Substance Use Topics    Alcohol use: Not Currently     Comment: last drink 3 years ago      Wt Readings from Last 1 Encounters:   06/15/25 (!) 148.6 kg (327 lb 8 oz)          Karlene Yun is a 59 year old female with PMH notable for liver cirrhosis 2/2 MASH, hypothyroidism, h/o PE (~2000, was on anticoagulation), HLD, MDD, obesity, OA, fibromyalgia, and psoriasis with arthritis, who presents for admission for liver transplant.       Anesthesia Evaluation   Pt has had prior anesthetic. Type: MAC.    No history of anesthetic complications       ROS/MED HX  ENT/Pulmonary: Comment: #NEREYDA, moderate  Has seen sleep medicine. Recs APAP 5-15 cm H2O. Does not use CPAP, it gives her anxiety.    #Enlarged mediastinal and 1.5 cm subcarinal lymph nodes  EUS 8/9/24 w/ FNA: polymorphous lymphocytes. Was not addressed during recent hospital admission.        (+) sleep apnea,    NEREYDA risk factors,   obese,   allergic rhinitis,                          (-) tobacco use and asthma   Neurologic: Comment: Fibromyalgia- gabapentin   (-) no seizures, no CVA, no TIA and migraines   Cardiovascular:     (+)  - -   -  - -                                 Previous cardiac testing   Echo: Date: 6/2025 Results:  Interpretation Summary  Global and regional left ventricular function is normal with an EF of 60-65%.  Global right ventricular function is normal.  No significant valvular abnormalities present.  Pulmonary artery systolic pressure is normal.  The inferior vena cava was normal in size with preserved respiratory  variability.  No pericardial effusion is present.    Stress Test:  Date: 10/2023 Results:  NM MPI WITH LEXISCAN  Order: [id number]  Narrative    Summary    1. A regadenoson infusion pharmacologic stress test was performed.    2. The patient experienced dyspnea during the stress test.    3. Negative stress ECG for ST segment depression.    4. Myocardial perfusion images are normal without  evidence of infarct or  ischemia.    5. Normal left ventricular systolic function. Calculated LVEF 69 %.    6. Based on this study, the annual cardiovascular mortality rate is low  (less than 1%).    Prior Study Comparison    No prior study for comparison.    ECG Reviewed:  Date: 6/7/2025 Results:  Sinus rhythm   Low voltage QRS   QTcB >= 480 msec   Abnormal ECG     Cath:  Date: Results:   (-) taking anticoagulants/antiplatelets (Last was ~2000)   METS/Exercise Tolerance:  Comment: Works in a yarn shop.     Hematologic:     (+) History of blood clots (h/o pulmonary embolism 2002.  Completed warfarin therapy.),    pt is anticoagulated,        (-) anemia and history of blood transfusion   Musculoskeletal: Comment: Psoriatic arthropathy  Osteoarthritis  Fibromyalgia   (+)  arthritis,             GI/Hepatic: Comment: MELD 3.0: 23  MELD-NA: 21    Recent admission  on 6/7 - 15/2025 for anasarca and dyspnea in the setting of decompensated MASH cirrhosis.    #Cirrhosis 2/2 MASH c/b ascites  #Hyperbilirubinemia (around baseline)  #Thrombocytopenia (around baseline, 70)      (+) GERD,            liver disease,       Renal/Genitourinary:       Endo: Comment: #Psoriatic arthritis  #Psoriasis  Was on methotrexate for 20+ years, discontinued d/t cirrhosis. Currently getting monthly ixekizumab injections. Defer ongoing management of this to her outpatient providers.      (+)          thyroid problem, hypothyroidism, Chronic steroid usage for   Obesity,       Psychiatric/Substance Use: Comment: #MDD  #Insomnia  PTA citalopram and trazodone.      (+) psychiatric history (Stable on currently stable.) depression    (-) alcohol abuse history and chronic opioid use history   Infectious Disease:  - neg infectious disease ROS     Malignancy:  - neg malignancy ROS     Other:      (+)  , H/O Chronic Pain,, other significant disability (Uses a cane for mobility.) Other (comment)           Physical Exam  Airway  Mallampati: II  TM distance:  ">3 FB  Neck ROM: full  Upper bite lip test: II  Mouth opening: >= 4 cm    Cardiovascular - normal exam   Dental   (+) Minor Abnormalities - some fillings, tiny chips    increased risk of dental damage  Pulmonary - normal exam      Neurological - normal exam  She appears awake, alert and oriented x3.    Other Findings       OUTSIDE LABS:  CBC:   Lab Results   Component Value Date    WBC 6.4 06/15/2025    WBC 4.5 06/14/2025    HGB 10.9 (L) 06/15/2025    HGB 9.6 (L) 06/14/2025    HCT 31.2 (L) 06/15/2025    HCT 28.0 (L) 06/14/2025    PLT 70 (L) 06/15/2025    PLT 40 (LL) 06/14/2025     BMP:   Lab Results   Component Value Date     (L) 06/15/2025     (L) 06/14/2025    POTASSIUM 4.1 06/15/2025    POTASSIUM 3.8 06/15/2025    CHLORIDE 95 (L) 06/15/2025    CHLORIDE 93 (L) 06/14/2025    CO2 27 06/15/2025    CO2 26 06/14/2025    BUN 27.5 (H) 06/15/2025    BUN 27.9 (H) 06/14/2025    CR 1.00 (H) 06/15/2025    CR 1.10 (H) 06/14/2025     (H) 06/15/2025     (H) 06/14/2025     COAGS:   Lab Results   Component Value Date    PTT 42 (H) 06/08/2025    INR 1.89 (H) 06/15/2025    FIBR 128 (L) 06/08/2025     POC: No results found for: \"BGM\", \"HCG\", \"HCGS\"  HEPATIC:   Lab Results   Component Value Date    ALBUMIN 2.3 (L) 06/15/2025    PROTTOTAL 6.4 06/15/2025    ALT 31 06/15/2025    AST 70 (H) 06/15/2025    ALKPHOS 70 06/15/2025    BILITOTAL 4.1 (H) 06/15/2025     OTHER:   Lab Results   Component Value Date    PH 6.0 06/21/2010    LACT 2.0 06/07/2025    A1C 4.8 11/26/2024    SANDY 8.3 (L) 06/15/2025    PHOS 3.4 11/26/2024    MAG 1.8 06/15/2025    TSH 4.89 (H) 06/07/2025    T4 1.47 06/07/2025    CRP 0.52 11/14/2011    SED 8 11/14/2011       Anesthesia Plan    ASA Status:  4      NPO Status: NPO Appropriate   Anesthesia Type: General.  Airway: oral.  Induction: intravenous.  Maintenance: Balanced.   Techniques and Equipment:     - Airway:  Planned airway equipment includes video laryngoscope.  Central Line Kit: Double " "Lumen Kit  Arterial Line Kit: Radial     - Monitoring Plan: standard ASA monitoring, train of four monitoring, BARI, arterial line kit, central line kit, processed EEG monitor     Consents    Anesthesia Plan(s) and associated risks, benefits, and realistic alternatives discussed. Questions answered and patient/representative(s) expressed understanding.     - Discussed: CRNA, proceduralist     - Discussed with:  Patient (Anesthetic risks discussed included but are not limited to corneal abrasion, damage to lips/teeths, sore throat, dysphagia/esophageal injury from BARI, prolonged intubation, dialysis, blood clots, severe blood loss, MI, stroke, and death)        - Pt is DNR/DNI Status: no DNR     Blood Consent:      - Discussed with: patient.     - Consented: consented to blood products     Postoperative Care    Pain management: plan for postoperative opioid use.     Comments:                   Nabeel Gomez MD    I have reviewed the pertinent notes and labs in the chart from the past 30 days and (re)examined the patient.  Any updates or changes from those notes are reflected in this note.    Clinically Significant Risk Factors Present on Admission                    # Chronic heart failure with preserved ejection fraction: heart failure noted on problem list and last echo with EF >50%          # Morbid Obesity: Estimated body mass index is 45.68 kg/m  as calculated from the following:    Height as of 6/7/25: 1.803 m (5' 11\").    Weight as of 6/15/25: 148.6 kg (327 lb 8 oz).                    "

## 2025-06-18 NOTE — PHARMACY-ADMISSION MEDICATION HISTORY
Pharmacist Admission Medication History    Admission medication history is complete. The information provided in this note is only as accurate as the sources available at the time of the update.    Information Source(s): recent discharge summary 6/15/25, medication history completed by pharmD 6/8/25     Pertinent Information:    Was prescribed rifaximin but has not picked it up due to concerns about cost. If her copay is very expensive, she does not plan to start this.  Gabapentin prescribed as 300 mg QAM and 600 mg QPM, but she often will omit the morning dose. Always takes the PM dose.  Last dose of Taltz was about a month ago. She is unsure what the plan is for this medication going forward.    Changes made to PTA medication list:    Added:   None  Deleted:   None  Changed:   Lactulose 5g daily >> 10 g daily. Was taking 5g prior to last admission when having diarrhea. Dose increased to 10g on admission and continued on discharge.      Medication History Completed By: Patricia Cole [facility] 6/18/2025 5:57 PM    PTA Med List   Medication Sig Last Dose/Taking    bumetanide (BUMEX) 2 MG tablet Take 1 tablet (2 mg) by mouth 2 times daily. Taking    citalopram (CELEXA) 20 MG tablet Take 1 tablet (20 mg) by mouth daily. Taking    gabapentin (NEURONTIN) 300 MG capsule 300 mg qam, 600 mg qpm Taking    ixekizumab (TALTZ) 80 MG/ML SOAJ auto-injector 160 mg once, followed by 80 mg at weeks 2, 4, 6, 8, 10, and 12, and then 80 mg every 4 weeks Taking    lactulose 20 GM/30ML solution Take 15 mLs (10 g) by mouth every morning. Taking    levothyroxine (SYNTHROID/LEVOTHROID) 112 MCG tablet Take 1 tablet (112 mcg) by mouth daily. Taking    Lidocaine (LIDOCARE) 4 % Patch Place 1 patch onto the skin daily as needed for moderate pain. Taking As Needed    mometasone (ELOCON) 0.1 % external solution Apply thin layer twice a day as needed to rash on scalp Taking    rifaximin (XIFAXAN) 550 MG TABS tablet Take 1 tablet (550 mg) by mouth 2 times  daily. Taking    spironolactone (ALDACTONE) 50 MG tablet Take 1 tablet (50 mg) by mouth daily. Taking    tacrolimus (PROTOPIC) 0.1 % external ointment Apply topically as needed Taking As Needed    traZODone (DESYREL) 100 MG tablet Take 1 tablet (100 mg) by mouth at bedtime. Taking    triamcinolone (KENALOG) 0.1 % external cream Apply topically as needed Taking As Needed

## 2025-06-19 ENCOUNTER — ANESTHESIA (OUTPATIENT)
Dept: SURGERY | Facility: CLINIC | Age: 60
End: 2025-06-19
Payer: COMMERCIAL

## 2025-06-19 ENCOUNTER — DOCUMENTATION ONLY (OUTPATIENT)
Dept: TRANSPLANT | Facility: CLINIC | Age: 60
End: 2025-06-19
Payer: COMMERCIAL

## 2025-06-19 ENCOUNTER — APPOINTMENT (OUTPATIENT)
Dept: ULTRASOUND IMAGING | Facility: CLINIC | Age: 60
End: 2025-06-19
Payer: COMMERCIAL

## 2025-06-19 ENCOUNTER — APPOINTMENT (OUTPATIENT)
Dept: GENERAL RADIOLOGY | Facility: CLINIC | Age: 60
DRG: 005 | End: 2025-06-19
Payer: COMMERCIAL

## 2025-06-19 DIAGNOSIS — K75.81 METABOLIC DYSFUNCTION-ASSOCIATED STEATOHEPATITIS (MASH): Primary | ICD-10-CM

## 2025-06-19 LAB — BACTERIA PLR CULT: NORMAL

## 2025-06-19 PROCEDURE — 250N000011 HC RX IP 250 OP 636: Performed by: REGISTERED NURSE

## 2025-06-19 PROCEDURE — 250N000009 HC RX 250: Performed by: NURSE ANESTHETIST, CERTIFIED REGISTERED

## 2025-06-19 PROCEDURE — 71045 X-RAY EXAM CHEST 1 VIEW: CPT | Mod: 26 | Performed by: RADIOLOGY

## 2025-06-19 PROCEDURE — 250N000011 HC RX IP 250 OP 636: Mod: JZ | Performed by: NURSE ANESTHETIST, CERTIFIED REGISTERED

## 2025-06-19 PROCEDURE — 258N000003 HC RX IP 258 OP 636: Performed by: REGISTERED NURSE

## 2025-06-19 PROCEDURE — 250N000009 HC RX 250: Performed by: REGISTERED NURSE

## 2025-06-19 PROCEDURE — 93975 VASCULAR STUDY: CPT

## 2025-06-19 PROCEDURE — 999N000065 XR CHEST PORT 1 VIEW

## 2025-06-19 PROCEDURE — 93975 VASCULAR STUDY: CPT | Mod: 26 | Performed by: STUDENT IN AN ORGANIZED HEALTH CARE EDUCATION/TRAINING PROGRAM

## 2025-06-19 PROCEDURE — 250N000011 HC RX IP 250 OP 636: Performed by: SURGERY

## 2025-06-19 PROCEDURE — 250N000011 HC RX IP 250 OP 636

## 2025-06-19 PROCEDURE — 258N000003 HC RX IP 258 OP 636

## 2025-06-19 PROCEDURE — 258N000003 HC RX IP 258 OP 636: Performed by: NURSE ANESTHETIST, CERTIFIED REGISTERED

## 2025-06-19 PROCEDURE — 250N000009 HC RX 250

## 2025-06-19 RX ORDER — LIDOCAINE HYDROCHLORIDE 20 MG/ML
INJECTION, SOLUTION INFILTRATION; PERINEURAL PRN
Status: DISCONTINUED | OUTPATIENT
Start: 2025-06-19 | End: 2025-06-19

## 2025-06-19 RX ORDER — MAGNESIUM SULFATE HEPTAHYDRATE 500 MG/ML
INJECTION, SOLUTION INTRAMUSCULAR; INTRAVENOUS PRN
Status: DISCONTINUED | OUTPATIENT
Start: 2025-06-19 | End: 2025-06-19

## 2025-06-19 RX ORDER — CALCIUM CHLORIDE 100 MG/ML
INJECTION INTRAVENOUS; INTRAVENTRICULAR PRN
Status: DISCONTINUED | OUTPATIENT
Start: 2025-06-19 | End: 2025-06-19

## 2025-06-19 RX ORDER — PROPOFOL 10 MG/ML
INJECTION, EMULSION INTRAVENOUS PRN
Status: DISCONTINUED | OUTPATIENT
Start: 2025-06-19 | End: 2025-06-19

## 2025-06-19 RX ORDER — SODIUM CHLORIDE, SODIUM GLUCONATE, SODIUM ACETATE, POTASSIUM CHLORIDE AND MAGNESIUM CHLORIDE 526; 502; 368; 37; 30 MG/100ML; MG/100ML; MG/100ML; MG/100ML; MG/100ML
INJECTION, SOLUTION INTRAVENOUS CONTINUOUS PRN
Status: DISCONTINUED | OUTPATIENT
Start: 2025-06-19 | End: 2025-06-19

## 2025-06-19 RX ORDER — INDOMETHACIN 25 MG/1
CAPSULE ORAL PRN
Status: DISCONTINUED | OUTPATIENT
Start: 2025-06-19 | End: 2025-06-19

## 2025-06-19 RX ORDER — SODIUM CHLORIDE, SODIUM LACTATE, POTASSIUM CHLORIDE, CALCIUM CHLORIDE 600; 310; 30; 20 MG/100ML; MG/100ML; MG/100ML; MG/100ML
INJECTION, SOLUTION INTRAVENOUS CONTINUOUS PRN
Status: DISCONTINUED | OUTPATIENT
Start: 2025-06-19 | End: 2025-06-19

## 2025-06-19 RX ORDER — VASOPRESSIN IN 0.9 % NACL 2 UNIT/2ML
SYRINGE (ML) INTRAVENOUS PRN
Status: DISCONTINUED | OUTPATIENT
Start: 2025-06-19 | End: 2025-06-19

## 2025-06-19 RX ORDER — FENTANYL CITRATE 50 UG/ML
INJECTION, SOLUTION INTRAMUSCULAR; INTRAVENOUS PRN
Status: DISCONTINUED | OUTPATIENT
Start: 2025-06-19 | End: 2025-06-19

## 2025-06-19 RX ADMIN — PROPOFOL 40 MG: 10 INJECTION, EMULSION INTRAVENOUS at 16:12

## 2025-06-19 RX ADMIN — Medication 50 MG: at 13:53

## 2025-06-19 RX ADMIN — NOREPINEPHRINE BITARTRATE 25.6 MCG: 1 INJECTION, SOLUTION, CONCENTRATE INTRAVENOUS at 15:25

## 2025-06-19 RX ADMIN — NOREPINEPHRINE BITARTRATE 25.6 MCG: 1 INJECTION, SOLUTION, CONCENTRATE INTRAVENOUS at 15:50

## 2025-06-19 RX ADMIN — CALCIUM CHLORIDE INJECTION 1 G: 100 INJECTION, SOLUTION INTRAVENOUS at 15:24

## 2025-06-19 RX ADMIN — NOREPINEPHRINE BITARTRATE 12.8 MCG: 1 INJECTION, SOLUTION, CONCENTRATE INTRAVENOUS at 15:58

## 2025-06-19 RX ADMIN — PHENYLEPHRINE HYDROCHLORIDE 300 MCG: 10 INJECTION INTRAVENOUS at 15:27

## 2025-06-19 RX ADMIN — FIBRINOGEN (HUMAN) 1150 MG: KIT INTRAVENOUS at 16:14

## 2025-06-19 RX ADMIN — HYDROMORPHONE HYDROCHLORIDE 0.5 MG: 1 INJECTION, SOLUTION INTRAMUSCULAR; INTRAVENOUS; SUBCUTANEOUS at 19:41

## 2025-06-19 RX ADMIN — PIPERACILLIN AND TAZOBACTAM 3.38 G: 3; .375 INJECTION, POWDER, LYOPHILIZED, FOR SOLUTION INTRAVENOUS at 12:45

## 2025-06-19 RX ADMIN — NOREPINEPHRINE BITARTRATE 0.07 MCG/KG/MIN: 1 INJECTION, SOLUTION, CONCENTRATE INTRAVENOUS at 12:03

## 2025-06-19 RX ADMIN — SODIUM CHLORIDE, SODIUM LACTATE, POTASSIUM CHLORIDE, AND CALCIUM CHLORIDE: .6; .31; .03; .02 INJECTION, SOLUTION INTRAVENOUS at 11:59

## 2025-06-19 RX ADMIN — Medication 0.5 UNITS: at 14:24

## 2025-06-19 RX ADMIN — NOREPINEPHRINE BITARTRATE 6.4 MCG: 1 INJECTION, SOLUTION, CONCENTRATE INTRAVENOUS at 15:19

## 2025-06-19 RX ADMIN — CALCIUM CHLORIDE INJECTION 1 G: 100 INJECTION, SOLUTION INTRAVENOUS at 15:58

## 2025-06-19 RX ADMIN — NOREPINEPHRINE BITARTRATE 25.6 MCG: 1 INJECTION, SOLUTION, CONCENTRATE INTRAVENOUS at 15:43

## 2025-06-19 RX ADMIN — Medication 0.5 UNITS: at 14:46

## 2025-06-19 RX ADMIN — NOREPINEPHRINE BITARTRATE 12.8 MCG: 1 INJECTION, SOLUTION, CONCENTRATE INTRAVENOUS at 15:40

## 2025-06-19 RX ADMIN — PHENYLEPHRINE HYDROCHLORIDE 300 MCG: 10 INJECTION INTRAVENOUS at 15:25

## 2025-06-19 RX ADMIN — FLUCONAZOLE 400 MG: 400 INJECTION, SOLUTION INTRAVENOUS at 13:47

## 2025-06-19 RX ADMIN — Medication 0.5 UNITS: at 16:40

## 2025-06-19 RX ADMIN — Medication 100 MG: at 12:08

## 2025-06-19 RX ADMIN — NOREPINEPHRINE BITARTRATE 6.4 MCG: 1 INJECTION, SOLUTION, CONCENTRATE INTRAVENOUS at 14:12

## 2025-06-19 RX ADMIN — Medication 1 UNITS: at 15:18

## 2025-06-19 RX ADMIN — CALCIUM CHLORIDE INJECTION 1 G: 100 INJECTION, SOLUTION INTRAVENOUS at 15:07

## 2025-06-19 RX ADMIN — NOREPINEPHRINE BITARTRATE 12.8 MCG: 1 INJECTION, SOLUTION, CONCENTRATE INTRAVENOUS at 15:08

## 2025-06-19 RX ADMIN — NOREPINEPHRINE BITARTRATE 12.8 MCG: 1 INJECTION, SOLUTION, CONCENTRATE INTRAVENOUS at 16:50

## 2025-06-19 RX ADMIN — Medication 1 UNITS: at 14:50

## 2025-06-19 RX ADMIN — SODIUM CHLORIDE, SODIUM GLUCONATE, SODIUM ACETATE, POTASSIUM CHLORIDE AND MAGNESIUM CHLORIDE: 526; 502; 368; 37; 30 INJECTION, SOLUTION INTRAVENOUS at 11:48

## 2025-06-19 RX ADMIN — HYDROMORPHONE HYDROCHLORIDE 0.5 MG: 1 INJECTION, SOLUTION INTRAMUSCULAR; INTRAVENOUS; SUBCUTANEOUS at 19:18

## 2025-06-19 RX ADMIN — PIPERACILLIN AND TAZOBACTAM 3.38 G: 3; .375 INJECTION, POWDER, LYOPHILIZED, FOR SOLUTION INTRAVENOUS at 16:45

## 2025-06-19 RX ADMIN — CALCIUM CHLORIDE 1 G: 100 INJECTION, SOLUTION INTRAVENOUS at 15:10

## 2025-06-19 RX ADMIN — VANCOMYCIN HYDROCHLORIDE 2 G: 10 INJECTION, POWDER, LYOPHILIZED, FOR SOLUTION INTRAVENOUS at 14:19

## 2025-06-19 RX ADMIN — FENTANYL CITRATE 50 MCG: 50 INJECTION INTRAMUSCULAR; INTRAVENOUS at 12:05

## 2025-06-19 RX ADMIN — HUMAN INSULIN 3 UNITS/HR: 100 INJECTION, SOLUTION SUBCUTANEOUS at 16:35

## 2025-06-19 RX ADMIN — NOREPINEPHRINE BITARTRATE 25.6 MCG: 1 INJECTION, SOLUTION, CONCENTRATE INTRAVENOUS at 15:33

## 2025-06-19 RX ADMIN — NOREPINEPHRINE BITARTRATE 25.6 MCG: 1 INJECTION, SOLUTION, CONCENTRATE INTRAVENOUS at 15:28

## 2025-06-19 RX ADMIN — Medication 1 UNITS: at 15:22

## 2025-06-19 RX ADMIN — FENTANYL CITRATE 100 MCG/HR: 50 INJECTION, SOLUTION INTRAMUSCULAR; INTRAVENOUS at 12:59

## 2025-06-19 RX ADMIN — SODIUM CHLORIDE, SODIUM GLUCONATE, SODIUM ACETATE, POTASSIUM CHLORIDE AND MAGNESIUM CHLORIDE: 526; 502; 368; 37; 30 INJECTION, SOLUTION INTRAVENOUS at 14:00

## 2025-06-19 RX ADMIN — Medication 1 UNITS: at 17:29

## 2025-06-19 RX ADMIN — NOREPINEPHRINE BITARTRATE 25.6 MCG: 1 INJECTION, SOLUTION, CONCENTRATE INTRAVENOUS at 15:45

## 2025-06-19 RX ADMIN — DEXMEDETOMIDINE HYDROCHLORIDE 8 MCG: 100 INJECTION, SOLUTION INTRAVENOUS at 19:54

## 2025-06-19 RX ADMIN — NOREPINEPHRINE BITARTRATE 12.8 MCG: 1 INJECTION, SOLUTION, CONCENTRATE INTRAVENOUS at 14:20

## 2025-06-19 RX ADMIN — Medication 1 UNITS: at 15:58

## 2025-06-19 RX ADMIN — NOREPINEPHRINE BITARTRATE 25.6 MCG: 1 INJECTION, SOLUTION, CONCENTRATE INTRAVENOUS at 15:37

## 2025-06-19 RX ADMIN — SODIUM BICARBONATE 50 MEQ: 84 INJECTION, SOLUTION INTRAVENOUS at 15:29

## 2025-06-19 RX ADMIN — NOREPINEPHRINE BITARTRATE 12.8 MCG: 1 INJECTION, SOLUTION, CONCENTRATE INTRAVENOUS at 15:30

## 2025-06-19 RX ADMIN — NOREPINEPHRINE BITARTRATE 6.4 MCG: 1 INJECTION, SOLUTION, CONCENTRATE INTRAVENOUS at 18:15

## 2025-06-19 RX ADMIN — DEXMEDETOMIDINE HYDROCHLORIDE 12 MCG: 100 INJECTION, SOLUTION INTRAVENOUS at 19:22

## 2025-06-19 RX ADMIN — NOREPINEPHRINE BITARTRATE 6.4 MCG: 1 INJECTION, SOLUTION, CONCENTRATE INTRAVENOUS at 17:08

## 2025-06-19 RX ADMIN — FIBRINOGEN (HUMAN) 1150 MG: KIT INTRAVENOUS at 17:02

## 2025-06-19 RX ADMIN — NOREPINEPHRINE BITARTRATE 12.8 MCG: 1 INJECTION, SOLUTION, CONCENTRATE INTRAVENOUS at 15:04

## 2025-06-19 RX ADMIN — Medication 100 MG: at 18:55

## 2025-06-19 RX ADMIN — PROPOFOL 30 MG: 10 INJECTION, EMULSION INTRAVENOUS at 12:10

## 2025-06-19 RX ADMIN — Medication 2 UNITS: at 15:49

## 2025-06-19 RX ADMIN — PIPERACILLIN AND TAZOBACTAM 3.38 G: 3; .375 INJECTION, POWDER, LYOPHILIZED, FOR SOLUTION INTRAVENOUS at 14:45

## 2025-06-19 RX ADMIN — Medication 0.5 UNITS: at 17:02

## 2025-06-19 RX ADMIN — HYDROMORPHONE HYDROCHLORIDE 0.5 MG: 1 INJECTION, SOLUTION INTRAMUSCULAR; INTRAVENOUS; SUBCUTANEOUS at 18:52

## 2025-06-19 RX ADMIN — NOREPINEPHRINE BITARTRATE 12.8 MCG: 1 INJECTION, SOLUTION, CONCENTRATE INTRAVENOUS at 15:20

## 2025-06-19 RX ADMIN — NOREPINEPHRINE BITARTRATE 25.6 MCG: 1 INJECTION, SOLUTION, CONCENTRATE INTRAVENOUS at 15:47

## 2025-06-19 RX ADMIN — NOREPINEPHRINE BITARTRATE 6.4 MCG: 1 INJECTION, SOLUTION, CONCENTRATE INTRAVENOUS at 12:44

## 2025-06-19 RX ADMIN — NOREPINEPHRINE BITARTRATE 12.8 MCG: 1 INJECTION, SOLUTION, CONCENTRATE INTRAVENOUS at 15:49

## 2025-06-19 RX ADMIN — NOREPINEPHRINE BITARTRATE 12.8 MCG: 1 INJECTION, SOLUTION, CONCENTRATE INTRAVENOUS at 15:18

## 2025-06-19 RX ADMIN — NOREPINEPHRINE BITARTRATE 25.6 MCG: 1 INJECTION, SOLUTION, CONCENTRATE INTRAVENOUS at 15:31

## 2025-06-19 RX ADMIN — VASOPRESSIN 1 UNITS/HR: 20 INJECTION, SOLUTION INTRAMUSCULAR; SUBCUTANEOUS at 14:24

## 2025-06-19 RX ADMIN — NOREPINEPHRINE BITARTRATE 12.8 MCG: 1 INJECTION, SOLUTION, CONCENTRATE INTRAVENOUS at 15:23

## 2025-06-19 RX ADMIN — NOREPINEPHRINE BITARTRATE 18.2 MCG: 1 INJECTION, SOLUTION, CONCENTRATE INTRAVENOUS at 15:56

## 2025-06-19 RX ADMIN — NOREPINEPHRINE BITARTRATE 12.8 MCG: 1 INJECTION, SOLUTION, CONCENTRATE INTRAVENOUS at 15:53

## 2025-06-19 RX ADMIN — Medication 2 UNITS: at 15:40

## 2025-06-19 RX ADMIN — SODIUM CHLORIDE 1000 MG: 0.9 INJECTION, SOLUTION INTRAVENOUS at 16:36

## 2025-06-19 RX ADMIN — Medication 200 MG: at 18:52

## 2025-06-19 RX ADMIN — PROPOFOL 130 MG: 10 INJECTION, EMULSION INTRAVENOUS at 12:08

## 2025-06-19 RX ADMIN — MIDAZOLAM 2 MG: 1 INJECTION INTRAMUSCULAR; INTRAVENOUS at 11:48

## 2025-06-19 RX ADMIN — HYDROMORPHONE HYDROCHLORIDE 0.5 MG: 1 INJECTION, SOLUTION INTRAMUSCULAR; INTRAVENOUS; SUBCUTANEOUS at 19:13

## 2025-06-19 RX ADMIN — NOREPINEPHRINE BITARTRATE 12.8 MCG: 1 INJECTION, SOLUTION, CONCENTRATE INTRAVENOUS at 13:04

## 2025-06-19 RX ADMIN — MAGNESIUM SULFATE HEPTAHYDRATE 2 G: 500 INJECTION, SOLUTION INTRAMUSCULAR; INTRAVENOUS at 16:12

## 2025-06-19 RX ADMIN — SODIUM CHLORIDE, SODIUM GLUCONATE, SODIUM ACETATE, POTASSIUM CHLORIDE AND MAGNESIUM CHLORIDE: 526; 502; 368; 37; 30 INJECTION, SOLUTION INTRAVENOUS at 19:54

## 2025-06-19 RX ADMIN — LIDOCAINE HYDROCHLORIDE 100 MG: 20 INJECTION, SOLUTION INFILTRATION; PERINEURAL at 12:07

## 2025-06-19 ASSESSMENT — ACTIVITIES OF DAILY LIVING (ADL)
ADLS_ACUITY_SCORE: 42

## 2025-06-19 NOTE — CONSULTS
Transplant Admission Psychosocial Assessment    Patient Name: Karlene Yun  : 1965  Age: 59 year old  MRN: [id number]  Date of Initial Social Work Evaluation: 2024    Karlene is in the OR for a  donor liver transplant.  I met with Karlene's daughter Patrica, sister Nora and friend Patrica for support and to provide education about SW role while inpatient, and to begin discussion of expectations/requirements, caregiver needs and follow up needs post-transplant.     Presenting Information   Living Situation: Karlene lives in a apartment in [place], [place]. Karlene resides alone.   If not local, plans for short term stay:  n/a  Previous Functional Status: Karlene was independent with her ADLs and IADLs.  Cultural/Language/Spiritual Considerations: undesignated, English is primary language    Support System  Primary Support Person: daughter Patrica   Other supports: sister Nora ([place]), sister Brigitte ([place]), friend Patrica ([place])  Plan for support in immediate post-transplant period: Patrica Soler is planning to stay at her home in [place].  Patrica and her  Bill are prepared to provide 24/ care giving.  Karlene's sisters and friend will assist as needed.  I offered a round trip [employer] voucher for Brigitte's air travel if she is coming to assist with care giving.    Health Care Directive  Decision Maker: Karlene  Alternate Decision Maker: daughter Patrica is Karlene's next of kin  Health Care Directive: Patrica provided Karlene's Health Care Directive to this writer, though it is not yet notarized.  Social work will offer a notary once patient is alert and oriented.  EDNA Ga will follow up tomorrow.    Mental Health/Coping:   History of Mental Health: Per initial assessment: Karlene has a history of depression and is prescribed Celexa by her PCP. She participated in psychotherapy 14+ years ago. No history of other psychiatric care including any  hospitalizations. No history of suicidal thoughts even passive ones.      History of Chemical Health: Per initial assessment: Karlene does not have a history of misuse of any substance including alcohol, pain medication, or illicit substances. Karlene's last consumption of alcohol was 2+ years ago and consumed alcohol about once a month.   Current status: There are no known concerns.   Coping: Unable to assess.  Services Needed/Recommended: liver transplant support group    Financial   Income: spousal maintenance  Impact of transplant on income: further assessment needed  Insurance and medication coverage: [facility] individual plan.  Financial concerns: unable to assess  Resources needed: ongoing assessment, patient was given education about applying for SSDI during evaluation    Education provided by [facility]: Social Work role inpatient setting, availability of support group, parking information     Assessment and recommendations and plan:    I will update patient's usual , Neisha Freitas.  She is out of the office until Monday, 6/23.  EDNA Ga will be available tomorrow if/when needs arise.     EDNA Alves, [facility]  Liver Transplant   [facility] Acute Care Management  Phone: [phone]  Available on Vocera:  Liver GI Intestinal Transplant A thru F [facility]

## 2025-06-19 NOTE — PLAN OF CARE
[id number]    HTN on RA. Aox4, pleasant. IND in room. BG ACHS, NPO since 11pm. Pre-op checklist completed except PIV placement. OR time 11a.

## 2025-06-19 NOTE — DISCHARGE INSTRUCTIONS
Nutrition Services - Post-Transplant Diet Guidelines   Follow recommendations on the Guide to Your Diet after Transplant handout (summary below).    You have increased energy and protein needs for six to eight weeks after transplant. Your goal is to consume at least 135 grams of protein per day during this time frame.   Eat a heart-healthy diet (low sodium, low saturated and trans-fat) once you are outside of the 6-8 week post-transplant window, and once your appetite improves to normal  In some cases (but not in all cases), adjust potassium intake   Take calcium and vitamin D supplement if your doctor or team orders  Take measures to prevent food poisoning: store and prepare foods to the proper temperature, practice good handwashing, heat all deli meat (to 165 degrees Fahrenheit), avoid raw fish and meats (including uncooked eggs, non-pasteurized or raw milk, and mold-ripened, non-pasteurized, and raw cheeses [including Brie, Camembert, Roquefort, Stilton, Gorgonzola and Declan or other soft, unpasteurized cheeses such as Mexican queso fresco]), and throw out leftovers older than two days.   Do not consume grapefruit or pomegranate-containing products. These can interact with your medications.   Avoid the following post txp d/t risk for rejection, unknown effects on the organs, and/or potential interactions with immunosuppressants:       - Herbal, Chinese, holistic, chiropractic, natural, alternative medicines and supplements       - Detoxes and cleanses       - Weight loss pills       - Protein powders or other products with extracts or herbs (ie green tea extract)  If you have any nutrition-related questions or concerns after discharge, please contact our outpatient transplant dietitian, Julita Conn at [phone]

## 2025-06-19 NOTE — ANESTHESIA PROCEDURE NOTES
Central Line/PA Catheter Placement    Pre-Procedure   Staff -        Anesthesiologist:  Brennen Waddell MD       Resident/Fellow: Darwin Snyder MD       Performed By: resident and with residents       Procedure performed by resident/fellow/CRNA in presence of a teaching physician.         Location: OR       Pre-Anesthestic Checklist: patient identified, IV checked, site marked, risks and benefits discussed, informed consent, monitors and equipment checked, pre-op evaluation and at physician/surgeon's request  Timeout:       Correct Patient: Yes        Correct Procedure: Yes        Correct Site: Yes        Correct Position: Yes        Correct Laterality: Yes   Line Placement:   This line was placed Post Induction    Procedure   Procedure: central line       Diagnosis: liver transplant       Laterality: left       Insertion Site: internal jugular.       Patient Position: Trendelenburg  Sterile Prep        All elements of maximal sterile barrier technique followed       Patient Prep/Sterile Barriers: draped, hand hygiene, gloves , hat , mask , draped, gown, sterile gel and probe cover       Skin prep: Chloraprep  Insertion/Injection        Technique: ultrasound guided and Seldinger Technique        1. Ultrasound was used to evaluate the access site.       2. Vein evaluated via ultrasound for patency/adequacy.       3. Using real-time ultrasound the needle/catheter was observed entering the artery/vein.       Introducer Type: 9 Fr, 2-lumen MAC        Type: Introducer  Narrative         Secured by: suture and anchor securement device       Tegaderm and Biopatch dressing used.       Complications: Hematoma,        blood aspirated from all lumens,        All lumens flushed: Yes       Verification method: Ultrasound and BARI   Comments:  Double stick LIJ MAC line for liver transplant done after attempt at RIJ MAC unable to thread wire and noted hematoma

## 2025-06-19 NOTE — ANESTHESIA PROCEDURE NOTES
Perioperative BARI Procedure Note    Staff -        Anesthesiologist:  Brennen Waddell MD       Performed By: anesthesiologist  Preanesthesia Checklist:  Patient identified, IV assessed, risks and benefits discussed, monitors and equipment assessed, procedure being performed at surgeon's request and anesthesia consent obtained.    BARI Probe Insertion    Probe Status PRE Insertion: NO obvious damage  Probe type:  Adult 2D  Bite block used:   Molar  Insertion Technique: Jaw Lift  Insertion complications: Lip Injury  Billing Report:BARI report by Anesthesiologist (See Separate Report note)  Probe Status POST Removal: NO obvious damage    BARI Report  General Procedure Information  Images for this study have been archived.  Modalities: 2D, CW Doppler, PW Doppler and Color flow mapping  Diagnostic Indications comments: Liver transplant.  Echocardiographic and Doppler Measurements  Right Ventricle:  Cavity size normal.    Hypertrophy not present.   Thrombus not present.    Global function normal.     Left Ventricle:  Cavity size normal.    Hypertrophy not present.   Thrombus not present.   Global Function normal.   Ejection Fraction 65%.      Ventricular Regional Function:  1- Basal Anteroseptal:  normal  2- Basal Anterior:  normal  3- Basal Anterolateral:  normal  4- Basal Inferolateral:  normal  5- Basal Inferior:  normal  6- Basal Inferoseptal:  normal  7- Mid Anteroseptal:  normal  8- Mid Anterior:  normal  9- Mid Anterolateral:  normal  10- Mid Inferolateral:  normal  11- Mid Inferior:  normal  12- Mid Inferoseptal:  normal  13- Apical Anterior:  normal  14- Apical Lateral:  normal  15- Apical Inferior:  normal  16- Apical Septal:  normal  17- [place]:  normal    Valves  Aortic Valve: Annulus normal.  Stenosis not present.  Regurgitation absent.  Leaflets normal.  Leaflet motions normal.    Mitral Valve: Annulus normal.  Stenosis not present.  Leaflets normal.  Leaflet motions normal.    Tricuspid Valve: Annulus normal.   Stenosis not present.  Leaflets normal.  Leaflet motions normal.    Pulmonic Valve: Annulus normal.  Stenosis not present.    Other Valve Findings:  No TV regurgitation in dissection phase. Mild TR in neohepatic phase. RVSP 47 mmHg with CVP of 10. Unchanged RV function/size.  Aorta: Ascending Aorta: Size normal.  Diameter 2.5 cm.  Dissection not present.  Plaque thickness less than 3 mm.  Mobile plaque not present.    Aortic Arch:   Dissection not present.   Plaque thickness less than 3 mm.   Mobile plaque not present.    Descending Aorta:   Dissection not present.   Plaque thickness less than 3 mm.   Mobile plaque not present.      Right Atrium:  Size dilated.   Spontaneous echo contrast not present.   Thrombus not present.   Tumor not present.   Device not present.     Left Atrium: Size dilated.  Spontaneous echo contrast not present.  Thrombus not present.  Tumor not present.  Device not present.    Left atrial appendage normal.   Other Atria Findings:  NATTY empyting velocity > 40cm/s  Atrial Septum: Intra-atrial septal morphology normal.       Ventricular Septum: Intra-ventricular septum morphology normal.        Other Findings:   Pericardium:  normal. Pleural Effusion:  left. Pulmonary Arteries:  normal.      Post Intervention Findings  Procedure(s) performed:  Other (see comments) (Liver Transplant). Global function:  Unchanged. Regional wall motion: Unchanged. Surgeon(s) notified of all postintervention findings: Yes.                 Echocardiogram Comments

## 2025-06-19 NOTE — PLAN OF CARE
Goal Outcome Evaluation:      Plan of Care Reviewed With: child, family      Karlene and her care giver(s) will receive post transplant psychosocial education.      EDNA Alves, [facility]  Liver Transplant   [facility] Acute Care Management  Phone: [phone]  Available on Vocera:  Liver GI Intestinal Transplant A thru F SW

## 2025-06-19 NOTE — ANESTHESIA PROCEDURE NOTES
Central Line/PA Catheter Placement    Pre-Procedure   Staff -        Anesthesiologist:  Brennen Waddell MD       Resident/Fellow: Darwin Snyder MD       Performed By: resident and with residents       Procedure performed by resident/fellow/CRNA in presence of a teaching physician.         Location: OR       Pre-Anesthestic Checklist: patient identified, IV checked, site marked, risks and benefits discussed, informed consent, monitors and equipment checked, pre-op evaluation and at physician/surgeon's request  Timeout:       Correct Patient: Yes        Correct Procedure: Yes        Correct Site: Yes        Correct Position: Yes        Correct Laterality: Yes   Line Placement:   This line was placed Post Induction    Procedure   Procedure: central line       Diagnosis: liver transplant       Laterality: left       Insertion Site: internal jugular.       Patient Position: Trendelenburg  Sterile Prep        All elements of maximal sterile barrier technique followed       Patient Prep/Sterile Barriers: draped, hand hygiene, gloves , hat , mask , draped, gown, sterile gel and probe cover       Skin prep: Chloraprep  Insertion/Injection        Technique: ultrasound guided and Seldinger Technique        1. Ultrasound was used to evaluate the access site.       2. Vein evaluated via ultrasound for patency/adequacy.       3. Using real-time ultrasound the needle/catheter was observed entering the artery/vein.       Introducer Type: 9 Fr, 2-lumen MAC        Type: Introducer  Narrative         Secured by: suture and anchor securement device       Tegaderm and Biopatch dressing used.       Complications: None apparent,        blood aspirated from all lumens,        All lumens flushed: Yes       Verification method: Ultrasound, Placement to be verified post-op and BARI   Comments:  Routine MAC (multi-lumen access cannula) central venous catheter placement without complications

## 2025-06-19 NOTE — H&P
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 Rebolledo on 2025.    Blood products and crystalloid intra-op:  --     PLAN:    Neurological:  # Acute post operative pain  # MDD   # Fibromyalgia  # Insomnia  - Monitor neurological status. Delirium preventions and precautions.  - Pain: ***   - Sedation: ***  Current Facility-Administered Medications   Medication Dose Route Frequency Provider Last Rate Last Admin    fentanyl (SUBLIMAZE) bolus or infusion 50 mcg/mL   mcg/hr Intravenous Continuous Brennen Waddell MD         -- PTA Citalopram, Trazodone ***    Pulmonary:   Vital Signs: Most Recent  Ranges (24 hours)   Resp: 10  SpO2: 98 % Resp  Min: 10  Max: 18  SpO2  Min: 96 %  Max: 99 %   O2 Device:   ( )  Resp: 10  # Post operative ventilatory support  # Acute hypoxic respiratory support   - VENT: ***. Continue full vent support. PST when meets criteria.  Ventilatory bundle. HOB elevation   - Supplemental O2, goal SpO2 > 92%.  - Pulmonary hygiene; IS q15-30 minutes when awake.    # Hx of NEREYDA  - No home medications      Cardiovascular:    Vital Signs: Most Recent  Ranges (24 hours)   Pulse: 69  BP: 100/49    Pulse  Min: 67  Max: 76  BP  Min: 96/63  Max: 160/54  No data recorded  No data recorded   # Shock-distributive   - Monitor hemodynamic status.  - ***    # Hx of HLD  - No home medications    Gastroenterology/Nutrition:  # Protein calorie deficit malnutrition   - NPO for Procedure/Surgery per Anesthesia Guidelines Except for: Meds; Clear liquids before procedure/surgery: ADULT (Age GREATER than or Equal to 18 years)  "- Clear liquids 2 hours before procedure/surgery  - Nutrition consult in AM  - NJ tube to be placed ***    # Cirrhosis of the liver  # Metabolic dysfunction-associated steatohepatitis  # S/p  donor liver transplant on 2025  - Home medications including rifaximin, spironolactone and tacrolimus  - Transplant team is primary and guiding treatment    # Nausea***  - Ondansetron IV/PO PRN    Renal/Fluids/Electrolytes:   No intake/output data recorded.  No intake/output data recorded.  Recent Labs   Lab 25  0531 06/18/25  1902 06/15/25  0726 06/15/25  0327 06/14/25  2000   * 130* 130*  --  129*   POTASSIUM 3.7 3.6 4.1 3.8 3.3*   CHLORIDE 98 95* 95*  --  93*   MAG 1.8 1.8 1.8  --   --    PHOS 3.6 2.9  --   --   --    SANDY 8.2* 8.4* 8.3*  --  8.2*   CO2 28 27 27  --  26   BUN 23.5* 24.8* 27.5*  --  27.9*   CR 0.94 0.98* 1.00*  --  1.10*       # ***  - mIVF of *** @ *** ml/hr  - Continue to monitor I/Os  - Rodriguez in place     Endocrine:  Recent Labs   Lab 06/19/25  0620 06/19/25  0531 06/18/25  2157 06/18/25  1902 06/15/25  0726 06/14/25  2000 06/14/25  072522 25  1832   GLC 81 91 105* 125* 100* 194* 110* 107* 102* 119*     # Stress Hyperglycemia   # No Hx DM  # Hypothyroidism  - SSI ***  - Goal sugars < 180  -- PTA levothyroxine ***    ID/Immunologic:  Recent Labs   Lab 06/19/25  0531 06/18/25  1906 06/15/25  0726 06/14/25  0708   WBC 4.0 6.0 6.4 4.5     Positive cultures:  No results found for: \"CULT\"      # Kathya-operative prophylaxis    # Leukocytosis 2/2 Post-Operative Inflammation  -  no indications for antibiotics  -  Trend CBCs QAM.    # Immunosuppression  - Tacrolimus PTA  - PPX: bactrim, valcyte, fluconazole     # Psoriasis  # Psoriatic arthritis  - Home regimen includes Ixekizumab and mometasone ***  - Hold home meds pending discussion with pharmacy/transplant team    Heme:  Recent Labs   Lab 25  0531 25  1906 25  1901 06/15/25  0726 " "25  0708   HGB 9.8* 11.2*  --  10.9* 9.6*   PLT 50* 70*  --  70* 40*   FIBR  --   --  156*  --   --      # Acute Blood Loss Anemia   # Anemia of Critical Illness       Transfusion goals:  -- Hgb > 8  -- Fibrinogen > 200  -- Plt > 50 ***  -- INR < 2.0     # Hx of PE 2002  -- No PTA DOAC    MSK:  # Weakness and deconditioning of critical illness   - Physical and occupational therapy consult      General Cares/Prophylaxis:    DVT Prophylaxis: {DVT PROPHYLAXIS:317683}  GI Prophylaxis: ***PPI  Restraints: Restraints for medical healing needed: { :119768::\"YES\"}  LDAs:  - ETT  - CVC  - PIVx***  - Michael      Disposition:  - SICU  - Barriers to floor status: ***    Patient seen, findings and plan discussed with surgical ICU staff,  ***.    ***    - - - - - - - - - - - - - - - - - - - - - - - - - - - - - - - - - - - - - - - - - - - - - -   HISTORY PRESENTING ILLNESS: Patient is a 59-year-old female with a past medical history of liver cirrhosis secondary to mash, psoriasis/psoriatic arthritis, fibromyalgia, and history of PE in , not on any anticoagulation prior to arrival.  She has been following with transplant on outpatient basis and was admitted on  for preoperative evaluation after donor liver became available that was a match for the patient.  She underwent  donor liver transplant on  and was transferred to the surgical ICU following the procedure.    REVIEW OF SYSTEMS: 10 point ROS neg other than the symptoms noted above in the HPI.    PAST MEDICAL HISTORY:   Past Medical History:   Diagnosis Date    Cirrhosis of liver with ascites (H) 2022    Fibromyalgia 2018    Hyperlipidemia 2005     Problem list name updated by automated process. Provider to review    Hypothyroidism 2002     Problem list name updated by automated process. Provider to review    Major depressive disorder, recurrent episode, in full remission 2002    Metabolic dysfunction-associated " "steatohepatitis (MASH) 05/30/2025    Morbid obesity (H) 05/12/2019    Muscle pain 08/20/2012    Osteoarthritis 11/11/2024    Psoriatic arthropathy (H) 01/21/2002    Pulmonary embolism and infarction (H) 01/31/2002    Problem list name updated by automated process. Provider to review         SURGICAL HISTORY:   Past Surgical History:   Procedure Laterality Date    BIOPSY  Aug 2024    COLONOSCOPY  02/11/2016    mild colitis/ repeat 10 yrs    ESOPHAGOSCOPY, GASTROSCOPY, DUODENOSCOPY (EGD), COMBINED N/A 08/09/2024    Procedure: ESOPHAGOGASTRODUODENOSCOPY for variceal surveillance;  Surgeon: Guru Jacquelyn Rees MD;  Location: [facility] OR    ESOPHAGOSCOPY, GASTROSCOPY, DUODENOSCOPY (EGD), COMBINED N/A 08/09/2024    Procedure: ESOPHAGOGASTRODUODENOSCOPY, WITH FINE NEEDLE ASPIRATION BIOPSY, WITH ENDOSCOPIC ULTRASOUND GUIDANCE liver and lymph node biopsy;  Surgeon: Guru Jacquelyn Rees MD;  Location: [facility] OR     REMOVAL OF TONSILS,<11 Y/O      Tonsils <11y.o.    IR LUMBAR PUNCTURE  06/30/2023    IR THORACENTESIS  03/06/2025    LASIK  01/01/2004    \"lasek\"    THORACENTESIS Left 03/06/2025    Procedure: Thoracentesis;  Surgeon: Jaden Martinez MD;  Location: [facility] OR       SOCIAL HISTORY:   Social History     Tobacco Use    Smoking status: Never    Smokeless tobacco: Never   Substance Use Topics    Alcohol use: Not Currently     Comment: last drink 3 years ago    Drug use: Never       FAMILY HISTORY:   Family History   Problem Relation Age of Onset    Cancer Sister         cervical    Arthritis Paternal Grandmother     Diabetes Father     Asthma Sister     Thyroid Disease Sister     Asthma Daughter     Thyroid Disease Sister        ALLERGIES:   Allergies   Allergen Reactions    Sumatriptan Succinate Nausea and Vomiting     IMITREX       MEDICATIONS:  Current Facility-Administered Medications   Medication Dose Route Frequency Provider Last Rate Last Admin    albumin human 5 % injection 50 g  50 g " Irrigation Once Loulou Orosco MD        [Auto Hold] bumetanide (BUMEX) tablet 2 mg  2 mg Oral BID AndLoulou vincent MD   2 mg at 06/19/25 0739    calcium chloride 1 g in sodium chloride 0.9 % 100 mL intermittent infusion  1 g Intravenous Q1H PRN Loulou Orosco MD        [Auto Hold] citalopram (celeXA) tablet 20 mg  20 mg Oral Daily AndLoulou vincent MD   20 mg at 06/19/25 0739    fentanyl (SUBLIMAZE) bolus or infusion 50 mcg/mL   mcg/hr Intravenous Continuous Brennen Waddell MD        fibrinogen concentrate (Human) (FIBRYGA) injection 1,150 mg  1,150 mg Intravenous Once Brennen Waddell MD        fibrinogen concentrate (Human) (FIBRYGA) injection 1,150 mg  1,150 mg Intravenous Once Brennen Waddell MD        fluconazole (DIFLUCAN) intermittent infusion 400 mg  400 mg Intravenous Once Loulou Orosco MD        [Auto Hold] gabapentin (NEURONTIN) capsule 300 mg  300 mg Oral QAM Loulou Orosco MD   300 mg at 06/19/25 0739    [Auto Hold] gabapentin (NEURONTIN) tablet 600 mg  600 mg Oral QPM Loulou Orosco MD   600 mg at 06/18/25 2016    [Auto Hold] lactulose (CHRONULAC) solution 10 g  10 g Oral QAM Loulou Orosco MD   10 g at 06/19/25 0739    [Auto Hold] levothyroxine (SYNTHROID/LEVOTHROID) tablet 112 mcg  112 mcg Oral Daily Loulou Orosco MD   112 mcg at 06/19/25 0739    [Auto Hold] Lidocaine (LIDOCARE) 4 % Patch 1 patch  1 patch Transdermal Daily PRN Loulou Orosco MD        lidocaine (LMX4) cream   Topical Q1H PRN Loulou Orosco MD        [Auto Hold] lidocaine (LMX4) cream   Topical Q1H PRN Loulou Orosco MD        lidocaine 1 % 0.1-1 mL  0.1-1 mL Other Q1H PRN Loulou Orosco MD        [Auto Hold] lidocaine 1 % 1 mL  1 mL Other Q1H PRN Loulou Orosco MD        methylPREDNISolone sodium succinate (solu-MEDROL) 1,000 mg in sodium chloride 0.9 % 283 mL intermittent infusion  1,000 mg Intravenous Once Loulou Orosco MD        [Auto Hold] mometasone (ELOCON) 0.1 % cream   Topical BID PRN Loulou Orosco MD         [Auto Hold] ondansetron (ZOFRAN ODT) ODT tab 4 mg  4 mg Oral Q6H PRN Loulou Orosco MD        Or    [Auto Hold] ondansetron (ZOFRAN) injection 4 mg  4 mg Intravenous Q6H PRN Loulou Orosco MD        piperacillin-tazobactam (ZOSYN) 3.375 g vial to attach to  mL bag  3.375 g Intravenous Once Loulou Orosco MD        Followed by    piperacillin-tazobactam (ZOSYN) 3.375 g vial to attach to  mL bag  3.375 g Intravenous Q4H PRN Loulou Orosco MD        [Auto Hold] prochlorperazine (COMPAZINE) injection 10 mg  10 mg Intravenous Q6H PRN Loulou Orosco MD        Or    [Auto Hold] prochlorperazine (COMPAZINE) tablet 10 mg  10 mg Oral Q6H PRN Loulou Orosco MD        [Auto Hold] rifaximin (XIFAXAN) tablet 550 mg  550 mg Oral BID Loulou Orosco MD   550 mg at 06/19/25 0739    sodium chloride (PF) 0.9% PF flush 3 mL  3 mL Intracatheter Q8H DARINEL Loulou Orosco MD        sodium chloride (PF) 0.9% PF flush 3 mL  3 mL Intracatheter q1 min prn Loulou Orosco MD        [Auto Hold] sodium chloride (PF) 0.9% PF flush 3 mL  3 mL Intracatheter Q1H PRN Loulou Orosco MD        [Auto Hold] sodium chloride (PF) 0.9% PF flush 3 mL  3 mL Intracatheter Q8H Loulou Orosco MD        [Auto Hold] spironolactone (ALDACTONE) tablet 50 mg  50 mg Oral Daily Loulou Orosco MD   50 mg at 06/19/25 0739    [Auto Hold] traZODone (DESYREL) tablet 100 mg  100 mg Oral At Bedtime Loulou Orosco MD        [Auto Hold] traZODone (DESYREL) tablet 50 mg  50 mg Oral At Bedtime Loulou Orosco MD   50 mg at 06/18/25 2316    [Auto Hold] triamcinolone (KENALOG) 0.1 % cream   Topical BID PRN Loulou Orosco MD           PHYSICAL EXAMINATION:  Temp:  [97.9  F (36.6  C)] 97.9  F (36.6  C)  Pulse:  [67-76] 69  Resp:  [10-18] 10  BP: ()/(49-83) 100/49  SpO2:  [96 %-99 %] 98 %  General: ***  HEENT: ***  Neck: ***   Neuro: sedated ***  Pulm/Resp: intubated, mechanical ventilation ***  CV: RRR, ***  Abdomen: Soft, ntnd***, no rebound  tenderness or guarding  : *** hanson catheter in place with clear yellow urine  Incisions/Skin: ***  MSK/Extremities: *** peripheral edema, extremities wwp***    LABS:   Arterial Blood Gases   No lab results found in last 7 days.  Complete Blood Count   Recent Labs   Lab 06/19/25  0531 06/18/25  1906 06/15/25  0726 06/14/25  0708   WBC 4.0 6.0 6.4 4.5   HGB 9.8* 11.2* 10.9* 9.6*   PLT 50* 70* 70* 40*     Basic Metabolic Panel  Recent Labs   Lab 06/19/25  0620 06/19/25  0531 06/18/25  2157 06/18/25  1902 06/15/25  0726 06/15/25  0327 06/14/25 2000   NA  --  132*  --  130* 130*  --  129*   POTASSIUM  --  3.7  --  3.6 4.1 3.8 3.3*   CHLORIDE  --  98  --  95* 95*  --  93*   CO2  --  28  --  27 27  --  26   BUN  --  23.5*  --  24.8* 27.5*  --  27.9*   CR  --  0.94  --  0.98* 1.00*  --  1.10*   GLC 81 91 105* 125* 100*  --  194*     Liver Function Tests  Recent Labs   Lab 06/18/25  1902 06/18/25  1901 06/15/25  0726 06/14/25  0708 06/13/25  0622   AST 83*  --  70* 65* 58*   ALT 40  --  31 27 27   ALKPHOS 74  --  70 62 68   BILITOTAL 4.9*  --  4.1* 3.7* 3.2*   ALBUMIN 2.5*  --  2.3* 2.0* 2.2*   INR  --  1.98* 1.89* 2.05* 2.13*     Pancreatic Enzymes  Recent Labs   Lab 06/18/25 1902   AMYLASE 47     Coagulation Profile  Recent Labs   Lab 06/18/25  1901 06/15/25  0726 06/14/25  0708 06/13/25  0622   INR 1.98* 1.89* 2.05* 2.13*   PTT 38  --   --   --      Cultures  No lab results found in last 7 days.    IMAGING:   Recent Results (from the past 24 hours)   XR Chest 2 Views    Narrative    XR CHEST 2 VIEWS  6/18/2025 8:09 PM     HISTORY:  pre-op screening for transplant       COMPARISON:  6/10/2025.    TECHNIQUE: Upright PA and lateral projection radiographs of the chest.    FINDINGS:   Cardiomediastinal silhouette is within normal limits. Trachea is  midline. Decreased size of the small left pleural effusion. No  pneumothorax.  No acute focal consolidations.  Mild perihilar  opacities with improvement of retrocardiac  opacities.    Upper abdomen is within normal limits.  No acute osseous  abnormalities.  Soft tissues are normal.         Impression    IMPRESSION: Mild perihilar atelectasis/edema with improved  retrocardiac opacities. Decreased size of the small left pleural  effusion.    I have personally reviewed the examination and initial interpretation  and I agree with the findings.    SILVIA GAO MD         SYSTEM ID:  [id number]   BARI with Report    Narrative    Brennen Waddell MD     6/19/2025  9:22 AM  Procedures         CURRENT MEDICATIONS:  Current Facility-Administered Medications   Medication Dose Route Frequency Provider Last Rate Last Admin    fentanyl (SUBLIMAZE) bolus or infusion 50 mcg/mL   mcg/hr Intravenous Continuous Brennen Waddell MD         Current Facility-Administered Medications   Medication Dose Route Frequency Provider Last Rate Last Admin    albumin human 5 % injection 50 g  50 g Irrigation Once Loulou Orosco MD        [Auto Hold] bumetanide (BUMEX) tablet 2 mg  2 mg Oral BID Loulou Orosco MD   2 mg at 06/19/25 0739    [Auto Hold] citalopram (celeXA) tablet 20 mg  20 mg Oral Daily Loulou Orosco MD   20 mg at 06/19/25 0739    fibrinogen concentrate (Human) (FIBRYGA) injection 1,150 mg  1,150 mg Intravenous Once Brennen Waddell MD        fibrinogen concentrate (Human) (FIBRYGA) injection 1,150 mg  1,150 mg Intravenous Once Brennen Waddell MD        fluconazole (DIFLUCAN) intermittent infusion 400 mg  400 mg Intravenous Once Loulou Orosco MD        [Auto Hold] gabapentin (NEURONTIN) capsule 300 mg  300 mg Oral QAM Loulou Orosco MD   300 mg at 06/19/25 0739    [Auto Hold] gabapentin (NEURONTIN) tablet 600 mg  600 mg Oral QPM Loulou Orosco MD   600 mg at 06/18/25 2016    [Auto Hold] lactulose (CHRONULAC) solution 10 g  10 g Oral QAM Loulou Orosco MD   10 g at 06/19/25 0739    [Auto Hold] levothyroxine (SYNTHROID/LEVOTHROID) tablet 112 mcg  112 mcg Oral Daily Loulou Orosco MD    112 mcg at 06/19/25 0739    methylPREDNISolone sodium succinate (solu-MEDROL) 1,000 mg in sodium chloride 0.9 % 283 mL intermittent infusion  1,000 mg Intravenous Once Loulou Orosco MD        piperacillin-tazobactam (ZOSYN) 3.375 g vial to attach to  mL bag  3.375 g Intravenous Once Loulou Orosco MD        [Auto Hold] rifaximin (XIFAXAN) tablet 550 mg  550 mg Oral BID AndLoulou vincent MD   550 mg at 06/19/25 0739    sodium chloride (PF) 0.9% PF flush 3 mL  3 mL Intracatheter Q8H DARINEL Loulou Orosco MD        [Auto Hold] sodium chloride (PF) 0.9% PF flush 3 mL  3 mL Intracatheter Q8H Loulou Orosco MD        [Auto Hold] spironolactone (ALDACTONE) tablet 50 mg  50 mg Oral Daily Loulou Orosco MD   50 mg at 06/19/25 0739    [Auto Hold] traZODone (DESYREL) tablet 100 mg  100 mg Oral At Bedtime Loulou Orosco MD        [Auto Hold] traZODone (DESYREL) tablet 50 mg  50 mg Oral At Bedtime Loulou Orosco MD   50 mg at 06/18/25 2316     Current Facility-Administered Medications   Medication Dose Route Frequency Provider Last Rate Last Admin    calcium chloride 1 g in sodium chloride 0.9 % 100 mL intermittent infusion  1 g Intravenous Q1H PRN Loulou Orosco MD        [Auto Hold] Lidocaine (LIDOCARE) 4 % Patch 1 patch  1 patch Transdermal Daily PRN Loulou Orosco MD        lidocaine (LMX4) cream   Topical Q1H PRN Loulou Orosco MD        [Auto Hold] lidocaine (LMX4) cream   Topical Q1H PRN Loulou Orosco MD        lidocaine 1 % 0.1-1 mL  0.1-1 mL Other Q1H PRN Loulou Orosco MD        [Auto Hold] lidocaine 1 % 1 mL  1 mL Other Q1H PRN Loulou Orosco MD        [Auto Hold] mometasone (ELOCON) 0.1 % cream   Topical BID PRN Loulou Orosco MD        [Auto Hold] ondansetron (ZOFRAN ODT) ODT tab 4 mg  4 mg Oral Q6H PRN Loulou Orosco MD        Or    [Auto Hold] ondansetron (ZOFRAN) injection 4 mg  4 mg Intravenous Q6H PRN Loulou Orosco MD        piperacillin-tazobactam (ZOSYN) 3.375 g vial to attach to   mL bag  3.375 g Intravenous Q4H PRN Loulou Orosco MD        [Auto Hold] prochlorperazine (COMPAZINE) injection 10 mg  10 mg Intravenous Q6H PRN Loulou Orosco MD        Or    [Auto Hold] prochlorperazine (COMPAZINE) tablet 10 mg  10 mg Oral Q6H PRN Loulou Orosco MD        sodium chloride (PF) 0.9% PF flush 3 mL  3 mL Intracatheter q1 min prn Loulou Orosco MD        [Auto Hold] sodium chloride (PF) 0.9% PF flush 3 mL  3 mL Intracatheter Q1H PRN Loulou Orosco MD        [Auto Hold] triamcinolone (KENALOG) 0.1 % cream   Topical BID PRN Loulou Orosco MD            normal.  Diameter 2.5 cm.  Dissection not   present.  Plaque thickness less than 3 mm.  Mobile plaque not present.    Aortic Arch:   Dissection not present.   Plaque thickness less than 3 mm.     Mobile plaque not present.    Descending Aorta:   Dissection not present.   Plaque thickness less than 3   mm.   Mobile plaque not present.      Right Atrium:  Size dilated.   Spontaneous echo contrast not present.     Thrombus not present.   Tumor not present.   Device not present.     Left Atrium: Size dilated.  Spontaneous echo contrast not present.    Thrombus not present.  Tumor not present.  Device not present.    Left atrial appendage normal.   Other Atria Findings:  NATTY empyting velocity > 40cm/s  Atrial Septum: Intra-atrial septal morphology normal.       Ventricular Septum: Intra-ventricular septum morphology normal.        Other Findings:   Pericardium:  normal. Pleural Effusion:  left. Pulmonary Arteries:    normal.      Post Intervention Findings  Procedure(s) performed:  Other (see comments) (Liver Transplant). Global   function:  Unchanged. Regional wall motion: Unchanged. Surgeon(s) notified   of all postintervention findings: Yes.                 Echocardiogram Comments   XR Chest Port 1 View    Narrative    Exam: XR CHEST PORT 1 VIEW, 6/19/2025 8:37 PM    Indication: line positioning post surgery    Comparison: 6/18/2025.    Findings:   Portable supine AP view of the chest. Left internal jugular central  venous catheter tips project over the left brachiocephalic vein and  brachiocephalic venous confluence. Trachea is midline.  Cardiomediastinal silhouette is stable. Low lung volumes. Streaky  perihilar opacities. Dense retrocardiac opacities. No definitive  pneumothorax. Decreased blunting of the left costophrenic angle, now  trace. Right costophrenic angle is clear. Surgical drains project over  the right upper quadrant. Surgical clips project over the central  upper abdomen.       Impression     Impression:  1. Left internal jugular central venous catheter tips project over the  left brachiocephalic vein and brachiocephalic venous confluence.  2. Low lung volumes with streaky perihilar opacities and dense  retrocardiac opacities, favor atelectasis/pulmonary edema.  3. Decreased left pleural effusion, now trace.    I have personally reviewed the examination and initial interpretation  and I agree with the findings.    AMADOR HERRERA MD         SYSTEM ID:  [id number]   US Liver Transplant    Impression    RESIDENT PRELIMINARY INTERPRETATION  Impression:   1.  Elevated peak systolic velocities and resistive indices of the  hepatic arteries which may represent vasospasm versus edema in the  immediate postoperative period. Attention on follow-up.  2.  Normal grayscale ultrasound evaluation of the liver.       CURRENT MEDICATIONS:  Current Facility-Administered Medications   Medication Dose Route Frequency Provider Last Rate Last Admin    dextrose 10% infusion   Intravenous Continuous PRN Jaimee Gagnon MD        dextrose 5% and 0.45% NaCl infusion   Intravenous Continuous Ginny Naidu  mL/hr at 06/19/25 2200 Rate Verify at 06/19/25 2200    insulin regular (MYXREDLIN) 1 unit/mL infusion  0-24 Units/hr Intravenous Continuous Jaimee Gagnon MD 12 mL/hr at 06/20/25 0200 12 Units/hr at 06/20/25 0200    norepinephrine (LEVOPHED) 16 mg in  mL infusion MAX CONC CENTRAL LINE  0.01-0.6 mcg/kg/min Intravenous Continuous Jaimee Gagnon MD 15.4 mL/hr at 06/20/25 0234 0.11 mcg/kg/min at 06/20/25 0234    Reason beta blocker order not selected   Does not apply DOES NOT GO TO Ginny Mcgowan MD        sodium chloride 0.45 % 1,000 mL infusion   Intravenous Continuous PRN Ginny Naidu MD         Current Facility-Administered Medications   Medication Dose Route Frequency Provider Last Rate Last Admin    acetaminophen (TYLENOL) tablet 650 mg  650 mg Oral Q8H Ginny Naidu MD        aspirin  (ASA) chewable tablet 81 mg  81 mg Oral or Feeding Tube Daily Max Rebolledo MD        citalopram (celeXA) tablet 20 mg  20 mg Oral Daily Ginny Naidu MD   20 mg at 06/19/25 0739    fluconazole (DIFLUCAN) intermittent infusion 400 mg  400 mg Intravenous Q24H Mxa Rebolledo MD        gabapentin (NEURONTIN) capsule 300 mg  300 mg Oral QAM Ginny Naidu MD   300 mg at 06/19/25 0739    gabapentin (NEURONTIN) tablet 600 mg  600 mg Oral QPM Ginny Naidu MD   600 mg at 06/18/25 2016    levothyroxine (SYNTHROID/LEVOTHROID) tablet 112 mcg  112 mcg Oral Daily Ginny Naidu MD   112 mcg at 06/19/25 0739    methylPREDNISolone Na Suc (solu-MEDROL) 200 mg in sodium chloride 0.9 % 58.2 mL intermittent infusion  200 mg Intravenous Once Ginny Naidu MD        Followed by    [START ON 6/21/2025] methylPREDNISolone Na Suc (solu-MEDROL) injection 100 mg  100 mg Intravenous Once Ginny Naidu MD        Followed by    [START ON 6/22/2025] methylPREDNISolone Na Suc (solu-MEDROL) injection 50 mg  50 mg Intravenous Once Ginny Naidu MD        Followed by    [START ON 6/23/2025] predniSONE (DELTASONE) tablet 25 mg  25 mg Oral Once Ginny Naidu MD        Followed by    [START ON 6/24/2025] predniSONE (DELTASONE) tablet 10 mg  10 mg Oral Once Ginny Naidu MD        Followed by    [START ON 6/25/2025] predniSONE (DELTASONE) tablet 5 mg  5 mg Oral Daily Ginny Naidu MD        multivitamin w/minerals (THERA-VIT-M) tablet 1 tablet  1 tablet Oral Daily Ginny Naidu MD        Or    multivitamins w/minerals liquid 15 mL  15 mL Oral or NG Tube Daily Ginny Naidu MD        mycophenolate (GENERIC EQUIVALENT) capsule 750 mg  750 mg Oral BID IS Ginny Naidu MD   750 mg at 06/19/25 2138    Or    mycophenolate (CELLCEPT BRAND) suspension 750 mg  750 mg Oral or NG Tube BID IS Ginny Naidu MD        pantoprazole (PROTONIX) EC tablet 40 mg  40 mg Oral Daily Ginny Naidu MD         Or    pantoprazole (PROTONIX) 2 mg/mL suspension 40 mg  40 mg Oral or NG Tube Daily Ginny Naidu MD        piperacillin-tazobactam (ZOSYN) 3.375 g vial to attach to  mL bag  3.375 g Intravenous Q6H Ginny Naidu MD   3.375 g at 06/19/25 2350    sodium chloride (PF) 0.9% PF flush 3 mL  3 mL Intravenous Q8H Ginny Naidu MD        sodium chloride (PF) 0.9% PF flush 3 mL  3 mL Intracatheter Q8H DARINEL Ginny Naidu MD        sodium chloride (PF) 0.9% PF flush 3 mL  3 mL Intracatheter Q8H Ginny Naidu MD        sulfamethoxazole-trimethoprim (BACTRIM) 400-80 MG per tablet 1 tablet  1 tablet Oral or Feeding Tube Daily Ginny Naidu MD        tacrolimus (GENERIC EQUIVALENT) capsule 2 mg  2 mg Oral BID IS Ginny Naidu MD   2 mg at 06/19/25 2244    ursodiol (ACTIGALL) capsule 300 mg  300 mg Oral BID Ginny Naidu MD   300 mg at 06/19/25 2139    Or    ursodiol (ACTIGALL) suspension 300 mg  300 mg Oral or NG Tube BID Ginny Naidu MD        valGANciclovir (VALCYTE) tablet 450 mg  450 mg Oral Daily Ginny Naidu MD        Or    valGANciclovir (VALCYTE) solution 450 mg  450 mg Oral or NG Tube Daily Ginny Naidu MD         Current Facility-Administered Medications   Medication Dose Route Frequency Provider Last Rate Last Admin    calcium chloride 1 g in sodium chloride 0.9 % 100 mL intermittent infusion  1 g Intravenous Q1H PRN Ginny Naidu MD   1 g at 06/19/25 1510    dextrose 10% infusion   Intravenous Continuous PRN Jaimee Gagnon MD        glucose gel 15-30 g  15-30 g Oral Q15 Min PRN Jaimee Gagnon MD        Or    dextrose 50 % injection 25-50 mL  25-50 mL Intravenous Q15 Min PRN Jaimee Gagnon MD        Or    glucagon injection 1 mg  1 mg Subcutaneous Q15 Min PRN Jaimee Gagnon MD        HYDROmorphone (DILAUDID) half-tab 1 mg  1 mg Oral Q3H PRN Jaimee Gagnon MD   1 mg at 06/19/25 4676    Or    HYDROmorphone (DILAUDID) tablet 2 mg  2 mg Oral Q3H  PRN Jaimee Gagnon MD        HYDROmorphone (DILAUDID) injection 0.4 mg  0.4 mg Intravenous Q2H PRN Jaimee Gagnon MD   0.4 mg at 06/19/25 2104    Lidocaine (LIDOCARE) 4 % Patch 1 patch  1 patch Transdermal Daily PRN Ginny Naidu MD        lidocaine (LMX4) cream   Topical Q1H PRN Ginny Naidu MD        lidocaine (LMX4) cream   Topical Q1H PRN Ginny Naidu MD        lidocaine 1 % 0.1-1 mL  0.1-1 mL Other Q1H PRN Ginny Naidu MD        lidocaine 1 % 1 mL  1 mL Other Q1H PRN Ginny Naidu MD        mometasone (ELOCON) 0.1 % cream   Topical BID PRN Ginny Naidu MD        naloxone (NARCAN) injection 0.2 mg  0.2 mg Intravenous Q2 Min PRN Max Rebolledo MD        Or    naloxone (NARCAN) injection 0.4 mg  0.4 mg Intravenous Q2 Min PRN Max Rebolledo MD        Or    naloxone (NARCAN) injection 0.2 mg  0.2 mg Intramuscular Q2 Min PRMax Jin MD        Or    naloxone (NARCAN) injection 0.4 mg  0.4 mg Intramuscular Q2 Min PRN Max Rebolledo MD        ondansetron (ZOFRAN ODT) ODT tab 4 mg  4 mg Oral Q6H PRN Ginny Naidu MD        Or    ondansetron (ZOFRAN) injection 4 mg  4 mg Intravenous Q6H PRN Ginny Naidu MD        piperacillin-tazobactam (ZOSYN) 3.375 g vial to attach to  mL bag  3.375 g Intravenous Q4H PRGinny Odell MD        prochlorperazine (COMPAZINE) injection 10 mg  10 mg Intravenous Q6H PRGinny Odell MD        Or    prochlorperazine (COMPAZINE) tablet 10 mg  10 mg Oral Q6H PRGinny Odell MD        Reason beta blocker order not selected   Does not apply DOES NOT GO TO Ginny Mcgowan MD        sodium chloride (PF) 0.9% PF flush 3 mL  3 mL Intravenous Q1H PRN Ginny Naidu MD        sodium chloride (PF) 0.9% PF flush 3 mL  3 mL Intravenous Q1H PRN Ginny Naidu MD        sodium chloride (PF) 0.9% PF flush 3 mL  3 mL Intracatheter q1 min prn Ginny Naidu MD        sodium chloride (PF) 0.9% PF  flush 3 mL  3 mL Intracatheter Q1H PRN Ginny Naidu MD        sodium chloride 0.45 % 1,000 mL infusion   Intravenous Continuous PRN Ginny Naidu MD        traZODone (DESYREL) tablet  mg   mg Oral At Bedtime PRN Jaimee Gagnon MD        triamcinolone (KENALOG) 0.1 % cream   Topical BID PRN Ginny Naidu MD

## 2025-06-19 NOTE — PROGRESS NOTES
Admitted/transferred from: home  Time of arrival on unit 1800  2 RN full  skin assessment completed by Yaneth COATS and Annie CLARKE  Skin assessment finding: issues found redness in gluteal cleft, redness on R hip, scab on L knee   Interventions/actions: other none at this time     Will continue to monitor.

## 2025-06-19 NOTE — ANESTHESIA PROCEDURE NOTES
Airway       Patient location during procedure: OR       Procedure Start/Stop Times: 6/19/2025 12:12 PM  Staff -        Other Anesthesia Staff: Argentina Zaragoza       Performed By: SRNA  Consent for Airway        Urgency: emergent  Indications and Patient Condition       Indications for airway management: beena-procedural       Induction type:intravenous       Mask difficulty assessment: 2 - vent by mask + OA or adjuvant +/- NMBA    Final Airway Details       Final airway type: endotracheal airway       Successful airway: ETT - single and Oral  Endotracheal Airway Details        ETT size (mm): 8.0       Cuffed: yes       Cuff volume (mL): 8       Successful intubation technique: direct laryngoscopy       DL Blade Type: MAC 3       Grade View of Cords: 1       Adjucts: stylet       Position: Right       Measured from: lips       Secured at (cm): 22       Bite block used: None    Post intubation assessment        Placement verified by: capnometry, equal breath sounds and chest rise        Number of attempts at approach: 1       Secured with: tape       Ease of procedure: easy       Dentition: Intact and Unchanged    Medication(s) Administered   Medication Administration Time: 6/19/2025 12:12 PM

## 2025-06-19 NOTE — PROGRESS NOTES
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 Rebolledo.      Changes today:   - ***   - ***  ____________________________________________    s/p DCD DDLT (stent?***) 6/19/25: . ***    Immunosuppressed status secondary to medications:     Induction: Steroid taper per protocol.***  Maintenance:    -  mg BID   - Tacrolimus *** mg BID. Goal level 8-12.   - Prednisone 5 mg daily following taper.    Neuro/Psych:  MDD:   - Continue PTA citalopram.  Insomnia:   - Restart PTA Trazadone at bedtime when extubated.   Acute post op pain:    - Restart PTA gabapentin***.    - ***    Hematology:   Acute blood loss anemia: Hgb ***.  Thrombocytopenia: 2/2 liver disease. PLT ***.     Cardiorespiratory:   Hx NEREYDA: Does not use CPAP.   Hx HLD: ***  Hx HFpEF: ***  Chronic left sided pleural effusion: ***  Post operative mechanical ventilation: ***    GI/Nutrition: ***    Endocrine:   Acquired hypothyroidism:    - PTA levothyroxine 112 mcg daily.   Steroid induced hyperglycemia:    - Continue insulin gtt***.     Fluid/Electrolytes: MIVF: ***    : {UMP TX ROWE:[id number]}    Rheum:  Psoriatic arthritis:    - PTA managed with ixekizumab, tacrolimus ointment    Infectious disease: ***    Prophylaxis: DVT (mechanical), fall, GI, fungal (micafungin x 14 days, then Nystatin), viral (Valcyte), pneumocystis (Bactrim)    Disposition: PT/OT consulted, SICU      JOCELIN/Fellow/Resident Provider: Gala Rodriguez NP Pager #[phone]    Faculty: Max Rebolledo M.D.  __________________________________________________________________  Transplant History:    N/A, Postoperative day:      Interval History: { :[id number]}  Overnight events: ***    ROS:   A 10-point review of systems was negative except as noted  above.    Curent Meds:  Current Facility-Administered Medications   Medication Dose Route Frequency Provider Last Rate Last Admin    albumin human 5 % injection 50 g  50 g Irrigation Once Loulou Orosco MD        [Auto Hold] bumetanide (BUMEX) tablet 2 mg  2 mg Oral BID Loulou Orosco MD   2 mg at 06/19/25 0739    [Auto Hold] citalopram (celeXA) tablet 20 mg  20 mg Oral Daily Loulou Orosco MD   20 mg at 06/19/25 0739    fibrinogen concentrate (Human) (FIBRYGA) injection 1,150 mg  1,150 mg Intravenous Once Brennen Waddell MD        fibrinogen concentrate (Human) (FIBRYGA) injection 1,150 mg  1,150 mg Intravenous Once Brennen Waddell MD        [Auto Hold] gabapentin (NEURONTIN) capsule 300 mg  300 mg Oral QAM Loulou Orosco MD   300 mg at 06/19/25 0739    [Auto Hold] gabapentin (NEURONTIN) tablet 600 mg  600 mg Oral QPM Loulou Orosco MD   600 mg at 06/18/25 2016    [Auto Hold] lactulose (CHRONULAC) solution 10 g  10 g Oral QAM Loulou Orosco MD   10 g at 06/19/25 0739    [Auto Hold] levothyroxine (SYNTHROID/LEVOTHROID) tablet 112 mcg  112 mcg Oral Daily Loulou Orosco MD   112 mcg at 06/19/25 0739    methylPREDNISolone sodium succinate (solu-MEDROL) 1,000 mg in sodium chloride 0.9 % 283 mL intermittent infusion  1,000 mg Intravenous Once Loulou Orosco MD        [Auto Hold] rifaximin (XIFAXAN) tablet 550 mg  550 mg Oral BID Loulou Orosco MD   550 mg at 06/19/25 0739    sodium chloride (PF) 0.9% PF flush 3 mL  3 mL Intracatheter Q8H [facility] Loulou Orosco MD        [Auto Hold] sodium chloride (PF) 0.9% PF flush 3 mL  3 mL Intracatheter Q8H Loulou Orocso MD        [Auto Hold] spironolactone (ALDACTONE) tablet 50 mg  50 mg Oral Daily Loulou Orosco MD   50 mg at 06/19/25 0739    [Auto Hold] traZODone (DESYREL) tablet 100 mg  100 mg Oral At Bedtime Loulou Orosco MD        [Auto Hold] traZODone (DESYREL) tablet 50 mg  50 mg Oral At Bedtime Loulou Orosco MD   50 mg at 06/18/25 4699    vancomycin (VANCOCIN)  "2,000 mg in 0.9% NaCl 500 mL intermittent infusion  2,000 mg Intravenous Pre-Op/Pre-procedure x 1 dose Jaquan Willis, MERRITT SANDS           Physical Exam:     Admit Weight: (!) 149.6 kg (329 lb 12.8 oz)    Current Vitals:   /49 (Cuff Size: Adult Large)   Pulse 69   Temp 97.9  F (36.6  C) (Oral)   Resp 10   Wt (!) 149.6 kg (329 lb 12.8 oz)   SpO2 98%   BMI 46.00 kg/m      Vital sign ranges:    Temp:  [97.9  F (36.6  C)] 97.9  F (36.6  C)  Pulse:  [67-76] 69  Resp:  [10-18] 10  BP: ()/(49-83) 100/49  SpO2:  [96 %-99 %] 98 %    General Appearance: {Distress levels:831287::\"in no apparent distress.\"}   Skin: {SKIN:399144}, {SKIN POSITIVES CHILD:9029}  Heart: ***  Lungs: ***  Abdomen: ***  : ***  Extremities: edema: *** There {IS / IS NO:974366} skin breakdown***.  Neurologic: ***. Tremor {ABSENT:090100}.     Frailty Scores          5/20/2025 3/30/2025   Frailty Scores   Final Score Not Frail  Not Frail    Final Score Number 1  1       Details          Patient-reported               Data:   CMP  Recent Labs   Lab 06/19/25  1435 06/19/25  1323 06/19/25  0620 06/19/25  0531 06/18/25  2157 06/18/25  1902 06/15/25  0726   * 133*  --  132*  --  130* 130*   POTASSIUM 3.6 3.5  --  3.7  --  3.6 4.1   CHLORIDE  --   --   --  98  --  95* 95*   CO2  --   --   --  28  --  27 27   * 104*   < > 91   < > 125* 100*   BUN  --   --   --  23.5*  --  24.8* 27.5*   CR  --   --   --  0.94  --  0.98* 1.00*   GFRESTIMATED  --   --   --  69  --  66 64   SANDY  --   --   --  8.2*  --  8.4* 8.3*   ICAW 4.1* 4.2*  --   --   --   --   --    MAG  --   --   --  1.8  --  1.8 1.8   PHOS  --   --   --  3.6  --  2.9  --    AMYLASE  --   --   --   --   --  47  --    ALBUMIN  --   --   --   --   --  2.5* 2.3*   BILITOTAL  --   --   --   --   --  4.9* 4.1*   ALKPHOS  --   --   --   --   --  74 70   AST  --   --   --   --   --  83* 70*   ALT  --   --   --   --   --  40 31    < > = values in this interval not displayed. "     CBC  Recent Labs   Lab 06/19/25  1435 06/19/25  1323 06/19/25  0531 06/18/25  1906   HGB 11.4* 11.5* 9.8* 11.2*   WBC  --   --  4.0 6.0   PLT  --   --  50* 70*

## 2025-06-19 NOTE — PROGRESS NOTES
"CLINICAL NUTRITION SERVICES - ASSESSMENT NOTE    Current BMI: 46.00 (HT 71 in,  lbs/149.6 kg)  BMI is just outside recommendation of <45 for liver transplant, but this is inconsequential at this point as pt is actively in OR for liver transplant    Per Outpatient visit with SOT RD for Liver Transplant Evaluation on 11/26/2024:  Handgrip Strength (average of 3 using dominant hand): 25     FraiLT-- Pre frail (LFI: 3.86)  [email or web address]  Reference Range  Robust <3.2  Pre Frail 3.2-4.3  Frail >4.4    RECOMMENDATIONS FOR MDs/PROVIDERS TO ORDER:  If unable to extubate and advance diet within next 48 hours, recommend initiate nutrition support (recommend EN pending GI status and hemodynamic stability).   --> If/when nutrition support becomes POC, please consult RD (\"Registered Dietitian to Order TF per Medical Nutrition Therapy Guidelines\")    Registered Dietitian Interventions:  None at this time.    Future/Additional Recommendations:  Will need post-transplant diet education prior to discharge.    Start ONS/kcal counts once appropriate. Had recently been on Ensure Max BID during admit about a week ago.    EN support recs if needed:   TwoCal HN @ 45 mL/hr (1080 mL/day) + 3 pkts ProSource TF20 to provide 2400 kcals (27 kcals/kg/day), 151 g PRO (1.7 g/kg/day), 756 mL H2O, 237 g CHO and 5 g Fiber daily.   - Initiate @ 15 mL/hr and advance by 10 mL q8hr as tolerated to goal rate.   - Do not advance unless K+ >/= 3, Mg++ >/=1.5, and phos >/=1.9  - 30 mL q4hr fluid flushes for tube patency. Additional fluids and/or adjustments per MD.   - Multivitamin/mineral (15 mL/day via FT) to help ensure micronutrient needs being met with suspected hypermetabolic demands and potential interruptions to TF infusions.       REASON FOR ASSESSMENT  Provider order - Liver Transplant Pre Op    INFORMATION OBTAINED  Patient not available for interview due to pt left to OR for liver transplant.    NUTRITION HISTORY  Pt " "known to Clinical Nutrition, most recently about a week ago (6/11/2025) for education on increased protein intake and restricting sodium intake. Pt had Ensure Max Protein added BID and was provided coupons for supplements for outpatient too. Pt seen in 11/2024 for liver transplant evaluation with outpatient SOT RD - at that time noted to be on Wegovy for wt loss but did not afford much success; as such, Wegovy discontinued at some point between end of Nov 2024-January 2025.    No food allergies noted.     Per visit with pt at bedside: Pt left for OR for possible liver transplant before writer could visit.    CURRENT NUTRITION ORDERS  Diet: NPO    CURRENT INTAKE/TOLERANCE  N/A    LABS  Nutrition-relevant labs: Reviewed; Na+ 132 (L)    MEDICATIONS  Nutrition-relevant medications: Reviewed; Bumex BID, lactulose    ANTHROPOMETRICS  Height:   Ht Readings from Last 1 Encounters:   06/07/25 1.803 m (5' 11\")   Admission Weight: (!) 149.6 kg (329 lb 12.8 oz) (06/18/25 1800)   Most Recent Weight: (!) 149.6 kg (329 lb 12.8 oz) (06/18/25 1800)  IBW: 70.5 kg   BMI: Body mass index is 46 kg/m .   Weight History: No recent wt loss noted.  Wt Readings from Last 20 Encounters:   06/18/25 (!) 149.6 kg (329 lb 12.8 oz)   06/15/25 (!) 148.6 kg (327 lb 8 oz)   03/31/25 (!) 149.2 kg (328 lb 14.4 oz)   03/06/25 (!) 145.2 kg (320 lb)   02/17/25 (!) 151.6 kg (334 lb 3.2 oz)   02/06/25 (!) 149.7 kg (330 lb)   01/29/25 (!) 154.2 kg (340 lb)   01/24/25 (!) 145.2 kg (320 lb)   11/26/24 147.9 kg (326 lb)   10/25/24 146.1 kg (322 lb)   10/24/24 146.1 kg (322 lb)   10/22/24 146.4 kg (322 lb 12.8 oz)   08/09/24 144.6 kg (318 lb 12.6 oz)   07/29/24 145.2 kg (320 lb)   04/22/20 (!) 150.6 kg (332 lb)   05/03/19 142.9 kg (315 lb)   03/01/18 (!) 140.6 kg (310 lb)   11/16/16 131.5 kg (290 lb)   10/19/15 132.9 kg (293 lb)   04/20/15 133 kg (293 lb 3.2 oz)   Dosing Weight: 90 kg, based on adjusted wt    ASSESSED NUTRITION NEEDS - pre-transplant  Estimated " Energy Needs: 7474-2269 kcals/day (20 - 25 kcals/kg)  Justification: Obese  Estimated Protein Needs: 135-180 grams protein/day (1.5 - 2 grams of pro/kg)  Justification: Obesity guidelines  Estimated Fluid Needs: Per provider pending fluid status    ASSESSED NUTRITION NEEDS - post-transplant  Estimated Energy Needs: 0272-4585 kcals/day (25 - 30 kcals/kg)  Justification: Increased needs post SOT x 6-8 weeks (through ~July 31st-August 14th)  Estimated Protein Needs: 135-180 grams protein/day (1.5 - 2 grams of pro/kg)  Justification: Increased needs post SOT x 6-8 weeks (through ~July 31st-August 14th)  Estimated Fluid Needs: Per provider pending fluid status    SYSTEM AND PHYSICAL FINDINGS    GI symptoms: Reviewed; last BM yesterday (6/18)  Skin/wounds: Reviewed; 2 RN skin assessment on 6/18 found redness in gluteal cleft, redness on R hip, scab L knee. Noted occasional psoriasis but not many flares recently per H&P.    MALNUTRITION  % Intake: Unable to assess  % Weight Loss: Weight loss does not meet criteria   Subcutaneous Fat Loss: Unable to assess  Muscle Loss: Unable to assess  Fluid Accumulation/Edema: Moderate to severe, 2-4+ - suspect largely r/t fluid retention 2/2 liver failure and not necessarily malnutrition  Malnutrition Diagnosis: Unable to determine due to pt in OR for liver transplant  Malnutrition Present on Admission: Unable to assess    NUTRITION DIAGNOSIS  Increased nutrient needs (pro/kcal) related to anticipated liver transplant as evidenced by assessed needs of 6643-1157 kcals/day (25 - 30 kcals/kg) and 135-180 grams protein/day (1.5 - 2 grams of pro/kg) post SOT x 6-8 weeks (through ~July 31st-August 14th).    INTERVENTIONS  Enteral nutrition management - TF recs if needed    GOALS  Diet adv v nutrition support within 2-3 days.     MONITORING/EVALUATION  Progress toward goals will be monitored and evaluated per policy.    Donna Addison RD, LD  Available on Vocera - can search by name or unit  Dietitian  **Clinical Nutrition is no longer available via pager

## 2025-06-19 NOTE — ANESTHESIA PROCEDURE NOTES
Arterial Line Procedure Note    Pre-Procedure   Staff -        Anesthesiologist:  Brennen Waddell MD       Performed By: anesthesiologist       Pre-Anesthestic Checklist: patient identified, IV checked, risks and benefits discussed, informed consent, monitors and equipment checked, pre-op evaluation and at physician/surgeon's request  Timeout:       Correct Patient: Yes        Correct Procedure: Yes        Correct Site: Yes        Correct Position: Yes   Line Placement:   This line was placed Post Induction  Procedure   Procedure: arterial line       Laterality: left       Insertion Site: radial.  Sterile Prep        Standard elements of sterile barrier followed       Skin prep: Chloraprep  Insertion/Injection        Technique: ultrasound guided and Seldinger Technique        Medical Necessity: need to minimize puncture attempts and anticipated small target vessel       1. Ultrasound was used to evaluate the access site.       2. Artery evaluated via ultrasound for patency/adequacy.       3. Using real-time ultrasound the needle/catheter was observed entering the artery/vein.       5. The visualized structures were anatomically normal.       6. There were no apparent abnormal pathologic findings.       Catheter Type/Size: 20 G, 12 cm  Narrative         Secured by: suture       Biopatch and Tegaderm dressing used.       Complications: Hematoma,        Arterial waveform: Yes        IBP within 10% of NIBP: Yes   Comments:  2 attempts with first attempt being by [place] SRNA. Pressure held for 3-5 minutes before next attempt by myself. Small hematoma present on ultrasound. Good perfusion on the hand.

## 2025-06-20 ENCOUNTER — APPOINTMENT (OUTPATIENT)
Dept: PHYSICAL THERAPY | Facility: CLINIC | Age: 60
DRG: 005 | End: 2025-06-20
Payer: COMMERCIAL

## 2025-06-20 ENCOUNTER — APPOINTMENT (OUTPATIENT)
Dept: GENERAL RADIOLOGY | Facility: CLINIC | Age: 60
End: 2025-06-20
Payer: COMMERCIAL

## 2025-06-20 ENCOUNTER — APPOINTMENT (OUTPATIENT)
Dept: OCCUPATIONAL THERAPY | Facility: CLINIC | Age: 60
DRG: 005 | End: 2025-06-20
Payer: COMMERCIAL

## 2025-06-20 ENCOUNTER — TELEPHONE (OUTPATIENT)
Dept: TRANSPLANT | Facility: CLINIC | Age: 60
End: 2025-06-20

## 2025-06-20 DIAGNOSIS — K75.81 METABOLIC DYSFUNCTION-ASSOCIATED STEATOHEPATITIS (MASH): Primary | ICD-10-CM

## 2025-06-20 PROCEDURE — 71045 X-RAY EXAM CHEST 1 VIEW: CPT | Mod: 26 | Performed by: RADIOLOGY

## 2025-06-20 PROCEDURE — 71045 X-RAY EXAM CHEST 1 VIEW: CPT

## 2025-06-20 ASSESSMENT — ACTIVITIES OF DAILY LIVING (ADL)
ADLS_ACUITY_SCORE: 48
ADLS_ACUITY_SCORE: 46
ADLS_ACUITY_SCORE: 48
ADLS_ACUITY_SCORE: 46
ADLS_ACUITY_SCORE: 46
ADLS_ACUITY_SCORE: 48
ADLS_ACUITY_SCORE: 46
ADLS_ACUITY_SCORE: 48
ADLS_ACUITY_SCORE: 48
ADLS_ACUITY_SCORE: 46
ADLS_ACUITY_SCORE: 46
ADLS_ACUITY_SCORE: 48
ADLS_ACUITY_SCORE: 48
ADLS_ACUITY_SCORE: 46
ADLS_ACUITY_SCORE: 48
ADLS_ACUITY_SCORE: 46
ADLS_ACUITY_SCORE: 48
ADLS_ACUITY_SCORE: 46
ADLS_ACUITY_SCORE: 48
ADLS_ACUITY_SCORE: 46
ADLS_ACUITY_SCORE: 48

## 2025-06-20 NOTE — PROGRESS NOTES
06/20/25 0900   Appointment Info   Signing Clinician's Name / Credentials (PT) Brian Dominguez DPT   Rehab Comments (PT) abdominal prec   Living Environment   People in Home alone   Current Living Arrangements apartment   Transportation Anticipated family or friend will provide   Living Environment Comments Patient lives alone in apartment, but plans to discharge to sister's home with sister and brother-in-law, home has ~5 FRANCESCA then all needs on main level.   Self-Care   Usual Activity Tolerance moderate   Current Activity Tolerance fair   Equipment Currently Used at Home none   Fall history within last six months no   Activity/Exercise/Self-Care Comment Patient reports being IND with all mobility and ADLs/IADLs at baseline, has cane but not currently using.   General Information   Onset of Illness/Injury or Date of Surgery 06/19/25   Referring Physician Ginny Naidu MD   Patient/Family Therapy Goals Statement (PT) return home   Pertinent History of Current Problem (include personal factors and/or comorbidities that impact the POC) Karlene Yun is a 59 year old female with PMH notable for 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 Rebolledo on 6/19/2025.   Existing Precautions/Restrictions abdominal;fall   Cognition   Affect/Mental Status (Cognition) WFL   Orientation Status (Cognition) oriented x 4   Follows Commands (Cognition) WFL   Pain Assessment   Patient Currently in Pain Yes, see Vital Sign flowsheet   Integumentary/Edema   General Comments/Previous Edema Treatment/Edema Equipment BLE edema   Posture    Posture Forward head position;Protracted shoulders   Range of Motion (ROM)   ROM Comment BLE ROM deficits due to edema and habitus, but demonstrates ROM WFL during  mobility   Strength (Manual Muscle Testing)   Strength Comments generalized post-op deconditioning, demonstrates BLE strength WFL with mobility   Bed Mobility   Comment, (Bed Mobility) supine>sit mod Ax2   Transfers   Comment, (Transfers) sit<>stand CGA-min Ax2   Gait/Stairs (Locomotion)   Comment, (Gait/Stairs) Not formally assessed, small steps bed>chair with CGA and UE support   Balance   Balance Comments good sitting balance, fair standing balance requiring BUE support   Sensory Examination   Sensory Perception patient reports no sensory changes   Clinical Impression   Criteria for Skilled Therapeutic Intervention Yes, treatment indicated   PT Diagnosis (PT) impaired functional mobility   Influenced by the following impairments strength, balance, activity tolerance, pain   Functional limitations due to impairments bed mobility, transfers, gait, stairs   Clinical Presentation (PT Evaluation Complexity) evolving   Clinical Presentation Rationale PMH/comorbidities, clinical judgement   Clinical Decision Making (Complexity) moderate complexity   Planned Therapy Interventions (PT) balance training;bed mobility training;gait training;patient/family education;stair training;strengthening;transfer training;progressive activity/exercise;risk factor education;home program guidelines   Risk & Benefits of therapy have been explained evaluation/treatment results reviewed;care plan/treatment goals reviewed;risks/benefits reviewed;current/potential barriers reviewed;participants voiced agreement with care plan;participants included;patient   Physical Therapy Goals   PT Frequency 6x/week   PT Predicted Duration/Target Date for Goal Attainment 07/04/25   PT: Bed Mobility Modified independent;Supine to/from sit;Within precautions   PT: Transfers Modified independent;Sit to/from stand;Within precautions   PT: Gait Modified independent;Greater than 200 feet   PT: Stairs Supervision/stand-by assist;5 stairs   PT Discharge Planning    PT Plan transfers, initiate gait   PT Discharge Recommendation (DC Rec) home with assist   PT Rationale for DC Rec Patient below IND baseline, but overall moving well post-op. Anticipate pt will progress to be able to d/c to sister's home with assist when medically ready.   PT Brief overview of current status transfers min Ax2   PT Total Distance Amb During Session (feet) 0

## 2025-06-20 NOTE — PROGRESS NOTES
Patient removed from OS waitlist after  donor liver transplant. OS ID [id number].    Donor Has Risk Criteria for Transmission of HIV/HCV/HBV: no  Recipient Notified of Risk Criteria: n/a    Shaila Diaz RN

## 2025-06-20 NOTE — PROGRESS NOTES
SURGICAL ICU PROGRESS NOTE  2025    PRIMARY TEAM: Transplant  PRIMARY PHYSICIAN: Max Rebolledo MD  REASON FOR CRITICAL CARE ADMISSION: s/p  donor liver transplant   ADMITTING PHYSICIAN: Dr. 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 Rebolledo on 2025.      Changes Today:  - Continue to monitor hemodynamics and wean levophed as able for MAP > 65 mmHg  - Transfusion for hgb > 8.0, receiving 1 unit this AM  - Continuous IVF discontinued, bolus as needed for fluid resuscitation  - Dose of Basiliximab ordered per transplant team  - A1c recheck  - Increase ASA to 325 mg daily  - Zosyn x7 days as donor had open abdomen      Neurological:  # Acute post operative pain  # MDD   # Fibromyalgia  # Insomnia  - Monitor neurological status. Delirium preventions and precautions.  - Pain:   - Scheduled: tylenol 650 mg q8h, gabapentin 300 mg qAM / 600 mg qPM (home regimen)  - PRN: PO dilaudid 1-2 mg q3h prn, IV dilaudid 0.4 mg q2h prn, Lidocaine patches daily  - Sedation: N/A  -- PTA Citalopram, Trazodone ordered      Pulmonary:   Vital Signs: Most Recent  Ranges (24 hours)   Resp: 13  SpO2: 94 % Resp  Min: 8  Max: 21  SpO2  Min: 88 %  Max: 100 %   O2 Device:   (40 LPM)  FiO2 (%): 30 %, Resp: 13  # Post operative respiratory support  # Left pleural effusion, resolving  - On High Flow O2 - 40%, 40 L/min. Wean as able.  - Supplemental O2, goal SpO2 > 92%.  - Pulmonary hygiene; IS q15-30 minutes when awake  - CXR post-op with atelectasis / pulmonary edema, decreased left pleural effusion  - Continue daily CXR      Cardiovascular:    Vital Signs: Most Recent  Ranges (24 hours)   Pulse: 73  BP: 100/49  Arterial Line BP: 121/46  Arterial Line MAP (mmHg): 69 mmHg Pulse  Min: 67  Max: 82  BP  Min:  96/63  Max: 100/49  Arterial Line BP  Min: 97/47  Max: 139/49  Arterial Line MAP (mmHg)  Min: 60 mmHg  Max: 79 mmHg     # Shock-distributive   - Monitor hemodynamic status.  - MAP goal > 65 mmHg  - Wean levophed as able     # Hx of HLD  - No home medications      Gastroenterology/Nutrition:  # Cirrhosis of the liver  # Metabolic dysfunction-associated steatohepatitis  # S/p  donor liver transplant on 2025  - PTA rifaximin, lactulose, spironolactone discontinued  - Ursodiol 300 mg BID  - Basiliximab   - Immunosuppressant regimen as below   - PPI every day  - Liver ultrasound post-op with elevated peak systolic velocities and resistive indices, no fluid collection  - LFT's :  (345 ) and AST 2889 (2825 )   - Continue to trend    # Protein calorie deficit malnutrition   - NPO for Procedure/Surgery per Anesthesia Guidelines Except for: Meds; Clear liquids before procedure/surgery: ADULT (Age GREATER than or Equal to 18 years) - Clear liquids 2 hours before procedure/surgery  Room Service  - Discussing progression with transplant team    # Nausea   - Ondansetron IV/PO PRN      Fluids/Electrolytes/Renal:   I/O last 3 completed shifts:  In: 21285.35 [I.V.:3263.35; Other:1400; IV Piggyback:850]  Out: 2270 [Urine:487; Drains:283; Blood:1500]  I/O this shift:  In: 1912.26 [I.V.:1311.26; IV Piggyback:100]  Out: 400 [Urine:68; Drains:332]  Recent Labs   Lab 25  0358 25  1935 25  1835 25  1323 25  0531 25  1902    138  138 138 138   < > 132* 130*   POTASSIUM 4.0 3.7  3.7 3.6 3.9   < > 3.7 3.6   CHLORIDE 101 101  101  --   --   --  98 95*   MAG 2.1 2.2  --   --   --  1.8 1.8   PHOS 5.9* 5.5*  --   --   --  3.6 2.9   SANDY 8.4* 8.6*  8.6*  --   --   --  8.2* 8.4*   CO2 25 22  22  --   --   --  28 27   BUN 31.3* 26.0*  26.0*  --   --   --  23.5* 24.8*   CR 1.62* 1.19*  1.19*  --   --   --  0.94 0.98*    < > = values in this interval not  "displayed.     # Acute Kidney Injury  - Likely secondary to hypoperfusion intraoperatively  - mIVF of D5 1/2 NS @ 125 ml/hr overnight, discontinued  - Creatinine increased to 1.62 (from 0.94 pre-op)  - Rodriguez in place, strict I/O's  - Fluid boluses as needed for fluid resuscitation       Endocrine:  Recent Labs   Lab 06/20/25  0604 06/20/25  0503 06/20/25  0358 06/20/25  0306 06/20/25  0150 06/20/25  0054 06/20/25  0001 06/19/25  2256 06/19/25  2204 06/19/25  2102 06/19/25 2025   * 157* 169*  172* 176* 171* 172* 174* 165* 175* 150* 158*  158*   # Stress Hyperglycemia   # No Hx DM  - Insulin gtt  - Goal -180    # Hypothyroidism  - PTA levothyroxine ordered      ID:  Recent Labs   Lab 06/20/25  0358 06/20/25  0003 06/19/25 2025 06/19/25  0531   WBC 13.5* 13.7* 13.2* 4.0     Positive cultures:  No results found for: \"CULT\"  # Leukocytosis 2/2 Post-Operative Inflammation  -  no indications for antibiotics  -  Daily CBC     # Immunosuppression  # Kathya-operative prophylaxis  - Tacrolimus, mycophenolate  - Steroid taper  - PPX: bactrim, valcyte, fluconazole   - Zosyn x 7 days as donor had open abdomen/high risk for infection     # Psoriasis  # Psoriatic arthritis  - Home regimen includes Ixekizumab (q4 weeks), prn mometasone - ordered mometasone       Heme:     Recent Labs   Lab 06/20/25  0358 06/20/25  0003 06/19/25 2025 06/19/25  1935 06/19/25  1323 06/19/25  0531 06/18/25  1906 06/18/25  1901   HGB 8.0* 7.6* 8.4* 8.9*   < > 9.8*   < >  --    * 133* 152  --   --  50*   < >  --    FIBR 275 224 159*  --   --   --   --  156*    < > = values in this interval not displayed.     # Acute Blood Loss Anemia   # Anemia of Critical Illness   - Transfuse per transfusion goals below  Transfusion goals:  -- Hgb > 8  -- Fibrinogen > 200  -- Plt > 50   -- INR < 2.0      # Hx of PE 1/31/2002  -- No PTA DOAC      MSK:  # Weakness and deconditioning of critical illness  - Physical and occupational therapy consult "       General Cares/Prophylaxis:    DVT Prophylaxis: Pneumatic Compression Devices,  mg daily  GI Prophylaxis: PPI  Restraints: Restraints for medical healing needed: NO    Lines/ tubes/ drains:  - PIV x2  - L radial art line  - L internal jugular CVC x2  - Rodriguez catheter  - Surgical drain x2  - Wound vac    Disposition:  -  Surgical ICU.     Patient seen and discussed with staff.    Gus Arroyo MD  Orthopedic Surgery Resident- PGY1    ====================================    TODAY'S SUBJECTIVE/INTERVAL HISTORY:   No acute overnight events, pain is well-controlled on current regimen.  Denies any complaints of chest pain, shortness of breath, nausea/vomiting.  States that her mouth is very dry and she would like to have some water once able.  No bowel movements postoperatively however has been passing gas    OBJECTIVE:     Temp:  [97.8  F (36.6  C)-99  F (37.2  C)] 98.8  F (37.1  C)  Pulse:  [67-82] 73  Resp:  [8-21] 13  BP: ()/(49-63) 100/49  MAP:  [60 mmHg-79 mmHg] 69 mmHg  Arterial Line BP: ()/(22-51) 121/46  FiO2 (%):  [30 %-40 %] 30 %  SpO2:  [88 %-100 %] 94 %  FiO2 (%): 30 %, Resp: 13    I/O last 3 completed shifts:  In: 78070.35 [I.V.:3263.35; Other:1400; IV Piggyback:850]  Out: 2270 [Urine:487; Drains:283; Blood:1500]    General/Neuro: Awake and alert x 3, sitting upright in bed in NAD  CV: RRR  Pulm: HFNC in place, no obvious increased work of breathing, lungs are CTAB  Abd: soft; NT, ND. Incisional wound vac in place with minimal output  Extremities: Mild edema in the b/l LE  Skin: warm and well-perfused.     LABS:   Arterial Blood Gases   Recent Labs   Lab 06/19/25  1935 06/19/25  1835 06/19/25  1802 06/19/25  1727   PH 7.39 7.35 7.37 7.37   PCO2 39 44 42 43   PO2 62* 88 118* 111*   HCO3 23 24 24 25     Complete Blood Count   Recent Labs   Lab 06/20/25  0358 06/20/25  0003 06/19/25 2025 06/19/25  1935 06/19/25  1323 06/19/25  0531   WBC 13.5* 13.7* 13.2*  --   --  4.0   HGB 8.0* 7.6*  8.4* 8.9*   < > 9.8*   * 133* 152  --   --  50*    < > = values in this interval not displayed.     Basic Metabolic Panel  Recent Labs   Lab 06/20/25  0604 06/20/25  0503 06/20/25 0358 06/19/25 2102 06/19/25 2025 06/19/25 2022 06/19/25  1935 06/19/25  1835 06/19/25 0620 06/19/25  0531 06/18/25 2157 06/18/25 1902   NA  --   --  136  --  138  138  --  138 138   < > 132*  --  130*   POTASSIUM  --   --  4.0  --  3.7  3.7  --  3.6 3.9   < > 3.7  --  3.6   CHLORIDE  --   --  101  --  101  101  --   --   --   --  98  --  95*   CO2  --   --  25  --  22  22  --   --   --   --  28  --  27   BUN  --   --  31.3*  --  26.0*  26.0*  --   --   --   --  23.5*  --  24.8*   CR  --   --  1.62*  --  1.19*  1.19*  --   --   --   --  0.94  --  0.98*   * 157* 169*  172*   < > 158*  158*   < > 157* 164*   < > 91   < > 125*    < > = values in this interval not displayed.     Liver Function Tests  Recent Labs   Lab 06/20/25 0358 06/20/25  0003 06/19/25  2025 06/18/25  1902 06/18/25  1901 06/15/25  0726   AST 2,889*  --  2,825* 83*  --  70*   *  --  345* 40  --  31   ALKPHOS 60  --  63 74  --  70   BILITOTAL 2.5*  --  3.9* 4.9*  --  4.1*   ALBUMIN 2.6*  --  2.2* 2.5*  --  2.3*   INR 1.58* 1.76* 2.14*  --  1.98* 1.89*     Pancreatic Enzymes  Recent Labs   Lab 06/20/25  0358 06/18/25  1902   LIPASE 46  --    AMYLASE 63 47     Coagulation Profile  Recent Labs   Lab 06/20/25  0358 06/20/25  0003 06/19/25 2025 06/18/25 1901   INR 1.58* 1.76* 2.14* 1.98*   PTT 35 40* 50* 38         IMAGING:   Recent Results (from the past 24 hours)   BARI with Report    Narrative    Brennen Waddell MD     6/19/2025  6:52 PM  Perioperative BARI Procedure Note    Staff -        Anesthesiologist:  Brennen Waddell MD       Performed By: anesthesiologist  Preanesthesia Checklist:  Patient identified, IV assessed, risks and   benefits discussed, monitors and equipment assessed, procedure being   performed at surgeon's request and  anesthesia consent obtained.    BARI Probe Insertion    Probe Status PRE Insertion: NO obvious damage  Probe type:  Adult 2D  Bite block used:   Molar  Insertion Technique: Jaw Lift  Insertion complications: Lip Injury  Billing Report:BARI report by Anesthesiologist (See Separate Report note)  Probe Status POST Removal: NO obvious damage    BARI Report  General Procedure Information  Images for this study have been archived.  Modalities: 2D, CW Doppler, PW Doppler and Color flow mapping  Diagnostic Indications comments: Liver transplant.  Echocardiographic and Doppler Measurements  Right Ventricle:  Cavity size normal.    Hypertrophy not present.     Thrombus not present.    Global function normal.     Left Ventricle:  Cavity size normal.    Hypertrophy not present.     Thrombus not present.   Global Function normal.   Ejection Fraction 65%.      Ventricular Regional Function:  1- Basal Anteroseptal:  normal  2- Basal Anterior:  normal  3- Basal Anterolateral:  normal  4- Basal Inferolateral:  normal  5- Basal Inferior:  normal  6- Basal Inferoseptal:  normal  7- Mid Anteroseptal:  normal  8- Mid Anterior:  normal  9- Mid Anterolateral:  normal  10- Mid Inferolateral:  normal  11- Mid Inferior:  normal  12- Mid Inferoseptal:  normal  13- Apical Anterior:  normal  14- Apical Lateral:  normal  15- Apical Inferior:  normal  16- Apical Septal:  normal  17- [place]:  normal    Valves  Aortic Valve: Annulus normal.  Stenosis not present.  Regurgitation   absent.  Leaflets normal.  Leaflet motions normal.    Mitral Valve: Annulus normal.  Stenosis not present.  Leaflets normal.    Leaflet motions normal.    Tricuspid Valve: Annulus normal.  Stenosis not present.  Leaflets normal.    Leaflet motions normal.    Pulmonic Valve: Annulus normal.  Stenosis not present.    Other Valve Findings:  No TV regurgitation in dissection phase. Mild TR in   neohepatic phase. RVSP 47 mmHg with CVP of 10. Unchanged RV function/size.  Aorta:  Ascending Aorta: Size normal.  Diameter 2.5 cm.  Dissection not   present.  Plaque thickness less than 3 mm.  Mobile plaque not present.    Aortic Arch:   Dissection not present.   Plaque thickness less than 3 mm.     Mobile plaque not present.    Descending Aorta:   Dissection not present.   Plaque thickness less than 3   mm.   Mobile plaque not present.      Right Atrium:  Size dilated.   Spontaneous echo contrast not present.     Thrombus not present.   Tumor not present.   Device not present.     Left Atrium: Size dilated.  Spontaneous echo contrast not present.    Thrombus not present.  Tumor not present.  Device not present.    Left atrial appendage normal.   Other Atria Findings:  NATTY empyting velocity > 40cm/s  Atrial Septum: Intra-atrial septal morphology normal.       Ventricular Septum: Intra-ventricular septum morphology normal.        Other Findings:   Pericardium:  normal. Pleural Effusion:  left. Pulmonary Arteries:    normal.      Post Intervention Findings  Procedure(s) performed:  Other (see comments) (Liver Transplant). Global   function:  Unchanged. Regional wall motion: Unchanged. Surgeon(s) notified   of all postintervention findings: Yes.                 Echocardiogram Comments   XR Chest Port 1 View    Narrative    Exam: XR CHEST PORT 1 VIEW, 6/19/2025 8:37 PM    Indication: line positioning post surgery    Comparison: 6/18/2025.    Findings:   Portable supine AP view of the chest. Left internal jugular central  venous catheter tips project over the left brachiocephalic vein and  brachiocephalic venous confluence. Trachea is midline.  Cardiomediastinal silhouette is stable. Low lung volumes. Streaky  perihilar opacities. Dense retrocardiac opacities. No definitive  pneumothorax. Decreased blunting of the left costophrenic angle, now  trace. Right costophrenic angle is clear. Surgical drains project over  the right upper quadrant. Surgical clips project over the central  upper abdomen.        Impression    Impression:  1. Left internal jugular central venous catheter tips project over the  left brachiocephalic vein and brachiocephalic venous confluence.  2. Low lung volumes with streaky perihilar opacities and dense  retrocardiac opacities, favor atelectasis/pulmonary edema.  3. Decreased left pleural effusion, now trace.    I have personally reviewed the examination and initial interpretation  and I agree with the findings.    AMADOR HERRERA MD         SYSTEM ID:  [id number]   US Liver Transplant    Impression    RESIDENT PRELIMINARY INTERPRETATION  Impression:   1.  Elevated peak systolic velocities and resistive indices of the  hepatic arteries which may represent vasospasm versus edema in the  immediate postoperative period. Attention on follow-up.  2.  Normal grayscale ultrasound evaluation of the liver.

## 2025-06-20 NOTE — TELEPHONE ENCOUNTER
CHIROMULO GUERRA) IS A (LIVER) TRANSPLANT PATIENT  (DATE OF TRANSPLANT: (DATE: 06/19/2025) )    Medical Benefits:   Plan Name: RIANA  ID: [id number]  Group: [id number]  Effective: 02/01/2025    Pharmacy Benefits Manger:  Plan Name: [employer]ERCIAL  ID #: [id number]  Rx Bin: [id number]  PCN: [id number]  Rx Group: [id number]  Effective: 02/01/2025    Pharmacy Benefit Details:   Deductible: $950  Max out of pocket: $7400    Copay Structure:  Generic: $15  Brand: $125  Non-Formulary: NOT COVERED    Test Claim Specialty #28     MYCOPHENOLATE 250mg: (#240/30DS) = $15  PROGRAF 1mg: (#120/30DS) = PRODUCT NOT COVERED/ PLAN EXCLUSION   TACROLIMUS 1mg: (#120/30DS) = $15    CYCLOSPORINE 100mg (#60/30DS) = $15    VALGANCICLOVIR 450mg: (#60/30DS) = $263.09  VALACYCLOVIR 1GM: (#90/30DS) = $15      Test Claim Discharge #27 (21 Years and Older)     MYCOPHENOLATE 250mg: (#240/30DS) = $15  PROGRAF 1mg: (#120/30DS) = PRODUCT NOT COVERED/ PLAN EXCLUSION   TACROLIMUS 1mg: (#120/30DS) = $15    CYCLOSPORINE 100mg (#60/30DS) = $15    VALGANCICLOVIR 450mg: (#60/30DS) = $263.09  VALACYCLOVIR 1GM: (#90/30DS) = $15      Can patient fill with [facility] for medications listed? YES    Pharmacy test claims are estimates and may not reflect the final cost. Actual costs can vary based on additional medications filled and the patient's progress toward meeting their deductible and out-of-pocket maximum.    If the patient has any questions, please advise them to e-mail the [email or web address] at [email or web address]

## 2025-06-20 NOTE — ANESTHESIA CARE TRANSFER NOTE
Patient: Karlene Yun    Procedure: Procedure(s):  Transplant liver recipient  donor       Diagnosis: End stage liver disease (H) [K72.10]  Diagnosis Additional Information: No value filed.    Anesthesia Type:   General     Note:    Oropharynx: oropharynx clear of all foreign objects and spontaneously breathing  Level of Consciousness: drowsy      Independent Airway: airway patency satisfactory and stable  Dentition: dentition unchanged  Vital Signs Stable: post-procedure vital signs reviewed and stable  Report to RN Given: handoff report given  Patient transferred to: ICU  Comments: Patient brought to SICU extubated on 10L O2.  Standard monitors.  Report to RN.  Gtts reviewd.  All questions answered.  ICU Handoff: Call for PAUSE to initiate/utilize ICU HANDOFF, Identified Patient, Identified Responsible Provider, Reviewed the Pertinent Medical History, Discussed Surgical Course, Reviewed Intra-OP Anesthesia Management and Issues during Anesthesia, Set Expectations for Post Procedure Period and Allowed Opportunity for Questions and Acknowledgement of Understanding      Vitals:  Vitals Value Taken Time    68    Temp 36.6  C (97.8  F) 25 20:14   Pulse 75 25 20:34   Resp 12    SpO2 98 % 25 20:34   Vitals shown include unfiled device data.    Electronically Signed By: Chaparrita Crane CRNA, APRN CRNA  2025  8:34 PM

## 2025-06-20 NOTE — PHARMACY-TRANSPLANT NOTE
Adult Liver Transplant Post Operative Note    59 year old female s/p  donor liver transplant on 25 for [facility].      Planned immunosuppression regimen to include:   INDUCTION with: methylprednisolone/prednisone taper through POD #6.  MAINTENANCE to include mycophenolate and tacrolimus with initial goal trough levels of 8-12 (closer to 10) mcg/L for 0-3 months post-transplant.  Patient may continue prednisone at 5 mg PO daily until tacrolimus level is therapeutic.     Surgical prophylaxis includes: piperacillin-tazobactam IV for 48 hours and fluconazole IV for 24 hours.      Opportunistic pathogen prophylaxis includes: trimethoprim/sulfamethoxazole, valganciclovir, and nystatin (topical antifungal coverage to begin once systemic antifungal therapy is complete).    Patient is not enrolled in medication study.    Pharmacy will monitor for medication interactions and immunosuppression levels in conjunction with the team. Medication therapy needs for discharge planning will continue to be addressed throughout the current admission via multidisciplinary rounds and order review.  Pharmacy will make recommendations as appropriate.

## 2025-06-20 NOTE — OP NOTE
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 Donation after Brain Death liver transplant   2.  Repair of the umbilical hernia   3.  Placement of Prevena wound VAC    Surgeon:  Surgeons and Role:     * Max Rebolledo MD - Primary     * Dick Fontaine MD - Fellow - Assisting    Fellow/assistant: Dr. Dick Fontaine assisted the entire dictated procedure., fellow,    There was no qualified resident to assist with this procedure    Anesthesia:  General    Specimen: Explant liver, ascites for culture    Drains: 2 TIARRA drains    Urine output: Few mls    Estimated blood loss: 3 units of PRBC transfused    Fluids administered:        Intraoperative Events: None    Complications: None    Findings:   #1.  Close to 5 inches of subcutaneous tissue to enter the abdominal wall  #2.  Macronodular cirrhotic liver with severe portal hypertension  #3.  Close to a liter of ascites  #4.  Severe coagulopathy after reperfusion of the liver and we had to pack and wait for some time for coagulopathy to improve    Brief description of the procedure:  Orthotopic liver transplant, caval replacement, portal vein end-to-end anastomosis, donor proper hepatic artery anastomosis recipient proper hepatic artery.  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 A suitable donor offer has become available. After discussing the risks and benefits of proceeding, the patient agreed to proceed with surgery and provided informed consent.    Final ABO/Crossmatch verification: After the donor organ arrived to  the operating room and prior to anastomosis, I participated in the transplant pre-verification upon organ receipt timeout by visually verifying the donor ID, organ and laterality, donor blood type, recipient unique identifier, recipient blood type, and that the donor and recipient are blood type compatible.     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    Fellow/Assistant: Dr. Dick Fontaine performed the back table procedure,   There was no qualified resident to assist with this procedure    Anesthesia:  None    Graft biopsy:  Yes-the biopsy showed 10% macrosteatosis almost no macrosteatosis, no inflammation or fibrosis.    Macroscopic steatosis: None    Back table reconstruction: No    Intimal flap repair: No     # of hepatic arteries: 1    # of portal veins: 1    Accessory arteries: 0    # of hepatic veins: 3    # of bile ducts: 1    Graft weight: Large liver     Findings:   Liver Laceration: No  Overall quality of liver: Good    Back Table Procedure: The liver allograft was received and inspected and the aforementioned findings were noted. It had been previously flushed with UW. The donor liver was placed in fresh ice-cold preservation solution. We identified the inferior vena cava. Two stay sutures were placed on the supra-hepatic portion of the cava. Two stay  sutures were placed on the infra-hepatic portion of the cava. The fibro-fatty tissue and adrenal gland was cleared of inferior vena cava. The phrenic vein was ligated. The adrenal vein was ligated. The IVC was tested for leaks by using a bulb syringe. We suture ligated all identified leaks. The portal vein was identified. All the fibro-fatty area or tissue around the portal vein was removed and the portal vein was dissected up to its bifurcation. An 8-Khmer cannula was placed in the portal vein and fixed with a stitch. The portal vein was tested for leaks. We suture ligated all identified leaks. The cannula was left in place to be used for flushing the liver at the time of implantation. The hepatic artery anatomy was identified. The celiac axis  was traced all the way from the aortic patch to the level of the gastro-duodenal artery. Dissection was stopped at the level of the gastro-duodenal artery. All the leaks in the hepatic artery tributaries were suture ligated. The bile duct was inspected and flushed. No reconstruction was required. The liver was placed back in ice-cold preservation solution until ready for transplantation. Faculty was present for the critical portions of the procedure.    Findings:    Operative Procedure:   Arterial anastomosis start:  6/19/2025  3:28 PM    Recipient arterial unclamp:  6/19/2025  4:40 PM    Extra vessels used:   No    Extra vessels banked:  Yes    Previous upper abd surgery:  No    Previous cholecystectomy?  No    Portal vein:  Thrombus: No   Patent: Yes-severe portal hypertension   Specify:     On portal bypass:  Yes-the portal bypass clotted while we were implanting the liver.    Arterial flow:  Sufficient: Yes-donor proper hepatic artery anastomosed to the recipient proper hepatic artery    Bile duct anastomosis:  To bowel: No   Specify:    To duct: Yes-donor bile duct was about 5 mm recipient bile duct was about 7 mm, we performed an end-to-end anastomosis with  interrupted 6-0 PDS;no stent placed   Specify:      Karlene Yun was brought to the operating room, placed in a supine position, and a time out was performed. Sequential compression devices were placed on both lower extremities and general endotracheal anesthesia was induced. The patient was given IV antibiotics, and Solumedrol. A Rodriguez catheter was placed. A central line was placed by Anesthesia service. The abdomen was then shaved, prepped, and draped in the usual sterile fashion.  The backtable preparation occurred prior to implantation.    We entered the abdominal cavity using Bilateral subcostal incision. The abdomen was examined. Lysis of adhesions took  an additional 30  minutes of operating time. We proceeded to mobilization of the liver first by dividing falciform ligament, next dividing the triangular ligaments and mobilizing the left lobe with further evaluation of the right lobe of the liver. Next the right lobe of the liver was mobilized. We elected to proceed with a hilar dissection. The right and left hepatic arteries were identified, ligated and divided, followed by ligation and division of the common bile duct. The portal vein was cleared from tissue and small branches were tied off and divided. The left and right portal veins were separately ligated and the portal vein was divided. At this point we went on the bypass with bypass flow of 1 liter per minute. Next, we continued mobilizing the right lobe. The adhesions between the right lobe and the right diaphragm were divided and the lobe was mobilized up to the vena cava. The hepatic veins were identified. Next, we dissected out the caudate lobe and infrahepatic IVC.     We applied Infrahepatic and suprahepatic clamps to the IVC and the liver was excised with curved Pina scissors. The recipient's liver was passed off from the operating table. Next, complete hemostasis was obtained. The donor liver was brought into the field. The suprahepatic  IVC anastomosis was constructed with 3-0 Prolene followed by the construction of infrahepatic anastomosis with 4-0 Prolene. Next, we came off the bypass and end-to-end portal anastomosis was constructed between the donor and recipient portal veins using 6-0 Prolene. During the construction of the infrahepatic IVC anastomosis, the liver was flushed with 5% albumin. Once the portal anastomosis was constructed, the liver was reperfused. Complete hemostasis was obtained and arterial anastomosis was performed between the donor celiac trunk patch using Bacon technique and the bifurcation of the right and left recipient hepatic arteries. After clamps were released, there was a good flow within the donor artery. Again, all the arterial branches that were bleeding were ligated and complete hemostasis was obtained. After the anastomosis was performed, good flow was re-established, hemostasis was obtained. Next, the biliary anastomosis was performed using a 6-0 running PDS suture over the stent.  We next repaired the umbilical hernia by identifying the hernial orifice.  The size was about 2 x 4 cm.  We used #1 Prolene and closed the defect continuously.    Next again the abdomen was irrigated and hemostasis was obtained. We closed the abdomen with looped PDS.the patient had almost 5 inches of subcutaneous adipose tissue to prevent wound infection, we placed a Prevena wound VAC and connected to suction.  All needle, sponge and instrument counts were correct x 2. Faculty was present for key portions of the procedure. The patient was awakened, extubated, and transferred to the ICU for post-op monitoring.

## 2025-06-20 NOTE — PROGRESS NOTES
Admitted/transferred from: OR  Reason for admission/transfer: Post OP ICU management.   2 RN skin assessment: completed by Linden LAYTON and Nay SANCHEZ  Result of skin assessment and interventions/actions: R lip bruise, R neck bruise, L hand bruise. Scattered bruises. No changes.  Height, weight, drug calc weight: Done  No belongings.     ?

## 2025-06-20 NOTE — ANESTHESIA POSTPROCEDURE EVALUATION
Patient: Karlene Yun    Procedure: Procedure(s):  Transplant liver recipient  donor       Anesthesia Type:  General    Note:  Disposition: Inpatient; ICU            ICU Sign Out: Anesthesiologist/ICU physician sign out WAS performed   Postop Pain Control: Uneventful            Sign Out: Well controlled pain   PONV: No   Neuro/Psych: Uneventful            Sign Out: Acceptable/Baseline neuro status   Airway/Respiratory: Uneventful            Sign Out: Acceptable/Baseline resp. status; O2 supplementation               Oxygen: Face mask   CV/Hemodynamics: Uneventful            Sign Out: Detailed CV status               Blood Pressure: Normal; Pressors (.08 norepinephrine)               Rate/Rhythm: Normal HR   Other NRE: NONE   DID A NON-ROUTINE EVENT OCCUR? No           Last vitals:  HR 76 NSR  SpO2 99% 10L Oxygen mask  BP cherelle 125/51  RR 14    Electronically Signed By: Brennen Waddell MD  2025  8:42 PM

## 2025-06-20 NOTE — PROGRESS NOTES
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 Rebolledo on 6/19/2025.     Today:   - Basiliximab 20 mg x 1 in the setting of EMILIE    - Add hemoglobin A1c    -  mg and Ursodiol x 3 months    - Will continue antibiotics x 7 days given donor had an open abdomen, suspect contamination    - Follow donor intra-abdominal cultures     Graft function: s/p DCDLT without stent, POD#1. Post-op US with patent Doppler. Transaminases with up-trend immediately post-op, as expected, Tbili with down-trend, AP normal, lactate normal, INR down-trending.    -  mg x 3 months    - Ursodiol  300 mg BID x 3 months     Immunosuppressed status secondary to medications:  Induction:  SM taper followed by prednisone x 3 months   Basiliximab 20 mg IV x 1 6/20 in the setting of EMILIE  Maintenance:     -  mg BID   - Tacrolimus goal level 8-12.   - Prednisone 5 mg daily following taper.    Neuro/Psych:  Depression:   - Continue home citalopram.   Fibromyalgia:   - Continue home gabapentin.   Psychophysiological insomnia:   - Continue home Trazodone.  Acute post op pain:    - PRN PO and IV hydromorphone available     Hematology:   Acute on chronic anemia: Secondary to acute surgical blood loss; transfuse to meet goals.   Acute on chronic thrombocytopenia: Secondary to induction IS:   Coagulopathy secondary to liver disease: Stable.     Cardiorespiratory:   Shock: Requiring NE.   Post-operative mechanical ventilation: Extubated 6/19.   Acute hypoxic respiratory failure: On HFNC 30%, 40 LPM.    - OOB as tolerate, early mobility   - Pulmonary hygiene   Hx NEREYDA:  Does not use CPAP.   Hx HLD: Not currently on statin therapy.   Hx HFpEF: Noted on on problem list, last EF normal. Not on GDMT.   Chronic left sided pleural effusion: Noted as trace on Xray 6/29/2025.   History of PE, 2002: Not on long-term anticoagulation.     GI/Nutrition: PPI.  Diet: ADAT. May need a FT depending course of recovery.       Endocrine: Insulin.   Hypothyroidism: TSH 6/7/2025 4.89.    - Continue home levothyroxine.     Steroid induced hyperglycemia: Last hemoglobin A1c 4.8% 11/26/2024.    - Continue insulin drip    - Add hemoglobin A1c      Renal/Fluid/Electrolytes: MIVF: saline lock.   Hyperphosphatemia: Monitor.   EMILIE: Oliguric.    - Consult to Transplant Nephrology      Anasarca:    - Hold home Bumex and spironolactone     : Rodriguez to remain due to EMILIE, oliguria.     Infectious disease:   Leukocytosis: Likely due to steroids, monitor.     Risk for infection: Donor with open abdomen with risk of contamination.    - Continue antibiotics for a full 7- day course.    - Follow donor cultures.     Rheum:  Psoriatic arthritis:    - PTA managed with ixekizumab, tacrolimus ointment    Prophylaxis: DVT, fall, GI, fungal (fluconazole), viral (Valcyte), pneumocystis (Bactrim)    Disposition: SICU     JOCELIN/Fellow/Resident Provider:   MERRITT Denson, CNP  Adult Solid Organ Transplant   Contact information via Vocera Web Console or via [facility] Paging/Directory    I saw and evaluated this patient as part of a shared visit. I spent 40 minutes on review of labs and imaging, exam, care coordination, and counseling.  Medically Ready for Discharge: Anticipated in 5+ Days       Faculty: Max Rebolledo M.D.    __________________________________________________________________  Transplant History:    6/19/2025 (Liver), Postoperative day: 1     Interval History: History is obtained from the patient  Overnight events: No acute events, post-operative course uneventful. Her pain is not well controlled this AM, she  is sitting in the chair. She would like water. No other complaints.     ROS:   A 10-point review of systems was negative except as noted above.    Curent Meds:  Current Facility-Administered Medications   Medication Dose Route Frequency Provider Last Rate Last Admin    acetaminophen (TYLENOL) tablet 650 mg  650 mg Oral Q8H Ginny Naidu MD   650 mg at 06/20/25 0414    aspirin (ASA) chewable tablet 81 mg  81 mg Oral or Feeding Tube Daily Max Rebolledo MD        citalopram (celeXA) tablet 20 mg  20 mg Oral Daily Ginny Naidu MD   20 mg at 06/19/25 0739    fluconazole (DIFLUCAN) intermittent infusion 400 mg  400 mg Intravenous Q24H Max Rebolledo MD        gabapentin (NEURONTIN) capsule 300 mg  300 mg Oral QAM Ginny Naidu MD   300 mg at 06/19/25 0739    gabapentin (NEURONTIN) tablet 600 mg  600 mg Oral QPM Ginny Naidu MD   600 mg at 06/18/25 2016    levothyroxine (SYNTHROID/LEVOTHROID) tablet 112 mcg  112 mcg Oral Daily Ginny Naidu MD   112 mcg at 06/19/25 0739    methylPREDNISolone Na Suc (solu-MEDROL) 200 mg in sodium chloride 0.9 % 58.2 mL intermittent infusion  200 mg Intravenous Once Ginny Naidu MD        Followed by    [START ON 6/21/2025] methylPREDNISolone Na Suc (solu-MEDROL) injection 100 mg  100 mg Intravenous Once Ginny Naidu MD        Followed by    [START ON 6/22/2025] methylPREDNISolone Na Suc (solu-MEDROL) injection 50 mg  50 mg Intravenous Once Ginny Naidu MD        Followed by    [START ON 6/23/2025] predniSONE (DELTASONE) tablet 25 mg  25 mg Oral Once Ginny Naidu MD        Followed by    [START ON 6/24/2025] predniSONE (DELTASONE) tablet 10 mg  10 mg Oral Once Ginny Naidu MD        Followed by    [START ON 6/25/2025] predniSONE (DELTASONE) tablet 5 mg  5 mg Oral Daily Ginny Naidu MD        multivitamin w/minerals (THERA-VIT-M) tablet 1 tablet  1 tablet Oral Daily Ginny Naidu MD        Or    multivitamins w/minerals  liquid 15 mL  15 mL Oral or NG Tube Daily Ginny Naidu MD        mycophenolate (GENERIC EQUIVALENT) capsule 750 mg  750 mg Oral BID IS Ginny Naidu MD   750 mg at 06/19/25 2138    Or    mycophenolate (CELLCEPT BRAND) suspension 750 mg  750 mg Oral or NG Tube BID IS Ginny Naidu MD        pantoprazole (PROTONIX) EC tablet 40 mg  40 mg Oral Daily Ginny Naidu MD        Or    pantoprazole (PROTONIX) 2 mg/mL suspension 40 mg  40 mg Oral or NG Tube Daily Ginny Naidu MD        piperacillin-tazobactam (ZOSYN) 3.375 g vial to attach to  mL bag  3.375 g Intravenous Q6H Ginny Naidu MD   3.375 g at 06/20/25 0623    sodium chloride (PF) 0.9% PF flush 3 mL  3 mL Intravenous Q8H Ginny Naidu MD        sodium chloride (PF) 0.9% PF flush 3 mL  3 mL Intracatheter Q8H DARINEL Ginny Naidu MD        sodium chloride (PF) 0.9% PF flush 3 mL  3 mL Intracatheter Q8H Ginny Naidu MD        sulfamethoxazole-trimethoprim (BACTRIM) 400-80 MG per tablet 1 tablet  1 tablet Oral or Feeding Tube Daily Ginny Naidu MD        tacrolimus (GENERIC EQUIVALENT) capsule 2 mg  2 mg Oral BID IS Ginny Naidu MD   2 mg at 06/19/25 2244    ursodiol (ACTIGALL) capsule 300 mg  300 mg Oral BID Ginny Naidu MD   300 mg at 06/19/25 2139    Or    ursodiol (ACTIGALL) suspension 300 mg  300 mg Oral or NG Tube BID Ginny Naidu MD        valGANciclovir (VALCYTE) tablet 450 mg  450 mg Oral Daily Ginny Naidu MD        Or    valGANciclovir (VALCYTE) solution 450 mg  450 mg Oral or NG Tube Daily Ginny Naidu MD           Physical Exam:     Admit Weight: (!) 149.6 kg (329 lb 12.8 oz)    Current Vitals:   /49 (Cuff Size: Adult Large)   Pulse 73   Temp 98.8  F (37.1  C)   Resp 13   Wt (!) 155.2 kg (342 lb 2.5 oz)   SpO2 94%   BMI 47.72 kg/m      Vital sign ranges:    Temp:  [97.8  F (36.6  C)-99  F (37.2  C)] 98.8  F (37.1  C)  Pulse:  [67-82] 73  Resp:  [8-21] 13  BP:  ()/(49-63) 100/49  MAP:  [60 mmHg-79 mmHg] 69 mmHg  Arterial Line BP: ()/(22-51) 121/46  FiO2 (%):  [30 %-40 %] 30 %  SpO2:  [88 %-100 %] 94 %    General Appearance: Sitting in chair, no obvious acute distress.    Skin: Normal, warm, dry.  Heart: She appears adequately perfused.   Lungs: No increased work of breathing, she requires a small amount of supplemental oxygen.   Abdomen: Non-distended, Pravena wound vac in place. TIARRA x 2 with thin red output.   : Rodriguez, minimal UOP.   Extremities: Edema: generalized edema. There is no skin breakdown obvious breakdown on exposed skin.  Neurologic: Awake, alert, oriented x 4. Tremor absent.     Frailty Scores          5/20/2025 3/30/2025   Frailty Scores   Final Score Not Frail  Not Frail    Final Score Number 1  1       Details          Patient-reported               Data:   CMP  Recent Labs   Lab 06/20/25  0644 06/20/25  0604 06/20/25  0503 06/20/25  0358 06/19/25  2102 06/19/25 2025 06/18/25  2157 06/18/25  1902   NA  --   --   --  136  --  138  138   < > 130*   POTASSIUM  --   --   --  4.0  --  3.7  3.7   < > 3.6   CHLORIDE  --   --   --  101  --  101  101   < > 95*   CO2  --   --   --  25  --  22  22   < > 27   * 140*   < > 169*  172*   < > 158*  158*   < > 125*   BUN  --   --   --  31.3*  --  26.0*  26.0*   < > 24.8*   CR  --   --   --  1.62*  --  1.19*  1.19*   < > 0.98*   GFRESTIMATED  --   --   --  36*  --  52*  52*   < > 66   SANDY  --   --   --  8.4*  --  8.6*  8.6*   < > 8.4*   ICAW  --   --   --  4.7  --  4.9   < >  --    MAG  --   --   --  2.1  --  2.2   < > 1.8   PHOS  --   --   --  5.9*  --  5.5*   < > 2.9   AMYLASE  --   --   --  63  --   --   --  47   LIPASE  --   --   --  46  --   --   --   --    ALBUMIN  --   --   --  2.6*  --  2.2*  --  2.5*   BILITOTAL  --   --   --  2.5*  --  3.9*  --  4.9*   ALKPHOS  --   --   --  60  --  63  --  74   AST  --   --   --  2,889*  --  2,825*  --  83*   ALT  --   --   --  360*  --  345*  --   40    < > = values in this interval not displayed.     CBC  Recent Labs   Lab 06/20/25  0358 06/20/25  0003   HGB 8.0* 7.6*   WBC 13.5* 13.7*   * 133*

## 2025-06-20 NOTE — PROGRESS NOTES
"CLINICAL NUTRITION SERVICES - BRIEF NOTE     Reason for RD note: Provider order for TF assess and order (auto consult post DDLT)    New Findings/Chart Review:  POD 1 DDLT, extubated post op  Labs and meds reviewed.  Norepi @ 0.10 mcg/kg/min    Interventions:  - Collaboration with providers - SICU rounds.  No plan for FT today    Future/Additional Recommendations:  - Follow for diet advancment.  Add ONS and calorie counts when diet advanced beyond clear liquids  - Post transplant diet education as able    Nutrition will continue to follow per protocol.    Francoise Stevenson, RD, LD, CNSC  \"4E Clinical Dietitian\" on Vocera  Weekend/Holiday RD - \"Weekend Clinical Dietitian\" on vocera    "

## 2025-06-20 NOTE — PLAN OF CARE
0700 - 1930  Drowsy but easily aroused, AO x4. Ax2 w/ gb/walker. TIARRA x2- bright red/ bloody output w/ clots- TIARRA #2 large amount of output. Insulin gtt Alg 4. 3 units PRBCs, platelets currently infusing. Albumin 25g 5% x2. New art line placed.     Goal Outcome Evaluation:    Plan of Care Reviewed With: patient  Overall Patient Progress: improvingOverall Patient Progress: improving  Outcome Evaluation: levo titrated down, pain somewhat managed w/ meds

## 2025-06-20 NOTE — CONSULTS
[facility]  Transplant Nephrology Consult Note  Date of Admission:  6/18/2025  Today's Date: 06/20/2025  Requesting physician: Max Rebolledo MD    Reason for Consult:  EMILIE    Recommendations:   - No acute indications for dialysis.  - Agree with some IV fluids for lower CVP.  - Could try bumetanide 5 mg IV once patient has received volume to see if it improves urine output.  - Should patient require dialysis at some point, she will require temporary dialysis access.  - She has been consented for dialysis, should she require it.    Assessment & Plan   # EMILIE: Trend up in creatinine.  Decreased urine output and only minimal today.  May be a bit intravascularly down and would recommend some IV fluid bolus.  If CVP improves, would try bumetanide 5 mg IV to see if urine output improves.  No acute indications for dialysis, but will reassess regularly.   - EMILIE felt secondary to liver transplant with hemodynamic changes and hypotension.    - Baseline Creatinine: ~ 0.8-1.0   - Proteinuria: Normal (<0.2 grams)    # Liver Tx: Patient with ESLD secondary to Metabolic associated steatotic liver disease (MASLD), s/p OLT June 19, 2025.  Transaminases Stable, elevated transaminases.  Followed by Transplant Surgery.    # Immunosuppression: Tacrolimus immediate release (goal 6-8), Mycophenolate mofetil (dose 750 mg every 12 hours), and Methylprednisolone (dose taper)   - Induction with Recent Transplant:  Per Liver Tx Protocol   - Continue with intensive monitoring of immunosuppression for efficacy and toxicity.   - Goal tacrolimus level lower due to EMILIE.   - Changes: Not at this time; Management per Transplant Surgery.    # Infection Prophylaxis:   - PJP: Sulfa/TMP (Bactrim)  - CMV: Valganciclovir (Valcyte)  - Thrush: Fluconazole (Diflucan)  - Fungal: Fluconazole (Diflucan)      - CMV IgG Ab High Risk Discordance (D+/R-) at time of transplant: No  Present CMV Serostatus:  Positive  - EBV IgG Ab High Risk Discordance (D+/R-) at time of transplant: No  Present EBV Serostatus: Positive    # Blood Pressure: Hypotensive on low dose pressors;  Goal BP: MAP > 65   - Changes: Not at this time; Would consider fluid bolus with low CVP.    # Elevated Blood Glucose: Glucose generally running ~ 100-170s; Secondary to high dose steroids.   - On insulin gtt.    # Anemia in Chronic Disease/Surgery: Hgb: Trend down, low      RACHEL: No   - Iron studies: Not checked recently    # Thrombocytopenia: Stable, mildly low platelet level.    # Mineral Bone Disorder:   - Vitamin D; level: Low normal        On supplement: No  - Calcium; level: Low normal when corrected for low serum albumin        On supplement: No  - Phosphorus; level: High        On binder: No    # Electrolytes:   - Potassium; level: Normal        On supplement: No  - Magnesium; level: Normal        On supplement: No  - Bicarbonate; level: Normal        On supplement: No  - Sodium; level: Normal    # Obesity, Class III (BMI = 47.7): Weight is up following liver transplant.   - Once patient recovers from surgery and incision heals, would recommend working on weight loss for overall health by increasing exercise and watching caloric intake.    # Other Significant PMH:   - H/o PE (~ 2000): Patient with no recent clotting episodes and off anticoagulation.   - Fibromyalgia: Stable symptoms on gabapentin.     # Transplant History:  Etiology of Organ Failure: Metabolic associated steatotic liver disease (MASLD)  Tx: Liver Tx  Transplant: 6/19/2025 (Liver)  Significant changes in immunosuppression: Slightly lower tacrolimus level goal due to EMILIE.  Significant transplant-related complications: EMILIE    Recommendations were communicated to the primary team via this note.    Juni Bueno MD  Transplant Nephrology  Contact information via Vocera Web Console or via [facility] Paging/Directory      History of Present Illness  60 yo female with ESLD  secondary to Metabolic associated steatotic liver disease (MASLD), s/p OLT 6/19/2025.  Now with EMILIE following liver transplant.  Other significant PMH: h/o PE, obesity, dyslipidemia, fibromyalgia, osteoarthritis, and depression.    Ms. Yun's creatinine is 1.62 (06/20 0358); Trend up.  Minimal urine output and anuric/oliguric today.  Stable, elevated transaminases.  Other significant labs/tests/vitals: Stable electrolytes.  Trend down in hemoglobin.  Stable platelet level.  No new events overnight.  No chest pain or shortness of breath.  No leg swelling.  No nausea and vomiting.  Bowel movements are none and not passing gas yet.  No fever, sweats or chills.    Review of Systems   The 10 point Review of Systems is negative other than noted in the HPI or here.      MEDICATIONS:  Current Facility-Administered Medications   Medication Dose Route Frequency Provider Last Rate Last Admin    acetaminophen (TYLENOL) tablet 650 mg  650 mg Oral Q8H Ginny Naidu MD   650 mg at 06/20/25 0414    [START ON 6/21/2025] aspirin (ASA) chewable tablet 324 mg  324 mg Oral or Feeding Tube Daily Gus Arroyo MD        basiliximab (SIMULECT) 20 mg in sodium chloride 0.9 % 50 mL infusion  20 mg Intravenous Once Shaila Foote, NP        citalopram (celeXA) tablet 20 mg  20 mg Oral Daily Ginny Naidu MD   20 mg at 06/20/25 0757    fluconazole (DIFLUCAN) intermittent infusion 400 mg  400 mg Intravenous Q24H Max Rebolledo MD        gabapentin (NEURONTIN) capsule 300 mg  300 mg Oral QAM Ginny Naidu MD   300 mg at 06/20/25 0757    gabapentin (NEURONTIN) tablet 600 mg  600 mg Oral QPM Ginny Naidu MD   600 mg at 06/18/25 2016    levothyroxine (SYNTHROID/LEVOTHROID) tablet 112 mcg  112 mcg Oral Daily Ginny Naidu MD   112 mcg at 06/20/25 0757    [START ON 6/21/2025] methylPREDNISolone Na Suc (solu-MEDROL) injection 100 mg  100 mg Intravenous Once Ginny Naidu MD        Followed by    [START ON  6/22/2025] methylPREDNISolone Na Suc (solu-MEDROL) injection 50 mg  50 mg Intravenous Once Ginny Naidu MD        Followed by    [START ON 6/23/2025] predniSONE (DELTASONE) tablet 25 mg  25 mg Oral Once Ginny Naidu MD        Followed by    [START ON 6/24/2025] predniSONE (DELTASONE) tablet 10 mg  10 mg Oral Once Ginny Naidu MD        Followed by    [START ON 6/25/2025] predniSONE (DELTASONE) tablet 5 mg  5 mg Oral Daily Ginny Naidu MD        multivitamin w/minerals (THERA-VIT-M) tablet 1 tablet  1 tablet Oral Daily Ginny Naidu MD   1 tablet at 06/20/25 0758    Or    multivitamins w/minerals liquid 15 mL  15 mL Oral or NG Tube Daily Ginny Naidu MD        mycophenolate (GENERIC EQUIVALENT) capsule 750 mg  750 mg Oral BID IS Ginny Naidu MD   750 mg at 06/20/25 0757    Or    mycophenolate (CELLCEPT BRAND) suspension 750 mg  750 mg Oral or NG Tube BID IS Ginny Naidu MD        pantoprazole (PROTONIX) EC tablet 40 mg  40 mg Oral Daily Ginny Naidu MD   40 mg at 06/20/25 0757    Or    pantoprazole (PROTONIX) 2 mg/mL suspension 40 mg  40 mg Oral or NG Tube Daily Ginny Naidu MD        piperacillin-tazobactam (ZOSYN) 3.375 g vial to attach to  mL bag  3.375 g Intravenous Q6H Ginny Naidu MD   3.375 g at 06/20/25 0623    sodium chloride (PF) 0.9% PF flush 3 mL  3 mL Intravenous Q8H Ginny Naidu MD        sodium chloride (PF) 0.9% PF flush 3 mL  3 mL Intracatheter Q8H [facility] Ginny Naidu MD        sodium chloride (PF) 0.9% PF flush 3 mL  3 mL Intracatheter Q8H Ginny Naidu MD        sulfamethoxazole-trimethoprim (BACTRIM) 400-80 MG per tablet 1 tablet  1 tablet Oral or Feeding Tube Daily Ginny Naidu MD   1 tablet at 06/20/25 0757    tacrolimus (GENERIC EQUIVALENT) capsule 2 mg  2 mg Oral BID IS Ginny Naidu MD   2 mg at 06/20/25 0757    ursodiol (ACTIGALL) capsule 300 mg  300 mg Oral BID Ginny Naidu MD   300 mg at 06/20/25 0757     Or    ursodiol (ACTIGALL) suspension 300 mg  300 mg Oral or NG Tube BID Ginny Naidu MD        valGANciclovir (VALCYTE) tablet 450 mg  450 mg Oral Daily Ginny Naidu MD   450 mg at 25 0758    Or    valGANciclovir (VALCYTE) solution 450 mg  450 mg Oral or NG Tube Daily Ginny Naidu MD         Current Facility-Administered Medications   Medication Dose Route Frequency Provider Last Rate Last Admin    dextrose 10% infusion   Intravenous Continuous PRN Jaimee Gagnon MD        insulin regular (MYXREDLIN) 1 unit/mL infusion  0-24 Units/hr Intravenous Continuous Jaimee Gagnon MD 5 mL/hr at 25 1036 5 Units/hr at 25 1036    norepinephrine (LEVOPHED) 16 mg in  mL infusion MAX CONC CENTRAL LINE  0.01-0.6 mcg/kg/min Intravenous Continuous Jaimee Gagnon MD 8.4 mL/hr at 25 1037 0.06 mcg/kg/min at 25 1037    Reason beta blocker order not selected   Does not apply DOES NOT GO TO Ginny Mcgowan MD        sodium chloride 0.45 % 1,000 mL infusion   Intravenous Continuous PRN Ginny Naidu MD           Physical Exam   Temp  Av.6  F (37  C)  Min: 97.8  F (36.6  C)  Max: 99  F (37.2  C)  Arterial Line BP  Min: 97/47  Max: 157/55  Arterial Line MAP (mmHg)  Av.1 mmHg  Min: 60 mmHg  Max: 85 mmHg      Pulse  Av.6  Min: 67  Max: 83 Resp  Av.5  Min: 8  Max: 23  FiO2 (%)  Av %  Min: 30 %  Max: 40 %  SpO2  Av.2 %  Min: 88 %  Max: 100 %    CVP (mmHg): 3 mmHgBP 136/48   Pulse 70   Temp 98.6  F (37  C)   Resp 12   Wt (!) 155.2 kg (342 lb 2.5 oz)   SpO2 97%   BMI 47.72 kg/m     Date 25 0700 - 25 0659   Shift 4341-0036 7869-6290 7532-5507 24 Hour Total   INTAKE   P.O. 240   240   I.V. 327.8   327.8   Blood Components 600   600   Shift Total(mL/kg) 1167.8(7.52)   1167.8(7.52)   OUTPUT   Urine 7   7   Drains 252   252   Shift Total(mL/kg) 259(1.67)   259(1.67)   Weight (kg) 155.2 155.2 155.2 155.2      Admit Weight: (!) 149.6 kg  (329 lb 12.8 oz)     GENERAL APPEARANCE: somewhat sleepy, but no distress  HENT: mouth without ulcers or lesions  RESP: lungs clear to auscultation - no rales, rhonchi or wheezes  CV: regular rhythm, normal rate, no rub, no murmur  EDEMA: trace LE and dependent edema bilaterally  ABDOMEN: soft, nondistended, nontender, bowel sounds quiet; Obese  MS: extremities normal - no gross deformities noted, no evidence of inflammation in joints, no muscle tenderness  SKIN: no rash  DIALYSIS ACCESS: none    Data   All labs reviewed by me.  CMP  Recent Labs   Lab 06/20/25  1032 06/20/25  0927 06/20/25  0749 06/20/25  0644 06/20/25  0503 06/20/25  0358 06/19/25  2102 06/19/25  2025 06/19/25  2022 06/19/25  1935 06/19/25  1835 06/19/25  0620 06/19/25  0531 06/18/25  2157 06/18/25  1902 06/15/25  0726   NA  --   --   --   --   --  136  --  138  138  --  138 138   < > 132*  --  130* 130*   POTASSIUM  --   --   --   --   --  4.0  --  3.7  3.7  --  3.6 3.9   < > 3.7  --  3.6 4.1   CHLORIDE  --   --   --   --   --  101  --  101  101  --   --   --   --  98  --  95* 95*   CO2  --   --   --   --   --  25  --  22  22  --   --   --   --  28  --  27 27   ANIONGAP  --   --   --   --   --  10  --  15  15  --   --   --   --  6*  --  8 8   * 120* 127* 138*   < > 169*  172*   < > 158*  158*   < > 157* 164*   < > 91   < > 125* 100*   BUN  --   --   --   --   --  31.3*  --  26.0*  26.0*  --   --   --   --  23.5*  --  24.8* 27.5*   CR  --   --   --   --   --  1.62*  --  1.19*  1.19*  --   --   --   --  0.94  --  0.98* 1.00*   GFRESTIMATED  --   --   --   --   --  36*  --  52*  52*  --   --   --   --  69  --  66 64   SANDY  --   --   --   --   --  8.4*  --  8.6*  8.6*  --   --   --   --  8.2*  --  8.4* 8.3*   MAG  --   --   --   --   --  2.1  --  2.2  --   --   --   --  1.8  --  1.8 1.8   PHOS  --   --   --   --   --  5.9*  --  5.5*  --   --   --   --  3.6  --  2.9  --    PROTTOTAL  --   --   --   --   --  4.6*  --  3.8*  --   --    --   --   --   --  6.7 6.4   ALBUMIN  --   --   --   --   --  2.6*  --  2.2*  --   --   --   --   --   --  2.5* 2.3*   BILITOTAL  --   --   --   --   --  2.5*  --  3.9*  --   --   --   --   --   --  4.9* 4.1*   ALKPHOS  --   --   --   --   --  60  --  63  --   --   --   --   --   --  74 70   AST  --   --   --   --   --  2,889*  --  2,825*  --   --   --   --   --   --  83* 70*   ALT  --   --   --   --   --  360*  --  345*  --   --   --   --   --   --  40 31    < > = values in this interval not displayed.     CBC  Recent Labs   Lab 06/20/25  0749 06/20/25  0358 06/20/25  0003 06/19/25 2025   HGB 7.4* 8.0* 7.6* 8.4*   WBC 13.7* 13.5* 13.7* 13.2*   RBC 2.31* 2.50* 2.40* 2.64*   HCT 21.1* 23.2* 22.4* 24.7*   MCV 91 93 93 94   MCH 32.0 32.0 31.7 31.8   MCHC 35.1 34.5 33.9 34.0   RDW 17.4* 17.6* 18.6* 18.4*   * 116* 133* 152     INR  Recent Labs   Lab 06/20/25  0749 06/20/25  0358 06/20/25  0003 06/19/25 2025   INR 1.56* 1.58* 1.76* 2.14*   PTT 36 35 40* 50*     ABG  Recent Labs   Lab 06/19/25  1935 06/19/25  1835 06/19/25  1802 06/19/25  1727   PH 7.39 7.35 7.37 7.37   PCO2 39 44 42 43   PO2 62* 88 118* 111*   HCO3 23 24 24 25   O2PER 60.0 43.0 40.0 40.0      Urine Studies  Recent Labs   Lab Test 06/18/25  2243 06/08/25  0447 11/26/24  0819   COLOR Yellow Yellow Yellow   APPEARANCE Clear Clear Clear   URINEGLC Negative Negative Negative   URINEBILI Negative Negative Negative   URINEKETONE Negative Negative Trace*   SG 1.015 1.015 1.026   UBLD Negative Negative Negative   URINEPH 5.0 5.5 5.5   PROTEIN Negative Negative 10*   NITRITE Negative Negative Negative   LEUKEST Negative Negative Negative   RBCU 0  --  1   WBCU <1  --  1     No lab results found.  PTH  No lab results found.  Iron Studies  Recent Labs   Lab Test 11/26/24  0800   IRON 112   *   IRONSAT 64*   LUDY 762*       IMAGING:  All imaging studies reviewed by me.    Past Medical History    I have reviewed this patient's medical history and updated  "it with pertinent information if needed.   Past Medical History:   Diagnosis Date    Cirrhosis of liver with ascites (H) 2022    Fibromyalgia 2018    Hyperlipidemia 2005     Problem list name updated by automated process. Provider to review    Hypothyroidism 2002     Problem list name updated by automated process. Provider to review    Major depressive disorder, recurrent episode, in full remission 2002    Metabolic dysfunction-associated steatohepatitis (MASH) 2025    Morbid obesity (H) 2019    Muscle pain 2012    Osteoarthritis 2024    Psoriatic arthropathy (H) 2002    Pulmonary embolism and infarction (H) 2002    Problem list name updated by automated process. Provider to review         Past Surgical History   I have reviewed this patient's surgical history and updated it with pertinent information if needed.  Past Surgical History:   Procedure Laterality Date    BIOPSY  Aug 2024    COLONOSCOPY  2016    mild colitis/ repeat 10 yrs    ESOPHAGOSCOPY, GASTROSCOPY, DUODENOSCOPY (EGD), COMBINED N/A 2024    Procedure: ESOPHAGOGASTRODUODENOSCOPY for variceal surveillance;  Surgeon: Guru Jacquelyn Rees MD;  Location:  OR    ESOPHAGOSCOPY, GASTROSCOPY, DUODENOSCOPY (EGD), COMBINED N/A 2024    Procedure: ESOPHAGOGASTRODUODENOSCOPY, WITH FINE NEEDLE ASPIRATION BIOPSY, WITH ENDOSCOPIC ULTRASOUND GUIDANCE liver and lymph node biopsy;  Surgeon: Guru Jacquelyn Rees MD;  Location: [facility] OR     REMOVAL OF TONSILS,<12 Y/O      Tonsils <11y.o.    IR LUMBAR PUNCTURE  2023    IR THORACENTESIS  2025    LASIK  2004    \"lasek\"    THORACENTESIS Left 2025    Procedure: Thoracentesis;  Surgeon: Jaden Martinez MD;  Location: [facility] OR    TRANSPLANT LIVER RECIPIENT  DONOR N/A 2025    Procedure: Transplant liver recipient  donor;  Surgeon: Max Rebolledo MD;  Location: [facility] OR       Family History   I have reviewed this patient's family history and updated it with pertinent information if needed.   Family History   Problem Relation Age of Onset    Cancer Sister         cervical    Arthritis Paternal Grandmother     Diabetes Father     Asthma Sister     Thyroid Disease Sister     Asthma Daughter     Thyroid Disease Sister        Social History   I have reviewed this patient's social history and updated it with pertinent information if needed. Karlene Yun  reports that she has never smoked. She has never used smokeless tobacco. She reports that she does not currently use alcohol. She reports that she does not use drugs.    Prior to Admission Medications   Medications Prior to Admission   Medication Sig Dispense Refill Last Dose/Taking    bumetanide (BUMEX) 2 MG tablet Take 1 tablet (2 mg) by mouth 2 times daily. 120 tablet 0 Taking    citalopram (CELEXA) 20 MG tablet Take 1 tablet (20 mg) by mouth daily. 90 tablet 3 Taking    gabapentin (NEURONTIN) 300 MG capsule 300 mg qam, 600 mg qpm 270 capsule 1 Taking    ixekizumab (TALTZ) 80 MG/ML SOAJ auto-injector 160 mg once, followed by 80 mg at weeks 2, 4, 6, 8, 10, and 12, and then 80 mg every 4 weeks 9 mL 0 Taking    lactulose 20 GM/30ML solution Take 15 mLs (10 g) by mouth every morning. 946 mL 4 Taking    levothyroxine (SYNTHROID/LEVOTHROID) 112 MCG tablet Take 1 tablet (112 mcg) by mouth daily. 90 tablet 3 Taking    Lidocaine (LIDOCARE) 4 % Patch Place 1 patch onto the skin daily as needed for moderate pain.   Taking As Needed    mometasone (ELOCON) 0.1 % external solution Apply thin layer twice a day as needed to rash on scalp   Taking    rifaximin (XIFAXAN) 550 MG TABS tablet Take 1 tablet (550 mg) by mouth 2 times daily. 180 tablet 3 Taking    spironolactone (ALDACTONE) 50 MG tablet Take 1 tablet (50 mg) by mouth daily. 60 tablet 0 Taking    tacrolimus (PROTOPIC) 0.1 % external ointment Apply topically as needed   Taking As  Needed    traZODone (DESYREL) 100 MG tablet Take 1 tablet (100 mg) by mouth at bedtime. 90 tablet 3 Taking    triamcinolone (KENALOG) 0.1 % external cream Apply topically as needed   Taking As Needed

## 2025-06-20 NOTE — PLAN OF CARE
NEURO: A & O x 4, drowsy, follows commands, pupils equal, round, reactive.     CARDIAC: Sinus Rhythm, HR 70's-80's, MAP goal >65- on levo 0.12 to meet MAP goal- unable to wean. 3 x 500 mL boluses given for hypotension. +2 pulses. Afebrile. CVP 5-7    RESPIRATORY: High flow- 30%- 40 LPM. Lungs Clear.     GI: NPO- sips of water with meds.     : Rodriguez- oliguria.     ACCESS/DRIPS: 2 L internal jugular with introducers- capped and hands free. L radial art line. R PIV. L PIV. Insulin drip- algorithm 4 + 7. Currently at 11 units.     SKIN: clamshell incision- wound vac- Prevena. R neck- bruise, R lip injury, L hand bruise. 2 R TIARRA drains- bloody/bright red with clots.     Product given on shift:  1 unit PRBC for Hgb 7.6. Hgb recheck 8.0- MD aware. Cryo x 2 for fibrinogen 159. FFP x1 for INR 2.14. Albumin 5%- 500 mL,  mL bolus x 2,  mL bolus.     Transfusion goals:  Hgb >8, Fibrinogen >200, PLTS> 50, INR <2.0      Radha Tellez RN      Goal Outcome Evaluation:      Plan of Care Reviewed With: patient    Overall Patient Progress: improvingOverall Patient Progress: improving    Outcome Evaluation: On levo to meet MAP >65, tolerating PO meds. Pain managed with meds.

## 2025-06-20 NOTE — TELEPHONE ENCOUNTER
Organ Offer Encounter Information    Organ Offer Information  Organ offer date & time: 6/18/2025  4:17 PM  Coordinator/Fellow/Attending name: Amarilys Ortega RN   Organ(s):  Organ UNOS ID Match Run ID Comment Organ Laterality   Liver [id number] MNOP         Recent infections?: No      New medications?: No Recent pregnancy?: No     Angicoagulation medications?: No Recent vaccinations?: No     Recent blood transfusions?: No Recent hospitalizations?: No   Has your insurance changed in the last 6-12 months?: Neg    Discussed organ offer with: Patient  Patient/Caregiver name: Karlene  Discussed risk category with Patient/Other: N/A  Patient/Other asked to speak to a surgeon?: No  Discussed program-specific outcomes: Did not have questions regarding SRTR  Right to decline organ offer without penalty, Patient/Other: Aware of option to decline without penalty  Organ offer decision status Patient/Other: Accepted Offer  Organ disposition: Transplanted  Additional Comments: 6/18/2025 4:18 PM  Liver: Primary, local  MD: Dr. Rebolledo  OPO Contact: [facility]  Donor/Recip HCV Status: Neg/Neg  Donor Nutritional Status: D5 @ 125cc/hr  Local or Import Donor: Local  Using [employer] OCS: No  Plan (NPO, Donor OR): Donor OR likely early tomorrow morning. Called pt to discuss offer and plan for admission this evening.   - - -   COVID Screening  In the past month, have you:  Or anyone close to you had a positive COVID test or suspected to have COVID:  No  Had any COVID symptoms (Fever, Cough, Short of Breath, Loss of Taste/Smell, Rash):  No    Admissions: 1641 - Martina, ETA 1800  Unit: 1630 - [facility], will have bed at 1800  Update Provider Entering Orders (XM Plan & COVID Testing):  1715 - JOSELO Lyon [ Msg Id [id number] ]  Immunology: 1646 - Bettie  Book OR: 1725 - María, booked for 0500  Vessel Storage Confirmation (PA/EMERITA/ANATOLY): Ok to bank  Blood Bank: 1727 - Thai  TransNet/ABO Verification: 1720 - Printed, verified by  María  Add Organ: 1644 - Liver added    Donor OR Time: 6/19 @ 0300 (PUSHED TO 1000)  Procuring MD: Dr. Tolentino (Changed to Dr. Fontaine)  Contact in the OR: TBD  Organs Being Procured: Liver/Kidneys and heart valves for research  Expected Delays: None  Flush Solution: [facility]  Biopsy: Requested intra-op requested  Pump: No  Special Requests (Special blood tubes, nodes, waivers-XM and/or anatomical): None  MD for Visualization: Dr. Rebolledo  Transportation Details: In-house donor    6/18/2025 8:39 PM:   After multiple conversations with surgical pathology, Dr. Rebolledo and [facility], donor OR being pushed to 1000 d/t pathology only being available after 0800 to read liver biopsies. Recipient OR pushed to 1100, confirmed with Britt in the OR @ 2037.  Amarilys Ortega RN  Transplant Coordinator    Attestation I have discussed all of the above with the Patient/Legal Guardian/Caregiver regarding this organ offer.: Yes  Coordinator/Fellow/Attending name: Amarilys Ortega RN

## 2025-06-20 NOTE — PROGRESS NOTES
06/20/25 1600   Appointment Info   Signing Clinician's Name / Credentials (OT) Laly Palacios OT   Rehab Comments (OT) abd precautions   Living Environment   People in Home alone   Current Living Arrangements apartment   Home Accessibility no concerns   Transportation Anticipated family or friend will provide   Living Environment Comments Pt lives alone in apartment, no FRANCESCA, elevator access, tub/shower. Pt's plan to d/c to daughter & KORY's house upon d/c: 3 FRANCESCA, one-level with basement, all needs met on main floor.   Self-Care   Usual Activity Tolerance moderate   Current Activity Tolerance fair   Regular Exercise No   Equipment Currently Used at Home cane, straight   Fall history within last six months no   Activity/Exercise/Self-Care Comment Per pt, was IND with ADLs PTA, uses SEC for community mobility occasionally   Instrumental Activities of Daily Living (IADL)   IADL Comments Pt was IND with cooking, laundry and driving; has services for cleaning x2/wk   General Information   Onset of Illness/Injury or Date of Surgery 06/19/25   Referring Physician Max Rebolledo MD   Patient/Family Therapy Goal Statement (OT) go home   Additional Occupational Profile Info/Pertinent History of Current Problem 59 year old female with PMH notable for 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 Rebolledo on 6/19/2025.   Existing Precautions/Restrictions abdominal;fall   Left Upper Extremity (Weight-bearing Status)   (no lifting >#10)   Right Upper Extremity (Weight-bearing Status)   (no lifting >#10)   Left Lower Extremity (Weight-bearing Status) full weight-bearing (FWB)   Right Lower Extremity (Weight-bearing Status) full weight-bearing (FWB)   Heart Disease Risk Factors Medical  history   Cognitive Status Examination   Orientation Status orientation to person, place and time   Visual Perception   Visual Impairment/Limitations corrective lenses full-time   Sensory   Sensory Quick Adds sensation intact   Range of Motion Comprehensive   General Range of Motion bilateral upper extremity ROM WFL   Strength Comprehensive (MMT)   Comment, General Manual Muscle Testing (MMT) Assessment overall generalized weakness   Bed Mobility   Comment (Bed Mobility) modAx2   Transfers   Transfer Comments minAx2   Activities of Daily Living   BADL Assessment/Intervention lower body dressing;upper body dressing;toileting;grooming;bathing   Bathing Assessment/Intervention   [place] Level (Bathing) minimum assist (75% patient effort)  (per clinical judgement)   Upper Body Dressing Assessment/Training   [place] Level (Upper Body Dressing) minimum assist (75% patient effort)  (no lifting >#10)   Lower Body Dressing Assessment/Training   [place] Level (Lower Body Dressing) maximum assist (25% patient effort)   Grooming Assessment/Training   [place] Level (Grooming) contact guard assist  (no lifting >#10)   Toileting   [place] Level (Toileting) minimum assist (75% patient effort)  (no lifting >#10)   Clinical Impression   Criteria for Skilled Therapeutic Interventions Met (OT) Yes, treatment indicated   OT Diagnosis decreased IND with ADLs & functional mobility   OT Problem List-Impairments impacting ADL problems related to;activity tolerance impaired;post-surgical precautions;strength;mobility   Assessment of Occupational Performance 5 or more Performance Deficits   Identified Performance Deficits dressing, bed mobility, transfers, g/h, toileting, bathing   Planned Therapy Interventions (OT) ADL retraining;IADL retraining;bed mobility training;strengthening;transfer training;home program guidelines   Clinical Decision Making Complexity (OT) problem focused assessment/low complexity   Risk &  Benefits of therapy have been explained evaluation/treatment results reviewed   OT Total Evaluation Time   OT Eval, Low Complexity Minutes (27570) 5   OT Goals   Therapy Frequency (OT) 6 times/week   OT Predicted Duration/Target Date for Goal Attainment 07/10/25   OT Goals Hygiene/Grooming;Upper Body Dressing;Lower Body Dressing;Upper Body Bathing;Lower Body Bathing;Toilet Transfer/Toileting   OT: Hygiene/Grooming supervision/stand-by assist;within precautions   OT: Upper Body Dressing Supervision/stand-by assist;within precautions   OT: Lower Body Dressing Supervision/stand-by assist;using adaptive equipment;within precautions   OT: Upper Body Bathing Supervision/stand-by assist;within precautions   OT: Lower Body Bathing Supervision/stand-by assist;using adaptive equipment;with precautions   OT: Toilet Transfer/Toileting Supervision/stand-by assist;within precautions   Interventions   Interventions Quick Adds Therapeutic Activity   Therapeutic Activities   Therapeutic Activity Minutes (53670) 30   Symptoms noted during/after treatment fatigue   Treatment Detail/Skilled Intervention OT: Pt seated in recliner upon arrival, daughter and KORY present and supportive. Educated post-surgical precautions, verbalized understanding. Doff/don socks in recliner maxA, unable to bring LE in figure-4 position due to body habitus and LE edema. STS x2 sets with arm-hold assist, marching in place x 5 steps, small steps, minAx2. Descend into recliner with minAx2, posterior scoot modA. Pt with edema in trip LE, PT will continue to monitor at this time. All needs within reach at end of session, chair alarm on.   OT Discharge Planning   OT Plan dressing with AE prn, bed mobility, toileting, tub trf   OT Discharge Recommendation (DC Rec) home with assist;Acute Rehab Center-Motivated patient will benefit from intensive, interdisciplinary therapy.  Anticipate will be able to tolerate 3 hours of therapy per day   OT Rationale for DC Rec Pt  is below baseline with functional tasks & mobility. Pending on progress in IP OT/PT during acute hospital stay, may be appropriate for d/c to ARU. However, pt is able to d/c to daughter & KORY's house who is able to provide 24/7 assist. Will continue to update d/c rec as necessary   OT Brief overview of current status STS arm-hold assist minAx2

## 2025-06-20 NOTE — PROCEDURES
[facility]    Arterial line placement    Date/Time: 6/20/2025 6:33 PM    Performed by: Joseph Winter MD  Authorized by: Joseph Winter MD      UNIVERSAL PROTOCOL   Site Marked: No  Prior Images Obtained and Reviewed:  No  Required items: Required blood products, implants, devices and special equipment available    Patient identity confirmed:  Verbally with patient, provided demographic data and arm band  NA - No sedation, light sedation, or local anesthesia  Confirmation Checklist:  Patient's identity using two indicators  Time out: Immediately prior to the procedure a time out was called    Universal Protocol: the Joint Commission Universal Protocol was followed    Preparation: Patient was prepped and draped in usual sterile fashion    Indication:  hemodynamic monitoring  Location:  Right radial       ANESTHESIA    Local Anesthetic:  Lidocaine 1% with epinephrine      SEDATION    Patient Sedated: No      PROCEDURE DETAILS      Seldinger technique: Seldinger technique used    Number of Attempts:  5 or more  Post-procedure:  Line sutured and dressing applied      PROCEDURE  Describe Procedure: Arterial Catheter Placement  June 20, 2025    Staff Surgeon: Marco Thurston MD  Residents: Joseph Winter MD    Procedure and risks were explained in detail. Consent was signed.      Line: Arterial line  Location: Right radial  Local: 5 ml of 1% lidocaine w/ epinephrine  US guided: US device was used with visualization of anatomy.       Procedure: A timeout was performed to ensure Propper patient, procedure, consent, and location. Pt was prepped and draped in a full sterile field fashion. The right radial artery was visualized under ultrasound to confirm target. Roughly 5 cc 1% lidocaine w/ epi was placed overlying the right radial artery. The Artery was then cannulated using an Arrow arterial line kit under direct visualization with ultrasound until flash of arterial  blood was visualized. The micro wire was then advanced with ease and the catheter then threaded over the wire into the artery up to the hub. The wire was removed in its entirety leaving the catheter in place. Pulsating bright red blood was visualized leaving the catheter, and Intraluminal arterial line placement was confirmed using ultrasound. The arterial line tubing was then connected and flushed to prevent further blood loss. The A line was sutured into place using one O silk sutures. A bio patch was placed over the entry site and a sterile dressing was placed over top. The patient tolerated the procedure well without incident or complications. Waveform on the monitor was appropriate and correlated with cough pressures.     Estimated blood loss of <5 mL.     Complications: None evident    Staff was available at all times during the procedure.     Joseph Winter MD  PGY-2, General Surgery    Length of time physician/provider present for 1:1 monitoring during sedation: 0

## 2025-06-21 ENCOUNTER — APPOINTMENT (OUTPATIENT)
Dept: GENERAL RADIOLOGY | Facility: CLINIC | Age: 60
DRG: 005 | End: 2025-06-21
Payer: COMMERCIAL

## 2025-06-21 ENCOUNTER — APPOINTMENT (OUTPATIENT)
Dept: GENERAL RADIOLOGY | Facility: CLINIC | Age: 60
End: 2025-06-21
Attending: TRANSPLANT SURGERY
Payer: COMMERCIAL

## 2025-06-21 PROCEDURE — 71045 X-RAY EXAM CHEST 1 VIEW: CPT

## 2025-06-21 PROCEDURE — 71045 X-RAY EXAM CHEST 1 VIEW: CPT | Mod: 26 | Performed by: RADIOLOGY

## 2025-06-21 PROCEDURE — 71045 X-RAY EXAM CHEST 1 VIEW: CPT | Mod: 77

## 2025-06-21 ASSESSMENT — ACTIVITIES OF DAILY LIVING (ADL)
ADLS_ACUITY_SCORE: 49
ADLS_ACUITY_SCORE: 49
ADLS_ACUITY_SCORE: 48
ADLS_ACUITY_SCORE: 48
ADLS_ACUITY_SCORE: 47
ADLS_ACUITY_SCORE: 49
ADLS_ACUITY_SCORE: 49
ADLS_ACUITY_SCORE: 48
ADLS_ACUITY_SCORE: 49
ADLS_ACUITY_SCORE: 49
ADLS_ACUITY_SCORE: 48
ADLS_ACUITY_SCORE: 49
ADLS_ACUITY_SCORE: 48
ADLS_ACUITY_SCORE: 49
ADLS_ACUITY_SCORE: 47
ADLS_ACUITY_SCORE: 49
ADLS_ACUITY_SCORE: 49
ADLS_ACUITY_SCORE: 48
ADLS_ACUITY_SCORE: 49

## 2025-06-21 NOTE — PROGRESS NOTES
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 Rebolledo on 6/19/2025.     Today:   - SICU placing trialysis catheter    - Transplant Nephrology beginning CRRT    - Okay for Heparin 5000 unit(s) subcutaneous BID    - Transfuse platelets to keep platelet goal ~ 100    - Fluconazole x 7 days for fungal prophylaxis    - Remove Rodriguez     Graft function: s/p DBDLT without stent, POD#2. Post-op US with patent Doppler. Liver labs down-trending, lactic normal, INR normalizing.    -  mg x 3 months    - Ursodiol  300 mg BID x 3 months     Immunosuppressed status secondary to medications:  Induction:  SM taper followed by prednisone x 3 months   Basiliximab 20 mg IV x 1 6/20 in the setting of EMILIE  Maintenance:     -  mg BID   - Tacrolimus goal level 8-12.   - Prednisone 5 mg daily following taper.    Neuro/Psych:  Depression:   - Continue home citalopram.   Fibromyalgia:   - Continue home gabapentin.   Psychophysiological insomnia:   - Continue home Trazodone.  Acute post op pain:    - PRN PO and IV hydromorphone available     Hematology:   Acute on chronic anemia: Secondary to acute surgical blood loss; transfuse to meet goals. Last transfused 6/21/2025.   Acute on chronic thrombocytopenia: Secondary to induction IS. Last transfused 6/20/2025. Transfuse to keep platelet count ~ 100.   Coagulopathy secondary to liver disease: Stable.     Cardiorespiratory:   Shock: Resolved.   Post-operative mechanical ventilation: Extubated 6/19.   Acute hypoxic respiratory failure: Wean NC as able.    - OOB as tolerate, early mobility   - Pulmonary hygiene    Hx NEREYDA: Does not use CPAP.   Hx HLD: Not currently on statin therapy.   Hx HFpEF: Noted on on problem list, last EF normal. Not on GDMT.   Chronic left sided pleural effusion: Noted as trace on Xray 6/29/2025.   History of PE, 2002: Not on long-term anticoagulation.     GI/Nutrition: PPI.  Diet: ADAT. May need a FT depending course of recovery.       Endocrine: Insulin.   Hypothyroidism: TSH 6/7/2025 4.89.    - Continue home levothyroxine.     Steroid induced hyperglycemia: Last hemoglobin A1c 4.9% 6/20/2025.   - Continue insulin drip      Renal/Fluid/Electrolytes: MIVF: saline lock.   Hypocalcemia: Managed by SICU.   Hyponatremia: Monitor.   Hypochloremia: Monitor.   Hyperphosphatemia: Monitor.   EMILIE: Oliguric. 43 ml UOP 6/20, Bumex given.    - Transplant Nephrology following   - CRRT today.     Anasarca:    - Hold home Bumex and spironolactone     : Remove Rodriguez today.      Infectious disease:   Risk for infection: Donor with open abdomen with risk of contamination.    - Continue antibiotics for a full 7- day course.    - Fluconazole x 7 days.    - Follow donor cultures.     Rheum:  Psoriatic arthritis:    - PTA managed with ixekizumab, tacrolimus ointment    Prophylaxis: DVT, fall, GI, fungal (fluconazole), viral (Valcyte), pneumocystis (Bactrim)    Disposition: SICU     JOCELIN/Fellow/Resident Provider:   MERRITT Denson, CNP  Adult Solid Organ Transplant   Contact information via [employer] Web Console or via [facility] Paging/Directory    I saw and evaluated this patient as part of a shared visit. I spent 40 minutes on review of labs and imaging, exam, care coordination, and counseling.  Medically Ready for Discharge: Anticipated in 5+ Days     Faculty: Mxa Rebolldeo M.D.  __________________________________________________________________  Transplant History:    6/19/2025 (Liver), Postoperative day: 2     Interval History: History is obtained from the patient  Overnight events: No acute events. Has required  multiple transfusions. Endorses pain, otherwise doing well.     ROS:   A 10-point review of systems was negative except as noted above.    Curent Meds:  Current Facility-Administered Medications   Medication Dose Route Frequency Provider Last Rate Last Admin    acetaminophen (TYLENOL) tablet 650 mg  650 mg Oral Q8H Ginny Naidu MD   650 mg at 06/21/25 0406    aspirin (ASA) chewable tablet 324 mg  324 mg Oral or Feeding Tube Daily Gus Arroyo MD        citalopram (celeXA) tablet 20 mg  20 mg Oral Daily Ginny Naidu MD   20 mg at 06/20/25 0757    gabapentin (NEURONTIN) capsule 200 mg  200 mg Oral QAM Max Rebolledo MD        gabapentin (NEURONTIN) capsule 400 mg  400 mg Oral QPM Max Rebolledo MD   400 mg at 06/20/25 2006    levothyroxine (SYNTHROID/LEVOTHROID) tablet 112 mcg  112 mcg Oral Daily Ginny Naidu MD   112 mcg at 06/20/25 0757    methylPREDNISolone Na Suc (solu-MEDROL) injection 100 mg  100 mg Intravenous Once Ginny Naidu MD        Followed by    [START ON 6/22/2025] methylPREDNISolone Na Suc (solu-MEDROL) injection 50 mg  50 mg Intravenous Once Ginny Naidu MD        Followed by    [START ON 6/23/2025] predniSONE (DELTASONE) tablet 25 mg  25 mg Oral Once Ginny Naidu MD        Followed by    [START ON 6/24/2025] predniSONE (DELTASONE) tablet 10 mg  10 mg Oral Once Ginny Naidu MD        Followed by    [START ON 6/25/2025] predniSONE (DELTASONE) tablet 5 mg  5 mg Oral Daily Ginny Naidu MD        multivitamin w/minerals (THERA-VIT-M) tablet 1 tablet  1 tablet Oral Daily Ginny Naidu MD   1 tablet at 06/20/25 0758    Or    multivitamins w/minerals liquid 15 mL  15 mL Oral or NG Tube Daily Ginny Naidu MD        mycophenolate (GENERIC EQUIVALENT) capsule 750 mg  750 mg Oral BID IS Ginny Naidu MD   750 mg at 06/20/25 1840    Or    mycophenolate (CELLCEPT BRAND) suspension 750 mg  750 mg Oral or NG Tube BID IS Ginny Naidu MD         pantoprazole (PROTONIX) EC tablet 40 mg  40 mg Oral Daily Ginny Naidu MD   40 mg at 06/20/25 0757    Or    pantoprazole (PROTONIX) 2 mg/mL suspension 40 mg  40 mg Oral or NG Tube Daily Ginny Naidu MD        piperacillin-tazobactam (ZOSYN) 2.25 g vial to attach to  ml bag  2.25 g Intravenous Q6H Max Rebolledo MD   2.25 g at 06/21/25 0639    polyethylene glycol (MIRALAX) Packet 17 g  17 g Oral BID Sincere Cunningham PA-C        senna-docusate (SENOKOT-S/PERICOLACE) 8.6-50 MG per tablet 2 tablet  2 tablet Oral BID Sincere Cunningham PA-C        sodium chloride (PF) 0.9% PF flush 3 mL  3 mL Intravenous Q8H Ginny Naidu MD   3 mL at 06/21/25 0409    sodium chloride (PF) 0.9% PF flush 3 mL  3 mL Intracatheter Q8H DARINEL Ginny Naidu MD   3 mL at 06/21/25 0122    sodium chloride (PF) 0.9% PF flush 3 mL  3 mL Intracatheter Q8H Ginny Naidu MD        [START ON 6/23/2025] sulfamethoxazole-trimethoprim (BACTRIM) 400-80 MG per tablet 1 tablet  1 tablet Oral or Feeding Tube Once per day on Monday Wednesday Friday Max Rebolledo MD        tacrolimus (GENERIC EQUIVALENT) capsule 2 mg  2 mg Oral BID IS Ginny Naidu MD   2 mg at 06/20/25 1840    ursodiol (ACTIGALL) capsule 300 mg  300 mg Oral BID Ginny Naidu MD   300 mg at 06/20/25 2006    Or    ursodiol (ACTIGALL) suspension 300 mg  300 mg Oral or NG Tube BID Ginny Naidu MD        [START ON 6/22/2025] valGANciclovir (VALCYTE) tablet 450 mg  450 mg Oral Every Other Day Max Rebolledo MD        Or    [START ON 6/22/2025] valGANciclovir (VALCYTE) solution 450 mg  450 mg Oral or NG Tube Every Other Day Max Rebolledo MD           Physical Exam:     Admit Weight: (!) 149.6 kg (329 lb 12.8 oz)    Current Vitals:   BP (!) 149/67 (BP Location: Right arm, Cuff Size: Adult Regular)   Pulse 76   Temp 97.3  F (36.3  C)   Resp 10   Wt (!) 155.2 kg (342 lb 2.5 oz)   SpO2 94%   BMI 47.72 kg/m      Vital sign ranges:     Temp:  [96.3  F (35.7  C)-98.6  F (37  C)] 97.3  F (36.3  C)  Pulse:  [66-83] 76  Resp:  [6-24] 10  BP: (112-155)/() 149/67  MAP:  [55 mmHg-198 mmHg] 75 mmHg  Arterial Line BP: ()/() 126/50  FiO2 (%):  [30 %] 30 %  SpO2:  [89 %-100 %] 94 %    General Appearance: Sitting in chair, no obvious acute distress.    Skin: Normal, warm, dry.  Heart: She appears adequately perfused.   Lungs: No increased work of breathing, she requires a small amount of supplemental oxygen.   Abdomen: Non-distended, Pravena wound vac in place. TIARRA x 2 with thin red output.   : Rodriguez, minimal UOP.   Extremities: Edema: generalized edema. There is no skin breakdown obvious breakdown on exposed skin.  Neurologic: Awake, alert, oriented x 4. Tremor absent.     Frailty Scores          5/20/2025 3/30/2025   Frailty Scores   Final Score Not Frail  Not Frail    Final Score Number 1  1       Details          Patient-reported               Data:   CMP  Recent Labs   Lab 06/21/25  0514 06/21/25  0513 06/21/25  0238 06/21/25  0101 06/20/25  0503 06/20/25  0358 06/18/25  2157 06/18/25  1902   *  --   --  131*   < > 136   < > 130*   POTASSIUM 4.7  --   --  4.5   < > 4.0   < > 3.6   CHLORIDE 95*  --   --  96*   < > 101   < > 95*   CO2 22  --   --  23   < > 25   < > 27   * 140*   < > 136*   < > 169*  172*   < > 125*   BUN 44.2*  --   --  43.3*   < > 31.3*   < > 24.8*   CR 3.10*  --   --  2.81*   < > 1.62*   < > 0.98*   GFRESTIMATED 17*  --   --  19*   < > 36*   < > 66   SANDY 8.3*  --   --  8.0*   < > 8.4*   < > 8.4*   ICAW  --   --   --  4.3*  --  4.7   < >  --    MAG 2.3  --   --  2.2  --  2.1   < > 1.8   PHOS 7.3*  --   --  6.9*  --  5.9*   < > 2.9   AMYLASE  --   --   --   --   --  63  --  47   LIPASE  --   --   --   --   --  46  --   --    ALBUMIN  --   --   --  2.9*  --  2.6*   < > 2.5*   BILITOTAL  --   --   --  1.4*  --  2.5*   < > 4.9*   ALKPHOS  --   --   --  54  --  60   < > 74   AST  --   --   --  984*  --   2,889*   < > 83*   ALT  --   --   --  261*  --  360*   < > 40    < > = values in this interval not displayed.     CBC  Recent Labs   Lab 06/21/25  0514 06/21/25  0101 06/20/25  1206 06/20/25  0749   HGB 8.3* 7.9*  7.7*   < > 7.4*   WBC 5.8 5.0  5.0   < > 13.7*   PLT 48* 32*  32*   < > 100*   A1C  --   --   --  4.9    < > = values in this interval not displayed.

## 2025-06-21 NOTE — PLAN OF CARE
Goal Outcome Evaluation:      Plan of Care Reviewed With: patient    Overall Patient Progress: improvingOverall Patient Progress: improving    Outcome Evaluation: Labs improved, VSS without intervention except on 5L NC

## 2025-06-21 NOTE — CONSULTS
Care Management Consult acknowledged for elevated risk score. Pt is s/p liver transplant 6/19/2025. Pt is actively being followed by liver transplant . Writer will clear consult as there are no immediate care management needs noted for the weekend.     Dianna Calvillo, [facility]  Weekend Covering   [facility] Acute Care Management  Searchable on Vocera: 4A CVICU SW, 4C MICU SW, 4E SICU SW

## 2025-06-21 NOTE — PROGRESS NOTES
SURGICAL ICU PROGRESS NOTE  2025    PRIMARY TEAM: Transplant  PRIMARY PHYSICIAN: Max Rebolledo MD  REASON FOR CRITICAL CARE ADMISSION: s/p  donor liver transplant   ADMITTING PHYSICIAN: Dr. 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 Rebolledo on 2025.      Major Changes/Updates  - ON, pt received 3 PLTs and 2 unit of pRBCs for transfusion goals  - Per discussion with Nephrology and Txp Surgery will plan to CRRT today. Will exchange one of her LIJ MAC lines for trialysis catheter  - Added bowel reg  - Will schedule methocarbamol 500 mg q 6 hr   - Will follow up with Transplant Surgery about when to start SQH       Neurological:  # Acute post operative pain  # MDD   # Fibromyalgia  # Insomnia  - Monitor neurological status. Delirium preventions and precautions.  - Pain:   - Scheduled: tylenol 650 mg q8h, gabapentin 300 mg qAM / 600 mg qPM (home regimen), methocarbamol 500 mg q 6hr   - PRN: PO dilaudid 1-2 mg q3h prn, IV dilaudid 0.4 mg q2h prn, Lidocaine patches daily  - Sedation: N/A  -- PTA Citalopram, Trazodone ordered    Pulmonary:   Vital Signs: Most Recent  Ranges (24 hours)   Resp: (!) 9  SpO2: 95 % Resp  Min: 6  Max: 24  SpO2  Min: 89 %  Max: 100 %   O2 Device:   (5 LPM)  FiO2 (%): 30 %, Resp: (!) 9  # Post operative respiratory support  # Left pleural effusion, resolving  - Wean Supplemental O2 as able to SpO2 goals  - Goal SpO2 > 92%.  - Pulmonary hygiene; IS q15-30 minutes when awake  - CXR, today's AM CXR, per my review, no significant changes compared to previous   - Continue daily CXR      Cardiovascular:    Vital Signs: Most Recent  Ranges (24 hours)   Pulse: 76  BP: (!) 149/67  Arterial Line BP: 115/46  Arterial Line MAP (mmHg): 67 mmHg Pulse  Min: 66  Max:  82  BP  Min: 112/58  Max: 155/58  Arterial Line BP  Min: 66/50  Max: 286/246  Arterial Line MAP (mmHg)  Min: 55 mmHg  Max: 198 mmHg     # Shock-distributive - resolved  - Monitor hemodynamic status.  - Off of NE this morning   - MAP goal > 65 mmHg    # Hx of HLD  - No home medications    Gastroenterology/Nutrition:  # Cirrhosis of the liver  # Metabolic dysfunction-associated steatohepatitis  # S/p  donor liver transplant on 2025  - Ursodiol 300 mg BID  - Basiliximab   - Immunosuppressant regimen as below   - PPI every day  - Liver ultrasound post-op with elevated peak systolic velocities and resistive indices, no fluid collection  - LFT downtrending ALT down to 261 from 360, AST down to 984 from 2889    - Continue to trend daily     # Protein calorie deficit malnutrition   - Room Service  Snacks/Supplements Adult: Ensure Max Protein (bariatric); With Meals  Full Liquid Diet Thin Liquids (level 0)  - Pt reports up for trying oatmeal; will advance diet to full liquid diet and encourage PO intake as she has been eating only lemon ice  - Last BM PTA; added bowel reg     # Nausea   - Ondansetron IV/PO PRN      Fluids/Electrolytes/Renal:   I/O last 3 completed shifts:  In: 6504.15 [P.O.:1750; I.V.:798.15; IV Piggyback:300]  Out: 1385 [Urine:43; Drains:1342]  I/O this shift:  In: 623 [P.O.:600; I.V.:23]  Out: 124 [Urine:5; Drains:119]  Recent Labs   Lab 25  0514 25  0101 25  1824 25  0358   * 131* 133* 136   POTASSIUM 4.7 4.5 4.5 4.0   CHLORIDE 95* 96* 98 101   MAG 2.3 2.2  --  2.1   PHOS 7.3* 6.9*  --  5.9*   SANDY 8.3* 8.0* 7.9* 8.4*   CO2  23 23 25   BUN 44.2* 43.3* 39.5* 31.3*   CR 3.10* 2.81* 2.46* 1.62*     # Acute Kidney Injury  - Nephrology consulted; plan for CRRT today and will need access   - Likely secondary to hypoperfusion intraoperatively  - Cr continues to uptrend to 3.10 up from 1.62; UOP minimal 43 ml/24 hrs  - Rodriguez in place, strict I/O's - will remove as  "pt is anuric and complaining of urgency and fullness. Bladder scan per unit protocol         Endocrine:  Recent Labs   Lab 06/21/25  0745 06/21/25  0514 06/21/25  0513 06/21/25  0238 06/21/25  0101 06/21/25  0100 06/21/25  0001 06/20/25  2150 06/20/25 2057 06/20/25 1958 06/20/25  1915 06/20/25  1824   * 142* 140* 134* 136* 135* 129* 127* 132* 160* 162* 145*  154*   # Stress Hyperglycemia   # No Hx DM  - Insulin gtt consider transitioning to sliding scale insulin as steroids taper down   - Goal -180 for optimal healing     # Hypothyroidism  - PTA levothyroxine ordered      ID:  Recent Labs   Lab 06/21/25  0514 06/21/25  0101 06/20/25 1959 06/20/25  1206   WBC 5.8 5.0  5.0 7.0 12.4*     Positive cultures:  No results found for: \"CULT\"  -  Daily CBC  - Pt is afebrile, T min 96.5, WBC 5.8     # Immunosuppression  # Kathya-operative prophylaxis  - Tacrolimus, mycophenolate  - Steroid taper  - PPX: bactrim, valcyte, fluconazole - per Txp Surgery plan to extend fluconazole for 7 day course   - Zosyn x 7 days EOT 6/26 as donor had open abdomen/high risk for infection     # Psoriasis  # Psoriatic arthritis  - Home regimen includes Ixekizumab (q4 weeks), prn mometasone - ordered mometasone       Heme:     Recent Labs   Lab 06/21/25  0810 06/21/25  0514 06/21/25  0101 06/20/25 1959 06/20/25  1824 06/20/25  1206   HGB  --  8.3* 7.9*  7.7* 7.5*  --  7.6*   PLT  --  48* 32*  32* 32*  --  60*   FIBR 274 278 257 264   < > 263    < > = values in this interval not displayed.     # Acute Blood Loss Anemia   # Anemia of Critical Illness   - Transfuse per transfusion goals below  - ON, pt received 3 PLTs and 2 unit of pRBCs for transfusion goals    Transfusion goals:  -- Hgb > 8  -- Fibrinogen > 200  -- Plt > 75  -- INR < 2.0      # Hx of PE 1/31/2002  -- No PTA DOAC      MSK:  # Weakness and deconditioning of critical illness  - Physical and occupational therapy consult       General Cares/Prophylaxis:    DVT " "Prophylaxis: Pneumatic Compression Devices,  mg daily  GI Prophylaxis: PPI  Restraints: Restraints for medical healing needed: NO    Lines/ tubes/ drains:  - PIV x2  - L radial art line  - L internal jugular CVC x2 - exchange one for trialysis catheter and remove the other  - Rodriguez catheter - remove   - Surgical drain x2  - Wound vac    Disposition: SICU    Discussed plan today with patient and their family member. All questions were answered. They are in agreement with plan.      Patient seen, findings and plan discussed with SICU staff and Transplant Surgery .     Total Critical Care time spent by me, excluding procedures, was 40 minutes.     Sincere Cunningham PA-C       Clinically Significant Risk Factors         # Hyponatremia: Lowest Na = 131 mmol/L in last 2 days, will monitor as appropriate  # Hypochloremia: Lowest Cl = 95 mmol/L in last 2 days, will monitor as appropriate  # Hypocalcemia: Lowest iCa = 4.1 mg/dL in last 2 days, will monitor and replace as appropriate     # Hypoalbuminemia: Lowest albumin = 2.2 g/dL at 6/19/2025  8:25 PM, will monitor as appropriate  # Coagulation Defect: INR = 1.30 (Ref range: 0.85 - 1.15) and/or PTT = 28 Seconds (Ref range: 22 - 38 Seconds), will monitor for bleeding  # Thrombocytopenia: Lowest platelets = 32 in last 2 days, will monitor for bleeding  # Acute Kidney Injury, unspecified: based on a >150% or 0.3 mg/dL increase in last creatinine compared to past 90 day average, will monitor renal function   # Acute heart failure with preserved ejection fraction: heart failure noted on problem list, last echo with EF >50%, and receiving IV diuretics           # Morbid Obesity: Estimated body mass index is 47.72 kg/m  as calculated from the following:    Height as of 6/7/25: 1.803 m (5' 11\").    Weight as of this encounter: 155.2 kg (342 lb 2.5 oz)., PRESENT ON ADMISSION     # Financial/Environmental Concerns: none          "       ====================================    TODAY'S SUBJECTIVE/INTERVAL HISTORY:   Pt states she slept well overnight. She states she hasn't eaten much beside lemon ice.     OBJECTIVE:     Temp:  [96.3  F (35.7  C)-98.2  F (36.8  C)] 98  F (36.7  C)  Pulse:  [66-82] 76  Resp:  [6-24] 9  BP: (112-155)/() 149/67  MAP:  [55 mmHg-198 mmHg] 67 mmHg  Arterial Line BP: ()/() 115/46  SpO2:  [89 %-100 %] 95 %  FiO2 (%): 30 %, Resp: (!) 9    I/O last 3 completed shifts:  In: 6504.15 [P.O.:1750; I.V.:798.15; IV Piggyback:300]  Out: 1385 [Urine:43; Drains:1342]    PHYSICAL EXAM   Neuro: Awake. Answers questions appropriately.  Follows commands. NAD.   HEENT: Normocephalic, atraumatic. PERRL, and nonicteric.   CV: RRR on monitor, S1S2, all extremities well perfused   Respiratory: Normal respiratory effort on 5L NC, equal chest rise b/l   GI: Large pannus. Abdomen soft and mildly tender to light palpation throughout. Mildly distended in the RLQ and LLQ. RLQ drain 1 with 229 ml of serosanguinous output. RLQ drain 2 with 1.1 L of serosanguinous output.   : Oliguric with Rodriguez  MSK: Moves all extremities well with normal appearing strength. Sensation intact in all upper/lower extremities. Edematous in BLE.     Skin: Normal color. No rashes or skin lesions.   Psych: Cooperative, congruent mood and affect.       LABS:   Arterial Blood Gases   Recent Labs   Lab 06/19/25  1935 06/19/25  1835 06/19/25  1802 06/19/25  1727   PH 7.39 7.35 7.37 7.37   PCO2 39 44 42 43   PO2 62* 88 118* 111*   HCO3 23 24 24 25     Complete Blood Count   Recent Labs   Lab 06/21/25  0514 06/21/25  0101 06/20/25  1959 06/20/25  1206   WBC 5.8 5.0  5.0 7.0 12.4*   HGB 8.3* 7.9*  7.7* 7.5* 7.6*   PLT 48* 32*  32* 32* 60*     Basic Metabolic Panel  Recent Labs   Lab 06/21/25  0745 06/21/25  0514 06/21/25  0513 06/21/25  0238 06/21/25  0101 06/20/25  1915 06/20/25  1824 06/20/25  0503 06/20/25  0358   NA  --  132*  --   --  131*  --  133*   --  136   POTASSIUM  --  4.7  --   --  4.5  --  4.5  --  4.0   CHLORIDE  --  95*  --   --  96*  --  98  --  101   CO2  --  22  --   --  23  --  23  --  25   BUN  --  44.2*  --   --  43.3*  --  39.5*  --  31.3*   CR  --  3.10*  --   --  2.81*  --  2.46*  --  1.62*   * 142* 140* 134* 136*   < > 145*  154*   < > 169*  172*    < > = values in this interval not displayed.     Liver Function Tests  Recent Labs   Lab 06/21/25  0514 06/21/25 0101 06/20/25 1959 06/20/25 1824 06/20/25  0749 06/20/25 0358 06/20/25  0003 06/19/25 2025 06/18/25 1902   AST  --  984*  --   --   --  2,889*  --  2,825* 83*   ALT  --  261*  --   --   --  360*  --  345* 40   ALKPHOS  --  54  --   --   --  60  --  63 74   BILITOTAL  --  1.4*  --   --   --  2.5*  --  3.9* 4.9*   ALBUMIN  --  2.9*  --   --   --  2.6*  --  2.2* 2.5*   INR 1.30* 1.38* 1.44* 1.43*   < > 1.58*   < > 2.14*  --     < > = values in this interval not displayed.     Pancreatic Enzymes  Recent Labs   Lab 06/20/25 0358 06/18/25 1902   LIPASE 46  --    AMYLASE 63 47     Coagulation Profile  Recent Labs   Lab 06/21/25  0810 06/21/25  0514 06/21/25 0101 06/20/25 1959 06/20/25 1824   INR  --  1.30* 1.38* 1.44* 1.43*   PTT 28 27 25 29 31         IMAGING:   No results found for this or any previous visit (from the past 24 hours).

## 2025-06-21 NOTE — PROGRESS NOTES
THORACIC SURGERY INPATIENT CONSULT NOTE    REASON FOR CONSULT: Right lower lobe mass    REFERRING PROVIDER: Dr. Welch    Subjective   HISTORY OF PRESENTING ILLNESS:   David A Jolissaint is a 79 y.o. male who has significant medical problems as mentioned in the medical chart.     Patient has a history of limited stage small cell lung cancer diagnosed in 2021.  He has received definitive chemoradiation treatment and total brain irradiation.  He has been followed by medical oncology at [facility].  He has a recent PET CT scan which showed increased size and activity in the right lower lobe mass and lung nodules concerning for recurrent disease.  He was referred to thoracic surgery clinic for navigational bronchoscopy and biopsy.  He presented to hospital on 1/4/2025 with community-acquired pneumonia.  He underwent bronchoscopy on 1/6/2025 by Dr. Quezada and was found to have significant mucopurulent secretions in the tracheobronchial tree.    Past Medical History:   Diagnosis Date    Anemia     Cellulitis     CKD (chronic kidney disease), stage III     COPD (chronic obstructive pulmonary disease)     Diverticulitis     Dyslipidemia     Hypertension     Myocardial infarction 2016    PVD (peripheral vascular disease)     Small cell lung cancer 05/2021       Past Surgical History:   Procedure Laterality Date    BELOW KNEE LEG AMPUTATION Left 2015    BRONCHOSCOPY N/A 05/20/2021    Procedure: BRONCHOSCOPY WITH ENDOBRONCHIAL ULTRASOUND WITH FINE NEEDLE ASPIRATION. BRONCHOALVEOLAR LAVAGE;  Surgeon: Jackson Quezada MD;  Location: [facility] ENDOSCOPY;  Service: Pulmonary;  Laterality: N/A;  subcarinal mass    BRONCHOSCOPY N/A 1/6/2025    Procedure: BRONCHOSCOPY with bronchial washing;  Surgeon: Jackson Quezada MD;  Location: [facility] ENDOSCOPY;  Service: Pulmonary;  Laterality: N/A;  postop: pneumonia    CARDIAC SURGERY      CHOLECYSTECTOMY      CORONARY ARTERY BYPASS GRAFT  2016    GALLBLADDER SURGERY         Family History   Problem  CRRT INITIATION NOTE    Consent for CRRT Completed:  YES  Patient s Vascular Access: Catheter              Placement Confirmed: YES  Manufacture: Arrow  Model: [employer] Trialysis  Length/Faroese Size:  12 Fr    DATA:  Procedure:  CVHHDF  Start Time:  1540  Machine#:  [id number]/[id number]  Filter:    Blood Flow:  200  ML/min  Pre-Replacement Solution: Phoxillum BK4/2.5  Post-Replacement Solution:  Phoxillum BK4/2.5  Dialysate Solution: Phoxillum BK4/2.5  Pre-Replacement Solution Rate:  1900 mL/hr  Post-Replacement Solution Rate:  200 mL/hr  Dialysate Flow Rate:  1900 mL/hr   Patient Removal Rate:  0 mL/hr  Anticoagulation Type and Rate:  N/A    ASSESSMENT:  How Patient Tolerated Initiation:   Vital Signs:  BP 93/69   Pulse 67   Temp 97.8  F (36.6  C) (Axillary)   Resp 17   Wt (!) 155.2 kg (342 lb 2.5 oz)   SpO2 95%   BMI 47.72 kg/m    Initial Pressures:  Access:  -51  Filter:  208  Return:  126  TMP:  61  Change in Filter Pressure:  43      INTERVENTIONS/PLAN:  Initiated CRRT per MD orders. Continue to monitor circuit daily and change set q72 hours or PRN for clotting/clogging. Please call CRRT RN with any questions/problems.           Relation Age of Onset    Heart attack Mother     Heart attack Father     Heart disease Father        Social History     Socioeconomic History    Marital status:    Tobacco Use    Smoking status: Former     Current packs/day: 0.00     Average packs/day: 2.0 packs/day for 57.0 years (114.0 ttl pk-yrs)     Types: Cigarettes     Start date: 1958     Quit date: 2015     Years since quitting: 10.0    Smokeless tobacco: Never   Vaping Use    Vaping status: Never Used   Substance and Sexual Activity    Alcohol use: Not Currently    Drug use: Never    Sexual activity: Defer         Current Facility-Administered Medications:     acetaminophen (TYLENOL) tablet 650 mg, 650 mg, Oral, Q4H PRN **OR** acetaminophen (TYLENOL) 160 MG/5ML oral solution 650 mg, 650 mg, Oral, Q4H PRN **OR** acetaminophen (TYLENOL) suppository 650 mg, 650 mg, Rectal, Q4H PRN, EkukTajna M, APRN    aspirin EC tablet 81 mg, 81 mg, Oral, Daily With Lunch, Rosa Ayoub, APRN, 81 mg at 01/07/25 1615    atorvastatin (LIPITOR) tablet 40 mg, 40 mg, Oral, QAM, EkukTajna M, APRN, 40 mg at 01/07/25 0920    sennosides-docusate (PERICOLACE) 8.6-50 MG per tablet 2 tablet, 2 tablet, Oral, BID PRN, 2 tablet at 01/05/25 1716 **AND** polyethylene glycol (MIRALAX) packet 17 g, 17 g, Oral, Daily PRN **AND** bisacodyl (DULCOLAX) EC tablet 5 mg, 5 mg, Oral, Daily PRN **AND** bisacodyl (DULCOLAX) suppository 10 mg, 10 mg, Rectal, Daily PRN, Ekuk Rosa M, APRN    budesonide (PULMICORT) nebulizer solution 0.5 mg, 0.5 mg, Nebulization, BID - RT, Taj Ayoubna M, APRN, 0.5 mg at 01/07/25 0702    Calcium Replacement - Follow Nurse / BPA Driven Protocol, , Not Applicable, PRN, EkukTajna M, APRN    clopidogrel (PLAVIX) tablet 75 mg, 75 mg, Oral, QAM, Rosa Ayoub APRN, 75 mg at 01/07/25 0920    diphenhydrAMINE (BENADRYL) capsule 25 mg, 25 mg, Oral, Nightly PRN, Rosa Ayoub APRN, 25 mg at 01/06/25 2025    Enoxaparin Sodium (LOVENOX) syringe 30 mg, 30  mg, Subcutaneous, Daily, Nika Genao PA-C, 30 mg at 01/07/25 1614    ferrous sulfate tablet 325 mg, 325 mg, Oral, Daily With Breakfast, Rosa Ayoub, APRN, 325 mg at 01/07/25 0923    finasteride (PROSCAR) tablet 5 mg, 5 mg, Oral, Nightly, Rosa Ayoub, APRN, 5 mg at 01/06/25 2025    guaiFENesin (MUCINEX) 12 hr tablet 1,200 mg, 1,200 mg, Oral, Q12H, Anjum Weiss MD, 1,200 mg at 01/07/25 1345    hydrALAZINE (APRESOLINE) injection 10 mg, 10 mg, Intravenous, Q6H PRN, Rosa Ayoub, APRN    HYDROcodone-acetaminophen (NORCO) 5-325 MG per tablet 1 tablet, 1 tablet, Oral, Q6H PRN, Rosa Ayoub, APRN, 1 tablet at 01/06/25 2025    ipratropium-albuterol (DUO-NEB) nebulizer solution 3 mL, 3 mL, Nebulization, BID, Nj Morataya MD    Magnesium Standard Dose Replacement - Follow Nurse / BPA Driven Protocol, , Not Applicable, PRN, Rosa Ayoub, APRN    methylPREDNISolone sodium succinate (SOLU-Medrol) injection 40 mg, 40 mg, Intravenous, Q12H, Nj Morataya MD, 40 mg at 01/07/25 0924    metoprolol succinate XL (TOPROL-XL) 24 hr tablet 25 mg, 25 mg, Oral, Daily, Rosa Ayoub, APRN, 25 mg at 01/07/25 0921    montelukast (SINGULAIR) tablet 10 mg, 10 mg, Oral, Nightly, Rosa Ayoub, APRN, 10 mg at 01/06/25 2025    ondansetron ODT (ZOFRAN-ODT) disintegrating tablet 4 mg, 4 mg, Oral, Q6H PRN **OR** ondansetron (ZOFRAN) injection 4 mg, 4 mg, Intravenous, Q6H PRN, Rosa Ayoub, APRN, 4 mg at 01/04/25 1816    pantoprazole (PROTONIX) EC tablet 40 mg, 40 mg, Oral, Daily, Rosa Ayoub, APRN, 40 mg at 01/07/25 0921    Pharmacy to Dose enoxaparin (LOVENOX), , Not Applicable, Continuous PRN, Nika Genao, DARYN    Phosphorus Replacement - Follow Nurse / BPA Driven Protocol, , Not Applicable, PRN, Rosa Ayoub, APRN    piperacillin-tazobactam (ZOSYN) 3.375 g IVPB in 100 mL NS MBP (CD), 3.375 g, Intravenous, Q8H, Draw, MD Jackson, 3.375 g at 01/07/25 0923    Potassium Replacement - Follow Nurse / BPA  "Driven Protocol, , Not Applicable, PRN, [place], Rosa M, APRN    saccharomyces boulardii (FLORASTOR) capsule 250 mg, 250 mg, Oral, Daily With Lunch, [place] Rosa M, APRN, 250 mg at 01/07/25 1616    sodium bicarbonate tablet 650 mg, 650 mg, Oral, BID, [place] Rosa M, APRN, 650 mg at 01/07/25 0921    [COMPLETED] Insert Peripheral IV, , , Once **AND** sodium chloride 0.9 % flush 10 mL, 10 mL, Intravenous, PRN, [place] rios G, APRN    sodium chloride 0.9 % flush 10 mL, 10 mL, Intravenous, Q12H, [place], Rosa M, APRN, 10 mL at 01/07/25 0924    sodium chloride 0.9 % flush 10 mL, 10 mL, Intravenous, PRN, [place], Rosa M, APRN    sodium chloride 0.9 % infusion 40 mL, 40 mL, Intravenous, PRN, [place], Rosa M, APRN     Allergies   Allergen Reactions    Diltiazem Unknown - High Severity     Swelling of lips             Objective    OBJECTIVE:     VITAL SIGNS:  /71 (BP Location: Right arm, Patient Position: Sitting)   Pulse 78   Temp 97.8 °F (36.6 °C) (Oral)   Resp 9   Ht 177.8 cm (70\")   Wt 65.2 kg (143 lb 11.8 oz)   SpO2 100%   BMI 20.62 kg/m²     PHYSICAL EXAM:  Normal appearance.   Head is normocephalic.   Nose appears normal.   No obvious deformity of the mouth and throat.  Conjunctivae normal.   Heart rate and rhythm is normal.  Pulmonary effort is normal.   Moving all 4 extremities.  Extremities warm.  No focal deficit present.   He is alert and oriented to person, place, and time.     LAB RESULTS:  I have reviewed all the available laboratory results in the chart.    RESULTS REVIEW:  I have reviewed the patient's all relevant laboratory and imaging findings.           ASSESSMENT & PLAN:  David A Jolissaint is a 79 y.o. male with significant medical conditions as mentioned above presented to the hospital with problem mentioned in history of presenting illness.    Right lower lobe mass, history of small cell lung cancer  The PET CT scan findings of increased size and metabolic activity of the right lower " lobe mass is concerning for malignancy.  However, currently he has convincing evidence of pneumonia and the tracheobronchial tree was noted to be flooded with mucopurulent secretion.  I discussed the case with Dr. Quezada.  I think it is reasonable to treat him with antibiotics with follow-up CT scan of the chest at short interval.    I discussed the patient's findings and my recommendations with the patient. The patient was given adequate time to ask questions and all questions were answered to patient satisfaction.     Pineda Issa MD  Thoracic Surgeon  [facility]        Dictated utilizing Dragon dictation    I spent 60 minutes caring for Brian on this date of service. This time includes time spent by me in the following activities: reviewing tests, obtaining and/or reviewing a separately obtained history, performing a medically appropriate examination and/or evaluation , counseling and educating the patient/family/caregiver, ordering medications, tests, or procedures, referring and communicating with other health care professionals , documenting information in the medical record, independently interpreting results and communicating that information with the patient/family/caregiver, and care coordination and more than half the time was spent in direct face to face evaluation and decision making.

## 2025-06-21 NOTE — PROCEDURES
[facility]    Central line    Date/Time: 6/21/2025 12:12 PM    Performed by: Joseph Winter MD  Authorized by: Joseph Winter MD  Indications: vascular access      UNIVERSAL PROTOCOL   Site Marked: No  Prior Images Obtained and Reviewed:  Yes  Required items: Required blood products, implants, devices and special equipment available    Patient identity confirmed:  Verbally with patient and provided demographic data  NA - No sedation, light sedation, or local anesthesia  Confirmation Checklist:  Patient's identity using two indicators  Time out: Immediately prior to the procedure a time out was called    Universal Protocol: the Joint Commission Universal Protocol was followed    Preparation: Patient was prepped and draped in usual sterile fashion       ANESTHESIA    Local Anesthetic:  Lidocaine 1% with epinephrine  Anesthetic Total (mL):  2      SEDATION    Patient Sedated: No      Preparation: skin prepped with ChloraPrep  Skin prep agent dried: skin prep agent completely dried prior to procedure  Sterile barriers: all five maximum sterile barriers used - cap, mask, sterile gown, sterile gloves, and large sterile sheet  Hand hygiene: hand hygiene performed prior to central venous catheter insertion  Patient position: Trendelenburg  Catheter type: triple lumen  Catheter size: 12 Fr  Number of attempts: 1  Post-procedure: line sutured and dressing applied  Assessment: blood return through all ports, free fluid flow, placement verified by x-ray and no pneumothorax on x-ray      PROCEDURE  Describe Procedure: BEDSIDE PROCEDURE - RE-WIRING OF CENTRAL LINE to TRIALYSIS CATHETER    Date of Procedure: 06/21/2025   Patient: Karlene Yun   MRN: [id number]     RESIDENT: Joseph Winter MD PGY2  FELLOW: Rama Bailey MD  SURGEON: Marco Thurston MD  LOCATION OF CENTRAL LINE: Left subclavian  SEDATION: Local   ESTIMATED BLOOD LOSS: < 20 cc   COMPLICATIONS: None.         INDICATIONS FOR PROCEDURE: Central venous access    DESCRIPTION OF PROCEDURE:  The risks, benefits and alternatives were described to the patient's family, and they were willing to proceed; consent was obtained.   The patient was placed in a dependent position appropriate for central line placement based on the vein to be cannulated. The patient's left neck was prepped and draped in sterile fashion. Each lumen of a triple lumen 12-French trialysis catheter was evacuated of air prior to insertion and flushed with sterile saline. The wire was introduced into the existing MAC introducer without resistance. The existing MAC introducer was then removed and the 12Fr trialysis catheter was introduced into the internal jugular vein using the Seldinger technique. The catheter was threaded smoothly over the guide wire and appropriate blood return was obtained. The wire was removed in its entirety. 3 mL 1% Lidocaine was used to anesthetize the surrounding skin area. The catheter was then sutured in place to the skin and a sterile dressing applied. CXR was obtained showing no pneumothorax and appropriate line position. Dr. Thurston was immediately available for the entire procedure.    - - - - - - - - - - - - - - - - - -  Joseph Winter MD  General Surgery PGY-2    See [facility] for on-call pager information.      Patient Tolerance:  Patient tolerated the procedure well with no immediate complications  Length of time physician/provider present for 1:1 monitoring during sedation: 0

## 2025-06-21 NOTE — PROGRESS NOTES
[facility]  Transplant Nephrology Consult Note  Date of Admission:  6/18/2025  Today's Date: 06/21/2025  Requesting physician: Max Rebolledo MD    Reason for Consult:  EMILIE    Recommendations:   - planned for iHD but her BP trended down, so started on CRRT with UF as tolerated  - immunosuppression per surgery team   - strict I and O, daily weights     Assessment & Plan   # EMILIE: Trend up in creatinine.  Decreased urine output and only minimal today.     - EMILIE felt secondary to liver transplant with hemodynamic changes and hypotension.    - Baseline Creatinine: ~ 0.8-1.0   - Proteinuria: Normal (<0.2 grams)    # Liver Tx: Patient with ESLD secondary to Metabolic associated steatotic liver disease (MASLD), s/p OLT June 19, 2025.  Transaminases Stable, elevated transaminases.  Followed by Transplant Surgery.    # Immunosuppression: Tacrolimus immediate release (goal 6-8), Mycophenolate mofetil (dose 750 mg every 12 hours), and Methylprednisolone (dose taper)   - Induction with Recent Transplant:  Per Liver Tx Protocol   - Continue with intensive monitoring of immunosuppression for efficacy and toxicity.   - Goal tacrolimus level lower due to EMILIE.   - Changes: Not at this time; Management per Transplant Surgery.    # Infection Prophylaxis:   - PJP: Sulfa/TMP (Bactrim)  - CMV: Valganciclovir (Valcyte)  - Thrush: Fluconazole (Diflucan)  - Fungal: Fluconazole (Diflucan)      - CMV IgG Ab High Risk Discordance (D+/R-) at time of transplant: No  Present CMV Serostatus: Positive  - EBV IgG Ab High Risk Discordance (D+/R-) at time of transplant: No  Present EBV Serostatus: Positive    # Blood Pressure: Hypotensive on low dose pressors;  Goal BP: MAP > 65   - Changes: Not at this time; Would consider fluid bolus with low CVP.    # Elevated Blood Glucose: Glucose generally running ~ 100-170s; Secondary to high dose steroids.   - On insulin gtt.    # Anemia in Chronic  Disease/Surgery: Hgb: Trend down, low      RACHEL: No   - Iron studies: Not checked recently    # Thrombocytopenia: Stable, mildly low platelet level.    # Mineral Bone Disorder:   - Vitamin D; level: Low normal        On supplement: No  - Calcium; level: Low normal when corrected for low serum albumin        On supplement: No  - Phosphorus; level: High        On binder: No    # Electrolytes:   - Potassium; level: Normal        On supplement: No  - Magnesium; level: Normal        On supplement: No  - Bicarbonate; level: Normal        On supplement: No  - Sodium; level: Normal    # Obesity, Class III (BMI = 47.7): Weight is up following liver transplant.   - Once patient recovers from surgery and incision heals, would recommend working on weight loss for overall health by increasing exercise and watching caloric intake.    # Other Significant PMH:   - H/o PE (~ 2000): Patient with no recent clotting episodes and off anticoagulation.   - Fibromyalgia: Stable symptoms on gabapentin.     # Transplant History:  Etiology of Organ Failure: Metabolic associated steatotic liver disease (MASLD)  Tx: Liver Tx  Transplant: 6/19/2025 (Liver)  Significant changes in immunosuppression: Slightly lower tacrolimus level goal due to EMILIE.  Significant transplant-related complications: EMILIE    Recommendations were communicated to the primary team via this note.    Ronit Roger MD  Transplant Nephrology  Contact information via [facility] Web Console or via [facility] Paging/Directory      History of Present Illness  60 yo female with ESLD secondary to Metabolic associated steatotic liver disease (MASLD), s/p OLT 6/19/2025.  Now with EMILIE following liver transplant.  Other significant PMH: h/o PE, obesity, dyslipidemia, fibromyalgia, osteoarthritis, and depression.    Ms. Yun's creatinine is 1.62 (06/20 0358); Trend up.  anuric/oliguric today.  Stable, elevated transaminases.  Other significant labs/tests/vitals: Stable electrolytes.  Trend  down in hemoglobin.  Stable platelet level.  No new events overnight.  No chest pain or shortness of breath.  No leg swelling.  No nausea and vomiting.  Bowel movements are none and not passing gas yet.  No fever, sweats or chills.    Review of Systems   4 point ROS was obtained and negative except as noted in the Interval History.    MEDICATIONS:  Current Facility-Administered Medications   Medication Dose Route Frequency Provider Last Rate Last Admin    acetaminophen (TYLENOL) tablet 650 mg  650 mg Oral Q8H Ginny Naidu MD   650 mg at 06/21/25 1225    aspirin (ASA) chewable tablet 324 mg  324 mg Oral or Feeding Tube Daily Gus Arroyo MD   324 mg at 06/21/25 0757    citalopram (celeXA) tablet 20 mg  20 mg Oral Daily Ginny Naidu MD   20 mg at 06/21/25 0758    fluconazole (DIFLUCAN) intermittent infusion 200 mg  200 mg Intravenous Q24H Sincere Cunningham PA-C 100 mL/hr at 06/21/25 1554 200 mg at 06/21/25 1554    [START ON 6/22/2025] gabapentin (NEURONTIN) capsule 100 mg  100 mg Oral QAM Max Rebolledo MD        gabapentin (NEURONTIN) capsule 200 mg  200 mg Oral QPM Max Rebolledo MD        heparin ANTICOAGULANT injection 5,000 Units  5,000 Units Subcutaneous Q12H Sincere Cunningham PA-C   5,000 Units at 06/21/25 1235    levothyroxine (SYNTHROID/LEVOTHROID) tablet 112 mcg  112 mcg Oral Daily Ginny Naidu MD   112 mcg at 06/21/25 0758    [START ON 6/22/2025] methylPREDNISolone Na Suc (solu-MEDROL) injection 50 mg  50 mg Intravenous Once Ginny Naidu MD        Followed by    [START ON 6/23/2025] predniSONE (DELTASONE) tablet 25 mg  25 mg Oral Once Ginny Naidu MD        Followed by    [START ON 6/24/2025] predniSONE (DELTASONE) tablet 10 mg  10 mg Oral Once Ginny Naidu MD        Followed by    [START ON 6/25/2025] predniSONE (DELTASONE) tablet 5 mg  5 mg Oral Daily Ginny Naidu MD        multivitamin w/minerals (THERA-VIT-M) tablet 1 tablet  1 tablet Oral Daily Evangelista  MD Ginny   1 tablet at 06/21/25 0758    Or    multivitamins w/minerals liquid 15 mL  15 mL Oral or NG Tube Daily Ginny Naidu MD        mycophenolate (GENERIC EQUIVALENT) capsule 750 mg  750 mg Oral BID IS Ginny Naidu MD   750 mg at 06/21/25 0758    Or    mycophenolate (CELLCEPT BRAND) suspension 750 mg  750 mg Oral or NG Tube BID IS Ginny Naiud MD        pantoprazole (PROTONIX) EC tablet 40 mg  40 mg Oral Daily Ginny Naidu MD   40 mg at 06/21/25 0758    Or    pantoprazole (PROTONIX) 2 mg/mL suspension 40 mg  40 mg Oral or NG Tube Daily Ginny Naidu MD        piperacillin-tazobactam (ZOSYN) 2.25 g vial to attach to  ml bag  2.25 g Intravenous Q6H Sincere Cunningham PA-C   2.25 g at 06/21/25 1237    polyethylene glycol (MIRALAX) Packet 17 g  17 g Oral BID Sincere Cunningham PA-C   17 g at 06/21/25 0756    senna-docusate (SENOKOT-S/PERICOLACE) 8.6-50 MG per tablet 2 tablet  2 tablet Oral BID Sincere Cunningham PA-C   2 tablet at 06/21/25 0757    sodium chloride (PF) 0.9% PF flush 3 mL  3 mL Intravenous Q8H Ginny Naidu MD   3 mL at 06/21/25 1217    sodium chloride (PF) 0.9% PF flush 3 mL  3 mL Intracatheter Q8H DARINEL Ginny Naidu MD   3 mL at 06/21/25 1553    sodium chloride (PF) 0.9% PF flush 3 mL  3 mL Intracatheter Q8H Ginny Naidu MD   3 mL at 06/21/25 1217    [START ON 6/23/2025] sulfamethoxazole-trimethoprim (BACTRIM) 400-80 MG per tablet 1 tablet  1 tablet Oral or Feeding Tube Once per day on Monday Wednesday Friday Max Rebolledo MD        tacrolimus (GENERIC EQUIVALENT) capsule 2 mg  2 mg Oral BID IS Ginny Naidu MD   2 mg at 06/21/25 0758    ursodiol (ACTIGALL) capsule 300 mg  300 mg Oral BID Ginny Naidu MD   300 mg at 06/21/25 0757    Or    ursodiol (ACTIGALL) suspension 300 mg  300 mg Oral or NG Tube BID Ginny Naidu MD        [START ON 6/23/2025] valGANciclovir (VALCYTE) tablet 450 mg  450 mg Oral Once per day on Monday Thursday Amaury  MD Max        Or    [START ON 2025] valGANciclovir (VALCYTE) solution 450 mg  450 mg Oral or NG Tube Once per day on  Max Rebolledo MD         Current Facility-Administered Medications   Medication Dose Route Frequency Provider Last Rate Last Admin    dextrose 10% infusion   Intravenous Continuous PRN Jaimee Gagnon MD        dialysate for CVVHD & CVVHDF (Phoxillum BK4/2.5)  12.5 mL/kg/hr CRRT Continuous Ronit Roger MD 1,900 mL/hr at 25 1513 12.5 mL/kg/hr at 25 1513    insulin regular (MYXREDLIN) 1 unit/mL infusion  0-24 Units/hr Intravenous Continuous Jaimee Gagnon MD 4 mL/hr at 25 1621 4 Units/hr at 25 1621    No heparin required   Does not apply Continuous PRN Ronit Roger MD        POST-filter replacement solution for CVVHD & CVVHDF (Phoxillum BK4/2.5)   CRRT Continuous Ronit Roger  mL/hr at 25 1513 New Bag at 25 1513    PRE-filter replacement solution for CVVHD & CVVHDF (Phoxillum BK4/2.5)  12.5 mL/kg/hr CRRT Continuous Ronit Roger MD 1,900 mL/hr at 25 1513 12.5 mL/kg/hr at 25 1513    Reason beta blocker order not selected   Does not apply DOES NOT GO TO Ginny Mcgowan MD        sodium chloride 0.45 % 1,000 mL infusion   Intravenous Continuous PRN Ginny Naidu MD           Physical Exam   Temp  Av.6  F (37  C)  Min: 97.8  F (36.6  C)  Max: 99  F (37.2  C)  Arterial Line BP  Min: 97/47  Max: 157/55  Arterial Line MAP (mmHg)  Av.1 mmHg  Min: 60 mmHg  Max: 85 mmHg      Pulse  Av.6  Min: 67  Max: 83 Resp  Av.5  Min: 8  Max: 23  FiO2 (%)  Av %  Min: 30 %  Max: 40 %  SpO2  Av.2 %  Min: 88 %  Max: 100 %    CVP (mmHg): 3 mmHgBP 93/69   Pulse 71   Temp 97.8  F (36.6  C) (Axillary)   Resp 19   Wt (!) 155.2 kg (342 lb 2.5 oz)   SpO2 95%   BMI 47.72 kg/m     Date 25 07 - 25 0659   Shift [id number] 7699-0903 0370-0659 24 Hour Total   INTAKE    P.O. 240   240   I.V. 327.8   327.8   Blood Components 600   600   Shift Total(mL/kg) 1167.8(7.52)   1167.8(7.52)   OUTPUT   Urine 7   7   Drains 252   252   Shift Total(mL/kg) 259(1.67)   259(1.67)   Weight (kg) 155.2 155.2 155.2 155.2      Admit Weight: (!) 149.6 kg (329 lb 12.8 oz)     GENERAL APPEARANCE: somewhat sleepy, but no distress  HENT: mouth without ulcers or lesions  RESP: lungs clear to auscultation - no rales, rhonchi or wheezes  CV: regular rhythm, normal rate, no rub, no murmur  EDEMA: trace LE and dependent edema bilaterally  ABDOMEN: soft, nondistended, nontender; Obese  MS: extremities normal - no gross deformities noted  SKIN: no rash  DIALYSIS ACCESS: none, but ICU team will exchange her central line to iHD line on left    Data   All labs reviewed by me.  CMP  Recent Labs   Lab 06/21/25  1442 06/21/25  1234 06/21/25  1009 06/21/25  0745 06/21/25  0514 06/21/25  0238 06/21/25  0101 06/20/25  1915 06/20/25  1824 06/20/25  0503 06/20/25  0358 06/19/25  2102 06/19/25  2025 06/18/25  2157 06/18/25  1902   NA  --   --   --   --  132*  --  131*  --  133*  --  136  --  138  138   < > 130*   POTASSIUM  --   --   --   --  4.7  --  4.5  --  4.5  --  4.0  --  3.7  3.7   < > 3.6   CHLORIDE  --   --   --   --  95*  --  96*  --  98  --  101  --  101  101   < > 95*   CO2  --   --   --   --  22  --  23  --  23  --  25  --  22  22   < > 27   ANIONGAP  --   --   --   --  15  --  12  --  12  --  10  --  15  15   < > 8   * 150* 130* 137* 142*   < > 136*   < > 145*  154*   < > 169*  172*   < > 158*  158*   < > 125*   BUN  --   --   --   --  44.2*  --  43.3*  --  39.5*  --  31.3*  --  26.0*  26.0*   < > 24.8*   CR  --   --   --   --  3.10*  --  2.81*  --  2.46*  --  1.62*  --  1.19*  1.19*   < > 0.98*   GFRESTIMATED  --   --   --   --  17*  --  19*  --  22*  --  36*  --  52*  52*   < > 66   SANDY  --   --   --   --  8.3*  --  8.0*  --  7.9*  --  8.4*  --  8.6*  8.6*   < > 8.4*   MAG  --   --   --    --  2.3  --  2.2  --   --   --  2.1  --  2.2   < > 1.8   PHOS  --   --   --   --  7.3*  --  6.9*  --   --   --  5.9*  --  5.5*   < > 2.9   PROTTOTAL  --   --   --   --   --   --  4.8*  --   --   --  4.6*  --  3.8*  --  6.7   ALBUMIN  --   --   --   --   --   --  2.9*  --   --   --  2.6*  --  2.2*  --  2.5*   BILITOTAL  --   --   --   --   --   --  1.4*  --   --   --  2.5*  --  3.9*  --  4.9*   ALKPHOS  --   --   --   --   --   --  54  --   --   --  60  --  63  --  74   AST  --   --   --   --   --   --  984*  --   --   --  2,889*  --  2,825*  --  83*   ALT  --   --   --   --   --   --  261*  --   --   --  360*  --  345*  --  40    < > = values in this interval not displayed.     CBC  Recent Labs   Lab 06/21/25 1528 06/21/25 1007 06/21/25 0514 06/21/25  0101   HGB 8.3* 8.6* 8.3* 7.9*  7.7*   WBC 6.6 5.9 5.8 5.0  5.0   RBC 2.80* 2.88* 2.80* 2.60*  2.58*   HCT 24.4* 25.8* 24.2* 23.1*  22.3*   MCV 87 90 86 89  86   MCH 29.6 29.9 29.6 30.4  29.8   MCHC 34.0 33.3 34.3 34.2  34.5   RDW 19.0* 18.6* 18.9* 19.3*  19.6*   PLT 69* 43* 48* 32*  32*     INR  Recent Labs   Lab 06/21/25 1528 06/21/25 1007 06/21/25  0810 06/21/25  0514 06/21/25  0101   INR 1.29* 1.34*  --  1.30* 1.38*   PTT 27  --  28 27 25     ABG  Recent Labs   Lab 06/19/25  1935 06/19/25  1835 06/19/25  1802 06/19/25  1727   PH 7.39 7.35 7.37 7.37   PCO2 39 44 42 43   PO2 62* 88 118* 111*   HCO3 23 24 24 25   O2PER 60.0 43.0 40.0 40.0      Urine Studies  Recent Labs   Lab Test 06/18/25  2243 06/08/25  0447 11/26/24  0819   COLOR Yellow Yellow Yellow   APPEARANCE Clear Clear Clear   URINEGLC Negative Negative Negative   URINEBILI Negative Negative Negative   URINEKETONE Negative Negative Trace*   SG 1.015 1.015 1.026   UBLD Negative Negative Negative   URINEPH 5.0 5.5 5.5   PROTEIN Negative Negative 10*   NITRITE Negative Negative Negative   LEUKEST Negative Negative Negative   RBCU 0  --  1   WBCU <1  --  1     No lab results found.  PTH  No lab  results found.  Iron Studies  Recent Labs   Lab Test 11/26/24  0800   IRON 112   *   IRONSAT 64*   LUDY 762*       IMAGING:  All imaging studies reviewed by me.

## 2025-06-21 NOTE — PLAN OF CARE
Status  D/I: Patient on unit 4E Surgical/Neuro ICU   Neuro- patient more somnolent this afternoon/evening, SICU aware, JOCELIN assessed bedside, patient arouses to loud voice and will answer a few questions then fall back asleep, VBG checked-normal, Capno started-35; oriented, needed reminding on day of the week this afternoon, pupils e/r, move all extremities with good strength to command  Patient more arousable this evening, able to take evening meds.  Pain/Sedation- hydromorphone ordered every two hours, given x2, last dose around noon, robaxan given this morning, held off on the 1300 dose due to increasing lethargy, patient has abdominal-incisional pain and back pain-chronic  CV- sinus rhythm, patient reported some chest heaviness this morning, EKG normal; 98% on 4L NC, afebrile, BP low 100's sys/60's, MAP goal >65, meeting goal  Pulm- 4L NC, SAMMI crackles heard, RUL clear, diminished in the bases, encouraging incentive spirometer  GI/- full liquid diet, poor appetite, contacted dietician to place calorie count-done; hanson removed, patient had been having lower abdominal fullness or the urge to void, due to void, purewick in place, bladder scan once a shift; no BM, suppository ordered--need to give still  Skin- generalized/scattered bruising, abdominal incision with prevena wound vac in place, no output, x2 TIARRA drains RLU, both serosang, more clots in #2  Gtts- insulin, TKO  Labs- monitor hemoglobin, platelets-goal changed to closer to 100, gave 2 units platelets today  Activity- did not get to the chair today, will work with PT tomorrow    CRRT- started this afternoon through [facility], slightly positional line when patient turns her head toward the left.    PLAN- monitor lethargy, pull toward CRRT goal as able, work with therapy tomorrow to get to the chair, encourage calorie intake and incentive spirometry.    See flow sheets for further interventions and assessments.  P: Continue to monitor pt closely, Notify MD of  changes/concerns.    Problem: Adult Inpatient Plan of Care  Goal: Optimal Comfort and Wellbeing  Intervention: Monitor Pain and Promote Comfort  Recent Flowsheet Documentation  Taken 6/21/2025 1200 by Gala Mojica, RN  Pain Management Interventions:   medication (see MAR)   repositioned   rest  Taken 6/21/2025 0800 by Gala Mojica, RN  Pain Management Interventions:   medication (see MAR)   repositioned   rest

## 2025-06-22 ENCOUNTER — APPOINTMENT (OUTPATIENT)
Dept: GENERAL RADIOLOGY | Facility: CLINIC | Age: 60
DRG: 005 | End: 2025-06-22
Payer: COMMERCIAL

## 2025-06-22 ENCOUNTER — APPOINTMENT (OUTPATIENT)
Dept: PHYSICAL THERAPY | Facility: CLINIC | Age: 60
DRG: 005 | End: 2025-06-22
Attending: TRANSPLANT SURGERY
Payer: COMMERCIAL

## 2025-06-22 ENCOUNTER — APPOINTMENT (OUTPATIENT)
Dept: GENERAL RADIOLOGY | Facility: CLINIC | Age: 60
End: 2025-06-22
Attending: STUDENT IN AN ORGANIZED HEALTH CARE EDUCATION/TRAINING PROGRAM
Payer: COMMERCIAL

## 2025-06-22 ENCOUNTER — APPOINTMENT (OUTPATIENT)
Dept: OCCUPATIONAL THERAPY | Facility: CLINIC | Age: 60
DRG: 005 | End: 2025-06-22
Attending: TRANSPLANT SURGERY
Payer: COMMERCIAL

## 2025-06-22 PROCEDURE — 74018 RADEX ABDOMEN 1 VIEW: CPT | Mod: 26 | Performed by: RADIOLOGY

## 2025-06-22 PROCEDURE — 71045 X-RAY EXAM CHEST 1 VIEW: CPT

## 2025-06-22 PROCEDURE — 999N000065 XR ABDOMEN PORT 1 VIEW

## 2025-06-22 PROCEDURE — 71045 X-RAY EXAM CHEST 1 VIEW: CPT | Mod: 26 | Performed by: STUDENT IN AN ORGANIZED HEALTH CARE EDUCATION/TRAINING PROGRAM

## 2025-06-22 ASSESSMENT — ACTIVITIES OF DAILY LIVING (ADL)
ADLS_ACUITY_SCORE: 47
ADLS_ACUITY_SCORE: 51
ADLS_ACUITY_SCORE: 47
ADLS_ACUITY_SCORE: 51
ADLS_ACUITY_SCORE: 47
ADLS_ACUITY_SCORE: 51
ADLS_ACUITY_SCORE: 51
ADLS_ACUITY_SCORE: 47
ADLS_ACUITY_SCORE: 51

## 2025-06-22 NOTE — PROGRESS NOTES
CRRT STATUS NOTE    DATA:  Time:  3:01 PM  Pressures WNL:  YES  Filter Status:  WDL    Problems Reported/Alarms Noted:  Access pressures extremely negative for a short time this morning but have improved, line continues to ooze, dressing changed this afternoon with bedside nurse, statseal biopatch placed.    Supplies Present:  YES    ASSESSMENT:  Patient Net Fluid Balance: (-) 547.95 ml since MN as of 1800    Vital Signs: Temp: 97.8  F (36.6  C) Temp src: Axillary BP: 115/55 Pulse: 66   Resp: 16 SpO2: (!) 88 % O2 Device: Nasal cannula Oxygen Delivery: 2 LPM     Labs:  K+ 4.3 Mag 2.4 Phos 5.1 iCal 4.4 Hgb 8.2    Goals of Therapy: 0-100 ml/hr    INTERVENTIONS:   Lines reversed this morning, now back to normal, will continue to monitor.    PLAN:  Continue to monitor circuit, change q72hr, and as needed. Pull to goal as able. Please contact CRRT resource with any questions or concerns.

## 2025-06-22 NOTE — PLAN OF CARE
"Goal Outcome Evaluation:      Plan of Care Reviewed With: patient    Overall Patient Progress: improvingOverall Patient Progress: improving    Outcome Evaluation: Up to chair, VSS, meeting CRRT goal        Major Shift Events:  Pt AOx4 but intermittently confused and saying things that do not make sense. Pt endorsing that she \"feels off\" and is \"out of it.\" Managing chronic back pain and incisional pain with scheduled Tylenol and Gabapentin; PRN Robaxin, and Oxycodone. Pt stating some relief especially in chair and appears more comfortable but still endorsing significant pain. SR 60s. BP stable. Afebrile. 2L NC. Encouraging IS. Tolerating full liquid diet but poor appetite. Calorie counts in place and NJ tube placed this afternoon for additional intake. Insulin drip has remained off most of shift but may need to be restarted with TF initiated. CRRT running with goal 0-100ml/hr; left trialysis line positional; and intermittently alarming due to low access pressures. Labs unremarkable and no electrolyte replacements needed. Meeting transfusion goals with no product given. Incision with prevena in place with no output. JPx2 with low output in #1 and 50ml q2hr in #2. Up to commode Ax2, passing gas but no BM, suppository given this afternoon. Family intermittently at bedside today.    Plan: Continue to manage CRRT. Encourage IS, activity, and PO intake. May need to restart insulin drip tonight with TF going.    For vital signs and complete assessments, please see documentation flowsheets.     "

## 2025-06-22 NOTE — PROGRESS NOTES
SURGICAL ICU PROGRESS NOTE  2025    PRIMARY TEAM: Transplant  PRIMARY PHYSICIAN: Max Rebolledo MD  REASON FOR CRITICAL CARE ADMISSION: s/p  donor liver transplant   ADMITTING PHYSICIAN: Dr. 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 Rebolledo on 2025.      Major Changes/Updates  - ON, pt had complained of 10/10 pain and couldn't sleep well  - After discussion with pt, will trial oxycodone for better long term coverage of pain, and increased PRN methocarbamol dose.   - CRRT fluid goals net -1.0 to -2.0 L as pt is very edematous on exam and is up 18 kg from her admission weight       Neurological:  # Acute post operative pain  # MDD   # Fibromyalgia  # Insomnia  - Monitor neurological status. Delirium preventions and precautions.  - Pain:   - Scheduled: tylenol 650 mg q8h, gabapentin 300 mg qAM / 600 mg qPM (home regimen)Lidocaine patches daily  - PRN: will start oxycodone 5-10 mg q 4hr , IV dilaudid 0.4 mg q2h prn, increase methocarbamol to 750 mg q 6 hr    - Sedation: N/A  -- PTA Citalopram, Trazodone ordered    Pulmonary:   Vital Signs: Most Recent  Ranges (24 hours)   Resp: 10  SpO2: (!) 91 % Resp  Min: 8  Max: 20  SpO2  Min: 90 %  Max: 100 %   O2 Device:   (2 LPM)  Resp: 10  # Post operative respiratory support  # Left pleural effusion, resolving  - Wean Supplemental O2 as able to SpO2 goals  - Goal SpO2 > 92%.  - Pulmonary hygiene; IS q15-30 minutes when awake  - CXR, today's AM CXR, per my review, no significant changes compared to previous   - Continue daily CXR      Cardiovascular:    Vital Signs: Most Recent  Ranges (24 hours)   Pulse: 65  BP: 125/60  Arterial Line BP: 106/100  Arterial Line MAP (mmHg): 103 mmHg Pulse  Min: 62  Max: 75  BP  Min: 93/69  Max:  150/68  Arterial Line BP  Min: 85/69  Max: 129/125  Arterial Line MAP (mmHg)  Min: 25 mmHg  Max: 128 mmHg   - Monitor hemodynamic status.  - MAP goal > 65 mmHg    # Hx of HLD  - No home medications    Gastroenterology/Nutrition:  # Cirrhosis of the liver  # Metabolic dysfunction-associated steatohepatitis  # S/p  donor liver transplant on 2025  - Ursodiol 300 mg BID  - Basiliximab   - Immunosuppressant regimen as below   - PPI every day  - Liver ultrasound post-op with elevated peak systolic velocities and resistive indices, no fluid collection  - LFT downtrending ALT down to 261 from 360, AST down to 984 from 2889    - Continue to trend daily     # Protein calorie deficit malnutrition   - Room Service  Full Liquid Diet Thin Liquids (level 0)  Calorie Counts  Snacks/Supplements Adult: Ensure Max Protein (bariatric); With Meals  - Pt reports up for trying oatmeal; will advance diet to full liquid diet and encourage PO intake as she has been eating only lemon ice  - Last BM PTA; added bowel reg     # Nausea   - Ondansetron IV/PO PRN      Fluids/Electrolytes/Renal:   I/O last 3 completed shifts:  In: 3881.23 [P.O.:2420; I.V.:211.23; IV Piggyback:500]  Out: 1344 [Urine:15; Drains:712; Other:617]  I/O this shift:  In: 391 [P.O.:360; I.V.:31]  Out: 766 [Drains:49; Other:717]  Recent Labs   Lab 25  0423 25  0514 25  0101   * 133* 132* 131*   POTASSIUM 4.4 4.4 4.7 4.5   CHLORIDE 99 98 95* 96*   MAG 2.4* 2.4* 2.3 2.2   PHOS 6.0* 6.8* 7.3* 6.9*   SANDY 8.2* 8.0* 8.3* 8.0*   CO2    BUN 35.0* 42.5* 44.2* 43.3*   CR 2.70* 3.05* 3.10* 2.81*     # Acute Kidney Injury  - Nephrology consulted; appreciate recs  - CRRT fluid goal net -1.0 to -2.0 L as pt is ~18 kg up from admission weight and edematous on exam   - Bladder scan per unit protocol       Endocrine:  Recent Labs   Lab 25  0958 25  0815 25  0619 25  0426 25  0423 25  0203  "06/22/25  0109 06/21/25  2344 06/21/25  2219 06/21/25  2106 06/21/25 2006   * 82 93 109* 104* 130* 114* 123* 119* 143* 119*  117*   # Stress Hyperglycemia   # No Hx DM  - Insulin gtt consider transitioning to sliding scale insulin as steroids taper down   - Goal -180 for optimal healing     # Hypothyroidism  - PTA levothyroxine ordered      ID:  Recent Labs   Lab 06/22/25  0423 06/21/25 2006 06/21/25  1528 06/21/25  1007   WBC 5.3 6.1 6.6 5.9     Positive cultures:  No results found for: \"CULT\"  -  Daily CBC  - Pt is afebrile, T max 98 , WBC 5.3    # Immunosuppression  # Kathya-operative prophylaxis  - Tacrolimus, mycophenolate  - Steroid taper  - PPX: bactrim, valcyte, fluconazole - per Txp Surgery plan to extend fluconazole for 7 day course   - Zosyn x 7 days EOT 6/26 as donor had open abdomen/high risk for infection     # Psoriasis  # Psoriatic arthritis  - Home regimen includes Ixekizumab (q4 weeks), prn mometasone - ordered mometasone       Heme:     Recent Labs   Lab 06/22/25  0423 06/21/25 2006 06/21/25  1528 06/21/25  1007 06/21/25  0810   HGB 7.7* 7.9* 8.3* 8.6*  --    PLT 56* 66* 69* 43*  --    FIBR 248 257 292  --  274     # Acute Blood Loss Anemia   # Anemia of Critical Illness   - Transfuse per transfusion goals below    Transfusion goals:  -- Hgb > 8  -- Fibrinogen > 200  -- Plt > 50  -- INR < 2.0      # Hx of PE 1/31/2002  -- No PTA DOAC      MSK:  # Weakness and deconditioning of critical illness  - Physical and occupational therapy consult       General Cares/Prophylaxis:    DVT Prophylaxis: Pneumatic Compression Devices,  mg daily  GI Prophylaxis: PPI  Restraints: Restraints for medical healing needed: NO    Lines/ tubes/ drains:  - PIV x2  - L radial art line - remove   - L internal jugular trialysis CVC  - Surgical drain x2  - Wound vac    Disposition: SICU    Discussed plan today with patient and their family member. All questions were answered. They are in agreement with " "plan.      Patient seen, findings and plan discussed with SICU staff and Transplant Surgery .     Total Critical Care time spent by me, excluding procedures, was 35 minutes.     Sincere Cunningham PA-C       Clinically Significant Risk Factors         # Hyponatremia: Lowest Na = 131 mmol/L in last 2 days, will monitor as appropriate  # Hypochloremia: Lowest Cl = 95 mmol/L in last 2 days, will monitor as appropriate  # Hypocalcemia: Lowest iCa = 4.2 mg/dL in last 2 days, will monitor and replace as appropriate     # Hypoalbuminemia: Lowest albumin = 2.2 g/dL at 6/19/2025  8:25 PM, will monitor as appropriate    # Coagulation Defect: INR = 1.25 (Ref range: 0.85 - 1.15) and/or PTT = 26 Seconds (Ref range: 22 - 38 Seconds), will monitor for bleeding  # Thrombocytopenia: Lowest platelets = 32 in last 2 days, will monitor for bleeding  # Acute Kidney Injury, unspecified: based on a >150% or 0.3 mg/dL increase in last creatinine compared to past 90 day average, will monitor renal function   # Acute heart failure with preserved ejection fraction: heart failure noted on problem list, last echo with EF >50%, and receiving IV diuretics           # Morbid Obesity: Estimated body mass index is 51.5 kg/m  as calculated from the following:    Height as of 6/7/25: 1.803 m (5' 11\").    Weight as of this encounter: 167.5 kg (369 lb 4.3 oz)., PRESENT ON ADMISSION       # Financial/Environmental Concerns: none                ====================================    TODAY'S SUBJECTIVE/INTERVAL HISTORY:   Pt states she feels \"off\" and feels she can \"hear someone in the other room\"     OBJECTIVE:     Temp:  [96.8  F (36  C)-98.1  F (36.7  C)] 98  F (36.7  C)  Pulse:  [62-75] 65  Resp:  [8-20] 10  BP: ()/(51-71) 125/60  MAP:  [25 mmHg-128 mmHg] 103 mmHg  Arterial Line BP: ()/() 106/100  SpO2:  [90 %-100 %] 91 %  Resp: 10    I/O last 3 completed shifts:  In: 3881.23 [P.O.:2420; I.V.:211.23; IV Piggyback:500]  Out: 1344 " [Urine:15; Drains:712; Other:617]    PHYSICAL EXAM   Neuro: Awake. Answers questions appropriately.  Follows commands. NAD.   HEENT: Normocephalic, atraumatic. PERRL, and nonicteric.   CV: RRR on monitor, S1S2, all extremities well perfused   Respiratory: Normal respiratory effort on 2L NC, equal chest rise b/l   GI: Large pannus. Abdomen soft non-tender to light palpation throughout. RLQ drain 1 with 210 ml of serosanguinous output. RLQ drain 2 with 502 L of serosanguinous output.   :Anuric   MSK: Moves all extremities well with normal appearing strength. Sensation intact in all upper/lower extremities. Edematous in BLE.     Skin: Scattered hematomas at various previous IV/line access sites. Normal color. No rashes or skin lesions.   Psych: Cooperative, congruent mood and affect.       LABS:   Arterial Blood Gases   Recent Labs   Lab 06/19/25  1935 06/19/25  1835 06/19/25  1802 06/19/25  1727   PH 7.39 7.35 7.37 7.37   PCO2 39 44 42 43   PO2 62* 88 118* 111*   HCO3 23 24 24 25     Complete Blood Count   Recent Labs   Lab 06/22/25  0423 06/21/25 2006 06/21/25  1528 06/21/25  1007   WBC 5.3 6.1 6.6 5.9   HGB 7.7* 7.9* 8.3* 8.6*   PLT 56* 66* 69* 43*     Basic Metabolic Panel  Recent Labs   Lab 06/22/25  0958 06/22/25  0815 06/22/25  0619 06/22/25  0426 06/22/25  0423 06/21/25  2106 06/21/25 2006 06/21/25  0745 06/21/25  0514 06/21/25  0238 06/21/25  0101   NA  --   --   --   --  133*  --  133*  --  132*  --  131*   POTASSIUM  --   --   --   --  4.4  --  4.4  --  4.7  --  4.5   CHLORIDE  --   --   --   --  99  --  98  --  95*  --  96*   CO2  --   --   --   --  22  --  22  --  22  --  23   BUN  --   --   --   --  35.0*  --  42.5*  --  44.2*  --  43.3*   CR  --   --   --   --  2.70*  --  3.05*  --  3.10*  --  2.81*   * 82 93 109* 104*   < > 119*  117*   < > 142*   < > 136*    < > = values in this interval not displayed.     Liver Function Tests  Recent Labs   Lab 06/22/25  0423 06/21/25 2006 06/21/25  1528  06/21/25  1007 06/21/25  0514 06/21/25  0101 06/20/25  0749 06/20/25  0358 06/20/25  0003 06/19/25 2025   *  --   --   --   --  984*  --  2,889*  --  2,825*   *  --   --   --   --  261*  --  360*  --  345*   ALKPHOS 90  --   --   --   --  54  --  60  --  63   BILITOTAL 1.0  --   --   --   --  1.4*  --  2.5*  --  3.9*   ALBUMIN 3.3* 3.2*  --   --   --  2.9*  --  2.6*  --  2.2*   INR 1.25* 1.22* 1.29* 1.34*   < > 1.38*   < > 1.58*   < > 2.14*    < > = values in this interval not displayed.     Pancreatic Enzymes  Recent Labs   Lab 06/20/25  0358 06/18/25  1902   LIPASE 46  --    AMYLASE 63 47     Coagulation Profile  Recent Labs   Lab 06/22/25  0423 06/21/25  2006 06/21/25  1528 06/21/25  1007 06/21/25  0810   INR 1.25* 1.22* 1.29* 1.34*  --    PTT 26 23 27  --  28         IMAGING:   Recent Results (from the past 24 hours)   XR Chest Port 1 View    Narrative    Examination: XR CHEST PORT 1 VIEW 6/21/2025 2:19 PM    Indication: eval line placement    Comparison: Radiograph 6/21/2025 at 0619    Findings:  AP portable chest. Unchanged positioning of the right IJ sheaths  terminating in the left innominate vein. Similar position of the right  basilar chest tube. Trachea is midline. Normal cardiac silhouette.  Improved right basilar opacities with mild continued retrocardiac/left  basilar opacities. No new opacities. No pneumothorax. Unchanged  osseous structures.      Impression    Impression:   1. Stable support devices.  2. Improved right basilar opacities with mild continued  retrocardiac/left basilar opacities. Continued follow-up to exclude  infection.    I have personally reviewed the examination and initial interpretation  and I agree with the findings.    ESE ROMAN MD         SYSTEM ID:  [id number]

## 2025-06-22 NOTE — PROGRESS NOTES
Transplant Surgery  Inpatient Daily Progress Note  06/22/2025    Assessment & Plan: Karlene Yun is a 59 year old female with PMH notable for 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 Rebolledo on 6/19/2025.     Today:   - Please place feeding tube   - Tacrolimus goal 5 to 6   -  mg every 8 hours     Graft function: s/p DBDLT without stent, POD#3. Post-op US with patent Doppler. Liver labs down-trending, lactic normal, INR normalizing.    -  mg x 3 months    - Ursodiol  300 mg BID x 3 months     Immunosuppressed status secondary to medications:  Induction:  SM taper followed by prednisone x 3 months   Basiliximab 20 mg IV x 1 6/20 in the setting of EMILIE  Maintenance:     -  mg Q8H   - Tacrolimus goal level 5 to 6.   - Prednisone 5 mg daily following taper.    Neuro/Psych:  Depression:   - Continue home citalopram.   Fibromyalgia:   - Continue home gabapentin.   Psychophysiological insomnia:   - Continue home Trazodone.  Acute post op pain:    - PRN PO and IV hydromorphone available     Hematology:   Acute on chronic anemia: Secondary to acute surgical blood loss; transfuse to meet goals. Last transfused 6/21/2025.   Acute on chronic thrombocytopenia: Secondary to induction IS. Last transfused 6/20/2025. Transfuse to keep platelet count ~ 100.   Coagulopathy secondary to liver disease: Stable.     Cardiorespiratory:   Shock: Resolved.   Post-operative mechanical ventilation: Extubated 6/19.   Acute hypoxic respiratory failure: Wean NC as able.    - OOB as tolerate, early mobility   - Pulmonary hygiene   Hx NEREYDA: Does not use CPAP.   Hx HLD: Not currently on statin therapy.   Hx HFpEF: Noted on on problem list, last EF normal. Not on GDMT.   Chronic left sided  pleural effusion: Noted as trace on Xray 6/29/2025.   History of PE, 2002: Not on long-term anticoagulation.     GI/Nutrition: PPI.  Diet: ADAT.    - Place feeding tube today   - Dietician following        Endocrine: Insulin.   Hypothyroidism: TSH 6/7/2025 4.89.    - Continue home levothyroxine.     Steroid induced hyperglycemia: Last hemoglobin A1c 4.9% 6/20/2025.   - Continue insulin drip      Renal/Fluid/Electrolytes: MIVF: saline lock.   Hypocalcemia: Managed by SICU.   Hyponatremia: Monitor.   Hypochloremia: Monitor.   Hyperphosphatemia: Monitor.   EMILIE: Oliguric. 43 ml UOP 6/20, Bumex given.    - Transplant Nephrology following   - CRRT started 6/21.     Anasarca:    - Hold home Bumex and spironolactone     : Remove Rodriguez today.      Infectious disease:   Risk for infection: Donor with open abdomen with risk of contamination.    - Continue antibiotics for a full 7- day course.    - Fluconazole x 7 days.    - Follow donor cultures.     Rheum:  Psoriatic arthritis:    - PTA managed with ixekizumab, tacrolimus ointment    Prophylaxis: DVT, fall, GI, fungal (fluconazole), viral (Valcyte), pneumocystis (Bactrim)    Disposition: SICU     JOCELIN/Fellow/Resident Provider:   MERRITT Denson, CNP  Adult Solid Organ Transplant   Contact information via Vocera Web Console or via [facility] Paging/Directory    I saw and evaluated this patient as part of a shared visit. I spent 40 minutes on review of labs and imaging, exam, care coordination, and counseling.  Medically Ready for Discharge: Anticipated in 5+ Days     Faculty: Max Rebolledo M.D.  __________________________________________________________________  Transplant History:    6/19/2025 (Liver), Postoperative day: 3     Interval History: History is obtained from the patient  Overnight events: No acute events.     ROS:   A 10-point review of systems was negative except as noted above.    Curent Meds:  Current Facility-Administered Medications   Medication Dose  Route Frequency Provider Last Rate Last Admin    acetaminophen (TYLENOL) tablet 650 mg  650 mg Oral Q8H Ginny Naidu MD   650 mg at 06/22/25 1158    aspirin (ASA) chewable tablet 324 mg  324 mg Oral or Feeding Tube Daily Gus Arroyo MD   324 mg at 06/22/25 0749    [START ON 6/24/2025] basiliximab (SIMULECT) 20 mg in sodium chloride 0.9 % 50 mL infusion  20 mg Intravenous Once Shaila Foote NP        bisacodyl (DULCOLAX) suppository 10 mg  10 mg Rectal Once Sincere Cunningham PA-C        citalopram (celeXA) tablet 20 mg  20 mg Oral Daily Ginny Naidu MD   20 mg at 06/22/25 0751    fluconazole (DIFLUCAN) intermittent infusion 400 mg  400 mg Intravenous Q24H Max Rebolledo MD        [START ON 6/23/2025] gabapentin (NEURONTIN) capsule 200 mg  200 mg Oral QAM Max Rebolledo MD        gabapentin (NEURONTIN) capsule 400 mg  400 mg Oral QPM Max Rebolledo MD        heparin ANTICOAGULANT injection 5,000 Units  5,000 Units Subcutaneous Q12H Sincere Cunningham PA-C   5,000 Units at 06/22/25 1158    levothyroxine (SYNTHROID/LEVOTHROID) tablet 112 mcg  112 mcg Oral Daily Ginny Naidu MD   112 mcg at 06/22/25 0751    lidocaine (viscous) (XYLOCAINE) 2 % solution 5 mL  5 mL Mouth/Throat Once Sincere Cunningham PA-C        multivitamin w/minerals (THERA-VIT-M) tablet 1 tablet  1 tablet Oral Daily Ginny Naidu MD   1 tablet at 06/22/25 0750    Or    multivitamins w/minerals liquid 15 mL  15 mL Oral or NG Tube Daily Ginny Naidu MD        mycophenolate (GENERIC EQUIVALENT) capsule 500 mg  500 mg Oral Q8H Shaila Foote NP        Or    mycophenolate (CELLCEPT BRAND) suspension 500 mg  500 mg Oral or NG Tube Q8H Shaila Foote NP        pantoprazole (PROTONIX) EC tablet 40 mg  40 mg Oral Daily Ginny Naidu MD   40 mg at 06/22/25 0749    Or    pantoprazole (PROTONIX) 2 mg/mL suspension 40 mg  40 mg Oral or NG Tube Daily Ginny Naidu MD        piperacillin-tazobactam (ZOSYN)  4.5 g vial to attach to  mL bag  4.5 g Intravenous Q6H Max Rebolledo MD   4.5 g at 06/22/25 1158    polyethylene glycol (MIRALAX) Packet 17 g  17 g Oral TID Sincere Cunningham PA-C   17 g at 06/22/25 0749    [START ON 6/23/2025] predniSONE (DELTASONE) tablet 25 mg  25 mg Oral Once Ginny Naidu MD        Followed by    [START ON 6/24/2025] predniSONE (DELTASONE) tablet 10 mg  10 mg Oral Once Ginny Naidu MD        Followed by    [START ON 6/25/2025] predniSONE (DELTASONE) tablet 5 mg  5 mg Oral Daily Ginny Naidu MD        Prosource TF20 [employer] Compatibl EN LIQD (PROSOURCE TF20) packet 60 mL  1 packet Per Feeding Tube TID Sincere Cunningham PA-C        senna-docusate (SENOKOT-S/PERICOLACE) 8.6-50 MG per tablet 2 tablet  2 tablet Oral BID Sincere Cunningham PA-C   2 tablet at 06/22/25 0749    sodium chloride (PF) 0.9% PF flush 3 mL  3 mL Intravenous Q8H Ginny Naidu MD   3 mL at 06/21/25 1217    sodium chloride (PF) 0.9% PF flush 3 mL  3 mL Intracatheter Q8H DARINEL Ginny Naidu MD   3 mL at 06/21/25 1553    sodium chloride (PF) 0.9% PF flush 3 mL  3 mL Intracatheter Q8H Ginny Naidu MD   3 mL at 06/21/25 1217    sulfamethoxazole-trimethoprim (BACTRIM) 400-80 MG per tablet 1 tablet  1 tablet Oral or Feeding Tube Daily Max Rebolledo MD   1 tablet at 06/22/25 0750    tacrolimus (GENERIC EQUIVALENT) capsule 2 mg  2 mg Oral BID IS Ginny Naidu MD   2 mg at 06/22/25 0749    ursodiol (ACTIGALL) capsule 300 mg  300 mg Oral BID Ginny Naidu MD   300 mg at 06/22/25 0749    Or    ursodiol (ACTIGALL) suspension 300 mg  300 mg Oral or NG Tube BID Ginny Naidu MD        valGANciclovir (VALCYTE) tablet 450 mg  450 mg Oral Every Other Day Max Rebolledo MD   450 mg at 06/22/25 0848    Or    valGANciclovir (VALCYTE) solution 450 mg  450 mg Oral or NG Tube Every Other Day Max Rebolledo MD           Physical Exam:     Admit Weight: (!) 149.6 kg (329 lb 12.8  oz)    Current Vitals:   /64   Pulse 66   Temp 98  F (36.7  C) (Axillary)   Resp 16   Wt (!) 167.5 kg (369 lb 4.3 oz)   SpO2 98%   BMI 51.50 kg/m      Vital sign ranges:    Temp:  [97.6  F (36.4  C)-98.1  F (36.7  C)] 98  F (36.7  C)  Pulse:  [62-75] 66  Resp:  [8-20] 16  BP: ()/(44-71) 132/64  MAP:  [25 mmHg-128 mmHg] 103 mmHg  Arterial Line BP: ()/() 106/100  SpO2:  [90 %-100 %] 98 %    General Appearance: Sitting in chair, no obvious acute distress.    Skin: Normal, warm, dry.  Heart: She appears adequately perfused.   Lungs: No increased work of breathing, she requires a small amount of supplemental oxygen.   Abdomen: Non-distended, Pravena wound vac in place. TIARRA x 2 with thin red output.   : Rodriguez, minimal UOP.   Extremities: Edema: generalized edema. There is no skin breakdown obvious breakdown on exposed skin.  Neurologic: Awake, alert, oriented x 4. Tremor absent.     Frailty Scores          5/20/2025 3/30/2025   Frailty Scores   Final Score Not Frail  Not Frail    Final Score Number 1  1       Details          Patient-reported               Data:   CMP  Recent Labs   Lab 06/22/25  1256 06/22/25  1250 06/22/25  0426 06/22/25  0423 06/21/25  0238 06/21/25  0101 06/20/25  0503 06/20/25  0358 06/18/25  2157 06/18/25  1902   NA  --  133*  --  133*   < > 131*   < > 136   < > 130*   POTASSIUM  --  4.5  --  4.4   < > 4.5   < > 4.0   < > 3.6   CHLORIDE  --  100  --  99   < > 96*   < > 101   < > 95*   CO2  --  23  --  22   < > 23   < > 25   < > 27   * 105*   < > 104*   < > 136*   < > 169*  172*   < > 125*   BUN  --  26.7*  --  35.0*   < > 43.3*   < > 31.3*   < > 24.8*   CR  --  2.02*  --  2.70*   < > 2.81*   < > 1.62*   < > 0.98*   GFRESTIMATED  --  28*  --  19*   < > 19*   < > 36*   < > 66   SANDY  --  8.0*  --  8.2*   < > 8.0*   < > 8.4*   < > 8.4*   ICAW  --  4.4  --  4.2*   < > 4.3*  --  4.7   < >  --    MAG  --  2.4*  --  2.4*   < > 2.2  --  2.1   < > 1.8   PHOS  --  5.1*   --  6.0*   < > 6.9*  --  5.9*   < > 2.9   AMYLASE  --   --   --   --   --   --   --  63  --  47   LIPASE  --   --   --   --   --   --   --  46  --   --    ALBUMIN  --  3.3*  --  3.3*   < > 2.9*  --  2.6*   < > 2.5*   BILITOTAL  --   --   --  1.0  --  1.4*  --  2.5*   < > 4.9*   ALKPHOS  --   --   --  90  --  54  --  60   < > 74   AST  --   --   --  491*  --  984*  --  2,889*   < > 83*   ALT  --   --   --  231*  --  261*  --  360*   < > 40    < > = values in this interval not displayed.     CBC  Recent Labs   Lab 06/22/25  1250 06/22/25  1037 06/20/25  1206 06/20/25  0749   HGB 8.2* 8.1*   < > 7.4*   WBC 5.9 5.9   < > 13.7*   PLT 63* 59*   < > 100*   A1C  --   --   --  4.9    < > = values in this interval not displayed.

## 2025-06-22 NOTE — PROGRESS NOTES
[facility]  Transplant Nephrology Progress Note  Date of Admission:  6/18/2025  Today's Date: 06/22/2025  Requesting physician: Max Rebolledo MD    Recommendations:   - continue on CRRT with net negative of 500-1L in the next 24 hours if able.  - immunosuppression per surgery team   - strict I and O, daily weights     Assessment & Plan   # EMILIE: Trend up in creatinine.  Decreased urine output and only minimal today.     - EMILIE felt secondary to liver transplant with hemodynamic changes and hypotension.    - Baseline Creatinine: ~ 0.8-1.0   - Proteinuria: Normal (<0.2 grams)    # Liver Tx: Patient with ESLD secondary to Metabolic associated steatotic liver disease (MASLD), s/p OLT June 19, 2025.  Transaminases Stable, elevated transaminases.  Followed by Transplant Surgery.    # Immunosuppression: Tacrolimus immediate release (goal 6-8), Mycophenolate mofetil (dose 750 mg every 12 hours), and Methylprednisolone (dose taper)   - Induction with Recent Transplant:  Per Liver Tx Protocol   - Continue with intensive monitoring of immunosuppression for efficacy and toxicity.   - Goal tacrolimus level lower due to EMILIE.   - Changes: Not at this time; Management per Transplant Surgery.    # Infection Prophylaxis:   - PJP: Sulfa/TMP (Bactrim)  - CMV: Valganciclovir (Valcyte)  - Thrush: Fluconazole (Diflucan)  - Fungal: Fluconazole (Diflucan)      - CMV IgG Ab High Risk Discordance (D+/R-) at time of transplant: No  Present CMV Serostatus: Positive  - EBV IgG Ab High Risk Discordance (D+/R-) at time of transplant: No  Present EBV Serostatus: Positive    # Blood Pressure: Hypotensive on low dose pressors;  Goal BP: MAP > 65   - Changes: Not at this time; Would consider fluid bolus with low CVP.    # Elevated Blood Glucose: Glucose generally running ~ 100-170s; Secondary to high dose steroids.   - On insulin gtt.    # Anemia in Chronic Disease/Surgery: Hgb: Trend down, low       RACHEL: No   - Iron studies: Not checked recently    # Thrombocytopenia: Stable, mildly low platelet level.    # Mineral Bone Disorder:   - Vitamin D; level: Low normal        On supplement: No  - Calcium; level: Low normal when corrected for low serum albumin        On supplement: No  - Phosphorus; level: High        On binder: No    # Electrolytes:   - Potassium; level: Normal        On supplement: No  - Magnesium; level: Normal        On supplement: No  - Bicarbonate; level: Normal        On supplement: No  - Sodium; level: Normal    # Obesity, Class III (BMI = 47.7): Weight is up following liver transplant.   - Once patient recovers from surgery and incision heals, would recommend working on weight loss for overall health by increasing exercise and watching caloric intake.    # Other Significant PMH:   - H/o PE (~ 2000): Patient with no recent clotting episodes and off anticoagulation.   - Fibromyalgia: Stable symptoms on gabapentin.     # Transplant History:  Etiology of Organ Failure: Metabolic associated steatotic liver disease (MASLD)  Tx: Liver Tx  Transplant: 6/19/2025 (Liver)  Significant changes in immunosuppression: Slightly lower tacrolimus level goal due to EMILIE.  Significant transplant-related complications: EMILIE    Recommendations were communicated to the primary team via this note.    Ronit Roger MD  Transplant Nephrology  Contact information via Vocera Web Console or via [facility] Paging/Directory    Interval History  58 yo female with ESLD secondary to Metabolic associated steatotic liver disease (MASLD), s/p OLT 6/19/2025.  Now with EMILIE following liver transplant.  Other significant PMH: h/o PE, obesity, dyslipidemia, fibromyalgia, osteoarthritis, and depression.    Ms. Yun's creatinine is 1.62 (06/20 0358); As expected with dialysis.  anuric/oliguric today.  Stable, elevated transaminases.  Other significant labs/tests/vitals: Stable electrolytes.  Trend down in hemoglobin.  Stable platelet  level.  No new events overnight.  No chest pain or shortness of breath.  No leg swelling.  No nausea and vomiting.    No fever, sweats or chills.    Review of Systems   4 point ROS was obtained and negative except as noted in the Interval History.    MEDICATIONS:  Current Facility-Administered Medications   Medication Dose Route Frequency Provider Last Rate Last Admin    acetaminophen (TYLENOL) tablet 650 mg  650 mg Oral Q8H Ginny Naidu MD   650 mg at 06/22/25 1158    aspirin (ASA) chewable tablet 324 mg  324 mg Oral or Feeding Tube Daily Gus Arroyo MD   324 mg at 06/22/25 0749    [START ON 6/24/2025] basiliximab (SIMULECT) 20 mg in sodium chloride 0.9 % 50 mL infusion  20 mg Intravenous Once Shaila Foote NP        bisacodyl (DULCOLAX) suppository 10 mg  10 mg Rectal Once Sincere Cunningham PA-C        citalopram (celeXA) tablet 20 mg  20 mg Oral Daily Ginny Naidu MD   20 mg at 06/22/25 0751    fluconazole (DIFLUCAN) intermittent infusion 400 mg  400 mg Intravenous Q24H Max Rebolledo MD        [START ON 6/23/2025] gabapentin (NEURONTIN) capsule 200 mg  200 mg Oral QAM Max Rebolledo MD        gabapentin (NEURONTIN) capsule 400 mg  400 mg Oral QPM Max Rebolledo MD        heparin ANTICOAGULANT injection 5,000 Units  5,000 Units Subcutaneous Q12H Sincere Cunningham PA-C   5,000 Units at 06/22/25 1158    levothyroxine (SYNTHROID/LEVOTHROID) tablet 112 mcg  112 mcg Oral Daily Ginny Naidu MD   112 mcg at 06/22/25 0751    lidocaine (viscous) (XYLOCAINE) 2 % solution 5 mL  5 mL Mouth/Throat Once Sincere Cunningham PA-C        multivitamin w/minerals (THERA-VIT-M) tablet 1 tablet  1 tablet Oral Daily Ginny Naidu MD   1 tablet at 06/22/25 0750    Or    multivitamins w/minerals liquid 15 mL  15 mL Oral or NG Tube Daily Ginny Naidu MD        mycophenolate (GENERIC EQUIVALENT) capsule 500 mg  500 mg Oral Q8H Shaila Foote NP        Or    mycophenolate (CELLCEPT BRAND)  suspension 500 mg  500 mg Oral or NG Tube Q8H Shaila Foote, NP        pantoprazole (PROTONIX) EC tablet 40 mg  40 mg Oral Daily Ginny Naidu MD   40 mg at 06/22/25 0749    Or    pantoprazole (PROTONIX) 2 mg/mL suspension 40 mg  40 mg Oral or NG Tube Daily Ginny Naidu MD        piperacillin-tazobactam (ZOSYN) 4.5 g vial to attach to  mL bag  4.5 g Intravenous Q6H Max Rebolledo MD   4.5 g at 06/22/25 1158    polyethylene glycol (MIRALAX) Packet 17 g  17 g Oral TID Sincere Cunningham PA-C   17 g at 06/22/25 0749    [START ON 6/23/2025] predniSONE (DELTASONE) tablet 25 mg  25 mg Oral Once Ginny Naidu MD        Followed by    [START ON 6/24/2025] predniSONE (DELTASONE) tablet 10 mg  10 mg Oral Once Ginny Naidu MD        Followed by    [START ON 6/25/2025] predniSONE (DELTASONE) tablet 5 mg  5 mg Oral Daily Ginny Naidu MD        senna-docusate (SENOKOT-S/PERICOLACE) 8.6-50 MG per tablet 2 tablet  2 tablet Oral BID Sincere Cunningham PA-C   2 tablet at 06/22/25 0749    sodium chloride (PF) 0.9% PF flush 3 mL  3 mL Intravenous Q8H Ginny Naidu MD   3 mL at 06/21/25 1217    sodium chloride (PF) 0.9% PF flush 3 mL  3 mL Intracatheter Q8H DARINEL Ginny Naidu MD   3 mL at 06/21/25 1553    sodium chloride (PF) 0.9% PF flush 3 mL  3 mL Intracatheter Q8H Ginny Naidu MD   3 mL at 06/21/25 1217    sulfamethoxazole-trimethoprim (BACTRIM) 400-80 MG per tablet 1 tablet  1 tablet Oral or Feeding Tube Daily Max Rebolledo MD   1 tablet at 06/22/25 0750    tacrolimus (GENERIC EQUIVALENT) capsule 2 mg  2 mg Oral BID IS Ginny Naidu MD   2 mg at 06/22/25 0749    ursodiol (ACTIGALL) capsule 300 mg  300 mg Oral BID Ginny Naidu MD   300 mg at 06/22/25 0749    Or    ursodiol (ACTIGALL) suspension 300 mg  300 mg Oral or NG Tube BID Ginny Naidu MD        valGANciclovir (VALCYTE) tablet 450 mg  450 mg Oral Every Other Day Max Rebolledo MD   450 mg at 06/22/25  0848    Or    valGANciclovir (VALCYTE) solution 450 mg  450 mg Oral or NG Tube Every Other Day Max Rebolledo MD         Current Facility-Administered Medications   Medication Dose Route Frequency Provider Last Rate Last Admin    dextrose 10% infusion   Intravenous Continuous PRN Jaimee Gagnon MD        dialysate for CVVHD & CVVHDF (Phoxillum BK4/2.5)  12.5 mL/kg/hr CRRT Continuous Ronit Roger MD 1,900 mL/hr at 25 1206 12.5 mL/kg/hr at 25 1206    insulin regular (MYXREDLIN) 1 unit/mL infusion  0-24 Units/hr Intravenous Continuous Jaimee Gagnon MD   Stopped at 25 0620    No heparin required   Does not apply Continuous PRN Ronit Roger MD        POST-filter replacement solution for CVVHD & CVVHDF (Phoxillum BK4/2.5)   CRRT Continuous Ronit Roger  mL/hr at 25 1513 New Bag at 25 1513    PRE-filter replacement solution for CVVHD & CVVHDF (Phoxillum BK4/2.5)  12.5 mL/kg/hr CRRT Continuous Ronit Roger MD 1,900 mL/hr at 25 1207 12.5 mL/kg/hr at 25 1207    Reason beta blocker order not selected   Does not apply DOES NOT GO TO Ginny Mcgowan MD        sodium chloride 0.45 % 1,000 mL infusion   Intravenous Continuous PRN Ginny Naidu MD           Physical Exam   Temp  Av.6  F (37  C)  Min: 97.8  F (36.6  C)  Max: 99  F (37.2  C)  Arterial Line BP  Min: 97/47  Max: 157/55  Arterial Line MAP (mmHg)  Av.1 mmHg  Min: 60 mmHg  Max: 85 mmHg      Pulse  Av.6  Min: 67  Max: 83 Resp  Av.5  Min: 8  Max: 23  FiO2 (%)  Av %  Min: 30 %  Max: 40 %  SpO2  Av.2 %  Min: 88 %  Max: 100 %    CVP (mmHg): 3 mmHgBP 132/64   Pulse 66   Temp 98  F (36.7  C) (Axillary)   Resp 16   Wt (!) 167.5 kg (369 lb 4.3 oz)   SpO2 98%   BMI 51.50 kg/m     Date 25 - 25 0659   Shift 7915-9555 8068-7023 3401-8723 24 Hour Total   INTAKE   P.O. 240   240   I.V. 327.8   327.8   Blood Components 600   600   Shift  Total(mL/kg) 1167.8(7.52)   1167.8(7.52)   OUTPUT   Urine 7   7   Drains 252   252   Shift Total(mL/kg) 259(1.67)   259(1.67)   Weight (kg) 155.2 155.2 155.2 155.2      Admit Weight: (!) 149.6 kg (329 lb 12.8 oz)     GENERAL APPEARANCE: somewhat sleepy, but no distress  HENT: mouth without ulcers or lesions  RESP: lungs clear to auscultation   CV: regular rhythm, normal rate  EDEMA: 1+ LE and dependent edema bilaterally  ABDOMEN: soft, nondistended, nontender; Obese  MS: extremities normal - no gross deformities noted  SKIN: no rash  DIALYSIS ACCESS: none, but ICU team will exchange her central line to iHD line on left    Data   All labs reviewed by me.  CMP  Recent Labs   Lab 06/22/25  1256 06/22/25  0958 06/22/25  0815 06/22/25  0619 06/22/25  0426 06/22/25  0423 06/21/25  2106 06/21/25 2006 06/21/25  0745 06/21/25  0514 06/21/25  0238 06/21/25  0101 06/20/25  0503 06/20/25  0358 06/19/25  2102 06/19/25 2025   NA  --   --   --   --   --  133*  --  133*  --  132*  --  131*   < > 136  --  138  138   POTASSIUM  --   --   --   --   --  4.4  --  4.4  --  4.7  --  4.5   < > 4.0  --  3.7  3.7   CHLORIDE  --   --   --   --   --  99  --  98  --  95*  --  96*   < > 101  --  101  101   CO2  --   --   --   --   --  22  --  22  --  22  --  23   < > 25  --  22  22   ANIONGAP  --   --   --   --   --  12  --  13  --  15  --  12   < > 10  --  15  15   * 103* 82 93   < > 104*   < > 119*  117*   < > 142*   < > 136*   < > 169*  172*   < > 158*  158*   BUN  --   --   --   --   --  35.0*  --  42.5*  --  44.2*  --  43.3*   < > 31.3*  --  26.0*  26.0*   CR  --   --   --   --   --  2.70*  --  3.05*  --  3.10*  --  2.81*   < > 1.62*  --  1.19*  1.19*   GFRESTIMATED  --   --   --   --   --  19*  --  17*  --  17*  --  19*   < > 36*  --  52*  52*   SANDY  --   --   --   --   --  8.2*  --  8.0*  --  8.3*  --  8.0*   < > 8.4*  --  8.6*  8.6*   MAG  --   --   --   --   --  2.4*  --  2.4*  --  2.3  --  2.2  --  2.1  --  2.2    PHOS  --   --   --   --   --  6.0*  --  6.8*  --  7.3*  --  6.9*  --  5.9*  --  5.5*   PROTTOTAL  --   --   --   --   --  5.4*  --   --   --   --   --  4.8*  --  4.6*  --  3.8*   ALBUMIN  --   --   --   --   --  3.3*  --  3.2*  --   --   --  2.9*  --  2.6*  --  2.2*   BILITOTAL  --   --   --   --   --  1.0  --   --   --   --   --  1.4*  --  2.5*  --  3.9*   ALKPHOS  --   --   --   --   --  90  --   --   --   --   --  54  --  60  --  63   AST  --   --   --   --   --  491*  --   --   --   --   --  984*  --  2,889*  --  2,825*   ALT  --   --   --   --   --  231*  --   --   --   --   --  261*  --  360*  --  345*    < > = values in this interval not displayed.     CBC  Recent Labs   Lab 06/22/25  1037 06/22/25 0423 06/21/25 2006 06/21/25  1528   HGB 8.1* 7.7* 7.9* 8.3*   WBC 5.9 5.3 6.1 6.6   RBC 2.72* 2.62* 2.65* 2.80*   HCT 24.2* 23.0* 23.1* 24.4*   MCV 89 88 87 87   MCH 29.8 29.4 29.8 29.6   MCHC 33.5 33.5 34.2 34.0   RDW 18.6* 18.6* 18.9* 19.0*   PLT 59* 56* 66* 69*     INR  Recent Labs   Lab 06/22/25  1037 06/22/25  0423 06/21/25  2006 06/21/25  1528   INR 1.23* 1.25* 1.22* 1.29*   PTT 25 26 23 27     ABG  Recent Labs   Lab 06/21/25  1619 06/19/25  1935 06/19/25  1835 06/19/25  1802 06/19/25  1727   PH  --  7.39 7.35 7.37 7.37   PCO2  --  39 44 42 43   PO2  --  62* 88 118* 111*   HCO3  --  23 24 24 25   O2PER 4 60.0 43.0 40.0 40.0      Urine Studies  Recent Labs   Lab Test 06/18/25  2243 06/08/25  0447 11/26/24  0819   COLOR Yellow Yellow Yellow   APPEARANCE Clear Clear Clear   URINEGLC Negative Negative Negative   URINEBILI Negative Negative Negative   URINEKETONE Negative Negative Trace*   SG 1.015 1.015 1.026   UBLD Negative Negative Negative   URINEPH 5.0 5.5 5.5   PROTEIN Negative Negative 10*   NITRITE Negative Negative Negative   LEUKEST Negative Negative Negative   RBCU 0  --  1   WBCU <1  --  1     No lab results found.  PTH  No lab results found.  Iron Studies  Recent Labs   Lab Test 11/26/24  0800    IRON 112   *   IRONSAT 64*   LUDY 762*       IMAGING:  All imaging studies reviewed by me.

## 2025-06-22 NOTE — PROGRESS NOTES
CRRT STATUS NOTE    DATA:  Time:  6:00 AM  Pressures WNL:  YES  Filter Status:  WDL    Problems Reported/Alarms Noted:  None.    Supplies Present:  YES    ASSESSMENT:  Patient Net Fluid Balance:  Net +373 ml @ 0600.  Vital Signs:  HR 67, /57, MAP 76  Labs:  K 4.4, Mg 2.4, Phos 6.0, iCa 4.2, Hgb 7.7, Plt 56  Goals of Therapy:  0-100    INTERVENTIONS:   Changed circuit 6/22 @ 0444 per MD orders. Lines reversed and blood warmer applied.     PLAN:  Continue to monitor circuit daily and change set q72 hours or PRN for clotting/clogging. Please call CRRT RN with any quesitons/problems.

## 2025-06-22 NOTE — PLAN OF CARE
Major Shift Events:   Neuro: A&Ox4 with intermittent confusion/rambling, Neuros & CMS intact otherwise, bed alarm on   Cardiac: SR, MAP > 65, pulses palpable, afebrile  Resp: lung sounds dim/crackles, sating > 92% 2L NC with capno   GI: not passing gas, bowel sounds hypoactive, LBM 6/18  : on CRRT per flowsheets, bladder scanned, no UO   Skin: clamshell incision with prevena wound vac, generalized bruising   Pain/Nausea: constant severe back pain & intermittent surgical pain/headache treated with all scheduled meds & PRNs (except lido patches d/t use of heating pad), precedex gtt ordered per SICU; denies nausea  LDA: PIV x 2 infusing insulin gtt (alg 3), trialysis line, TIARRA x 2  Diet: fulls, poor appetite   Mobility: heavy 2 assist, sat at edge of bed with dizziness/constant CRRT alarms  Labs: reviewed, Hgb 7.7 & plt 56 (SICU notified, no transfusion orders), Ca replaced   Plan: continue POC   For vital signs and complete assessments, please see documentation flowsheets.

## 2025-06-22 NOTE — PROGRESS NOTES
CLINICAL NUTRITION SERVICES - BRIEF NOTE    Registered Dietitian Interventions:  Ordered: TwoCal HN @ 45 mL/hr (1080 mL/day) + 3 pkts ProSource TF20 to provide 2400 kcals (27 kcals/kg/day), 151 g PRO (1.7 g/kg/day), 756 mL H2O, 237 g CHO and 5 g Fiber daily.   - Initiate @ 15 mL/hr and advance by 10 mL q8hr as tolerated to goal rate.   - Do not advance unless K+ >/= 3, Mg++ >/=1.5, and phos >/=1.9  - 30 mL q4hr fluid flushes for tube patency. Additional fluids and/or adjustments per MD.   - continue Thera-vit-m     Future/Additional Recommendations:  -- monitor lytes with TF advancement  -- once TF is at goal consider cycling TF and adjusting prn per calorie counts   -- monitor renal labs for the need to change TF to renal formula       Provider consult to assess and order TF per protocol    CURRENT NUTRITION ORDERS  Diet: Full Liquid  Nutritional Supplement: Ensure Max Protein with meals (pt prefers vanilla)  Calorie counts 6/22-6/24    CURRENT INTAKE/TOLERANCE  Per chart review appetite/intake poor.     Labs: Nutrition-relevant labs:   (6/22): Na 133 mmol/L (L), BUN 35 mg/dL (H), Cr 2.7 mg/dL (H),  U/L (H),  U/L (H), phos 6 mg/dL (H)    Medication: Nutrition-relevant medications:   Thera-vit-m    NEW FINDINGS    CRRT    INTERVENTIONS  Collaboration by nutrition professional with other providers  Enteral nutrition management  Multitrace element supplement therapy     Monitoring/Evaluation  Progress toward goals will be monitored and evaluated per policy.    Jael Sr MS/RD/LD/CNSC  Available on Vocera   M-F (7am-3:30pm) - 4E/SICU Clinical Dietitian  Weekend/Holiday Dietitian (7am-3:30pm)    ** Clinical Dietitians no longer available on pager

## 2025-06-23 ENCOUNTER — APPOINTMENT (OUTPATIENT)
Dept: GENERAL RADIOLOGY | Facility: CLINIC | Age: 60
End: 2025-06-23
Payer: COMMERCIAL

## 2025-06-23 ENCOUNTER — APPOINTMENT (OUTPATIENT)
Dept: PHYSICAL THERAPY | Facility: CLINIC | Age: 60
DRG: 005 | End: 2025-06-23
Attending: TRANSPLANT SURGERY
Payer: COMMERCIAL

## 2025-06-23 ENCOUNTER — APPOINTMENT (OUTPATIENT)
Dept: OCCUPATIONAL THERAPY | Facility: CLINIC | Age: 60
DRG: 005 | End: 2025-06-23
Attending: TRANSPLANT SURGERY
Payer: COMMERCIAL

## 2025-06-23 ENCOUNTER — ENROLLMENT (OUTPATIENT)
Dept: HOME HEALTH SERVICES | Facility: HOME HEALTH | Age: 60
End: 2025-06-23
Payer: COMMERCIAL

## 2025-06-23 PROCEDURE — 71045 X-RAY EXAM CHEST 1 VIEW: CPT

## 2025-06-23 PROCEDURE — 71045 X-RAY EXAM CHEST 1 VIEW: CPT | Mod: 26 | Performed by: RADIOLOGY

## 2025-06-23 ASSESSMENT — ACTIVITIES OF DAILY LIVING (ADL)
ADLS_ACUITY_SCORE: 53

## 2025-06-23 NOTE — PROGRESS NOTES
[facility]  Transplant Nephrology Progress Note  Date of Admission:  6/18/2025  Today's Date: 06/23/2025  Requesting physician: Max Rebolledo MD    Recommendations:   - resume CRRT today and increase UF rate to -200 ml/h to optimize volume status  - plan to switch to iHD tomorrow if remains hemodynamically stable     Assessment & Plan   # EMILIE: RRT dependent   - EMILIE likely secondary to liver transplant with hemodynamic changes and hypotension.    - Baseline Creatinine: ~ 0.8-1.0   - Proteinuria: Normal (<0.2 grams)    # Liver Tx: Patient with ESLD secondary to Metabolic associated steatotic liver disease (MASLD), s/p OLT June 19, 2025.  Transaminases Stable, elevated transaminases.  Followed by Transplant Surgery.    # Immunosuppression: Tacrolimus immediate release (goal 6-8), Mycophenolate mofetil (dose 750 mg every 12 hours), and Methylprednisolone (dose taper)   - Induction with Recent Transplant:  Per Liver Tx Protocol   - Continue with intensive monitoring of immunosuppression for efficacy and toxicity.   - Changes: Not at this time; Management per Transplant Surgery.    # Infection Prophylaxis:   - PJP: Sulfa/TMP (Bactrim)  - CMV: Valganciclovir (Valcyte)  - Thrush: Fluconazole (Diflucan)  - Fungal: Fluconazole (Diflucan)      - CMV IgG Ab High Risk Discordance (D+/R-) at time of transplant: No  Present CMV Serostatus: Positive  - EBV IgG Ab High Risk Discordance (D+/R-) at time of transplant: No  Present EBV Serostatus: Positive    # Blood Pressure: stable off pressors;  Goal BP: MAP > 65   - Changes: Not at this time;     # Elevated Blood Glucose: Secondary to high dose steroids.   - On insulin    # Anemia in Chronic Disease/Surgery: Hgb: stable low      RACHEL: No   - Iron studies: Not checked recently    # Thrombocytopenia: Stable, mildly low platelet level.    # Mineral Bone Disorder:   - Vitamin D; level: Low normal        On supplement: No  - Calcium;  level: Low normal when corrected for low serum albumin        On supplement: No  - Phosphorus; level: High        On binder: No    # Electrolytes:   - Potassium; level: Normal        On supplement: No  - Magnesium; level: Normal        On supplement: No  - Bicarbonate; level: Normal        On supplement: No  - Sodium; level: Normal    # Obesity, Class III (BMI = 47.7):    - Once patient recovers from surgery and incision heals, would recommend working on weight loss     # Other Significant PMH:   - H/o PE (~ 2000): Patient with no recent clotting episodes and off anticoagulation.   - Fibromyalgia: Stable symptoms on gabapentin.     # Transplant History:  Etiology of Organ Failure: Metabolic associated steatotic liver disease (MASLD)  Tx: Liver Tx  Transplant: 6/19/2025 (Liver)  Significant changes in immunosuppression: Slightly lower tacrolimus level goal due to EMILIE.  Significant transplant-related complications: EMILIE    Recommendations were communicated to the primary team via this note.    Tevin Wyatt MD  Transplant Nephrology  Contact information via Vocera Web Console or via [employer] Paging/Directory    Interval History  Hemodynamically stable. Remains off pressors  Sating 96% on 2 LPM O2  Anuric, tolerating CRRT ~net neg 1.9 L/24h  Significant leg swelling  Review of Systems   4 point ROS was obtained and negative except as noted in the Interval History.    MEDICATIONS:  Current Facility-Administered Medications   Medication Dose Route Frequency Provider Last Rate Last Admin    acetaminophen (TYLENOL) tablet 650 mg  650 mg Oral Q8H Ginny Naidu MD   650 mg at 06/23/25 1149    [START ON 6/24/2025] aspirin (ASA) tablet 325 mg  325 mg Oral or Feeding Tube Daily Max Rebolledo MD        [START ON 6/24/2025] basiliximab (SIMULECT) 20 mg in sodium chloride 0.9 % 50 mL infusion  20 mg Intravenous Once Shaila Foote, NP        citalopram (celeXA) tablet 20 mg  20 mg Oral Daily Ginny Naidu MD   20  mg at 06/23/25 0745    fluconazole (DIFLUCAN) intermittent infusion 200 mg  200 mg Intravenous Q24H Max Rebolledo MD        gabapentin (NEURONTIN) capsule 200 mg  200 mg Oral QAM Max Rebolledo MD   200 mg at 06/23/25 0746    gabapentin (NEURONTIN) capsule 400 mg  400 mg Oral QPM Max Rebolledo MD   400 mg at 06/22/25 2011    heparin ANTICOAGULANT injection 5,000 Units  5,000 Units Subcutaneous Q8H [facility] Gus Arroyo MD   5,000 Units at 06/23/25 1149    insulin aspart (NovoLOG) injection (RAPID ACTING)  1-12 Units Subcutaneous Q4H Sincere Cunningham PA-C   1 Units at 06/23/25 1213    levothyroxine (SYNTHROID/LEVOTHROID) tablet 112 mcg  112 mcg Oral Daily Ginny Naidu MD   112 mcg at 06/23/25 0746    multivitamin w/minerals (THERA-VIT-M) tablet 1 tablet  1 tablet Oral Daily Ginny Naidu MD   1 tablet at 06/23/25 0746    mycophenolate (GENERIC EQUIVALENT) capsule 500 mg  500 mg Oral Q8H Shaila Foote, NP   500 mg at 06/23/25 0745    piperacillin-tazobactam (ZOSYN) 2.25 g vial to attach to  ml bag  2.25 g Intravenous Q6H Max Rebolledo MD   2.25 g at 06/23/25 1149    polyethylene glycol (MIRALAX) Packet 17 g  17 g Oral Daily Sincere Cunningham PA-C        [START ON 6/24/2025] predniSONE (DELTASONE) tablet 10 mg  10 mg Oral Once Ginny Naidu MD        Followed by    [START ON 6/25/2025] predniSONE (DELTASONE) tablet 5 mg  5 mg Oral Daily Ginny Naidu MD        Prosource TF20 [employer] Compatibl EN LIQD (PROSOURCE TF20) packet 60 mL  1 packet Per Feeding Tube TID Sincere Cunningham PA-C   60 mL at 06/23/25 0747    senna-docusate (SENOKOT-S/PERICOLACE) 8.6-50 MG per tablet 2 tablet  2 tablet Oral BID Sincere Cunningham PA-C   2 tablet at 06/22/25 2012    sodium chloride (PF) 0.9% PF flush 3 mL  3 mL Intravenous Q8H Ginny Naidu MD   3 mL at 06/21/25 1217    sodium chloride (PF) 0.9% PF flush 3 mL  3 mL Intracatheter Q8H [facility] Ginny Naidu MD   3 mL at 06/21/25 8305     sodium chloride (PF) 0.9% PF flush 3 mL  3 mL Intracatheter Q8H Ginny Naidu MD   3 mL at 25 1217    [START ON 2025] sulfamethoxazole-trimethoprim (BACTRIM) 400-80 MG per tablet 1 tablet  1 tablet Oral or Feeding Tube Once per day on  Max Rebolledo MD        tacrolimus (GENERIC EQUIVALENT) capsule 2 mg  2 mg Oral BID IS Ginny Naidu MD   2 mg at 25 0745    ursodiol (ACTIGALL) capsule 300 mg  300 mg Oral BID Ginny Naidu MD   300 mg at 25 0745    valGANciclovir (VALCYTE) tablet 450 mg  450 mg Oral Once per day on  Max Rebolledo MD         Current Facility-Administered Medications   Medication Dose Route Frequency Provider Last Rate Last Admin    dextrose 10% infusion   Intravenous Continuous PRN Jaimee Gagnon MD        dialysate for CVVHD & CVVHDF (Phoxillum BK4/2.5)  12.5 mL/kg/hr CRRT Continuous Tevin Young MD        No heparin required   Does not apply Continuous PRN Tevin Young MD        POST-filter replacement solution for CVVHD & CVVHDF (Phoxillum BK4/2.5)   CRRT Continuous Tevin Young MD        PRE-filter replacement solution for CVVHD & CVVHDF (Phoxillum BK4/2.5)  12.5 mL/kg/hr CRRT Continuous Tevin Young MD        Reason beta blocker order not selected   Does not apply DOES NOT GO TO Ginny Mcgowan MD           Physical Exam   Temp  Av.6  F (37  C)  Min: 97.8  F (36.6  C)  Max: 99  F (37.2  C)  Arterial Line BP  Min: 97/47  Max: 157/55  Arterial Line MAP (mmHg)  Av.1 mmHg  Min: 60 mmHg  Max: 85 mmHg      Pulse  Av.6  Min: 67  Max: 83 Resp  Av.5  Min: 8  Max: 23  FiO2 (%)  Av %  Min: 30 %  Max: 40 %  SpO2  Av.2 %  Min: 88 %  Max: 100 %    CVP (mmHg): 3 mmHgBP 136/63   Pulse 62   Temp 98  F (36.7  C)   Resp 14   Wt (!) 173.4 kg (382 lb 4.8 oz)   SpO2 96%   BMI 53.32 kg/m     Date 25 07 - 25 0659   Shift 4188-8748 4709-9129 1341-6032 24 Hour  Total   INTAKE   P.O. 240   240   I.V. 327.8   327.8   Blood Components 600   600   Shift Total(mL/kg) 1167.8(7.52)   1167.8(7.52)   OUTPUT   Urine 7   7   Drains 252   252   Shift Total(mL/kg) 259(1.67)   259(1.67)   Weight (kg) 155.2 155.2 155.2 155.2      Admit Weight: (!) 149.6 kg (329 lb 12.8 oz)     GENERAL APPEARANCE:  NATD  CV: rrr s1s2 no m  Lungs: decreased breath sounds  Ext 3+pitting edema  Neuro: sleepy, arousable    Data   All labs reviewed by me.  CMP  Recent Labs   Lab 06/23/25  1211 06/23/25  0805 06/23/25  0353 06/22/25  2340 06/22/25  2022 06/22/25  1256 06/22/25  1250 06/22/25  0426 06/22/25  0423 06/21/25  0238 06/21/25  0101 06/20/25  0503 06/20/25  0358   NA  --   --  134*  --  132*  --  133*  --  133*   < > 131*   < > 136   POTASSIUM  --   --  4.2  --  4.3  --  4.5  --  4.4   < > 4.5   < > 4.0   CHLORIDE  --   --  102  --  99  --  100  --  99   < > 96*   < > 101   CO2  --   --  24  --  21*  --  23  --  22   < > 23   < > 25   ANIONGAP  --   --  8  --  12  --  10  --  12   < > 12   < > 10   * 128* 118*  123*   < > 141*  140*   < > 105*   < > 104*   < > 136*   < > 169*  172*   BUN  --   --  25.6*  --  28.5*  --  26.7*  --  35.0*   < > 43.3*   < > 31.3*   CR  --   --  1.62*  --  1.84*  --  2.02*  --  2.70*   < > 2.81*   < > 1.62*   GFRESTIMATED  --   --  36*  --  31*  --  28*  --  19*   < > 19*   < > 36*   SANDY  --   --  8.0*  --  8.2*  --  8.0*  --  8.2*   < > 8.0*   < > 8.4*   MAG  --   --  2.5*  --  2.4*  --  2.4*  --  2.4*   < > 2.2  --  2.1   PHOS  --   --  4.5  --  4.8*  --  5.1*  --  6.0*   < > 6.9*  --  5.9*   PROTTOTAL  --   --  5.3*  --   --   --   --   --  5.4*  --  4.8*  --  4.6*   ALBUMIN  --   --  3.1*  --  3.3*  --  3.3*  --  3.3*   < > 2.9*  --  2.6*   BILITOTAL  --   --  0.9  --   --   --   --   --  1.0  --  1.4*  --  2.5*   ALKPHOS  --   --  106  --   --   --   --   --  90  --  54  --  60   AST  --   --  251*  --   --   --   --   --  491*  --  984*  --  2,889*   ALT  --    --  191*  --   --   --   --   --  231*  --  261*  --  360*    < > = values in this interval not displayed.     CBC  Recent Labs   Lab 06/23/25  0353 06/22/25  2022 06/22/25  1250 06/22/25  1037   HGB 8.1* 8.2* 8.2* 8.1*   WBC 5.6 6.1 5.9 5.9   RBC 2.78* 2.75* 2.77* 2.72*   HCT 25.1* 24.1* 24.8* 24.2*   MCV 90 88 90 89   MCH 29.1 29.8 29.6 29.8   MCHC 32.3 34.0 33.1 33.5   RDW 18.3* 18.3* 18.3* 18.6*   PLT 63* 68* 63* 59*     INR  Recent Labs   Lab 06/23/25  0353 06/22/25  1037 06/22/25  0423 06/21/25 2006   INR 1.24* 1.23* 1.25* 1.22*   PTT 25 25 26 23     ABG  Recent Labs   Lab 06/21/25  1619 06/19/25  1935 06/19/25  1835 06/19/25  1802 06/19/25  1727   PH  --  7.39 7.35 7.37 7.37   PCO2  --  39 44 42 43   PO2  --  62* 88 118* 111*   HCO3  --  23 24 24 25   O2PER 4 60.0 43.0 40.0 40.0      Urine Studies  Recent Labs   Lab Test 06/18/25  2243 06/08/25  0447 11/26/24  0819   COLOR Yellow Yellow Yellow   APPEARANCE Clear Clear Clear   URINEGLC Negative Negative Negative   URINEBILI Negative Negative Negative   URINEKETONE Negative Negative Trace*   SG 1.015 1.015 1.026   UBLD Negative Negative Negative   URINEPH 5.0 5.5 5.5   PROTEIN Negative Negative 10*   NITRITE Negative Negative Negative   LEUKEST Negative Negative Negative   RBCU 0  --  1   WBCU <1  --  1     No lab results found.  PTH  No lab results found.  Iron Studies  Recent Labs   Lab Test 11/26/24  0800   IRON 112   *   IRONSAT 64*   LUDY 762*       IMAGING:  All imaging studies reviewed by me.

## 2025-06-23 NOTE — PROGRESS NOTES
SURGICAL ICU PROGRESS NOTE  2025    PRIMARY TEAM: Transplant  PRIMARY PHYSICIAN: Max Rebolledo MD  REASON FOR CRITICAL CARE ADMISSION: s/p  donor liver transplant   ADMITTING PHYSICIAN: Dr. 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 Rebolledo on 2025.      Major Changes/Updates  - Will discuss with Nephrology Txp about iHD today; if tolerates well can transfer to floor   - Increase SQH to q 8 hr  - Discontinue PPI       Neurological:  # Acute post operative pain  # MDD   # Fibromyalgia  # Insomnia  # Hypoactive delirium- improved   - Monitor neurological status. Delirium preventions and precautions.  - Pain:   - Scheduled: tylenol 650 mg q8h, gabapentin 300 mg qAM / 600 mg qPM (home regimen)Lidocaine patches daily  - PRN:  oxycodone 10-15 mg q 4 hr  , IV dilaudid 0.4 mg q2h prn,  methocarbamol to 750 mg q 6 hr    - Sedation: N/A  -- PTA Citalopram, Trazodone ordered    Pulmonary:   Vital Signs: Most Recent  Ranges (24 hours)   Resp: 14  SpO2: (!) 88 % Resp  Min: 9  Max: 31  SpO2  Min: 88 %  Max: 99 %   O2 Device:   (2 LPM)  Resp: 14  # Post operative respiratory support  # Left pleural effusion, resolving  - Wean Supplemental O2 as able to SpO2 goals  - Goal SpO2 > 92%.  - Pulmonary hygiene; IS q15-30 minutes when awake  - CXR, today's AM CXR, per my review, slightly increased b/l pleural effusions compared to previous   - Continue daily CXR      Cardiovascular:    Vital Signs: Most Recent  Ranges (24 hours)   Pulse: 67  BP: 125/55    Pulse  Min: 62  Max: 86  BP  Min: 103/44  Max: 146/64  No data recorded  No data recorded   - Monitor hemodynamic status.  - MAP goal > 65 mmHg    # Hx of HLD  - No home medications    Gastroenterology/Nutrition:  # Cirrhosis of the liver  #  Metabolic dysfunction-associated steatohepatitis  # S/p  donor liver transplant on 2025  - Ursodiol 300 mg BID  - Basiliximab   - Immunosuppressant regimen as below   - Will discontinue PPI today   - Liver ultrasound post-op with elevated peak systolic velocities and resistive indices, no fluid collection  - LFT downtrending ALT down to 191 from 231 , AST down to 251 from 984. Alk phos WNL. Direct Bili down to 0.64 from 0.73    - Continue to trend daily     # Protein calorie deficit malnutrition   - Room Service  Full Liquid Diet Thin Liquids (level 0)  Calorie Counts  Snacks/Supplements Adult: Ensure Max Protein (bariatric); With Meals  Adult Formula Drip Feeding: Continuous TwoCal HN; Nasoduodenal tube; Goal Rate: 45; mL/hr; once FT confirmed start at 15 ml/hr for 8 hrs and increase 10 ml q 8 hrs until goal rate  - Diet: PO and TF @ goal   - Last BM ; daily Miralax and BID senna     # Nausea   - Ondansetron IV/PO PRN      Fluids/Electrolytes/Renal:   I/O last 3 completed shifts:  In: 3456 [P.O.:2090; I.V.:126; NG/GT:245; IV Piggyback:700]  Out: 5415 [Drains:720; Other:4695]  I/O this shift:  In: 850 [P.O.:590; I.V.:20; NG/GT:110]  Out: 985 [Drains:56; Other:579; Stool:350]  Recent Labs   Lab 25  0353 25  1250 25  0423   * 132* 133* 133*   POTASSIUM 4.2 4.3 4.5 4.4   CHLORIDE 102 99 100 99   MAG 2.5* 2.4* 2.4* 2.4*   PHOS 4.5 4.8* 5.1* 6.0*   SANDY 8.0* 8.2* 8.0* 8.2*   CO2 24 * 23 22   BUN 25.6* 28.5* 26.7* 35.0*   CR 1.62* 1.84* 2.02* 2.70*     # Acute Kidney Injury  - Nephrology consulted; appreciate recs  - Pt tolerated CRRT well yesterday net -1.9 L will follow up with Nephrology Txp for iHD run today   - Bladder scan per unit protocol       Endocrine:  Recent Labs   Lab 25  0805 25  0353 25  2340 25  2022 25  1608 25  1256 25  1250 25  0958 25  0815 25  0619   * 118*  123* 122* 141*  " 140* 123* 104* 105* 103* 82 93   # Stress Hyperglycemia   # No Hx DM  - Insulin gtt consider transitioning to sliding scale insulin as steroids taper down   - Goal -180 for optimal healing     # Hypothyroidism  - PTA levothyroxine ordered      ID:  Recent Labs   Lab 06/23/25  0353 06/22/25 2022 06/22/25  1250 06/22/25  1037   WBC 5.6 6.1 5.9 5.9     Positive cultures:  No results found for: \"CULT\"  -  Daily CBC  - Pt is afebrile, T max 98 , WBC 5.3    # Immunosuppression  # Kathya-operative prophylaxis  - Tacrolimus, mycophenolate  - Steroid taper  - PPX: bactrim, valcyte, fluconazole - per Txp Surgery plan to extend fluconazole for 7 day course   - Zosyn x 7 days EOT 6/26 as donor had open abdomen/high risk for infection     # Psoriasis  # Psoriatic arthritis  - Home regimen includes Ixekizumab (q4 weeks), prn mometasone - ordered mometasone       Heme:     Recent Labs   Lab 06/23/25  0353 06/22/25 2022 06/22/25  1250 06/22/25  1037 06/22/25  0423 06/21/25 2006   HGB 8.1* 8.2* 8.2* 8.1* 7.7* 7.9*   PLT 63* 68* 63* 59* 56* 66*   FIBR 215  --   --  252 248 257     # Acute Blood Loss Anemia   # Anemia of Critical Illness   - Transfuse per transfusion goals below    Transfusion goals:  -- Hgb > 8  -- Fibrinogen > 200  -- Plt > 50  -- INR < 2.0      # Hx of PE 1/31/2002  -- No PTA DOAC      MSK:  # Weakness and deconditioning of critical illness  - Physical and occupational therapy consult       General Cares/Prophylaxis:    DVT Prophylaxis: Pneumatic Compression Devices,  mg daily, SQH   GI Prophylaxis: PPI  Restraints: Restraints for medical healing needed: NO    Lines/ tubes/ drains:  - PIV x2  - L internal jugular trialysis CVC  - Surgical drain x2  - Wound vac    Disposition: SICU with possible transfer to floor if does well on iHD run today    Discussed plan today with patient and their family member. All questions were answered. They are in agreement with plan.      Patient seen, findings and " "plan discussed with SICU staff and Transplant Surgery .       Sincere Cunningham PA-C       Clinically Significant Risk Factors         # Hyponatremia: Lowest Na = 132 mmol/L in last 2 days, will monitor as appropriate   # Hypocalcemia: Lowest iCa = 4.2 mg/dL in last 2 days, will monitor and replace as appropriate     # Hypoalbuminemia: Lowest albumin = 2.2 g/dL at 6/19/2025  8:25 PM, will monitor as appropriate    # Coagulation Defect: INR = 1.24 (Ref range: 0.85 - 1.15) and/or PTT = 25 Seconds (Ref range: 22 - 38 Seconds), will monitor for bleeding  # Thrombocytopenia: Lowest platelets = 56 in last 2 days, will monitor for bleeding    # Chronic heart failure with preserved ejection fraction: heart failure noted on problem list and last echo with EF >50%           # Morbid Obesity: Estimated body mass index is 53.32 kg/m  as calculated from the following:    Height as of 6/7/25: 1.803 m (5' 11\").    Weight as of this encounter: 173.4 kg (382 lb 4.8 oz)., PRESENT ON ADMISSION       # Financial/Environmental Concerns: none                ====================================    TODAY'S SUBJECTIVE/INTERVAL HISTORY:   Pt states she feels more like herself today and states her pain is better controlled.     OBJECTIVE:     Temp:  [97.6  F (36.4  C)-98  F (36.7  C)] 97.6  F (36.4  C)  Pulse:  [62-86] 67  Resp:  [9-31] 14  BP: (103-146)/(44-73) 125/55  SpO2:  [88 %-99 %] 88 %  Resp: 14    I/O last 3 completed shifts:  In: 3456 [P.O.:2090; I.V.:126; NG/GT:245; IV Piggyback:700]  Out: 5415 [Drains:720; Other:4695]    PHYSICAL EXAM   Neuro: Awake. Answers questions appropriately.  Follows commands. NAD.   HEENT: Normocephalic, atraumatic. PERRL, and nonicteric.   CV: RRR on monitor, S1S2, all extremities well perfused   Respiratory: Normal respiratory effort on 2L NC, equal chest rise b/l   GI: Large pannus. Abdomen soft non-tender to light palpation throughout. RLQ drain 1 with 97 ml of serosanguinous output. RLQ drain 2 with 623 " ml   of serosanguinous output.   :Anuric   MSK: Moves all extremities well with normal appearing strength. Sensation intact in all upper/lower extremities. Edematous in BLE.     Skin: Scattered hematomas at various previous IV/line access sites. Normal color. No rashes or skin lesions.   Psych: Cooperative, congruent mood and affect.       LABS:   Arterial Blood Gases   Recent Labs   Lab 06/19/25  1935 06/19/25  1835 06/19/25  1802 06/19/25  1727   PH 7.39 7.35 7.37 7.37   PCO2 39 44 42 43   PO2 62* 88 118* 111*   HCO3 23 24 24 25     Complete Blood Count   Recent Labs   Lab 06/23/25  0353 06/22/25  2022 06/22/25  1250 06/22/25  1037   WBC 5.6 6.1 5.9 5.9   HGB 8.1* 8.2* 8.2* 8.1*   PLT 63* 68* 63* 59*     Basic Metabolic Panel  Recent Labs   Lab 06/23/25  0805 06/23/25  0353 06/22/25  2340 06/22/25 2022 06/22/25  1256 06/22/25  1250 06/22/25  0426 06/22/25  0423   NA  --  134*  --  132*  --  133*  --  133*   POTASSIUM  --  4.2  --  4.3  --  4.5  --  4.4   CHLORIDE  --  102  --  99  --  100  --  99   CO2  --  24  --  21*  --  23  --  22   BUN  --  25.6*  --  28.5*  --  26.7*  --  35.0*   CR  --  1.62*  --  1.84*  --  2.02*  --  2.70*   * 118*  123* 122* 141*  140*   < > 105*   < > 104*    < > = values in this interval not displayed.     Liver Function Tests  Recent Labs   Lab 06/23/25  0353 06/22/25  2022 06/22/25  1250 06/22/25  1037 06/22/25  0423 06/21/25  2006 06/21/25  0514 06/21/25  0101 06/20/25  0749 06/20/25  0358   *  --   --   --  491*  --   --  984*  --  2,889*   *  --   --   --  231*  --   --  261*  --  360*   ALKPHOS 106  --   --   --  90  --   --  54  --  60   BILITOTAL 0.9  --   --   --  1.0  --   --  1.4*  --  2.5*   ALBUMIN 3.1* 3.3* 3.3*  --  3.3* 3.2*  --  2.9*  --  2.6*   INR 1.24*  --   --  1.23* 1.25* 1.22*   < > 1.38*   < > 1.58*    < > = values in this interval not displayed.     Pancreatic Enzymes  Recent Labs   Lab 06/20/25  0358 06/18/25  4416   LIPASE 46  --     AMYLASE 63 47     Coagulation Profile  Recent Labs   Lab 06/23/25  0353 06/22/25  1037 06/22/25  0423 06/21/25 2006   INR 1.24* 1.23* 1.25* 1.22*   PTT 25 25 26 23         IMAGING:   Recent Results (from the past 24 hours)   XR Abdomen Port 1 View    Narrative    EXAMINATION: XR ABDOMEN PORT 1 VIEW, 6/22/2025 3:31 PM    COMPARISON: Ultrasound 6/19/2025    HISTORY: Confirm NJ tube placement    FINDINGS: Feeding tube tip at the third portion of the duodenum.  Postoperative incision staples. Left pleural effusion and basilar  atelectasis/consolidation. No air-filled dilated loops of bowel.      Impression    IMPRESSION: Feeding tube tip in the third portion of the duodenum.    DEEPTI BERRIOS MD         SYSTEM ID:  [id number]   XR Chest Port 1 View    Narrative    Exam: XR CHEST PORT 1 VIEW, 6/23/2025 2:06 AM    Indication: post op increased oxygen requirements, known left pleural  effusion    Comparison: 6/22/2025.    Findings:   Portable semiupright AP view of the chest. Enteric tube courses below  the field of view. Left internal jugular central venous catheter tip  projects over the brachiocephalic venous confluence. Stable right  basilar chest tube. Trachea is midline. Cardiomediastinal silhouette  is stable. Low lung volumes. Similar patchy and streaky perihilar and  bibasilar opacities. No definitive pneumothorax. Mild blunting of the  costophrenic angles. Visualized portions of the upper abdomen are  unremarkable. Surgical clips project over the central upper abdomen.      Impression    Impression:  1. Low lung volumes with similar patchy and streaky perihilar and  bibasilar opacities, likely pulmonary edema with superimposed  atelectasis.  2. Small bilateral pleural effusions.    I have personally reviewed the examination and initial interpretation  and I agree with the findings.    TRACE HOU MD         SYSTEM ID:  [id number]

## 2025-06-23 NOTE — PROGRESS NOTES
Calorie Count  Intake recorded for: 6/22  Total Kcals: 549 Total Protein: 73g  Kcals from Hospital Food: 549   Protein: 73g  Kcals from Outside Food (average):0 Protein: 0g  # Meals Ordered from Kitchen: 3 meals  # Meals Recorded: 1 meal (100% 1.25 Greek yogurt, orange sherbet; 25% 1 6oz chicken broth, cooked cream of wheat)  # Supplements Recorded: 2 supplements (100% 2 Ensure Max)

## 2025-06-23 NOTE — PROGRESS NOTES
RT started pt on HFNC 40L 50% after pt unable to maintain SPO2 >92% on 6L nasal cannula throughout the day.    Minerva Mallory, RT on 6/23/2025 at 5:06 PM

## 2025-06-23 NOTE — PLAN OF CARE
Goal Outcome Evaluation:            Outcome Evaluation: CRRT dcd and then restarted this afternoon. Pt needing more O2 support.        Major Shift Events:  Pt AOx4 but intermittently forgetful and asking things that do not make sense. Up Ax2 and walking in halls today with therapy. SR. BP stable. Afebrile. O2 needs and work of breathing increasing this afternoon and pt placed on HFNC 50% 40L. Tolerating full liquid diet with very poor appetite but no nausea, calorie counts in place. TF running at goal 45ml/hr. Anuric. CRRT dcd this AM but unable to be scheduled for HD this afternoon and with O2 needs increasing pt was placed back on CRRT this evening with goal net -200ml/hr. Liquid BM x2. PRN Oxy and Robaxin given to control chronic back pain and incisional pain as well as a heating pad. Pain better managed when pt was able to move around this afternoon.    Plan: HD run tomorrow. Wean oxygen and encourage activity as able.     For vital signs and complete assessments, please see documentation flowsheets.

## 2025-06-23 NOTE — PROGRESS NOTES
CRRT STATUS NOTE    DATA:  Time:  6:00 AM  Pressures WNL:  YES  Filter Status:  WDL    Problems Reported/Alarms Noted:  None.    Supplies Present:  YES    ASSESSMENT:  Patient Net Fluid Balance:  Net -1 L @ midnight, -525 ml @ 0600.  Vital Signs:  HR 65, /59, MAP 81  Labs:  K 4.2, Mg 2.5, Phos 4.5, iCa 4.5, Hgb 8.1, Plt 63  Goals of Therapy:  Meeting goals of therapy    INTERVENTIONS:   None.    PLAN:  Transition to iHD today?   Continue to monitor circuit daily and change set q72 hours or PRN for clotting/clogging. Please call CRRT RN with any questions/problems.

## 2025-06-23 NOTE — PLAN OF CARE
Major Shift Events:   Neuro: A&Ox4 with intermittent confusion/rambling, Neuros & CMS intact otherwise, bed alarm on   Cardiac: SR, MAP > 65, pulses palpable, afebrile  Resp: lung sounds dim, sating > 92% 2L NC with capno   GI: passing gas, bowel sounds active, LBM this shift, loose stool   : on CRRT per flowsheets, bladder scanned, no UO   Skin: clamshell incision with prevena wound vac, generalized bruising, excoriation under pannus - interdry applied  Pain/Nausea: constant back pain & intermittent surgical pain treated with scheduled meds, PRNs, lido patch to low back & heating pad to upper back; denies nausea  LDA: PIV x 2, trialysis line, TIARRA x 2, NJT with TF at 25 & st fwf  Diet: fulls, poor appetite, amanuel counts  Mobility: 2 assist  Labs: reviewed, Ca replaced   Plan: continue POC   For vital signs and complete assessments, please see documentation flowsheets.

## 2025-06-23 NOTE — PROGRESS NOTES
CRRT STATUS NOTE    DATA:  Time:  1800  Pressures WNL:  YES  Filter Status:  WDL    Problems Reported/Alarms Noted:  Therapy discontinued by MD, then reordered shortly after set had been taken down and patient moved to different room. Therapy restart delayed d/t need to move patient back to old room.    Supplies Present:  Yes    ASSESSMENT:  Patient Net Fluid Balance:  -31ml since midnight  Intake/Output Summary (Last 24 hours) at 6/23/2025 1811  Last data filed at 6/23/2025 1800  Gross per 24 hour   Intake 3898 ml   Output 4245 ml   Net -347 ml        Vital Signs:  /67   Pulse 70   Temp 97.6  F (36.4  C) (Axillary)   Resp 22   Wt (!) 146 kg (321 lb 14 oz)   SpO2 93%   BMI 44.89 kg/m        Labs:    Lab Results   Component Value Date    WBC 5.6 06/23/2025    HGB 8.1 (L) 06/23/2025    HCT 25.1 (L) 06/23/2025    MCV 90 06/23/2025    PLT 63 (L) 06/23/2025     Lab Results   Component Value Date     (L) 06/23/2025    POTASSIUM 4.2 06/23/2025    CHLORIDE 102 06/23/2025    CO2 24 06/23/2025     (H) 06/23/2025     Lab Results   Component Value Date    BUN 25.6 (H) 06/23/2025    CR 1.62 (H) 06/23/2025         Goals of Therapy:  150-200mL/hr    INTERVENTIONS: Discontinue/taken down at 0910. Restarted @ 1625.    PLAN: Continue fluid removal per goals of care as patient tolerates. Check filter daily. Change filter q72 hours and PRN. Please call CRRT Resource RN on Vocera with any questions or concerns.

## 2025-06-23 NOTE — PROGRESS NOTES
Care Management Follow Up    Length of Stay (days): 3    Expected Discharge Date: 07/07/2025     Concerns to be Addressed:  Discharge planning     Patient plan of care discussed at interdisciplinary rounds: Yes    Anticipated Discharge Disposition: Home with family assist. Home Care.   Anticipated Discharge Services:  Skilled home care  Anticipated Discharge DME:  Tube feeding? To be determined    Patient/family educated on Medicare website which has current facility and service quality ratings:    Education Provided on the Discharge Plan:    Patient/Family in Agreement with the Plan: yes    Referrals Placed by CM/SW:  [facility]. [facility] for tube feeding.  Private pay costs discussed: Not applicable    Discussed  Partnership in Safe Discharge Planning  document with patient/family: No     Handoff Completed: No, handoff not indicated or clinically appropriate    Additional Information:  In 4E rounds it was reported pt has intermittent confusion. On CRRT. Has a prevena wound vac; NJ tube feedings; TIARRA drain. Up with 2 assist (per nursing report this may be due to tubes). On oxygen 6 liters.   On 06-22 PT recommended home with assist vs ARU; OT recommended ARU, but may progress.     Referral sent to [facility] for home care RN, PT & OT.  Referral sent to [facility] for tube feeding. Pt has an NJ tube. Also taking some food orally, on calorie counts.     [facility] (new tube feeding referral)  Phone:  [phone]    [facility] (new home care referral)      Next Steps:   ---Follow-up on home care referral. Clarify with daughter where pt will be staying after discharge. Obtain dtr's address if pt will be at her home.   ---Post transplant appts.   ---Follow progress with PT & OT.   ---Check if outpt labs or infusions are needed.   ---Follow-up [facility] referral for tube feeding. It looks like pt is taking a small amount of food orally, on calorie counts.   ---Follow oxygen  needs.   ---Communicate with pt/family regarding discharge planning.         Elizabeth Sorto, RN Care Coordinator  [facility]  [facility]  On 06- Danie [facility] coverage for Unit 4C and Unit 4E (non-neuro & non-cardiac). Call [phone].

## 2025-06-23 NOTE — PROGRESS NOTES
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 Rebolledo on 6/19/2025.     Today:   - Ok with floor transfer orders if tolerates iHD                                                                                                                                    Graft function: s/p DBDLT without stent, POD#4. Post-op US with patent Doppler. Liver labs down-trending, lactic normal, INR normalizing.    -  mg x 3 months    - Ursodiol  300 mg BID x 3 months     Immunosuppressed status secondary to medications:  Induction:  SM taper followed by prednisone x 3 months   Basiliximab 20 mg IV on 6/20 and 6/24 in the setting of EMILIE  Maintenance:     -  mg Q8H   - Tacrolimus goal level 5 to 6.   - Prednisone 5 mg daily following taper.    Neuro/Psych:  Depression:   - Continue home citalopram.   Fibromyalgia:   - Continue home gabapentin.   Psychophysiological insomnia:   - Continue home Trazodone.  Acute post op pain:    - PRN PO and IV hydromorphone available     Hematology:   Acute on chronic anemia: Hgb ~8. Secondary to acute surgical blood loss; transfuse to meet goals. Last transfused 6/21/2025.   Acute on chronic thrombocytopenia: Secondary to induction IS. Last transfused 6/20/2025. Plt 60s.  Coagulopathy secondary to liver disease: Stable.     Cardiorespiratory:   Shock: Resolved.   Post-operative mechanical ventilation: Extubated 6/19.   Acute hypoxic respiratory failure: Wean NC as able.    - OOB as tolerate, early mobility   - Pulmonary hygiene   Hx NEREYDA: Does not use CPAP.   Hx HLD: Not currently on statin therapy.   Hx HFpEF:  Noted on on problem list, last EF normal. Not on GDMT.   Chronic left sided pleural effusion: Noted as trace on Xray 6/29/2025.   History of PE, 2002: Not on long-term anticoagulation.     GI/Nutrition: PPI.  Diet: ADAT.    - Feeding tube placed 6/22, TF goal rate 45 mL/hr   - Dietician following       - Calorie counts    Endocrine:   Hypothyroidism: TSH 6/7/2025 4.89.    - Continue home levothyroxine.   Steroid induced hyperglycemia: Last hemoglobin A1c 4.9% 6/20/2025.   - Sliding scale insulin Q4H     Renal/Fluid/Electrolytes: MIVF: saline lock.   Hypocalcemia: Managed by SICU.   Hyponatremia: Monitor.   Hypochloremia: Monitor.   Hyperphosphatemia: Monitor.   EMILIE: Oliguric. 43 ml UOP 6/20, Bumex given.    - Transplant Nephrology following   - CRRT started 6/21 and discontinued today. HD per Neph  Anasarca:    - Hold home Bumex and spironolactone     : Rodriguez removed 6/22    Infectious disease:   Risk for infection: Donor with open abdomen with risk of contamination.    - Continue antibiotics for a full 7- day course.    - Fluconazole x 7 days.    - Follow donor cultures.     Rheum:  Psoriatic arthritis:    - PTA managed with ixekizumab, tacrolimus ointment    Prophylaxis: DVT, fall, GI, fungal (fluconazole), viral (Valcyte), pneumocystis (Bactrim)    Disposition: SICU     JOCELIN/Fellow/Resident Provider: MERRITT Sibley CNP, Voctabitha    I saw and evaluated this patient as part of a shared visit. I spent 40 minutes on review of labs and imaging, exam, care coordination, and counseling.  Medically Ready for Discharge: Anticipated in 5+ Days     Faculty: Amauri Jacobs MD, PhD  __________________________________________________________________  Transplant History:    6/19/2025 (Liver), Postoperative day: 4     Interval History: History is obtained from the patient  Overnight events: No acute events overnight. Patient states she's feeling well this morning. Sitting up in chair, endorses no specific  complaints.    ROS:   A 10-point review of systems was negative except as noted above.    Curent Meds:  Current Facility-Administered Medications   Medication Dose Route Frequency Provider Last Rate Last Admin    acetaminophen (TYLENOL) tablet 650 mg  650 mg Oral Q8H Ginny Naidu MD   650 mg at 06/23/25 1149    [START ON 6/24/2025] aspirin (ASA) tablet 325 mg  325 mg Oral or Feeding Tube Daily Max Rebolledo MD        [START ON 6/24/2025] basiliximab (SIMULECT) 20 mg in sodium chloride 0.9 % 50 mL infusion  20 mg Intravenous Once Shaila Foote NP        citalopram (celeXA) tablet 20 mg  20 mg Oral Daily Ginny Naidu MD   20 mg at 06/23/25 0745    fluconazole (DIFLUCAN) intermittent infusion 200 mg  200 mg Intravenous Q24H Max Rebolledo MD        gabapentin (NEURONTIN) capsule 200 mg  200 mg Oral QAM Max Rebolledo MD   200 mg at 06/23/25 0746    gabapentin (NEURONTIN) capsule 400 mg  400 mg Oral QPM Max Rebolledo MD   400 mg at 06/22/25 2011    heparin ANTICOAGULANT injection 5,000 Units  5,000 Units Subcutaneous Q8H [facility] Gus Arroyo MD   5,000 Units at 06/23/25 1149    insulin aspart (NovoLOG) injection (RAPID ACTING)  1-12 Units Subcutaneous Q4H Sincere Cunningham PA-C   1 Units at 06/23/25 1213    levothyroxine (SYNTHROID/LEVOTHROID) tablet 112 mcg  112 mcg Oral Daily Ginny Naidu MD   112 mcg at 06/23/25 0746    multivitamin w/minerals (THERA-VIT-M) tablet 1 tablet  1 tablet Oral Daily Ginny Naidu MD   1 tablet at 06/23/25 0746    mycophenolate (GENERIC EQUIVALENT) capsule 500 mg  500 mg Oral Q8H Shaila Foote NP   500 mg at 06/23/25 0745    piperacillin-tazobactam (ZOSYN) 2.25 g vial to attach to  ml bag  2.25 g Intravenous Q6H Max Rebolledo MD   2.25 g at 06/23/25 1149    polyethylene glycol (MIRALAX) Packet 17 g  17 g Oral Daily Sincere Cunningham PA-C        [START ON 6/24/2025] predniSONE (DELTASONE) tablet 10 mg  10 mg Oral Once  Ginny Naidu MD        Followed by    [START ON 6/25/2025] predniSONE (DELTASONE) tablet 5 mg  5 mg Oral Daily Ginny Naidu MD        Prosource TF20 [employer] Compatibl EN LIQD (PROSOURCE TF20) packet 60 mL  1 packet Per Feeding Tube TID Sincere Cunningham PA-C   60 mL at 06/23/25 0747    senna-docusate (SENOKOT-S/PERICOLACE) 8.6-50 MG per tablet 2 tablet  2 tablet Oral BID Sincere Cunningham PA-C   2 tablet at 06/22/25 2012    sodium chloride (PF) 0.9% PF flush 3 mL  3 mL Intravenous Q8H Ginny Naidu MD   3 mL at 06/21/25 1217    sodium chloride (PF) 0.9% PF flush 3 mL  3 mL Intracatheter Q8H DARINEL Ginny Naidu MD   3 mL at 06/21/25 1553    sodium chloride (PF) 0.9% PF flush 3 mL  3 mL Intracatheter Q8H Ginny Naidu MD   3 mL at 06/21/25 1217    [START ON 6/25/2025] sulfamethoxazole-trimethoprim (BACTRIM) 400-80 MG per tablet 1 tablet  1 tablet Oral or Feeding Tube Once per day on Monday Wednesday Friday Max Rebolledo MD        tacrolimus (GENERIC EQUIVALENT) capsule 2 mg  2 mg Oral BID IS Ginny Naidu MD   2 mg at 06/23/25 0745    ursodiol (ACTIGALL) capsule 300 mg  300 mg Oral BID Ginny Naidu MD   300 mg at 06/23/25 0745    valGANciclovir (VALCYTE) tablet 450 mg  450 mg Oral Once per day on Monday Friday Max Rebolledo MD           Physical Exam:     Admit Weight: (!) 149.6 kg (329 lb 12.8 oz)    Current Vitals:   /85   Pulse 63   Temp 98  F (36.7  C)   Resp 18   Wt (!) 173.4 kg (382 lb 4.8 oz)   SpO2 93%   BMI 53.32 kg/m      Vital sign ranges:    Temp:  [97.6  F (36.4  C)-98  F (36.7  C)] 98  F (36.7  C)  Pulse:  [61-86] 63  Resp:  [9-31] 18  BP: (114-146)/(50-85) 127/85  SpO2:  [88 %-98 %] 93 %    General Appearance: Sitting in chair, no obvious acute distress.    Skin: Normal, warm, dry.  Heart: She appears adequately perfused.   Lungs: No increased work of breathing, she requires a small amount of supplemental oxygen.   Abdomen: Non-distended, Pravena wound  vac in place. TIARRA x 2 with thin serosang output.   : Rodriguez removed, minimal UOP.   Extremities: Edema: generalized edema. There is no skin breakdown obvious breakdown on exposed skin.  Neurologic: Awake, alert, oriented x 4. Tremor absent.     Frailty Scores          5/20/2025 3/30/2025   Frailty Scores   Final Score Not Frail  Not Frail    Final Score Number 1  1       Details          Patient-reported               Data:   CMP  Recent Labs   Lab 06/23/25  1211 06/23/25  0805 06/23/25  0353 06/22/25  2340 06/22/25 2022 06/22/25  0426 06/22/25  0423 06/20/25  0503 06/20/25  0358 06/18/25  2157 06/18/25  1902   NA  --   --  134*  --  132*   < > 133*   < > 136   < > 130*   POTASSIUM  --   --  4.2  --  4.3   < > 4.4   < > 4.0   < > 3.6   CHLORIDE  --   --  102  --  99   < > 99   < > 101   < > 95*   CO2  --   --  24  --  21*   < > 22   < > 25   < > 27   * 128* 118*  123*   < > 141*  140*   < > 104*   < > 169*  172*   < > 125*   BUN  --   --  25.6*  --  28.5*   < > 35.0*   < > 31.3*   < > 24.8*   CR  --   --  1.62*  --  1.84*   < > 2.70*   < > 1.62*   < > 0.98*   GFRESTIMATED  --   --  36*  --  31*   < > 19*   < > 36*   < > 66   SANDY  --   --  8.0*  --  8.2*   < > 8.2*   < > 8.4*   < > 8.4*   ICAW  --   --  4.5  --  4.2*   < > 4.2*   < > 4.7   < >  --    MAG  --   --  2.5*  --  2.4*   < > 2.4*   < > 2.1   < > 1.8   PHOS  --   --  4.5  --  4.8*   < > 6.0*   < > 5.9*   < > 2.9   AMYLASE  --   --   --   --   --   --   --   --  63  --  47   LIPASE  --   --   --   --   --   --   --   --  46  --   --    ALBUMIN  --   --  3.1*  --  3.3*   < > 3.3*   < > 2.6*   < > 2.5*   BILITOTAL  --   --  0.9  --   --   --  1.0   < > 2.5*   < > 4.9*   ALKPHOS  --   --  106  --   --   --  90   < > 60   < > 74   AST  --   --  251*  --   --   --  491*   < > 2,889*   < > 83*   ALT  --   --  191*  --   --   --  231*   < > 360*   < > 40    < > = values in this interval not displayed.     CBC  Recent Labs   Lab 06/23/25  0351  06/22/25 2022 06/20/25  1206 06/20/25  0749   HGB 8.1* 8.2*   < > 7.4*   WBC 5.6 6.1   < > 13.7*   PLT 63* 68*   < > 100*   A1C  --   --   --  4.9    < > = values in this interval not displayed.

## 2025-06-24 ENCOUNTER — APPOINTMENT (OUTPATIENT)
Dept: PHYSICAL THERAPY | Facility: CLINIC | Age: 60
DRG: 005 | End: 2025-06-24
Attending: TRANSPLANT SURGERY
Payer: COMMERCIAL

## 2025-06-24 ENCOUNTER — APPOINTMENT (OUTPATIENT)
Dept: GENERAL RADIOLOGY | Facility: CLINIC | Age: 60
End: 2025-06-24
Payer: COMMERCIAL

## 2025-06-24 ENCOUNTER — APPOINTMENT (OUTPATIENT)
Dept: OCCUPATIONAL THERAPY | Facility: CLINIC | Age: 60
DRG: 005 | End: 2025-06-24
Attending: TRANSPLANT SURGERY
Payer: COMMERCIAL

## 2025-06-24 PROCEDURE — 71045 X-RAY EXAM CHEST 1 VIEW: CPT

## 2025-06-24 ASSESSMENT — ACTIVITIES OF DAILY LIVING (ADL)
ADLS_ACUITY_SCORE: 53
ADLS_ACUITY_SCORE: 52
ADLS_ACUITY_SCORE: 53
ADLS_ACUITY_SCORE: 52
ADLS_ACUITY_SCORE: 53
ADLS_ACUITY_SCORE: 52
ADLS_ACUITY_SCORE: 53
ADLS_ACUITY_SCORE: 56
ADLS_ACUITY_SCORE: 52
ADLS_ACUITY_SCORE: 53
ADLS_ACUITY_SCORE: 53
ADLS_ACUITY_SCORE: 52

## 2025-06-24 NOTE — PROGRESS NOTES
Calorie Count  Intake recorded for: 6/23  Total Kcals: 770 Total Protein: 80g  Kcals from Hospital Food: 770   Protein: 80g  Kcals from Outside Food (average):0 Protein: 0g  # Meals Ordered from Kitchen: 3  # Meals Recorded: 2 meals recorded   Meal 1: 100% 1 lemon fruit ice   Meal 2: 50% 1 lemon fruit ice  # Supplements Recorded: 100% 2 Ensure Max vanilla; 1 Ensure Plus HP vanilla

## 2025-06-24 NOTE — PROGRESS NOTES
CRRT STATUS NOTE    DATA:  Time:  4:58 AM  Pressures WNL:  YES  Filter Status:  WDL    Problems Reported/Alarms Noted:  Access/Return alarms.  Line positional.    Supplies Present:  YES    ASSESSMENT:  Patient Net Fluid Balance:  6/23  -1027  6/24 0400 -1328  Vital Signs:  Temp: 97.6  F (36.4  C) Temp src: Oral BP: 109/54 Pulse: 62   Resp: 15 SpO2: 99 % O2 Device: High Flow Nasal Cannula (HFNC) Oxygen Delivery: 40 LPM 40%.      Labs:  K 4.1  ICa 4.4 Hgb 8.1  plt 62  Goals of Therapy:  150-200ml/hr net, meeting goals of therapy    INTERVENTIONS:   Restarted @ 2148 due to high return pressure.  Adjusted blood flow and reversed line x2.  Changed dressing x2 due to bleeding.  StatSeal placed.    PLAN:  Discuss transition to HD today. Prescribed weight 173.4Kg and AM weight 145.83. Continue per Renal orders.

## 2025-06-24 NOTE — PROGRESS NOTES
Date: 6/24/2025  Time: 1400     Data:  Pre Wt:   145.8 kg ( bed scale)  Desired Wt:  - 2 kg   Post Wt:  143.8 kg (bed scale)  Weight change: - 2 kg  Ultrafiltration - Post Run Net Total Removed (mL):  2000 ml  Vascular Access Status: CVC patent  Dialyzer Rinse:  Light  Total Blood Volume Processed: 50.2 L   Total Dialysis (Treatment) Time:   3 Hrs  Dialysate Bath: K 3, Ca 3  ,   Heparin: none    Lab:   Yes    Interventions:  Dialysis done through left non tunneled CVC  Uf goal set to 2.3 litres including priming and rinse back volume  Arterial line infiltration, AP increased, line reversed and  achieved throughout run  meds administered per MAR.   Crit line shows profile A and B throughout run tolerating uf pull  Glucose checks done, 1 time, 153 mg/dl  Treatment has ended safely and  blood is rinsed back completely.  Catheter lumens flushed with saline and locked with saline, catheter caps (ClearGuard ) changed post HD.  Report given to Zaira Baugh pt remained to room with stable condition     Assessment:  A & O x 4, calm & cooperative, denies pain  CVC dressing due on 6/30/2025. CVC intact, clean and dry.                 Plan:    Per Renal team        JEM MANN, LYNNETTEN, RN  Acute Dialysis RN  [facility]

## 2025-06-24 NOTE — PROGRESS NOTES
Shift summary 0700 to 1930 today:  Neuro: alert and oriented x4, pt reported confusion symptoms attributible to neurontin dosing. States symptoms resolving with discontinuation of neurontin. Up to bedside commode and chair with assist of one. Good pain control on scheduled tylenol and PRN oxy 5 mg. Up in chair most of the afternoon.   CV: BP and HR stable all day, tolerated CRRT to goal until stopped at 0930.   Pulm: Able to wean down HFNC from 40% to room air after dialysis.  GI: 4 large incontinent BM in first 3 hours of this shift, has now stopped. Holding laxatives. Tube feeds stopped for nighttime cycling. Advanced to regular diet, continuing calorie counts.   : Large incontinent void on chux in bed, estimated 400cc. Plan for bladder scan q shift.   Tolerated UF on CRRT and iHD today to goal, currently 3.8 liters net negative since midnight.   Skin: no new concerns. [facility] therapist elected to hold off on wrapping of BLE due to weepy edema in Right thigh.   Sister at bedside most of the day, participating in care. Anticipate transfer to  tomorrow.   Pt has virtual visit with rheumatology @1400 tomorrow.

## 2025-06-24 NOTE — PLAN OF CARE
ICU End of Shift Summary. See flowsheets for vital signs and detailed assessment.    Changes this shift: Patient Aox 3, with intermittent confusion. She reported hearing music and had been for on and off that last few days. SR randi 55-70's. Still continues on hi-flow oxygen and tolerating weaning, IS encouraged. Continues on TF, multiple loose incontinent stools. Voided bladder, unmeasurable and incontinent. CRRT alarming, please see CRRT resource note regarding complications. Able to meet removal goal.     Plan: Continue plan of care.       Goal Outcome Evaluation:           Overall Patient Progress: improvingOverall Patient Progress: improving    Outcome Evaluation: Pt tolerating CRRT. HD planned for later today 6/24

## 2025-06-24 NOTE — PROGRESS NOTES
CLINICAL NUTRITION SERVICES - BRIEF NOTE      Reason for RD note: Following up on tolerance to EN support, kcal cts    New Findings/Chart Review:  TF reached goal rate on 6/23 @ 1600.    Full liquid diet + Ensure Max with meals. Taking ONS well, and accounting for most of PO so far per kcal cts.    Kcal cts 6/22-6/24 6/22       Total Kcals: 549       Total Protein: 73g  Kcals from Hospital Food: 549                                   Protein: 73g  Kcals from Outside Food (average):0            Protein: 0g  # Meals Ordered from Kitchen: 3 meals  # Meals Recorded: 1 meal (100% 1.25 Greek yogurt, orange sherbet; 25% 1 6oz chicken broth, cooked cream of wheat)  # Supplements Recorded: 2 supplements (100% 2 Ensure Max)    6/23       Total Kcals: 770       Total Protein: 80g  Kcals from Hospital Food: 770                                   Protein: 80g  Kcals from Outside Food (average):0            Protein: 0g  # Meals Ordered from Kitchen: 3  # Meals Recorded: 2 meals recorded              Meal 1: 100% 1 lemon fruit ice              Meal 2: 50% 1 lemon fruit ice  # Supplements Recorded: 100% 2 Ensure Max vanilla; 1 Ensure Plus HP vanilla     6/24 --> Results pending...    Interventions:  Discussed pt in SICU rounds. Writer discussed improving kcal cts and suggested cycled TF. SICU checked with Transplant team and passed along to writer that they're ok with writer cycling feeds.    Continue Ensure Max    Cycle EN support to encourage appetite during day:  TwoCal HN @ 45 mL/hr x 12 hours (8p-8a) + 2 pkts ProSource TF20 = 540 mL TF, 1240 kcal (69% minimum assessed kcal needs), 89 g protein (66% minimum assessed protein needs)    Future/Additional Recommendations:  Monitor PO/kcal cts. Goal 1170 kcal, 88 g pro (65% low-end needs) to get off EN/remove FT    Nutrition will continue to follow per protocol.    Donna Addison RD, LD  Available on [employer] - can search by name or unit Dietitian

## 2025-06-24 NOTE — PROGRESS NOTES
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 Rebolledo on 6/19/2025.     Today:   - Transition to iHD per Neph   - May be ready for floor orders tomorrow pending tolerance of iHD and respiratory status                                                                                                                                    Graft function: s/p DBDLT without stent, POD#5. Post-op US with patent Doppler. Liver labs down-trending, lactic normal, INR normalizing.    -  mg x 3 months    - Ursodiol  300 mg BID x 3 months     Immunosuppressed status secondary to medications:  Induction:  SM taper followed by prednisone x 3 months   Basiliximab 20 mg IV on 6/20 and 6/24 in the setting of EMILIE  Maintenance:     -  mg Q8H   - Tacrolimus goal level 5 to 6.   - Prednisone 5 mg daily following taper.    Neuro/Psych:  Depression:   - Continue home citalopram.   Fibromyalgia:   - Continue home gabapentin.   Psychophysiological insomnia:   - Continue home Trazodone.  Acute post op pain:    - PRN PO and IV hydromorphone available     Hematology:   Acute on chronic anemia: Hgb ~8. Secondary to acute surgical blood loss; transfuse to meet goals. Last transfused 6/21/2025.   Acute on chronic thrombocytopenia: Secondary to induction IS. Last transfused 6/20/2025. Plt 60s.  Coagulopathy secondary to liver disease: Stable.     Cardiorespiratory:   Shock: Resolved.   Post-operative mechanical ventilation: Extubated 6/19.   Acute hypoxic respiratory failure: Wean HFNC as able.    - OOB as tolerate, early mobility   - Pulmonary hygiene   Hx NEREYDA:  Does not use CPAP.   Hx HLD: Not currently on statin therapy.   Hx HFpEF: Noted on on problem list, last EF normal. Not on GDMT.   Chronic left sided pleural effusion: Noted as trace on Xray 6/29/2025.   History of PE, 2002: Not on long-term anticoagulation.     GI/Nutrition: PPI.  Diet: Regular    - Feeding tube placed 6/22, TF goal rate 45 mL/hr   - Dietician following       - Calorie counts    Endocrine:   Hypothyroidism: TSH 6/7/2025 4.89.    - Continue home levothyroxine.   Steroid induced hyperglycemia: Last hemoglobin A1c 4.9% 6/20/2025.   - Sliding scale insulin Q4H     Renal/Fluid/Electrolytes: MIVF: saline lock.   Hypocalcemia: Managed by SICU.   Hyponatremia: Monitor.   Hypochloremia: Monitor.   Hyperphosphatemia: Monitor.   EMILIE: Oliguric. 43 ml UOP 6/20, Bumex given.    - Transplant Nephrology following   - CRRT started 6/21 and discontinued today. HD per Neph  Anasarca:    - Hold home Bumex and spironolactone     : Rodriguez removed 6/22    Infectious disease:   Risk for infection: Donor with open abdomen with risk of contamination.    - Continue antibiotics for a full 7- day course.    - Fluconazole x 7 days.    - Follow donor cultures.     Rheum:  Psoriatic arthritis:    - PTA managed with ixekizumab, tacrolimus ointment    Prophylaxis: DVT, fall, GI, fungal (fluconazole), viral (Valcyte), pneumocystis (Bactrim)    Disposition: SICU     JOCELIN/Fellow/Resident Provider: MERRITT Sibley CNP, Voctabitha    I saw and evaluated this patient as part of a shared visit. I spent 40 minutes on review of labs and imaging, exam, care coordination, and counseling.  Medically Ready for Discharge: Anticipated in 5+ Days     Faculty: Amauri Jacobs MD, PhD  __________________________________________________________________  Transplant History:    6/19/2025 (Liver), Postoperative day: 5     Interval History: History is obtained from the patient  Overnight events: No acute events overnight. Patient reports some labored  breathing this morning with activity. No other complaints.    ROS:   A 10-point review of systems was negative except as noted above.    Curent Meds:  Current Facility-Administered Medications   Medication Dose Route Frequency Provider Last Rate Last Admin    acetaminophen (TYLENOL) tablet 650 mg  650 mg Oral Q8H Ginny Naidu MD   650 mg at 06/24/25 0346    aspirin (ASA) tablet 325 mg  325 mg Oral or Feeding Tube Daily Max Rebolledo MD   325 mg at 06/24/25 0815    citalopram (celeXA) tablet 20 mg  20 mg Oral Daily Ginny Naidu MD   20 mg at 06/24/25 0817    fluconazole (DIFLUCAN) intermittent infusion 400 mg  400 mg Intravenous Q24H Max Rebolledo  mL/hr at 06/23/25 1831 400 mg at 06/23/25 1831    heparin ANTICOAGULANT injection 5,000 Units  5,000 Units Subcutaneous Q8H [facility] Gus Arroyo MD   5,000 Units at 06/24/25 0537    insulin aspart (NovoLOG) injection (RAPID ACTING)  1-12 Units Subcutaneous Q4H Sincere Cunningham PA-C   1 Units at 06/24/25 0826    levothyroxine (SYNTHROID/LEVOTHROID) tablet 112 mcg  112 mcg Oral Daily Ginny Naidu MD   112 mcg at 06/24/25 0815    multivitamin w/minerals (THERA-VIT-M) tablet 1 tablet  1 tablet Oral Daily Ginny Naidu MD   1 tablet at 06/24/25 0819    mycophenolate (GENERIC EQUIVALENT) capsule 500 mg  500 mg Oral Q8H Shaila Foote NP   500 mg at 06/24/25 0812    piperacillin-tazobactam (ZOSYN) 2.25 g vial to attach to  ml bag  2.25 g Intravenous Q6H Max Rebolledo MD        [START ON 6/25/2025] predniSONE (DELTASONE) tablet 5 mg  5 mg Oral Daily Ginny Naidu MD        Prosource TF20 [employer] Compat[employer]l EN LIQD (PROSOURCE TF20) packet 60 mL  1 packet Per Feeding Tube TID Sincere Cunningham PA-C   60 mL at 06/24/25 0820    sodium chloride (PF) 0.9% PF flush 3 mL  3 mL Intravenous Q8H Ginny Naidu MD   3 mL at 06/21/25 1217    sodium chloride (PF) 0.9% PF flush 3 mL  3 mL Intracatheter Q8H [facility] Ginny Naidu MD   3 mL  at 06/21/25 1553    sodium chloride (PF) 0.9% PF flush 3 mL  3 mL Intracatheter Q8H Ginny Naidu MD   3 mL at 06/24/25 0346    sodium chloride 0.9% BOLUS 500 mL  500 mL Hemodialysis Machine Once El Tevin Bello MD        sulfamethoxazole-trimethoprim (BACTRIM) 400-80 MG per tablet 1 tablet  1 tablet Oral or Feeding Tube Once per day on Tuesday Thursday Saturday Max Rebolledo MD        tacrolimus (GENERIC EQUIVALENT) capsule 2.5 mg  2.5 mg Oral BID IS Elisabeth Wilkinson APRN CNP   2.5 mg at 06/24/25 0812    ursodiol (ACTIGALL) capsule 300 mg  300 mg Oral BID Ginny Naidu MD   300 mg at 06/24/25 0815    valGANciclovir (VALCYTE) tablet 450 mg  450 mg Oral Once per day on Tuesday Saturday Max Rebolledo MD           Physical Exam:     Admit Weight: (!) 149.6 kg (329 lb 12.8 oz)    Current Vitals:   BP (!) 145/56   Pulse 70   Temp 97.8  F (36.6  C) (Oral)   Resp 20   Wt (!) 145.8 kg (321 lb 8 oz)   SpO2 (!) 89%   BMI 44.84 kg/m      Vital sign ranges:    Temp:  [97.6  F (36.4  C)-98.4  F (36.9  C)] 97.8  F (36.6  C)  Pulse:  [56-76] 70  Resp:  [13-24] 20  BP: ()/(44-91) 145/56  Cuff Mean (mmHg):  [75] 75  FiO2 (%):  [40 %-60 %] 40 %  SpO2:  [89 %-99 %] 89 %    General Appearance: no obvious acute distress.    Skin: Normal, warm, dry.  Heart: She appears adequately perfused.   Lungs: Dyspnea on exertion, HFNC   Abdomen: Non-distended, Pravena wound vac in place. TIARRA x 2 with thin serosang output.   : Rodriguez removed, minimal UOP.   Extremities: Edema: generalized edema. There is no skin breakdown obvious breakdown on exposed skin.  Neurologic: Awake, alert, oriented x 4. Tremor absent.     Frailty Scores          5/20/2025 3/30/2025   Frailty Scores   Final Score Not Frail  Not Frail    Final Score Number 1  1       Details          Patient-reported               Data:   CMP  Recent Labs   Lab 06/24/25  0825 06/24/25  0340 06/23/25 1958 06/23/25 1957 06/23/25  0805 06/23/25  0353  06/20/25  0503 06/20/25  0358 06/18/25  2157 06/18/25  1902   NA  --  136  --  134*  --  134*   < > 136   < > 130*   POTASSIUM  --  4.1  --  4.2  --  4.2   < > 4.0   < > 3.6   CHLORIDE  --  104  --  101  --  102   < > 101   < > 95*   CO2  --  23  --  23  --  24   < > 25   < > 27   * 149*  158*   < > 224*   < > 118*  123*   < > 169*  172*   < > 125*   BUN  --  25.9*  --  30.7*  --  25.6*   < > 31.3*   < > 24.8*   CR  --  1.49*  --  1.66*  --  1.62*   < > 1.62*   < > 0.98*   GFRESTIMATED  --  40*  --  35*  --  36*   < > 36*   < > 66   SANDY  --  7.6*  --  7.9*  --  8.0*   < > 8.4*   < > 8.4*   ICAW  --  4.4  --  4.5  --  4.5   < > 4.7   < >  --    MAG  --  2.4*  --  2.5*  --  2.5*   < > 2.1   < > 1.8   PHOS  --  3.4  --  3.6  --  4.5   < > 5.9*   < > 2.9   AMYLASE  --   --   --   --   --   --   --  63  --  47   LIPASE  --   --   --   --   --   --   --  46  --   --    ALBUMIN  --  3.0*  --  3.2*  --  3.1*   < > 2.6*   < > 2.5*   BILITOTAL  --  0.7  --   --   --  0.9   < > 2.5*   < > 4.9*   ALKPHOS  --  98  --   --   --  106   < > 60   < > 74   AST  --  117*  --   --   --  251*   < > 2,889*   < > 83*   ALT  --  145*  --   --   --  191*   < > 360*   < > 40    < > = values in this interval not displayed.     CBC  Recent Labs   Lab 06/24/25  0340 06/23/25  0353 06/20/25  1206 06/20/25  0749   HGB 8.1*  8.1* 8.1*   < > 7.4*   WBC 4.3  4.3 5.6   < > 13.7*   PLT 62*  62* 63*   < > 100*   A1C  --   --   --  4.9    < > = values in this interval not displayed.

## 2025-06-24 NOTE — PROGRESS NOTES
SICU PROGRESS NOTE  2025      CO-MORBIDITIES:   Metabolic dysfunction-associated steatohepatitis (MASH)  (primary encounter diagnosis)  Cirrhosis of liver with ascites, unspecified hepatic cirrhosis type (H)    ASSESSMENT: aKrlene Yun is a 59 year old female with PMHx liver cirrhosis secondary to MASH and prior PE (in , no longer on anticoagulation), who was admitted to the SICU on 2025 s/p  donor liver transplant with Dr. Rebolledo on 2025.     TODAY'S PROGRESS:   -Discontinue Gabapentin  -Trial iHD today  -Wean HFNC as able  -Decrease PRN Oxycodone to 5-10 mg, discontinue Dilaudid IV PRN  -Advance diet as tolerated  -Initiate PRN Hydroxyzine 12.5-25 mg q 6 hours  -PRN Bowel Regimen    PLAN:  # Acute post operative pain  # MDD   # Fibromyalgia  # Insomnia  # Hypoactive delirium- improved   - Monitor neurological status. Delirium preventions and precautions.  - Pain:   - Scheduled: Tylenol 650 mg q8h, Gabapentin 300 mg qAM / 400 every evening (holding due to patient report of anxiety with administration ), Lidocaine patches daily  - PRN:  Oxycodone 10-15 mg q 4 hr, IV Dilaudid 0.4 mg q2h prn, Methocarbamol to 750 mg q 6 hr       - : Decrease Oxycodone to 5-10 mg q 4 hours PRN, discontinue IV Dilaudid PRN, was not utilizing  -- PTA Citalopram, Trazodone ordered    # Anxiety  - Initiate PRN Hydroxyzine 12.5-25 mg q 6 hours (adjusted for renal function)     Pulmonary care:   # Post operative respiratory insufficiency  # Left pleural effusion, resolving  - Wean Supplemental O2 as able to SpO2 goals  - Goal SpO2 > 92%.  - Pulmonary hygiene; IS q15-30 minutes when awake  - CXR with increased edema, plan for iHD , wean HFNC as tolerated    Cardiovascular:    - Monitor hemodynamic status.  - MAP goal > 65 mmHg, not currently requiring vasopressor support  am     # Hx of HLD  - No home medications    GI /Nutrition:   # Cirrhosis of the liver  # Metabolic  dysfunction-associated steatohepatitis  # S/p  donor liver transplant on 2025  - Ursodiol 300 mg BID  - Basiliximab   - Immunosuppressant regimen as below   - PPI discontinued   - Liver ultrasound post-op with elevated peak systolic velocities and resistive indices, no fluid collection  - LFTs downtrending , continue to trend  -  mg daily  - Continue to follow drain output, RLQ with increased output      # Protein calorie deficit malnutrition, risk for   - Full Liquid Diet Thin Liquids (level 0), advance as tolerated   - Calorie Counts  - Snacks/Supplements Adult: Ensure Max Protein (bariatric); With Meals  - Last BM ; daily Miralax and BID senna, multiple Bms , hold bowel regimen    # Nausea   - Ondansetron IV/PO PRN    Fluids/ Electrolytes/ Renal:   # Acute Kidney Injury  - Nephrology consulted; appreciate recs  - Pt tolerated CRRT yesterday, plan for iHD today   - Bladder scan per unit protocol     Endocrine:    # Stress Hyperglycemia   # No Hx DM  - Transitioned to high intensity sliding scale insulin   - Goal -180 for optimal healing      # Hypothyroidism  - PTA Levothyroxine ordered    ID/ Antibiotics:  # Immunosuppression  # Kathya-operative prophylaxis  - Tacrolimus, mycophenolate  - Steroid taper  - PPX: bactrim, valcyte, fluconazole - per Txp Surgery plan to extend fluconazole for 7 day course, continue Zosyn x 7 day course with end date  as donor had open abdomen/high risk for infection  - Patient afebrile, WBC downtrended to 4.3 on      # Psoriasis  # Psoriatic arthritis  - Home regimen includes Ixekizumab (q4 weeks, currently held), PRN Mometasone cream ordered    Heme:     # Acute Blood Loss Anemia   # Anemia of Critical Illness   - Transfuse per transfusion goals below  - SQH for DVT prophylaxis  - Hemoglobin stable at 8.1, platelets stable at 62 on      Transfusion goals:  -- Hgb > 8  -- Fibrinogen > 200  -- Plt > 50  -- INR <  "2.0      # Hx of PE 1/31/2002  -- No PTA DOAC    MSK/Skin:  # Weakness and deconditioning of critical illness  # Surgical Incision  - Physical and occupational therapy consult   - Wound vac in place to abdominal incision    Prophylaxis:    DVT Prophylaxis: Pneumatic Compression Devices,  mg daily, SQH   GI Prophylaxis: PPI-discontinued 6/23    Lines/ tubes/ drains:  - PIV x2  - L internal jugular trialysis CVC  - Surgical drain x2  - Wound vac    Disposition:  - SICU.    Patient seen, findings and plan discussed with SICU staff, Dr. Rodríguez.    Total Critical Care time spent by me, excluding procedures, was 38 minutes.    Jasmina Pastrana PA-C    Clinically Significant Risk Factors         # Hyponatremia: Lowest Na = 132 mmol/L in last 2 days, will monitor as appropriate   # Hypocalcemia: Lowest iCa = 4.2 mg/dL in last 2 days, will monitor and replace as appropriate     # Hypoalbuminemia: Lowest albumin = 2.2 g/dL at 6/19/2025  8:25 PM, will monitor as appropriate  # Coagulation Defect: INR = 1.18 (Ref range: 0.85 - 1.15) and/or PTT = 25 Seconds (Ref range: 22 - 38 Seconds), will monitor for bleeding  # Thrombocytopenia: Lowest platelets = 59 in last 2 days, will monitor for bleeding    # Chronic heart failure with preserved ejection fraction: heart failure noted on problem list and last echo with EF >50%           # Morbid Obesity: Estimated body mass index is 44.84 kg/m  as calculated from the following:    Height as of 6/7/25: 1.803 m (5' 11\").    Weight as of this encounter: 145.8 kg (321 lb 8 oz)., PRESENT ON ADMISSION     # Financial/Environmental Concerns: none            ====================================    SUBJECTIVE: Patient resting in bed, HFNC in place. Responding to questions appropriately. Endorsing some gas pain earlier in the day. Endorsing obtaining sleep overnight. Endorses pain, but states it is chronic and not new since the surgery, endorses it as well managed with current pain " medication.    OBJECTIVE:   1. VITAL SIGNS:   Temp:  [97.6  F (36.4  C)-98.1  F (36.7  C)] 97.6  F (36.4  C)  Pulse:  [56-71] 57  Resp:  [12-24] 24  BP: ()/(44-85) 117/58  FiO2 (%):  [40 %-60 %] 40 %  SpO2:  [88 %-99 %] 95 %  FiO2 (%): 40 %, Resp: 24    2. INTAKE/ OUTPUT:   I/O last 3 completed shifts:  In: 4350 [P.O.:2370; I.V.:70; NG/GT:400; IV Piggyback:700]  Out: 4969 [Drains:1004; Other:3365; Stool:600]    3. PHYSICAL EXAMINATION:   General: Patient resting in bed, awake. Glasses in place.   Neuro: Alert, responding to questions appropriately, moving all extremities spontaneously or to command.  Resp: HFNC in place, lungs clear to auscultation bilaterally.  CV: Bradycardic rate, regular rhythm.  Abdomen: Soft, Non-distended, Non-tender on palpation.  Incisions: Upside down Y-shaped incision with wound vac in place. TIARRA drains x2, both with serosanguinous output.  Extremities: warm and well perfused, DP and radial pulses palpable bilaterally. Peripheral edema bilateral LE.    4. INVESTIGATIONS:   Arterial Blood Gases   Recent Labs   Lab 06/19/25  1935 06/19/25  1835 06/19/25  1802 06/19/25  1727   PH 7.39 7.35 7.37 7.37   PCO2 39 44 42 43   PO2 62* 88 118* 111*   HCO3 23 24 24 25     Complete Blood Count   Recent Labs   Lab 06/24/25  0340 06/23/25  0353 06/22/25 2022 06/22/25  1250   WBC 4.3  4.3 5.6 6.1 5.9   HGB 8.1*  8.1* 8.1* 8.2* 8.2*   PLT 62*  62* 63* 68* 63*     Basic Metabolic Panel  Recent Labs   Lab 06/24/25  0340 06/23/25  2316 06/23/25 1958 06/23/25 1957 06/23/25  0805 06/23/25  0353 06/22/25 2340 06/22/25 2022     --   --  134*  --  134*  --  132*   POTASSIUM 4.1  --   --  4.2  --  4.2  --  4.3   CHLORIDE 104  --   --  101  --  102  --  99   CO2 23  --   --  23  --  24  --  21*   BUN 25.9*  --   --  30.7*  --  25.6*  --  28.5*   CR 1.49*  --   --  1.66*  --  1.62*  --  1.84*   *  158* 188* 208* 224*   < > 118*  123*   < > 141*  140*    < > = values in this interval not  displayed.     Liver Function Tests  Recent Labs   Lab 06/24/25  0340 06/23/25  1957 06/23/25  0353 06/22/25 2022 06/22/25  1250 06/22/25  1037 06/22/25  0423 06/21/25  0514 06/21/25  0101   *  --  251*  --   --   --  491*  --  984*   *  --  191*  --   --   --  231*  --  261*   ALKPHOS 98  --  106  --   --   --  90  --  54   BILITOTAL 0.7  --  0.9  --   --   --  1.0  --  1.4*   ALBUMIN 3.0* 3.2* 3.1* 3.3*   < >  --  3.3*   < > 2.9*   INR 1.18*  --  1.24*  --   --  1.23* 1.25*   < > 1.38*    < > = values in this interval not displayed.     Pancreatic Enzymes  Recent Labs   Lab 06/20/25 0358 06/18/25  1902   LIPASE 46  --    AMYLASE 63 47     Coagulation Profile  Recent Labs   Lab 06/24/25  0340 06/23/25  0353 06/22/25  1037 06/22/25  0423   INR 1.18* 1.24* 1.23* 1.25*   PTT 25 25 25 26         5. RADIOLOGY:   No results found for this or any previous visit (from the past 24 hours).    =========================================

## 2025-06-24 NOTE — PROGRESS NOTES
[facility]  Transplant Nephrology Progress Note  Date of Admission:  6/18/2025  Today's Date: 06/24/2025  Requesting physician: Max Rebolledo MD    Recommendations:   - switch to iHD today, 2 L UF as tolerated    Assessment & Plan   # EMILIE: RRT dependent, CRRT to iHD switch on 6/24   - EMILIE likely secondary to liver transplant with hemodynamic changes and hypotension.    - Baseline Creatinine: ~ 0.8-1.0   - Proteinuria: Normal (<0.2 grams)    # Liver Tx: Patient with ESLD secondary to Metabolic associated steatotic liver disease (MASLD), s/p OLT June 19, 2025.  Transaminases donwtrending,  Followed by Transplant Surgery.    # Immunosuppression: Tacrolimus immediate release (goal 6-8), Mycophenolate mofetil (dose 750 mg every 12 hours), and Methylprednisolone (dose taper)   - Induction with Recent Transplant:  Per Liver Tx Protocol   - Continue with intensive monitoring of immunosuppression for efficacy and toxicity.   - Changes: Not at this time; Management per Transplant Surgery.    # Infection Prophylaxis:   - PJP: Sulfa/TMP (Bactrim)  - CMV: Valganciclovir (Valcyte)  - Thrush: Fluconazole (Diflucan)  - Fungal: Fluconazole (Diflucan)      - CMV IgG Ab High Risk Discordance (D+/R-) at time of transplant: No  Present CMV Serostatus: Positive  - EBV IgG Ab High Risk Discordance (D+/R-) at time of transplant: No  Present EBV Serostatus: Positive    # Blood Pressure: stable off pressors;  Goal BP: MAP > 65   - Changes: Not at this time;     # Elevated Blood Glucose: Secondary to high dose steroids.   - On insulin    # Anemia in Chronic Disease/Surgery: Hgb: stable low      RACHEL: No   - Iron studies: Not checked recently    # Thrombocytopenia: Stable, mildly low platelet level.    # Mineral Bone Disorder:   - Vitamin D; level: Low normal        On supplement: No  - Calcium; level: Low normal when corrected for low serum albumin        On supplement: No  - Phosphorus;  level: High        On binder: No    # Electrolytes:   - Potassium; level: Normal        On supplement: No  - Magnesium; level: Normal        On supplement: No  - Bicarbonate; level: Normal        On supplement: No  - Sodium; level: Normal    # Obesity, Class III (BMI = 47.7):    - Once patient recovers from surgery and incision heals, would recommend working on weight loss     # Other Significant PMH:   - H/o PE (~ 2000): Patient with no recent clotting episodes and off anticoagulation.   - Fibromyalgia: Stable symptoms on gabapentin.     # Transplant History:  Etiology of Organ Failure: Metabolic associated steatotic liver disease (MASLD)  Tx: Liver Tx  Transplant: 6/19/2025 (Liver)  Significant changes in immunosuppression: Slightly lower tacrolimus level goal due to EMILIE.  Significant transplant-related complications: EMILIE    Recommendations were communicated to the primary team via this note.    Tevin Wyatt MD  Transplant Nephrology  Contact information via Vocera Web Console or via [facility] Paging/Directory    Interval History  More alert today  Hemodynamically stable. Remains off pressors  On HFNC 40%sating 95%  Anuric, tolerating CRRT ~net neg 1.9 L/24h  Significant leg swelling    Review of Systems   4 point ROS was obtained and negative except as noted in the Interval History.    MEDICATIONS:  Current Facility-Administered Medications   Medication Dose Route Frequency Provider Last Rate Last Admin    acetaminophen (TYLENOL) tablet 650 mg  650 mg Oral Q8H Ginny Naidu MD   650 mg at 06/24/25 0346    aspirin (ASA) tablet 325 mg  325 mg Oral or Feeding Tube Daily Max Rebolledo MD        basiliximab (SIMULECT) 20 mg in sodium chloride 0.9 % 50 mL infusion  20 mg Intravenous Once Shaila Foote, NP        citalopram (celeXA) tablet 20 mg  20 mg Oral Daily Ginny Naidu MD   20 mg at 06/23/25 0745    fluconazole (DIFLUCAN) intermittent infusion 400 mg  400 mg Intravenous Q24H Amaury  MD Max 100 mL/hr at 06/23/25 1831 400 mg at 06/23/25 1831    gabapentin (NEURONTIN) capsule 200 mg  200 mg Oral QAM Max Rebolledo MD   200 mg at 06/23/25 0746    gabapentin (NEURONTIN) capsule 400 mg  400 mg Oral QPM Max Rebolledo MD   400 mg at 06/23/25 1947    heparin ANTICOAGULANT injection 5,000 Units  5,000 Units Subcutaneous Q8H [facility] Gus Arroyo MD   5,000 Units at 06/24/25 0537    insulin aspart (NovoLOG) injection (RAPID ACTING)  1-12 Units Subcutaneous Q4H Sincere Cunningham PA-C   1 Units at 06/24/25 0346    levothyroxine (SYNTHROID/LEVOTHROID) tablet 112 mcg  112 mcg Oral Daily Ginny Naidu MD   112 mcg at 06/23/25 0746    multivitamin w/minerals (THERA-VIT-M) tablet 1 tablet  1 tablet Oral Daily Ginny Naidu MD   1 tablet at 06/23/25 0746    mycophenolate (GENERIC EQUIVALENT) capsule 500 mg  500 mg Oral Q8H Shaila Foote, NP   500 mg at 06/23/25 2332    piperacillin-tazobactam (ZOSYN) 4.5 g vial to attach to  mL bag  4.5 g Intravenous Q6H Max Rebolledo MD   4.5 g at 06/24/25 0537    polyethylene glycol (MIRALAX) Packet 17 g  17 g Oral Daily Sincere Cunningham PA-C        predniSONE (DELTASONE) tablet 10 mg  10 mg Oral Once Ginny Naidu MD        Followed by    [START ON 6/25/2025] predniSONE (DELTASONE) tablet 5 mg  5 mg Oral Daily Ginny Naidu MD        Prosource TF20 ENfit Compatibl EN LIQD (PROSOURCE TF20) packet 60 mL  1 packet Per Feeding Tube TID Sincere Cunningham PA-C   60 mL at 06/23/25 1947    senna-docusate (SENOKOT-S/PERICOLACE) 8.6-50 MG per tablet 2 tablet  2 tablet Oral BID Sincere Cunningham PA-C   2 tablet at 06/22/25 2012    sodium chloride (PF) 0.9% PF flush 3 mL  3 mL Intravenous Q8H Ginny Naidu MD   3 mL at 06/21/25 1217    sodium chloride (PF) 0.9% PF flush 3 mL  3 mL Intracatheter Q8H [facility] Ginny Naidu MD   3 mL at 06/21/25 1553    sodium chloride (PF) 0.9% PF flush 3 mL  3 mL Intracatheter Q8H Ginny Naidu MD   3 mL  at 25 0346    sulfamethoxazole-trimethoprim (BACTRIM) 400-80 MG per tablet 1 tablet  1 tablet Oral or Feeding Tube Daily Max Rebolledo MD        tacrolimus (GENERIC EQUIVALENT) capsule 2.5 mg  2.5 mg Oral BID IS Elisabeth Wilkinson APRN CNP   2.5 mg at 25 1730    ursodiol (ACTIGALL) capsule 300 mg  300 mg Oral BID Ginny Naidu MD   300 mg at 25 194    valGANciclovir (VALCYTE) tablet 450 mg  450 mg Oral Daily Max Rebolledo MD   450 mg at 25 194     Current Facility-Administered Medications   Medication Dose Route Frequency Provider Last Rate Last Admin    dextrose 10% infusion   Intravenous Continuous PRN Jaimee Gagnon MD        dialysate for CVVHD & CVVHDF (Phoxillum BK4/2.5)  12.5 mL/kg/hr CRRT Continuous Tevin Young MD 2,200 mL/hr at 25 0638 12.5 mL/kg/hr at 25 0638    No heparin required   Does not apply Continuous PRN Tevin Young MD        POST-filter replacement solution for CVVHD & CVVHDF (Phoxillum BK4/2.5)   CRRT Continuous Tevin Young  mL/hr at 25 1454 New Bag at 25 1454    PRE-filter replacement solution for CVVHD & CVVHDF (Phoxillum BK4/2.5)  12.5 mL/kg/hr CRRT Continuous Tevin Young MD 2,200 mL/hr at 25 0639 12.5 mL/kg/hr at 25 0639    Reason beta blocker order not selected   Does not apply DOES NOT GO TO Ginny Mcgowan MD           Physical Exam   Temp  Av.6  F (37  C)  Min: 97.8  F (36.6  C)  Max: 99  F (37.2  C)  Arterial Line BP  Min: 97/47  Max: 157/55  Arterial Line MAP (mmHg)  Av.1 mmHg  Min: 60 mmHg  Max: 85 mmHg      Pulse  Av.6  Min: 67  Max: 83 Resp  Av.5  Min: 8  Max: 23  FiO2 (%)  Av %  Min: 30 %  Max: 40 %  SpO2  Av.2 %  Min: 88 %  Max: 100 %    CVP (mmHg): 3 mmHgBP 113/51   Pulse 58   Temp 97.6  F (36.4  C) (Oral)   Resp 13   Wt (!) 145.8 kg (321 lb 8 oz)   SpO2 95%   BMI 44.84 kg/m     Date 25 0700 - 25 0659   Shift  [id number] 0322-1390 0312-0659 24 Hour Total   INTAKE   P.O. 240   240   I.V. 327.8   327.8   Blood Components 600   600   Shift Total(mL/kg) 1167.8(7.52)   1167.8(7.52)   OUTPUT   Urine 7   7   Drains 252   252   Shift Total(mL/kg) 259(1.67)   259(1.67)   Weight (kg) 155.2 155.2 155.2 155.2      Admit Weight: (!) 149.6 kg (329 lb 12.8 oz)     GENERAL APPEARANCE:  NAD  CV: rrr s1s2 no m  Lungs: decreased breath sounds  Ext 3+pitting edema  Neuro: awake    Data   All labs reviewed by me.  CMP  Recent Labs   Lab 06/24/25  0340 06/23/25  2316 06/23/25 1958 06/23/25 1957 06/23/25  0805 06/23/25  0353 06/22/25  2340 06/22/25 2022 06/22/25  0426 06/22/25  0423 06/21/25  0238 06/21/25  0101     --   --  134*  --  134*  --  132*   < > 133*   < > 131*   POTASSIUM 4.1  --   --  4.2  --  4.2  --  4.3   < > 4.4   < > 4.5   CHLORIDE 104  --   --  101  --  102  --  99   < > 99   < > 96*   CO2 23  --   --  23  --  24  --  21*   < > 22   < > 23   ANIONGAP 9  --   --  10  --  8  --  12   < > 12   < > 12   *  158* 188* 208* 224*   < > 118*  123*   < > 141*  140*   < > 104*   < > 136*   BUN 25.9*  --   --  30.7*  --  25.6*  --  28.5*   < > 35.0*   < > 43.3*   CR 1.49*  --   --  1.66*  --  1.62*  --  1.84*   < > 2.70*   < > 2.81*   GFRESTIMATED 40*  --   --  35*  --  36*  --  31*   < > 19*   < > 19*   SANDY 7.6*  --   --  7.9*  --  8.0*  --  8.2*   < > 8.2*   < > 8.0*   MAG 2.4*  --   --  2.5*  --  2.5*  --  2.4*   < > 2.4*   < > 2.2   PHOS 3.4  --   --  3.6  --  4.5  --  4.8*   < > 6.0*   < > 6.9*   PROTTOTAL 5.0*  --   --   --   --  5.3*  --   --   --  5.4*  --  4.8*   ALBUMIN 3.0*  --   --  3.2*  --  3.1*  --  3.3*   < > 3.3*   < > 2.9*   BILITOTAL 0.7  --   --   --   --  0.9  --   --   --  1.0  --  1.4*   ALKPHOS 98  --   --   --   --  106  --   --   --  90  --  54   *  --   --   --   --  251*  --   --   --  491*  --  984*   *  --   --   --   --  191*  --   --   --  231*  --  261*    < > = values in  this interval not displayed.     CBC  Recent Labs   Lab 06/24/25  0340 06/23/25  0353 06/22/25  2022 06/22/25  1250   HGB 8.1*  8.1* 8.1* 8.2* 8.2*   WBC 4.3  4.3 5.6 6.1 5.9   RBC 2.68*  2.68* 2.78* 2.75* 2.77*   HCT 24.4*  24.4* 25.1* 24.1* 24.8*   MCV 91  91 90 88 90   MCH 30.2  30.2 29.1 29.8 29.6   MCHC 33.2  33.2 32.3 34.0 33.1   RDW 18.1*  18.1* 18.3* 18.3* 18.3*   PLT 62*  62* 63* 68* 63*     INR  Recent Labs   Lab 06/24/25  0340 06/23/25  0353 06/22/25  1037 06/22/25  0423   INR 1.18* 1.24* 1.23* 1.25*   PTT 25 25 25 26     ABG  Recent Labs   Lab 06/21/25  1619 06/19/25  1935 06/19/25  1835 06/19/25  1802 06/19/25  1727   PH  --  7.39 7.35 7.37 7.37   PCO2  --  39 44 42 43   PO2  --  62* 88 118* 111*   HCO3  --  23 24 24 25   O2PER 4 60.0 43.0 40.0 40.0      Urine Studies  Recent Labs   Lab Test 06/18/25  2243 06/08/25  0447 11/26/24  0819   COLOR Yellow Yellow Yellow   APPEARANCE Clear Clear Clear   URINEGLC Negative Negative Negative   URINEBILI Negative Negative Negative   URINEKETONE Negative Negative Trace*   SG 1.015 1.015 1.026   UBLD Negative Negative Negative   URINEPH 5.0 5.5 5.5   PROTEIN Negative Negative 10*   NITRITE Negative Negative Negative   LEUKEST Negative Negative Negative   RBCU 0  --  1   WBCU <1  --  1     No lab results found.  PTH  No lab results found.  Iron Studies  Recent Labs   Lab Test 11/26/24  0800   IRON 112   *   IRONSAT 64*   LUDY 762*       IMAGING:  All imaging studies reviewed by me.

## 2025-06-24 NOTE — PROGRESS NOTES
Transplant Social Work Services Progress Note    Date of Initial Social Work Evaluation: 2024  Collaborated with: Patient at bedside    Data: Karlene is s/p  donor liver transplant.   Intervention: I met with Karlene at bedside. She will be staying with her daughter in [place], [place] post discharge from the hospital. Current recommendation for PT/OT are home when ready. I reviewed her test claim and also writing to the donor family. We will review again at a later time if there are further questions.   Assessment: Patient remains on the ICU  Education provided by SW: Writing to the donor family, test claim  Plan:    Discharge Plans in Progress: Home     Barriers to d/c plan: Medical stability. Still on ICU    Follow up Plan: Will continue to follow for needs    EDNA Hannah, [facility] Center[facility]  [facility]   [facility] Acute Care Management  Phone:  [phone]  Available on VitaPortal

## 2025-06-25 ENCOUNTER — APPOINTMENT (OUTPATIENT)
Dept: OCCUPATIONAL THERAPY | Facility: CLINIC | Age: 60
DRG: 005 | End: 2025-06-25
Attending: TRANSPLANT SURGERY
Payer: COMMERCIAL

## 2025-06-25 ENCOUNTER — APPOINTMENT (OUTPATIENT)
Dept: PHYSICAL THERAPY | Facility: CLINIC | Age: 60
DRG: 005 | End: 2025-06-25
Attending: TRANSPLANT SURGERY
Payer: COMMERCIAL

## 2025-06-25 ENCOUNTER — VIRTUAL VISIT (OUTPATIENT)
Dept: RHEUMATOLOGY | Facility: CLINIC | Age: 60
End: 2025-06-25
Attending: INTERNAL MEDICINE
Payer: COMMERCIAL

## 2025-06-25 DIAGNOSIS — L40.0 PLAQUE PSORIASIS: ICD-10-CM

## 2025-06-25 DIAGNOSIS — L40.50 PSORIATIC ARTHROPATHY (H): Primary | ICD-10-CM

## 2025-06-25 PROCEDURE — 98006 SYNCH AUDIO-VIDEO EST MOD 30: CPT | Performed by: INTERNAL MEDICINE

## 2025-06-25 PROCEDURE — G2211 COMPLEX E/M VISIT ADD ON: HCPCS | Performed by: INTERNAL MEDICINE

## 2025-06-25 ASSESSMENT — ACTIVITIES OF DAILY LIVING (ADL)
ADLS_ACUITY_SCORE: 56
ADLS_ACUITY_SCORE: 48
ADLS_ACUITY_SCORE: 56
ADLS_ACUITY_SCORE: 56
ADLS_ACUITY_SCORE: 48
ADLS_ACUITY_SCORE: 56
ADLS_ACUITY_SCORE: 48
ADLS_ACUITY_SCORE: 56

## 2025-06-25 NOTE — LETTER
6/25/2025       RE: Karlene Yun  [address]  [facility] [address]     Dear Colleague,    Thank you for referring your patient, Karlene Yun, to the [facility] RHEUMATOLOGY CLINIC [place] at [facility]. Please see a copy of my visit note below.    Virtual Visit Details    Type of service:  Video Visit     2:30-2:39 pm    Originating Location (pt. Location): Home  Distant Location (provider location):  On-site  Platform used for Video Visit: [facility]        Rheumatology Follow up Note    Reason for visit: PsA    Date of initial visit: 10/24/2024    Last seen: 2/6/2025    DOS: 6/25/2025      HPI 10/24/2024:    Karlene Yun is a 58 year old female who was referred to our clinic for evaluation and management of her PsA.    Has h/o PsA x 30 years.    Has h/o OA.    On Wegovy and not losing weight x 6 months, no weight loss and no SE.    Arthritis is getting worse.    She has h/o cirrhosis.    Psoriasis age 15, diffuse all over scalp, face, arms and legs. Tried topical, photo.    Has some psoriasis spots on eyelids, scalp, it was ok most recently.    About 30 yr ago, was diagnosed with PsA affecting large joints. Not sure if methotrexate helped with joint, rash, but it might have helped.     Today, has pain over knees, ankles, feet. Hard to tell the source of pain with Dx of FMS. No joint swelling.    Fell on L knee 2 winter ago.    Not seeing ortho, no steroid inj.    Steroid helps, wonder drug for her.    AM stiffness is there, not too long, an hour maybe.    Gets hands/feet and hip cramps.    With h/o liver cirrhosis in 2022, came off methotrexate.    On diuretic, ankles came down, swelling is better, no longer uses cane.    Energy level is low.    No oral ulcers, seizures, photosensitivity, CP, SOB, cough, Raynaud's, diarrhea, cramps.    Gets numbness over feet.    Not sure if skyrizi helps with joints, skin.    2/6/2025:    -on  cosentyx since 10/2024, no improvement, no SE    -has scalp psoriasis, itchy, uncomfortable    -had steroid inj to L knee, but fell on it 3 days later, so she does not know if it helped or not, today L knee is bothering    -will see her dermatologist soon      Today 6/25/2025:    -currently admitted, s/p liver transplant on 6/19/2025 for [facility], doing well, recovering well, on MMF+Tac+prednisone 5 mg every day  -off taltz, no major psoriasis rash or joint pain  -did not tolerate gabapentin, feels better off gabapentin    ROS: A comprehensive ROS was done. Positives are per HPI.        Past Medical History:   Diagnosis Date     Cirrhosis of liver with ascites (H) 08/05/2022     Fibromyalgia 11/12/2018     Hyperlipidemia 03/17/2005     Problem list name updated by automated process. Provider to review     Hypothyroidism 06/26/2002     Problem list name updated by automated process. Provider to review     Major depressive disorder, recurrent episode, in full remission 01/21/2002     Metabolic dysfunction-associated steatohepatitis (MASH) 05/30/2025     Morbid obesity (H) 05/12/2019     Muscle pain 08/20/2012     Osteoarthritis 11/11/2024     Psoriatic arthropathy (H) 01/21/2002     Pulmonary embolism and infarction (H) 01/31/2002    Problem list name updated by automated process. Provider to review     Liver cirrhosis    Past Surgical History:   Procedure Laterality Date     BIOPSY  Aug 2024     COLONOSCOPY  02/11/2016    mild colitis/ repeat 10 yrs     ESOPHAGOSCOPY, GASTROSCOPY, DUODENOSCOPY (EGD), COMBINED N/A 08/09/2024    Procedure: ESOPHAGOGASTRODUODENOSCOPY for variceal surveillance;  Surgeon: Guru Jacquelyn Rees MD;  Location:  OR     ESOPHAGOSCOPY, GASTROSCOPY, DUODENOSCOPY (EGD), COMBINED N/A 08/09/2024    Procedure: ESOPHAGOGASTRODUODENOSCOPY, WITH FINE NEEDLE ASPIRATION BIOPSY, WITH ENDOSCOPIC ULTRASOUND GUIDANCE liver and lymph node biopsy;  Surgeon: Guru Jacquelyn Rees,  "MD;  Location: [facility] OR      REMOVAL OF TONSILS,<12 Y/O      Tonsils <11y.o.     IR LUMBAR PUNCTURE  2023     IR THORACENTESIS  2025     LASIK  2004    \"lasek\"     THORACENTESIS Left 2025    Procedure: Thoracentesis;  Surgeon: Jaden Martinez MD;  Location: [facility] OR     TRANSPLANT LIVER RECIPIENT  DONOR N/A 2025    Procedure: Transplant liver recipient  donor;  Surgeon: Max Rebolledo MD;  Location: [facility] OR     Family History   Problem Relation Age of Onset     Cancer Sister         cervical     Arthritis Paternal Grandmother      Diabetes Father      Asthma Sister      Thyroid Disease Sister      Asthma Daughter      Thyroid Disease Sister    PGM had RA    Parents  young.    Older sister has OA.    No fh/o lupus, IBD.      SHx: no tobacco, ETOH use.      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        Social History     Socioeconomic History     Marital status:      Spouse name: Not on file     Number of children: 1     Years of education: Not on file     Highest education level: Not on file   Occupational History     Not on file   Tobacco Use     Smoking status: Never     Smokeless tobacco: Never   Substance and Sexual Activity     Alcohol use: Not Currently     Comment: last drink 3 years ago     Drug use: Never     Sexual activity: Not Currently     Partners: Male   Other Topics Concern     Parent/sibling w/ CABG, MI or angioplasty before 65F 55M? Yes     Comment: Father   Social History Narrative     Not on file     Social Drivers of Health     Financial Resource Strain: Low Risk  (2025)    Financial Resource Strain      Within the past 12 months, have you or your family members you live with been unable to get utilities (heat, electricity) when it was really needed?: No   Food Insecurity: High Risk (2025)    Food Insecurity      Within the past 12 months, did you worry that your food would run out before " you got money to buy more?: Yes      Within the past 12 months, did the food you bought just not last and you didn t have money to get more?: Yes   Transportation Needs: Low Risk  (6/18/2025)    Transportation Needs      Within the past 12 months, has lack of transportation kept you from medical appointments, getting your medicines, non-medical meetings or appointments, work, or from getting things that you need?: No   Physical Activity: Not on file   Stress: Not on file   Social Connections: Not on file   Interpersonal Safety: Low Risk  (6/19/2025)    Interpersonal Safety      Do you feel physically and emotionally safe where you currently live?: Yes      Within the past 12 months, have you been hit, slapped, kicked or otherwise physically hurt by someone?: No      Within the past 12 months, have you been humiliated or emotionally abused in other ways by your partner or ex-partner?: No   Housing Stability: High Risk (6/18/2025)    Housing Stability      Do you have housing? : No      Are you worried about losing your housing?: Yes     Patient Active Problem List   Diagnosis     Psoriatic arthropathy (H)     Major depressive disorder, recurrent episode, in full remission     Hypothyroidism     Allergic rhinitis     Rosacea     Hyperlipidemia     Living will, counseling/discussion     Chronic fatigue fibromyalgia syndrome     Morbid obesity (H)     Controlled substance agreement signed     Idiopathic thrombocytopenia (H)     Osteoarthritis cervical spine     Moderate obstructive sleep apnea     Cirrhosis of liver with ascites (H)     DDD (degenerative disc disease), lumbar     ASCUS of cervix with negative high risk HPV     Other pulmonary embolism without acute cor pulmonale (H)     Unspecified diastolic (congestive) heart failure (H)     Metabolic dysfunction-associated steatohepatitis (MASH)     Shortness of breath     Hypervolemia, unspecified hypervolemia type     Cirrhosis of liver with ascites, unspecified  hepatic cirrhosis type (H)     Allergies   Allergen Reactions     Sumatriptan Succinate Nausea and Vomiting     IMITREX       Facility-Administered Encounter Medications as of 6/25/2025   Medication Dose Route Frequency Provider Last Rate Last Admin     acetaminophen (TYLENOL) tablet 650 mg  650 mg Oral Q8H Ginny Naidu MD   650 mg at 06/25/25 0312     aspirin (ASA) tablet 325 mg  325 mg Oral or Feeding Tube Daily Max Rebolledo MD   325 mg at 06/25/25 0752     bisacodyl (DULCOLAX) suppository 10 mg  10 mg Rectal Daily PRN Sincere Cunningham PA-C         citalopram (celeXA) tablet 20 mg  20 mg Oral Daily Ginny Naidu MD   20 mg at 06/25/25 0752     dextrose 10% infusion   Intravenous Continuous PRN Jaimee Gagnon MD         glucose gel 15-30 g  15-30 g Oral Q15 Min PRN Sincere Cunningham PA-C        Or     dextrose 50 % injection 25-50 mL  25-50 mL Intravenous Q15 Min PRN Sincere Cunningham PA-C        Or     glucagon injection 1 mg  1 mg Subcutaneous Q15 Min PRN Sincere Cunningham PA-C         fluconazole (DIFLUCAN) intermittent infusion 400 mg  400 mg Intravenous Q24H Max Rebolledo  mL/hr at 06/24/25 1743 400 mg at 06/24/25 1743     heparin ANTICOAGULANT injection 5,000 Units  5,000 Units Subcutaneous Q8H [facility] Gus Arroyo MD   5,000 Units at 06/25/25 0539     hydrOXYzine HCl (ATARAX) half-tab 12.5 mg  12.5 mg Oral or Feeding Tube Q6H PRN Gus Arroyo MD        Or     hydrOXYzine HCl (ATARAX) tablet 25 mg  25 mg Oral or Feeding Tube Q6H PRN Gus Arroyo MD         insulin aspart (NovoLOG) injection (RAPID ACTING)  1-12 Units Subcutaneous Q4H Sincere Cunningham PA-C   1 Units at 06/25/25 0315     levothyroxine (SYNTHROID/LEVOTHROID) tablet 112 mcg  112 mcg Oral Daily Ginny Naidu MD   112 mcg at 06/25/25 0754     Lidocaine (LIDOCARE) 4 % Patch 1 patch  1 patch Transdermal Daily PRN Ginny Naidu MD   1 patch at 06/22/25 2100     lidocaine (LMX4) cream   Topical Q1H PRN Evangelista  MD Ginny         lidocaine (LMX4) cream   Topical Q1H PRN Ginny Naidu MD         lidocaine 1 % 0.1-1 mL  0.1-1 mL Other Q1H PRN Ginny Naidu MD         lidocaine 1 % 1 mL  1 mL Other Q1H PRN Ginny Naidu MD         methocarbamol (ROBAXIN) tablet 750 mg  750 mg Oral Q6H PRN Sincere Cunningham PA-C   750 mg at 06/23/25 1559     mometasone (ELOCON) 0.1 % cream   Topical BID PRN Ginny Niadu MD         multivitamin w/minerals (THERA-VIT-M) tablet 1 tablet  1 tablet Oral Daily Ginny Naidu MD   1 tablet at 06/25/25 0752     mycophenolate (GENERIC EQUIVALENT) capsule 500 mg  500 mg Oral Q8H Shaila Foote NP   500 mg at 06/25/25 0752     naloxone (NARCAN) injection 0.2 mg  0.2 mg Intravenous Q2 Min PRN Max Rebolledo MD        Or     naloxone (NARCAN) injection 0.4 mg  0.4 mg Intravenous Q2 Min PRN Max Rebolledo MD        Or     naloxone (NARCAN) injection 0.2 mg  0.2 mg Intramuscular Q2 Min PRN Max Rebolledo MD        Or     naloxone (NARCAN) injection 0.4 mg  0.4 mg Intramuscular Q2 Min PRN Max Rebolledo MD         ondansetron (ZOFRAN ODT) ODT tab 4 mg  4 mg Oral Q6H PRN Ginny Naidu MD        Or     ondansetron (ZOFRAN) injection 4 mg  4 mg Intravenous Q6H PRN Ginny Naidu MD         oxyCODONE IR (ROXICODONE) tablet 10 mg  10 mg Oral or Feeding Tube Q4H PRN Gus Arroyo MD   10 mg at 06/25/25 0311    Or     oxyCODONE (ROXICODONE) tablet 5 mg  5 mg Oral or Feeding Tube Q4H PRN Gus Arroyo MD   5 mg at 06/24/25 1659     piperacillin-tazobactam (ZOSYN) 2.25 g vial to attach to  ml bag  2.25 g Intravenous Q6H Max Rebolledo  mL/hr at 06/24/25 1359 2.25 g at 06/25/25 0808     polyethylene glycol (MIRALAX) Packet 17 g  17 g Oral or Feeding Tube Daily PRN Gus Arroyo MD         predniSONE (DELTASONE) tablet 5 mg  5 mg Oral Daily Ginny Naidu MD   5 mg at 06/25/25 075     prochlorperazine (COMPAZINE) injection 10 mg  10 mg  Intravenous Q6H PRN Ginny Naidu MD        Or     prochlorperazine (COMPAZINE) tablet 10 mg  10 mg Oral Q6H PRN Ginny Naidu MD         Prosource TF20 ENfit Compatibl EN LIQD (PROSOURCE TF20) packet 60 mL  1 packet Per Feeding Tube BID Ginny Naidu MD   60 mL at 06/25/25 0754     Reason beta blocker order not selected   Does not apply DOES NOT GO TO Ginny Mcgowan MD         senna-docusate (SENOKOT-S/PERICOLACE) 8.6-50 MG per tablet 2 tablet  2 tablet Oral or Feeding Tube BID PRN Gus Arroyo MD         sodium chloride (PF) 0.9% PF flush 3 mL  3 mL Intravenous Q1H PRN Ginny Naidu MD         sodium chloride (PF) 0.9% PF flush 3 mL  3 mL Intravenous Q8H Ginny Naidu MD   3 mL at 06/25/25 0539     sodium chloride (PF) 0.9% PF flush 3 mL  3 mL Intravenous Q1H PRGinny Odell MD         sodium chloride (PF) 0.9% PF flush 3 mL  3 mL Intracatheter Q8H DARINEL Ginny Naidu MD   3 mL at 06/25/25 0539     sodium chloride (PF) 0.9% PF flush 3 mL  3 mL Intracatheter q1 min prGinny Odell MD         sodium chloride (PF) 0.9% PF flush 3 mL  3 mL Intracatheter Q1H PRGinny Odell MD         sodium chloride (PF) 0.9% PF flush 3 mL  3 mL Intracatheter Q8H Ginny Naidu MD   3 mL at 06/25/25 0326     sulfamethoxazole-trimethoprim (BACTRIM) 400-80 MG per tablet 1 tablet  1 tablet Oral or Feeding Tube Once per day on Tuesday Thursday Saturday Max Rebolledo MD   1 tablet at 06/24/25 1944     tacrolimus (GENERIC EQUIVALENT) capsule 2.5 mg  2.5 mg Oral BID IS Elisabeth Wilkinson APRN CNP   2.5 mg at 06/25/25 0751     traZODone (DESYREL) tablet  mg   mg Oral At Bedtime PRN Jaimee Gagnon MD   100 mg at 06/23/25 2332     triamcinolone (KENALOG) 0.1 % cream   Topical BID PRN Ginny Naidu MD         ursodiol (ACTIGALL) capsule 300 mg  300 mg Oral BID Ginny Naidu MD   300 mg at 06/25/25 9410     valGANciclovir (VALCYTE) tablet 450 mg  450 mg Oral Once  per day on Tuesday Saturday Max Rebolledo MD   450 mg at 06/24/25 1745     Outpatient Encounter Medications as of 6/25/2025   Medication Sig Dispense Refill     bumetanide (BUMEX) 2 MG tablet Take 1 tablet (2 mg) by mouth 2 times daily. 120 tablet 0     citalopram (CELEXA) 20 MG tablet Take 1 tablet (20 mg) by mouth daily. 90 tablet 3     gabapentin (NEURONTIN) 300 MG capsule 300 mg qam, 600 mg qpm 270 capsule 1     ixekizumab (TALTZ) 80 MG/ML SOAJ auto-injector 160 mg once, followed by 80 mg at weeks 2, 4, 6, 8, 10, and 12, and then 80 mg every 4 weeks 9 mL 0     lactulose 20 GM/30ML solution Take 15 mLs (10 g) by mouth every morning. 946 mL 4     levothyroxine (SYNTHROID/LEVOTHROID) 112 MCG tablet Take 1 tablet (112 mcg) by mouth daily. 90 tablet 3     Lidocaine (LIDOCARE) 4 % Patch Place 1 patch onto the skin daily as needed for moderate pain.       mometasone (ELOCON) 0.1 % external solution Apply thin layer twice a day as needed to rash on scalp       rifaximin (XIFAXAN) 550 MG TABS tablet Take 1 tablet (550 mg) by mouth 2 times daily. 180 tablet 3     spironolactone (ALDACTONE) 50 MG tablet Take 1 tablet (50 mg) by mouth daily. 60 tablet 0     tacrolimus (PROTOPIC) 0.1 % external ointment Apply topically as needed       traZODone (DESYREL) 100 MG tablet Take 1 tablet (100 mg) by mouth at bedtime. 90 tablet 3     triamcinolone (KENALOG) 0.1 % external cream Apply topically as needed           Component      Latest Ref Rng 10/22/2024  8:43 AM   Sodium      135 - 145 mmol/L 135    Potassium      3.4 - 5.3 mmol/L 4.1    Chloride      98 - 107 mmol/L 104    Carbon Dioxide (CO2)      22 - 29 mmol/L 24    Anion Gap      7 - 15 mmol/L 7    Urea Nitrogen      8.0 - 23.0 mg/dL 11.3    Creatinine      0.51 - 0.95 mg/dL 0.90    GFR Estimate      >60 mL/min/1.73m2 73    Calcium      8.8 - 10.4 mg/dL 8.7 (L)    Glucose      70 - 99 mg/dL 114 (H)    WBC      4.0 - 11.0 10e3/uL 4.0    RBC Count      3.80 - 5.20  10e6/uL 4.12    Hemoglobin      11.7 - 15.7 g/dL 13.9    Hematocrit      35.0 - 47.0 % 39.6    MCV      78 - 100 fL 96    MCH      26.5 - 33.0 pg 33.7 (H)    MCHC      31.5 - 36.5 g/dL 35.1    RDW      10.0 - 15.0 % 14.8    Platelet Count      150 - 450 10e3/uL 67 (L)    Protein Total      6.4 - 8.3 g/dL 7.0    Albumin      3.5 - 5.2 g/dL 2.6 (L)    Bilirubin Total      <=1.2 mg/dL 3.5 (H)    Alkaline Phosphatase      40 - 150 U/L 69    AST      0 - 45 U/L 60 (H)    ALT      0 - 50 U/L 26    Bilirubin Direct      0.00 - 0.30 mg/dL 1.16 (H)    INR      0.85 - 1.15  1.55 (H)       Legend:  (L) Low  (H) High  Component      Latest Ref [facility] 6/25/2025  3:24 AM   WBC      4.0 - 11.0 10e3/uL 4.9    RBC Count      3.80 - 5.20 10e6/uL 2.76 (L)    Hemoglobin      11.7 - 15.7 g/dL 8.4 (L)    Hematocrit      35.0 - 47.0 % 25.7 (L)    MCV      78 - 100 fL 93    MCH      26.5 - 33.0 pg 30.4    MCHC      31.5 - 36.5 g/dL 32.7    RDW      10.0 - 15.0 % 17.9 (H)    Platelet Count      150 - 450 10e3/uL 66 (L)    % Neutrophils      % 76    % Lymphocytes      % 13    % Monocytes      % 4    % Eosinophils      % 6    % Basophils      % 0    % Immature Granulocytes      % 1    NRBC/W      <1 /100 0    Absolute Neutrophil      1.6 - 8.3 10e3/uL 3.7    Absolute Lymphocytes      0.8 - 5.3 10e3/uL 0.6 (L)    Absolute Monocytes      0.0 - 1.3 10e3/uL 0.2    Absolute Eosinophils      0.0 - 0.7 10e3/uL 0.3    Absolute Basophils      0.0 - 0.2 10e3/uL 0.0    Absolute Immature Granulocytes      <=0.4 10e3/uL 0.0    Absolute NRBCs      10e3/uL 0.0    Sodium      135 - 145 mmol/L 135    Potassium      3.4 - 5.3 mmol/L 3.8    Chloride      98 - 107 mmol/L 105    Carbon Dioxide (CO2)      22 - 29 mmol/L 23    Anion Gap      7 - 15 mmol/L 7    Urea Nitrogen      8.0 - 23.0 mg/dL 24.4 (H)    Creatinine      0.51 - 0.95 mg/dL 1.84 (H)    GFR Estimate      >60 mL/min/1.73m2 31 (L)    Calcium      8.8 - 10.4 mg/dL 7.8 (L)    Glucose      70 - 99 mg/dL 154  (H)    Protein Total      6.4 - 8.3 g/dL 4.8 (L)    Albumin      3.5 - 5.2 g/dL 2.6 (L)    Bilirubin Total      <=1.2 mg/dL 0.6    Alkaline Phosphatase      40 - 150 U/L 90    AST      0 - 45 U/L 71 (H)    ALT      0 - 50 U/L 105 (H)    Bilirubin Direct      0.00 - 0.45 mg/dL 0.43    INR      0.85 - 1.15  1.12    PT      11.8 - 14.8 Seconds 14.4    Calcium Ionized Whole Blood      4.4 - 5.2 mg/dL 4.5    Fibrinogen      170 - 510 mg/dL 319    PTT      22 - 38 Seconds 26    Magnesium      1.7 - 2.3 mg/dL 2.1    Phosphorus      2.5 - 4.5 mg/dL 2.8       Legend:  (L) Low  (H) High    Ph.E:      Constitutional: WD/WN. Pleasant. In no acute distress. Sister present  Eyes: EOM intact, sclera anicteric, conj not injected  MS: No synovitis. OA changes of hands.  Skin: no visible rash  Neuro: A&O x 3. Grossly non focal  Psych: NL affect     Assessment/ plan:    10/2024:    Active psoriasis, PsA, h/o liver cirrhosis. Recommend switching skyrizi to taltz; risks were discussed. Could get assistance to have martinez covered by switching to taltz, now eight loss on wegovy. Discussed OA mx.    Plan:      Will switch skyrizi to taltz     [facility] our pharmacist will contact you    You could add gabapentin 300 mg in the morning    Recommend [place] chi, voltaren gel for the knee pain    Refer to ortho for knee pain        Return in about 3-4 months (video)        2/6/2025:    Insurance did not cover taltz, so cosentyx was ordered instead, she failed it, has active psoriasis. Recommend tos witch to taltz, then she could also go on zepbound as double coverage by company. Risks were discussed.    For knee pain, recommend follow up with ortho and trying increased gabapentin dose.    Plan:    Stop cosentyx    Start taltz when it is covered, will ask [employer] to help you with zepbound coverage    Add gabapentin 300 mg in the morning    Follow up with ortho    Return video visit in about 3-4 months        Today 6/25/2025:      Recovering from liver  transplant on 6/19/2025, on MMF+Tac+prednisone 5 mg every day. Taltz on hold. No concern for psoriasis or PsA flare today.    Plan:    Return in 6 months in person    TT 30 min was spent on date of the encounter doing chart review, history and exam, documentation and further activities as noted above. Any prior notes, outside records, laboratory results, and imaging studies were reviewed if relevant.    The longitudinal plan of care for the diagnosis(es)/condition(s) as documented were addressed during this visit. Due to the added complexity in care, I will continue to support Karlene in the subsequent management and with ongoing continuity of care.        Lisette Mccann MD        Again, thank you for allowing me to participate in the care of your patient.      Sincerely,    Lisette Mccann MD

## 2025-06-25 NOTE — PROGRESS NOTES
Calorie Count  Intake recorded for: 6/24  Total Kcals: 957 Total Protein: 44g  Kcals from Hospital Food: 662   Protein: 38g  Kcals from Outside Food (average):295 Protein: 6g  # Meals Ordered from Kitchen: 2  # Meals Recorded: 2 meals recorded   Meal 1 [outside food]: 50% Chick-richy-A shake   Meal 2: 75%  salad (w/ lettuce, tomato, cucumbers, turkey, egg, cheddar, croutons, [place] dressing)  # Supplements Recorded: 100% 1 Ensure Plus HP van

## 2025-06-25 NOTE — PROGRESS NOTES
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 Reboleldo on 6/19/2025.     Today:   - Ok for transfer orders to  today   - Lasix IV 80 mg x 1, may re-dose later depending on response                                                                                                                                    Graft function: s/p DBDLT without stent, POD#6. Post-op US with patent Doppler. Liver labs continue down-trending, lactic normal, INR normal.    -  mg x 3 months    - Ursodiol  300 mg BID x 3 months     Immunosuppressed status secondary to medications:  Induction:  SM taper followed by prednisone x 3 months   Basiliximab 20 mg IV on 6/20 and 6/24 in the setting of EMILIE  Maintenance:     -  mg Q8H   - Tacrolimus goal level 5 to 8.   - Prednisone 5 mg daily following taper.    Neuro/Psych:  Depression:   - Continue home citalopram.   Fibromyalgia:   - Continue home gabapentin.   Psychophysiological insomnia:   - Continue home Trazodone.  Acute post op pain:    - Scheduled Tylenol   - PRN Atarax, Robaxin, and Oxycodone    Hematology:   Acute on chronic anemia: Hgb ~8. Secondary to acute surgical blood loss; transfuse to meet goals. Last transfused 6/21/2025.   Acute on chronic thrombocytopenia: Secondary to induction IS. Last transfused 6/20/2025. Plt 60s.  Coagulopathy secondary to liver disease: Stable.     Cardiorespiratory:   Shock: Resolved.   Post-operative mechanical ventilation: Extubated 6/19.   Acute hypoxic respiratory failure: Wean HFNC as able.    - OOB as tolerate, early mobility   - Pulmonary hygiene   Hx NEREYDA:  Does not use CPAP.   Hx HLD: Not currently on statin therapy.   Hx HFpEF: Noted on on problem list, last EF normal. Not on GDMT.   Chronic left sided pleural effusion: Noted as trace on Xray 6/29/2025.   History of PE, 2002: Not on long-term anticoagulation.     GI/Nutrition: PPI.  Diet: Regular    - Feeding tube placed 6/22, cycled tube feeds   - Dietician following       - Calorie counts    Endocrine:   Hypothyroidism: TSH 6/7/2025 4.89.    - Continue home levothyroxine.   Steroid induced hyperglycemia: Last hemoglobin A1c 4.9% 6/20/2025.   - Sliding scale insulin Q4H     Renal/Fluid/Electrolytes: MIVF: saline lock.   Hypocalcemia: Monitor   Hyponatremia: Monitor. Resolved.  Hypochloremia: Monitor. Resolved.  Hyperphosphatemia: Monitor. Resolved.   EMILIE: Oliguric. Made 400 mL of urine yesterday.   - Transplant Nephrology following   - CRRT started 6/21 and discontinued 6/24. iHD per Neph.   - Lasix 80 mg IV x 1 today, reassess in the afternoon for re-dosing  Anasarca:    - Hold home Bumex and spironolactone     : Rodriguez removed 6/22    Infectious disease:   Risk for infection: Donor with open abdomen with risk of contamination.    - Continue antibiotics for a full 7- day course.    - Fluconazole and Zosyn x 7 days.    - Follow donor cultures.     Rheum:  Psoriatic arthritis:    - PTA managed with ixekizumab, tacrolimus ointment    Prophylaxis: DVT, fall, GI, fungal (fluconazole), viral (Valcyte), pneumocystis (Bactrim)    Disposition: SICU, transfer to  once bed available    JOCELIN/Fellow/Resident Provider: MERRITT Sibley CNP, Vocera    I saw and evaluated this patient as part of a shared visit. I spent 40 minutes on review of labs and imaging, exam, care coordination, and counseling.  Medically Ready for Discharge: Anticipated in 5+ Days     Faculty: Amauri Jacobs MD, PhD  __________________________________________________________________  Transplant History:    6/19/2025 (Liver), Postoperative day: 6      Interval History: History is obtained from the patient  Overnight events: No acute events overnight. Patient up in chair and states she's feeling well this morning. Pain is controlled. Appetite is slightly improving. Tolerating activity with therapies.     ROS:   A 10-point review of systems was negative except as noted above.    Curent Meds:  Current Facility-Administered Medications   Medication Dose Route Frequency Provider Last Rate Last Admin    acetaminophen (TYLENOL) tablet 650 mg  650 mg Oral Q8H Ginny Naidu MD   650 mg at 06/25/25 0312    aspirin (ASA) tablet 325 mg  325 mg Oral or Feeding Tube Daily Max Rebolledo MD   325 mg at 06/25/25 0752    citalopram (celeXA) tablet 20 mg  20 mg Oral Daily Ginny Naidu MD   20 mg at 06/25/25 0752    fluconazole (DIFLUCAN) intermittent infusion 400 mg  400 mg Intravenous Q24H Max Rebolledo  mL/hr at 06/24/25 1743 400 mg at 06/24/25 1743    furosemide (LASIX) injection 80 mg  80 mg Intravenous Once Elisabeth Wilkinson APRN CNP        heparin ANTICOAGULANT injection 5,000 Units  5,000 Units Subcutaneous Q8H [facility] Gus Arroyo MD   5,000 Units at 06/25/25 0539    insulin aspart (NovoLOG) injection (RAPID ACTING)  1-12 Units Subcutaneous Q4H Sincere Cunningham PA-C   1 Units at 06/25/25 0315    levothyroxine (SYNTHROID/LEVOTHROID) tablet 112 mcg  112 mcg Oral Daily Ginny Naidu MD   112 mcg at 06/25/25 0754    multivitamin w/minerals (THERA-VIT-M) tablet 1 tablet  1 tablet Oral Daily Ginny Naidu MD   1 tablet at 06/25/25 0752    mycophenolate (GENERIC EQUIVALENT) capsule 500 mg  500 mg Oral Q8H Shaila Foote NP   500 mg at 06/25/25 0752    piperacillin-tazobactam (ZOSYN) 2.25 g vial to attach to  ml bag  2.25 g Intravenous Q6H Max Rebolledo  mL/hr at 06/24/25 1359 2.25 g at 06/25/25 0808    predniSONE (DELTASONE) tablet 5 mg  5 mg Oral Daily Ginny Naidu MD   5 mg at 06/25/25 0753    [facility] TF20  ENfit Compatibl EN LIQD (PROSOURCE TF20) packet 60 mL  1 packet Per Feeding Tube BID Ginny Naidu MD   60 mL at 06/25/25 0754    sodium chloride (PF) 0.9% PF flush 3 mL  3 mL Intravenous Q8H Ginny Naidu MD   3 mL at 06/25/25 0539    sodium chloride (PF) 0.9% PF flush 3 mL  3 mL Intracatheter Q8H DARINEL Ginny Naidu MD   3 mL at 06/25/25 0539    sodium chloride (PF) 0.9% PF flush 3 mL  3 mL Intracatheter Q8H Ginny Naidu MD   3 mL at 06/25/25 0326    sulfamethoxazole-trimethoprim (BACTRIM) 400-80 MG per tablet 1 tablet  1 tablet Oral or Feeding Tube Once per day on Tuesday Thursday Saturday Max Rebolledo MD   1 tablet at 06/24/25 1944    tacrolimus (GENERIC EQUIVALENT) capsule 2.5 mg  2.5 mg Oral BID IS Elisabeth Wilkinson APRN CNP   2.5 mg at 06/25/25 0751    ursodiol (ACTIGALL) capsule 300 mg  300 mg Oral BID Ginny Nadiu MD   300 mg at 06/25/25 0753    valGANciclovir (VALCYTE) tablet 450 mg  450 mg Oral Once per day on Tuesday Saturday Max Rebolledo MD   450 mg at 06/24/25 1745       Physical Exam:     Admit Weight: (!) 149.6 kg (329 lb 12.8 oz)    Current Vitals:   BP (!) 149/63 (BP Location: Right arm, Cuff Size: Adult Large)   Pulse 64   Temp 98.2  F (36.8  C) (Axillary)   Resp 19   Wt (!) 139.6 kg (307 lb 11.2 oz)   SpO2 96%   BMI 42.92 kg/m      Vital sign ranges:    Temp:  [98  F (36.7  C)-98.7  F (37.1  C)] 98.2  F (36.8  C)  Pulse:  [51-74] 64  Resp:  [16-22] 19  BP: (116-160)/() 149/63  Cuff Mean (mmHg):  [100] 100  FiO2 (%):  [5 %-40 %] 25 %  SpO2:  [86 %-100 %] 96 %    General Appearance: no obvious acute distress.    Skin: Normal, warm, dry.  Heart: She appears adequately perfused.   Lungs: Non-labored on HFNC  Abdomen: Non-distended, Pravena wound vac in place. TIARRA x 2 with thin serosang output.   : Rodriguez removed, small urine output  Extremities: Edema: generalized edema. There is no skin breakdown obvious breakdown on exposed skin.  Neurologic: Awake,  alert, oriented x 4. Tremor absent.     Frailty Scores          5/20/2025 3/30/2025   Frailty Scores   Final Score Not Frail  Not Frail    Final Score Number 1  1       Details          Patient-reported               Data:   CMP  Recent Labs   Lab 06/25/25  0847 06/25/25  0324 06/24/25  0825 06/24/25 0340 06/20/25  0503 06/20/25  0358 06/18/25  2157 06/18/25  1902   NA  --  135  --  136   < > 136   < > 130*   POTASSIUM  --  3.8  --  4.1   < > 4.0   < > 3.6   CHLORIDE  --  105  --  104   < > 101   < > 95*   CO2  --  23  --  23   < > 25   < > 27   * 154*   < > 149*  158*   < > 169*  172*   < > 125*   BUN  --  24.4*  --  25.9*   < > 31.3*   < > 24.8*   CR  --  1.84*  --  1.49*   < > 1.62*   < > 0.98*   GFRESTIMATED  --  31*  --  40*   < > 36*   < > 66   SANDY  --  7.8*  --  7.6*   < > 8.4*   < > 8.4*   ICAW  --  4.5  --  4.4   < > 4.7   < >  --    MAG  --  2.1  --  2.4*   < > 2.1   < > 1.8   PHOS  --  2.8  --  3.4   < > 5.9*   < > 2.9   AMYLASE  --   --   --   --   --  63  --  47   LIPASE  --   --   --   --   --  46  --   --    ALBUMIN  --  2.6*  --  3.0*   < > 2.6*   < > 2.5*   BILITOTAL  --  0.6  --  0.7   < > 2.5*   < > 4.9*   ALKPHOS  --  90  --  98   < > 60   < > 74   AST  --  71*  --  117*   < > 2,889*   < > 83*   ALT  --  105*  --  145*   < > 360*   < > 40    < > = values in this interval not displayed.     CBC  Recent Labs   Lab 06/25/25 0324 06/24/25 0340 06/20/25  1206 06/20/25  0749   HGB 8.4* 8.1*  8.1*   < > 7.4*   WBC 4.9 4.3  4.3   < > 13.7*   PLT 66* 62*  62*   < > 100*   A1C  --   --   --  4.9    < > = values in this interval not displayed.

## 2025-06-25 NOTE — PROGRESS NOTES
Virtual Visit Details    Type of service:  Video Visit     2:30-2:39 pm    Originating Location (pt. Location): Home  Distant Location (provider location):  On-site  Platform used for Video Visit: [facility]        Rheumatology Follow up Note    Reason for visit: PsA    Date of initial visit: 10/24/2024    Last seen: 2/6/2025    DOS: 6/25/2025      HPI 10/24/2024:    Karlene Yun is a 58 year old female who was referred to our clinic for evaluation and management of her PsA.    Has h/o PsA x 30 years.    Has h/o OA.    On Wegovy and not losing weight x 6 months, no weight loss and no SE.    Arthritis is getting worse.    She has h/o cirrhosis.    Psoriasis age 15, diffuse all over scalp, face, arms and legs. Tried topical, photo.    Has some psoriasis spots on eyelids, scalp, it was ok most recently.    About 30 yr ago, was diagnosed with PsA affecting large joints. Not sure if methotrexate helped with joint, rash, but it might have helped.     Today, has pain over knees, ankles, feet. Hard to tell the source of pain with Dx of FMS. No joint swelling.    Fell on L knee 2 winter ago.    Not seeing ortho, no steroid inj.    Steroid helps, wonder drug for her.    AM stiffness is there, not too long, an hour maybe.    Gets hands/feet and hip cramps.    With h/o liver cirrhosis in 2022, came off methotrexate.    On diuretic, ankles came down, swelling is better, no longer uses cane.    Energy level is low.    No oral ulcers, seizures, photosensitivity, CP, SOB, cough, Raynaud's, diarrhea, cramps.    Gets numbness over feet.    Not sure if skyrizi helps with joints, skin.    2/6/2025:    -on cosentyx since 10/2024, no improvement, no SE    -has scalp psoriasis, itchy, uncomfortable    -had steroid inj to L knee, but fell on it 3 days later, so she does not know if it helped or not, today L knee is bothering    -will see her dermatologist soon      Today 6/25/2025:    -currently admitted, s/p liver transplant on  "2025 for [facility], doing well, recovering well, on MMF+Tac+prednisone 5 mg every day  -off taltz, no major psoriasis rash or joint pain  -did not tolerate gabapentin, feels better off gabapentin    ROS: A comprehensive ROS was done. Positives are per HPI.        Past Medical History:   Diagnosis Date    Cirrhosis of liver with ascites (H) 2022    Fibromyalgia 2018    Hyperlipidemia 2005     Problem list name updated by automated process. Provider to review    Hypothyroidism 2002     Problem list name updated by automated process. Provider to review    Major depressive disorder, recurrent episode, in full remission 2002    Metabolic dysfunction-associated steatohepatitis (MASH) 2025    Morbid obesity (H) 2019    Muscle pain 2012    Osteoarthritis 2024    Psoriatic arthropathy (H) 2002    Pulmonary embolism and infarction (H) 2002    Problem list name updated by automated process. Provider to review     Liver cirrhosis    Past Surgical History:   Procedure Laterality Date    BIOPSY  Aug 2024    COLONOSCOPY  2016    mild colitis/ repeat 10 yrs    ESOPHAGOSCOPY, GASTROSCOPY, DUODENOSCOPY (EGD), COMBINED N/A 2024    Procedure: ESOPHAGOGASTRODUODENOSCOPY for variceal surveillance;  Surgeon: Guru Jacquelyn Rees MD;  Location:  OR    ESOPHAGOSCOPY, GASTROSCOPY, DUODENOSCOPY (EGD), COMBINED N/A 2024    Procedure: ESOPHAGOGASTRODUODENOSCOPY, WITH FINE NEEDLE ASPIRATION BIOPSY, WITH ENDOSCOPIC ULTRASOUND GUIDANCE liver and lymph node biopsy;  Surgeon: Guru Jacquelyn Rees MD;  Location:  OR     REMOVAL OF TONSILS,<11 Y/O      Tonsils <11y.o.    IR LUMBAR PUNCTURE  2023    IR THORACENTESIS  2025    LASIK  2004    \"lasek\"    THORACENTESIS Left 2025    Procedure: Thoracentesis;  Surgeon: Jaden Martinez MD;  [place] [facility] [place]    TRANSPLANT LIVER RECIPIENT  DONOR N/A " 2025    Procedure: Transplant liver recipient  donor;  Surgeon: Max Rebolledo MD;  Location:  OR     Family History   Problem Relation Age of Onset    Cancer Sister         cervical    Arthritis Paternal Grandmother     Diabetes Father     Asthma Sister     Thyroid Disease Sister     Asthma Daughter     Thyroid Disease Sister    PGM had RA    Parents  young.    Older sister has OA.    No fh/o lupus, IBD.      SHx: no tobacco, ETOH use.      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        Social History     Socioeconomic History    Marital status:      Spouse name: Not on file    Number of children: 1    Years of education: Not on file    Highest education level: Not on file   Occupational History    Not on file   Tobacco Use    Smoking status: Never    Smokeless tobacco: Never   Substance and Sexual Activity    Alcohol use: Not Currently     Comment: last drink 3 years ago    Drug use: Never    Sexual activity: Not Currently     Partners: Male   Other Topics Concern    Parent/sibling w/ CABG, MI or angioplasty before 65F 55M? Yes     Comment: Father   Social History Narrative    Not on file     Social Drivers of Health     Financial Resource Strain: Low Risk  (2025)    Financial Resource Strain     Within the past 12 months, have you or your family members you live with been unable to get utilities (heat, electricity) when it was really needed?: No   Food Insecurity: High Risk (2025)    Food Insecurity     Within the past 12 months, did you worry that your food would run out before you got money to buy more?: Yes     Within the past 12 months, did the food you bought just not last and you didn t have money to get more?: Yes   Transportation Needs: Low Risk  (2025)    Transportation Needs     Within the past 12 months, has lack of transportation kept you from medical appointments, getting your medicines, non-medical meetings or  appointments, work, or from getting things that you need?: No   Physical Activity: Not on file   Stress: Not on file   Social Connections: Not on file   Interpersonal Safety: Low Risk  (6/19/2025)    Interpersonal Safety     Do you feel physically and emotionally safe where you currently live?: Yes     Within the past 12 months, have you been hit, slapped, kicked or otherwise physically hurt by someone?: No     Within the past 12 months, have you been humiliated or emotionally abused in other ways by your partner or ex-partner?: No   Housing Stability: High Risk (6/18/2025)    Housing Stability     Do you have housing? : No     Are you worried about losing your housing?: Yes     Patient Active Problem List   Diagnosis    Psoriatic arthropathy (H)    Major depressive disorder, recurrent episode, in full remission    Hypothyroidism    Allergic rhinitis    Rosacea    Hyperlipidemia    Living will, counseling/discussion    Chronic fatigue fibromyalgia syndrome    Morbid obesity (H)    Controlled substance agreement signed    Idiopathic thrombocytopenia (H)    Osteoarthritis cervical spine    Moderate obstructive sleep apnea    Cirrhosis of liver with ascites (H)    DDD (degenerative disc disease), lumbar    ASCUS of cervix with negative high risk HPV    Other pulmonary embolism without acute cor pulmonale (H)    Unspecified diastolic (congestive) heart failure (H)    Metabolic dysfunction-associated steatohepatitis (MASH)    Shortness of breath    Hypervolemia, unspecified hypervolemia type    Cirrhosis of liver with ascites, unspecified hepatic cirrhosis type (H)     Allergies   Allergen Reactions    Sumatriptan Succinate Nausea and Vomiting     IMITREX       Facility-Administered Encounter Medications as of 6/25/2025   Medication Dose Route Frequency Provider Last Rate Last Admin    acetaminophen (TYLENOL) tablet 650 mg  650 mg Oral Q8H Ginny Naidu MD   650 mg at 06/25/25 0312    aspirin (ASA) tablet 325 mg  325  mg Oral or Feeding Tube Daily Max Rebolledo MD   325 mg at 06/25/25 0752    bisacodyl (DULCOLAX) suppository 10 mg  10 mg Rectal Daily PRN Sincere Cunningham PA-C        citalopram (celeXA) tablet 20 mg  20 mg Oral Daily Ginny Naidu MD   20 mg at 06/25/25 0752    dextrose 10% infusion   Intravenous Continuous PRN Jaimee Gagnon MD        glucose gel 15-30 g  15-30 g Oral Q15 Min PRN Sincere Cunningham PA-C        Or    dextrose 50 % injection 25-50 mL  25-50 mL Intravenous Q15 Min PRN Sincere Cunningham PA-C        Or    glucagon injection 1 mg  1 mg Subcutaneous Q15 Min PRN Sincere Cunningham PA-C        fluconazole (DIFLUCAN) intermittent infusion 400 mg  400 mg Intravenous Q24H Max Rebolledo  mL/hr at 06/24/25 1743 400 mg at 06/24/25 1743    heparin ANTICOAGULANT injection 5,000 Units  5,000 Units Subcutaneous Q8H [facility] Gus Arroyo MD   5,000 Units at 06/25/25 0539    hydrOXYzine HCl (ATARAX) half-tab 12.5 mg  12.5 mg Oral or Feeding Tube Q6H PRN Gus Arroyo MD        Or    hydrOXYzine HCl (ATARAX) tablet 25 mg  25 mg Oral or Feeding Tube Q6H PRGus Hull MD        insulin aspart (NovoLOG) injection (RAPID ACTING)  1-12 Units Subcutaneous Q4H Sincere Cunningham PA-C   1 Units at 06/25/25 0315    levothyroxine (SYNTHROID/LEVOTHROID) tablet 112 mcg  112 mcg Oral Daily Ginny Naidu MD   112 mcg at 06/25/25 0754    Lidocaine (LIDOCARE) 4 % Patch 1 patch  1 patch Transdermal Daily PRN Ginny Naidu MD   1 patch at 06/22/25 2100    lidocaine (LMX4) cream   Topical Q1H PRN Ginny Naidu MD        lidocaine (LMX4) cream   Topical Q1H PRN Ginny Naidu MD        lidocaine 1 % 0.1-1 mL  0.1-1 mL Other Q1H PRN Ginny Naidu MD        lidocaine 1 % 1 mL  1 mL Other Q1H PRN Ginny Naidu MD        methocarbamol (ROBAXIN) tablet 750 mg  750 mg Oral Q6H PRN Sincere Cunningham PA-C   750 mg at 06/23/25 1559    mometasone (ELOCON) 0.1 % cream   Topical BID PRN Ginny Naidu,  MD        multivitamin w/minerals (THERA-VIT-M) tablet 1 tablet  1 tablet Oral Daily Ginny Naidu MD   1 tablet at 06/25/25 0752    mycophenolate (GENERIC EQUIVALENT) capsule 500 mg  500 mg Oral Q8H Shaila Foote, NP   500 mg at 06/25/25 0752    naloxone (NARCAN) injection 0.2 mg  0.2 mg Intravenous Q2 Min PRN Max Rebolledo MD        Or    naloxone (NARCAN) injection 0.4 mg  0.4 mg Intravenous Q2 Min PRN Max Rebolledo MD        Or    naloxone (NARCAN) injection 0.2 mg  0.2 mg Intramuscular Q2 Min PRN Max Rebolledo MD        Or    naloxone (NARCAN) injection 0.4 mg  0.4 mg Intramuscular Q2 Min PRN Max Rebolledo MD        ondansetron (ZOFRAN ODT) ODT tab 4 mg  4 mg Oral Q6H PRN Ginny Naidu MD        Or    ondansetron (ZOFRAN) injection 4 mg  4 mg Intravenous Q6H PRN Ginny Naidu MD        oxyCODONE IR (ROXICODONE) tablet 10 mg  10 mg Oral or Feeding Tube Q4H PRN Gus Arroyo MD   10 mg at 06/25/25 0311    Or    oxyCODONE (ROXICODONE) tablet 5 mg  5 mg Oral or Feeding Tube Q4H PRN Gus Arroyo MD   5 mg at 06/24/25 1659    piperacillin-tazobactam (ZOSYN) 2.25 g vial to attach to  ml bag  2.25 g Intravenous Q6H Max Rebolledo  mL/hr at 06/24/25 1359 2.25 g at 06/25/25 0808    polyethylene glycol (MIRALAX) Packet 17 g  17 g Oral or Feeding Tube Daily PRN Gus Arroyo MD        predniSONE (DELTASONE) tablet 5 mg  5 mg Oral Daily Ginny Naidu MD   5 mg at 06/25/25 0753    prochlorperazine (COMPAZINE) injection 10 mg  10 mg Intravenous Q6H PRN Ginny Naidu MD        Or    prochlorperazine (COMPAZINE) tablet 10 mg  10 mg Oral Q6H PRN Ginny Naidu MD        Prosource TF20 ENfit Compatibl EN LIQD (PROSOURCE TF20) packet 60 mL  1 packet Per Feeding Tube BID Ginny Naidu MD   60 mL at 06/25/25 0754    Reason beta blocker order not selected   Does not apply DOES NOT GO TO Ginny Mcgowan MD        senna-docusate  (SENOKOT-S/PERICOLACE) 8.6-50 MG per tablet 2 tablet  2 tablet Oral or Feeding Tube BID PRN Gus Arroyo MD        sodium chloride (PF) 0.9% PF flush 3 mL  3 mL Intravenous Q1H PRN Ginny Naidu MD        sodium chloride (PF) 0.9% PF flush 3 mL  3 mL Intravenous Q8H Ginny Naidu MD   3 mL at 06/25/25 0539    sodium chloride (PF) 0.9% PF flush 3 mL  3 mL Intravenous Q1H PRN Ginny Naidu MD        sodium chloride (PF) 0.9% PF flush 3 mL  3 mL Intracatheter Q8H DARINEL Ginny Naidu MD   3 mL at 06/25/25 0539    sodium chloride (PF) 0.9% PF flush 3 mL  3 mL Intracatheter q1 min prGinny Linton MD        sodium chloride (PF) 0.9% PF flush 3 mL  3 mL Intracatheter Q1H PRN Ginny Naidu MD        sodium chloride (PF) 0.9% PF flush 3 mL  3 mL Intracatheter Q8H Ginny Naidu MD   3 mL at 06/25/25 0326    sulfamethoxazole-trimethoprim (BACTRIM) 400-80 MG per tablet 1 tablet  1 tablet Oral or Feeding Tube Once per day on Tuesday Thursday Saturday Max Rebolledo MD   1 tablet at 06/24/25 1944    tacrolimus (GENERIC EQUIVALENT) capsule 2.5 mg  2.5 mg Oral BID IS Elisabeth Wilkinson APRN CNP   2.5 mg at 06/25/25 0751    traZODone (DESYREL) tablet  mg   mg Oral At Bedtime PRN Jaimee Gagnon MD   100 mg at 06/23/25 2332    triamcinolone (KENALOG) 0.1 % cream   Topical BID PRN Ginny Naidu MD        ursodiol (ACTIGALL) capsule 300 mg  300 mg Oral BID Ginny Naidu MD   300 mg at 06/25/25 0753    valGANciclovir (VALCYTE) tablet 450 mg  450 mg Oral Once per day on Tuesday Saturday Max Rebolledo MD   450 mg at 06/24/25 1745     Outpatient Encounter Medications as of 6/25/2025   Medication Sig Dispense Refill    bumetanide (BUMEX) 2 MG tablet Take 1 tablet (2 mg) by mouth 2 times daily. 120 tablet 0    citalopram (CELEXA) 20 MG tablet Take 1 tablet (20 mg) by mouth daily. 90 tablet 3    gabapentin (NEURONTIN) 300 MG capsule 300 mg qam, 600 mg qpm 270 capsule 1     ixekizumab (TALTZ) 80 MG/ML SOAJ auto-injector 160 mg once, followed by 80 mg at weeks 2, 4, 6, 8, 10, and 12, and then 80 mg every 4 weeks 9 mL 0    lactulose 20 GM/30ML solution Take 15 mLs (10 g) by mouth every morning. 946 mL 4    levothyroxine (SYNTHROID/LEVOTHROID) 112 MCG tablet Take 1 tablet (112 mcg) by mouth daily. 90 tablet 3    Lidocaine (LIDOCARE) 4 % Patch Place 1 patch onto the skin daily as needed for moderate pain.      mometasone (ELOCON) 0.1 % external solution Apply thin layer twice a day as needed to rash on scalp      rifaximin (XIFAXAN) 550 MG TABS tablet Take 1 tablet (550 mg) by mouth 2 times daily. 180 tablet 3    spironolactone (ALDACTONE) 50 MG tablet Take 1 tablet (50 mg) by mouth daily. 60 tablet 0    tacrolimus (PROTOPIC) 0.1 % external ointment Apply topically as needed      traZODone (DESYREL) 100 MG tablet Take 1 tablet (100 mg) by mouth at bedtime. 90 tablet 3    triamcinolone (KENALOG) 0.1 % external cream Apply topically as needed           Component      Latest Ref [facility] 10/22/2024  8:43 AM   Sodium      135 - 145 mmol/L 135    Potassium      3.4 - 5.3 mmol/L 4.1    Chloride      98 - 107 mmol/L 104    Carbon Dioxide (CO2)      22 - 29 mmol/L 24    Anion Gap      7 - 15 mmol/L 7    Urea Nitrogen      8.0 - 23.0 mg/dL 11.3    Creatinine      0.51 - 0.95 mg/dL 0.90    GFR Estimate      >60 mL/min/1.73m2 73    Calcium      8.8 - 10.4 mg/dL 8.7 (L)    Glucose      70 - 99 mg/dL 114 (H)    WBC      4.0 - 11.0 10e3/uL 4.0    RBC Count      3.80 - 5.20 10e6/uL 4.12    Hemoglobin      11.7 - 15.7 g/dL 13.9    Hematocrit      35.0 - 47.0 % 39.6    MCV      78 - 100 fL 96    MCH      26.5 - 33.0 pg 33.7 (H)    MCHC      31.5 - 36.5 g/dL 35.1    RDW      10.0 - 15.0 % 14.8    Platelet Count      150 - 450 10e3/uL 67 (L)    Protein Total      6.4 - 8.3 g/dL 7.0    Albumin      3.5 - 5.2 g/dL 2.6 (L)    Bilirubin Total      <=1.2 mg/dL 3.5 (H)    Alkaline Phosphatase      40 - 150 U/L 69     AST      0 - 45 U/L 60 (H)    ALT      0 - 50 U/L 26    Bilirubin Direct      0.00 - 0.30 mg/dL 1.16 (H)    INR      0.85 - 1.15  1.55 (H)       Legend:  (L) Low  (H) High  Component      Latest Ref [facility] 6/25/2025  3:24 AM   WBC      4.0 - 11.0 10e3/uL 4.9    RBC Count      3.80 - 5.20 10e6/uL 2.76 (L)    Hemoglobin      11.7 - 15.7 g/dL 8.4 (L)    Hematocrit      35.0 - 47.0 % 25.7 (L)    MCV      78 - 100 fL 93    MCH      26.5 - 33.0 pg 30.4    MCHC      31.5 - 36.5 g/dL 32.7    RDW      10.0 - 15.0 % 17.9 (H)    Platelet Count      150 - 450 10e3/uL 66 (L)    % Neutrophils      % 76    % Lymphocytes      % 13    % Monocytes      % 4    % Eosinophils      % 6    % Basophils      % 0    % Immature Granulocytes      % 1    NRBC/W      <1 /100 0    Absolute Neutrophil      1.6 - 8.3 10e3/uL 3.7    Absolute Lymphocytes      0.8 - 5.3 10e3/uL 0.6 (L)    Absolute Monocytes      0.0 - 1.3 10e3/uL 0.2    Absolute Eosinophils      0.0 - 0.7 10e3/uL 0.3    Absolute Basophils      0.0 - 0.2 10e3/uL 0.0    Absolute Immature Granulocytes      <=0.4 10e3/uL 0.0    Absolute NRBCs      10e3/uL 0.0    Sodium      135 - 145 mmol/L 135    Potassium      3.4 - 5.3 mmol/L 3.8    Chloride      98 - 107 mmol/L 105    Carbon Dioxide (CO2)      22 - 29 mmol/L 23    Anion Gap      7 - 15 mmol/L 7    Urea Nitrogen      8.0 - 23.0 mg/dL 24.4 (H)    Creatinine      0.51 - 0.95 mg/dL 1.84 (H)    GFR Estimate      >60 mL/min/1.73m2 31 (L)    Calcium      8.8 - 10.4 mg/dL 7.8 (L)    Glucose      70 - 99 mg/dL 154 (H)    Protein Total      6.4 - 8.3 g/dL 4.8 (L)    Albumin      3.5 - 5.2 g/dL 2.6 (L)    Bilirubin Total      <=1.2 mg/dL 0.6    Alkaline Phosphatase      40 - 150 U/L 90    AST      0 - 45 U/L 71 (H)    ALT      0 - 50 U/L 105 (H)    Bilirubin Direct      0.00 - 0.45 mg/dL 0.43    INR      0.85 - 1.15  1.12    PT      11.8 - 14.8 Seconds 14.4    Calcium Ionized Whole Blood      4.4 - 5.2 mg/dL 4.5    Fibrinogen      170 - 510 mg/dL  319    PTT      22 - 38 Seconds 26    Magnesium      1.7 - 2.3 mg/dL 2.1    Phosphorus      2.5 - 4.5 mg/dL 2.8       Legend:  (L) Low  (H) High    Ph.E:      Constitutional: WD/WN. Pleasant. In no acute distress. Sister present  Eyes: EOM intact, sclera anicteric, conj not injected  MS: No synovitis. OA changes of hands.  Skin: no visible rash  Neuro: A&O x 3. Grossly non focal  Psych: NL affect     Assessment/ plan:    10/2024:    Active psoriasis, PsA, h/o liver cirrhosis. Recommend switching skyrizi to taltz; risks were discussed. Could get assistance to have manjaro covered by switching to taltz, now eight loss on wegovy. Discussed OA mx.    Plan:      Will switch skyrizi to taltz     [facility] our pharmacist will contact you    You could add gabapentin 300 mg in the morning    Recommend [place] chi, voltaren gel for the knee pain    Refer to ortho for knee pain        Return in about 3-4 months (video)        2/6/2025:    Insurance did not cover taltz, so cosentyx was ordered instead, she failed it, has active psoriasis. Recommend tos witch to taltz, then she could also go on zepbound as double coverage by company. Risks were discussed.    For knee pain, recommend follow up with ortho and trying increased gabapentin dose.    Plan:    Stop cosentyx    Start taltz when it is covered, will ask [employer] to help you with zepbound coverage    Add gabapentin 300 mg in the morning    Follow up with ortho    Return video visit in about 3-4 months        Today 6/25/2025:      Recovering from liver transplant on 6/19/2025, on MMF+Tac+prednisone 5 mg every day. Taltz on hold. No concern for psoriasis or PsA flare today.    Plan:    Return in 6 months in person    TT 30 min was spent on date of the encounter doing chart review, history and exam, documentation and further activities as noted above. Any prior notes, outside records, laboratory results, and imaging studies were reviewed if relevant.    The longitudinal plan of care  for the diagnosis(es)/condition(s) as documented were addressed during this visit. Due to the added complexity in care, I will continue to support Karlene in the subsequent management and with ongoing continuity of care.        Lisette Mccann MD

## 2025-06-25 NOTE — PROGRESS NOTES
[facility]  Transplant Nephrology Progress Note  Date of Admission:  6/18/2025  Today's Date: 06/25/2025  Requesting physician: Max Rebolledo MD    Recommendations:   - no acute RRT indications today  - lasix 80 mg iv bid    Assessment & Plan   # EMILIE: RRT dependent, CRRT to iHD switch on 6/24   - EMILIE likely secondary to liver transplant with hemodynamic changes and hypotension.    - Baseline Creatinine: ~ 0.8-1.0   - Proteinuria: Normal (<0.2 grams)    # Liver Tx: Patient with ESLD secondary to Metabolic associated steatotic liver disease (MASLD), s/p OLT June 19, 2025.  Transaminases donwtrending,  Followed by Transplant Surgery.    # Immunosuppression: Tacrolimus immediate release (goal 6-8), Mycophenolate mofetil (dose 750 mg every 12 hours), and Methylprednisolone (dose taper)   - Induction with Recent Transplant:  Per Liver Tx Protocol   - Continue with intensive monitoring of immunosuppression for efficacy and toxicity.   - Changes: Not at this time; Management per Transplant Surgery.    # Infection Prophylaxis:   - PJP: Sulfa/TMP (Bactrim)  - CMV: Valganciclovir (Valcyte)  - Thrush: Fluconazole (Diflucan)  - Fungal: Fluconazole (Diflucan)      - CMV IgG Ab High Risk Discordance (D+/R-) at time of transplant: No  Present CMV Serostatus: Positive  - EBV IgG Ab High Risk Discordance (D+/R-) at time of transplant: No  Present EBV Serostatus: Positive    # Blood Pressure: stable off pressors;  Goal BP: MAP > 65   - Changes: Not at this time;     # Elevated Blood Glucose: Secondary to high dose steroids.   - On insulin    # Anemia in Chronic Disease/Surgery: Hgb: stable low      RACHEL: No   - Iron studies: Not checked recently    # Thrombocytopenia: Stable, mildly low platelet level.    # Mineral Bone Disorder:   - Vitamin D; level: Low normal        On supplement: No  - Calcium; level: Low normal when corrected for low serum albumin        On supplement:  No  - Phosphorus; level: High        On binder: No    # Electrolytes:   - Potassium; level: Normal        On supplement: No  - Magnesium; level: Normal        On supplement: No  - Bicarbonate; level: Normal        On supplement: No  - Sodium; level: Normal    # Obesity, Class III (BMI = 47.7):    - Once patient recovers from surgery and incision heals, would recommend working on weight loss     # Other Significant PMH:   - H/o PE (~ 2000): Patient with no recent clotting episodes and off anticoagulation.   - Fibromyalgia: Stable symptoms on gabapentin.     # Transplant History:  Etiology of Organ Failure: Metabolic associated steatotic liver disease (MASLD)  Tx: Liver Tx  Transplant: 6/19/2025 (Liver)  Significant changes in immunosuppression: Slightly lower tacrolimus level goal due to EMILIE.  Significant transplant-related complications: EMILIE    Recommendations were communicated to the primary team via this note.    Tevin Wyatt MD  Transplant Nephrology  Contact information via Vocera Web Console or via [email or web address] Paging/Directory    Interval History  Tolerated HD yesterday 2 l UF  Sating 100% on RA  Leg swelling improved  UOP ~400 ml past 24hrs    Review of Systems   4 point ROS was obtained and negative except as noted in the Interval History.    MEDICATIONS:  Current Facility-Administered Medications   Medication Dose Route Frequency Provider Last Rate Last Admin    acetaminophen (TYLENOL) tablet 650 mg  650 mg Oral Q8H Ginny Naidu MD   650 mg at 06/25/25 1215    aspirin (ASA) tablet 325 mg  325 mg Oral or Feeding Tube Daily Max Rebolledo MD   325 mg at 06/25/25 0752    citalopram (celeXA) tablet 20 mg  20 mg Oral Daily Ginny Naidu MD   20 mg at 06/25/25 0752    fluconazole (DIFLUCAN) intermittent infusion 400 mg  400 mg Intravenous Q24H Max Rebolledo  mL/hr at 06/24/25 1743 400 mg at 06/24/25 1743    heparin ANTICOAGULANT injection 5,000 Units  5,000 Units Subcutaneous Q8H DARINEL  Gus Arroyo MD   5,000 Units at 06/25/25 0539    insulin aspart (NovoLOG) injection (RAPID ACTING)  1-12 Units Subcutaneous Q4H Sincere Cunningham PA-C   1 Units at 06/25/25 0315    levothyroxine (SYNTHROID/LEVOTHROID) tablet 112 mcg  112 mcg Oral Daily Ginny Naidu MD   112 mcg at 06/25/25 0754    multivitamin w/minerals (THERA-VIT-M) tablet 1 tablet  1 tablet Oral Daily Ginny Naidu MD   1 tablet at 06/25/25 0752    mycophenolate (GENERIC EQUIVALENT) capsule 500 mg  500 mg Oral Q8H Shaila Foote NP   500 mg at 06/25/25 0752    piperacillin-tazobactam (ZOSYN) 2.25 g vial to attach to  ml bag  2.25 g Intravenous Q6H Max Rebolledo  mL/hr at 06/24/25 1359 2.25 g at 06/25/25 0808    predniSONE (DELTASONE) tablet 5 mg  5 mg Oral Daily Ginny Naidu MD   5 mg at 06/25/25 0753    Prosource TF20 ENfit Compatibl EN LIQD (PROSOURCE TF20) packet 60 mL  1 packet Per Feeding Tube BID Ginny Naidu MD   60 mL at 06/25/25 0754    sodium chloride (PF) 0.9% PF flush 3 mL  3 mL Intravenous Q8H Ginny Naidu MD   3 mL at 06/25/25 0539    sodium chloride (PF) 0.9% PF flush 3 mL  3 mL Intracatheter Q8H DARINEL Ginny Naidu MD   3 mL at 06/25/25 0539    sodium chloride (PF) 0.9% PF flush 3 mL  3 mL Intracatheter Q8H Ginny Naidu MD   3 mL at 06/25/25 0326    sulfamethoxazole-trimethoprim (BACTRIM) 400-80 MG per tablet 1 tablet  1 tablet Oral or Feeding Tube Once per day on Tuesday Thursday Saturday Max Rebolledo MD   1 tablet at 06/24/25 1944    tacrolimus (GENERIC EQUIVALENT) capsule 2.5 mg  2.5 mg Oral BID IS Elisabeth Wilkinson APRN CNP   2.5 mg at 06/25/25 0751    ursodiol (ACTIGALL) capsule 300 mg  300 mg Oral BID Ginny Naidu MD   300 mg at 06/25/25 0753    valGANciclovir (VALCYTE) tablet 450 mg  450 mg Oral Once per day on Tuesday Saturday Max Rebolledo MD   450 mg at 06/24/25 1744     Current Facility-Administered Medications   Medication Dose Route  Frequency Provider Last Rate Last Admin    dextrose 10% infusion   Intravenous Continuous PRN Jaimee Gagnon MD        Reason beta blocker order not selected   Does not apply DOES NOT GO TO Ginny Mcgowan MD           Physical Exam   Temp  Av.6  F (37  C)  Min: 97.8  F (36.6  C)  Max: 99  F (37.2  C)  Arterial Line BP  Min: 97/47  Max: 157/55  Arterial Line MAP (mmHg)  Av.1 mmHg  Min: 60 mmHg  Max: 85 mmHg      Pulse  Av.6  Min: 67  Max: 83 Resp  Av.5  Min: 8  Max: 23  FiO2 (%)  Av %  Min: 30 %  Max: 40 %  SpO2  Av.2 %  Min: 88 %  Max: 100 %    CVP (mmHg): 3 mmHgBP 98/84   Pulse 55   Temp 98.3  F (36.8  C) (Oral)   Resp 19   Wt (!) 139.6 kg (307 lb 11.2 oz)   SpO2 100%   BMI 42.92 kg/m     Date 25 07 - 25 0659   Shift 8430-3036 1887-9701 9030-7753 24 Hour Total   INTAKE   P.O. 240   240   I.V. 327.8   327.8   Blood Components 600   600   Shift Total(mL/kg) 1167.8(7.52)   1167.8(7.52)   OUTPUT   Urine 7   7   Drains 252   252   Shift Total(mL/kg) 259(1.67)   259(1.67)   Weight (kg) 155.2 155.2 155.2 155.2      Admit Weight: (!) 149.6 kg (329 lb 12.8 oz)     GENERAL APPEARANCE:  NAD  CV: rrr s1s2 no m  Lungs: decreased breath sounds  Ext ++pitting edema  Neuro: awake    Data   All labs reviewed by me.  CMP  Recent Labs   Lab 25  1224 25  0847 25  0324 25  0315 25  0825 25  0340 25  19525  0805 25  0353 25  0426 25  0423   NA  --   --  135  --   --  136  --  134*  --  134*   < > 133*   POTASSIUM  --   --  3.8  --   --  4.1  --  4.2  --  4.2   < > 4.4   CHLORIDE  --   --  105  --   --  104  --  101  --  102   < > 99   CO2  --   --  23  --   --  23  --  23  --  24   < > 22   ANIONGAP  --   --  7  --   --  9  --  10  --  8   < > 12   * 135* 154* 157*   < > 149*  158*   < > 224*   < > 118*  123*   < > 104*   BUN  --   --  24.4*  --   --  25.9*  --  30.7*  --  25.6*   < >  35.0*   CR  --   --  1.84*  --   --  1.49*  --  1.66*  --  1.62*   < > 2.70*   GFRESTIMATED  --   --  31*  --   --  40*  --  35*  --  36*   < > 19*   SANDY  --   --  7.8*  --   --  7.6*  --  7.9*  --  8.0*   < > 8.2*   MAG  --   --  2.1  --   --  2.4*  --  2.5*  --  2.5*   < > 2.4*   PHOS  --   --  2.8  --   --  3.4  --  3.6  --  4.5   < > 6.0*   PROTTOTAL  --   --  4.8*  --   --  5.0*  --   --   --  5.3*  --  5.4*   ALBUMIN  --   --  2.6*  --   --  3.0*  --  3.2*  --  3.1*   < > 3.3*   BILITOTAL  --   --  0.6  --   --  0.7  --   --   --  0.9  --  1.0   ALKPHOS  --   --  90  --   --  98  --   --   --  106  --  90   AST  --   --  71*  --   --  117*  --   --   --  251*  --  491*   ALT  --   --  105*  --   --  145*  --   --   --  191*  --  231*    < > = values in this interval not displayed.     CBC  Recent Labs   Lab 06/25/25  0324 06/24/25  0340 06/23/25  0353 06/22/25 2022   HGB 8.4* 8.1*  8.1* 8.1* 8.2*   WBC 4.9 4.3  4.3 5.6 6.1   RBC 2.76* 2.68*  2.68* 2.78* 2.75*   HCT 25.7* 24.4*  24.4* 25.1* 24.1*   MCV 93 91  91 90 88   MCH 30.4 30.2  30.2 29.1 29.8   MCHC 32.7 33.2  33.2 32.3 34.0   RDW 17.9* 18.1*  18.1* 18.3* 18.3*   PLT 66* 62*  62* 63* 68*     INR  Recent Labs   Lab 06/25/25  0324 06/24/25  0340 06/23/25  0353 06/22/25  1037   INR 1.12 1.18* 1.24* 1.23*   PTT 26 25 25 25     ABG  Recent Labs   Lab 06/21/25  1619 06/19/25  1935 06/19/25  1835 06/19/25  1802 06/19/25  1727   PH  --  7.39 7.35 7.37 7.37   PCO2  --  39 44 42 43   PO2  --  62* 88 118* 111*   HCO3  --  23 24 24 25   O2PER 4 60.0 43.0 40.0 40.0      Urine Studies  Recent Labs   Lab Test 06/18/25  2243 06/08/25  0447 11/26/24  0819   COLOR Yellow Yellow Yellow   APPEARANCE Clear Clear Clear   URINEGLC Negative Negative Negative   URINEBILI Negative Negative Negative   URINEKETONE Negative Negative Trace*   SG 1.015 1.015 1.026   UBLD Negative Negative Negative   URINEPH 5.0 5.5 5.5   PROTEIN Negative Negative 10*   NITRITE Negative  Negative Negative   LEUKEST Negative Negative Negative   RBCU 0  --  1   WBCU <1  --  1     No lab results found.  PTH  No lab results found.  Iron Studies  Recent Labs   Lab Test 11/26/24  0800   IRON 112   *   IRONSAT 64*   LUDY 762*       IMAGING:  All imaging studies reviewed by me.

## 2025-06-25 NOTE — PLAN OF CARE
Admitted/transferred from:   Time of arrival on unit 1700  2 RN full  skin assessment completed by Lilian DNA & Chandrika DAN  Skin assessment finding: issues found : Clamshell abdominal incision w/wound vac in place, R TIARRA x 2, weeping wounds x 3 LLE (from heparin injections), Mepilex to bilateral hips (pressure injury prevention), sacral Mepilex (changed this shift). Bruising to mid back, ~ quarter sized, skin intact.   Interventions/actions: skin interventions Primapore dressings changed on LLE. Sacral Mepilex changed. L hip Mepilex changed.     Will continue to monitor.    Lilian Ashby RN

## 2025-06-25 NOTE — PROGRESS NOTES
CLINICAL NUTRITION SERVICES - REASSESSMENT NOTE     Registered Dietitian Interventions:  Ordered kcal cts 6/25-6/27    Continued supplements:  Ensure Max Protein Vanilla - modified to be sent @ 10am & 2pm instead of with meals    Continued cycled EN support to promote PO during the day - discussed in SICU rounds to continue pending kcal cts:  TwoCal HN @ 45 mL/hr x 12 hours (8p-8a) + 2 pkts ProSource TF20 = 540 mL TF, 1240 kcal (69% minimum assessed kcal needs), 89 g protein (66% minimum assessed protein needs)     With pt and pt's friend: Briefly reviewed post-transplant education on high protein sources, food safety precautions. Kept education brief per pt family/friend request and to review in more detail at later date. Provided Handouts: Food Safety and Guide to Your Diet After Transplant.     Future/Additional Recommendations:  Monitor kcal cts. Goal ~1400 kcal and 85 g protein daily (~65% low-end needs) to discontinue EN support/remove NDT.    Verbally review post-transplant diet prior to discharge.      INFORMATION OBTAINED  Assessed patient in room.    CURRENT NUTRITION ORDERS  Diet: Regular (just advanced yesterday from Full Liquid Diet)    Snacks/Supplements:   Snacks/Supplements Adult: Ensure Max Protein (bariatric); With Meals  CONTINUOUS        Comments: Pt prefers vanilla   Question Answer Comment   Select Supplement Ensure Max Protein (bariatric)    Frequency With Meals         Nutrition Support: EN support  6/22-6/24: TwoCal HN @ 45 mL/hr (1080 mL/day) + 3 pkts ProSource TF20 to provide 2400 kcals (27 kcals/kg/day), 151 g PRO (1.7 g/kg/day), 756 mL H2O, 237 g CHO and 5 g Fiber daily.     6/24-Current: Cycled TF to promote PO during day:   TwoCal HN @ 45 mL/hr x 12 hours (8p-8a) + 2 pkts ProSource TF20 = 540 mL TF, 1240 kcal (69% minimum assessed kcal needs), 89 g protein (66% minimum assessed protein needs)     CURRENT INTAKE/TOLERANCE  Per RN flowsheets: % meals since diet advanced  post-op.    Kcal cts 6/22-6/24:  6/22       Total Kcals: 549       Total Protein: 73g  Kcals from Hospital Food: 549                                   Protein: 73g  Kcals from Outside Food (average):0            Protein: 0g  # Meals Ordered from Kitchen: 3 meals  # Meals Recorded: 1 meal (100% 1.25 Greek yogurt, orange sherbet; 25% 1 6oz chicken broth, cooked cream of wheat)  # Supplements Recorded: 2 supplements (100% 2 Ensure Max)    6/23       Total Kcals: 770       Total Protein: 80g  Kcals from Hospital Food: 770                                   Protein: 80g  Kcals from Outside Food (average):0            Protein: 0g  # Meals Ordered from Kitchen: 3  # Meals Recorded: 2 meals recorded              Meal 1: 100% 1 lemon fruit ice              Meal 2: 50% 1 lemon fruit ice  # Supplements Recorded: 100% 2 Ensure Max vanilla; 1 Ensure Plus HP vanilla     6/24       Total Kcals: 957       Total Protein: 44g  Kcals from Hospital Food: 662                                   Protein: 38g  Kcals from Outside Food (average):295        Protein: 6g  # Meals Ordered from Kitchen: 2  # Meals Recorded: 2 meals recorded              Meal 1 [outside food]: 50% Chick-richy-A shake              Meal 2: 75%  salad (w/ lettuce, tomato, cucumbers, turkey, egg, cheddar, croutons, Uzbek dressing)  # Supplements Recorded: 100% 1 Ensure Plus HP van     --> 3-day average: 759 kcal (35% minimum assessed kcal needs) and 66 g protein (50% minimum assessed protein needs)    Per visit with pt and pt's visitor at bedside: Pt's visitor shares that she's a picky eater at baseline and pt corroborates. Family/friend is going to get her some takeout from a local restaurant today. Writer educated on importance of food safety with leftovers and also to make RN aware of all foods eaten so it can be included for kcal cts. Pt would like a vanilla Ensure Max to be sent up now. Had 100% of strawberry Greek yogurt this AM.    NEW FINDINGS  GI symptoms:  Reviewed; DDLT on 6/19. No BM from admit to 6/21, last BM today (6/25). No scheduled bowel regimen at present.    Skin/wounds: Reviewed; pt not followed by WOCN. Surgical incisions to upper transverse abdomen and wound VAC in place. 2 drains in RLQ.    Nutrition-relevant labs: Reviewed    Nutrition-relevant medications: Reviewed; Thera-vit-M, high dose sliding scale insulin     Weight: ~6.7% wt loss over the past week despite +11 L since admit per I/Os.  Date/Time Weight Weight Method   06/25/25 0500 139.6 kg (307 lb 11.2 oz) Abnormal  Bed scale   06/24/25 0400 145.8 kg (321 lb 8 oz) Abnormal  Bed scale   06/23/25 1400 146 kg (321 lb 14 oz) Abnormal  Bed scale   06/23/25 0400 173.4 kg (382 lb 4.8 oz) Abnormal  Bed scale   06/22/25 0400 167.5 kg (369 lb 4.3 oz) Abnormal  Bed scale   06/20/25 0000 155.2 kg (342 lb 2.5 oz) Abnormal  Bed scale   06/18/25 1800 149.6 kg (329 lb 12.8 oz) Abnormal  --     Updated ASSESSED NUTRITION NEEDS  Dosing Weight: 88 kg, based on adjusted wt (based on lowest wt of 139.6 kg on 6/25 and IBW of 70.5 kg)  Estimated Energy Needs: 0415-9395 kcals/day (25 - 30 kcals/kg)  Justification: Increased needs post SOT x 6-8 weeks (through ~July 31st-August 14th)   Estimated Protein Needs: 132-176 grams protein/day (1.5 - 2 grams of pro/kg)  Justification: Increased needs post SOT x 6-8 weeks (through ~July 31st-August 14th)   Estimated Fluid Needs: Per provider pending fluid status    MALNUTRITION  % Intake: Decreased intake does not meet criteria  % Weight Loss: > 2% in 1 week (severe)   Subcutaneous Fat Loss: None observed  Muscle Loss: None observed  Fluid Accumulation/Edema: Moderate to severe, 2-4+  Malnutrition Diagnosis: Moderate malnutrition in the context of acute illness or injury  Malnutrition Present on Admission: Unable to assess    EVALUATION OF THE PROGRESS TOWARD GOALS   Previous Goals  Diet adv v nutrition support within 2-3 days.   Evaluation: Met    Previous Nutrition  Diagnosis  Increased nutrient needs (pro/kcal) related to anticipated liver transplant as evidenced by assessed needs of 4791-6160 kcals/day (25 - 30 kcals/kg) and 135-180 grams protein/day (1.5 - 2 grams of pro/kg) post SOT x 6-8 weeks (through ~July 31st-August 14th).   Evaluation: Ongoing, new dx below    NUTRITION DIAGNOSIS  Inadequate protein energy intake related to decreased appetite in setting of recent DDLT as evidenced by feeding tube placement, ongoing supplemental EN support, and 3-day kcal ct average showing 759 kcal (35% minimum assessed kcal needs) and 66 g protein (50% minimum assessed protein needs).    INTERVENTIONS  Collaboration by nutrition professional with other providers  Discussed modified diet with patient and/or family.  Enteral nutrition management  Medical food supplement therapy    GOALS  Goal ~1400 kcal and 85 g protein daily (~65% low-end needs) to discontinue EN support/remove NDT.     MONITORING/EVALUATION  Progress toward goals will be monitored and evaluated per policy.    Donna Addison RD, LD  Available on Vocera - can search by name or unit Dietitian

## 2025-06-25 NOTE — PROGRESS NOTES
Care Management Follow Up    Length of Stay (days): 5    Expected Discharge Date: 07/07/2025     Concerns to be Addressed: discharge planning, adjustment to diagnosis/illness     Patient plan of care discussed at interdisciplinary rounds: Yes    Anticipated Discharge Disposition:  Home enteral         Additional Information:  RNCC received a message from HAYDEN Henson stating that   RD Patrica stated that pt is not sole source, pt is eating some and they are weaning tube feeds. Pt's [employer] plan Enteral criteria is that pt must be sole source. I will have to created a self pay quote for pt since pt does not meet Enteral criteria. I have generated the self pay quote in pt's epic chart in the estimate tabs. 3 cans of the TwoCal HN 2.0 formula is $11.76 per day, the Prosource TF 1.5 - 4pkts per day is $11.52, supplies per day is $70. For any additional nursing services is $90. The total per day with out nursing coverage is $93.28.    RNCC sent referral to Maxi covarrubias ( ph [phone], fax [phone])     Next Steps:  Follow with Maxi for cost of enteral TF formula and supplies and RN services.     Diandra Reyes, MSN, MA, RN, [facility]  4C/4A/4E ICU Care Coordinator  [facility] Acute Care Management  Phone:[phone]  Available on Vocera: 4C MICU RNCC 4E SICU RNCC 4A CVICU RNCC

## 2025-06-25 NOTE — PROGRESS NOTES
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 Rebolledo on 2025.     CHANGES and MAJOR THINGS TODAY:   - Transfer to floor  - PRN Hydralazine for SBP > 160 mmHg    PLAN:  Neurological/Pain:  # Acute post operative pain  # MDD   # Fibromyalgia  # Insomnia  # Hypoactive delirium, resolved   - Monitor neurological status. Delirium preventions and precautions.  - Pain:   - Scheduled: Tylenol 650 mg q8h, Gabapentin (holding due to patient report of anxiety with administration ), Lidocaine patches daily  - PRN:  Oxycodone 5-10 mg q 4 hr, Methocarbamol 750 mg q 6 hrs  -- PTA Citalopram, Trazodone ordered     # Anxiety  - Initiated PRN Hydroxyzine 12.5-25 mg q 6 hours (adjusted for renal function)      Pulmonary care:   # Post operative respiratory insufficiency, improving  # Left pleural effusion, resolving  # NEREYDA, history of  - Wean Supplemental O2 as able to SpO2 goals, weaned to HiFlow 20 LPM overnight, now on room air  am  - Goal SpO2 > 92%.  - Pulmonary hygiene; IS q15-30 minutes when awake  - Per chart review does not use CPAP at home as it gives her anxiety     Cardiovascular:    - Monitor hemodynamic status.  - MAP goal > 65 mmHg, not currently requiring vasopressor support  am       - Hydralazine PRN for SBP > 160 added      # Hx of HLD  - No home medications     GI /Nutrition:   # Cirrhosis of the liver  # Metabolic dysfunction-associated steatohepatitis  # S/p  donor liver transplant, repair of umbilical hernia on 2025  - Ursodiol 300 mg BID  - Basiliximab   - Immunosuppressant regimen as below   - PPI discontinued   - Liver ultrasound post-op  with elevated peak systolic velocities and resistive indices, no fluid collection  - LFTs downtrending 6/25, continue to monitor  -  mg daily  - TIARRA drains x 2, continue to follow drain output, RLQ with stable output 6/24     # Protein calorie deficit malnutrition, risk for   - Advance as tolerated 6/24 with Calorie Counts, continue for another day while monitoring for sufficient caloric intake as diet is advanced to assess readiness for discontinuation of tube feeds       - Initiated cycling of tube feeds 6/24  - Snacks/Supplements Adult: Ensure Max Protein (bariatric); With Meals  - Multiple Bms 6/24, holding bowel regimen     # Nausea, resolved   - Ondansetron IV/PO PRN     Fluids/ Electrolytes/ Renal:   # Acute Kidney Injury  # Oliguria  - Nephrology consulted; appreciate recs       - CRRT discontinued 6/24, tolerated iHD with 2L removed 6/24  - 1 x unmeasured void 6/25, continue bladder scans q shift     Endocrine:    # Stress vs steroid-induced Hyperglycemia   - Continue High intensity sliding scale insulin  - Goal -180 for optimal healing      # Hypothyroidism  - PTA Levothyroxine ordered     ID/ Antibiotics:  # Immunosuppression s/p liver transplant  # Kathya-operative prophylaxis  # Polymorphous lymphocytes on lymph node biopsy  - Tacrolimus, mycophenolate  - Steroid taper  - PPX: bactrim, valcyte, fluconazole        - per Transplant Surgery extend Fluconazole, Zosyn for 7 day course with end date 6/26 as donor had open abdomen/high risk for infection  - Patient afebrile, WBC downtrended to stable at 4.9 on 6/25  - Per chart review had lymph node biopsy of subcarinal in 8/2024 which was negative for malignancy (4/2/25 pulmonology note)     # Psoriatic Arthropathy  - Home regimen includes Ixekizumab (q4 weeks, currently held), PRN Mometasone cream ordered     Heme:     # Acute Blood Loss Anemia   # Anemia of Critical Illness   # Thrombocytopenia  - Transfuse per transfusion goals below  - SQH for  DVT prophylaxis  - Hemoglobin stable at 8.4, platelets stable at 66 on 6/25  - Discontinue trending coags     Transfusion goals:  -- Hgb > 8  -- Fibrinogen > 200  -- Plt > 50  -- INR < 2.0      # Hx of PE 1/31/2002  -- No PTA DOAC     MSK/Skin:  # Weakness and deconditioning of critical illness  # Surgical Incision  - Physical and occupational therapy consult   - Wound vac in place to abdominal incision     Prophylaxis:    DVT Prophylaxis: Pneumatic Compression Devices,  mg daily, SQH   GI Prophylaxis: PPI-discontinued 6/23     Lines/ tubes/ drains:  - PIV x2  - L internal jugular trialysis CVC  - Surgical drain x2  - Wound vac     Disposition:  - Transfer to floor    Patient seen, findings and plan discussed with surgical ICU staff, Dr. Rodríguez.    Time spent on this Encounter   Billing:  I spent 40 minutes bedside and on the inpatient unit today managing the care of Karlene Yun in relation to the issues listed in this note.      Jasmina Pastrana PA-C    ====================================  INTERVAL HISTORY: Patient awake, resting in bed. Responding appropriately to questions. Endorses obtaining sleep overnight, pain as well controlled. Endorses tolerating PO intake without nausea, but taking it slow due to decreased appetite.     OBJECTIVE:   1. VITAL SIGNS:   Temp:  [97.8  F (36.6  C)-98.7  F (37.1  C)] 98  F (36.7  C)  Pulse:  [51-76] 58  Resp:  [16-22] 19  BP: (116-160)/() 140/50  Cuff Mean (mmHg):  [] 100  FiO2 (%):  [5 %-40 %] 5 %  SpO2:  [86 %-100 %] 100 %  FiO2 (%): 5 %, Resp: 19    2. INTAKE/ OUTPUT:   I/O last 3 completed shifts:  In: 2333 [P.O.:500; I.V.:166; NG/GT:290; IV Piggyback:500]  Out: 4158.1 [Urine:400; Drains:833; Other:2925.1]    3. PHYSICAL EXAMINATION:  General: Resting in bed, awake.  HEENT: Glasses in place.  Neuro: Alert, responding appropriately to questions, moving all extremities spontaneously.  Pulm/Resp: HFNC in place, lungs clear to auscultation  bilaterally.  CV: Bradycardic rate, regular rhythm.  Abdomen: Soft, non-distended, non-tender on palpation, no guarding.  Incisions/Skin: Upside down Y-incision with wound vac in place. TIARRA drains x 2 with serosanguinous output.  MSK/Extremities: Bilateral peripheral edema LE, DP and radial pulses palpable bilaterally. Extremities warm.    4. INVESTIGATIONS:   Arterial Blood Gases   Recent Labs   Lab 06/19/25  1935 06/19/25  1835 06/19/25  1802 06/19/25  1727   PH 7.39 7.35 7.37 7.37   PCO2 39 44 42 43   PO2 62* 88 118* 111*   HCO3 23 24 24 25     Complete Blood Count   Recent Labs   Lab 06/25/25 0324 06/24/25 0340 06/23/25  0353 06/22/25  2022   WBC 4.9 4.3  4.3 5.6 6.1   HGB 8.4* 8.1*  8.1* 8.1* 8.2*   PLT 66* 62*  62* 63* 68*     Basic Metabolic Panel  Recent Labs   Lab 06/25/25 0324 06/25/25 0315 06/24/25 2329 06/24/25 1958 06/24/25 0825 06/24/25 0340 06/23/25 1958 06/23/25 1957 06/23/25  0805 06/23/25  0353     --   --   --   --  136  --  134*  --  134*   POTASSIUM 3.8  --   --   --   --  4.1  --  4.2  --  4.2   CHLORIDE 105  --   --   --   --  104  --  101  --  102   CO2 23  --   --   --   --  23  --  23  --  24   BUN 24.4*  --   --   --   --  25.9*  --  30.7*  --  25.6*   CR 1.84*  --   --   --   --  1.49*  --  1.66*  --  1.62*   * 157* 145* 189*   < > 149*  158*   < > 224*   < > 118*  123*    < > = values in this interval not displayed.     Liver Function Tests  Recent Labs   Lab 06/25/25 0324 06/24/25 0340 06/23/25 1957 06/23/25  0353 06/22/25  1250 06/22/25  1037 06/22/25  0423   AST 71* 117*  --  251*  --   --  491*   * 145*  --  191*  --   --  231*   ALKPHOS 90 98  --  106  --   --  90   BILITOTAL 0.6 0.7  --  0.9  --   --  1.0   ALBUMIN 2.6* 3.0* 3.2* 3.1*   < >  --  3.3*   INR 1.12 1.18*  --  1.24*  --  1.23* 1.25*    < > = values in this interval not displayed.     Pancreatic Enzymes  Recent Labs   Lab 06/20/25 0358 06/18/25  1902   LIPASE 46  --    AMYLASE 63 47      Coagulation Profile  Recent Labs   Lab 06/25/25  0324 06/24/25  0340 06/23/25  0353 06/22/25  1037   INR 1.12 1.18* 1.24* 1.23*   PTT 26 25 25 25         5. RADIOLOGY:   No results found for this or any previous visit (from the past 24 hours).    =========================================

## 2025-06-25 NOTE — PLAN OF CARE
Goal Outcome Evaluation:    Pt is A&Ox4, moves all extremities, pupils equal and reactive. Complains of back pain 8/10, oxy given. SB, map >65, afebrile. On high flow titrated from 30L 30% to now 20L 25%.  Had 1 incontinence episode this shift, voided x2 this shift. Bladder scan @ 0630 was 74. Jpx 2 with adequate output, prevena wound vac with 0 output. Cycle TF @ 45 shut off at 0800.             Problem: Pain Acute  Goal: Optimal Pain Control and Function  Intervention: Develop Pain Management Plan  Recent Flowsheet Documentation  Taken 6/25/2025 0000 by Cherelle Najera RN  Pain Management Interventions:   repositioned   medication offered but refused  Taken 6/24/2025 2205 by Cherelle Najera RN  Pain Management Interventions:   repositioned   medication offered but refused  Taken 6/24/2025 2123 by Cherelle Najera RN  Pain Management Interventions: medication (see MAR)  Taken 6/24/2025 2023 by Cherelle Najera RN  Pain Management Interventions:   care clustered   pillow support provided  Taken 6/24/2025 2000 by Cherelle Najera RN  Pain Management Interventions:   care clustered   pillow support provided     Problem: Acute Kidney Injury/Impairment  Goal: Fluid and Electrolyte Balance  Intervention: Monitor and Manage Fluid and Electrolyte Balance  Recent Flowsheet Documentation  Taken 6/25/2025 0000 by Cherelle Najera RN  Fluid/Electrolyte Management: fluids provided  Taken 6/24/2025 2000 by Cherelle Najera RN  Fluid/Electrolyte Management: fluids provided     Problem: Liver Transplant  Goal: Acceptable Pain Control  Intervention: Prevent or Manage Pain  Recent Flowsheet Documentation  Taken 6/25/2025 0000 by Cherelle Najera RN  Pain Management Interventions:   repositioned   medication offered but refused  Taken 6/24/2025 2205 by Cherelle Najera RN  Pain Management Interventions:   repositioned   medication offered but refused  Taken 6/24/2025 2123 by Cherelle Najera RN  Pain Management Interventions: medication (see MAR)  Taken  6/24/2025 2023 by Cherelle Najera, RN  Pain Management Interventions:   care clustered   pillow support provided  Taken 6/24/2025 2000 by Cherelle Najera, RN  Pain Management Interventions:   care clustered   pillow support provided

## 2025-06-26 ENCOUNTER — APPOINTMENT (OUTPATIENT)
Dept: GENERAL RADIOLOGY | Facility: CLINIC | Age: 60
DRG: 005 | End: 2025-06-26
Payer: COMMERCIAL

## 2025-06-26 ENCOUNTER — APPOINTMENT (OUTPATIENT)
Dept: OCCUPATIONAL THERAPY | Facility: CLINIC | Age: 60
DRG: 005 | End: 2025-06-26
Attending: TRANSPLANT SURGERY
Payer: COMMERCIAL

## 2025-06-26 LAB — BACTERIA PLR CULT: NORMAL

## 2025-06-26 PROCEDURE — 71045 X-RAY EXAM CHEST 1 VIEW: CPT

## 2025-06-26 PROCEDURE — 71045 X-RAY EXAM CHEST 1 VIEW: CPT | Mod: 26 | Performed by: RADIOLOGY

## 2025-06-26 ASSESSMENT — ACTIVITIES OF DAILY LIVING (ADL)
ADLS_ACUITY_SCORE: 50
ADLS_ACUITY_SCORE: 54
ADLS_ACUITY_SCORE: 50
ADLS_ACUITY_SCORE: 54
ADLS_ACUITY_SCORE: 54
ADLS_ACUITY_SCORE: 50
ADLS_ACUITY_SCORE: 54
ADLS_ACUITY_SCORE: 54
ADLS_ACUITY_SCORE: 50
ADLS_ACUITY_SCORE: 54
ADLS_ACUITY_SCORE: 48
ADLS_ACUITY_SCORE: 50
ADLS_ACUITY_SCORE: 50
ADLS_ACUITY_SCORE: 54
ADLS_ACUITY_SCORE: 48
ADLS_ACUITY_SCORE: 54
ADLS_ACUITY_SCORE: 50
ADLS_ACUITY_SCORE: 48

## 2025-06-26 NOTE — PLAN OF CARE
Goal Outcome Evaluation:      Plan of Care Reviewed With: patient    Overall Patient Progress: no change    Outcome Evaluation: Tolerating TF, pain well managed with meds    BP (!) 148/48 (BP Location: Right arm)   Pulse 55   Temp 98.3  F (36.8  C) (Oral)   Resp 18   Wt (!) 139.6 kg (307 lb 11.2 oz)   SpO2 96%   BMI 42.92 kg/m      Shift: 6430-2185   VS: Hypertensive otherwise stable on RA, afebrile  Neuro: Aox4  Behaviors: Calm and cooperative  BG: ACHS, Bgs 163 and 130  Labs: pending AM lab draw  Respiratory: WDL  Cardiac: WDL  Pain/Nausea: Complaints of abd pain, denies nausea  PRN: none given this shift  Diet: Regular diet, Cycled TF @ 45 ml/hr from 8p-8a  IV Access: L trialysis line  Infusion(s): Zosyn x1  Lines/Drains: TIARRA x2, abd wound vac over incision   GI/: Voiding adequately, 1 BM this shift  Skin: Clamshell incision covered by wound vac, scattered bruising  Mobility: Assist of 1 with walker  Plan: Continue with POC any notify team with any changes

## 2025-06-26 NOTE — PROGRESS NOTES
[facility]  Transplant Nephrology Progress Note  Date of Admission:  6/18/2025  Today's Date: 06/26/2025  Requesting physician: Max Rebolledo MD    Recommendations:   - hold off on additional diuresis today  - monitor I/o and daily weights  - no acute RRT indications   - stool Cdiff    Assessment & Plan   # EMILIE: EMILIE-D CRRT to iHD switch on 6/24, improving UOP 6/25   - EMILIE likely secondary to liver transplant with hemodynamic changes and hypotension.    - Baseline Creatinine: ~ 0.8-1.0   - Proteinuria: Normal (<0.2 grams)    # Liver Tx: Patient with ESLD secondary to Metabolic associated steatotic liver disease (MASLD), s/p OLT June 19, 2025.  Transaminases donwtrending,  Followed by Transplant Surgery.    # Immunosuppression: Tacrolimus immediate release (goal 6-8), Mycophenolic acid (dose 540 mg every 12 hours), and Prednisone (dose taper)   - Induction with Recent Transplant:  Per Liver Tx Protocol   - Continue with intensive monitoring of immunosuppression for efficacy and toxicity.   - Changes: switched to MPA due to diarrhea; Management per Transplant Surgery.    # Infection Prophylaxis:   - PJP: Sulfa/TMP (Bactrim)  - CMV: Valganciclovir (Valcyte)  - Thrush: Fluconazole (Diflucan)  - Fungal: Fluconazole (Diflucan)      - CMV IgG Ab High Risk Discordance (D+/R-) at time of transplant: No  Present CMV Serostatus: Positive  - EBV IgG Ab High Risk Discordance (D+/R-) at time of transplant: No  Present EBV Serostatus: Positive    # Blood Pressure: stable off pressors;  Goal BP: MAP > 65   - Changes: Not at this time;     # Elevated Blood Glucose: Secondary to high dose steroids.   - On insulin    # Anemia in Chronic Disease/Surgery: Hgb: stable low      RACHEL: No   - Iron studies: Not checked recently    # Thrombocytopenia: Stable, mildly low platelet level.    # Mineral Bone Disorder:   - Vitamin D; level: Low normal        On supplement: No  - Calcium; level:  Low normal when corrected for low serum albumin        On supplement: No  - Phosphorus; level: High        On binder: No    # Electrolytes:   - Potassium; level: Normal        On supplement: No  - Magnesium; level: Normal        On supplement: No  - Bicarbonate; level: Normal        On supplement: No  - Sodium; level: Normal    # Obesity, Class III (BMI = 47.7):    - Once patient recovers from surgery and incision heals, would recommend working on weight loss     # Other Significant PMH:   - H/o PE (~ 2000): Patient with no recent clotting episodes and off anticoagulation.   - Fibromyalgia: Stable symptoms on gabapentin.     # Transplant History:  Etiology of Organ Failure: Metabolic associated steatotic liver disease (MASLD)  Tx: Liver Tx  Transplant: 6/19/2025 (Liver)  Significant changes in immunosuppression: Slightly lower tacrolimus level goal due to EMILIE.  Significant transplant-related complications: EMILIE    Recommendations were communicated to the primary team via this note.    Tevin Wyatt MD  Transplant Nephrology  Contact information via Vocera Web Console or via [facility] Paging/Directory    Interval History  Transferred to regular floor  Breathing improved sating 98% RA  Brisk diuresis UOP up to 2.7 L post lasix 80 mg iv x2 yesterday  New onset diarrhea    Review of Systems   4 point ROS was obtained and negative except as noted in the Interval History.    MEDICATIONS:  Current Facility-Administered Medications   Medication Dose Route Frequency Provider Last Rate Last Admin    acetaminophen (TYLENOL) tablet 650 mg  650 mg Oral Q8H Ginny Naidu MD   650 mg at 06/26/25 0428    aspirin (ASA) tablet 325 mg  325 mg Oral or Feeding Tube Daily Max Rebolledo MD   325 mg at 06/25/25 0752    citalopram (celeXA) tablet 20 mg  20 mg Oral Daily Ginny Naidu MD   20 mg at 06/25/25 0752    fluconazole (DIFLUCAN) intermittent infusion 400 mg  400 mg Intravenous Q24H Max Rebolledo  mL/hr at  06/25/25 1923 400 mg at 06/25/25 1923    heparin ANTICOAGULANT injection 5,000 Units  5,000 Units Subcutaneous Q8H [facility] Gus Arroyo MD   5,000 Units at 06/26/25 0643    insulin aspart (NovoLOG) injection (RAPID ACTING)  1-12 Units Subcutaneous Q4H Sincere Cunningham PA-C   1 Units at 06/26/25 0005    levothyroxine (SYNTHROID/LEVOTHROID) tablet 112 mcg  112 mcg Oral Daily Ginny Naidu MD   112 mcg at 06/25/25 0754    multivitamin w/minerals (THERA-VIT-M) tablet 1 tablet  1 tablet Oral Daily Ginny Naidu MD   1 tablet at 06/25/25 0752    mycophenolate (GENERIC EQUIVALENT) capsule 500 mg  500 mg Oral Q8H Shaila Foote NP   500 mg at 06/26/25 0005    piperacillin-tazobactam (ZOSYN) 2.25 g vial to attach to  ml bag  2.25 g Intravenous Q6H Max Rebolledo  mL/hr at 06/25/25 2156 2.25 g at 06/26/25 0428    predniSONE (DELTASONE) tablet 5 mg  5 mg Oral Daily Ginny Naidu MD   5 mg at 06/25/25 0753    Prosource TF20 ENfit Compatibl EN LIQD (PROSOURCE TF20) packet 60 mL  1 packet Per Feeding Tube BID Ginny Naidu MD   60 mL at 06/25/25 2025    sodium chloride (PF) 0.9% PF flush 3 mL  3 mL Intravenous Q8H Ginny Naidu MD   3 mL at 06/25/25 2027    sodium chloride (PF) 0.9% PF flush 3 mL  3 mL Intracatheter Q8H [facility] Ginny Naidu MD   3 mL at 06/25/25 1523    sodium chloride (PF) 0.9% PF flush 3 mL  3 mL Intracatheter Q8H Ginny Naidu MD   3 mL at 06/25/25 2026    sulfamethoxazole-trimethoprim (BACTRIM) 400-80 MG per tablet 1 tablet  1 tablet Oral or Feeding Tube Once per day on Tuesday Thursday Saturday Max Rebolledo MD   1 tablet at 06/24/25 1944    tacrolimus (GENERIC EQUIVALENT) capsule 2.5 mg  2.5 mg Oral BID IS Elisabeth Wilkinson APRN CNP   2.5 mg at 06/25/25 1838    ursodiol (ACTIGALL) capsule 300 mg  300 mg Oral BID Ginny Naidu MD   300 mg at 06/25/25 2025    valGANciclovir (VALCYTE) tablet 450 mg  450 mg Oral Once per day on Tuesday Saturday  Max Rebolledo MD   450 mg at 25 1748     Current Facility-Administered Medications   Medication Dose Route Frequency Provider Last Rate Last Admin    dextrose 10% infusion   Intravenous Continuous PRN Jaimee Gagnon MD        Reason beta blocker order not selected   Does not apply DOES NOT GO TO Ginny Mcgowan MD           Physical Exam   Temp  Av.6  F (37  C)  Min: 97.8  F (36.6  C)  Max: 99  F (37.2  C)  Arterial Line BP  Min: 97/47  Max: 157/55  Arterial Line MAP (mmHg)  Av.1 mmHg  Min: 60 mmHg  Max: 85 mmHg      Pulse  Av.6  Min: 67  Max: 83 Resp  Av.5  Min: 8  Max: 23  FiO2 (%)  Av %  Min: 30 %  Max: 40 %  SpO2  Av.2 %  Min: 88 %  Max: 100 %    CVP (mmHg): 3 mmHgBP (!) 148/48 (BP Location: Right arm)   Pulse 55   Temp 98.3  F (36.8  C) (Oral)   Resp 18   Wt (!) 139.6 kg (307 lb 11.2 oz)   SpO2 96%   BMI 42.92 kg/m     Date 25 0700 - 25 0659   Shift 7580-5944 2446-9873 1631-7548 24 Hour Total   INTAKE   P.O. 240   240   I.V. 327.8   327.8   Blood Components 600   600   Shift Total(mL/kg) 1167.8(7.52)   1167.8(7.52)   OUTPUT   Urine 7   7   Drains 252   252   Shift Total(mL/kg) 259(1.67)   259(1.67)   Weight (kg) 155.2 155.2 155.2 155.2      Admit Weight: (!) 149.6 kg (329 lb 12.8 oz)     GENERAL APPEARANCE:  NAD  CV: rrr s1s2 no m  Lungs: decreased breath sounds  Ext +pitting edema  Neuro: awake    Data   All labs reviewed by me.  CMP  Recent Labs   Lab 25  0739 25  0546 25  0432 25  0004 25  0847 25  0324 25  0825 06/2425  0353   NA  --  134*  --   --   --  135  --  136  --  134*  --  134*   POTASSIUM  --  4.0  --   --   --  3.8  --  4.1  --  4.2  --  4.2   CHLORIDE  --  102  --   --   --  105  --  104  --  101  --  102   CO2  --  22  --   --   --  23  --  23  --  23  --  24   ANIONGAP  --  10  --   --   --  7  --  9  --  10  --  8   GLC  147* 151* 130* 163*   < > 154*   < > 149*  158*   < > 224*   < > 118*  123*   BUN  --  39.8*  --   --   --  24.4*  --  25.9*  --  30.7*  --  25.6*   CR  --  2.49*  --   --   --  1.84*  --  1.49*  --  1.66*  --  1.62*   GFRESTIMATED  --  21*  --   --   --  31*  --  40*  --  35*  --  36*   SANDY  --  8.0*  --   --   --  7.8*  --  7.6*  --  7.9*  --  8.0*   MAG  --  2.0  --   --   --  2.1  --  2.4*  --  2.5*  --  2.5*   PHOS  --  3.9  --   --   --  2.8  --  3.4  --  3.6  --  4.5   PROTTOTAL  --  5.0*  --   --   --  4.8*  --  5.0*  --   --   --  5.3*   ALBUMIN  --  2.6*  --   --   --  2.6*  --  3.0*  --  3.2*  --  3.1*   BILITOTAL  --  0.6  --   --   --  0.6  --  0.7  --   --   --  0.9   ALKPHOS  --  94  --   --   --  90  --  98  --   --   --  106   AST  --  48*  --   --   --  71*  --  117*  --   --   --  251*   ALT  --  87*  --   --   --  105*  --  145*  --   --   --  191*    < > = values in this interval not displayed.     CBC  Recent Labs   Lab 06/26/25  0546 06/25/25  0324 06/24/25  0340 06/23/25  0353   HGB 9.5* 8.4* 8.1*  8.1* 8.1*   WBC 5.6 4.9 4.3  4.3 5.6   RBC 3.19* 2.76* 2.68*  2.68* 2.78*   HCT 29.4* 25.7* 24.4*  24.4* 25.1*   MCV 92 93 91  91 90   MCH 29.8 30.4 30.2  30.2 29.1   MCHC 32.3 32.7 33.2  33.2 32.3   RDW 18.0* 17.9* 18.1*  18.1* 18.3*   PLT 78* 66* 62*  62* 63*     INR  Recent Labs   Lab 06/25/25  0324 06/24/25  0340 06/23/25  0353 06/22/25  1037   INR 1.12 1.18* 1.24* 1.23*   PTT 26 25 25 25     ABG  Recent Labs   Lab 06/21/25  1619 06/19/25  1935 06/19/25  1835 06/19/25  1802 06/19/25  1727   PH  --  7.39 7.35 7.37 7.37   PCO2  --  39 44 42 43   PO2  --  62* 88 118* 111*   HCO3  --  23 24 24 25   O2PER 4 60.0 43.0 40.0 40.0      Urine Studies  Recent Labs   Lab Test 06/18/25  2243 06/08/25  0447 11/26/24  0819   COLOR Yellow Yellow Yellow   APPEARANCE Clear Clear Clear   URINEGLC Negative Negative Negative   URINEBILI Negative Negative Negative   URINEKETONE Negative Negative Trace*    SG 1.015 1.015 1.026   UBLD Negative Negative Negative   URINEPH 5.0 5.5 5.5   PROTEIN Negative Negative 10*   NITRITE Negative Negative Negative   LEUKEST Negative Negative Negative   RBCU 0  --  1   WBCU <1  --  1     No lab results found.  PTH  No lab results found.  Iron Studies  Recent Labs   Lab Test 11/26/24  0800   IRON 112   *   IRONSAT 64*   LUDY 762*       IMAGING:  All imaging studies reviewed by me.

## 2025-06-26 NOTE — PROGRESS NOTES
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 Rebolledo on 6/19/2025.     Today:   - Pull drain #1 this afternoon                                                                                                                                     Graft function: s/p DBDLT without stent, POD#7. Post-op US with patent Doppler. Liver labs continue down-trending, lactic normal, INR normal.    -  mg x 3 months    - Ursodiol  300 mg BID x 3 months     Immunosuppressed status secondary to medications:  Induction:  SM taper followed by prednisone x 3 months   Basiliximab 20 mg IV on 6/20 and 6/24 in the setting of EMILIE  Maintenance:    Historical change: MMF changed to Myfortic on 6/26 due to loose stools.    -  mg BID    - Tacrolimus goal level 5 to 8.   - Prednisone 5 mg daily following taper.    Neuro/Psych:  Depression:   - Continue home citalopram.   Fibromyalgia:   - Continue home gabapentin.   Psychophysiological insomnia:   - Continue home Trazodone.  Acute post op pain:    - Scheduled Tylenol.   - PRN Atarax, Robaxin, and Oxycodone.    Hematology:   Acute on chronic anemia: Hgb ~8-9. Secondary to acute surgical blood loss; transfuse to meet goals. Last transfused 6/21/2025.   Acute on chronic thrombocytopenia: Secondary to induction IS. Last transfused 6/20/2025. Plt 70s.  Coagulopathy secondary to liver disease: Stable.     Cardiorespiratory:   Shock: Resolved.   Post-operative mechanical ventilation: Extubated 6/19.   Acute hypoxic respiratory failure: Requiring 2 LPM, HFNC at night 20 LPM.    - OOB as tolerate, encourage  mobility.    - Pulmonary hygiene.  Hx NEREYDA: Does not use CPAP.   Hx HLD: Not currently on statin therapy.   Hx HFpEF: Noted on on problem list, last EF normal. Not on GDMT.   Chronic left sided pleural effusion: Noted as trace on Xray 6/29/2025.   History of PE, 2002: Not on long-term anticoagulation.     GI/Nutrition: PPI.  Diet: Regular.   - Feeding tube placed 6/22, cycled tube feeds.   - Dietician following.       - Calorie counts.    Endocrine:   Hypothyroidism: TSH 6/7/2025 4.89.    - Continue home levothyroxine.   Steroid induced hyperglycemia: Last hemoglobin A1c 4.9% 6/20/2025.   - Sliding scale insulin Q4H.     Renal/Fluid/Electrolytes: MIVF: saline lock.   Hypocalcemia: Monitor.  Hyponatremia: Monitor.   EMILIE: Initially oliguric. Lasix 80 mg IV x 1 on 6/25 with 2.7 L UOP.    - Transplant Nephrology following.   - CRRT started 6/21 and discontinued 6/24. iHD per Transplant Nephrology, not indicated today.   Anasarca:    - Hold home Bumex and spironolactone     : Rodriguez removed 6/22.    Infectious disease:   Risk for infection: Donor with open abdomen with risk of contamination.    - Continue antibiotics for a full 7- day course.    - Fluconazole and Zosyn x 7 days.    - Follow donor cultures.     Rheum:  Psoriatic arthritis:    - PTA managed with ixekizumab, tacrolimus ointment    Prophylaxis: DVT, fall, GI, fungal (fluconazole), viral (Valcyte), pneumocystis (Bactrim)    Disposition: 7A. Therapies recommending home with PT/OT.     JOCELIN/Fellow/Resident Provider:   MERRITT Denson, CNP  Adult Solid Organ Transplant   Contact information via Vocera Web Console or via [facility] Paging/Directory    I saw and evaluated this patient as part of a shared visit. I spent 40 minutes on review of labs and imaging, exam, care coordination, and counseling.  Medically Ready for Discharge: Anticipated in 2-4 Days     Faculty: Amauri Jacobs MD,  PhD  __________________________________________________________________  Transplant History:    6/19/2025 (Liver), Postoperative day: 7     Interval History: History is obtained from the patient  Overnight events: No acute events overnight. Patient up in chair. Pain is controlled. Appetite is slightly improving. Tolerating activity with therapies.     ROS:   A 10-point review of systems was negative except as noted above.    Curent Meds:  Current Facility-Administered Medications   Medication Dose Route Frequency Provider Last Rate Last Admin    acetaminophen (TYLENOL) tablet 650 mg  650 mg Oral Q8H Ginny Naidu MD   650 mg at 06/26/25 1153    aspirin (ASA) tablet 325 mg  325 mg Oral or Feeding Tube Daily Max Rebolledo MD   325 mg at 06/26/25 0813    citalopram (celeXA) tablet 20 mg  20 mg Oral Daily Ginny Naidu MD   20 mg at 06/26/25 0812    fluconazole (DIFLUCAN) intermittent infusion 400 mg  400 mg Intravenous Q24H Max Rebolledo  mL/hr at 06/25/25 1923 400 mg at 06/25/25 1923    heparin ANTICOAGULANT injection 5,000 Units  5,000 Units Subcutaneous Q8H [facility] Gus Arroyo MD   5,000 Units at 06/26/25 0643    insulin aspart (NovoLOG) injection (RAPID ACTING)  1-12 Units Subcutaneous Q4H Sincere Cunningham PA-C   1 Units at 06/26/25 0759    levothyroxine (SYNTHROID/LEVOTHROID) tablet 112 mcg  112 mcg Oral Daily Ginny Naidu MD   112 mcg at 06/26/25 0812    multivitamin w/minerals (THERA-VIT-M) tablet 1 tablet  1 tablet Oral Daily Ginny Naidu MD   1 tablet at 06/26/25 0812    mycophenolate (GENERIC EQUIVALENT) capsule 500 mg  500 mg Oral Q8H Shaila Foote, NP   500 mg at 06/26/25 0806    predniSONE (DELTASONE) tablet 5 mg  5 mg Oral Daily Ginny Naidu MD   5 mg at 06/26/25 0810    Prosource TF20 ENfit Compatibl EN LIQD (PROSOURCE TF20) packet 60 mL  1 packet Per Feeding Tube BID Ginny Naidu MD   60 mL at 06/26/25 1016    sodium chloride (PF) 0.9% PF flush 3 mL   3 mL Intravenous Q8H Ginny Naidu MD   3 mL at 06/25/25 2027    sodium chloride (PF) 0.9% PF flush 3 mL  3 mL Intracatheter Q8H [facility] Ginny Naidu MD   3 mL at 06/25/25 1523    sodium chloride (PF) 0.9% PF flush 3 mL  3 mL Intracatheter Q8H Ginny Naidu MD   3 mL at 06/25/25 2026    sulfamethoxazole-trimethoprim (BACTRIM) 400-80 MG per tablet 1 tablet  1 tablet Oral or Feeding Tube Once per day on Tuesday Thursday Saturday Max Rebolledo MD   1 tablet at 06/24/25 1944    tacrolimus (GENERIC EQUIVALENT) capsule 2.5 mg  2.5 mg Oral BID IS Elisabeth Wilkinson APRN CNP   2.5 mg at 06/26/25 0808    ursodiol (ACTIGALL) capsule 300 mg  300 mg Oral BID Ginny Naidu MD   300 mg at 06/26/25 0813    valGANciclovir (VALCYTE) tablet 450 mg  450 mg Oral Once per day on Tuesday Saturday Max Rebolledo MD   450 mg at 06/24/25 1745       Physical Exam:     Admit Weight: (!) 149.6 kg (329 lb 12.8 oz)    Current Vitals:   BP (!) 141/62   Pulse 64   Temp 98.3  F (36.8  C)   Resp 16   Wt (!) 139.6 kg (307 lb 11.2 oz)   SpO2 96%   BMI 42.92 kg/m      Vital sign ranges:    Temp:  [98.1  F (36.7  C)-98.4  F (36.9  C)] 98.3  F (36.8  C)  Pulse:  [51-64] 64  Resp:  [16-20] 16  BP: (127-151)/(48-62) 141/62  SpO2:  [87 %-100 %] 96 %    General Appearance: no obvious acute distress.    Skin: Normal, warm, dry.  Heart: She appears adequately perfused.   Lungs: Non-labored on 2 lpm via NC.   Abdomen: Non-distended, Pravena wound vac in place. TIARRA x 2 with thin serosang output.   : Rodriguez removed, small urine output.  Extremities: Edema: generalized edema. There is no skin breakdown obvious breakdown on exposed skin.  Neurologic: Awake, alert, oriented x 4. Tremor absent.     Frailty Scores          5/20/2025 3/30/2025   Frailty Scores   Final Score Not Frail  Not Frail    Final Score Number 1  1       Details          Patient-reported               Data:   CMP  Recent Labs   Lab 06/26/25  0739 06/26/25  0546  06/25/25  0847 06/25/25  0324 06/20/25  0503 06/20/25  0358   NA  --  134*  --  135   < > 136   POTASSIUM  --  4.0  --  3.8   < > 4.0   CHLORIDE  --  102  --  105   < > 101   CO2  --  22  --  23   < > 25   * 151*   < > 154*   < > 169*  172*   BUN  --  39.8*  --  24.4*   < > 31.3*   CR  --  2.49*  --  1.84*   < > 1.62*   GFRESTIMATED  --  21*  --  31*   < > 36*   SANDY  --  8.0*  --  7.8*   < > 8.4*   ICAW  --  4.4  --  4.5   < > 4.7   MAG  --  2.0  --  2.1   < > 2.1   PHOS  --  3.9  --  2.8   < > 5.9*   AMYLASE  --   --   --   --   --  63   LIPASE  --   --   --   --   --  46   ALBUMIN  --  2.6*  --  2.6*   < > 2.6*   BILITOTAL  --  0.6  --  0.6   < > 2.5*   ALKPHOS  --  94  --  90   < > 60   AST  --  48*  --  71*   < > 2,889*   ALT  --  87*  --  105*   < > 360*    < > = values in this interval not displayed.     CBC  Recent Labs   Lab 06/26/25  0546 06/25/25  0324 06/20/25  1206 06/20/25  0749   HGB 9.5* 8.4*   < > 7.4*   WBC 5.6 4.9   < > 13.7*   PLT 78* 66*   < > 100*   A1C  --   --   --  4.9    < > = values in this interval not displayed.

## 2025-06-26 NOTE — PLAN OF CARE
Goal Outcome Evaluation:      Plan of Care Reviewed With: patient  Outcome: progressing   BP (!) 151/54 (BP Location: Right arm)   Pulse 58   Temp 98.1  F (36.7  C) (Oral)   Resp 19   Wt (!) 139.6 kg (307 lb 11.2 oz)   SpO2 (!) 87%   BMI 42.92 kg/m     Shift: 2231-6925  Isolation Status: N/A  VS: Within parameters  on 2L NC, afebrile  Neuro: Aox4  Behaviors: Calm cooperative   BG: ACHS + 2am last   Labs/Imaging: GFR estimate 31, platelet count 66, RBC, 2.76  Respiratory: >92% on RA while awake lungs diminished, 2L NC overnight for sleep apnea.   Cardiac: Hypertension no interventions, Chase 50's   Pain/Nausea: No nausea reported, abdominal pain    PRN: 5mg oxy x1, 10mg oxy x 1, Robaxin x 1,Tums x 1 , eye drops x 1   Diet: Regular, TF 8p-8a 45 ml/hr   LDA: wound vac over incision TIARRA x 2, NG tube, Trialysis line.   Infusion(s): Fluconazole, Zosyn,   GI/: Voiding via pur wick, lose stools incontinence x 1    Skin: Generalized brushing, clamshell incision.   Mobility: Ax1 walker   Plan: Continue with POC

## 2025-06-26 NOTE — PROGRESS NOTES
Calorie Count  Intake recorded for: 6/25  Total Kcals: 0 Total Protein: 0g  Kcals from Hospital Food: 0   Protein: 0g  Kcals from Outside Food (average):0 Protein: 0g  # Meals Ordered from Kitchen: 2  # Meals Recorded: 0  # Supplements Recorded: 0  Note: Missing calorie count envelope & no intake recorded in Epic. Pt ordered fresh fruit cup & Greek yogurt for breakfast (176 kcal, 10 g PRO), Ensure Max for lunch (with one as a supplement & snack -- 3 total: 450 kcal, 90 g PRO), and had dinner brought from home. However, do not know what the pt actually consumed of this.

## 2025-06-26 NOTE — PROGRESS NOTES
Care Management Follow Up    Length of Stay (days): 6    Expected Discharge Date: 06/30/2025     Concerns to be Addressed: discharge planning, adjustment to diagnosis/illness     Patient plan of care discussed at interdisciplinary rounds: Yes    Anticipated Discharge Disposition: Home, Home Care              Anticipated Discharge Services:    Anticipated Discharge DME:      Patient/family educated on Medicare website which has current facility and service quality ratings:    Education Provided on the Discharge Plan:    Patient/Family in Agreement with the Plan: yes    Referrals Placed by CM/SW:    Private pay costs discussed: Not applicable    Discussed  Partnership in Safe Discharge Planning  document with patient/family: No     Handoff Completed: No, handoff not indicated or clinically appropriate    Additional Information:  Pt s/p liver transplant.  Pt transferred to  for on going medical management.  PT/OT recommending home with home care.  Per chart review [facility] home care liaison requesting clarification on where pt will be staying post hospitalization.  Pt currently requiring enteral feeds.    Per transplant nephrology EMILIE improving, monitoring need for HD.   ICU RNCC initiated referral to [facility] for enteral benefit check.  Team aware pt does not have enteral coverage.   Final nutrition plan pending.  Delta counts started.    Met with pt and her sister.  Pt confirmed she plans to stay  with her daughter Patrica     [address]  [place] [address]    Per pt she will need to navigate 5 steps no railing to access her daughters home. Noted pt aware of need to work with pt on stairs.  Pt aware home care acceptance pending.  Pt and her sister noted no concerns or questions at this time.      Messaged [facility] Home Care Liaison with address information.    Next Steps:   Follow for discharge planning  Follow up on home care referral  Ensure home care orders placed      Sue Jaime RN BSN, PHN, ACM-RN   Nurse  Care Coordinator    [facility] Acute Care Management  Phone: [phone]  Available on [place]   [facility] RNCC

## 2025-06-26 NOTE — PLAN OF CARE
Goal Outcome Evaluation:      Plan of Care Reviewed With: patient    Overall Patient Progress: improvingOverall Patient Progress: improving    Outcome Evaluation: pt had two loose BMs, fiber ordered    BP (!) 141/62 (BP Location: Right arm)   Pulse 64   Temp 98.3  F (36.8  C) (Oral)   Resp 16   Wt (!) 139.6 kg (307 lb 11.2 oz)   SpO2 96%   BMI 42.92 kg/m      Assumed cares [id number]  Neuro: A/Ox4  Pain/Nausea: abdominal pain, given Robaxin and Oxycodone; nausea x1, given Zofran  Cardiac: rate and rhythm regular  Resp: lung sounds clear, diminished in bilateral bases  GI/: incontinent liquid BM x2; fiber ordered; small voids  Diet/Appetite: Regular diet, calorie counts started today; pt ate 50% of breakfast eggs, 100% fruit cup; 100% Ensure protein max; for lunch she had 1  from [employer] and 200mL Rootbeer  BG: ACHS, 130-140's  Skin: clamshell incision - wound vac in place; TIARRA x2 - leaky at sites; flank and BLE edema, weeping on hips  Access: trialysis line - brown lumen saline locked  Drains: TIARRA w/ serosanguinous output  Activity: Ax1 w/ walker, pivots to commode  Plan: med card made today, reviewed with pt; still needs lab book  Will continue with plan of care and notify team of any changes.?    RACHEL MARIN RN on 6/26/2025 at 3:20 PM

## 2025-06-27 ENCOUNTER — APPOINTMENT (OUTPATIENT)
Dept: PHYSICAL THERAPY | Facility: CLINIC | Age: 60
DRG: 005 | End: 2025-06-27
Attending: TRANSPLANT SURGERY
Payer: COMMERCIAL

## 2025-06-27 ASSESSMENT — ACTIVITIES OF DAILY LIVING (ADL)
ADLS_ACUITY_SCORE: 47
ADLS_ACUITY_SCORE: 48
ADLS_ACUITY_SCORE: 47
ADLS_ACUITY_SCORE: 48
ADLS_ACUITY_SCORE: 54
ADLS_ACUITY_SCORE: 54
ADLS_ACUITY_SCORE: 48
ADLS_ACUITY_SCORE: 47
ADLS_ACUITY_SCORE: 47
ADLS_ACUITY_SCORE: 54
ADLS_ACUITY_SCORE: 48
ADLS_ACUITY_SCORE: 54
ADLS_ACUITY_SCORE: 48
ADLS_ACUITY_SCORE: 47
ADLS_ACUITY_SCORE: 47
ADLS_ACUITY_SCORE: 54
ADLS_ACUITY_SCORE: 50
ADLS_ACUITY_SCORE: 49

## 2025-06-27 NOTE — PLAN OF CARE
Cirrhosis of the liver; metabolic related. A&Ox4. A&Ox4. 2L 02. Q4 BG checks with insulin coverage.   Lines - TL trialysis line; Red and Blue Lumen are dialysis lumens and the brown is lumen for nursing staff.   ND tube with feeds at goal for 45 mL/hr   Regular diet with carb & calorie counts; poor intake   Drains - RLQ LUIS F Drain and Midline abd wound vac  Skin - Old luis f site; Large bruises covering most of body.  R flank weeping and wet, linen changed.   Events this shift - Glucose monitor is not transferring information; Chart reads BG has not been taken since 6/26 at 0700. Blood glucose reads were 0000 @ 123 & 0400 @ 174. AM labs drawn.   Goal Outcome Evaluation:      Plan of Care Reviewed With: patient    Overall Patient Progress: no changeOverall Patient Progress: no change

## 2025-06-27 NOTE — PROGRESS NOTES
Calorie Count  Intake recorded for: 6/26  Total Kcals: 608 Total Protein: 55g  Kcals from Hospital Food: 348   Protein: 41g  Kcals from Outside Food (average): 260   Protein: 14g  # Meals Ordered from Kitchen: 1 meal  # Meals Recorded:  2 meals   Meal 1: 100% fresh fruit cup; 50% scrambled eggs, strawberry greek yogurt   Meal 2: 100% 1 [employer] chicken finger,. 200mL root beer; 25% texas toast from [employer] (outside food)  # Supplements Recorded: 100% 1 Ensure Max Vanilla

## 2025-06-27 NOTE — PROGRESS NOTES
CLINICAL NUTRITION SERVICES - BRIEF NOTE     RECOMMENDATIONS FOR MDs/PROVIDERS TO ORDER:  Monitor kcal cts. Goal ~1400 kcal and 85 g protein daily (~65% low-end needs) to discontinue EN support/remove NDT.     Registered Dietitian Interventions:  Reviewed minimum calorie/protein needs with patient to have FT removed  Discussed ordering 3 meals daily and consuming supplements between meals   Fairlife Core Shakes (170 calories and 26 gram protein per bottle)    Reviewed post transplant nutrition guidelines. Patient without questions at this time.    Future/Additional Recommendations:  -- monitor oral intake of meals and supplements      INFORMATION OBTAINED  Assessed patient in room.    CURRENT NUTRITION ORDERS  Diet: Regular  Snacks/Supplements: Ensure Max BID    Nutrition Support: TwoCal HN @ 45 mL/hr x 12 hours (8p-8a) + 2 pkts ProSource TF20 = 540 mL TF, 1240 kcal (69% minimum assessed kcal needs), 89 g protein (66% minimum assessed protein needs     CURRENT INTAKE/TOLERANCE  Met with Karlene at bedside. She would like to know how may calories she needs to have FT removed. Reviewed recommendations. She would like to bring in nutritional supplements from home Fairlife Core drink (170 kcals and 26 gram protein) and protein bars.      NEW FINDINGS  Nutrition-relevant labs:   (6/27): Na 134 mmol/L (L), BUN 44.7 mg/dL (H), Cr 2.47 mg/dL (H)  Nutrition-relevant medications: Reviewed    INTERVENTIONS  Collaboration by nutrition professional with other providers  Discussed intervention/plan with patient and/or family.    MONITORING/EVALUATION  Progress toward goals will be monitored and evaluated per policy.    Jael Sr MS/RD/LD/CNSC  Available on 1SDK   M-F (7am-3:30pm) - 7A/7B Clinical Dietitian  Weekend/Holiday Dietitian (7am-3:30pm)    ** Clinical Dietitians no longer available on pager

## 2025-06-27 NOTE — PROGRESS NOTES
[facility]  Transplant Nephrology Progress Note  Date of Admission:  6/18/2025  Today's Date: 06/27/2025  Requesting physician: Max Rebolledo MD    Recommendations:   - hold off on additional diuresis today with good UOP, ongoing loose stools  - monitor I/O and daily weights  - no acute RRT indications       Assessment & Plan   # EMILIE: EMILIE-D CRRT to iHD switch on 6/24, improving UOP 6/25   - EMILIE likely secondary to liver transplant with hemodynamic changes and hypotension.    - Baseline Creatinine: ~ 0.8-1.0   - Proteinuria: Normal (<0.2 grams)    # Liver Tx: Patient with ESLD secondary to Metabolic associated steatotic liver disease (MASLD), s/p OLT June 19, 2025.  Transaminases donwtrending,  Followed by Transplant Surgery.    # Immunosuppression: Tacrolimus immediate release (goal 6-8), Mycophenolic acid (dose 540 mg every 12 hours), and Prednisone (dose taper)   - Induction with Recent Transplant:  Per Liver Tx Protocol   - Continue with intensive monitoring of immunosuppression for efficacy and toxicity.   - Changes: switched to MPA due to diarrhea; Management per Transplant Surgery.    # Infection Prophylaxis:   - PJP: Sulfa/TMP (Bactrim)  - CMV: Valganciclovir (Valcyte)  - Thrush: Fluconazole (Diflucan)  - Fungal: Fluconazole (Diflucan)      - CMV IgG Ab High Risk Discordance (D+/R-) at time of transplant: No  Present CMV Serostatus: Positive  - EBV IgG Ab High Risk Discordance (D+/R-) at time of transplant: No  Present EBV Serostatus: Positive    # Blood Pressure: stable off pressors;  Goal BP: MAP > 65   - Changes: Not at this time;     # Elevated Blood Glucose: Secondary to high dose steroids.   - On insulin    # Anemia in Chronic Disease/Surgery: Hgb: stable low      RACHEL: No   - Iron studies: Not checked recently    # Thrombocytopenia: Stable, mildly low platelet level.    # Mineral Bone Disorder:   - Vitamin D; level: Low normal        On supplement:  No  - Calcium; level: Low normal when corrected for low serum albumin        On supplement: No  - Phosphorus; level: High        On binder: No    # Electrolytes:   - Potassium; level: Normal        On supplement: No  - Magnesium; level: Normal        On supplement: No  - Bicarbonate; level: Normal        On supplement: No  - Sodium; level: Normal    # Obesity, Class III (BMI = 47.7):    - Once patient recovers from surgery and incision heals, would recommend working on weight loss     # Other Significant PMH:   - H/o PE (~ 2000): Patient with no recent clotting episodes and off anticoagulation.   - Fibromyalgia: Stable symptoms on gabapentin.     # Transplant History:  Etiology of Organ Failure: Metabolic associated steatotic liver disease (MASLD)  Tx: Liver Tx  Transplant: 6/19/2025 (Liver)  Significant changes in immunosuppression: Slightly lower tacrolimus level goal due to EMILIE.  Significant transplant-related complications: EMILIE    Recommendations were communicated to the primary team via this note.    Tevin Wyatt MD  Transplant Nephrology  Contact information via Vocera Web Console or via [employer] Paging/Directory    Interval History  Feels ok  Reports loose stools  Denies any dyspnea, sating 99% on RA    Review of Systems   4 point ROS was obtained and negative except as noted in the Interval History.    MEDICATIONS:  Current Facility-Administered Medications   Medication Dose Route Frequency Provider Last Rate Last Admin    acetaminophen (TYLENOL) tablet 650 mg  650 mg Oral Q8H Ginny Naidu MD   650 mg at 06/27/25 1313    aspirin (ASA) tablet 325 mg  325 mg Oral or Feeding Tube Daily Max Rebolledo MD   325 mg at 06/27/25 0730    atorvastatin (LIPITOR) tablet 20 mg  20 mg Oral QPM Shaila Foote NP   20 mg at 06/26/25 1951    Followed by    [START ON 7/3/2025] atorvastatin (LIPITOR) tablet 40 mg  40 mg Oral QPM Shaila Foote NP        citalopram (celeXA) tablet 20 mg  20 mg Oral Daily  Ginny Naidu MD   20 mg at 06/27/25 0730    fiber modular (BANATROL TF) packet 2 packet  2 packet Per Feeding Tube BID Shaila Foote NP   2 packet at 06/27/25 1004    fluconazole (DIFLUCAN) intermittent infusion 400 mg  400 mg Intravenous Q24H Max Rebolledo  mL/hr at 06/26/25 1837 400 mg at 06/26/25 1837    heparin ANTICOAGULANT injection 5,000 Units  5,000 Units Subcutaneous Q8H [facility] Gus Arroyo MD   5,000 Units at 06/27/25 1437    insulin aspart (NovoLOG) injection (RAPID ACTING)  1-10 Units Subcutaneous TID  Angelica Natarajan PA-C   1 Units at 06/27/25 1316    insulin aspart (NovoLOG) injection (RAPID ACTING)  1-7 Units Subcutaneous At Bedtime Angelica Natarajan PA-C        levothyroxine (SYNTHROID/LEVOTHROID) tablet 112 mcg  112 mcg Oral Daily Ginny Naidu MD   112 mcg at 06/27/25 0730    multivitamin w/minerals (THERA-VIT-M) tablet 1 tablet  1 tablet Oral Daily Ginny Naidu MD   1 tablet at 06/27/25 0729    mycophenolic acid (GENERIC EQUIVALENT) EC tablet 540 mg  540 mg Oral BID IS Shaila Foote NP   540 mg at 06/27/25 0729    ondansetron (ZOFRAN ODT) ODT tab 4 mg  4 mg Oral TID  Angelica Natarajan PA-C        predniSONE (DELTASONE) tablet 5 mg  5 mg Oral Daily Ginny Naidu MD   5 mg at 06/27/25 0730    Prosource TF20 ENfit Compatibl EN LIQD (PROSOURCE TF20) packet 60 mL  1 packet Per Feeding Tube BID Ginny Naidu MD   60 mL at 06/27/25 1004    sodium chloride (PF) 0.9% PF flush 3 mL  3 mL Intravenous Q8H Ginny Naidu MD   3 mL at 06/27/25 1318    sodium chloride (PF) 0.9% PF flush 3 mL  3 mL Intracatheter Q8H [facility] Ginny Naidu MD   3 mL at 06/27/25 1318    sodium chloride (PF) 0.9% PF flush 3 mL  3 mL Intracatheter Q8H Ginny Naidu MD   3 mL at 06/27/25 1318    sulfamethoxazole-trimethoprim (BACTRIM) 400-80 MG per tablet 1 tablet  1 tablet Oral or Feeding Tube Once per day on Tuesday Thursday Saturday Max Rebolledo MD   1 tablet at  25    tacrolimus (GENERIC EQUIVALENT) capsule 4 mg  4 mg Oral BID IS Shaila Foote NP   4 mg at 25 07    ursodiol (ACTIGALL) capsule 300 mg  300 mg Oral BID Ginny Naidu MD   300 mg at 25 0730    valGANciclovir (VALCYTE) tablet 450 mg  450 mg Oral Once per day on  Max Rebolledo MD   450 mg at 25 1745     Current Facility-Administered Medications   Medication Dose Route Frequency Provider Last Rate Last Admin       Physical Exam   Temp  Av.6  F (37  C)  Min: 97.8  F (36.6  C)  Max: 99  F (37.2  C)  Arterial Line BP  Min: 97/47  Max: 157/55  Arterial Line MAP (mmHg)  Av.1 mmHg  Min: 60 mmHg  Max: 85 mmHg      Pulse  Av.6  Min: 67  Max: 83 Resp  Av.5  Min: 8  Max: 23  FiO2 (%)  Av %  Min: 30 %  Max: 40 %  SpO2  Av.2 %  Min: 88 %  Max: 100 %    CVP (mmHg): 3 mmHgBP 135/63 (BP Location: Left arm)   Pulse 56   Temp 98  F (36.7  C) (Oral)   Resp 18   Wt (!) 152.1 kg (335 lb 4.8 oz)   SpO2 99%   BMI 46.76 kg/m     Date 25 0700 - 25 0659   Shift 4059-9531 4531-1905 8216-1942 24 Hour Total   INTAKE   P.O. 240   240   I.V. 327.8   327.8   Blood Components 600   600   Shift Total(mL/kg) 1167.8(7.52)   1167.8(7.52)   OUTPUT   Urine 7   7   Drains 252   252   Shift Total(mL/kg) 259(1.67)   259(1.67)   Weight (kg) 155.2 155.2 155.2 155.2      Admit Weight: (!) 149.6 kg (329 lb 12.8 oz)     GENERAL APPEARANCE:  NAD  CV: rrr s1s2 no m  Lungs: decreased breath sounds  Ext +pitting edema  Neuro: awake    Data   All labs reviewed by me.  CMP  Recent Labs   Lab 25  1315 25  0525 25  0739 25  0546 25  0847 25  0324 25  0825 25  0340   NA  --  134*  --  134*  --  135  --  136   POTASSIUM  --  4.2  --  4.0  --  3.8  --  4.1   CHLORIDE  --  103  --  102  --  105  --  104   CO2  --  23  --  22  --  23  --  23   ANIONGAP  --  8  --  10  --  7  --  9   * 165* 147* 151*   < >  154*   < > 149*  158*   BUN  --  44.7*  --  39.8*  --  24.4*  --  25.9*   CR  --  2.47*  --  2.49*  --  1.84*  --  1.49*   GFRESTIMATED  --  22*  --  21*  --  31*  --  40*   SANDY  --  8.0*  --  8.0*  --  7.8*  --  7.6*   MAG  --  2.1  --  2.0  --  2.1  --  2.4*   PHOS  --  4.0  --  3.9  --  2.8  --  3.4   PROTTOTAL  --  4.8*  --  5.0*  --  4.8*  --  5.0*   ALBUMIN  --  2.6*  --  2.6*  --  2.6*  --  3.0*   BILITOTAL  --  0.6  --  0.6  --  0.6  --  0.7   ALKPHOS  --  94  --  94  --  90  --  98   AST  --  32  --  48*  --  71*  --  117*   ALT  --  62*  --  87*  --  105*  --  145*    < > = values in this interval not displayed.     CBC  Recent Labs   Lab 06/27/25  0525 06/26/25  0546 06/25/25 0324 06/24/25 0340   HGB 8.6* 9.5* 8.4* 8.1*  8.1*   WBC 6.8 5.6 4.9 4.3  4.3   RBC 2.85* 3.19* 2.76* 2.68*  2.68*   HCT 26.5* 29.4* 25.7* 24.4*  24.4*   MCV 93 92 93 91  91   MCH 30.2 29.8 30.4 30.2  30.2   MCHC 32.5 32.3 32.7 33.2  33.2   RDW 18.4* 18.0* 17.9* 18.1*  18.1*   PLT 90* 78* 66* 62*  62*     INR  Recent Labs   Lab 06/25/25  0324 06/24/25  0340 06/23/25  0353 06/22/25  1037   INR 1.12 1.18* 1.24* 1.23*   PTT 26 25 25 25     ABG  Recent Labs   Lab 06/21/25  1619   O2PER 4      Urine Studies  Recent Labs   Lab Test 06/18/25  2243 06/08/25  0447 11/26/24  0819   COLOR Yellow Yellow Yellow   APPEARANCE Clear Clear Clear   URINEGLC Negative Negative Negative   URINEBILI Negative Negative Negative   URINEKETONE Negative Negative Trace*   SG 1.015 1.015 1.026   UBLD Negative Negative Negative   URINEPH 5.0 5.5 5.5   PROTEIN Negative Negative 10*   NITRITE Negative Negative Negative   LEUKEST Negative Negative Negative   RBCU 0  --  1   WBCU <1  --  1     No lab results found.  PTH  No lab results found.  Iron Studies  Recent Labs   Lab Test 11/26/24  0800   IRON 112   *   IRONSAT 64*   LUDY 762*       IMAGING:  All imaging studies reviewed by me.

## 2025-06-27 NOTE — PROGRESS NOTES
"Care Management Follow Up    Length of Stay (days): 7    Expected Discharge Date: 06/30/2025     Concerns to be Addressed: discharge planning, adjustment to diagnosis/illness     Patient plan of care discussed at interdisciplinary rounds: No    Anticipated Discharge Disposition: Home, Home Care              Anticipated Discharge Services:    Anticipated Discharge DME:      Patient/family educated on Medicare website which has current facility and service quality ratings:    Education Provided on the Discharge Plan:    Patient/Family in Agreement with the Plan: yes    Referrals Placed by CM/SW:    Private pay costs discussed: Not applicable    Discussed  Partnership in Safe Discharge Planning  document with patient/family: No     Handoff Completed: No, handoff not indicated or clinically appropriate    Additional Information:  Pt s/p liver transplant.  Per discussion with EDER Dominguez Transplant pt may require enteral feeds at discharge. [facility] benefit check initiated.  Messaged Rimma [facility] liaison with above information as well as to inquire about home care referral status.    0930 Received message from Rimma [facility] Liaison that [facility] has accepted for home RN, PT, OT services.    1040 Notified by Natividad [facility] Liaison home enteral will not be covered by patients insurance -\"06/25/2025: PT'S PLAN REQUIRES ENTERAL TO BE SOLE SOURCE. PER ANETA JOSE M PT IS NOT SOLE SOURCE, IS EATING SOME AND THEY ARE WEANING TUBE FEEDS. PT WILL HAVE TO AGREE TO SELF PAY FOR COVERAGE. I HAVE GENERATED THE SELF PAY IN THE ESTIMATE TABS. KX\"  Reviewed above with Jael DUNCAN Dietician.  Delta counts started 6/26. Fannie will be following up with patient today and will update writer/provider on need for enteral feeds at discharge.        Accepting Home Care  [facility]  Phone: [phone]  Fax: [phone]    Discharge Address - pt staying locally with her daughter Jose M:    [address]  [place] " [place] [address]    Next Steps:   Follow for discharge planning  Follow up on home care referral  Ensure home care orders placed.    Sue Jaime, RN BSN, PHN, ACM-RN  7A Nurse Care Coordinator    [facility] Acute Care Management  Phone: [phone]  Available on Vocera  7A SOT RNCC

## 2025-06-27 NOTE — PLAN OF CARE
Goal Outcome Evaluation:      Plan of Care Reviewed With: patient, sibling    Overall Patient Progress: no changeOverall Patient Progress: no change    Outcome Evaluation: VSS, 1-2 L O2, makes needs known.    BP (!) 146/61 (BP Location: Right arm)   Pulse 52   Temp 98  F (36.7  C) (Oral)   Resp 16   Wt (!) 139.6 kg (307 lb 11.2 oz)   SpO2 100%   BMI 42.92 kg/m      Shift: 0289-5121  Isolation Status: N/A  VS: VSS on 1-2 L O2, afebrile  Neuro: Aox4, makes needs known  Behaviors: Calm, cooperative, pleasant  BG: Q4H - 147, 118  Labs/Imaging: Na 134, BUN 39.8, Cr 2.49, Ca 8.0, albumin 2.6, protein total 5.0, ALT 87, AST 48, Hgb 9.5, hematocrit 29.4, plt 78  Respiratory: Satting > 90% on 1-2 L O2 via NC (pt w/NEREYDA, no CPAP use)  Cardiac: Hypertensive 140s/60s, bradycardic 50s  Pain/Nausea: Nausea/heartburn intermittent, managed w/PRN Tums & zofran w/some relief. Endorses moderate pain at incision site, managed w/PRN oxy x 2 + robaxin x 1 w/some relief. PRN trazodone before bed.  PRN: Oxy x 2, robaxin, zofran PO, trazodone  Diet: Regular, fair appetite. Calorie counts started today. TF cycled 8p-8a 45 mL/hr via ND tube.  LDA: L TL internal jugular (dressing & caps changed today), R TIARRA drain, wound vac  Infusion(s): Diflucan q24h  GI/: Voiding spontaneously w/o difficulty via bedside commode. LBM 6/26 AM, passing gas.  Skin: Clamshell abdominal incision w/Prevena wound vac in place, dressing CDI. R TIARRA drain w/100 mL output, serosanguinous. Multiple weeping spots on LLE (covered w/Primapore), weeping skin R flank (covered w/Mepilex, changed x 2).   Mobility: Assist x 1 stand/pivot to commode, gait belt/walker while ambulating  Events/Education: Sister at bedside, supportive of pt/involved in cares. Med card at bedside, lab book started, up-to-date.  Plan: Continue w/plan of care & notify team w/any changes. Pharmacy Education tomorrow AM.

## 2025-06-27 NOTE — PROGRESS NOTES
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 Rebolledo on 6/19/2025.     Today:   - Remove vac and remaining drain   - Continue HD catheter today   - Renal does not recommend diuretics at this time   - Delta counts   - PT/OT   - Zofran TID prior to meals                                                                                                                                    Graft function: s/p DBDLT without stent, POD#8. Post-op US with patent Doppler. Liver labs continue to down trend.     -  mg x 3 months    - Ursodiol  300 mg BID x 3 months    - Remove TIARRA drain   - Remove wound Vac    Immunosuppressed status secondary to medications:  Induction:  SM taper followed by prednisone x 3 months   Basiliximab 20 mg IV on 6/20 and 6/24 in the setting of EMILIE  Maintenance:    Historical change: MMF changed to Myfortic on 6/26 due to loose stools, nausea.    -  mg BID    - Tacrolimus goal level 5 to 8. Diflucan EOT 6/27, will increase tac dose with stopping Dilfucan.   - Prednisone 5 mg daily following taper.    Neuro/Psych:  Depression:   - Continue home citalopram.   Fibromyalgia:   - Continue home gabapentin.   Psychophysiological insomnia:   - Continue home Trazodone.  Acute post op pain:    - Scheduled Tylenol.   - PRN Atarax, Robaxin, and Oxycodone.    Hematology:   Acute on chronic anemia: Hgb ~8-9. Secondary to acute surgical blood loss; transfuse to meet goals. Last transfused 6/21/2025.   Acute on chronic thrombocytopenia: Secondary to induction IS. Last transfused 6/20/2025. Plt trending up  Coagulopathy secondary to  liver disease: Stable.     Cardiorespiratory:   Shock: Resolved.   Post-operative mechanical ventilation: Extubated 6/19.   Acute hypoxic respiratory failure: RA-1L.    - OOB as tolerate, encourage mobility.    - Pulmonary hygiene.  Hx NEREYDA: Does not use CPAP.   Hx HLD: Not currently on statin therapy.   Hx HFpEF: Noted on on problem list, last EF normal. Not on GDMT.   Chronic left sided pleural effusion: Noted as trace on Xray 6/29/2025.   History of PE, 2002: Not on long-term anticoagulation.     GI/Nutrition: PPI.  Diet: Regular.   - Feeding tube placed 6/22, cycled tube feeds.   - Dietician following.       - Calorie counts.  Nausea: After eating per patient.    - Schedule zofran ODT prior to meals  Diarrhea:   - Bowel regimen held/switched to PRN  - Fiber (Banatrol) 2 pkt BID    Endocrine:   Hypothyroidism: TSH 6/7/2025 4.89.    - Continue home levothyroxine.   Steroid induced hyperglycemia: Last hemoglobin A1c 4.9% 6/20/2025.   - Sliding scale insulin ACHS. Check [id number]     Renal/Fluid/Electrolytes: MIVF: saline lock.   Hypocalcemia: Monitor.  Hyponatremia: Monitor.   EMILIE: Initially oliguric. Cr 2.5 (2.5), 1.3 L UOP yesterday.    - Transplant Nephrology following.   - CRRT started 6/21 and discontinued 6/24. iHD per Transplant Nephrology   -No HD indicated today.    - Continue HD line   - Renal does not recommend diuretics at this time due to loose stools - will allow to self diurese     : Rodriguez removed 6/22.    Infectious disease:   Risk for infection: Donor with open abdomen with risk of contamination.    - Continue antibiotics for a full 7- day course.    - Fluconazole and Zosyn x 7 days.    - Follow donor cultures.     Rheum:  Psoriatic arthritis:    - PTA managed with ixekizumab, tacrolimus ointment    Prophylaxis: DVT, fall, GI, fungal (fluconazole), viral (Valcyte), pneumocystis (Bactrim)    Disposition: 7A. Therapies recommending home with PT/OT.     JOCELIN/Fellow/Resident Provider:   Angelica Natarajan  PAC  Adult Solid Organ Transplant   Contact information via Vocera Web Console or via [facility] Paging/Directory    Medically Ready for Discharge: Anticipated in 2-4 Days     Faculty: Amauri Jacobs MD, PhD  __________________________________________________________________  Transplant History:    6/19/2025 (Liver), Postoperative day: 8     Interval History: History is obtained from the patient  Overnight events: No acute events overnight. Patient up in chair. Pain is controlled. Poor appetite, early satiety. Nausea after supplements. Loose stools, patient feels these have been controlled, improved. Tolerating activity with therapies.     ROS:   A 10-point review of systems was negative except as noted above.    Curent Meds:  Current Facility-Administered Medications   Medication Dose Route Frequency Provider Last Rate Last Admin    acetaminophen (TYLENOL) tablet 650 mg  650 mg Oral Q8H Ginny Naidu MD   650 mg at 06/27/25 0412    aspirin (ASA) tablet 325 mg  325 mg Oral or Feeding Tube Daily Max Rebolledo MD   325 mg at 06/26/25 0813    atorvastatin (LIPITOR) tablet 20 mg  20 mg Oral QPM Shaila Foote NP   20 mg at 06/26/25 1951    Followed by    [START ON 7/3/2025] atorvastatin (LIPITOR) tablet 40 mg  40 mg Oral QPM Shaila Foote NP        citalopram (celeXA) tablet 20 mg  20 mg Oral Daily Ginny Naidu MD   20 mg at 06/26/25 0812    fiber modular (BANATROL TF) packet 2 packet  2 packet Per Feeding Tube BID Shaila Foote NP   1 packet at 06/26/25 1955    fluconazole (DIFLUCAN) intermittent infusion 400 mg  400 mg Intravenous Q24H Max Rebolledo  mL/hr at 06/26/25 1837 400 mg at 06/26/25 1837    heparin ANTICOAGULANT injection 5,000 Units  5,000 Units Subcutaneous Q8H Gus Darden MD   5,000 Units at 06/27/25 0539    insulin aspart (NovoLOG) injection (RAPID ACTING)  1-12 Units Subcutaneous Q4H Sincere Cunningham PA-C   2 Units at 06/27/25 0412    levothyroxine  (SYNTHROID/LEVOTHROID) tablet 112 mcg  112 mcg Oral Daily Ginny Naidu MD   112 mcg at 06/26/25 0812    multivitamin w/minerals (THERA-VIT-M) tablet 1 tablet  1 tablet Oral Daily Ginny Naidu MD   1 tablet at 06/26/25 0812    mycophenolic acid (GENERIC EQUIVALENT) EC tablet 540 mg  540 mg Oral BID IS Shaila Foote NP   540 mg at 06/26/25 1837    predniSONE (DELTASONE) tablet 5 mg  5 mg Oral Daily Ginny Naidu MD   5 mg at 06/26/25 0810    Prosource TF20 ENfit Compatibl EN LIQD (PROSOURCE TF20) packet 60 mL  1 packet Per Feeding Tube BID Ginny Naidu MD   60 mL at 06/26/25 1955    sodium chloride (PF) 0.9% PF flush 3 mL  3 mL Intravenous Q8H Ginny Naidu MD   3 mL at 06/25/25 2027    sodium chloride (PF) 0.9% PF flush 3 mL  3 mL Intracatheter Q8H DARINEL Ginny Naidu MD   3 mL at 06/25/25 1523    sodium chloride (PF) 0.9% PF flush 3 mL  3 mL Intracatheter Q8H Ginny Naidu MD   3 mL at 06/25/25 2026    sulfamethoxazole-trimethoprim (BACTRIM) 400-80 MG per tablet 1 tablet  1 tablet Oral or Feeding Tube Once per day on Tuesday Thursday Saturday Max Rebolledo MD   1 tablet at 06/26/25 1951    tacrolimus (GENERIC EQUIVALENT) capsule 4 mg  4 mg Oral BID IS Shaila Foote NP   4 mg at 06/26/25 1837    ursodiol (ACTIGALL) capsule 300 mg  300 mg Oral BID Ginny Naidu MD   300 mg at 06/26/25 1951    valGANciclovir (VALCYTE) tablet 450 mg  450 mg Oral Once per day on Tuesday Saturday Max Rebolledo MD   450 mg at 06/24/25 1745       Physical Exam:     Admit Weight: (!) 149.6 kg (329 lb 12.8 oz)    Current Vitals:   BP (!) 150/64 (BP Location: Right arm)   Pulse 52   Temp 98.1  F (36.7  C) (Oral)   Resp 16   Wt (!) 139.6 kg (307 lb 11.2 oz)   SpO2 100%   BMI 42.92 kg/m      Vital sign ranges:    Temp:  [98  F (36.7  C)-98.3  F (36.8  C)] 98.1  F (36.7  C)  Pulse:  [52-64] 52  Resp:  [16] 16  BP: (141-150)/(54-64) 150/64  SpO2:  [90 %-100 %] 100 %    General  Appearance: no obvious acute distress.    Skin: Normal, warm, dry.  Heart: She appears adequately perfused.   Lungs: Non-labored on 2 lpm via NC.   Abdomen: Non-distended, Prevena wound vac in place. TIARRA x 2 with thin serosang output.   : Rodriguez removed, small urine output.  Extremities: Edema: generalized edema. There is no skin breakdown obvious breakdown on exposed skin.  Neurologic: Awake, alert, oriented x 4. Tremor absent.     Frailty Scores          5/20/2025 3/30/2025   Frailty Scores   Final Score Not Frail  Not Frail    Final Score Number 1  1       Details          Patient-reported               Data:   CMP  Recent Labs   Lab 06/27/25  0525 06/26/25  0739 06/26/25  0546 06/25/25  0847 06/25/25  0324   NA  --   --  134*  --  135   POTASSIUM  --   --  4.0  --  3.8   CHLORIDE  --   --  102  --  105   CO2  --   --  22  --  23   GLC  --  147* 151*   < > 154*   BUN  --   --  39.8*  --  24.4*   CR  --   --  2.49*  --  1.84*   GFRESTIMATED  --   --  21*  --  31*   SANDY  --   --  8.0*  --  7.8*   ICAW 4.5  --  4.4  --  4.5   MAG  --   --  2.0  --  2.1   PHOS  --   --  3.9  --  2.8   ALBUMIN  --   --  2.6*  --  2.6*   BILITOTAL  --   --  0.6  --  0.6   ALKPHOS  --   --  94  --  90   AST  --   --  48*  --  71*   ALT  --   --  87*  --  105*    < > = values in this interval not displayed.     CBC  Recent Labs   Lab 06/27/25  0525 06/26/25  0546 06/20/25  1206 06/20/25  0749   HGB 8.6* 9.5*   < > 7.4*   WBC 6.8 5.6   < > 13.7*   PLT 90* 78*   < > 100*   A1C  --   --   --  4.9    < > = values in this interval not displayed.

## 2025-06-28 ENCOUNTER — APPOINTMENT (OUTPATIENT)
Dept: PHYSICAL THERAPY | Facility: CLINIC | Age: 60
DRG: 005 | End: 2025-06-28
Attending: TRANSPLANT SURGERY
Payer: COMMERCIAL

## 2025-06-28 ASSESSMENT — ACTIVITIES OF DAILY LIVING (ADL)
ADLS_ACUITY_SCORE: 48
ADLS_ACUITY_SCORE: 50
ADLS_ACUITY_SCORE: 48
ADLS_ACUITY_SCORE: 50
ADLS_ACUITY_SCORE: 48
ADLS_ACUITY_SCORE: 50
ADLS_ACUITY_SCORE: 48
ADLS_ACUITY_SCORE: 48

## 2025-06-28 NOTE — PLAN OF CARE
Goal Outcome Evaluation:      Plan of Care Reviewed With: patient    Overall Patient Progress: improvingOverall Patient Progress: improving  [id number]  Neuro:  Pt. alert & Ox4.  Behavior:  Pt.  calm & cooperative with cares.    Activity:  Pt. up with 1 and walker.   Vitals:  Pt. Hypertensive and given prn Hydralazine IV x1. AOVSS on RA.    LDAs: CVC with SL.  ND with tube feeds. Old TIARRA site covered with ostomy bag with 200cc serosang. fluid.    BG:  ACHS.  2am 167.    Cardiac:  WNL  Respiratory: LS clear with diminished bases.    GI/:  Pt. voiding without difficulty.  Loose stools x3..    Skin: Scattered bruising. Incision stapled and ALBERTA.  Pain/Nausea:  Abdominal and back pain managed with sched. Tylenol.  Pt.  denies nausea.    Diet: Regular with calorie counts. Tube feeds at 45cc/hour from 8pm-8am.  Labs/Imaging: See chart for results.    Plan:  Continue to follow POC and notify team with change in status.

## 2025-06-28 NOTE — PLAN OF CARE
Vitals: BP (!) 144/59 (BP Location: Left arm)   Pulse 57   Temp 98.1  F (36.7  C) (Oral)   Resp 18   Wt (!) 152.1 kg (335 lb 4.8 oz)   SpO2 93%   BMI 46.76 kg/m      Stable room air.   Neuro : a x o x 4.  Blood glucose: achs: [phone].   Pain/nausea: robaxin x 1. Scheduled tylenol. Zofran and compazine given  Diet: regular. Poor appetite,calories counted. Cycled TF started GR 45 6211-6346.   Lines: Trilysis line saline locked.   : voiding well. Uses commode  GI:diarrhea, fibers given, team aware  Drains: TIARRA Right removed, very leaky, ostomy pouch applied.   Skin: incision clamshell staples delfino. Right lateral flank leaky, mepilex changed.   Mobility: sba x walker.   Education : med and lab book updated. Pharmacy education completed, handbook with daughter. Videos started and aware.

## 2025-06-28 NOTE — PROGRESS NOTES
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 Rebolledo on 6/19/2025.     Today:   - Delta counts, cycled tube feeds   - PT/OT   - Zofran TID prior to meals                                                                                                                                    Graft function: s/p DBDLT without stent, POD#9. Post-op US with patent Doppler. Liver labs continue to down trend.     -  mg x 3 months    - Ursodiol  300 mg BID x 3 months    - Remove TIARRA drain   - Remove wound Vac    Immunosuppressed status secondary to medications:  Induction:  SM taper followed by prednisone x 3 months   Basiliximab 20 mg IV on 6/20 and 6/24 in the setting of EMILIE  Maintenance:    Historical change: MMF changed to Myfortic on 6/26 due to loose stools, nausea.    -  mg BID    - Tacrolimus goal level 5 to 8. Diflucan EOT 6/27, will increase tac dose with stopping Dilfucan.   - Prednisone 5 mg daily following taper.    Neuro/Psych:  Depression:   - Continue home citalopram.   Fibromyalgia:   - Continue home gabapentin.   Psychophysiological insomnia:   - Continue home Trazodone.  Acute post op pain:    - Scheduled Tylenol.   - PRN Atarax, Robaxin, and Oxycodone.    Hematology:   Acute on chronic anemia: Hgb ~8-9. Secondary to acute surgical blood loss; transfuse to meet goals. Last transfused 6/21/2025.   Acute on chronic thrombocytopenia: Secondary to induction IS. Last transfused 6/20/2025. Plt trending up  Coagulopathy secondary to liver disease: Stable.     Cardiorespiratory:   Shock: Resolved.   Post-operative mechanical  ventilation: Extubated 6/19.   Acute hypoxic respiratory failure: RA-1L.    - OOB as tolerate, encourage mobility.    - Pulmonary hygiene.  Hx NEREYDA: Does not use CPAP.   Hx HLD: Not currently on statin therapy.   Hx HFpEF: Noted on on problem list, last EF normal. Not on GDMT.   Chronic left sided pleural effusion: Noted as trace on Xray 6/29/2025.   History of PE, 2002: Not on long-term anticoagulation.     GI/Nutrition: PPI.  Diet: Regular.   - Feeding tube placed 6/22, cycled tube feeds.   - Dietician following.       - Calorie counts.  Nausea: After eating per patient.    - Schedule zofran ODT prior to meals  Diarrhea:   - Bowel regimen held/switched to PRN  - Fiber (Banatrol) 2 pkt BID    Endocrine:   Hypothyroidism: TSH 6/7/2025 4.89.    - Continue home levothyroxine.   Steroid induced hyperglycemia: Last hemoglobin A1c 4.9% 6/20/2025.   - Sliding scale insulin ACHS. Check [id number]     Renal/Fluid/Electrolytes: MIVF: saline lock.   Hypocalcemia: Monitor.  Hyponatremia: Monitor.   EMILIE: Initially oliguric. Cr 2.5 (2.5), 1.3 L UOP yesterday.    - Transplant Nephrology following.   - CRRT started 6/21 and discontinued 6/24. iHD per Transplant Nephrology   -No HD indicated today.    - Continue HD line   - Renal does not recommend diuretics at this time due to loose stools - will allow to self diurese     : Rodriguez removed 6/22.    Infectious disease:   Risk for infection: Donor with open abdomen with risk of contamination.    - Continue antibiotics for a full 7- day course.    - Fluconazole and Zosyn x 7 days.    - Follow donor cultures.     Rheum:  Psoriatic arthritis:    - PTA managed with ixekizumab, tacrolimus ointment    Prophylaxis: DVT, fall, GI, fungal (fluconazole), viral (Valcyte), pneumocystis (Bactrim)    Disposition: 7A. Therapies recommending home with PT/OT.     JOCELIN/Fellow/Resident Provider:   KARLA Sapp  Adult Solid Organ Transplant   Contact information via Vocera Web Console or via [facility]  Paging/Directory    Medically Ready for Discharge: Anticipated in 2-4 Days     Faculty: Amauri Jacobs MD, PhD  __________________________________________________________________  Transplant History:    6/19/2025 (Liver), Postoperative day: 9     Interval History: History is obtained from the patient  Overnight events: NAEO     ROS:   A 10-point review of systems was negative except as noted above.    Curent Meds:  Current Facility-Administered Medications   Medication Dose Route Frequency Provider Last Rate Last Admin    acetaminophen (TYLENOL) tablet 650 mg  650 mg Oral Q8H Ginny Naidu MD   650 mg at 06/28/25 0405    aspirin (ASA) tablet 325 mg  325 mg Oral or Feeding Tube Daily Max Rebolledo MD   325 mg at 06/28/25 0800    atorvastatin (LIPITOR) tablet 20 mg  20 mg Oral QPM Shaila Foote NP   20 mg at 06/27/25 1936    Followed by    [START ON 7/3/2025] atorvastatin (LIPITOR) tablet 40 mg  40 mg Oral QPM Shaila Foote NP        citalopram (celeXA) tablet 20 mg  20 mg Oral Daily Ginny Naidu MD   20 mg at 06/28/25 0801    fiber modular (BANATROL TF) packet 2 packet  2 packet Per Feeding Tube BID Shaila Foote NP   2 packet at 06/27/25 1936    heparin ANTICOAGULANT injection 5,000 Units  5,000 Units Subcutaneous Q8H [facility] Gus Arroyo MD   5,000 Units at 06/28/25 0611    insulin aspart (NovoLOG) injection (RAPID ACTING)  1-10 Units Subcutaneous TID AC Angelica Natarajan PA-C   2 Units at 06/28/25 0809    insulin aspart (NovoLOG) injection (RAPID ACTING)  1-7 Units Subcutaneous At Bedtime Angelica Natarajan PA-C        levothyroxine (SYNTHROID/LEVOTHROID) tablet 112 mcg  112 mcg Oral Daily Ginny Naidu MD   112 mcg at 06/28/25 0801    multivitamin w/minerals (THERA-VIT-M) tablet 1 tablet  1 tablet Oral Daily Ginny Naidu MD   1 tablet at 06/28/25 0801    mycophenolic acid (GENERIC EQUIVALENT) EC tablet 540 mg  540 mg Oral BID IS Shaila Foote NP   540 mg at 06/28/25  0801    ondansetron (ZOFRAN ODT) ODT tab 4 mg  4 mg Oral TID  Angelica Natarajan PA-C   4 mg at 06/28/25 0759    predniSONE (DELTASONE) tablet 5 mg  5 mg Oral Daily Ginny Naidu MD   5 mg at 06/28/25 0801    Prosource TF20 ENfit Compatibl EN LIQD (PROSOURCE TF20) packet 60 mL  1 packet Per Feeding Tube BID Ginny Naidu MD   60 mL at 06/27/25 1937    sodium chloride (PF) 0.9% PF flush 3 mL  3 mL Intravenous Q8H Ginny Naidu MD   3 mL at 06/27/25 1937    sodium chloride (PF) 0.9% PF flush 3 mL  3 mL Intracatheter Q8H DARINEL Ginny Naidu MD   3 mL at 06/28/25 0611    sodium chloride (PF) 0.9% PF flush 3 mL  3 mL Intracatheter Q8H Ginny Naidu MD   3 mL at 06/27/25 1937    sulfamethoxazole-trimethoprim (BACTRIM) 400-80 MG per tablet 1 tablet  1 tablet Oral or Feeding Tube Once per day on Tuesday Thursday Saturday Max Rebolledo MD   1 tablet at 06/26/25 1951    tacrolimus (GENERIC EQUIVALENT) capsule 4 mg  4 mg Oral BID IS Shaila Foote NP   4 mg at 06/28/25 0801    ursodiol (ACTIGALL) capsule 300 mg  300 mg Oral BID Ginny Naidu MD   300 mg at 06/28/25 0800    valGANciclovir (VALCYTE) tablet 450 mg  450 mg Oral Once per day on Tuesday Saturday Max Rebolledo MD   450 mg at 06/24/25 1745       Physical Exam:     Admit Weight: (!) 149.6 kg (329 lb 12.8 oz)    Current Vitals:   BP (!) 153/63 (BP Location: Right arm)   Pulse 59   Temp 98.3  F (36.8  C) (Oral)   Resp 18   Wt (!) 151.5 kg (334 lb 1.6 oz)   SpO2 96%   BMI 46.60 kg/m      Vital sign ranges:    Temp:  [98  F (36.7  C)-98.3  F (36.8  C)] 98.3  F (36.8  C)  Pulse:  [56-64] 59  Resp:  [16-20] 18  BP: (127-185)/(59-89) 153/63  SpO2:  [92 %-99 %] 96 %    General Appearance: no obvious acute distress.    Skin: Normal, warm, dry.  Heart: She appears adequately perfused.   Lungs: Non-labored on 2 lpm via NC.   Abdomen: Non-distended, Prevena wound vac in place. TIARRA x 2 with thin serosang output.   : Rodriguez removed,  small urine output.  Extremities: Edema: generalized edema. There is no skin breakdown obvious breakdown on exposed skin.  Neurologic: Awake, alert, oriented x 4. Tremor absent.     Frailty Scores          5/20/2025 3/30/2025   Frailty Scores   Final Score Not Frail  Not Frail    Final Score Number 1  1       Details          Patient-reported               Data:   CMP  Recent Labs   Lab 06/28/25  0741 06/28/25  0725 06/27/25  1315 06/27/25  0525 06/26/25  0739 06/26/25  0546   NA  --  134*  --  134*  --  134*   POTASSIUM  --  4.6  --  4.2  --  4.0   CHLORIDE  --  103  --  103  --  102   CO2  --  22  --  23  --  22   * 185*   < > 165*   < > 151*   BUN  --  46.2*  --  44.7*  --  39.8*   CR  --  2.39*  --  2.47*  --  2.49*   GFRESTIMATED  --  23*  --  22*  --  21*   SANDY  --  8.4*  --  8.0*  --  8.0*   ICAW  --   --   --  4.5  --  4.4   MAG  --  2.1  --  2.1  --  2.0   PHOS  --  3.6  --  4.0  --  3.9   ALBUMIN  --  2.9*  --  2.6*  --  2.6*   BILITOTAL  --  0.6  --  0.6  --  0.6   ALKPHOS  --  113  --  94  --  94   AST  --  28  --  32  --  48*   ALT  --  55*  --  62*  --  87*    < > = values in this interval not displayed.     CBC  Recent Labs   Lab 06/28/25  0907 06/27/25  0525   HGB 9.8* 8.6*   WBC 6.7 6.8   * 90*

## 2025-06-28 NOTE — PROGRESS NOTES
Care Management Follow Up    Length of Stay (days): 8    Expected Discharge Date: 06/30/2025     Concerns to be Addressed: discharge planning, adjustment to diagnosis/illness     Patient plan of care discussed at interdisciplinary rounds: Yes    Anticipated Discharge Disposition: Home, Home Care              Anticipated Discharge Services:    Anticipated Discharge DME:      Patient/family educated on Medicare website which has current facility and service quality ratings:    Education Provided on the Discharge Plan:    Patient/Family in Agreement with the Plan: yes    Referrals Placed by CM/SW:    Private pay costs discussed: Not applicable    Discussed  Partnership in Safe Discharge Planning  document with patient/family: No     Handoff Completed: No, handoff not indicated or clinically appropriate    Additional Information:  Pt s/p liver transplant.  Per discussion with [facility] PT pt and daughter anxious about discharge to home.  Anticipate dual discharge plan ARU vs home with home care.  Pt may or may not require enteral feeds at discharge.  [facility] has accepted for home RN, PT, OT.  Enteral feeds would not be covered as pt insurance plan requires enteral feeds be sole source.  Pt started on amanuel counts.      Met with pt.  Pt daughter Patrica participated in discussion via phone.  Reviewed/discussed [facility].  Reviewed/discussed home care services.  Pt and her cliffaghter aware final plan pending medical clearance, therapy recommendations and nutrition plan.       Initiated referral to ARU.  Messaged Neisha BURNS with update.    RNCC/ROMULO BURNS to follow up on Monday 6/30 and review discharge plan/recommendations    Accepting Home Care  [facility]  Phone: [phone]  Fax: [phone]     Discharge Address - pt staying locally with her daughter Patrica:     [address]  [facility] [address]       Next Steps:   Follow for discharge planning  If plan is discharge to daughter's home RNCC to coordinate/follow  up with [facility] Liaison  If plan is discharge to ARU, SOEDER BURNS to coordinate with [facility] admissions liaison.    Sue Jaime, RN BSN, PHN, ACM-RN  7A Nurse Care Coordinator    [facility] Acute [facility] Management  Phone: [phone]  Available on Vocera  7A SOT RNCC

## 2025-06-28 NOTE — PLAN OF CARE
Goal Outcome Evaluation:    Plan of Care Reviewed With: patient  Overall Patient Progress: improving  Outcome Evaluation: VSS on RA, nausea under control today after PRN atarax w/ tylenol    BP (!) 118/93   Pulse 58   Temp 98.9  F (37.2  C)   Resp 16   Wt (!) 151.5 kg (334 lb 1.6 oz)   SpO2 94%   BMI 46.60 kg/m      Shift: 7023-4824  Isolation Status: none  VS: VSS on RA, afebrile  Neuro: AOx4  Behaviors: receptive to cares, able to make needs known   BG: ACHS (177, 155)  Labs/Imaging: LFTs trending down  Respiratory: WDL  Cardiac: WDL  Pain/Nausea: endorsing back & incisional pain, intermittent nausea w/ scheduled ODT zofran  PRNs: PRN robaxin & atarax for pain, PRN compazine x1 - pt reported nausea decreased when atarax was given w/ tylenol  Diet: regular w/ amanuel counts 6/, poor appetite due to nausea but pt is trying  LDA: trialysis line; unable to get blood return on brown lumen; waiting on TPA from pharmacy; ND clamped  Infusion(s): cycled TF 8p-8a at 45 mL/hr (goal)  GI/: loose BM this shift, voiding  Skin: clamshell incision stapled, ALBERTA; old TIARRA sites leaky - new dressing applied this shift after ostomy bag removed per provider request  Mobility: up w/1  Plan: continue with POC & update team with any changes    Handoff given to following RN.

## 2025-06-28 NOTE — PROGRESS NOTES
[facility]  Transplant Nephrology Progress Note  Date of Admission:  6/18/2025  Today's Date: 06/28/2025  Requesting physician: Max Rebolledo MD    Recommendations:   - ok to remove HD line  - monitor I/O and daily weights  - no acute RRT indications       Assessment & Plan   # EMILIE: EMILIE-D CRRT to iHD switch on 6/24, improving UOP 6/25   - EMILIE likely secondary to liver transplant with hemodynamic changes and hypotension.    - Baseline Creatinine: ~ 0.8-1.0   - Proteinuria: Normal (<0.2 grams)    # Liver Tx: Patient with ESLD secondary to Metabolic associated steatotic liver disease (MASLD), s/p OLT June 19, 2025.  Transaminases donwtrending,  Followed by Transplant Surgery.    # Immunosuppression: Tacrolimus immediate release (goal 6-8), Mycophenolic acid (dose 540 mg every 12 hours), and Prednisone (dose taper)   - Induction with Recent Transplant:  Per Liver Tx Protocol   - Continue with intensive monitoring of immunosuppression for efficacy and toxicity.   - Changes: switched to MPA due to diarrhea; Management per Transplant Surgery.    # Infection Prophylaxis:   - PJP: Sulfa/TMP (Bactrim)  - CMV: Valganciclovir (Valcyte)  - Thrush: Fluconazole (Diflucan)  - Fungal: Fluconazole (Diflucan)      - CMV IgG Ab High Risk Discordance (D+/R-) at time of transplant: No  Present CMV Serostatus: Positive  - EBV IgG Ab High Risk Discordance (D+/R-) at time of transplant: No  Present EBV Serostatus: Positive    # Blood Pressure: stable off pressors;  Goal BP: MAP > 65   - Changes: Not at this time;     # Elevated Blood Glucose: Secondary to high dose steroids.   - On insulin    # Anemia in Chronic Disease/Surgery: Hgb: stable low      RACHEL: No   - Iron studies: Not checked recently    # Thrombocytopenia: Stable, mildly low platelet level.    # Mineral Bone Disorder:   - Vitamin D; level: Low normal        On supplement: No  - Calcium; level: Low normal when corrected for  low serum albumin        On supplement: No  - Phosphorus; level: High        On binder: No    # Electrolytes:   - Potassium; level: Normal        On supplement: No  - Magnesium; level: Normal        On supplement: No  - Bicarbonate; level: Normal        On supplement: No  - Sodium; level: Normal    # Obesity, Class III (BMI = 47.7):    - Once patient recovers from surgery and incision heals, would recommend working on weight loss     # Other Significant PMH:   - H/o PE (~ 2000): Patient with no recent clotting episodes and off anticoagulation.   - Fibromyalgia: Stable symptoms on gabapentin.     # Transplant History:  Etiology of Organ Failure: Metabolic associated steatotic liver disease (MASLD)  Tx: Liver Tx  Transplant: 6/19/2025 (Liver)  Significant changes in immunosuppression: Slightly lower tacrolimus level goal due to EMILIE.  Significant transplant-related complications: EMILIE    Recommendations were communicated to the primary team via this note.    Tevin Wyatt MD  Transplant Nephrology  Contact information via Vocera Web Console or via [facility] Paging/Directory    Interval History  Ongoing loose stools, no nausea/vomiting  Denies any dyspnea, sating 95% on RA    Review of Systems   4 point ROS was obtained and negative except as noted in the Interval History.    MEDICATIONS:  Current Facility-Administered Medications   Medication Dose Route Frequency Provider Last Rate Last Admin    acetaminophen (TYLENOL) tablet 650 mg  650 mg Oral Q8H Ginny Naidu MD   650 mg at 06/28/25 1224    aspirin (ASA) tablet 325 mg  325 mg Oral or Feeding Tube Daily Max Rebolledo MD   325 mg at 06/28/25 0800    atorvastatin (LIPITOR) tablet 20 mg  20 mg Oral QPM Shaila Foote NP   20 mg at 06/27/25 1936    Followed by    [START ON 7/3/2025] atorvastatin (LIPITOR) tablet 40 mg  40 mg Oral QPM Shaila Foote NP        citalopram (celeXA) tablet 20 mg  20 mg Oral Daily Ginny Naidu MD   20 mg at 06/28/25 0801     fiber modular (BANATROL TF) packet 2 packet  2 packet Per Feeding Tube BID Shaila Foote NP   2 packet at 06/27/25 1936    heparin ANTICOAGULANT injection 5,000 Units  5,000 Units Subcutaneous Q8H [facility] Gus Arroyo MD   5,000 Units at 06/28/25 0611    insulin aspart (NovoLOG) injection (RAPID ACTING)  1-10 Units Subcutaneous TID  Angelica Natarajan PA-C   1 Units at 06/28/25 1228    insulin aspart (NovoLOG) injection (RAPID ACTING)  1-7 Units Subcutaneous At Bedtime Angelica Natarajan PA-C        levothyroxine (SYNTHROID/LEVOTHROID) tablet 112 mcg  112 mcg Oral Daily Ginny Naidu MD   112 mcg at 06/28/25 0801    multivitamin w/minerals (THERA-VIT-M) tablet 1 tablet  1 tablet Oral Daily Ginny Naidu MD   1 tablet at 06/28/25 0801    mycophenolic acid (GENERIC EQUIVALENT) EC tablet 540 mg  540 mg Oral BID IS Shaila Foote NP   540 mg at 06/28/25 0801    ondansetron (ZOFRAN ODT) ODT tab 4 mg  4 mg Oral TID  Angelica Natarajan PA-C   4 mg at 06/28/25 1223    predniSONE (DELTASONE) tablet 5 mg  5 mg Oral Daily Ginny Naidu MD   5 mg at 06/28/25 0801    Prosource TF20 ENfit Compatibl EN LIQD (PROSOURCE TF20) packet 60 mL  1 packet Per Feeding Tube BID Ginny Naidu MD   60 mL at 06/27/25 1937    sodium chloride (PF) 0.9% PF flush 3 mL  3 mL Intravenous Q8H Ginny Naidu MD   3 mL at 06/28/25 1227    sodium chloride (PF) 0.9% PF flush 3 mL  3 mL Intracatheter Q8H [facility] Ginny Naidu MD   3 mL at 06/28/25 0611    sodium chloride (PF) 0.9% PF flush 3 mL  3 mL Intracatheter Q8H Ginny Naidu MD   3 mL at 06/28/25 1223    sulfamethoxazole-trimethoprim (BACTRIM) 400-80 MG per tablet 1 tablet  1 tablet Oral or Feeding Tube Once per day on Tuesday Thursday Saturday Max Rebolledo MD   1 tablet at 06/26/25 1951    tacrolimus (GENERIC EQUIVALENT) capsule 4 mg  4 mg Oral BID IS Shaila Foote NP   4 mg at 06/28/25 0801    ursodiol (ACTIGALL) capsule 300 mg  300 mg Oral BID  Ginny Naidu MD   300 mg at 25 0800    valGANciclovir (VALCYTE) tablet 450 mg  450 mg Oral Once per day on  Max Rebolledo MD   450 mg at 25 1745     Current Facility-Administered Medications   Medication Dose Route Frequency Provider Last Rate Last Admin       Physical Exam   Temp  Av.6  F (37  C)  Min: 97.8  F (36.6  C)  Max: 99  F (37.2  C)  Arterial Line BP  Min: 97/47  Max: 157/55  Arterial Line MAP (mmHg)  Av.1 mmHg  Min: 60 mmHg  Max: 85 mmHg      Pulse  Av.6  Min: 67  Max: 83 Resp  Av.5  Min: 8  Max: 23  FiO2 (%)  Av %  Min: 30 %  Max: 40 %  SpO2  Av.2 %  Min: 88 %  Max: 100 %    CVP (mmHg): 3 mmHgBP 122/57   Pulse 64   Temp 97.7  F (36.5  C)   Resp 16   Wt (!) 151.5 kg (334 lb 1.6 oz)   SpO2 95%   BMI 46.60 kg/m     Date 25 0700 - 25 0659   Shift 6327-2952 2607-2068 2586-7983 24 Hour Total   INTAKE   P.O. 240   240   I.V. 327.8   327.8   Blood Components 600   600   Shift Total(mL/kg) 1167.8(7.52)   1167.8(7.52)   OUTPUT   Urine 7   7   Drains 252   252   Shift Total(mL/kg) 259(1.67)   259(1.67)   Weight (kg) 155.2 155.2 155.2 155.2      Admit Weight: (!) 149.6 kg (329 lb 12.8 oz)     GENERAL APPEARANCE:  NAD  CV: rrr s1s2 no m  Lungs: decreased breath sounds  Ext +pitting edema  Neuro: awake    Data   All labs reviewed by me.  CMP  Recent Labs   Lab 25  1211 25  0741 25  0725 25  0205 25  1315 25  0525 25  0739 25  0546 25  0847 25  0324   NA  --   --  134*  --   --  134*  --  134*  --  135   POTASSIUM  --   --  4.6  --   --  4.2  --  4.0  --  3.8   CHLORIDE  --   --  103  --   --  103  --  102  --  105   CO2  --   --  22  --   --  23  --  22  --  23   ANIONGAP  --   --  9  --   --  8  --  10  --  7   * 177* 185* 167*   < > 165*   < > 151*   < > 154*   BUN  --   --  46.2*  --   --  44.7*  --  39.8*  --  24.4*   CR  --   --  2.39*  --   --  2.47*  --   2.49*  --  1.84*   GFRESTIMATED  --   --  23*  --   --  22*  --  21*  --  31*   SANDY  --   --  8.4*  --   --  8.0*  --  8.0*  --  7.8*   MAG  --   --  2.1  --   --  2.1  --  2.0  --  2.1   PHOS  --   --  3.6  --   --  4.0  --  3.9  --  2.8   PROTTOTAL  --   --  5.5*  --   --  4.8*  --  5.0*  --  4.8*   ALBUMIN  --   --  2.9*  --   --  2.6*  --  2.6*  --  2.6*   BILITOTAL  --   --  0.6  --   --  0.6  --  0.6  --  0.6   ALKPHOS  --   --  113  --   --  94  --  94  --  90   AST  --   --  28  --   --  32  --  48*  --  71*   ALT  --   --  55*  --   --  62*  --  87*  --  105*    < > = values in this interval not displayed.     CBC  Recent Labs   Lab 06/28/25  0907 06/27/25  0525 06/26/25  0546 06/25/25  0324   HGB 9.8* 8.6* 9.5* 8.4*   WBC 6.7 6.8 5.6 4.9   RBC 3.26* 2.85* 3.19* 2.76*   HCT 30.6* 26.5* 29.4* 25.7*   MCV 94 93 92 93   MCH 30.1 30.2 29.8 30.4   MCHC 32.0 32.5 32.3 32.7   RDW 18.6* 18.4* 18.0* 17.9*   * 90* 78* 66*     INR  Recent Labs   Lab 06/25/25  0324 06/24/25  0340 06/23/25  0353 06/22/25  1037   INR 1.12 1.18* 1.24* 1.23*   PTT 26 25 25 25     ABG  Recent Labs   Lab 06/21/25  1619   O2PER 4      Urine Studies  Recent Labs   Lab Test 06/18/25  2243 06/08/25  0447 11/26/24  0819   COLOR Yellow Yellow Yellow   APPEARANCE Clear Clear Clear   URINEGLC Negative Negative Negative   URINEBILI Negative Negative Negative   URINEKETONE Negative Negative Trace*   SG 1.015 1.015 1.026   UBLD Negative Negative Negative   URINEPH 5.0 5.5 5.5   PROTEIN Negative Negative 10*   NITRITE Negative Negative Negative   LEUKEST Negative Negative Negative   RBCU 0  --  1   WBCU <1  --  1     No lab results found.  PTH  No lab results found.  Iron Studies  Recent Labs   Lab Test 11/26/24  0800   IRON 112   *   IRONSAT 64*   LUDY 762*       IMAGING:  All imaging studies reviewed by me.

## 2025-06-29 ENCOUNTER — APPOINTMENT (OUTPATIENT)
Dept: OCCUPATIONAL THERAPY | Facility: CLINIC | Age: 60
DRG: 005 | End: 2025-06-29
Attending: TRANSPLANT SURGERY
Payer: COMMERCIAL

## 2025-06-29 ENCOUNTER — APPOINTMENT (OUTPATIENT)
Dept: PHYSICAL THERAPY | Facility: CLINIC | Age: 60
DRG: 005 | End: 2025-06-29
Attending: TRANSPLANT SURGERY
Payer: COMMERCIAL

## 2025-06-29 ASSESSMENT — ACTIVITIES OF DAILY LIVING (ADL)
ADLS_ACUITY_SCORE: 50
ADLS_ACUITY_SCORE: 48
ADLS_ACUITY_SCORE: 48
ADLS_ACUITY_SCORE: 50
ADLS_ACUITY_SCORE: 48
ADLS_ACUITY_SCORE: 50
ADLS_ACUITY_SCORE: 50
ADLS_ACUITY_SCORE: 48
ADLS_ACUITY_SCORE: 48
ADLS_ACUITY_SCORE: 50
ADLS_ACUITY_SCORE: 48
ADLS_ACUITY_SCORE: 50
ADLS_ACUITY_SCORE: 48
ADLS_ACUITY_SCORE: 50
ADLS_ACUITY_SCORE: 50

## 2025-06-29 NOTE — PROGRESS NOTES
Care Management Follow Up    Length of Stay (days): 9    Expected Discharge Date: 06/30/2025     Concerns to be Addressed: discharge planning, adjustment to diagnosis/illness     Patient plan of care discussed at interdisciplinary rounds: Yes    Anticipated Discharge Disposition: Home, Home Care / ARU     Anticipated Discharge Services:  TBD  Anticipated Discharge DME:  TBD    Patient/family educated on Medicare website which has current facility and service quality ratings:  no  Education Provided on the Discharge Plan:  no  Patient/Family in Agreement with the Plan: yes    Referrals Placed by CM/SW:  none on this date.     Pending:    [facility]  [address]  [address] [place] [address]  P: [phone]     Private pay costs discussed: Not applicable    Discussed  Partnership in Safe Discharge Planning  document with patient/family: No     Handoff Completed: No, handoff not indicated or clinically appropriate    Additional Information:  GRANT received a message from charge nurse over on 7A regarding pt going to ARU and to check and see if ARU can take her today.     GRANT followed up with [facility] liaison about the referral that was placed yesterday, and if they can take the pt. Liaison confirmed that they looked at pt's file and confirmed that pt is moving too well for ARU criteria. Liaison states that once PT's full note is in they can see if pt does meet criteria, but states that PT/OT recs still state home with home care.     GRANT then relayed the message back to charge that pt would not be discharging to ARU unless PT/OT recommend ARU or if pt meets criteria.     Next Steps: CM to monitor next steps for pt to discharge to home or ARU.     EDNA Joaquin  Weekend Covering   [facility] Acute Care Management  Searchable on Vocera: 7A SOT GRANT, 7B MS GRANT

## 2025-06-29 NOTE — PLAN OF CARE
Goal Outcome Evaluation:    Plan of Care Reviewed With: patient  Overall Patient Progress: improving  Outcome Evaluation: VSS on RA, pain & nausea fairly well, ambulating UAL!    BP (!) 141/69 (BP Location: Left arm)   Pulse 58   Temp 98  F (36.7  C) (Oral)   Resp 18   Wt (!) 150.5 kg (331 lb 11.2 oz)   SpO2 96%   BMI 46.26 kg/m      Shift: 3379-0532  Isolation Status: none  VS: VSS on RA, afebrile  Neuro: AOx4  Behaviors: receptive to cares, calling appropriately   BG: ACHS (151, 186, 127)  Labs/Imaging: LFTs downtrending   Respiratory: WDL  Cardiac: WDL  Pain/Nausea: endorsing back pain, incisional pain & intermittent nausea managed w/ scheduled zofran  PRNs: atarax x2 & robaxin x2 for pain  Diet: regular diet w/ amanuel counts w/ improved appetite! Pt really putting in effort to eat more & daughter has been super supportive by bringing in foods from outside that pt likes  LDA: PIV SL, ND clamped  Infusion(s): n/a  GI/: LBM 6/29, voiding spontaneously  Skin: clamshell incision stapled, ALBERTA; L trialysis line removed today w/ dressing CDI, leaky TIARRA sites changed multiple times throughout shift - per provider she can stitch tomorrow if necessary  Mobility: pt doing GREAT and moving independently! Up SBA, ambulating to bathroom independently  Plan: med card & lab book up to date, possible discharge tomorrow    Handoff given to following RN.

## 2025-06-29 NOTE — PROGRESS NOTES
[facility]  Transplant Nephrology Progress Note  Date of Admission:  6/18/2025  Today's Date: 06/29/2025  Requesting physician: Max Rebolledo MD    Recommendations:   - ok to remove HD line  - monitor I/O and daily weights  - no acute RRT indications       Assessment & Plan   # EMILIE: EMILIE-D CRRT to iHD switch on 6/24, improving UOP 6/25   - EMILIE likely secondary to liver transplant with hemodynamic changes and hypotension.    - Baseline Creatinine: ~ 0.8-1.0   - Proteinuria: Normal (<0.2 grams)    # Liver Tx: Patient with ESLD secondary to Metabolic associated steatotic liver disease (MASLD), s/p OLT June 19, 2025.  Transaminases donwtrending,  Followed by Transplant Surgery.    # Immunosuppression: Tacrolimus immediate release (goal 6-8), Mycophenolic acid (dose 540 mg every 12 hours), and Prednisone (dose taper)   - Induction with Recent Transplant:  Per Liver Tx Protocol   - Continue with intensive monitoring of immunosuppression for efficacy and toxicity.   - Changes: switched to MPA due to diarrhea; Management per Transplant Surgery.    # Infection Prophylaxis:   - PJP: Sulfa/TMP (Bactrim)  - CMV: Valganciclovir (Valcyte)  - Thrush: Fluconazole (Diflucan)  - Fungal: Fluconazole (Diflucan)      - CMV IgG Ab High Risk Discordance (D+/R-) at time of transplant: No  Present CMV Serostatus: Positive  - EBV IgG Ab High Risk Discordance (D+/R-) at time of transplant: No  Present EBV Serostatus: Positive    # Blood Pressure: stable off pressors;  Goal BP: MAP > 65   - Changes: Not at this time;     # Elevated Blood Glucose: Secondary to high dose steroids.   - On insulin    # Anemia in Chronic Disease/Surgery: Hgb: stable low      RACHEL: No   - Iron studies: Not checked recently    # Thrombocytopenia: Stable, mildly low platelet level.    # Mineral Bone Disorder:   - Vitamin D; level: Low normal        On supplement: No  - Calcium; level: Low normal when corrected for  low serum albumin        On supplement: No  - Phosphorus; level: High        On binder: No    # Electrolytes:   - Potassium; level: Normal        On supplement: No  - Magnesium; level: Normal        On supplement: No  - Bicarbonate; level: Normal        On supplement: No  - Sodium; level: Normal    # Obesity, Class III (BMI = 47.7):    - Once patient recovers from surgery and incision heals, would recommend working on weight loss     # Other Significant PMH:   - H/o PE (~ 2000): Patient with no recent clotting episodes and off anticoagulation.   - Fibromyalgia: Stable symptoms on gabapentin.     # Transplant History:  Etiology of Organ Failure: Metabolic associated steatotic liver disease (MASLD)  Tx: Liver Tx  Transplant: 6/19/2025 (Liver)  Significant changes in immunosuppression: Slightly lower tacrolimus level goal due to EMILIE.  Significant transplant-related complications: EMILIE    Recommendations were communicated to the primary team via this note.    Tevin Wyatt MD  Transplant Nephrology  Contact information via Vocera Web Console or via [employer] Paging/Directory    Interval History  Feels good  +loose stools, no nausea/vomiting  Denies any dyspnea, sating 96% on RA    Review of Systems   4 point ROS was obtained and negative except as noted in the Interval History.    MEDICATIONS:  Current Facility-Administered Medications   Medication Dose Route Frequency Provider Last Rate Last Admin    acetaminophen (TYLENOL) tablet 650 mg  650 mg Oral Q8H Ginny Naidu MD   650 mg at 06/29/25 1209    aspirin (ASA) tablet 325 mg  325 mg Oral or Feeding Tube Daily Max Rebolledo MD   325 mg at 06/29/25 0743    atorvastatin (LIPITOR) tablet 20 mg  20 mg Oral QPM Shaila Foote NP   20 mg at 06/28/25 2106    Followed by    [START ON 7/3/2025] atorvastatin (LIPITOR) tablet 40 mg  40 mg Oral QPM Shaila Foote NP        citalopram (celeXA) tablet 20 mg  20 mg Oral Daily Ginny Naidu MD   20 mg at 06/29/25  0743    fiber modular (BANATROL TF) packet 2 packet  2 packet Per Feeding Tube BID Shaila Foote NP   2 packet at 06/29/25 0901    heparin ANTICOAGULANT injection 5,000 Units  5,000 Units Subcutaneous Q8H [facility] Gus Arroyo MD   5,000 Units at 06/29/25 0540    insulin aspart (NovoLOG) injection (RAPID ACTING)  1-10 Units Subcutaneous TID  Angelica Natarajan PA-C   2 Units at 06/29/25 1211    insulin aspart (NovoLOG) injection (RAPID ACTING)  1-7 Units Subcutaneous At Bedtime Angelica Natarajan PA-C        levothyroxine (SYNTHROID/LEVOTHROID) tablet 112 mcg  112 mcg Oral Daily Ginny Naidu MD   112 mcg at 06/29/25 0744    multivitamin w/minerals (THERA-VIT-M) tablet 1 tablet  1 tablet Oral Daily Ginny Naidu MD   1 tablet at 06/29/25 0744    mycophenolic acid (GENERIC EQUIVALENT) EC tablet 540 mg  540 mg Oral BID IS Shaila Foote NP   540 mg at 06/29/25 0743    nystatin (MYCOSTATIN) suspension 1,000,000 Units  1,000,000 Units Oral 4x Daily Angelica Nataarjan PA-C   1,000,000 Units at 06/29/25 1209    ondansetron (ZOFRAN ODT) ODT tab 4 mg  4 mg Oral TID AC Angelica Natarajan PA-C   4 mg at 06/29/25 1209    predniSONE (DELTASONE) tablet 5 mg  5 mg Oral Daily Ginny Naidu MD   5 mg at 06/29/25 0744    Prosource TF20 ENfit Compatibl EN LIQD (PROSOURCE TF20) packet 60 mL  1 packet Per Feeding Tube BID Ginny Naidu MD   60 mL at 06/29/25 0748    sodium chloride (PF) 0.9% PF flush 3 mL  3 mL Intravenous Q8H Ginny Naidu MD   3 mL at 06/29/25 0755    sodium chloride (PF) 0.9% PF flush 3 mL  3 mL Intracatheter Q8H [facility] Ginny Naidu MD   3 mL at 06/29/25 0755    sodium chloride (PF) 0.9% PF flush 3 mL  3 mL Intracatheter Q8H Ginny Naidu MD   3 mL at 06/29/25 1213    sulfamethoxazole-trimethoprim (BACTRIM) 400-80 MG per tablet 1 tablet  1 tablet Oral or Feeding Tube Once per day on Tuesday Thursday Saturday Max Rebolledo MD   1 tablet at 06/28/25 6350    tacrolimus  (GENERIC EQUIVALENT) capsule 5 mg  5 mg Oral BID IS Angelica Natarajan PA-C        ursodiol (ACTIGALL) capsule 300 mg  300 mg Oral BID Ginny Naidu MD   300 mg at 25 0743    [START ON 2025] valGANciclovir (VALCYTE) tablet 450 mg  450 mg Oral Every Other Day Max Rebolledo MD         Current Facility-Administered Medications   Medication Dose Route Frequency Provider Last Rate Last Admin       Physical Exam   Temp  Av.6  F (37  C)  Min: 97.8  F (36.6  C)  Max: 99  F (37.2  C)  Arterial Line BP  Min: 97/47  Max: 157/55  Arterial Line MAP (mmHg)  Av.1 mmHg  Min: 60 mmHg  Max: 85 mmHg      Pulse  Av.6  Min: 67  Max: 83 Resp  Av.5  Min: 8  Max: 23  FiO2 (%)  Av %  Min: 30 %  Max: 40 %  SpO2  Av.2 %  Min: 88 %  Max: 100 %    CVP (mmHg): 3 mmHgBP (!) 154/70   Pulse 58   Temp 97.8  F (36.6  C)   Resp 16   Wt (!) 150.5 kg (331 lb 11.2 oz)   SpO2 96%   BMI 46.26 kg/m     Date 25 0700 - 25 0659   Shift 6339-5851 3184-1639 0593-0074 24 Hour Total   INTAKE   P.O. 240   240   I.V. 327.8   327.8   Blood Components 600   600   Shift Total(mL/kg) 1167.8(7.52)   1167.8(7.52)   OUTPUT   Urine 7   7   Drains 252   252   Shift Total(mL/kg) 259(1.67)   259(1.67)   Weight (kg) 155.2 155.2 155.2 155.2      Admit Weight: (!) 149.6 kg (329 lb 12.8 oz)     GENERAL APPEARANCE:  NAD  CV: rrr s1s2 no m  Lungs: decreased breath sounds  Ext +pitting edema  Neuro: awake    Data   All labs reviewed by me.  CMP  Recent Labs   Lab 25  1203 25  0713 25  0549 25  0132 25  0741 25  0725 25  1315 25  0525 25  0739 25  0546   NA  --   --  133*  --   --  134*  --  134*  --  134*   POTASSIUM  --   --  4.9  --   --  4.6  --  4.2  --  4.0   CHLORIDE  --   --  103  --   --  103  --  103  --  102   CO2  --   --  23  --   --  22  --  23  --  22   ANIONGAP  --   --  7  --   --  9  --  8  --  10   * 151* 167* 157*   < > 185*   <  > 165*   < > 151*   BUN  --   --  49.9*  --   --  46.2*  --  44.7*  --  39.8*   CR  --   --  2.36*  --   --  2.39*  --  2.47*  --  2.49*   GFRESTIMATED  --   --  23*  --   --  23*  --  22*  --  21*   SANDY  --   --  8.2*  --   --  8.4*  --  8.0*  --  8.0*   MAG  --   --  2.1  --   --  2.1  --  2.1  --  2.0   PHOS  --   --  3.5  --   --  3.6  --  4.0  --  3.9   PROTTOTAL  --   --  5.0*  --   --  5.5*  --  4.8*  --  5.0*   ALBUMIN  --   --  2.7*  --   --  2.9*  --  2.6*  --  2.6*   BILITOTAL  --   --  0.5  --   --  0.6  --  0.6  --  0.6   ALKPHOS  --   --  103  --   --  113  --  94  --  94   AST  --   --  22  --   --  28  --  32  --  48*   ALT  --   --  42  --   --  55*  --  62*  --  87*    < > = values in this interval not displayed.     CBC  Recent Labs   Lab 06/29/25  0549 06/28/25  0907 06/27/25  0525 06/26/25  0546   HGB 8.7* 9.8* 8.6* 9.5*   WBC 5.6 6.7 6.8 5.6   RBC 2.88* 3.26* 2.85* 3.19*   HCT 26.6* 30.6* 26.5* 29.4*   MCV 92 94 93 92   MCH 30.2 30.1 30.2 29.8   MCHC 32.7 32.0 32.5 32.3   RDW 18.9* 18.6* 18.4* 18.0*   * 114* 90* 78*     INR  Recent Labs   Lab 06/25/25  0324 06/24/25  0340 06/23/25  0353   INR 1.12 1.18* 1.24*   PTT 26 25 25     ABG  No lab results found in last 7 days.     Urine Studies  Recent Labs   Lab Test 06/18/25  2243 06/08/25  0447 11/26/24  0819   COLOR Yellow Yellow Yellow   APPEARANCE Clear Clear Clear   URINEGLC Negative Negative Negative   URINEBILI Negative Negative Negative   URINEKETONE Negative Negative Trace*   SG 1.015 1.015 1.026   UBLD Negative Negative Negative   URINEPH 5.0 5.5 5.5   PROTEIN Negative Negative 10*   NITRITE Negative Negative Negative   LEUKEST Negative Negative Negative   RBCU 0  --  1   WBCU <1  --  1     No lab results found.  PTH  No lab results found.  Iron Studies  Recent Labs   Lab Test 11/26/24  0800   IRON 112   *   IRONSAT 64*   LUDY 762*       IMAGING:  All imaging studies reviewed by me.

## 2025-06-29 NOTE — PLAN OF CARE
Goal Outcome Evaluation:      Plan of Care Reviewed With: patient    Overall Patient Progress: no changeOverall Patient Progress: no change  [id number]  Neuro:  Pt. alert & Ox4.  Behavior:  Pt.  calm & cooperative with cares.    Activity:  Pt. up with 1 and walker.   Vitals:  AVSS on RA.    LDAs: CVC with SL.  ND with tube feeds. Old TIARRA site leaking and dressing changed x1.   BG:  ACHS.  2am 157.   Cardiac:  WNL  Respiratory: LS clear with diminished bases.    GI/:  Pt. voiding without difficulty.  Loose stools  x2.    Skin: Scattered bruising. Incision stapled and ALBERTA.  Generalized bruising.   Pain/Nausea:  Abdominal and back pain managed with sched. Tylenol and prn Atarax x1 and Robaxin x1.  Pt.  denies nausea.    Diet: Regular. Tube feeds at 45cc/hour from 8pm-8am.  Labs/Imaging: See chart for results.    Plan:  Continue to follow POC and notify team with change in status.

## 2025-06-29 NOTE — PROGRESS NOTES
Calorie Count  Intake recorded for: 6/28  Total Kcals: 495 Total Protein: 36g  Kcals from Hospital Food: 325   Protein: 10g  Kcals from Outside Food (average):170 Protein: 26g  # Meals Ordered from Kitchen: 3  # Meals Recorded: 3  1: 25% fresh fruit cup  2: 100% lemon lime soda, 60% chicken noodle soup with cheese, 50% lemon fruit ice   3: 15% beef pot roast with gravy, mashed potatoes with gravy, green beans, lemon lime soda   # Supplements Recorded: 1  1: 100% [employer] core power

## 2025-06-29 NOTE — PLAN OF CARE
Stable room air.   Neuro : a x o x 4.  Blood glucose: achs: bg 145.141.  Pain/nausea: Scheduled tylenol. Zofran.   Diet: regular. Poor appetite,calories counted. Cycled TF started GR 45 8116-8844.   Lines: Trilysis line saline locked.   : voiding well. Uses commode  GI:diarrhea, fibers given, team aware  Drains: TIARRA Right removed yesterday. Leaky , changed dressing. Team aware  Skin: incision clamshell staples delfino.  Mobility: sba x walker.   Education : med and lab book updated. Pharmacy education completed, handbook with daughter. Videos started and aware.

## 2025-06-29 NOTE — PROGRESS NOTES
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 Rebolledo on 6/19/2025.     Today:   - Delta counts, cycled tube feeds   - PT/OT   - Zofran TID prior to meals                                                                                                                                    Graft function: s/p DBDLT without stent, POD#10. Post-op US with patent Doppler. Liver WNL   -  mg x 3 months    - Ursodiol  300 mg BID x 3 months     Immunosuppressed status secondary to medications:  Induction:  SM taper followed by prednisone x 3 months   Basiliximab 20 mg IV on 6/20 and 6/24 in the setting of EMILIE  Maintenance:    Historical change: MMF changed to Myfortic on 6/26 due to loose stools, nausea.    -  mg BID    - Tacrolimus goal level 5 to 8.    - Prednisone 5 mg daily following taper.    Neuro/Psych:  Depression:   - Continue home citalopram.   Fibromyalgia:   - Continue home gabapentin.   Psychophysiological insomnia:   - Continue home Trazodone.  Acute post op pain:    - Scheduled Tylenol.   - PRN Atarax, Robaxin, and Oxycodone.    Hematology:   Acute on chronic anemia: Hgb ~8-9. Secondary to acute surgical blood loss; transfuse to meet goals. Last transfused 6/21/2025.   Acute on chronic thrombocytopenia: Secondary to induction IS. Last transfused 6/20/2025. Plt trending up  Coagulopathy secondary to liver disease: Resolved    Cardiorespiratory:   Shock: Resolved.   Post-operative mechanical ventilation: Extubated 6/19.   Acute hypoxic respiratory failure: RA-1L.    - OOB as tolerate, encourage mobility.    - Pulmonary  hygiene.  Hx NEREYDA: Does not use CPAP.   Hx HLD: Not currently on statin therapy.   Hx HFpEF: Noted on on problem list, last EF normal. Not on GDMT.   Chronic left sided pleural effusion: Noted as trace on Xray 6/29/2025.   History of PE, 2002: Not on long-term anticoagulation.     GI/Nutrition: PPI.  Diet: Regular.   - Feeding tube placed 6/22, cycled tube feeds.   - Dietician following.       - Calorie counts.  Nausea: After eating per patient.    - Schedule zofran ODT prior to meals  Diarrhea:   - Bowel regimen held/switched to PRN  - Fiber (Banatrol) 2 pkt BID    Endocrine:   Hypothyroidism: TSH 6/7/2025 4.89.    - Continue home levothyroxine.   Steroid induced hyperglycemia: Last hemoglobin A1c 4.9% 6/20/2025.   - Sliding scale insulin ACHS. Check 1543-1490     Renal/Fluid/Electrolytes: MIVF: saline lock.   Hypocalcemia: Monitor.  Hyponatremia: Monitor.   EMILIE: Initially oliguric. Cr 2.5 (2.5), 1.3 L + missed voids UOP yesterday.    - Transplant Nephrology following.   - CRRT started 6/21 and discontinued 6/24. iHD per Transplant Nephrology   - Remove HD line   - Renal does not recommend diuretics at this time due to loose stools - will allow to self diurese     : Rodriguez removed 6/22.    Infectious disease:   Risk for infection: Donor with open abdomen with risk of contamination.    - Continue antibiotics for a full 7- day course.    - Fluconazole and Zosyn x 7 days. COmpleted   - Follow donor cultures, will speak with surgeon .     Rheum:  Psoriatic arthritis:    - PTA managed with ixekizumab, tacrolimus ointment    Prophylaxis: DVT, fall, GI, fungal (fluconazole), viral (Valcyte), pneumocystis (Bactrim)    Disposition: 7A. SW made request for ARU placement    JOCELIN/Fellow/Resident Provider:   KARLA Sapp  Adult Solid Organ Transplant   Contact information via Vocera Web Console or via [facility] Paging/Directory    Medically Ready for Discharge: Ready Now     Faculty: Amauri Jacobs MD,  PhD  __________________________________________________________________  Transplant History:    6/19/2025 (Liver), Postoperative day: 10     Interval History: History is obtained from the patient  Overnight events: NAEO. BM loose, manageable per patient.     ROS:   A 10-point review of systems was negative except as noted above.    Curent Meds:  Current Facility-Administered Medications   Medication Dose Route Frequency Provider Last Rate Last Admin    acetaminophen (TYLENOL) tablet 650 mg  650 mg Oral Q8H Ginny Naidu MD   650 mg at 06/29/25 0421    aspirin (ASA) tablet 325 mg  325 mg Oral or Feeding Tube Daily Max Rebolledo MD   325 mg at 06/29/25 0743    atorvastatin (LIPITOR) tablet 20 mg  20 mg Oral QPM Shaila Foote NP   20 mg at 06/28/25 2106    Followed by    [START ON 7/3/2025] atorvastatin (LIPITOR) tablet 40 mg  40 mg Oral QPM Shaila Foote NP        citalopram (celeXA) tablet 20 mg  20 mg Oral Daily Ginny Naidu MD   20 mg at 06/29/25 0743    fiber modular (BANATROL TF) packet 2 packet  2 packet Per Feeding Tube BID Shaila Foote NP   2 packet at 06/28/25 2108    heparin ANTICOAGULANT injection 5,000 Units  5,000 Units Subcutaneous Q8H [facility] Gus Arroyo MD   5,000 Units at 06/29/25 0540    insulin aspart (NovoLOG) injection (RAPID ACTING)  1-10 Units Subcutaneous TID AC Angelica Natarajan PA-C   1 Units at 06/29/25 0747    insulin aspart (NovoLOG) injection (RAPID ACTING)  1-7 Units Subcutaneous At Bedtime Angelica Natarajan PA-C        levothyroxine (SYNTHROID/LEVOTHROID) tablet 112 mcg  112 mcg Oral Daily Ginny Naidu MD   112 mcg at 06/29/25 0744    multivitamin w/minerals (THERA-VIT-M) tablet 1 tablet  1 tablet Oral Daily Ginny Naidu MD   1 tablet at 06/29/25 0744    mycophenolic acid (GENERIC EQUIVALENT) EC tablet 540 mg  540 mg Oral BID IS Shaila Foote, NP   540 mg at 06/29/25 0743    nystatin (MYCOSTATIN) suspension 1,000,000 Units  1,000,000  Units Oral 4x Daily Angelica Natarajan PA-C   1,000,000 Units at 06/29/25 0743    ondansetron (ZOFRAN ODT) ODT tab 4 mg  4 mg Oral TID AC Angelica Natarajan PA-C   4 mg at 06/29/25 0744    predniSONE (DELTASONE) tablet 5 mg  5 mg Oral Daily Ginny Naidu MD   5 mg at 06/29/25 0744    Prosource TF20 ENfit Compatibl EN LIQD (PROSOURCE TF20) packet 60 mL  1 packet Per Feeding Tube BID Ginny Naidu MD   60 mL at 06/29/25 0748    sodium chloride (PF) 0.9% PF flush 3 mL  3 mL Intravenous Q8H Ginny Naidu MD   3 mL at 06/29/25 0755    sodium chloride (PF) 0.9% PF flush 3 mL  3 mL Intracatheter Q8H [facility] Ginny Naidu MD   3 mL at 06/29/25 0755    sodium chloride (PF) 0.9% PF flush 3 mL  3 mL Intracatheter Q8H Ginny Naidu MD   3 mL at 06/29/25 0540    sulfamethoxazole-trimethoprim (BACTRIM) 400-80 MG per tablet 1 tablet  1 tablet Oral or Feeding Tube Once per day on Tuesday Thursday Saturday Max Rebolledo MD   1 tablet at 06/28/25 2109    tacrolimus (GENERIC EQUIVALENT) capsule 4 mg  4 mg Oral BID IS Shaila oFote NP   4 mg at 06/29/25 0743    ursodiol (ACTIGALL) capsule 300 mg  300 mg Oral BID Ginny Naidu MD   300 mg at 06/29/25 0743    valGANciclovir (VALCYTE) tablet 450 mg  450 mg Oral Once per day on Tuesday Saturday Max Rebolledo MD   450 mg at 06/28/25 1818       Physical Exam:     Admit Weight: (!) 149.6 kg (329 lb 12.8 oz)    Current Vitals:   /57 (BP Location: Left arm)   Pulse 58   Temp 97.9  F (36.6  C) (Oral)   Resp 16   Wt (!) 151.5 kg (334 lb 1.6 oz)   SpO2 96%   BMI 46.60 kg/m      Vital sign ranges:    Temp:  [97.7  F (36.5  C)-98.9  F (37.2  C)] 97.9  F (36.6  C)  Pulse:  [58-66] 58  Resp:  [16] 16  BP: (118-147)/(47-93) 132/57  SpO2:  [92 %-96 %] 96 %    General Appearance: no obvious acute distress.    Skin: Normal, warm, dry.  Heart: She appears adequately perfused.   Lungs: Non-labored on RA.   Abdomen: Non-distended, Incision dry, intact, no  signs of infection. Arvind site scant leakage  : No hanson  Extremities: Edema: generalized edema. There is no skin breakdown obvious breakdown on exposed skin.  Neurologic: Awake, alert, oriented x 4. Tremor absent.     Frailty Scores          5/20/2025 3/30/2025   Frailty Scores   Final Score Not Frail  Not Frail    Final Score Number 1  1       Details          Patient-reported               Data:   CMP  Recent Labs   Lab 06/29/25  0713 06/29/25  0549 06/28/25  0741 06/28/25  0725 06/27/25  1315 06/27/25  0525 06/26/25  0739 06/26/25  0546   NA  --  133*  --  134*  --  134*  --  134*   POTASSIUM  --  4.9  --  4.6  --  4.2  --  4.0   CHLORIDE  --  103  --  103  --  103  --  102   CO2  --  23  --  22  --  23  --  22   * 167*   < > 185*   < > 165*   < > 151*   BUN  --  49.9*  --  46.2*  --  44.7*  --  39.8*   CR  --  2.36*  --  2.39*  --  2.47*  --  2.49*   GFRESTIMATED  --  23*  --  23*  --  22*  --  21*   SANDY  --  8.2*  --  8.4*  --  8.0*  --  8.0*   ICAW  --   --   --   --   --  4.5  --  4.4   MAG  --  2.1  --  2.1  --  2.1  --  2.0   PHOS  --  3.5  --  3.6  --  4.0  --  3.9   ALBUMIN  --  2.7*  --  2.9*  --  2.6*  --  2.6*   BILITOTAL  --  0.5  --  0.6  --  0.6  --  0.6   ALKPHOS  --  103  --  113  --  94  --  94   AST  --  22  --  28  --  32  --  48*   ALT  --  42  --  55*  --  62*  --  87*    < > = values in this interval not displayed.     CBC  Recent Labs   Lab 06/29/25  0549 06/28/25  0907   HGB 8.7* 9.8*   WBC 5.6 6.7   * 114*

## 2025-06-30 ENCOUNTER — TELEPHONE (OUTPATIENT)
Dept: TRANSPLANT | Facility: CLINIC | Age: 60
End: 2025-06-30

## 2025-06-30 DIAGNOSIS — Z94.4 LIVER TRANSPLANTED (H): ICD-10-CM

## 2025-06-30 DIAGNOSIS — K75.81 METABOLIC DYSFUNCTION-ASSOCIATED STEATOHEPATITIS (MASH): Primary | ICD-10-CM

## 2025-06-30 ASSESSMENT — ACTIVITIES OF DAILY LIVING (ADL)
ADLS_ACUITY_SCORE: 53
ADLS_ACUITY_SCORE: 54
ADLS_ACUITY_SCORE: 54
ADLS_ACUITY_SCORE: 53
ADLS_ACUITY_SCORE: 50
ADLS_ACUITY_SCORE: 53

## 2025-06-30 NOTE — PROGRESS NOTES
[facility]  Transplant Nephrology Progress Note  Date of Admission:  6/18/2025  Today's Date: 06/30/2025  Requesting physician: Max Rebolledo MD    Recommendations:   - Continue current immunosuppression.  - No acute indications for dialysis today.      Assessment & Plan   # EMILIE: EMILIE-D - Stable creatinine. CRRT to iHD switch on 6/24. Improving urine output. Last iHD 6/24.   - EMILIE likely secondary to liver transplant with hemodynamic changes and hypotension.    - Baseline Creatinine: ~ 0.8-1.0   - Proteinuria: Normal (<0.2 grams)    # Liver Tx: Patient with ESLD secondary to Metabolic associated steatotic liver disease (MASLD), s/p OLT June 19, 2025.  Transaminases now WNL,  Followed by Transplant Surgery.    # Immunosuppression: Tacrolimus immediate release (goal 6-8), Mycophenolic acid (dose 540 mg every 12 hours), and Prednisone (dose taper)   - Induction with Recent Transplant:  Per Liver Tx Protocol   - Continue with intensive monitoring of immunosuppression for efficacy and toxicity.   - Changes: switched to MPA due to diarrhea; Management per Transplant Surgery.    # Infection Prophylaxis:   - PJP: Sulfa/TMP (Bactrim)  - CMV: Valganciclovir (Valcyte)  - Thrush: Fluconazole (Diflucan)  - Fungal: Fluconazole (Diflucan)      - CMV IgG Ab High Risk Discordance (D+/R-) at time of transplant: No  Present CMV Serostatus: Positive  - EBV IgG Ab High Risk Discordance (D+/R-) at time of transplant: No  Present EBV Serostatus: Positive    # Blood Pressure: borderline controlled Goal BP: < 140/90 (Hospitalization goal)   - Changes: Not at this time    # Elevated Blood Glucose: Secondary to high dose steroids.   - On insulin    # Anemia in Chronic Disease/Surgery: Hgb: stable low      RACHEL: No   - Iron studies: Not checked recently    # Thrombocytopenia: Stable, mildly low platelet level.    # Mineral Bone Disorder:   - Vitamin D; level: Low normal        On supplement:  No  - Calcium; level: Low; normal when corrected for low serum albumin        On supplement: No  - Phosphorus; level: Normal        On binder: No    # Electrolytes:   - Potassium; level: Normal        On supplement: No  - Magnesium; level: Normal        On supplement: No  - Bicarbonate; level: Low        On supplement: No  - Sodium; level: Low, mild    # Obesity, Class III (BMI = 47.7):    - Once patient recovers from surgery and incision heals, would recommend working on weight loss     # Other Significant PMH:   - H/o PE (~ 2000): Patient with no recent clotting episodes and off anticoagulation.   - Fibromyalgia: Stable symptoms on gabapentin.     # Transplant History:  Etiology of Organ Failure: Metabolic associated steatotic liver disease (MASLD)  Tx: Liver Tx  Transplant: 6/19/2025 (Liver)  Significant changes in immunosuppression: Slightly lower tacrolimus level goal due to EMILIE.  Significant transplant-related complications: EMILIE    Recommendations were communicated to the primary team via this note.    Plan discussed with Dr. Bueno.     MERRITT Spencer CNP  Transplant Nephrology  Contact information via Vocera Web Console     Interval History  Ms. Yun's creatinine is 2.32 (06/30 0532); Stable.  Good urine output.  Other significant labs/tests/vitals: SBP 140s-150s  No events overnight.  No chest pain or shortness of breath.  No leg swelling.  No nausea and vomiting.  Bowel movements are loose but improving with Imodium.  No fever, sweats or chills.      Review of Systems   4 point ROS was obtained and negative except as noted in the Interval History.    MEDICATIONS:  Current Facility-Administered Medications   Medication Dose Route Frequency Provider Last Rate Last Admin    acetaminophen (TYLENOL) tablet 650 mg  650 mg Oral Q8H Ginny Naidu MD   650 mg at 06/30/25 0314    aspirin (ASA) tablet 325 mg  325 mg Oral or Feeding Tube Daily Max Rebolledo MD   325 mg at 06/29/25 9630     atorvastatin (LIPITOR) tablet 20 mg  20 mg Oral QPM Shaila Foote NP   20 mg at 06/29/25 2012    Followed by    [START ON 7/3/2025] atorvastatin (LIPITOR) tablet 40 mg  40 mg Oral QPM Shaila Foote NP        citalopram (celeXA) tablet 20 mg  20 mg Oral Daily Ginny Naidu MD   20 mg at 06/29/25 0743    fiber modular (BANATROL TF) packet 2 packet  2 packet Per Feeding Tube BID Shaila Foote NP   2 packet at 06/29/25 2015    heparin ANTICOAGULANT injection 5,000 Units  5,000 Units Subcutaneous Q8H [facility] Gus Arroyo MD   5,000 Units at 06/30/25 0622    insulin aspart (NovoLOG) injection (RAPID ACTING)  1-10 Units Subcutaneous TID  Angelica Natarajan PA-C   2 Units at 06/29/25 1211    insulin aspart (NovoLOG) injection (RAPID ACTING)  1-7 Units Subcutaneous At Bedtime Angelica Natarajan PA-C        levothyroxine (SYNTHROID/LEVOTHROID) tablet 112 mcg  112 mcg Oral Daily Ginny Naidu MD   112 mcg at 06/29/25 0744    multivitamin w/minerals (THERA-VIT-M) tablet 1 tablet  1 tablet Oral Daily Ginny Naidu MD   1 tablet at 06/29/25 0744    mycophenolic acid (GENERIC EQUIVALENT) EC tablet 540 mg  540 mg Oral BID IS Shaila Foote NP   540 mg at 06/29/25 1712    nystatin (MYCOSTATIN) suspension 1,000,000 Units  1,000,000 Units Oral 4x Daily Angelica Natarajan PA-C   1,000,000 Units at 06/29/25 2012    ondansetron (ZOFRAN ODT) ODT tab 4 mg  4 mg Oral TID AC Angelica Natarajan PA-C   4 mg at 06/29/25 1712    predniSONE (DELTASONE) tablet 5 mg  5 mg Oral Daily Ginny Naidu MD   5 mg at 06/29/25 0744    Prosource TF20 ENfit Compatibl EN LIQD (PROSOURCE TF20) packet 60 mL  1 packet Per Feeding Tube BID Ginny Naidu MD   60 mL at 06/29/25 2015    sodium chloride (PF) 0.9% PF flush 3 mL  3 mL Intravenous Q8H Ginny Naidu MD   3 mL at 06/29/25 0755    sodium chloride (PF) 0.9% PF flush 3 mL  3 mL Intracatheter Q8H [facility] Ginny Naidu MD   3 mL at 06/30/25 0624    sodium chloride  (PF) 0.9% PF flush 3 mL  3 mL Intracatheter Q8H Ginny Naidu MD   3 mL at 25    sulfamethoxazole-trimethoprim (BACTRIM) 400-80 MG per tablet 1 tablet  1 tablet Oral or Feeding Tube Once per day on  Max Rebolledo MD   1 tablet at 25    tacrolimus (GENERIC EQUIVALENT) capsule 5 mg  5 mg Oral BID IS Angelica Natarajan PA-C   5 mg at 25    ursodiol (ACTIGALL) capsule 300 mg  300 mg Oral BID Ginny Naidu MD   300 mg at 25    valGANciclovir (VALCYTE) tablet 450 mg  450 mg Oral Every Other Day Max Rebolledo MD         Current Facility-Administered Medications   Medication Dose Route Frequency Provider Last Rate Last Admin       Physical Exam   Temp  Av.6  F (37  C)  Min: 97.8  F (36.6  C)  Max: 99  F (37.2  C)  Arterial Line BP  Min: 97/47  Max: 157/55  Arterial Line MAP (mmHg)  Av.1 mmHg  Min: 60 mmHg  Max: 85 mmHg      Pulse  Av.6  Min: 67  Max: 83 Resp  Av.5  Min: 8  Max: 23  FiO2 (%)  Av %  Min: 30 %  Max: 40 %  SpO2  Av.2 %  Min: 88 %  Max: 100 %    CVP (mmHg): 3 mmHgBP (!) 158/75 (BP Location: Left arm, Patient Position: Sitting, Cuff Size: Adult Large)   Pulse 60   Temp 97.8  F (36.6  C) (Oral)   Resp 18   Wt (!) 150.5 kg (331 lb 11.2 oz)   SpO2 94%   BMI 46.26 kg/m     Date 25 07 - 25 0659   Shift 1016-9007 3763-8992 7301-8881 24 Hour Total   INTAKE   P.O. 240   240   I.V. 327.8   327.8   Blood Components 600   600   Shift Total(mL/kg) 1167.8(7.52)   1167.8(7.52)   OUTPUT   Urine 7   7   Drains 252   252   Shift Total(mL/kg) 259(1.67)   259(1.67)   Weight (kg) 155.2 155.2 155.2 155.2      Admit Weight: (!) 149.6 kg (329 lb 12.8 oz)     GENERAL APPEARANCE: alert and no distress  HENT: mouth without ulcers or lesions  RESP: lungs clear to auscultation - no rales, rhonchi or wheezes  CV: regular rhythm, normal rate, no rub, no murmur  EDEMA: no LE edema bilaterally  ABDOMEN:  soft, nondistended, nontender, bowel sounds normal  MS: extremities normal - no gross deformities noted, no evidence of inflammation in joints, no muscle tenderness  SKIN: no rash  DIALYSIS ACCESS:  None      Data   All labs reviewed by me.  CMP  Recent Labs   Lab 06/30/25  0532 06/29/25  2220 06/29/25  1800 06/29/25  1203 06/29/25  0713 06/29/25  0549 06/28/25  0741 06/28/25  0725 06/27/25  1315 06/27/25  0525   *  --   --   --   --  133*  --  134*  --  134*   POTASSIUM 4.9  --   --   --   --  4.9  --  4.6  --  4.2   CHLORIDE 104  --   --   --   --  103  --  103  --  103   CO2 20*  --   --   --   --  23  --  22  --  23   ANIONGAP 9  --   --   --   --  7  --  9  --  8   * 133* 127* 186*   < > 167*   < > 185*   < > 165*   BUN 48.6*  --   --   --   --  49.9*  --  46.2*  --  44.7*   CR 2.32*  --   --   --   --  2.36*  --  2.39*  --  2.47*   GFRESTIMATED 23*  --   --   --   --  23*  --  23*  --  22*   SANDY 8.3*  --   --   --   --  8.2*  --  8.4*  --  8.0*   MAG 2.1  --   --   --   --  2.1  --  2.1  --  2.1   PHOS 3.6  --   --   --   --  3.5  --  3.6  --  4.0   PROTTOTAL 5.1*  --   --   --   --  5.0*  --  5.5*  --  4.8*   ALBUMIN 2.6*  --   --   --   --  2.7*  --  2.9*  --  2.6*   BILITOTAL 0.5  --   --   --   --  0.5  --  0.6  --  0.6   ALKPHOS 109  --   --   --   --  103  --  113  --  94   AST 21  --   --   --   --  22  --  28  --  32   ALT 37  --   --   --   --  42  --  55*  --  62*    < > = values in this interval not displayed.     CBC  Recent Labs   Lab 06/29/25  0549 06/28/25  0907 06/27/25  0525 06/26/25  0546   HGB 8.7* 9.8* 8.6* 9.5*   WBC 5.6 6.7 6.8 5.6   RBC 2.88* 3.26* 2.85* 3.19*   HCT 26.6* 30.6* 26.5* 29.4*   MCV 92 94 93 92   MCH 30.2 30.1 30.2 29.8   MCHC 32.7 32.0 32.5 32.3   RDW 18.9* 18.6* 18.4* 18.0*   * 114* 90* 78*     INR  Recent Labs   Lab 06/25/25  0324 06/24/25  0340   INR 1.12 1.18*   PTT 26 25     ABG  No lab results found in last 7 days.     Urine Studies  Recent Labs    Lab Test 06/18/25  2243 06/08/25  0447 11/26/24  0819   COLOR Yellow Yellow Yellow   APPEARANCE Clear Clear Clear   URINEGLC Negative Negative Negative   URINEBILI Negative Negative Negative   URINEKETONE Negative Negative Trace*   SG 1.015 1.015 1.026   UBLD Negative Negative Negative   URINEPH 5.0 5.5 5.5   PROTEIN Negative Negative 10*   NITRITE Negative Negative Negative   LEUKEST Negative Negative Negative   RBCU 0  --  1   WBCU <1  --  1     No lab results found.  PTH  No lab results found.  Iron Studies  Recent Labs   Lab Test 11/26/24  0800   IRON 112   *   IRONSAT 64*   LUDY 762*       IMAGING:  All imaging studies reviewed by me.

## 2025-06-30 NOTE — PROGRESS NOTES
Calorie Count  Intake recorded for: 6/29  Total Kcals: 1423 Total Protein: 96g  Kcals from Hospital Food: 275   Protein: 16g  Kcals from Outside Food (average):693 Protein: 21g  # Meals Ordered from Kitchen: 3 meals  # Meals Recorded: 3 meals (First - 90% chicken noodle soup w/ saltine crackers; 50% [employer] protein shake from home, Greek yogurt; less than 25% scrambled eggs w/ cheddar cheese, 1 chatman slice)      (Second - 100% 1 [employer] lemon poppyseed loaf; 40% [employer] small cookies and cream drink)      (Third - 50% penne pasta w/ marinara sauce)  # Supplements Recorded: 3 (2 [employer] shakes, 1 protein bar)    Addendum 953 after discussion with patient/family re: supplements consumed - added these to totals.

## 2025-06-30 NOTE — PROGRESS NOTES
Transplant Surgery  Inpatient Daily Progress Note  2025    Assessment & Plan: Karlene Yun is a 59 year old female with history of cirrhosis 2/2 MASLD (MELD 25), hypothyroidism, PE in , HLD, MDD, obesity, OA, fibromyalgia, and psoriatic arthritis. S/p  donor liver transplant without stent and repair of umbilical hernia by Dr. Rebolledo on 2025.     Today:   - Remove feeding tube   - Lomotil                                                                                                                                    Liver transplant: POD#11. LFTs normal.   -  mg x 3 months    - Ursodiol  300 mg BID x 3 months     Immunosuppressed status secondary to medications:  Induction:  SM taper followed by prednisone x 3 months   Basiliximab 20 mg IV on  and  in the setting of EMILIE  Maintenance:    Historical change: MMF changed to Myfortic on  due to loose stools, nausea.    -  mg BID    - Tacrolimus goal level 5 to 8.    - Prednisone 5 mg daily following taper.    Neuro/Psych:  Depression:   - Continue home citalopram.   Fibromyalgia:   - Continue home gabapentin.   Psychophysiological insomnia:   - Continue home Trazodone.  Acute post op pain:    - Scheduled Tylenol.   - PRN Atarax, Robaxin, and Oxycodone.    Hematology:   Acute on chronic anemia: Hgb ~8-9. Secondary to acute surgical blood loss. Last transfused 2025.   Acute on chronic thrombocytopenia: Plt 144    Cardiorespiratory:   Hx NEREYDA: Does not use CPAP.   Hx HLD: Not currently on statin therapy.   Hx HFpEF: Noted on on problem list, last EF normal. Not on GDMT.   Chronic left sided pleural effusion: Noted as trace on Xray 2025.   History of PE, : Not on long-term anticoagulation.     GI/Nutrition: PPI.  Moderate malnutrition in the context of acute illness or injury: Regular diet. Delta counts adequate for feeding tube removal.   - Remove feeding tube  Nausea: After eating per patient.    -  Schedule zofran ODT prior to meals  Diarrhea:   - Bowel regimen held/switched to PRN  - Fiber (Banatrol) 2 pkt BID  - Lomotil PRN    Endocrine:   Hypothyroidism: TSH 6/7/2025 4.89.    - Continue home levothyroxine.   Steroid induced hyperglycemia: A1c 4.9% 6/20/2025. Glucose <200.   - Sliding scale insulin ACHS.      Renal/Fluid/Electrolytes:   Hyponatremia: Monitor.   EMILIE: Initially required CRRT. Cr 2.3 stable. Now off dialysis.   - Transplant Nephrology following.    : No acute issues.     Infectious disease:   Risk for infection: Donor with open abdomen with risk of contamination. Fluconazole and Zosyn x 7 days, completed.    Rheum:  Psoriatic arthritis:    - PTA managed with ixekizumab, tacrolimus ointment    Prophylaxis: DVT, fall, GI, fungal (fluconazole), viral (Valcyte), pneumocystis (Bactrim)    Disposition: 7A. Possible discharge to home tomorrow.    JOCELIN/Fellow/Resident Provider:   Lindsey Boyd NP   Adult Solid Organ Transplant   Contact information via Vocera Web Console or via [facility] Paging/Directory    Medically Ready for Discharge: Anticipated Tomorrow     Faculty: Federico Jefferson MD   __________________________________________________________________  Transplant History:    6/19/2025 (Liver), Postoperative day: 11     Interval History: History is obtained from the patient  Overnight events: Incont of watery stool    ROS:   A 10-point review of systems was negative except as noted above.    Curent Meds:  Current Facility-Administered Medications   Medication Dose Route Frequency Provider Last Rate Last Admin    acetaminophen (TYLENOL) tablet 650 mg  650 mg Oral Q8H Ginny Naidu MD   650 mg at 06/30/25 1216    aspirin (ASA) tablet 325 mg  325 mg Oral or Feeding Tube Daily Max Rebolledo MD   325 mg at 06/30/25 0842    atorvastatin (LIPITOR) tablet 20 mg  20 mg Oral QPM Shaila Foote NP   20 mg at 06/29/25 2012    Followed by    [START ON 7/3/2025] atorvastatin (LIPITOR) tablet 40  mg  40 mg Oral QPM Shaila Foote NP        citalopram (celeXA) tablet 20 mg  20 mg Oral Daily Ginny Naidu MD   20 mg at 06/30/25 0843    fiber modular (BANATROL TF) packet 2 packet  2 packet Per Feeding Tube BID Shaila Foote NP   2 packet at 06/29/25 2015    heparin ANTICOAGULANT injection 5,000 Units  5,000 Units Subcutaneous Q8H [facility] Gus Arroyo MD   5,000 Units at 06/30/25 0622    insulin aspart (NovoLOG) injection (RAPID ACTING)  1-10 Units Subcutaneous TID  Angelica Natarajan PA-C   1 Units at 06/30/25 1228    insulin aspart (NovoLOG) injection (RAPID ACTING)  1-7 Units Subcutaneous At Bedtime Angelica Natarajan PA-C        levothyroxine (SYNTHROID/LEVOTHROID) tablet 112 mcg  112 mcg Oral Daily Ginny Naidu MD   112 mcg at 06/30/25 0844    multivitamin w/minerals (THERA-VIT-M) tablet 1 tablet  1 tablet Oral Daily Ginny Naidu MD   1 tablet at 06/30/25 0844    mycophenolic acid (GENERIC EQUIVALENT) EC tablet 540 mg  540 mg Oral BID IS Shaila Foote NP   540 mg at 06/30/25 0844    nystatin (MYCOSTATIN) suspension 1,000,000 Units  1,000,000 Units Oral 4x Daily Angelica Natarajan PA-C   1,000,000 Units at 06/30/25 1217    ondansetron (ZOFRAN ODT) ODT tab 4 mg  4 mg Oral TID  Angelica Natarajan PA-C   4 mg at 06/30/25 1217    predniSONE (DELTASONE) tablet 5 mg  5 mg Oral Daily Ginny Naidu MD   5 mg at 06/30/25 0845    Prosource TF20 ENfit Compatibl EN LIQD (PROSOURCE TF20) packet 60 mL  1 packet Per Feeding Tube BID Ginny Naidu MD   60 mL at 06/30/25 0846    sodium chloride (PF) 0.9% PF flush 3 mL  3 mL Intravenous Q8H Ginny Naidu MD   3 mL at 06/29/25 0755    sodium chloride (PF) 0.9% PF flush 3 mL  3 mL Intracatheter Q8H [facility] Ginny Naidu MD   3 mL at 06/30/25 0624    sodium chloride (PF) 0.9% PF flush 3 mL  3 mL Intracatheter Q8H Ginny Naidu MD   3 mL at 06/29/25 2029    sulfamethoxazole-trimethoprim (BACTRIM) 400-80 MG per tablet 1 tablet  1  tablet Oral or Feeding Tube Once per day on Tuesday Thursday Saturday Max Rebolledo MD   1 tablet at 06/28/25 2109    tacrolimus (GENERIC EQUIVALENT) capsule 5 mg  5 mg Oral BID IS Angelica Natarajan PA-C   5 mg at 06/30/25 0842    ursodiol (ACTIGALL) capsule 300 mg  300 mg Oral BID Ginny Naidu MD   300 mg at 06/30/25 0844    valGANciclovir (VALCYTE) tablet 450 mg  450 mg Oral Every Other Day Max Rebolledo MD   450 mg at 06/30/25 0842       Physical Exam:     Admit Weight: (!) 149.6 kg (329 lb 12.8 oz)    Current Vitals:   BP (!) 153/56 (BP Location: Left arm)   Pulse 66   Temp 98  F (36.7  C) (Oral)   Resp 18   Wt (!) 150.5 kg (331 lb 11.2 oz)   SpO2 96%   BMI 46.26 kg/m      Vital sign ranges:    Temp:  [97.5  F (36.4  C)-98  F (36.7  C)] 98  F (36.7  C)  Pulse:  [58-66] 66  Resp:  [16-18] 18  BP: (141-158)/(56-75) 153/56  SpO2:  [94 %-97 %] 96 %    General Appearance: no obvious acute distress.    Skin: Normal, warm, dry.  Heart: She appears adequately perfused.   Lungs: Non-labored on RA.   Abdomen: Non-distended, Incision dry, intact, no signs of infection. TIARRA site leaking  : No hanson  Extremities: Edema: generalized edema. There is no skin breakdown obvious breakdown on exposed skin.  Neurologic: Awake, alert, oriented x 4. Tremor absent.     Frailty Scores          5/20/2025 3/30/2025   Frailty Scores   Final Score Not Frail  Not Frail    Final Score Number 1  1       Details          Patient-reported               Data:   CMP  Recent Labs   Lab 06/30/25  1223 06/30/25  0834 06/30/25  0532 06/29/25  0713 06/29/25  0549 06/27/25  1315 06/27/25  0525 06/26/25  0739 06/26/25  0546   NA  --   --  133*  --  133*   < > 134*  --  134*   POTASSIUM  --   --  4.9  --  4.9   < > 4.2  --  4.0   CHLORIDE  --   --  104  --  103   < > 103  --  102   CO2  --   --  20*  --  23   < > 23  --  22   * 157* 185*   < > 167*   < > 165*   < > 151*   BUN  --   --  48.6*  --  49.9*   < > 44.7*  --   39.8*   CR  --   --  2.32*  --  2.36*   < > 2.47*  --  2.49*   GFRESTIMATED  --   --  23*  --  23*   < > 22*  --  21*   SANDY  --   --  8.3*  --  8.2*   < > 8.0*  --  8.0*   ICAW  --   --   --   --   --   --  4.5  --  4.4   MAG  --   --  2.1  --  2.1   < > 2.1  --  2.0   PHOS  --   --  3.6  --  3.5   < > 4.0  --  3.9   ALBUMIN  --   --  2.6*  --  2.7*   < > 2.6*  --  2.6*   BILITOTAL  --   --  0.5  --  0.5   < > 0.6  --  0.6   ALKPHOS  --   --  109  --  103   < > 94  --  94   AST  --   --  21  --  22   < > 32  --  48*   ALT  --   --  37  --  42   < > 62*  --  87*    < > = values in this interval not displayed.     CBC  Recent Labs   Lab 06/30/25  0841 06/29/25  0549   HGB 9.6* 8.7*   WBC 8.0 5.6    144*

## 2025-06-30 NOTE — PLAN OF CARE
Goal Outcome Evaluation:      Plan of Care Reviewed With: patient    Overall Patient Progress: no changeOverall Patient Progress: no change                 Activity: SBA, using IV pole  Neuro: Intact, AnO x4, 5/5 throughout, afebrile  Cardiac: WDL, IV BP meds in place for SBP >160  Respiratory: WDL on RA  GI/: x5 incontinent watery Bms overnight, provider notified and imodium added, given x1 overnight with no relief yet. Voiding spontaneously.  Diet: Regular, TF from 8pm-8am at goal rate of 45mL/hr, tolerating well.  Skin: [place] incision stapled ALBERTA, old TIARRA sites covered with x2 4x4 gauzes with Tegaderm, site cleansed with micro cleanser during dressing changes. Barrier cream added to coccyx after each BM.  Lines/Drains: (R) PIV saline locked, ND tube with nasal bridle running tube feeds  Pain/Nausea: 7/10 abd pain managed with sched tylenol, PRN robaxin and hydroxyzine x2. Nausea intermittent, hydroxyzine has been helping as well as x1 dose of PO Zofran.  Changes:  Possible discharge home today, continue to manage diarrhea with imodium as needed. Monitor SBPs. Follow POC.

## 2025-06-30 NOTE — PROGRESS NOTES
Neuro: A&Ox4.   Cardiac: No tele.. VSS.   Respiratory: Sating 94%> on RA.  GI/: Adequate urine output. BM X1  Diet/appetite: Tolerating regular diet. Eating well.  Activity:  Assist of 1/SBA, up to chair and in halls.  Pain: At acceptable level on current regimen.   Skin: No new deficits noted.  LDA's:PIV    Plan: Continue with POC. Notify primary team with changes.

## 2025-06-30 NOTE — PHARMACY-TRANSPLANT NOTE
Solid Organ Transplant Recipient Prior to Discharge Note    59 year old female s/p liver transplant on 06/19/2025.    Immunosuppression plan at time of discharge:  Mycophenolate 540 mg by mouth twice daily about 12 hours apart  Tacrolimus 5 mg by mouth twice daily about 12 hours apart, titrated to goal trough of 5-8 mcg/L  6/26/2025 tacrolimus level 3.7 mcg/L (11 hour trough) after 5 doses of 2.5 mg (dose was increased to 4 mg BID). Tacrolimus was increased in light of subtherapeutic level.  6/28/2025 tacrolimus level of 6.4 mcg/L (13 hour trough) after 3 doses of 4 mg   6/29/2025 Dose of tacrolimus was empirically increased to 5 mg BID in consideration of fluconazole course being completed. Fluconazole has a drug-drug interaction with tacrolimus where it is expected to increase tacrolimus levels. Last dose of fluconazole was on 6/27.  Prednisone 5 mg daily                    Opportunistic prophylaxis:  Sulfamethoxazole-trimethoprim 400-80mg by mouth three times weekly (TUE, THU, SAT) for PJP prophylaxis  Valganciclovir 450 mg by mouth daily for CMV prophylaxis (CMV IgG: Donor POSITIVE  / Recipient POSITIVE; EBV IgG: Donor POSITIVE / Recipient POSITIVE ) for 3 months    Vascular:  Aspirin 325 mg daily  Atorvastatin 40 mg daily indefinitely     Pharmacy has monitored for medication interactions and immunosuppression levels in conjunction with the multidisciplinary team. In anticipation for discharge, medication therapy needs have been addressed daily throughout the current admission via multidisciplinary rounds and/or discussions, order verification, daily clinical pharmacy review, and communication with prescribers.  Denae Cook, PGY-1 Pharmacy Resident

## 2025-06-30 NOTE — PROGRESS NOTES
Care Management Follow Up    Length of Stay (days): 10    Expected Discharge Date: 07/01/2025     Concerns to be Addressed: discharge planning, adjustment to diagnosis/illness     Patient plan of care discussed at interdisciplinary rounds: Yes    Anticipated Discharge Disposition: Home, Home Care              Anticipated Discharge Services:    Anticipated Discharge DME:      Patient/family educated on Medicare website which has current facility and service quality ratings:    Education Provided on the Discharge Plan:    Patient/Family in Agreement with the Plan: yes    Referrals Placed by CM/SW:    Private pay costs discussed: Not applicable    Discussed  Partnership in Safe Discharge Planning  document with patient/family: No     Handoff Completed: No, handoff not indicated or clinically appropriate    Additional Information:  Pt s/p liver transplant.  Per care team rounds anticipate pt will discharge tomorrow.  Pt will not require enteral feeds at discharge.  [facility] Care confirmed for home RN, Pt, OT services. Trentond Rimma [facility] Home Care Liaison with update    Met with pt, her sister and her daughter.  Reviewed anticipated plan for discharge.  Pt and her family note no concerns or questions.      Accepting Home Care  [facility]  Phone: [phone]  Fax: [phone]    Discharge Address - pt staying locally with her daughter Patrica:     [address]  [facility] [address]     Next Steps:   Follow for discharge planning  Ensure home care RN, PT, OT order placed, coordinate/follow up with [facility] Care Liaison.    Sue Jaime RN BSN, PHN, ACM-RN  7A Nurse Care Coordinator    [facility] Acute Care Management  Phone: [phone]  Available on Travon SEBASTIAN RNCC

## 2025-06-30 NOTE — PROGRESS NOTES
Transplant Social Work Services Progress Note    Date of Initial Social Work Evaluation: 2024  Collaborated with: Patient at bedside, patient's daughter and son in law    Data: Karlene is s/p  donor liver transplant  Intervention: I met with Karlene and her family at bedside. Daughter voiced some anxiety related to discharged home due to patients BM and nausea. They inquired about home care, and this writer indicated that patient does have confirmed services. They were also wondering about DME. No other questions or concerns.   Assessment: Patient may discharge tomorrow to home with home care. Family voices some concerns related to her discharge. They noted that these concerns have been voiced already.   Education provided by SW: Discharge planning  Plan:    Discharge Plans in Progress: Home with home care    Barriers to d/c plan: Medical stability    Follow up Plan: This writer will follow as needed. No further psychosocial needs are identified at this time. Message to RNCC and PT.      EDNA Hannah, [facility]  Liver Transplant   Phone:  [phone]

## 2025-07-01 ENCOUNTER — TELEPHONE (OUTPATIENT)
Dept: TRANSPLANT | Facility: CLINIC | Age: 60
End: 2025-07-01
Payer: COMMERCIAL

## 2025-07-01 PROBLEM — Z94.4 LIVER REPLACED BY TRANSPLANT (H): Status: ACTIVE | Noted: 2025-07-01

## 2025-07-01 PROBLEM — K74.60 CIRRHOSIS OF LIVER WITH ASCITES (H): Status: RESOLVED | Noted: 2022-08-05 | Resolved: 2025-07-01

## 2025-07-01 PROBLEM — R19.7 DIARRHEA: Status: ACTIVE | Noted: 2025-07-01

## 2025-07-01 PROBLEM — R18.8 CIRRHOSIS OF LIVER WITH ASCITES (H): Status: RESOLVED | Noted: 2022-08-05 | Resolved: 2025-07-01

## 2025-07-01 PROBLEM — E87.5 HYPERKALEMIA: Status: ACTIVE | Noted: 2025-07-01

## 2025-07-01 PROBLEM — R11.0 NAUSEA: Status: ACTIVE | Noted: 2025-07-01

## 2025-07-01 PROBLEM — R18.8 CIRRHOSIS OF LIVER WITH ASCITES, UNSPECIFIED HEPATIC CIRRHOSIS TYPE (H): Status: RESOLVED | Noted: 2025-06-18 | Resolved: 2025-07-01

## 2025-07-01 PROBLEM — K75.81 METABOLIC DYSFUNCTION-ASSOCIATED STEATOHEPATITIS (MASH): Status: RESOLVED | Noted: 2025-05-30 | Resolved: 2025-07-01

## 2025-07-01 PROBLEM — R06.02 SHORTNESS OF BREATH: Status: RESOLVED | Noted: 2025-06-07 | Resolved: 2025-07-01

## 2025-07-01 PROBLEM — K74.60 CIRRHOSIS OF LIVER WITH ASCITES, UNSPECIFIED HEPATIC CIRRHOSIS TYPE (H): Status: RESOLVED | Noted: 2025-06-18 | Resolved: 2025-07-01

## 2025-07-01 PROBLEM — N17.9 AKI (ACUTE KIDNEY INJURY): Status: ACTIVE | Noted: 2025-07-01

## 2025-07-01 ASSESSMENT — ACTIVITIES OF DAILY LIVING (ADL)
ADLS_ACUITY_SCORE: 54
ADLS_ACUITY_SCORE: 53
ADLS_ACUITY_SCORE: 51
ADLS_ACUITY_SCORE: 53
ADLS_ACUITY_SCORE: 51
ADLS_ACUITY_SCORE: 53
ADLS_ACUITY_SCORE: 51
ADLS_ACUITY_SCORE: 51
ADLS_ACUITY_SCORE: 53
ADLS_ACUITY_SCORE: 54
ADLS_ACUITY_SCORE: 54

## 2025-07-01 NOTE — PLAN OF CARE
Goal Outcome Evaluation:      Plan of Care Reviewed With: patient    Overall Patient Progress: no changeOverall Patient Progress: no change     Time    BP (!) 146/54 (BP Location: Right arm)   Pulse 57   Temp 98.2  F (36.8  C) (Oral)   Resp (!) 108   Wt (!) 150.5 kg (331 lb 11.2 oz)   SpO2 94%   BMI 46.26 kg/m      Reason for admission: Cirrhosis of liver  Activity: SBA  Pain: 8/10 back pain being managed with prn atarax, robaxin and scheduled tylenol, pt offered prn oxycodone but pt declined.   Neuro: A&O X4  Cardiac: WNL  Respiratory: WNL  GI/: Voiding urine spontaneously and passing gas  Diet: Reg diet   Lines: PIV SL  Wounds: Abd incision open to air no drainage         Continue to monitor and follow POC

## 2025-07-01 NOTE — PROGRESS NOTES
[facility]  Transplant Nephrology Progress Note  Date of Admission:  6/18/2025  Today's Date: 07/01/2025  Requesting physician: Max Rebolledo MD    Recommendations:   - Agree with lokelma 10 g once today.  - Agree with starting sodium bicarb 650 mg bid.   - Continue current immunosuppression.  - No acute indications for dialysis today.    Assessment & Plan   # EMILIE: EMILIE-D - Stable creatinine. CRRT to iHD switch on 6/24. Last iHD 6/24. Improving urine output.    - EMILIE likely secondary to liver transplant with hemodynamic changes and hypotension.    - Baseline Creatinine: ~ 0.8-1.0   - Proteinuria: Normal (<0.2 grams)    # Liver Tx: Patient with ESLD secondary to Metabolic associated steatotic liver disease (MASLD), s/p OLT June 19, 2025.  Transaminases now WNL,  Followed by Transplant Surgery.    # Immunosuppression: Tacrolimus immediate release (goal 6-8), Mycophenolic acid (dose 540 mg every 12 hours), and Prednisone (dose taper)   - Induction with Recent Transplant:  Per Liver Tx Protocol   - Continue with intensive monitoring of immunosuppression for efficacy and toxicity.   - Changes: switched to MPA due to diarrhea; Management per Transplant Surgery.    # Infection Prophylaxis:   - PJP: Sulfa/TMP (Bactrim)  - CMV: Valganciclovir (Valcyte)  - Thrush: Fluconazole (Diflucan)  - Fungal: Fluconazole (Diflucan)      - CMV IgG Ab High Risk Discordance (D+/R-) at time of transplant: No  Present CMV Serostatus: Positive  - EBV IgG Ab High Risk Discordance (D+/R-) at time of transplant: No  Present EBV Serostatus: Positive    # Blood Pressure: borderline controlled Goal BP: < 140/90 (Hospitalization goal)   - Changes: Not at this time    # Elevated Blood Glucose: Secondary to high dose steroids.   - On insulin    # Anemia in Chronic Disease/Surgery: Hgb: stable low      RACHEL: No   - Iron studies: Not checked recently    # Mineral Bone Disorder:   - Vitamin D; level: Low  normal        On supplement: No  - Calcium; level: Low; normal when corrected for low serum albumin        On supplement: No  - Phosphorus; level: Normal        On binder: No    # Electrolytes:   - Potassium; level: High        On supplement: No; will get 10g lokelma once  - Magnesium; level: Normal        On supplement: No  - Bicarbonate; level: Low        On supplement: Yes; started sodium bicarb 650 mg bid.  - Sodium; level: Low, mild    # Obesity, Class III (BMI = 47.7):    - Once patient recovers from surgery and incision heals, would recommend working on weight loss     # Other Significant PMH:   - H/o PE (~ 2000): Patient with no recent clotting episodes and off anticoagulation.   - Fibromyalgia: Stable symptoms on gabapentin.     # Transplant History:  Etiology of Organ Failure: Metabolic associated steatotic liver disease (MASLD)  Tx: Liver Tx  Transplant: 6/19/2025 (Liver)  Significant changes in immunosuppression: Slightly lower tacrolimus level goal due to EMILIE.  Significant transplant-related complications: EMILIE    Recommendations were communicated to the primary team via this note.    Plan discussed with Dr. Bueno.     MERRITT Spencer CNP  Transplant Nephrology  Contact information via Vocera Web Console     Interval History  Ms. Yun's creatinine is 2.32 (07/01 0532); Stable.  Good urine output.  Other significant labs/tests/vitals: SBP 140s-150s  No events overnight.  No chest pain or shortness of breath.  Trace leg swelling.  No nausea and vomiting.  Bowel movements are loose but improving with Imodium.  No fever, sweats or chills.      Review of Systems   4 point ROS was obtained and negative except as noted in the Interval History.    MEDICATIONS:  Current Facility-Administered Medications   Medication Dose Route Frequency Provider Last Rate Last Admin    acetaminophen (TYLENOL) tablet 650 mg  650 mg Oral Q8H Ginny Naidu MD   650 mg at 07/01/25 0510    aspirin (ASA) tablet 325 mg   325 mg Oral or Feeding Tube Daily Max Rebolledo MD   325 mg at 06/30/25 0842    atorvastatin (LIPITOR) tablet 20 mg  20 mg Oral QPM Shaila Foote NP   20 mg at 06/30/25 2004    Followed by    [START ON 7/3/2025] atorvastatin (LIPITOR) tablet 40 mg  40 mg Oral QPM Shaila Foote NP        citalopram (celeXA) tablet 20 mg  20 mg Oral Daily Ginny Naidu MD   20 mg at 06/30/25 0843    fiber modular (BANATROL TF) packet 2 packet  2 packet Per Feeding Tube BID Shaila Foote NP   2 packet at 06/29/25 2015    heparin ANTICOAGULANT injection 5,000 Units  5,000 Units Subcutaneous Q8H [facility] Gus Arroyo MD   5,000 Units at 07/01/25 0511    levothyroxine (SYNTHROID/LEVOTHROID) tablet 112 mcg  112 mcg Oral Daily Ginny Naidu MD   112 mcg at 06/30/25 0844    multivitamin w/minerals (THERA-VIT-M) tablet 1 tablet  1 tablet Oral Daily Ginny Naidu MD   1 tablet at 06/30/25 0844    mycophenolic acid (GENERIC EQUIVALENT) EC tablet 540 mg  540 mg Oral BID IS Shaila Foote NP   540 mg at 06/30/25 1801    nystatin (MYCOSTATIN) suspension 1,000,000 Units  1,000,000 Units Oral 4x Daily Angelica Natarajan PA-C   1,000,000 Units at 06/30/25 2004    ondansetron (ZOFRAN ODT) ODT tab 4 mg  4 mg Oral TID AC Angelica Natarajan PA-C   4 mg at 07/01/25 0809    predniSONE (DELTASONE) tablet 5 mg  5 mg Oral Daily Ginny Naidu MD   5 mg at 06/30/25 0845    Prosource TF20 ENfit Compatibl EN LIQD (PROSOURCE TF20) packet 60 mL  1 packet Per Feeding Tube BID Ginny Naidu MD   60 mL at 06/30/25 0846    sodium bicarbonate tablet 650 mg  650 mg Oral BID Lindsey Boyd, MERRITT CNP        sodium chloride (PF) 0.9% PF flush 3 mL  3 mL Intravenous Q8H Ginny Naidu MD   3 mL at 06/29/25 0755    sodium chloride (PF) 0.9% PF flush 3 mL  3 mL Intracatheter Q8H [facility] Ginny Naidu MD   3 mL at 06/30/25 0624    sodium chloride (PF) 0.9% PF flush 3 mL  3 mL Intracatheter Q8H Ginny Naidu MD   3 mL at  25 0513    sodium zirconium cyclosilicate (LOKELMA) packet 10 g  10 g Oral Once Lindsey Boyd APRN CNP        [Held by provider] sulfamethoxazole-trimethoprim (BACTRIM) 400-80 MG per tablet 1 tablet  1 tablet Oral or Feeding Tube Once per day on  Max Rebolledo MD   1 tablet at 25    tacrolimus (GENERIC EQUIVALENT) capsule 5 mg  5 mg Oral BID IS Angelica Natarajan PA-C   5 mg at 25    ursodiol (ACTIGALL) capsule 300 mg  300 mg Oral BID Ginny Naidu MD   300 mg at 25    valGANciclovir (VALCYTE) tablet 450 mg  450 mg Oral or NG Tube Daily Max Rebolledo MD         Current Facility-Administered Medications   Medication Dose Route Frequency Provider Last Rate Last Admin       Physical Exam   Temp  Av.6  F (37  C)  Min: 97.8  F (36.6  C)  Max: 99  F (37.2  C)  Arterial Line BP  Min: 97/47  Max: 157/55  Arterial Line MAP (mmHg)  Av.1 mmHg  Min: 60 mmHg  Max: 85 mmHg      Pulse  Av.6  Min: 67  Max: 83 Resp  Av.5  Min: 8  Max: 23  FiO2 (%)  Av %  Min: 30 %  Max: 40 %  SpO2  Av.2 %  Min: 88 %  Max: 100 %    CVP (mmHg): 3 mmHgBP (!) 140/48 (BP Location: Right arm)   Pulse 57   Temp 97.9  F (36.6  C) (Oral)   Resp 18   Wt (!) 150.5 kg (331 lb 11.2 oz)   SpO2 93%   BMI 46.26 kg/m     Date 25 07 - 25 0659   Shift 2308-2413 6018-2644 7251-2272 24 Hour Total   INTAKE   P.O. 240   240   I.V. 327.8   327.8   Blood Components 600   600   Shift Total(mL/kg) 1167.8(7.52)   1167.8(7.52)   OUTPUT   Urine 7   7   Drains 252   252   Shift Total(mL/kg) 259(1.67)   259(1.67)   Weight (kg) 155.2 155.2 155.2 155.2      Admit Weight: (!) 149.6 kg (329 lb 12.8 oz)     GENERAL APPEARANCE: alert and no distress  HENT: mouth without ulcers or lesions  RESP: lungs clear to auscultation - no rales, rhonchi or wheezes  CV: regular rhythm, normal rate, no rub, no murmur  EDEMA: trace LE edema bilaterally  ABDOMEN:  soft, nondistended, nontender, bowel sounds normal  MS: extremities normal - no gross deformities noted, no evidence of inflammation in joints, no muscle tenderness  SKIN: no rash  DIALYSIS ACCESS:  None      Data   All labs reviewed by me.  CMP  Recent Labs   Lab 07/01/25  0504 06/30/25  2211 06/30/25  1659 06/30/25  1223 06/30/25  0834 06/30/25  0532 06/29/25  0713 06/29/25  0549 06/28/25  0741 06/28/25  0725   *  --   --   --   --  133*  --  133*  --  134*   POTASSIUM 5.5*  --   --   --   --  4.9  --  4.9  --  4.6   CHLORIDE 106  --   --   --   --  104  --  103  --  103   CO2 20*  --   --   --   --  20*  --  23  --  22   ANIONGAP 8  --   --   --   --  9  --  7  --  9   * 131* 143* 142*   < > 185*   < > 167*   < > 185*   BUN 48.2*  --   --   --   --  48.6*  --  49.9*  --  46.2*   CR 2.32*  --   --   --   --  2.32*  --  2.36*  --  2.39*   GFRESTIMATED 23*  --   --   --   --  23*  --  23*  --  23*   SANDY 8.5*  --   --   --   --  8.3*  --  8.2*  --  8.4*   MAG 2.1  --   --   --   --  2.1  --  2.1  --  2.1   PHOS 4.1  --   --   --   --  3.6  --  3.5  --  3.6   PROTTOTAL 5.2*  --   --   --   --  5.1*  --  5.0*  --  5.5*   ALBUMIN 2.6*  --   --   --   --  2.6*  --  2.7*  --  2.9*   BILITOTAL 0.5  --   --   --   --  0.5  --  0.5  --  0.6   ALKPHOS 103  --   --   --   --  109  --  103  --  113   AST 22  --   --   --   --  21  --  22  --  28   ALT 32  --   --   --   --  37  --  42  --  55*    < > = values in this interval not displayed.     CBC  Recent Labs   Lab 07/01/25  0504 06/30/25  0841 06/29/25  0549 06/28/25  0907   HGB 8.9* 9.6* 8.7* 9.8*   WBC 7.6 8.0 5.6 6.7   RBC 2.93* 3.13* 2.88* 3.26*   HCT 26.8* 28.9* 26.6* 30.6*   MCV 92 92 92 94   MCH 30.4 30.7 30.2 30.1   MCHC 33.2 33.2 32.7 32.0   RDW 19.0* 19.2* 18.9* 18.6*    191 144* 114*     INR  Recent Labs   Lab 06/25/25  0324   INR 1.12   PTT 26     ABG  No lab results found in last 7 days.     Urine Studies  Recent Labs   Lab Test  06/18/25  2243 06/08/25  0447 11/26/24  0819   COLOR Yellow Yellow Yellow   APPEARANCE Clear Clear Clear   URINEGLC Negative Negative Negative   URINEBILI Negative Negative Negative   URINEKETONE Negative Negative Trace*   SG 1.015 1.015 1.026   UBLD Negative Negative Negative   URINEPH 5.0 5.5 5.5   PROTEIN Negative Negative 10*   NITRITE Negative Negative Negative   LEUKEST Negative Negative Negative   RBCU 0  --  1   WBCU <1  --  1     No lab results found.  PTH  No lab results found.  Iron Studies  Recent Labs   Lab Test 11/26/24  0800   IRON 112   *   IRONSAT 64*   LUDY 762*       IMAGING:  All imaging studies reviewed by me.

## 2025-07-01 NOTE — PLAN OF CARE
Goal Outcome Evaluation:      Plan of Care Reviewed With: patient, family    Overall Patient Progress: improvingOverall Patient Progress: improving    Outcome Evaluation: ready to discharge home      DISCHARGE:  Patient with orders to discharge to her daughter's house in [place].     Education Provided:   Med Card: updated  Lab Book: Updated  Handouts: Discharge instruction  Specialty Pharmacy: completed  LDAs: PIV Removed    Discharge instructions, medications & follow ups reviewed with pt and her daughter. Copy of discharge summary given to pt. PIV removed. Abdominal incision staple intact/dry open to air. Diarrhea x3 this shift and pt had Imodium 2mg. C/o abdominal pain/discomfort treated by Robaxin x2    Patient in stable condition. AVSS. Pt and her family had no further questions regarding discharge instructions and medications. Patient transferred out by transport/family to discharge pharmacy.

## 2025-07-01 NOTE — TELEPHONE ENCOUNTER
Karlene was discharged today. Attempted to see her in hospital before but was unable to work it out.     Call to Karlene and Patrica. Agreed upon 11 am for teaching tomorrow morning.

## 2025-07-01 NOTE — PLAN OF CARE
Physical Therapy Discharge Summary    Reason for therapy discharge:    Discharged to home with home therapy.    Progress towards therapy goal(s). See goals on Care Plan in [facility] electronic health record for goal details.  Goals partially met.  Barriers to achieving goals:   discharge from facility.    Therapy recommendation(s):    Continued therapy is recommended.  Rationale/Recommendations:  Recommend HHPT for home safety assessment and progression of functional IND towards PLOF.

## 2025-07-01 NOTE — PROGRESS NOTES
Care Management Discharge Note    Discharge Date: 07/01/2025     Discharge Disposition: Home, Home Care    Discharge Services: Home Care    Discharge DME: None    Discharge Transportation: family or friend will provide    Private pay costs discussed: Not applicable    Does the patient's insurance plan have a 3 day qualifying hospital stay waiver?  Yes     Which insurance plan 3 day waiver is available? Alternative insurance waiver    Will the waiver be used for post-acute placement? No    PAS Confirmation Code: NA  Patient/family educated on Medicare website which has current facility and service quality ratings: yes    Education Provided on the Discharge Plan: Yes  Persons Notified of Discharge Plans: patient  Patient/Family in Agreement with the Plan: yes    Handoff Referral Completed: Yes, [facility] PCP: Internal handoff referral completed    Additional Information:  RNCC met with patient to review discharge plan. RNCC notified patient that [facility] will see her for RN/PT/OT. Patient stated she will be discharging to her daughter's home. RNCC updated Rimma Mendoza [facility] liaison so they can have the correct branch follow. Currently lists [facility]. Daughter to transport.       DISCHARGE ADDRESS:     [address] [place] [address] [place], [facility] [address]    Home Care:     [facility] Home care RN/PT/OT    Sheree Orozco RN, BSN, PHN, CCM  Float Nurse Care Coordinator  [facility] Acute Care Management   Searchable on Vocera: DAKOTA SEBASTIAN RNCC  Covering for 7A  Phone [phone]

## 2025-07-01 NOTE — DISCHARGE SUMMARY
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 S/p  donor liver transplant without stent and repair of umbilical hernia by Dr. Rebolledo on 2025.     Hospital Course   Liver transplant:   LFTs trended down as expected after transplant. Ursodiol x 3 months.    Immunosuppressed due to medications:    -Induction basiliximab x2 doses  -Induction steroid taper per protocol.  -Tacrolimus, goal level 5-8.  -Myfortic 540 mg BID (GI symptoms)  -Prednisone 5 mg daily x 3 months  -Opportunistic infectious prophylaxis with Pentamidine x1 (25, restart Bactrim in 1 month if K+ stable) and Valcyte x3 months.    Transplant coordinator Shaila Murillo [phone].  Biliary stent:  NO  Donor status:  DBD  CMV D + / R +  EBV D + / R +  Donor HBV core Ab: Negative  Anticoagulation plan: Aspirin 325 mg daily x3 months  RETREAT score (if HCC): N/A    Neuro/Psych:  Depression: Continue home dose citalopram.   Fibromyalgia: Home dose gabapentin HELD. If restarted, it will need to be renally dosed.  Psychophysiological insomnia: Continue home dose trazodone.  Acute post op pain: Discharge on PRN Atarax, Robaxin, and acetaminophen.     Hematology:   Acute on chronic anemia: Hgb ~8-9, stable.     Cardiorespiratory:   HFpEF: Noted on on  problem list, last EF normal. Not on GDMT at this time. Consider addition of SGLT2 in the future.  HTN: SBP 140s but patient remains hypervolemic. Will monitor outpatient. Re-assess at Nephrology follow up appointment.     GI/Nutrition:   Moderate malnutrition in the context of acute illness or injury: Nutrition consulted. Feeding tube removed 6/30/25. Discharge on low potassium diet.  Nausea: PRN zofran ODT prior to meals. Possibly secondary to Myfortic.  Diarrhea: Possibly secondary to Myfortic. Discharge on fiber and lomotil.     Endocrine:   Steroid induced hyperglycemia: A1c 4.9% 6/20/25. Resolved prior to discharge.     Renal/Fluid/Electrolytes:   Hyponatremia: Mild, monitor.   EMILIE: Initially required CRRT. Now off dialysis. Cr 2.3, stable. Follow up with Transplant Nephrology in ~1 month.  Hyperkalemia: K 5.5. Received Lokelma 10g x1. Changed to low potassium diet, sodium bicarb added, and Bactrim held. Recheck labs on 7/3/25.    Infectious disease:   Risk for infection: Donor with open abdomen with risk of contamination. Fluconazole and Zosyn x 7 days, completed.     Rheum:  Psoriatic arthritis:  Managed with ixekizumab, tacrolimus ointment. Advised patient to notify Rheumatology of new immunosuppressant medications.    Significant Results and Procedures    Procedure date:  06/19/25    Preoperative diagnosis:  End Stage Liver Disease due to MASLD  BMI 46, weight 149.6 kg  Small umbilical hernia    Postoperative diagnosis:  Same,     Procedure:  1. Donation after Brain Death liver transplant   2.  Repair of the umbilical hernia   3.  Placement of Prevena wound VAC    Surgeon:  Surgeons and Role:     * Max Rebolledo MD - Primary     * Dick Fontaine MD - Fellow - Assisting     Code Status   Full    Primary Care Physician   María Hanson    Physical Exam   Temp: 98.1  F (36.7  C) Temp src: Oral BP: (!) 141/49 Pulse: 80   Resp: 16 SpO2: 96 % O2 Device: None (Room air)    Vitals:    06/27/25 1154  06/28/25 0900 06/29/25 1000   Weight: (!) 152.1 kg (335 lb 4.8 oz) (!) 151.5 kg (334 lb 1.6 oz) (!) 150.5 kg (331 lb 11.2 oz)     Vital Signs with Ranges  Temp:  [97.9  F (36.6  C)-98.2  F (36.8  C)] 98.1  F (36.7  C)  Pulse:  [56-80] 80  Resp:  [16-18] 16  BP: (140-162)/(48-67) 141/49  SpO2:  [93 %-98 %] 96 %  I/O last 3 completed shifts:  In: 619 [P.O.:414; NG/GT:160]  Out: 150 [Urine:150]    General Appearance: NAD    Skin: Normal, warm, dry.  Heart: She appears adequately perfused.   Lungs: Non-labored on RA.   Abdomen: Non-distended, Incision dry, intact, no signs of infection.   : No hanson  Extremities: Edema: BLE +1-2. There is no skin breakdown obvious breakdown on exposed skin.  Neurologic: Awake, alert, oriented x 4. Tremor absent.     Time Spent on this Encounter   I, MERRITT Barnes CNP, personally saw the patient today and spent greater than 30 minutes discharging this patient.    Discharge Disposition   Discharged to home  Condition at discharge: Stable    Consultations This Hospital Stay   SOCIAL WORK IP CONSULT  NUTRITION SERVICES ADULT IP CONSULT  PHYSICAL THERAPY ADULT IP CONSULT  OCCUPATIONAL THERAPY ADULT IP CONSULT  CONSULT FOR INPATIENT VASCULAR ACCESS CARE  CARE MANAGEMENT / SOCIAL WORK IP CONSULT  PHYSICAL THERAPY ADULT IP CONSULT  OCCUPATIONAL THERAPY ADULT IP CONSULT  NUTRITION SERVICES ADULT IP CONSULT  OCCUPATIONAL THERAPY ADULT IP CONSULT  PHYSICAL THERAPY ADULT IP CONSULT  PHARMACY IP CONSULT  SOT MEDICATION HISTORY IP PHARMACY CONSULT  CARE MANAGEMENT / SOCIAL WORK IP CONSULT  PHARMACY IP CONSULT  CARE MANAGEMENT / SOCIAL WORK IP CONSULT  PHARMACY CRRT IP CONSULT  CONSULT FOR INPATIENT VASCULAR ACCESS CARE  IP CONSULT ALANNA LOZOYA  NUTRITION SERVICES ADULT IP CONSULT  PHARMACY IP CONSULT  PHARMACY CRRT IP CONSULT  CONSULT FOR INPATIENT VASCULAR ACCESS CARE    Discharge Orders      Primary Care - Care Coordination Referral      Home Care Referral      Reason for your  hospital stay    Liver transplanted     Activity    Your activity upon discharge: Walk at least four times a day, lift no greater than 10 pounds for 8 weeks from the time of surgery.  No driving while taking narcotics or 3 weeks after surgery.     Monitor and record    Weight: every day.     When to contact your care team    WHEN TO CONTACT YOUR  COORDINATOR:     Transplant Coordinator Shaila Murillo [phone]     Notify your coordinator if you have pain over your liver, increased redness or drainage from your incision, fever greater than 100F, or yellowing of skin or eyes.     Notify your coordinator immediately if you are ever unable to take your immunosuppressive medications for any reason.     If you have URGENT concerns after office hours, please call the hospital switchboard at [phone] and ask to have the organ transplant nurse on-call paged. If you have a life-threatening emergency, go to the nearest emergency room.     Wound care and dressings    Instructions to care for your wound at home: If you have staples in place, they will be removed in 3 weeks after operation. Wash incision daily with soap and water. Do not soak or scrub.     ADULT [facility] Specialty Follow-up and recommended labs and tests    [facility] FOLLOW UP:     1. Follow up in Transplant Clinic weekly for the next 5 weeks with Dr. Rebolledo (or any available liver transplant surgeon).     2. Follow up with Transplant Hepatology in 3 months.     3. Follow up with Transplant Nephrology in ~1 month.    Call your Transplant Coordinator ([phone]) with questions about Transplant Center appointment scheduling.     LABS:     CBC, BMP, magnesium, phosphorus, hepatic panel, tacrolimus level every Monday and Thursday by home health care nurse if arranged, or at an outpatient lab. HCV/HBV/HIV EMILY to be drawn 5 weeks post-transplant.     Walker Order for DME - ONLY FOR DME     Diet    Follow this diet upon discharge: Low potassium  diet     Discharge Medications   Current Discharge Medication List        START taking these medications    Details   acetaminophen (TYLENOL) 325 MG tablet Take 2 tablets (650 mg) by mouth every 6 hours as needed for pain.  Qty: 100 tablet, Refills: 0    Associated Diagnoses: Liver replaced by transplant (H)      aspirin (ASA) 325 MG tablet Take 1 tablet (325 mg) by mouth daily.  Qty: 90 tablet, Refills: 0    Associated Diagnoses: Liver replaced by transplant (H)      atorvastatin (LIPITOR) 40 MG tablet Take 1 tablet (40 mg) by mouth daily.  Qty: 30 tablet, Refills: 11    Associated Diagnoses: Liver replaced by transplant (H)      bulk laxative (BENEFIBER DRINK MIX) packet Take 1 packet by mouth 2 times daily.  Qty: 60 packet, Refills: 3    Associated Diagnoses: Diarrhea, unspecified type      hydrOXYzine HCl (ATARAX) 25 MG tablet Take 1 tablet (25 mg) by mouth every 6 hours as needed for other (adjuvant pain).  Qty: 20 tablet, Refills: 0    Associated Diagnoses: Liver replaced by transplant (H)      loperamide (IMODIUM) 2 MG capsule Take 1 capsule (2 mg) by mouth 4 times daily as needed for diarrhea.  Qty: 60 capsule, Refills: 0    Associated Diagnoses: Diarrhea, unspecified type      methocarbamol (ROBAXIN) 750 MG tablet Take 1 tablet (750 mg) by mouth every 6 hours as needed for muscle spasms.  Qty: 30 tablet, Refills: 0    Associated Diagnoses: Liver replaced by transplant (H)      multivitamin w/minerals (THERA-VIT-M) tablet Take 1 tablet by mouth daily.  Qty: 30 tablet, Refills: 0    Associated Diagnoses: Liver replaced by transplant (H)      mycophenolic acid (GENERIC EQUIVALENT) 180 MG EC tablet Take 3 tablets (540 mg) by mouth 2 times daily.  Qty: 180 tablet, Refills: 5    Associated Diagnoses: Liver replaced by transplant (H)      ondansetron (ZOFRAN ODT) 4 MG ODT tab Take 1 tablet (4 mg) by mouth 3 times daily as needed for nausea or vomiting.  Qty: 90 tablet, Refills: 1    Associated Diagnoses: Nausea       predniSONE (DELTASONE) 5 MG tablet Take 1 tablet (5 mg) by mouth daily.  Qty: 30 tablet, Refills: 2    Associated Diagnoses: Liver replaced by transplant (H)      sodium bicarbonate 650 MG tablet Take 1 tablet (650 mg) by mouth 2 times daily.  Qty: 60 tablet, Refills: 11    Associated Diagnoses: Hyperkalemia      !! tacrolimus (GENERIC EQUIVALENT) 0.5 MG capsule Take 1 cap by mouth twice daily as directed by Transplant Center for dose changes.  Qty: 60 capsule, Refills: 0    Associated Diagnoses: Liver replaced by transplant (H)      !! tacrolimus (GENERIC EQUIVALENT) 1 MG capsule Take 5 capsules (5 mg) by mouth 2 times daily.  Qty: 300 capsule, Refills: 11    Associated Diagnoses: Liver replaced by transplant (H)      valGANciclovir (VALCYTE) 450 MG tablet Take 1 tablet (450 mg) by mouth daily.  Qty: 78 tablet, Refills: 0    Associated Diagnoses: Liver replaced by transplant (H)       !! - Potential duplicate medications found. Please discuss with provider.        CONTINUE these medications which have CHANGED    Details   ixekizumab (TALTZ) 80 MG/ML SOAJ auto-injector 160 mg once, followed by 80 mg at weeks 2, 4, 6, 8, 10, and 12, and then 80 mg every 4 weeks. Follow up with your Rheumatologist regarding this medication and your new immunosuppressant medications.    Associated Diagnoses: Plaque psoriasis           CONTINUE these medications which have NOT CHANGED    Details   citalopram (CELEXA) 20 MG tablet Take 1 tablet (20 mg) by mouth daily.  Qty: 90 tablet, Refills: 3    Associated Diagnoses: Major depressive disorder, recurrent episode, in full remission      levothyroxine (SYNTHROID/LEVOTHROID) 112 MCG tablet Take 1 tablet (112 mcg) by mouth daily.  Qty: 90 tablet, Refills: 3    Associated Diagnoses: Acquired hypothyroidism      Lidocaine (LIDOCARE) 4 % Patch Place 1 patch onto the skin daily as needed for moderate pain.      mometasone (ELOCON) 0.1 % external solution Apply thin layer twice a day as  needed to rash on scalp      tacrolimus (PROTOPIC) 0.1 % external ointment Apply topically as needed    Associated Diagnoses: Psoriatic arthropathy (H)      traZODone (DESYREL) 100 MG tablet Take 1 tablet (100 mg) by mouth at bedtime.  Qty: 90 tablet, Refills: 3    Associated Diagnoses: Psychophysiological insomnia      triamcinolone (KENALOG) 0.1 % external cream Apply topically as needed    Associated Diagnoses: Psoriatic arthropathy (H)           STOP taking these medications       bumetanide (BUMEX) 2 MG tablet Comments:   Reason for Stopping:         gabapentin (NEURONTIN) 300 MG capsule Comments:   Reason for Stopping:         lactulose 20 GM/30ML solution Comments:   Reason for Stopping:         rifaximin (XIFAXAN) 550 MG TABS tablet Comments:   Reason for Stopping:         spironolactone (ALDACTONE) 50 MG tablet Comments:   Reason for Stopping:             Allergies   Allergies   Allergen Reactions    Sumatriptan Succinate Nausea and Vomiting     IMITREX     Data   Most Recent 3 CBC's:  Recent Labs   Lab Test 07/01/25  0504 06/30/25  0841 06/29/25  0549   WBC 7.6 8.0 5.6   HGB 8.9* 9.6* 8.7*   MCV 92 92 92    191 144*     Most Recent 3 BMP's:  Recent Labs   Lab Test 07/01/25  0504 07/01/25  0237 06/30/25  2211 06/30/25  0834 06/30/25  0532 06/29/25  0713 06/29/25  0549   *  --   --   --  133*  --  133*   POTASSIUM 5.5*  --   --   --  4.9  --  4.9   CHLORIDE 106  --   --   --  104  --  103   CO2 20*  --   --   --  20*  --  23   BUN 48.2*  --   --   --  48.6*  --  49.9*   CR 2.32*  --   --   --  2.32*  --  2.36*   ANIONGAP 8  --   --   --  9  --  7   SANDY 8.5*  --   --   --  8.3*  --  8.2*   * 114* 131*   < > 185*   < > 167*    < > = values in this interval not displayed.     Most Recent 2 LFT's:  Recent Labs   Lab Test 07/01/25  0504 06/30/25  0532   AST 22 21   ALT 32 37   ALKPHOS 103 109   BILITOTAL 0.5 0.5        167*    < > = values in this interval not displayed.     Most Recent 2 LFT's:  Recent Labs   Lab Test 07/01/25  0504 06/30/25  0532   AST 22 21   ALT 32 37   ALKPHOS 103 109   BILITOTAL 0.5 0.5

## 2025-07-01 NOTE — PLAN OF CARE
Goal Outcome Evaluation:      Plan of Care Reviewed With: patient    Overall Patient Progress: no changeOverall Patient Progress: no change    Outcome Evaluation: VSS on RA, pain managed w/PRNs, likely d/c tomorrow    BP (!) 151/56 (BP Location: Left arm)   Pulse 56   Temp 98  F (36.7  C) (Oral)   Resp 18   Wt (!) 150.5 kg (331 lb 11.2 oz)   SpO2 94%   BMI 46.26 kg/m      Shift: 2636-0720  Isolation Status: N/A  VS: VSS on RA, afebrile  Neuro: Aox4, makes needs known  Behaviors: Calm, cooperative, pleasant  BG: ACHS - 131  Labs/Imaging: Na 133, CO2 20, BUN 48.6, Cr 2.32, Ca 8.3, albumin 2.6, protein total 5.1, Hgb 9.6, hematocrit 28.9  Respiratory: WDL room air. Lung sounds diminished in lower lobes.  Cardiac: Hypertensive 150s/50s, bradycardic 50s  Pain/Nausea: Denies nausea. Endorses incisional pain, managed w/scheduled Tylenol + PRN robaxin & atarax w/some relief.  PRN: Robaxin, atarax, imodium, trazodone (see MAR)  Diet: Regular diet, fair appetite. Increasing PO intake now that TF d/c'd.  LDA: R PIV SL  Infusion(s): N/A  GI/: Voiding spontaneously w/o difficulty. Loose stools x 1, PRN imodium x 1 given.  Skin: Clamshell abdominal incision w/staples, ALBERTA, no drainage. R TIARRA sites x 2, stitches in place, ALBERTA, CDI.   Mobility: UAL in room, SBA in hallways  Events/Education: Lab book updated.  Plan: Continue w/plan of care & notify team w/any changes.

## 2025-07-01 NOTE — PLAN OF CARE
Occupational Therapy Discharge Summary    Reason for therapy discharge:    Discharged to home.    Progress towards therapy goal(s). See goals on Care Plan in [facility] electronic health record for goal details.  Goals not met.  Barriers to achieving goals:   discharge from facility.    Therapy recommendation(s):    Continued therapy is recommended.  Rationale/Recommendations:   OT to increase I/ADL IND and safety.

## 2025-07-01 NOTE — PROGRESS NOTES
CLINICAL NUTRITION SERVICES - DISCHARGE NOTE    Education provided on low potassium diet (handout provided).  Reviewed patient's protein supplements (CorePower shake has 700 mg and Barbell protein bar has 160 mg).  Discussed alternative shakes and/or limited current shakes over the next few days.  Reviewed HIGH protein needs (~135 grams/day).     Patient s discharge needs assessed and discharge planning has been conducted with the multidisciplinary transplant care team including physicians, pharmacy, social work and transplant coordinator.    Follow up/Monitoring:  Once discharged, place outpatient nutrition consult via the transplant team if nutrition concerns arise.    Gala Patel, MS, RD, LD, CCTD, CNSC  7A + 7B (beds 8084-7982)  Available on Vocera [7A or 7B Clinical Dietitian]   Weekend/Holiday: Vocera [Weekend Clinical Dietitian]

## 2025-07-01 NOTE — PROGRESS NOTES
Calorie Count  Intake recorded for: 6/30  Total Kcals: 596 Total Protein: 34g  Kcals from Hospital Food: 538   Protein: 23g  Kcals from Outside Food (average): 58 Protein: 11g  # Meals Ordered from Kitchen: 3  # Meals Recorded: 2 meals recorded   Meal 1: 75% omelet w/ ham & chatman, froot loops w/ 4 oz 1% milk, hot cocoa; 25% [employer] Core Power protein shake   Meal 2: 100% chicken noodle soup, saltine crackers, lemon lime soda  # Supplements Recorded: 0

## 2025-07-02 ENCOUNTER — RESULTS FOLLOW-UP (OUTPATIENT)
Dept: TRANSPLANT | Facility: CLINIC | Age: 60
End: 2025-07-02

## 2025-07-02 ENCOUNTER — TELEPHONE (OUTPATIENT)
Dept: TRANSPLANT | Facility: CLINIC | Age: 60
End: 2025-07-02
Payer: COMMERCIAL

## 2025-07-02 ENCOUNTER — LAB REQUISITION (OUTPATIENT)
Dept: LAB | Facility: CLINIC | Age: 60
End: 2025-07-02
Payer: COMMERCIAL

## 2025-07-02 ENCOUNTER — MEDICAL CORRESPONDENCE (OUTPATIENT)
Dept: HEALTH INFORMATION MANAGEMENT | Facility: CLINIC | Age: 60
End: 2025-07-02

## 2025-07-02 ENCOUNTER — PATIENT OUTREACH (OUTPATIENT)
Dept: CARE COORDINATION | Facility: CLINIC | Age: 60
End: 2025-07-02

## 2025-07-02 ENCOUNTER — TELEPHONE (OUTPATIENT)
Dept: PHARMACY | Facility: CLINIC | Age: 60
End: 2025-07-02
Payer: COMMERCIAL

## 2025-07-02 DIAGNOSIS — K75.81 NONALCOHOLIC STEATOHEPATITIS (NASH): ICD-10-CM

## 2025-07-02 DIAGNOSIS — Z94.4 LIVER REPLACED BY TRANSPLANT (H): Primary | ICD-10-CM

## 2025-07-02 LAB
ALBUMIN SERPL BCG-MCNC: 3.4 G/DL (ref 3.5–5.2)
ALBUMIN SERPL BCG-MCNC: 3.4 G/DL (ref 3.5–5.2)
ALP SERPL-CCNC: 118 U/L (ref 40–150)
ALP SERPL-CCNC: 118 U/L (ref 40–150)
ALT SERPL W P-5'-P-CCNC: 39 U/L (ref 0–50)
ALT SERPL W P-5'-P-CCNC: 39 U/L (ref 0–50)
ANION GAP SERPL CALCULATED.3IONS-SCNC: 11 MMOL/L (ref 7–15)
AST SERPL W P-5'-P-CCNC: 27 U/L (ref 0–45)
AST SERPL W P-5'-P-CCNC: 27 U/L (ref 0–45)
BASOPHILS # BLD AUTO: 0.1 10E3/UL (ref 0–0.2)
BASOPHILS NFR BLD AUTO: 1 %
BILIRUB DIRECT SERPL-MCNC: 0.35 MG/DL (ref 0–0.45)
BILIRUB DIRECT SERPL-MCNC: 0.35 MG/DL (ref 0–0.45)
BILIRUB SERPL-MCNC: 0.6 MG/DL
BILIRUB SERPL-MCNC: 0.6 MG/DL
BUN SERPL-MCNC: 42.5 MG/DL (ref 8–23)
CALCIUM SERPL-MCNC: 8.8 MG/DL (ref 8.8–10.4)
CHLORIDE SERPL-SCNC: 105 MMOL/L (ref 98–107)
CREAT SERPL-MCNC: 2.37 MG/DL (ref 0.51–0.95)
EGFRCR SERPLBLD CKD-EPI 2021: 23 ML/MIN/1.73M2
EOSINOPHIL # BLD AUTO: 0.2 10E3/UL (ref 0–0.7)
EOSINOPHIL NFR BLD AUTO: 2 %
ERYTHROCYTE [DISTWIDTH] IN BLOOD BY AUTOMATED COUNT: 19.2 % (ref 10–15)
FASTING STATUS PATIENT QL REPORTED: ABNORMAL
GLUCOSE SERPL-MCNC: 112 MG/DL (ref 70–99)
HCO3 SERPL-SCNC: 20 MMOL/L (ref 22–29)
HCT VFR BLD AUTO: 28.6 % (ref 35–47)
HGB BLD-MCNC: 9.2 G/DL (ref 11.7–15.7)
IMM GRANULOCYTES # BLD: 0.1 10E3/UL
IMM GRANULOCYTES NFR BLD: 1 %
LYMPHOCYTES # BLD AUTO: 0.9 10E3/UL (ref 0.8–5.3)
LYMPHOCYTES NFR BLD AUTO: 11 %
MAGNESIUM SERPL-MCNC: 2.3 MG/DL (ref 1.7–2.3)
MCH RBC QN AUTO: 30.9 PG (ref 26.5–33)
MCHC RBC AUTO-ENTMCNC: 32.2 G/DL (ref 31.5–36.5)
MCV RBC AUTO: 96 FL (ref 78–100)
MONOCYTES # BLD AUTO: 0.5 10E3/UL (ref 0–1.3)
MONOCYTES NFR BLD AUTO: 6 %
NEUTROPHILS # BLD AUTO: 6.7 10E3/UL (ref 1.6–8.3)
NEUTROPHILS NFR BLD AUTO: 79 %
NRBC # BLD AUTO: 0 10E3/UL
NRBC BLD AUTO-RTO: 0 /100
PHOSPHATE SERPL-MCNC: 4.4 MG/DL (ref 2.5–4.5)
PLATELET # BLD AUTO: 293 10E3/UL (ref 150–450)
POTASSIUM SERPL-SCNC: 5.6 MMOL/L (ref 3.4–5.3)
PREALB SERPL-MCNC: 29.1 MG/DL (ref 20–40)
PROT SERPL-MCNC: 6.2 G/DL (ref 6.4–8.3)
PROT SERPL-MCNC: 6.2 G/DL (ref 6.4–8.3)
RBC # BLD AUTO: 2.98 10E6/UL (ref 3.8–5.2)
SODIUM SERPL-SCNC: 136 MMOL/L (ref 135–145)
TACROLIMUS BLD-MCNC: 7.1 UG/L (ref 5–15)
TME LAST DOSE: NORMAL H
TME LAST DOSE: NORMAL H
TRIGL SERPL-MCNC: 234 MG/DL
WBC # BLD AUTO: 8.5 10E3/UL (ref 4–11)

## 2025-07-02 PROCEDURE — 85025 COMPLETE CBC W/AUTO DIFF WBC: CPT | Mod: ORL | Performed by: TRANSPLANT SURGERY

## 2025-07-02 PROCEDURE — 84478 ASSAY OF TRIGLYCERIDES: CPT | Mod: ORL | Performed by: TRANSPLANT SURGERY

## 2025-07-02 PROCEDURE — 83735 ASSAY OF MAGNESIUM: CPT | Mod: ORL | Performed by: TRANSPLANT SURGERY

## 2025-07-02 PROCEDURE — 84100 ASSAY OF PHOSPHORUS: CPT | Mod: ORL | Performed by: TRANSPLANT SURGERY

## 2025-07-02 PROCEDURE — 84134 ASSAY OF PREALBUMIN: CPT | Mod: ORL | Performed by: TRANSPLANT SURGERY

## 2025-07-02 PROCEDURE — 80197 ASSAY OF TACROLIMUS: CPT | Mod: ORL | Performed by: TRANSPLANT SURGERY

## 2025-07-02 PROCEDURE — 82248 BILIRUBIN DIRECT: CPT | Mod: ORL | Performed by: TRANSPLANT SURGERY

## 2025-07-02 NOTE — PROGRESS NOTES
Clinic Care Coordination Contact  Care Coordination Clinician Chart Review    Situation: Patient chart reviewed by care coordinator.    Background: Clinic Care Coordination Referral received from inpatient care team for transition handoff communication following hospital admission.    Assessment: Upon chart review, patient is not a candidate for Primary Care Clinic Care Coordination enrollment due to reason stated below:  Primary Care Clinic Care Coordination will defer follow-up outreach to Specialty Clinic Care Coordination team who are already closely following patient. SOT team already has been in touch with patient since hospital discharge, pt with discharge follow up on 7/7.    Plan/Recommendations: Clinic Care Coordination Referral/order cancelled. RN/GRANT CC will perform no further monitoring/outreaches at this time and will remain available as needed. If new needs arise, a new Care Coordination Referral may be placed.    PABLO PAULSON RN on 7/2/2025 at 1:12 PM

## 2025-07-02 NOTE — LETTER
OUTPATIENT LABORATORY TEST ORDER   [facility]/patient copy  Fax#[phone]    Patient Name: Karlene Yun   YOB: 1965     [facility] MR# [if applicable]: [id number]   Date & Time: July 2, 2025  10:01 AM  Expiration Date: 1 year after date issued       Diagnosis: Liver Transplant (ICD-10 Z94.4)   Aftercare following organ transplant (ICD-10 Z48.288)   Long term use of medications (ICD-10 Z79.899)   Contact with and (suspected) exposure to other viral communicable   diseases (Z20.828)      We ask your assistance in obtaining the following laboratory tests, which are part of our routine surveillance program for Solid Organ Transplant patients.     Please fax each result to [phone], same day as resulted/available    Critical lab results page [phone]    First 8 weeks post-transplant (7/2/2025--8/28/2025)  Labs 2x/week (Monday/Thursday)  CBC with Platelets   Basic Metabolic Panel   Phosphorous  Magnesium  Hepatic panel   Tacrolimus drug level - trough level, please document time of last dose       At 1-month post-transplant (7/19/2025, no later than 8/1/2025)  HBV, HCV, HIV by EMILY testing  If unable to conduct EMILY testing, please substitute the following:   Hepatitis B DNA Quantitative, Real-Time PCR   Hepatitis C RNA, Quantitation   HIV 1 RNA, Quantitation   HIV 2 RNA, Quantitation     Month 3 post-transplant   Labs weekly (Monday or Tuesday) x 2  CBC with Platelets    Basic Metabolic Panel   Phosphorous  Magnesium  Hepatic panel   Tacrolimus drug level - trough level, please document time of last dose       Months 4 post-transplant  Labs every other week x 2  CBC with Platelets   Basic Metabolic Panel   Phosphorous  Magnesium  Hepatic panel   Tacrolimus drug level - trough level, please document time of last dose       Months 5-12 post-transplant   Labs monthly  CBC with Platelets   Basic Metabolic Panel   Phosphorous  Magnesium  Hepatic panel   Tacrolimus drug level -  trough level, please document time of last dose         12-months post-transplant  Fasting Lipid Panel  Urine protein/creatinine ratio  Hepatitis B DNA PCR on all patients  Magnesium    If you have any questions, please call [facility]- [phone] or [phone], Fax- [phone].      Max Rebolledo MD, DURAN  Professor of Surgery  [employer]  Surgical Director of Liver Transplant Program  Executive Medical Director of Pediatric Transplantation

## 2025-07-02 NOTE — TELEPHONE ENCOUNTER
Spoke to Erika and confirmed that the staff at the  location will initiate home care intake today. Lab orders faxed.

## 2025-07-02 NOTE — TELEPHONE ENCOUNTER
Karlene Yun is a post-liver transplant patient who discharged on 7/1/25. Patient chooses to receive medications from [facility] pharmacy. The patient will be responsible for managing medications.    DWAYNE*SOT CHARLIE) IS A (LIVER) TRANSPLANT PATIENT  (DATE OF TRANSPLANT: (DATE: 06/19/2025) )    MEDICAL BENEFITS:   PLAN NAME: RIANA  ID: [id number]  GROUP: [id number]  EFFECTIVE: 02/01/2025    PHARMACY BENEFITS MANGER:  PLAN NAME: RIANA [employer]  ID #: [id number]  RX BIN: [id number]  PCN: [id number]  RX GROUP: [id number]  EFFECTIVE: 02/01/2025    PHARMACY BENEFIT DETAILS:   DEDUCTIBLE: $950  MAX OUT OF POCKET: $7400    COPAY STRUCTURE:  GENERIC: $15  BRAND: $125  NON-FORMULARY: NOT COVERED    TEST CLAIM SPECIALTY #28     MYCOPHENOLATE 250MG: (#240/30DS) = $15  PROGRAF 1MG: (#120/30DS) = PRODUCT NOT COVERED/ PLAN EXCLUSION   TACROLIMUS 1MG: (#120/30DS) = $15    CYCLOSPORINE 100MG (#60/30DS) = $15    VALGANCICLOVIR 450MG: (#60/30DS) = $263.09  VALACYCLOVIR 1GM: (#90/30DS) = $15      TEST CLAIM DISCHARGE #27 (21 YEARS AND OLDER)     MYCOPHENOLATE 250MG: (#240/30DS) = $15  PROGRAF 1MG: (#120/30DS) = PRODUCT NOT COVERED/ PLAN EXCLUSION   TACROLIMUS 1MG: (#120/30DS) = $15    CYCLOSPORINE 100MG (#60/30DS) = $15    VALGANCICLOVIR 450MG: (#60/30DS) = $263.09  VALACYCLOVIR 1GM: (#90/30DS) = $15      CAN PATIENT FILL WITH [place] FOR MEDICATIONS LISTED? YES    PHARMACY TEST CLAIMS ARE ESTIMATES AND MAY NOT REFLECT THE FINAL COST. ACTUAL COSTS CAN VARY BASED ON ADDITIONAL MEDICATIONS FILLED AND THE PATIENT'S PROGRESS TOWARD MEETING THEIR DEDUCTIBLE AND OUT-OF-POCKET MAXIMUM.    Thank You,  Loulou Tran, PharmD  [facility]  [phone]

## 2025-07-03 ENCOUNTER — TELEPHONE (OUTPATIENT)
Dept: TRANSPLANT | Facility: CLINIC | Age: 60
End: 2025-07-03
Payer: COMMERCIAL

## 2025-07-03 DIAGNOSIS — E87.5 HYPERKALEMIA: ICD-10-CM

## 2025-07-03 DIAGNOSIS — Z94.4 LIVER REPLACED BY TRANSPLANT (H): ICD-10-CM

## 2025-07-03 LAB — BACTERIA PLR CULT: NORMAL

## 2025-07-03 RX ORDER — SODIUM BICARBONATE 650 MG/1
650 TABLET ORAL 3 TIMES DAILY
Qty: 90 TABLET | Refills: 11 | Status: SHIPPED | OUTPATIENT
Start: 2025-07-03

## 2025-07-03 RX ORDER — MYCOPHENOLIC ACID 180 MG/1
720 TABLET, DELAYED RELEASE ORAL 2 TIMES DAILY
Qty: 240 TABLET | Refills: 5 | Status: SHIPPED | OUTPATIENT
Start: 2025-07-03

## 2025-07-03 NOTE — TELEPHONE ENCOUNTER
Post Liver Transplant Team Conference  Date: 7/3/2025  Transplant Coordinator: Shaila Murillo  Transplant Surgeon: Amauri Jacobs, Max Rebolledo      Attendees:  [] Dr. London [] Dania Goldberg, FRANSICO []       [x] Dr. Rebolledo [x] Pamela Beckett LPN []    [] Dr. Castillo [x] Shaila Murillo RN []    [] Dr. Perez [] EDER Brantley []    [] DR. Jefferson [] Moira Ayala RN []       [] Dr. Loving [x] Yvonne Tang RN []       [] Dr. Negron [x] Cate Jeong RN []       [] Dr. LUCY Jacobs [x] Felicia Dahl RN []       [x] Oscar Lynn, pharm [] Julieta Sandhu RN []       [] Coby López NP [] Osvaldo Wang RN []         Verbal Plan Read Back:   Increase mycophenolic acid to 720 mg BID with lower tacrolimus goal  Increase sodium bicarb dose to TID    Routed to RN Coordinator   Shaila Murillo RN    Call to Karlene and gave her the above instructions. She requested that prescriptions be sent to the [facility] pharmacy as she will be there on Monday.

## 2025-07-03 NOTE — TELEPHONE ENCOUNTER
Post Liver Transplant Coordinator Discharge Teaching    Introduced myself and explained the role of the post liver transplant coordinator and support team.    Upon discharge, Karlene will be going home.    Karlene informed of need for appointment with PCP within 1-2 weeks of discharge.  Karlene is aware that they will have weekly clinic visits with their surgeon or surgical JOCELIN x4 and then visit frequency is dictated by surgeon for first 3 months post transplant.   Karlene informed that they will see their pre-transplant hepatologist around 3 months after transplant and will have regular follow up with hepatologist for life.   Karleneis aware that they will need lab testing on a regular basis for life as labs are the best way to monitor how the liver is functioning. Initial lab frequency is twice weekly, with frequency decreasing over time.   Karlene does not have a biliary stent.     Briefly discussed the following topics:  Immunosuppression- the importance of taking regularly as directed and timing of medication before lab draws  Pertinent labs and their interpretation  Rejection signs/symptoms and treatment  Importance of long term follow up with transplant providers and PCP  No grapefruit or grapefruit juice.   10 pound weight restriction and driving recommendations  Need annual dermatology appt for a transplant skin check. Increased risk for skin cancer due to immunosuppressants  If anyone other than transplant team prescribes new medications, contact coordinator to review before taking  How to contact coordinator/transplant office if: becomes ill, fevers, forgets to take medications, or other emergencies  Provided contact number for coordinator/LPN/transplant office, , and after hours/weekend/holiday coordinator    Karlene has lab book and understands responsibility of getting and recording results.     Karlene and Patrica asked further questions    Pharmacy to be used: TBD  Lab to be used:  Home care for 8-9 weeks  [facility] Care: [facility]    Organ specific education completed and discharge planning has been conducted with multidisciplinary transplant care team including physicians, pharmacy, nutrition, and social work.

## 2025-07-03 NOTE — TELEPHONE ENCOUNTER
[facility] home health nurse called to get verbal orders for Nursing, PT, OT, and home health aide.   Gave verbal approval for the following:  RN 2x/week for 9 weeks  PT initial eval  OT initial eval  HHA 1x/week for 4 weeks

## 2025-07-07 ENCOUNTER — OFFICE VISIT (OUTPATIENT)
Dept: TRANSPLANT | Facility: CLINIC | Age: 60
End: 2025-07-07
Attending: INTERNAL MEDICINE
Payer: COMMERCIAL

## 2025-07-07 ENCOUNTER — LAB (OUTPATIENT)
Dept: LAB | Facility: CLINIC | Age: 60
End: 2025-07-07
Payer: COMMERCIAL

## 2025-07-07 ENCOUNTER — VIRTUAL VISIT (OUTPATIENT)
Dept: TRANSPLANT | Facility: CLINIC | Age: 60
End: 2025-07-07
Attending: TRANSPLANT SURGERY
Payer: COMMERCIAL

## 2025-07-07 ENCOUNTER — TELEPHONE (OUTPATIENT)
Dept: TRANSPLANT | Facility: CLINIC | Age: 60
End: 2025-07-07

## 2025-07-07 VITALS
DIASTOLIC BLOOD PRESSURE: 78 MMHG | SYSTOLIC BLOOD PRESSURE: 141 MMHG | TEMPERATURE: 99.1 F | HEART RATE: 56 BPM | WEIGHT: 293 LBS | BODY MASS INDEX: 44.8 KG/M2 | OXYGEN SATURATION: 97 %

## 2025-07-07 DIAGNOSIS — Z94.4 LIVER TRANSPLANTED (H): ICD-10-CM

## 2025-07-07 DIAGNOSIS — K74.60 LIVER CIRRHOSIS SECONDARY TO NASH (H): ICD-10-CM

## 2025-07-07 DIAGNOSIS — R18.8 CIRRHOSIS OF LIVER WITH ASCITES, UNSPECIFIED HEPATIC CIRRHOSIS TYPE (H): ICD-10-CM

## 2025-07-07 DIAGNOSIS — R60.0 BILATERAL EDEMA OF LOWER EXTREMITY: ICD-10-CM

## 2025-07-07 DIAGNOSIS — K75.81 METABOLIC DYSFUNCTION-ASSOCIATED STEATOHEPATITIS (MASH): Primary | ICD-10-CM

## 2025-07-07 DIAGNOSIS — Z94.4 LIVER REPLACED BY TRANSPLANT (H): ICD-10-CM

## 2025-07-07 DIAGNOSIS — K75.81 LIVER CIRRHOSIS SECONDARY TO NASH (H): ICD-10-CM

## 2025-07-07 DIAGNOSIS — K76.6 PORTAL HYPERTENSION (H): ICD-10-CM

## 2025-07-07 DIAGNOSIS — K74.60 CIRRHOSIS OF LIVER WITH ASCITES, UNSPECIFIED HEPATIC CIRRHOSIS TYPE (H): ICD-10-CM

## 2025-07-07 LAB
ALBUMIN SERPL BCG-MCNC: 3.4 G/DL (ref 3.5–5.2)
ALP SERPL-CCNC: 101 U/L (ref 40–150)
ALT SERPL W P-5'-P-CCNC: 21 U/L (ref 0–50)
ANION GAP SERPL CALCULATED.3IONS-SCNC: 11 MMOL/L (ref 7–15)
AST SERPL W P-5'-P-CCNC: 19 U/L (ref 0–45)
BACTERIA PLR CULT: NO GROWTH
BILIRUB SERPL-MCNC: 0.6 MG/DL
BILIRUBIN DIRECT (ROCHE PRO & PURE): 0.34 MG/DL (ref 0–0.45)
BUN SERPL-MCNC: 24.9 MG/DL (ref 8–23)
CALCIUM SERPL-MCNC: 8.5 MG/DL (ref 8.8–10.4)
CHLORIDE SERPL-SCNC: 107 MMOL/L (ref 98–107)
CREAT SERPL-MCNC: 2.16 MG/DL (ref 0.51–0.95)
EGFRCR SERPLBLD CKD-EPI 2021: 25 ML/MIN/1.73M2
ERYTHROCYTE [DISTWIDTH] IN BLOOD BY AUTOMATED COUNT: 18.9 % (ref 10–15)
GLUCOSE SERPL-MCNC: 104 MG/DL (ref 70–99)
HCO3 SERPL-SCNC: 22 MMOL/L (ref 22–29)
HCT VFR BLD AUTO: 28.4 % (ref 35–47)
HGB BLD-MCNC: 9.2 G/DL (ref 11.7–15.7)
MAGNESIUM SERPL-MCNC: 2.3 MG/DL (ref 1.7–2.3)
MCH RBC QN AUTO: 30.5 PG (ref 26.5–33)
MCHC RBC AUTO-ENTMCNC: 32.4 G/DL (ref 31.5–36.5)
MCV RBC AUTO: 94 FL (ref 78–100)
PHOSPHATE SERPL-MCNC: 4.5 MG/DL (ref 2.5–4.5)
PLATELET # BLD AUTO: 355 10E3/UL (ref 150–450)
POTASSIUM SERPL-SCNC: 4.8 MMOL/L (ref 3.4–5.3)
PROT SERPL-MCNC: 6.1 G/DL (ref 6.4–8.3)
RBC # BLD AUTO: 3.02 10E6/UL (ref 3.8–5.2)
SODIUM SERPL-SCNC: 140 MMOL/L (ref 135–145)
TACROLIMUS BLD-MCNC: 6.8 UG/L (ref 5–15)
TME LAST DOSE: NORMAL H
TME LAST DOSE: NORMAL H
WBC # BLD AUTO: 5.6 10E3/UL (ref 4–11)

## 2025-07-07 PROCEDURE — 84100 ASSAY OF PHOSPHORUS: CPT | Performed by: PATHOLOGY

## 2025-07-07 PROCEDURE — 3078F DIAST BP <80 MM HG: CPT | Performed by: TRANSPLANT SURGERY

## 2025-07-07 PROCEDURE — G0463 HOSPITAL OUTPT CLINIC VISIT: HCPCS | Performed by: TRANSPLANT SURGERY

## 2025-07-07 PROCEDURE — 1125F AMNT PAIN NOTED PAIN PRSNT: CPT | Performed by: TRANSPLANT SURGERY

## 2025-07-07 PROCEDURE — 80053 COMPREHEN METABOLIC PANEL: CPT | Performed by: PATHOLOGY

## 2025-07-07 PROCEDURE — 36415 COLL VENOUS BLD VENIPUNCTURE: CPT | Performed by: PATHOLOGY

## 2025-07-07 PROCEDURE — 85027 COMPLETE CBC AUTOMATED: CPT | Performed by: PATHOLOGY

## 2025-07-07 PROCEDURE — 87535 HIV-1 PROBE&REVERSE TRNSCRPJ: CPT | Performed by: TRANSPLANT SURGERY

## 2025-07-07 PROCEDURE — 99213 OFFICE O/P EST LOW 20 MIN: CPT | Mod: 24 | Performed by: TRANSPLANT SURGERY

## 2025-07-07 PROCEDURE — 82248 BILIRUBIN DIRECT: CPT | Performed by: PATHOLOGY

## 2025-07-07 PROCEDURE — 99000 SPECIMEN HANDLING OFFICE-LAB: CPT | Performed by: PATHOLOGY

## 2025-07-07 PROCEDURE — 80197 ASSAY OF TACROLIMUS: CPT | Performed by: TRANSPLANT SURGERY

## 2025-07-07 PROCEDURE — 3077F SYST BP >= 140 MM HG: CPT | Performed by: TRANSPLANT SURGERY

## 2025-07-07 PROCEDURE — 83735 ASSAY OF MAGNESIUM: CPT | Performed by: PATHOLOGY

## 2025-07-07 ASSESSMENT — PAIN SCALES - GENERAL: PAINLEVEL_OUTOF10: SEVERE PAIN (7)

## 2025-07-07 NOTE — PROGRESS NOTES
Transplant Surgery -OUTPATIENT IMMUNOSUPPRESSION PROGRESS NOTE    Date of Visit: 07/07/2025    Transplants:  6/19/2025 (Liver); Postoperative day:  18  ASSESMENT AND PLAN:  1.Graft Function:Liver allograft: no rejection or technical problems.    2.Immunosuppression Management:keep tacrolimus levels at 5-6 ng/dL for renal sparing; myfortic 360 mg po qid for renal sparing  3.Hypertension:ok  4.Renal Function:creatinine 2.16, EMILIE improving  5.Lab frequency:twice weekly  6.Other:  Wound healing     Date: July 7, 2025    Transplant:  [x]                             Liver [x]                              Kidney []                             Pancreas []                              Other:             Chief Complaint:Post-op Visit (Liver txp)    Has diarrhea,  History of Present Illness:  Patient Active Problem List   Diagnosis    Psoriatic arthropathy (H)    Major depressive disorder, recurrent episode, in full remission    Hypothyroidism    Allergic rhinitis    Rosacea    Hyperlipidemia    Living will, counseling/discussion    Chronic fatigue fibromyalgia syndrome    Morbid obesity (H)    Controlled substance agreement signed    Idiopathic thrombocytopenia (H)    Osteoarthritis cervical spine    Moderate obstructive sleep apnea    DDD (degenerative disc disease), lumbar    ASCUS of cervix with negative high risk HPV    Other pulmonary embolism without acute cor pulmonale (H)    Unspecified diastolic (congestive) heart failure (H)    Hypervolemia, unspecified hypervolemia type    Liver replaced by transplant (H)    Nausea    Hyperkalemia    Diarrhea    EMILIE (acute kidney injury)     SOCIAL /FAMILY HISTORY: [x]                  No recent change    Past Medical History:   Diagnosis Date    Cirrhosis of liver with ascites (H) 08/05/2022    Fibromyalgia 11/12/2018    Hyperlipidemia 03/17/2005     Problem list name updated by automated process. Provider to review    Hypothyroidism 06/26/2002     Problem list name updated by  "automated process. Provider to review    Major depressive disorder, recurrent episode, in full remission 2002    Metabolic dysfunction-associated steatohepatitis (MASH) 2025    Morbid obesity (H) 2019    Muscle pain 2012    Osteoarthritis 2024    Psoriatic arthropathy (H) 2002    Pulmonary embolism and infarction (H) 2002    Problem list name updated by automated process. Provider to review       Past Surgical History:   Procedure Laterality Date    BIOPSY  Aug 2024    COLONOSCOPY  2016    mild colitis/ repeat 10 yrs    ESOPHAGOSCOPY, GASTROSCOPY, DUODENOSCOPY (EGD), COMBINED N/A 2024    Procedure: ESOPHAGOGASTRODUODENOSCOPY for variceal surveillance;  Surgeon: Guru Jacquelyn Rees MD;  [facility]    ESOPHAGOSCOPY, GASTROSCOPY, DUODENOSCOPY (EGD), COMBINED N/A 2024    Procedure: ESOPHAGOGASTRODUODENOSCOPY, WITH FINE NEEDLE ASPIRATION BIOPSY, WITH ENDOSCOPIC ULTRASOUND GUIDANCE liver and lymph node biopsy;  Surgeon: Guru Jacquelyn Rees MD;  Location: [facility] OR     REMOVAL OF TONSILS,<10 Y/O      Tonsils <11y.o.    IR LUMBAR PUNCTURE  2023    IR THORACENTESIS  2025    LASIK  2004    \"lasek\"    THORACENTESIS Left 2025    Procedure: Thoracentesis;  Surgeon: Jaden Martinez MD;  Location: [facility] [place]    TRANSPLANT LIVER RECIPIENT  DONOR N/A 2025    Procedure: Transplant liver recipient  donor;  Surgeon: Max Rebolledo MD;  Location:  OR     Social History     Socioeconomic History    Marital status:      Spouse name: Not on file    Number of children: 1    Years of education: Not on file    Highest education level: Not on file   Occupational History    Not on file   Tobacco Use    Smoking status: Never    Smokeless tobacco: Never   Substance and Sexual Activity    Alcohol use: Not Currently     Comment: last drink 3 years ago    Drug use: Never    Sexual activity: " Not Currently     Partners: Male   Other Topics Concern    Parent/sibling w/ CABG, MI or angioplasty before 65F 55M? Yes     Comment: Father   Social History Narrative    Not on file     Social Drivers of Health     Financial Resource Strain: Low Risk  (6/18/2025)    Financial Resource Strain     Within the past 12 months, have you or your family members you live with been unable to get utilities (heat, electricity) when it was really needed?: No   Food Insecurity: High Risk (6/18/2025)    Food Insecurity     Within the past 12 months, did you worry that your food would run out before you got money to buy more?: Yes     Within the past 12 months, did the food you bought just not last and you didn t have money to get more?: Yes   Transportation Needs: Low Risk  (6/18/2025)    Transportation Needs     Within the past 12 months, has lack of transportation kept you from medical appointments, getting your medicines, non-medical meetings or appointments, work, or from getting things that you need?: No   Physical Activity: Not on file   Stress: Not on file   Social Connections: Not on file   Interpersonal Safety: Low Risk  (6/19/2025)    Interpersonal Safety     Do you feel physically and emotionally safe where you currently live?: Yes     Within the past 12 months, have you been hit, slapped, kicked or otherwise physically hurt by someone?: No     Within the past 12 months, have you been humiliated or emotionally abused in other ways by your partner or ex-partner?: No   Housing Stability: High Risk (6/18/2025)    Housing Stability     Do you have housing? : No     Are you worried about losing your housing?: Yes     Prescription Medications as of 7/7/2025         Rx Number Disp Refills Start End Last Dispensed Date Next Fill Date Owning Pharmacy    acetaminophen (TYLENOL) 325 MG tablet  100 tablet 0 7/1/2025 --   [facility] - [place], [place] - [address]    Sig: Take 2 tablets (650 mg) by mouth  every 6 hours as needed for pain.    Class: E-Prescribe    Route: Oral    Renewals       Renewal requests to authorizing provider (Lindsey Boyd APRN CNP) <b>prohibited</b>            aspirin (ASA) 325 MG tablet  90 tablet 0 7/2/2025 9/30/2025   [address]    Sig: Take 1 tablet (325 mg) by mouth daily.    Class: E-Prescribe    Route: Oral    Renewals       Renewal requests to authorizing provider (Lindsey Boyd APRN CNP) <b>prohibited</b>            atorvastatin (LIPITOR) 40 MG tablet  30 tablet 11 7/1/2025 --   [address]    Sig: Take 1 tablet (40 mg) by mouth daily.    Class: E-Prescribe    Route: Oral    Renewals       Renewal requests to authorizing provider (Lindsey Boyd APRN CNP) <b>prohibited</b>            bulk laxative (BENEFIBER DRINK MIX) packet  60 packet 3 7/1/2025 --   [address]    Sig: Take 1 packet by mouth 2 times daily.    Class: E-Prescribe    Route: Oral    Renewals       Renewal requests to authorizing provider (Lindsey Boyd APRN CNP) <b>prohibited</b>            citalopram (CELEXA) 20 MG tablet  90 tablet 3 3/31/2025 --   [facility] [place] [address] [address]    Sig: Take 1 tablet (20 mg) by mouth daily.    Class: E-Prescribe    Route: Oral    hydrOXYzine HCl (ATARAX) 25 MG tablet  20 tablet 0 7/1/2025 --   [address]    Sig: Take 1 tablet (25 mg) by mouth every 6 hours as needed for other (adjuvant pain).    Class: E-Prescribe    Route: Oral    Renewals       Renewal requests to authorizing provider (Lindsey Boyd APRN CNP) <b>prohibited</b>            ixekizumab (TALTZ) 80 MG/ML SOAJ auto-injector  -- -- 7/1/2025 --       Sig: 160 mg once, followed by 80 mg at weeks 2, 4, 6, 8, 10, and 12, and  then 80 mg every 4 weeks. Follow up with your Rheumatologist regarding this medication and your new immunosuppressant medications.    Class: No Print Out    levothyroxine (SYNTHROID/LEVOTHROID) 112 MCG tablet  90 tablet 3 3/31/2025 --   [facility] [address] [place] [facility] [address] [address]    Sig: Take 1 tablet (112 mcg) by mouth daily.    Class: E-Prescribe    Route: Oral    Lidocaine (LIDOCARE) 4 % Patch  -- --  --       Sig: Place 1 patch onto the skin daily as needed for moderate pain.    Class: Historical    Route: Transdermal    loperamide (IMODIUM) 2 MG capsule  60 capsule 0 7/1/2025 --   [address]    Sig: Take 1 capsule (2 mg) by mouth 4 times daily as needed for diarrhea.    Class: E-Prescribe    Route: Oral    Renewals       Renewal requests to authorizing provider (Lindsey Boyd APRN CNP) <b>prohibited</b>            methocarbamol (ROBAXIN) 750 MG tablet  30 tablet 0 7/1/2025 --   [address]    Sig: Take 1 tablet (750 mg) by mouth every 6 hours as needed for muscle spasms.    Class: E-Prescribe    Route: Oral    Renewals       Renewal requests to authorizing provider (Lindsey Boyd APRN CNP) <b>prohibited</b>            mometasone (ELOCON) 0.1 % external solution  -- -- 10/2/2023 --       Sig: Apply thin layer twice a day as needed to rash on scalp    Class: Historical    multivitamin w/minerals (THERA-VIT-M) tablet  30 tablet 0 7/2/2025 --   [address]    Sig: Take 1 tablet by mouth daily.    Class: E-Prescribe    Route: Oral    Renewals       Renewal requests to authorizing provider (Lindsey Boyd APRN CNP) <b>prohibited</b>            mycophenolic acid (GENERIC EQUIVALENT) 180 MG EC tablet  240 tablet 5 7/3/2025 --   [address]  [address]    Sig: Take 4 tablets (720 mg) by mouth 2 times daily.    Class: E-Prescribe    Notes to Pharmacy: TXP DT 6/19/2025 (Liver) TXP Dischg DT 7/1/2025 DX Liver replaced by transplant Z94.4 TX [facility] ([place], [place])    Route: Oral    ondansetron (ZOFRAN ODT) 4 MG ODT tab  90 tablet 1 7/1/2025 --   [address]    Sig: Take 1 tablet (4 mg) by mouth 3 times daily as needed for nausea or vomiting.    Class: E-Prescribe    Route: Oral    Renewals       Renewal requests to authorizing provider (Lindsey Boyd APRN CNP) <b>prohibited</b>            predniSONE (DELTASONE) 5 MG tablet  30 tablet 2 7/1/2025 --   [address]    Sig: Take 1 tablet (5 mg) by mouth daily.    Class: E-Prescribe    Route: Oral    Renewals       Renewal requests to authorizing provider (Lindsey Boyd APRN CNP) <b>prohibited</b>            sodium bicarbonate 650 MG tablet  90 tablet 11 7/3/2025 --   [address]    Sig: Take 1 tablet (650 mg) by mouth 3 times daily.    Class: E-Prescribe    Route: Oral    tacrolimus (GENERIC EQUIVALENT) 0.5 MG capsule  60 capsule 0 7/1/2025 --   [address]    Sig: Take 1 cap by mouth twice daily as directed by Transplant Center for dose changes.    Class: E-Prescribe    Renewals       Renewal requests to authorizing provider (Lindsey Boyd APRN CNP) <b>prohibited</b>            tacrolimus (GENERIC EQUIVALENT) 1 MG capsule  300 capsule 11 7/1/2025 --   [address]    Sig: Take 5 capsules (5 mg) by mouth 2 times daily.    Class: E-Prescribe    Route: Oral    Renewals       Renewal requests to authorizing provider (Lindsey Boyd APRN  CNP) <b>prohibited</b>            tacrolimus (PROTOPIC) 0.1 % external ointment  -- -- 1/9/2020 --       Sig: Apply topically as needed    Class: Historical    Route: Topical    traZODone (DESYREL) 100 MG tablet  90 tablet 3 3/31/2025 --   [facility] [place] [facility] [address] [address]    Sig: Take 1 tablet (100 mg) by mouth at bedtime.    Class: E-Prescribe    Route: Oral    triamcinolone (KENALOG) 0.1 % external cream  -- -- 1/9/2020 --       Sig: Apply topically as needed    Class: Historical    Route: Topical    ursodiol (ACTIGALL) 300 MG capsule  60 capsule 2 7/1/2025 --   [address]    Sig: Take 1 capsule (300 mg) by mouth 2 times daily.    Class: E-Prescribe    Route: Oral    Renewals       Renewal requests to authorizing provider (Lindsey Boyd APRN CNP) <b>prohibited</b>            valGANciclovir (VALCYTE) 450 MG tablet  78 tablet 0 7/1/2025 9/17/2025   [address]    Sig: Take 1 tablet (450 mg) by mouth daily.    Class: E-Prescribe    Route: Oral    Renewals       Renewal requests to authorizing provider (Lindsey Boyd APRN CNP) <b>prohibited</b>                  Sumatriptan succinate   REVIEW OF SYSTEMS (check box if normal)  [x]               GENERAL  [x]                 PULMONARY [x]                GENITOURINARY  [x]                CNS                 [x]                 CARDIAC  [x]                 ENDOCRINE  [x]                EARS,NOSE,THROAT [x]                 GASTROINTESTINAL [x]                 NEUROLOGIC    [x]                MUSCLOSKELTAL  [x]                  HEMATOLOGY      PHYSICAL EXAM (check box if normal)BP (!) 141/78   Pulse 56   Temp 99.1  F (37.3  C) (Oral)   Wt (!) 145.7 kg (321 lb 3.2 oz)   SpO2 97%   BMI 44.80 kg/m          [x]            GENERAL:    [x]       EYES:  ICTERIC   []        YES  []                    NO  [x]            EXTREMITIES: Clubbing []       Y     [x]           N    [x]           EARS, NOSE, THROAT: Membranes Moist    YES   [x]                   NO []                  [x]           LUNGS:  CLEAR    YES       [x]                  NO    []                                [x]           SKIN: Jaundice           YES       []                  NO    [x]                   Rash: YES       []                  NO    [x]                                     [x]             HEART: Regular Rate          YES       [x]                  NO    []                   Incision Clean:  YES       [x]                  NO    []                                [x]                    ABDOMEN: Wound healing well Organomegaly YES       []                  NO    [x]                       [x]                    NEUROLOGICAL:  Nonfocal  YES       [x]                  NO    []                       [x]                    Hernia YES       []                  NO    [x]                   PSYCHIATRIC:  Appropriate  YES       [x]                  NO    []                       OTHER:                                                                                                   PAIN SCALE:: 3 0

## 2025-07-07 NOTE — LETTER
7/7/2025      Karlene Yun  [address]  [place] [address]      Dear Colleague,    Thank you for referring your patient, Karlene Yun, to the [facility] TRANSPLANT [facility]. Please see a copy of my visit note below.    [facility] SOLID ORGAN TRANSPLANT  OUTPATIENT MNT: POST LIVER TXP    TIME SPENT: 15 minutes  VISIT TYPE: follow up   REFERRING PHYSICIAN: Amaury  PT ACCOMPANIED BY: her daughter, Patrica    Date of txp: 6/19/25    NUTRITION ASSESSMENT // DIET RECALL  Pt has returned home with dtr 5 days ago. She is eating well. Yesterday she had: 2 boiled eggs, leftover fried rice (take out), spaghetti w/ meat sauce and caesar salad  Fruit- watermelon, unswt applesauce/apples  Drinking water only      # Protein Supplements: 1-2 [employer]are[employer] protein bars/day (170 mg K each); stopped drinking protein drinks upon hospital discharge due to hyperK    LABS      NUTRITION DIAGNOSIS   Predicted suboptimal protein intake related to increased protein needs s/p transplant.    NUTRITION INTERVENTION   1. Discussed importance of consuming adequate calories and protein for 2 months post-txp to help heal and reduce muscle/fat wasting. Discussed sources of protein and ways to ensure she is increasing her protein intake.    2. Discussed potential for wt gain post-txp and after 2 months of eating higher calorie/higher protein foods, to return to baseline eating habits and monitor for any weight gains.     3. Reviewed importance of food safety and increased risk for food-borne illness post-txp. Also discussed potential need to monitor K+, CHO, and Na+ intakes d/t medication side effects.     4. Avoid the following post txp d/t risk for rejection, unknown effects on the organs, and/or potential interactions with immunosuppressants:  - Herbal, Chinese, holistic, chiropractic, natural, alternative medicines and supplements  - Detoxes and cleanses  - Weight loss pills  - Protein powders or other products  with extracts or herbs (ie green tea extract)    Patient Understanding: Pt verbalized understanding of education provided.  Expected Engagement: Good  Follow-Up Plans: PRN     NUTRITION GOALS   Continue with high protein diet x 2 months post txp     Julita Conn RD, LD, CCTD      Again, thank you for allowing me to participate in the care of your patient.        Sincerely,        Julita Conn RD    Electronically signed

## 2025-07-07 NOTE — PROGRESS NOTES
Transplant Social Work Services Progress Note        Collaborated with: Liver Transplant Team     Data: Karlene is s/p  donor Liver Transplant. Karlene presented to clinic with her daughter, Patrica for a routine follow up.       Intervention/Education: I spoke with Karlene and Patrica  and we reviewed the followin. Writing to the Donor Family process. No further questions        2. Liver Transplant Support Group and social events-        3. Immunosuppression copays-no current concerns.  I reminded them to contact me if they have future concerns and we can evaluate for financial assistance programs, grants, etc. They reported that they waiting 2+ hours at the pharmacy at discharge as her mother medications were not prescribed. She had a $400+ bill at discharge pharmacy. Will review her test claim. Two of her medications were increased and these medications were sent to [facility] pharmacy for today. Confirmed the transplant coordinator.         4. Adjustment to illness - Karlene feels like she is doing well.         5. Importance of maintaining abstinence from all non-prescribed drugs and alcohol.    In terms of labs/home care. Home care was unable to draw her labs this morning. They inquired about timing of medications and eating. This writer spoke with transplant coordinator. It was advised that patient could take all of her meds except for her tac, and get her labs done after her appts today. She is able to eat. I also confirmed that patient can wait to take all of her meds until after her labs are done. Her medications should be taken at the same time this evening per coordinator. This writer provided information to patient and her daughter. They voiced understanding. They prefer to keep home care labs.     Assessment: They voiced understanding to the topics reviewed. They decline any concerns and continue to have adequate support.      Plan: I will remain available for the psychosocial needs of this  patient.     EDNA Hannah, [facility]  Liver Transplant   Phone:  [phone]

## 2025-07-07 NOTE — TELEPHONE ENCOUNTER
Home care nurse called. She was unable to get Karlene's labs this morning and wanted to know how to proceed. Karlene has an appt at [facility] with Dr. Rebolledo this morning. Lab appointment made. Advised them to arrive a little early for appt and stop at lab, bring meds with and take them after labs done, and take tonight's meds at normal time. Karlene understood instructions.

## 2025-07-07 NOTE — LETTER
7/7/2025      Karlene Yun  [address]  [facility] [address]      Dear Colleague,    Thank you for referring your patient, Karlene Yun, to the [facility] TRANSPLANT [facility]. Please see a copy of my visit note below.    Transplant Surgery -OUTPATIENT IMMUNOSUPPRESSION PROGRESS NOTE    Date of Visit: 07/07/2025    Transplants:  6/19/2025 (Liver); Postoperative day:  18  ASSESMENT AND PLAN:  1.Graft Function:Liver allograft: no rejection or technical problems.    2.Immunosuppression Management:keep tacrolimus levels at 5-6 ng/dL for renal sparing; myfortic 360 mg po qid for renal sparing  3.Hypertension:ok  4.Renal Function:creatinine 2.16, EMILIE improving  5.Lab frequency:twice weekly  6.Other:  Wound healing     Date: July 7, 2025    Transplant:  [x]                             Liver [x]                              Kidney []                             Pancreas []                              Other:             Chief Complaint:Post-op Visit (Liver txp)    Has diarrhea,  History of Present Illness:  Patient Active Problem List   Diagnosis     Psoriatic arthropathy (H)     Major depressive disorder, recurrent episode, in full remission     Hypothyroidism     Allergic rhinitis     Rosacea     Hyperlipidemia     Living will, counseling/discussion     Chronic fatigue fibromyalgia syndrome     Morbid obesity (H)     Controlled substance agreement signed     Idiopathic thrombocytopenia (H)     Osteoarthritis cervical spine     Moderate obstructive sleep apnea     DDD (degenerative disc disease), lumbar     ASCUS of cervix with negative high risk HPV     Other pulmonary embolism without acute cor pulmonale (H)     Unspecified diastolic (congestive) heart failure (H)     Hypervolemia, unspecified hypervolemia type     Liver replaced by transplant (H)     Nausea     Hyperkalemia     Diarrhea     EMILIE (acute kidney injury)     SOCIAL /FAMILY HISTORY: [x]                  No recent change    Past  "Medical History:   Diagnosis Date     Cirrhosis of liver with ascites (H) 2022     Fibromyalgia 2018     Hyperlipidemia 2005     Problem list name updated by automated process. Provider to review     Hypothyroidism 2002     Problem list name updated by automated process. Provider to review     Major depressive disorder, recurrent episode, in full remission 2002     Metabolic dysfunction-associated steatohepatitis (MASH) 2025     Morbid obesity (H) 2019     Muscle pain 2012     Osteoarthritis 2024     Psoriatic arthropathy (H) 2002     Pulmonary embolism and infarction (H) 2002    Problem list name updated by automated process. Provider to review       Past Surgical History:   Procedure Laterality Date     BIOPSY  Aug 2024     COLONOSCOPY  2016    mild colitis/ repeat 10 yrs     ESOPHAGOSCOPY, GASTROSCOPY, DUODENOSCOPY (EGD), COMBINED N/A 2024    Procedure: ESOPHAGOGASTRODUODENOSCOPY for variceal surveillance;  Surgeon: Guru Jacquelyn Rees MD;  Location:  OR     ESOPHAGOSCOPY, GASTROSCOPY, DUODENOSCOPY (EGD), COMBINED N/A 2024    Procedure: ESOPHAGOGASTRODUODENOSCOPY, WITH FINE NEEDLE ASPIRATION BIOPSY, WITH ENDOSCOPIC ULTRASOUND GUIDANCE liver and lymph node biopsy;  Surgeon: Guru Jacquelyn Rees MD;  Location: [facility] OR      REMOVAL OF TONSILS,<12 Y/O      Tonsils <11y.o.     IR LUMBAR PUNCTURE  2023     IR THORACENTESIS  2025     LASIK  2004    \"lasek\"     THORACENTESIS Left 2025    Procedure: Thoracentesis;  Surgeon: Jaden Martinez MD;  [place]: [facility] [place]     TRANSPLANT LIVER RECIPIENT  DONOR N/A 2025    Procedure: Transplant liver recipient  donor;  Surgeon: Max Rebolledo MD;  Location:  OR     Social History     Socioeconomic History     Marital status:      Spouse name: Not on file     Number of children: 1     Years of " education: Not on file     Highest education level: Not on file   Occupational History     Not on file   Tobacco Use     Smoking status: Never     Smokeless tobacco: Never   Substance and Sexual Activity     Alcohol use: Not Currently     Comment: last drink 3 years ago     Drug use: Never     Sexual activity: Not Currently     Partners: Male   Other Topics Concern     Parent/sibling w/ CABG, MI or angioplasty before 65F 55M? Yes     Comment: Father   Social History Narrative     Not on file     Social Drivers of Health     Financial Resource Strain: Low Risk  (6/18/2025)    Financial Resource Strain      Within the past 12 months, have you or your family members you live with been unable to get utilities (heat, electricity) when it was really needed?: No   Food Insecurity: High Risk (6/18/2025)    Food Insecurity      Within the past 12 months, did you worry that your food would run out before you got money to buy more?: Yes      Within the past 12 months, did the food you bought just not last and you didn t have money to get more?: Yes   Transportation Needs: Low Risk  (6/18/2025)    Transportation Needs      Within the past 12 months, has lack of transportation kept you from medical appointments, getting your medicines, non-medical meetings or appointments, work, or from getting things that you need?: No   Physical Activity: Not on file   Stress: Not on file   Social Connections: Not on file   Interpersonal Safety: Low Risk  (6/19/2025)    Interpersonal Safety      Do you feel physically and emotionally safe where you currently live?: Yes      Within the past 12 months, have you been hit, slapped, kicked or otherwise physically hurt by someone?: No      Within the past 12 months, have you been humiliated or emotionally abused in other ways by your partner or ex-partner?: No   Housing Stability: High Risk (6/18/2025)    Housing Stability      Do you have housing? : No      Are you worried about losing your  housing?: Yes     Prescription Medications as of 7/7/2025         Rx Number Disp Refills Start End Last Dispensed Date Next Fill Date Owning Pharmacy    acetaminophen (TYLENOL) 325 MG tablet  100 tablet 0 7/1/2025 --   [address]    Sig: Take 2 tablets (650 mg) by mouth every 6 hours as needed for pain.    Class: E-Prescribe    Route: Oral    Renewals       Renewal requests to authorizing provider (Lindsey Boyd APRN CNP) <b>prohibited</b>            aspirin (ASA) 325 MG tablet  90 tablet 0 7/2/2025 9/30/2025   [address]    Sig: Take 1 tablet (325 mg) by mouth daily.    Class: E-Prescribe    Route: Oral    Renewals       Renewal requests to authorizing provider (Lindsey Boyd APRN CNP) <b>prohibited</b>            atorvastatin (LIPITOR) 40 MG tablet  30 tablet 11 7/1/2025 --   [address]    Sig: Take 1 tablet (40 mg) by mouth daily.    Class: E-Prescribe    Route: Oral    Renewals       Renewal requests to authorizing provider (Lindsey Boyd APRN CNP) <b>prohibited</b>            bulk laxative (BENEFIBER DRINK MIX) packet  60 packet 3 7/1/2025 --   [address]    Sig: Take 1 packet by mouth 2 times daily.    Class: E-Prescribe    Route: Oral    Renewals       Renewal requests to authorizing provider (Lindsey Boyd APRN CNP) <b>prohibited</b>            citalopram (CELEXA) 20 MG tablet  90 tablet 3 3/31/2025 --   [facility] [place] [facility] [place], [place] - [address] [address]    Sig: Take 1 tablet (20 mg) by mouth daily.    Class: E-Prescribe    Route: Oral    hydrOXYzine HCl (ATARAX) 25 MG tablet  20 tablet 0 7/1/2025 --   [address]    Sig: Take 1 tablet (25 mg) by mouth every 6 hours as needed  for other (adjuvant pain).    Class: E-Prescribe    Route: Oral    Renewals       Renewal requests to authorizing provider (Lindsey Boyd APRN CNP) <b>prohibited</b>            ixekizumab (TALTZ) 80 MG/ML SOAJ auto-injector  -- -- 7/1/2025 --       Sig: 160 mg once, followed by 80 mg at weeks 2, 4, 6, 8, 10, and 12, and then 80 mg every 4 weeks. Follow up with your Rheumatologist regarding this medication and your new immunosuppressant medications.    Class: No Print Out    levothyroxine (SYNTHROID/LEVOTHROID) 112 MCG tablet  90 tablet 3 3/31/2025 --   [facility] [address] [place] [address] [address]    Sig: Take 1 tablet (112 mcg) by mouth daily.    Class: E-Prescribe    Route: Oral    Lidocaine (LIDOCARE) 4 % Patch  -- --  --       Sig: Place 1 patch onto the skin daily as needed for moderate pain.    Class: Historical    Route: Transdermal    loperamide (IMODIUM) 2 MG capsule  60 capsule 0 7/1/2025 --   [place], [place] - [address] [place] [address]    Sig: Take 1 capsule (2 mg) by mouth 4 times daily as needed for diarrhea.    Class: E-Prescribe    Route: Oral    Renewals       Renewal requests to authorizing provider (Lindsey Boyd APRN CNP) <b>prohibited</b>            methocarbamol (ROBAXIN) 750 MG tablet  30 tablet 0 7/1/2025 --   [place], [place] - [address] [place] [address]    Sig: Take 1 tablet (750 mg) by mouth every 6 hours as needed for muscle spasms.    Class: E-Prescribe    Route: Oral    Renewals       Renewal requests to authorizing provider (Lindsey Boyd APRN CNP) <b>prohibited</b>            mometasone (ELOCON) 0.1 % external solution  -- -- 10/2/2023 --       Sig: Apply thin layer twice a day as needed to rash on scalp    Class: Historical    multivitamin w/minerals (THERA-VIT-M) tablet  30 tablet 0 7/2/2025 --   [place], [place] - [address] [place] [address]    Sig: Take 1 tablet by mouth  daily.    Class: E-Prescribe    Route: Oral    Renewals       Renewal requests to authorizing provider (Lindsey Boyd APRN CNP) <b>prohibited</b>            mycophenolic acid (GENERIC EQUIVALENT) 180 MG EC tablet  240 tablet 5 7/3/2025 --   [phone]    Sig: Take 4 tablets (720 mg) by mouth 2 times daily.    Class: E-Prescribe    Notes to Pharmacy: TXP DT 6/19/2025 (Liver) TXP Dischg DT 7/1/2025 DX Liver replaced by transplant Z94.4 TX [facility] ([place], [place])    Route: Oral    ondansetron (ZOFRAN ODT) 4 MG ODT tab  90 tablet 1 7/1/2025 --   [address]    Sig: Take 1 tablet (4 mg) by mouth 3 times daily as needed for nausea or vomiting.    Class: E-Prescribe    Route: Oral    Renewals       Renewal requests to authorizing provider (Lindsey Boyd APRN CNP) <b>prohibited</b>            predniSONE (DELTASONE) 5 MG tablet  30 tablet 2 7/1/2025 --   [address]    Sig: Take 1 tablet (5 mg) by mouth daily.    Class: E-Prescribe    Route: Oral    Renewals       Renewal requests to authorizing provider (Lindsey Boyd APRN CNP) <b>prohibited</b>            sodium bicarbonate 650 MG tablet  90 tablet 11 7/3/2025 --   [address]    Sig: Take 1 tablet (650 mg) by mouth 3 times daily.    Class: E-Prescribe    Route: Oral    tacrolimus (GENERIC EQUIVALENT) 0.5 MG capsule  60 capsule 0 7/1/2025 --   [address]    Sig: Take 1 cap by mouth twice daily as directed by Transplant Center for dose changes.    Class: E-Prescribe    Renewals       Renewal requests to authorizing provider (Lindsey Boyd APRN CNP) <b>prohibited</b>             tacrolimus (GENERIC EQUIVALENT) 1 MG capsule  300 capsule 11 7/1/2025 --   [address]    Sig: Take 5 capsules (5 mg) by mouth 2 times daily.    Class: E-Prescribe    Route: Oral    Renewals       Renewal requests to authorizing provider (Lindsey Boyd APRN CNP) <b>prohibited</b>            tacrolimus (PROTOPIC) 0.1 % external ointment  -- -- 1/9/2020 --       Sig: Apply topically as needed    Class: Historical    Route: Topical    traZODone (DESYREL) 100 MG tablet  90 tablet 3 3/31/2025 --   [facility] [address] [place] [address] [address]    Sig: Take 1 tablet (100 mg) by mouth at bedtime.    Class: E-Prescribe    Route: Oral    triamcinolone (KENALOG) 0.1 % external cream  -- -- 1/9/2020 --       Sig: Apply topically as needed    Class: Historical    Route: Topical    ursodiol (ACTIGALL) 300 MG capsule  60 capsule 2 7/1/2025 --   [address]    Sig: Take 1 capsule (300 mg) by mouth 2 times daily.    Class: E-Prescribe    Route: Oral    Renewals       Renewal requests to authorizing provider (Lindsey Boyd APRN CNP) <b>prohibited</b>            valGANciclovir (VALCYTE) 450 MG tablet  78 tablet 0 7/1/2025 9/17/2025   [address]    Sig: Take 1 tablet (450 mg) by mouth daily.    Class: E-Prescribe    Route: Oral    Renewals       Renewal requests to authorizing provider (Lindsey Boyd APRN CNP) <b>prohibited</b>                  Sumatriptan succinate   REVIEW OF SYSTEMS (check box if normal)  [x]               GENERAL  [x]                 PULMONARY [x]                GENITOURINARY  [x]                CNS                 [x]                 CARDIAC  [x]                 ENDOCRINE  [x]                EARS,NOSE,THROAT [x]                 GASTROINTESTINAL [x]                 NEUROLOGIC    [x]                 TERI  [x]                  HEMATOLOGY      PHYSICAL EXAM (check box if normal)BP (!) 141/78   Pulse 56   Temp 99.1  F (37.3  C) (Oral)   Wt (!) 145.7 kg (321 lb 3.2 oz)   SpO2 97%   BMI 44.80 kg/m          [x]            GENERAL:    [x]       EYES:  ICTERIC   []        YES  []                    NO  [x]           EXTREMITIES: Clubbing []       Y     [x]           N    [x]           EARS, NOSE, THROAT: Membranes Moist    YES   [x]                   NO []                  [x]           LUNGS:  CLEAR    YES       [x]                  NO    []                                [x]           SKIN: Jaundice           YES       []                  NO    [x]                   Rash: YES       []                  NO    [x]                                     [x]             HEART: Regular Rate          YES       [x]                  NO    []                   Incision Clean:  YES       [x]                  NO    []                                [x]                    ABDOMEN: Wound healing well Organomegaly YES       []                  NO    [x]                       [x]                    NEUROLOGICAL:  Nonfocal  YES       [x]                  NO    []                       [x]                    Hernia YES       []                  NO    [x]                   PSYCHIATRIC:  Appropriate  YES       [x]                  NO    []                       OTHER:                                                                                                   PAIN SCALE:: 3 0    Again, thank you for allowing me to participate in the care of your patient.        Sincerely,        Max Rebolledo MD    Electronically signed

## 2025-07-07 NOTE — PROGRESS NOTES
[facility] SOLID ORGAN TRANSPLANT  OUTPATIENT MNT: POST LIVER TXP    TIME SPENT: 15 minutes  VISIT TYPE: follow up   REFERRING PHYSICIAN: Amaury  PT ACCOMPANIED BY: her daughter, Patrica    Date of txp: 6/19/25    NUTRITION ASSESSMENT // DIET RECALL  Pt has returned home with dtr 5 days ago. She is eating well. Yesterday she had: 2 boiled eggs, leftover fried rice (take out), spaghetti w/ meat sauce and caesar salad  Fruit- watermelon, unswt applesauce/apples  Drinking water only      # Protein Supplements: 1-2 [employer] protein bars/day (170 mg K each); stopped drinking protein drinks upon hospital discharge due to hyperK    LABS      NUTRITION DIAGNOSIS   Predicted suboptimal protein intake related to increased protein needs s/p transplant.    NUTRITION INTERVENTION   1. Discussed importance of consuming adequate calories and protein for 2 months post-txp to help heal and reduce muscle/fat wasting. Discussed sources of protein and ways to ensure she is increasing her protein intake.    2. Discussed potential for wt gain post-txp and after 2 months of eating higher calorie/higher protein foods, to return to baseline eating habits and monitor for any weight gains.     3. Reviewed importance of food safety and increased risk for food-borne illness post-txp. Also discussed potential need to monitor K+, CHO, and Na+ intakes d/t medication side effects.     4. Avoid the following post txp d/t risk for rejection, unknown effects on the organs, and/or potential interactions with immunosuppressants:  - Herbal, Chinese, holistic, chiropractic, natural, alternative medicines and supplements  - Detoxes and cleanses  - Weight loss pills  - Protein powders or other products with extracts or herbs (ie green tea extract)    Patient Understanding: Pt verbalized understanding of education provided.  Expected Engagement: Good  Follow-Up Plans: PRN     NUTRITION GOALS   Continue with high protein diet x 2 months post txp      Julita Conn, RD, LD, CCTD

## 2025-07-08 ENCOUNTER — OFFICE VISIT (OUTPATIENT)
Dept: TRANSPLANT | Facility: CLINIC | Age: 60
End: 2025-07-08
Attending: STUDENT IN AN ORGANIZED HEALTH CARE EDUCATION/TRAINING PROGRAM
Payer: COMMERCIAL

## 2025-07-08 VITALS
OXYGEN SATURATION: 95 % | SYSTOLIC BLOOD PRESSURE: 139 MMHG | TEMPERATURE: 98.4 F | DIASTOLIC BLOOD PRESSURE: 73 MMHG | WEIGHT: 293 LBS | HEART RATE: 55 BPM | BODY MASS INDEX: 44.77 KG/M2

## 2025-07-08 DIAGNOSIS — D84.9 IMMUNOSUPPRESSED STATUS: ICD-10-CM

## 2025-07-08 DIAGNOSIS — Z99.2 ACUTE RENAL FAILURE ON DIALYSIS: Primary | ICD-10-CM

## 2025-07-08 DIAGNOSIS — E87.70 HYPERVOLEMIA, UNSPECIFIED HYPERVOLEMIA TYPE: ICD-10-CM

## 2025-07-08 DIAGNOSIS — N17.9 ACUTE RENAL FAILURE ON DIALYSIS: Primary | ICD-10-CM

## 2025-07-08 DIAGNOSIS — Z94.4 LIVER TRANSPLANTED (H): ICD-10-CM

## 2025-07-08 DIAGNOSIS — E87.5 HYPERKALEMIA: ICD-10-CM

## 2025-07-08 PROCEDURE — 3075F SYST BP GE 130 - 139MM HG: CPT | Performed by: STUDENT IN AN ORGANIZED HEALTH CARE EDUCATION/TRAINING PROGRAM

## 2025-07-08 PROCEDURE — 99214 OFFICE O/P EST MOD 30 MIN: CPT | Mod: 24 | Performed by: STUDENT IN AN ORGANIZED HEALTH CARE EDUCATION/TRAINING PROGRAM

## 2025-07-08 PROCEDURE — G0463 HOSPITAL OUTPT CLINIC VISIT: HCPCS | Performed by: STUDENT IN AN ORGANIZED HEALTH CARE EDUCATION/TRAINING PROGRAM

## 2025-07-08 PROCEDURE — 1125F AMNT PAIN NOTED PAIN PRSNT: CPT | Performed by: STUDENT IN AN ORGANIZED HEALTH CARE EDUCATION/TRAINING PROGRAM

## 2025-07-08 PROCEDURE — G2211 COMPLEX E/M VISIT ADD ON: HCPCS | Performed by: STUDENT IN AN ORGANIZED HEALTH CARE EDUCATION/TRAINING PROGRAM

## 2025-07-08 PROCEDURE — 3078F DIAST BP <80 MM HG: CPT | Performed by: STUDENT IN AN ORGANIZED HEALTH CARE EDUCATION/TRAINING PROGRAM

## 2025-07-08 ASSESSMENT — PAIN SCALES - GENERAL: PAINLEVEL_OUTOF10: MODERATE PAIN (5)

## 2025-07-08 NOTE — PATIENT INSTRUCTIONS
Patient Recommendations:  - Continue to monitor at home your weight and blood pressure.  - No need for antihypertensives for now if blood pressure remains predominantly below 140/90 mm Hg as it may get better by itself with improved kidney function and loss of extra fluid.

## 2025-07-08 NOTE — NURSING NOTE
Chief Complaint   Patient presents with    RECHECK     K/P post acute txp nephr MD for AKT, post liver tx         BP Readings from Last 3 Encounters:   07/08/25 139/73   07/07/25 (!) 141/78   07/01/25 (!) 141/49       /73   Pulse 55   Temp 98.4  F (36.9  C) (Oral)   Wt (!) 145.6 kg (321 lb)   SpO2 95%   BMI 44.77 kg/m       Vivi Sargent

## 2025-07-08 NOTE — PROGRESS NOTES
06/08/25 1104   Appointment Info   Signing Clinician's Name / Credentials (PT) Mireille Paige, PT, DPT   Living Environment   People in Home alone   Current Living Arrangements apartment   Living Environment Comments Bathroom includes standard toilet and tub shower, no grab bars.   Self-Care   Usual Activity Tolerance moderate   Current Activity Tolerance fair   Equipment Currently Used at Home cane, straight   Fall history within last six months no   Activity/Exercise/Self-Care Comment Pt reports she is IND with mobility, started using cane outside apt ~1 wk ago due to feeling off-balance with new swelling. Pt is IND with ADLs and IADLs, including driving. She reports she usually drives to grocery store & does her own shopping but sometimes orders delivery if feeling more tired. Pt reports chronic back pain affects her activity tolerance more than fatigue.   General Information   Onset of Illness/Injury or Date of Surgery 06/07/25   Referring Physician Antoinette Heath MD   Patient/Family Therapy Goals Statement (PT) not directly stated though agreeable to PT's recommendation to work with PT in hospital to promote improved activity tolerance/prevent further deconditioning   Pertinent History of Current Problem (include personal factors and/or comorbidities that impact the POC) 60 yo female w/ MASH cirrhosis, chronic left-sided pleural effusion, psoriatic arthritis admitted for dyspnea and anasarca.   General Observations Pt is pleasant & agreeable to PT   Cognition   Affect/Mental Status (Cognition) WFL   Pain Assessment   Patient Currently in Pain Yes, see Vital Sign flowsheet  (5/10 neck & upper back; also has lower & midback pain at baseline)   Integumentary/Edema   Integumentary/Edema Comments anasarca/ whole body edema, particularly prominent in B LEs & feet   Posture    Posture Forward head position;Protracted shoulders   Range of Motion (ROM)   Range of Motion ROM deficits secondary to swelling   ROM Comment  Female reduced hip, knee & ankle ROM due to edema, pt unable to flex at hip/knee to reach foot to don socks   Strength (Manual Muscle Testing)   Strength Comments Pt demos strength WFL for transfers, but effortful to stand from bed in low position   Bed Mobility   Comment, (Bed Mobility) Mod IND rodríguez's<>sit EOB, HOB high elevated   Transfers   Comment, (Transfers) Sit>stand from EOB in low position with B UE pushoff, demos IND level   Gait/Stairs (Locomotion)   Comment, (Gait/Stairs) Pt able to ambulate in room without AD with SBA, wide ALIYAH noted   Balance   Balance Comments Pt ambulates with wide ALIYAH, short steps, endorses feeling less steady than her baseline due to LE edema but no overt LOB noted   Sensory Examination   Sensory Perception Comments denies numbnes/tingling   Clinical Impression   Criteria for Skilled Therapeutic Intervention Yes, treatment indicated   PT Diagnosis (PT) decreased functional mobility and endurance   Functional limitations due to impairments difficulty/decreased independence with ambulation, reduced activity tolerance for community mobility   Clinical Presentation (PT Evaluation Complexity) stable   Clinical Presentation Rationale clinical judgement   Clinical Decision Making (Complexity) low complexity   Planned Therapy Interventions (PT) balance training;bed mobility training;cryotherapy;gait training;home exercise program;neuromuscular re-education;patient/family education;ROM (range of motion);stair training;strengthening;stretching;progressive activity/exercise;transfer training;home program guidelines   Risk & Benefits of therapy have been explained evaluation/treatment results reviewed;risks/benefits reviewed;care plan/treatment goals reviewed;current/potential barriers reviewed;participants voiced agreement with care plan;participants included;patient   PT Total Evaluation Time   PT Pedro, Low Complexity Minutes (55800) 5   Physical Therapy Goals   PT Frequency 4x/week   PT  "Predicted Duration/Target Date for Goal Attainment 06/16/25   PT Goals Bed Mobility;Transfers;Gait;Stairs   PT: Bed Mobility Modified independent;Supine to/from sit  (per home bed set up)   PT: Transfers Independent;Sit to/from stand  (various surfaces, including toilet transfer)   PT: Gait Modified independent;Straight cane;Greater than 200 feet   PT: Stairs 2 stairs;Supervision/stand-by assist  (for community mobility)   Interventions   Interventions Quick Adds Therapeutic Procedure;Therapeutic Activity   Therapeutic Procedure/Exercise   Ther. Procedure: strength, endurance, ROM, flexibillity Minutes (70140) 8   Symptoms Noted During/After Treatment fatigue   Treatment Detail/Skilled Intervention Pt declining ambulation out of room due to need for frequent urination and not wanting to disconnect external catheter. After eval, pt ambulated an additional ~20 ft in room with SBA without AD. After brief sitting rest, pt completed sit<>stand 5x consecutively from slightly raised bed without use of UE pushoff. Engaged pt in standing steps in place/marching 2x 30\" with standing rest between. Pt rates steps in place at 3-4/10 on effort scale. Edu pt on rec to perform sit<>stand reps and steps in place with 1 rail support for \"home program\" in hospital room in order to promote increased activity tolerance & prevent deconditioning & pt is agreeable to this.   Therapeutic Activity   Therapeutic Activities: dynamic activities to improve functional performance Minutes (79029) 8   Treatment Detail/Skilled Intervention Pt unable to reach to don  socks due to increased edema/reduced AROM due to edema. PT provided total A to don socks. Pt reports she has already lost 10 lbs since admission due to fluid reduction. Pt denies any lightheadedness/dizziness when getting up, PT edu her to be aware of possibility of this as it can occur when fluid is reduced & to call for assist if she does feel these symptoms, pt voiced agreement. " "Pt completed sit>lying rodríguez's in bed mod ind with use of bedrail and HOB high elevated at end of PT session. Pt declined OOR ambulation today due to wanting to stay connected to external catheter (primafit) but agreeable to trial in future session with PT. Pt in bed with needs in reach at PT departure, RN updated.   PT Discharge Planning   PT Plan Ambu out of room, progress activity tolerance, check in on HEP (sit<>Stands and 30\" bouts of steps in place), discuss OP PT- see if pt agreeable (note pt is on liver transplant list-edu on benefit to increase strength in advance of liver transplant), practice tub transfer   PT Discharge Recommendation (DC Rec) home with outpatient physical therapy;home   PT Rationale for DC Rec Patient is mobilizing well overall, able to perform bed mobility and transfers mod ind, ambulation with SBA in room without AD; she appears slightly below her baseline with new use of cane in past ~1 week, corresponding with increased edema & also with reduced activity tolerance, though pt reports activity tolerance has declined over the past year or so. She likely would benefit from OP PT to progress activity tolerance & build strength as well as address chronic back pain. Did not yet discuss rec for OP PT with patient, will plan to do so in future sessions.   PT Brief overview of current status mod ind-IND with bed mobility and transfers, SBA for ambulation short distance without AD. OK to perform sit<>Stand reps and standing marching at edge of bed independently for exercises given by PT   PT Total Distance Amb During Session (feet) 30   PT Equipment Needed at Discharge   (patient has cane at home)   Physical Therapy Time and Intention   Timed Code Treatment Minutes 16   Total Session Time (sum of timed and untimed services) 21       "

## 2025-07-08 NOTE — LETTER
7/8/2025      Karlene Yun  [address]  [facility] [address]      Dear Colleague,    Thank you for referring your patient, Karlene Yun, to the [facility]. Please see a copy of my visit note below.    TRANSPLANT NEPHROLOGY CLINIC VISIT     Assessment & Plan  Continue to monitor at home your weight and blood pressure.  No need for antihypertensives for now if blood pressure remains predominantly below 140/90 mm Hg as it may get better by itself with improved kidney function and loss of extra fluid.  No need for diuretics given continued improvement in volume status and kidney function.  However, I have asked her to let me know if blood pressure volume status worsens.    # EMILIE-D: Trend down. Was on CRRT on 6/21 after transplant then transition iHD on 6/24, with last iHD on 6/24/25.    - EMILIE felt secondary to HRS/ATN in the setting of cirrhosis and liver transplant. Urinalysis was bland on 6/18/25. US abdomen in December 2024 with both kidneys are of normal echotexture without hydronephrosis. Simple right renal cyst measuring up to 3.7 cm. Renal lengths: right- 12.3 cm, left- 11.4 cm.  Her kidney function seems to be in a downtrend and it is possible that it will continue to improve over the next weeks.  This may also lead to improvement in blood pressure and volume status without antihypertensive or diuretics.   - Baseline Creatinine: ~ 0.8-1   - Proteinuria: Normal (<0.2 grams)    # Liver Tx: Patient with ESLD secondary to Metabolic associated steatotic liver disease (MASLD), s/p OLT June 19, 2025.  Transaminases Stable, normal.  Followed by Transplant Surgery.    # Immunosuppression: Tacrolimus immediate release (goal as per liver transplant), Mycophenolic acid (dose 720 mg every 12 hours), and Prednisone (dose 5 mg daily)   - Induction with Recent Transplant:  Per Liver Tx Protocol   - Continue with intensive monitoring of immunosuppression for efficacy and  toxicity.   - Goal tacrolimus level lower due to EMILIE.   - Changes: Not at this time; Management per Transplant Surgery.    # Infection Prevention:   Last CD4 Level: Not checked  - PJP: Inhaled pentamidine  - CMV: Valganciclovir (Valcyte)      - CMV IgG Ab High Risk Discordance (D+/R-) at time of transplant: No  Present CMV Serostatus: Positive  - EBV IgG Ab High Risk Discordance (D+/R-) at time of transplant: No  Present EBV Serostatus: Positive    # Blood Pressure: Borderline control;  Goal BP: < 140/90   - Changes: No    # Anemia in Chronic Disease/Surgery: Hgb: Stable, low      RACHEL: No   - Iron studies: Not checked recently    # Mineral Bone Disorder:    - Vitamin D; level: Low normal        On supplement: No  - Calcium; level: Low        On supplement: No  - Phosphorus; level: Normal        On supplement: No    # Electrolytes:  - Potassium; level: Normal        On supplement: No  - Magnesium; level: Normal        On supplement: No   - Bicarbonate; level: Low normal        On supplement: Yes sodium bicarbonate 650 mg three times a day.    # Other Significant PMH:  - H/o PE (~ 2000): Patient with no recent clotting episodes and off anticoagulation.  - Fibromyalgia: Stable symptoms on gabapentin.  - Psoriatic arthritis: on ustekinumab.      # Transplant History:  Etiology of Organ Failure: Metabolic associated steatotic liver disease (MASLD)  Tx: Liver Tx  Transplant: 6/19/2025 (Liver)  Significant changes in immunosuppression: Slightly lower tacrolimus level goal due to EMILIE.  Significant transplant-related complications: None    Transplant Office Phone Number: [phone]    Assessment and plan was discussed with the patient and she voiced her understanding and agreement.    Return visit: Return in about 2 months (around 9/8/2025).    Triston Dominguez MD    The longitudinal plan of care for the diagnosis(es)/condition(s) as documented were addressed during this visit. Due to the added complexity in care, I  will continue to support Karlene in the subsequent management and with ongoing continuity of care.      Chief Complaint  Ms. Yun is a 59 year old here for EMILIE.     History of Present Illness  Ms. Karlene Yun was accompanied by her daughter today.  The patient has a history of cirrhosis and is now 19 days from liver transplant.  Prior to transplant she had no history of kidney disease, hematuria or proteinuria.  She did not require dialysis before or during her liver transplant but due to worsening creatinine and volume overload CRRT was initiated on June 21, 2025.  She only required a few treatments due to improvement in volume status and increased urine output with last hemodialysis on June 24, 2025.    Since her last hemodialysis treatment, her weight has continued to decreased.  In the hospital it peaked around 392 pounds but most recently is to 321 pounds.  She does not feel like there is abdominal swelling and there is no thigh swelling.  The leg swelling has continued to decrease since discharge.  She does not have issues emptying her bladder, no dysuria or hematuria.  There is no fever or chills.    There is no nausea or vomiting, but does report diarrhea after starting mycophenolate.  It was at its worst last week having approximately 5 watery bowel movements for which mycophenolate mofetil was changed to mycophenolate sodium.  The diarrhea has improved since.    She has a blood pressure cuff at home but has not checked blood pressure values since discharge.    Problem List  Patient Active Problem List   Diagnosis     Psoriatic arthropathy (H)     Major depressive disorder, recurrent episode, in full remission     Hypothyroidism     Allergic rhinitis     Rosacea     Hyperlipidemia     Living will, counseling/discussion     Chronic fatigue fibromyalgia syndrome     Morbid obesity (H)     Controlled substance agreement signed     Idiopathic thrombocytopenia (H)     Osteoarthritis cervical spine      Moderate obstructive sleep apnea     DDD (degenerative disc disease), lumbar     ASCUS of cervix with negative high risk HPV     Other pulmonary embolism without acute cor pulmonale (H)     Unspecified diastolic (congestive) heart failure (H)     Hypervolemia, unspecified hypervolemia type     Liver replaced by transplant (H)     Nausea     Hyperkalemia     Diarrhea     EMILIE (acute kidney injury)       Allergies  Allergies   Allergen Reactions     Sumatriptan Succinate Nausea and Vomiting     IMITREX       Medications  Current Outpatient Medications   Medication Sig Dispense Refill     acetaminophen (TYLENOL) 325 MG tablet Take 2 tablets (650 mg) by mouth every 6 hours as needed for pain. 100 tablet 0     aspirin (ASA) 325 MG tablet Take 1 tablet (325 mg) by mouth daily. 90 tablet 0     atorvastatin (LIPITOR) 40 MG tablet Take 1 tablet (40 mg) by mouth daily. 30 tablet 11     bulk laxative (BENEFIBER DRINK MIX) packet Take 1 packet by mouth 2 times daily. 60 packet 3     citalopram (CELEXA) 20 MG tablet Take 1 tablet (20 mg) by mouth daily. 90 tablet 3     hydrOXYzine HCl (ATARAX) 25 MG tablet Take 1 tablet (25 mg) by mouth every 6 hours as needed for other (adjuvant pain). 20 tablet 0     ixekizumab (TALTZ) 80 MG/ML SOAJ auto-injector 160 mg once, followed by 80 mg at weeks 2, 4, 6, 8, 10, and 12, and then 80 mg every 4 weeks. Follow up with your Rheumatologist regarding this medication and your new immunosuppressant medications.       levothyroxine (SYNTHROID/LEVOTHROID) 112 MCG tablet Take 1 tablet (112 mcg) by mouth daily. 90 tablet 3     Lidocaine (LIDOCARE) 4 % Patch Place 1 patch onto the skin daily as needed for moderate pain.       loperamide (IMODIUM) 2 MG capsule Take 1 capsule (2 mg) by mouth 4 times daily as needed for diarrhea. 60 capsule 0     methocarbamol (ROBAXIN) 750 MG tablet Take 1 tablet (750 mg) by mouth every 6 hours as needed for muscle spasms. 30 tablet 0     mometasone (ELOCON) 0.1 %  external solution Apply thin layer twice a day as needed to rash on scalp       multivitamin w/minerals (THERA-VIT-M) tablet Take 1 tablet by mouth daily. 30 tablet 0     mycophenolic acid (GENERIC EQUIVALENT) 180 MG EC tablet Take 4 tablets (720 mg) by mouth 2 times daily. 240 tablet 5     ondansetron (ZOFRAN ODT) 4 MG ODT tab Take 1 tablet (4 mg) by mouth 3 times daily as needed for nausea or vomiting. 90 tablet 1     predniSONE (DELTASONE) 5 MG tablet Take 1 tablet (5 mg) by mouth daily. 30 tablet 2     sodium bicarbonate 650 MG tablet Take 1 tablet (650 mg) by mouth 3 times daily. 90 tablet 11     tacrolimus (GENERIC EQUIVALENT) 0.5 MG capsule Take 1 cap by mouth twice daily as directed by Transplant Center for dose changes. 60 capsule 0     tacrolimus (GENERIC EQUIVALENT) 1 MG capsule Take 5 capsules (5 mg) by mouth 2 times daily. 300 capsule 11     tacrolimus (PROTOPIC) 0.1 % external ointment Apply topically as needed       traZODone (DESYREL) 100 MG tablet Take 1 tablet (100 mg) by mouth at bedtime. 90 tablet 3     triamcinolone (KENALOG) 0.1 % external cream Apply topically as needed       ursodiol (ACTIGALL) 300 MG capsule Take 1 capsule (300 mg) by mouth 2 times daily. 60 capsule 2     valGANciclovir (VALCYTE) 450 MG tablet Take 1 tablet (450 mg) by mouth daily. 78 tablet 0     No current facility-administered medications for this visit.     There are no discontinued medications.    Physical Exam  Vital Signs: /73   Pulse 55   Temp 98.4  F (36.9  C) (Oral)   Wt (!) 145.6 kg (321 lb)   SpO2 95%   BMI 44.77 kg/m      GENERAL APPEARANCE: alert and no distress  HENT: mouth without ulcers or lesions  RESP: lungs clear to auscultation - no rales, rhonchi or wheezes  CV: regular rhythm, normal rate, no rub, no murmur  EDEMA: 1+ LE edema bilaterally.  ABDOMEN: soft, nondistended, nontender, bowel sounds normal  MS: extremities normal - no gross deformities noted, no evidence of inflammation in joints,  no muscle tenderness  SKIN: no rash  DIALYSIS ACCESS: none    Data        Latest Ref Rng & Units 7/7/2025     1:18 PM 7/2/2025     8:40 AM 7/1/2025     5:04 AM   Renal   Sodium 135 - 145 mmol/L 140  136  134    K 3.4 - 5.3 mmol/L 4.8  5.6  5.5    Cl 98 - 107 mmol/L 107  105  106    Cl (external) 98 - 107 mmol/L 107  105  106    CO2 22 - 29 mmol/L 22  20  20    Urea Nitrogen 8.0 - 23.0 mg/dL 24.9  42.5  48.2    Creatinine 0.51 - 0.95 mg/dL 2.16  2.37  2.32    Glucose 70 - 99 mg/dL 104  112  119    Calcium 8.8 - 10.4 mg/dL 8.5  8.8  8.5    Magnesium 1.7 - 2.3 mg/dL 2.3  2.3  2.1          Latest Ref Rng & Units 7/7/2025     1:18 PM 7/2/2025     8:40 AM 7/1/2025     5:04 AM   Bone Health   Phosphorus 2.5 - 4.5 mg/dL 4.5  4.4  4.1          Latest Ref Rng & Units 7/7/2025     1:18 PM 7/2/2025     8:40 AM 7/1/2025     5:04 AM   Heme   WBC 4.0 - 11.0 10e3/uL 5.6  8.5  7.6    Hgb 11.7 - 15.7 g/dL 9.2  9.2  8.9    Plt 150 - 450 10e3/uL 355  293  213    ABSOLUTE NEUTROPHIL 1.6 - 8.3 10e3/uL  6.7     ABSOLUTE LYMPHOCYTES 0.8 - 5.3 10e3/uL  0.9     ABSOLUTE MONOCYTES 0.0 - 1.3 10e3/uL  0.5     ABSOLUTE EOSINOPHILS 0.0 - 0.7 10e3/uL  0.2     ABSOLUTE BASOPHILS 0.0 - 0.2 10e3/uL  0.1           Latest Ref Rng & Units 7/7/2025     1:18 PM 7/2/2025     8:40 AM 7/1/2025     5:04 AM   Liver   AP 40 - 150 U/L 101  118     118  103    TBili <=1.2 mg/dL 0.6  0.6     0.6  0.5    Bilirubin Direct 0.00 - 0.45 mg/dL  0.00 - 0.45 mg/dL  0.35     0.35     ALT 0 - 50 U/L 21  39     39  32    AST 0 - 45 U/L 19  27     27  22    Tot Protein 6.4 - 8.3 g/dL 6.1  6.2     6.2  5.2    Albumin 3.5 - 5.2 g/dL 3.4  3.4     3.4  2.6          Latest Ref Rng & Units 6/20/2025     7:49 AM 6/20/2025     3:58 AM 6/18/2025     7:02 PM   Pancreas   A1C <5.7 % 4.9      Amylase 28 - 100 U/L  63  47    Lipase (Roche) 13 - 60 U/L  46           Latest Ref Rng & Units 11/26/2024     8:00 AM   Iron studies   Iron 37 - 145 ug/dL 112    Iron Sat Index 15 - 46 % 64     Ferritin 11 - 328 ng/mL 762          Latest Ref Rng & Units 6/18/2025     7:01 PM 11/26/2024     8:00 AM   [facility] Txp Virology   EBV CAPSID ANTIBODY IGG No detectable antibody. Positive  Positive      Failed to redirect to the Timeline version of the REVFS SmartLink.  Recent Labs   Lab Test 06/23/25  0552 06/26/25  0546 06/30/25  0532 07/02/25  0840 07/07/25  1318   DOSTAC 6/22/2025  --   --   --  7/6/2025   TACROL 4.7*   < > 6.2 7.1 6.8    < > = values in this interval not displayed.            Again, thank you for allowing me to participate in the care of your patient.        Sincerely,        Triston Dominguez MD    Electronically signed

## 2025-07-08 NOTE — PROGRESS NOTES
TRANSPLANT NEPHROLOGY CLINIC VISIT     Assessment & Plan   Continue to monitor at home your weight and blood pressure.  No need for antihypertensives for now if blood pressure remains predominantly below 140/90 mm Hg as it may get better by itself with improved kidney function and loss of extra fluid.  No need for diuretics given continued improvement in volume status and kidney function.  However, I have asked her to let me know if blood pressure volume status worsens.    # EMILIE-D: Trend down. Was on CRRT on 6/21 after transplant then transition iHD on 6/24, with last iHD on 6/24/25.    - EMILIE felt secondary to HRS/ATN in the setting of cirrhosis and liver transplant. Urinalysis was bland on 6/18/25. US abdomen in December 2024 with both kidneys are of normal echotexture without hydronephrosis. Simple right renal cyst measuring up to 3.7 cm. Renal lengths: right- 12.3 cm, left- 11.4 cm.  Her kidney function seems to be in a downtrend and it is possible that it will continue to improve over the next weeks.  This may also lead to improvement in blood pressure and volume status without antihypertensive or diuretics.   - Baseline Creatinine: ~ 0.8-1   - Proteinuria: Normal (<0.2 grams)    # Liver Tx: Patient with ESLD secondary to Metabolic associated steatotic liver disease (MASLD), s/p OLT June 19, 2025.  Transaminases Stable, normal.  Followed by Transplant Surgery.    # Immunosuppression: Tacrolimus immediate release (goal as per liver transplant), Mycophenolic acid (dose 720 mg every 12 hours), and Prednisone (dose 5 mg daily)   - Induction with Recent Transplant:  Per Liver Tx Protocol   - Continue with intensive monitoring of immunosuppression for efficacy and toxicity.   - Goal tacrolimus level lower due to EMILIE.   - Changes: Not at this time; Management per Transplant Surgery.    # Infection Prevention:   Last CD4 Level: Not checked  - PJP: Inhaled pentamidine  - CMV: Valganciclovir (Valcyte)      - CMV IgG Ab  High Risk Discordance (D+/R-) at time of transplant: No  Present CMV Serostatus: Positive  - EBV IgG Ab High Risk Discordance (D+/R-) at time of transplant: No  Present EBV Serostatus: Positive    # Blood Pressure: Borderline control;  Goal BP: < 140/90   - Changes: No    # Anemia in Chronic Disease/Surgery: Hgb: Stable, low      RACHEL: No   - Iron studies: Not checked recently    # Mineral Bone Disorder:    - Vitamin D; level: Low normal        On supplement: No  - Calcium; level: Low        On supplement: No  - Phosphorus; level: Normal        On supplement: No    # Electrolytes:  - Potassium; level: Normal        On supplement: No  - Magnesium; level: Normal        On supplement: No   - Bicarbonate; level: Low normal        On supplement: Yes sodium bicarbonate 650 mg three times a day.    # Other Significant PMH:  - H/o PE (~ 2000): Patient with no recent clotting episodes and off anticoagulation.  - Fibromyalgia: Stable symptoms on gabapentin.  - Psoriatic arthritis: on ustekinumab.      # Transplant History:  Etiology of Organ Failure: Metabolic associated steatotic liver disease (MASLD)  Tx: Liver Tx  Transplant: 6/19/2025 (Liver)  Significant changes in immunosuppression: Slightly lower tacrolimus level goal due to EMILIE.  Significant transplant-related complications: None    Transplant Office Phone Number: [phone]    Assessment and plan was discussed with the patient and she voiced her understanding and agreement.    Return visit: Return in about 2 months (around 9/8/2025).    Triston Dominguez MD    The longitudinal plan of care for the diagnosis(es)/condition(s) as documented were addressed during this visit. Due to the added complexity in care, I will continue to support Karlene in the subsequent management and with ongoing continuity of care.      Chief Complaint   Ms. Yun is a 59 year old here for EMILIE.     History of Present Illness   Ms. Karlene Yun was accompanied by her daughter today.   The patient has a history of cirrhosis and is now 19 days from liver transplant.  Prior to transplant she had no history of kidney disease, hematuria or proteinuria.  She did not require dialysis before or during her liver transplant but due to worsening creatinine and volume overload CRRT was initiated on June 21, 2025.  She only required a few treatments due to improvement in volume status and increased urine output with last hemodialysis on June 24, 2025.    Since her last hemodialysis treatment, her weight has continued to decreased.  In the hospital it peaked around 392 pounds but most recently is to 321 pounds.  She does not feel like there is abdominal swelling and there is no thigh swelling.  The leg swelling has continued to decrease since discharge.  She does not have issues emptying her bladder, no dysuria or hematuria.  There is no fever or chills.    There is no nausea or vomiting, but does report diarrhea after starting mycophenolate.  It was at its worst last week having approximately 5 watery bowel movements for which mycophenolate mofetil was changed to mycophenolate sodium.  The diarrhea has improved since.    She has a blood pressure cuff at home but has not checked blood pressure values since discharge.    Problem List   Patient Active Problem List   Diagnosis    Psoriatic arthropathy (H)    Major depressive disorder, recurrent episode, in full remission    Hypothyroidism    Allergic rhinitis    Rosacea    Hyperlipidemia    Living will, counseling/discussion    Chronic fatigue fibromyalgia syndrome    Morbid obesity (H)    Controlled substance agreement signed    Idiopathic thrombocytopenia (H)    Osteoarthritis cervical spine    Moderate obstructive sleep apnea    DDD (degenerative disc disease), lumbar    ASCUS of cervix with negative high risk HPV    Other pulmonary embolism without acute cor pulmonale (H)    Unspecified diastolic (congestive) heart failure (H)    Hypervolemia, unspecified  hypervolemia type    Liver replaced by transplant (H)    Nausea    Hyperkalemia    Diarrhea    EMILIE (acute kidney injury)       Allergies   Allergies   Allergen Reactions    Sumatriptan Succinate Nausea and Vomiting     IMITREX       Medications   Current Outpatient Medications   Medication Sig Dispense Refill    acetaminophen (TYLENOL) 325 MG tablet Take 2 tablets (650 mg) by mouth every 6 hours as needed for pain. 100 tablet 0    aspirin (ASA) 325 MG tablet Take 1 tablet (325 mg) by mouth daily. 90 tablet 0    atorvastatin (LIPITOR) 40 MG tablet Take 1 tablet (40 mg) by mouth daily. 30 tablet 11    bulk laxative (BENEFIBER DRINK MIX) packet Take 1 packet by mouth 2 times daily. 60 packet 3    citalopram (CELEXA) 20 MG tablet Take 1 tablet (20 mg) by mouth daily. 90 tablet 3    hydrOXYzine HCl (ATARAX) 25 MG tablet Take 1 tablet (25 mg) by mouth every 6 hours as needed for other (adjuvant pain). 20 tablet 0    ixekizumab (TALTZ) 80 MG/ML SOAJ auto-injector 160 mg once, followed by 80 mg at weeks 2, 4, 6, 8, 10, and 12, and then 80 mg every 4 weeks. Follow up with your Rheumatologist regarding this medication and your new immunosuppressant medications.      levothyroxine (SYNTHROID/LEVOTHROID) 112 MCG tablet Take 1 tablet (112 mcg) by mouth daily. 90 tablet 3    Lidocaine (LIDOCARE) 4 % Patch Place 1 patch onto the skin daily as needed for moderate pain.      loperamide (IMODIUM) 2 MG capsule Take 1 capsule (2 mg) by mouth 4 times daily as needed for diarrhea. 60 capsule 0    methocarbamol (ROBAXIN) 750 MG tablet Take 1 tablet (750 mg) by mouth every 6 hours as needed for muscle spasms. 30 tablet 0    mometasone (ELOCON) 0.1 % external solution Apply thin layer twice a day as needed to rash on scalp      multivitamin w/minerals (THERA-VIT-M) tablet Take 1 tablet by mouth daily. 30 tablet 0    mycophenolic acid (GENERIC EQUIVALENT) 180 MG EC tablet Take 4 tablets (720 mg) by mouth 2 times daily. 240 tablet 5     ondansetron (ZOFRAN ODT) 4 MG ODT tab Take 1 tablet (4 mg) by mouth 3 times daily as needed for nausea or vomiting. 90 tablet 1    predniSONE (DELTASONE) 5 MG tablet Take 1 tablet (5 mg) by mouth daily. 30 tablet 2    sodium bicarbonate 650 MG tablet Take 1 tablet (650 mg) by mouth 3 times daily. 90 tablet 11    tacrolimus (GENERIC EQUIVALENT) 0.5 MG capsule Take 1 cap by mouth twice daily as directed by Transplant Center for dose changes. 60 capsule 0    tacrolimus (GENERIC EQUIVALENT) 1 MG capsule Take 5 capsules (5 mg) by mouth 2 times daily. 300 capsule 11    tacrolimus (PROTOPIC) 0.1 % external ointment Apply topically as needed      traZODone (DESYREL) 100 MG tablet Take 1 tablet (100 mg) by mouth at bedtime. 90 tablet 3    triamcinolone (KENALOG) 0.1 % external cream Apply topically as needed      ursodiol (ACTIGALL) 300 MG capsule Take 1 capsule (300 mg) by mouth 2 times daily. 60 capsule 2    valGANciclovir (VALCYTE) 450 MG tablet Take 1 tablet (450 mg) by mouth daily. 78 tablet 0     No current facility-administered medications for this visit.     There are no discontinued medications.    Physical Exam   Vital Signs: /73   Pulse 55   Temp 98.4  F (36.9  C) (Oral)   Wt (!) 145.6 kg (321 lb)   SpO2 95%   BMI 44.77 kg/m      GENERAL APPEARANCE: alert and no distress  HENT: mouth without ulcers or lesions  RESP: lungs clear to auscultation - no rales, rhonchi or wheezes  CV: regular rhythm, normal rate, no rub, no murmur  EDEMA: 1+ LE edema bilaterally.  ABDOMEN: soft, nondistended, nontender, bowel sounds normal  MS: extremities normal - no gross deformities noted, no evidence of inflammation in joints, no muscle tenderness  SKIN: no rash  DIALYSIS ACCESS: none    Data         Latest Ref Rng & Units 7/7/2025     1:18 PM 7/2/2025     8:40 AM 7/1/2025     5:04 AM   Renal   Sodium 135 - 145 mmol/L 140  136  134    K 3.4 - 5.3 mmol/L 4.8  5.6  5.5    Cl 98 - 107 mmol/L 107  105  106    Cl (external) 98  - 107 mmol/L 107  105  106    CO2 22 - 29 mmol/L 22  20  20    Urea Nitrogen 8.0 - 23.0 mg/dL 24.9  42.5  48.2    Creatinine 0.51 - 0.95 mg/dL 2.16  2.37  2.32    Glucose 70 - 99 mg/dL 104  112  119    Calcium 8.8 - 10.4 mg/dL 8.5  8.8  8.5    Magnesium 1.7 - 2.3 mg/dL 2.3  2.3  2.1          Latest Ref Rng & Units 7/7/2025     1:18 PM 7/2/2025     8:40 AM 7/1/2025     5:04 AM   Bone Health   Phosphorus 2.5 - 4.5 mg/dL 4.5  4.4  4.1          Latest Ref Rng & Units 7/7/2025     1:18 PM 7/2/2025     8:40 AM 7/1/2025     5:04 AM   Heme   WBC 4.0 - 11.0 10e3/uL 5.6  8.5  7.6    Hgb 11.7 - 15.7 g/dL 9.2  9.2  8.9    Plt 150 - 450 10e3/uL 355  293  213    ABSOLUTE NEUTROPHIL 1.6 - 8.3 10e3/uL  6.7     ABSOLUTE LYMPHOCYTES 0.8 - 5.3 10e3/uL  0.9     ABSOLUTE MONOCYTES 0.0 - 1.3 10e3/uL  0.5     ABSOLUTE EOSINOPHILS 0.0 - 0.7 10e3/uL  0.2     ABSOLUTE BASOPHILS 0.0 - 0.2 10e3/uL  0.1           Latest Ref Rng & Units 7/7/2025     1:18 PM 7/2/2025     8:40 AM 7/1/2025     5:04 AM   Liver   AP 40 - 150 U/L 101  118     118  103    TBili <=1.2 mg/dL 0.6  0.6     0.6  0.5    Bilirubin Direct 0.00 - 0.45 mg/dL  0.00 - 0.45 mg/dL  0.35     0.35     ALT 0 - 50 U/L 21  39     39  32    AST 0 - 45 U/L 19  27     27  22    Tot Protein 6.4 - 8.3 g/dL 6.1  6.2     6.2  5.2    Albumin 3.5 - 5.2 g/dL 3.4  3.4     3.4  2.6          Latest Ref Rng & Units 6/20/2025     7:49 AM 6/20/2025     3:58 AM 6/18/2025     7:02 PM   Pancreas   A1C <5.7 % 4.9      Amylase 28 - 100 U/L  63  47    Lipase (Roche) 13 - 60 U/L  46           Latest Ref Rng & Units 11/26/2024     8:00 AM   Iron studies   Iron 37 - 145 ug/dL 112    Iron Sat Index 15 - 46 % 64    Ferritin 11 - 328 ng/mL 762          Latest Ref Rng & Units 6/18/2025     7:01 PM 11/26/2024     8:00 AM   [facility]   EBV CAPSID ANTIBODY IGG No detectable antibody. Positive  Positive      Failed to redirect to the Timeline version of the REVFS SmartLink.  Recent Labs   Lab Test 06/23/25  0575  06/26/25  0546 06/30/25  0532 07/02/25  0840 07/07/25  1318   DOSTAC 6/22/2025  --   --   --  7/6/2025   TACROL 4.7*   < > 6.2 7.1 6.8    < > = values in this interval not displayed.

## 2025-07-09 ENCOUNTER — TELEPHONE (OUTPATIENT)
Dept: TRANSPLANT | Facility: CLINIC | Age: 60
End: 2025-07-09
Payer: COMMERCIAL

## 2025-07-09 NOTE — TELEPHONE ENCOUNTER
Call to Elaine with [facility]. She is the OT that evaluated Karlene today. She wanted to update team. Today Karlene's HR was 53, Karlene told her that this is typical and she is asymptomatic.     OT eval done and Karlene requires no further OT intervention as she is doing well functionally. Elaine did tell Karlene that once she is back in her own apartment, OT could come back to evaluate again.     Karlene will be discharged from home health aide part of [facility] as her daughter is helping with showering and other ADL tasks.

## 2025-07-09 NOTE — TELEPHONE ENCOUNTER
General  Route to LPN    Reason for call: Elaine is calling to get an update on albertina    Call back needed? Yes    Return Call Needed  Same as documented in contacts section  When to return call?: Greater than one day: Route standard priority

## 2025-07-10 ENCOUNTER — MYC REFILL (OUTPATIENT)
Dept: INTERNAL MEDICINE | Facility: CLINIC | Age: 60
End: 2025-07-10

## 2025-07-10 ENCOUNTER — LAB REQUISITION (OUTPATIENT)
Dept: LAB | Facility: CLINIC | Age: 60
End: 2025-07-10
Payer: COMMERCIAL

## 2025-07-10 DIAGNOSIS — Z48.23 ENCOUNTER FOR AFTERCARE FOLLOWING LIVER TRANSPLANT (H): ICD-10-CM

## 2025-07-10 DIAGNOSIS — Z94.4 LIVER REPLACED BY TRANSPLANT (H): ICD-10-CM

## 2025-07-10 LAB
ALBUMIN SERPL BCG-MCNC: 3.3 G/DL (ref 3.5–5.2)
ALBUMIN SERPL BCG-MCNC: 3.3 G/DL (ref 3.5–5.2)
ALP SERPL-CCNC: 85 U/L (ref 40–150)
ALP SERPL-CCNC: 85 U/L (ref 40–150)
ALT SERPL W P-5'-P-CCNC: 15 U/L (ref 0–50)
ALT SERPL W P-5'-P-CCNC: 15 U/L (ref 0–50)
ANION GAP SERPL CALCULATED.3IONS-SCNC: 12 MMOL/L (ref 7–15)
AST SERPL W P-5'-P-CCNC: 17 U/L (ref 0–45)
AST SERPL W P-5'-P-CCNC: 17 U/L (ref 0–45)
BASOPHILS # BLD AUTO: 0.1 10E3/UL (ref 0–0.2)
BASOPHILS NFR BLD AUTO: 2 %
BILIRUB DIRECT SERPL-MCNC: 0.3 MG/DL (ref 0–0.45)
BILIRUB DIRECT SERPL-MCNC: 0.3 MG/DL (ref 0–0.45)
BILIRUB SERPL-MCNC: 0.6 MG/DL
BILIRUB SERPL-MCNC: 0.6 MG/DL
BUN SERPL-MCNC: 23.7 MG/DL (ref 8–23)
CALCIUM SERPL-MCNC: 8.4 MG/DL (ref 8.8–10.4)
CHLORIDE SERPL-SCNC: 107 MMOL/L (ref 98–107)
CREAT SERPL-MCNC: 2.15 MG/DL (ref 0.51–0.95)
EGFRCR SERPLBLD CKD-EPI 2021: 26 ML/MIN/1.73M2
EOSINOPHIL # BLD AUTO: 0.2 10E3/UL (ref 0–0.7)
EOSINOPHIL NFR BLD AUTO: 4 %
ERYTHROCYTE [DISTWIDTH] IN BLOOD BY AUTOMATED COUNT: 18.5 % (ref 10–15)
FASTING STATUS PATIENT QL REPORTED: ABNORMAL
GLUCOSE SERPL-MCNC: 110 MG/DL (ref 70–99)
HCO3 SERPL-SCNC: 21 MMOL/L (ref 22–29)
HCT VFR BLD AUTO: 27.2 % (ref 35–47)
HGB BLD-MCNC: 8.9 G/DL (ref 11.7–15.7)
IMM GRANULOCYTES # BLD: 0 10E3/UL
IMM GRANULOCYTES NFR BLD: 1 %
LYMPHOCYTES # BLD AUTO: 0.9 10E3/UL (ref 0.8–5.3)
LYMPHOCYTES NFR BLD AUTO: 17 %
MAGNESIUM SERPL-MCNC: 2.3 MG/DL (ref 1.7–2.3)
MCH RBC QN AUTO: 30.6 PG (ref 26.5–33)
MCHC RBC AUTO-ENTMCNC: 32.7 G/DL (ref 31.5–36.5)
MCV RBC AUTO: 94 FL (ref 78–100)
MONOCYTES # BLD AUTO: 0.2 10E3/UL (ref 0–1.3)
MONOCYTES NFR BLD AUTO: 4 %
NEUTROPHILS # BLD AUTO: 4 10E3/UL (ref 1.6–8.3)
NEUTROPHILS NFR BLD AUTO: 74 %
NRBC # BLD AUTO: 0 10E3/UL
NRBC BLD AUTO-RTO: 0 /100
PHOSPHATE SERPL-MCNC: 4.4 MG/DL (ref 2.5–4.5)
PLATELET # BLD AUTO: 311 10E3/UL (ref 150–450)
POTASSIUM SERPL-SCNC: 4.7 MMOL/L (ref 3.4–5.3)
PREALB SERPL-MCNC: 24.1 MG/DL (ref 20–40)
PROT SERPL-MCNC: 5.8 G/DL (ref 6.4–8.3)
PROT SERPL-MCNC: 5.8 G/DL (ref 6.4–8.3)
RBC # BLD AUTO: 2.91 10E6/UL (ref 3.8–5.2)
SODIUM SERPL-SCNC: 140 MMOL/L (ref 135–145)
TACROLIMUS BLD-MCNC: 7.3 UG/L (ref 5–15)
TME LAST DOSE: NORMAL H
TME LAST DOSE: NORMAL H
TRIGL SERPL-MCNC: 180 MG/DL
WBC # BLD AUTO: 5.5 10E3/UL (ref 4–11)

## 2025-07-10 PROCEDURE — 80197 ASSAY OF TACROLIMUS: CPT | Mod: ORL | Performed by: TRANSPLANT SURGERY

## 2025-07-10 PROCEDURE — 85025 COMPLETE CBC W/AUTO DIFF WBC: CPT | Mod: ORL | Performed by: TRANSPLANT SURGERY

## 2025-07-10 PROCEDURE — 84478 ASSAY OF TRIGLYCERIDES: CPT | Mod: ORL | Performed by: TRANSPLANT SURGERY

## 2025-07-10 PROCEDURE — 83735 ASSAY OF MAGNESIUM: CPT | Mod: ORL | Performed by: TRANSPLANT SURGERY

## 2025-07-10 PROCEDURE — 84100 ASSAY OF PHOSPHORUS: CPT | Mod: ORL | Performed by: TRANSPLANT SURGERY

## 2025-07-10 PROCEDURE — 82248 BILIRUBIN DIRECT: CPT | Mod: ORL | Performed by: TRANSPLANT SURGERY

## 2025-07-10 PROCEDURE — 84134 ASSAY OF PREALBUMIN: CPT | Mod: ORL | Performed by: TRANSPLANT SURGERY

## 2025-07-14 ENCOUNTER — OFFICE VISIT (OUTPATIENT)
Dept: TRANSPLANT | Facility: CLINIC | Age: 60
End: 2025-07-14
Attending: TRANSPLANT SURGERY
Payer: COMMERCIAL

## 2025-07-14 ENCOUNTER — TELEPHONE (OUTPATIENT)
Dept: TRANSPLANT | Facility: CLINIC | Age: 60
End: 2025-07-14

## 2025-07-14 ENCOUNTER — LAB (OUTPATIENT)
Dept: LAB | Facility: CLINIC | Age: 60
End: 2025-07-14
Attending: TRANSPLANT SURGERY
Payer: COMMERCIAL

## 2025-07-14 VITALS
HEART RATE: 60 BPM | SYSTOLIC BLOOD PRESSURE: 152 MMHG | RESPIRATION RATE: 16 BRPM | WEIGHT: 293 LBS | DIASTOLIC BLOOD PRESSURE: 77 MMHG | OXYGEN SATURATION: 93 % | BODY MASS INDEX: 44.07 KG/M2 | TEMPERATURE: 98.8 F

## 2025-07-14 DIAGNOSIS — Z94.4 LIVER TRANSPLANTED (H): ICD-10-CM

## 2025-07-14 DIAGNOSIS — Z94.4 LIVER REPLACED BY TRANSPLANT (H): ICD-10-CM

## 2025-07-14 LAB
ALBUMIN SERPL BCG-MCNC: 3.8 G/DL (ref 3.5–5.2)
ALP SERPL-CCNC: 88 U/L (ref 40–150)
ALT SERPL W P-5'-P-CCNC: 12 U/L (ref 0–50)
ANION GAP SERPL CALCULATED.3IONS-SCNC: 12 MMOL/L (ref 7–15)
AST SERPL W P-5'-P-CCNC: 17 U/L (ref 0–45)
BILIRUB SERPL-MCNC: 0.6 MG/DL
BILIRUBIN DIRECT (ROCHE PRO & PURE): 0.35 MG/DL (ref 0–0.45)
BUN SERPL-MCNC: 24.6 MG/DL (ref 8–23)
CALCIUM SERPL-MCNC: 8.9 MG/DL (ref 8.8–10.4)
CHLORIDE SERPL-SCNC: 106 MMOL/L (ref 98–107)
CREAT SERPL-MCNC: 1.87 MG/DL (ref 0.51–0.95)
EGFRCR SERPLBLD CKD-EPI 2021: 30 ML/MIN/1.73M2
ERYTHROCYTE [DISTWIDTH] IN BLOOD BY AUTOMATED COUNT: 17.8 % (ref 10–15)
GLUCOSE SERPL-MCNC: 123 MG/DL (ref 70–99)
HCO3 SERPL-SCNC: 22 MMOL/L (ref 22–29)
HCT VFR BLD AUTO: 29.4 % (ref 35–47)
HGB BLD-MCNC: 9.6 G/DL (ref 11.7–15.7)
MAGNESIUM SERPL-MCNC: 2 MG/DL (ref 1.7–2.3)
MCH RBC QN AUTO: 30.4 PG (ref 26.5–33)
MCHC RBC AUTO-ENTMCNC: 32.7 G/DL (ref 31.5–36.5)
MCV RBC AUTO: 93 FL (ref 78–100)
PHOSPHATE SERPL-MCNC: 4.1 MG/DL (ref 2.5–4.5)
PLATELET # BLD AUTO: 226 10E3/UL (ref 150–450)
POTASSIUM SERPL-SCNC: 4.5 MMOL/L (ref 3.4–5.3)
PROT SERPL-MCNC: 6.4 G/DL (ref 6.4–8.3)
RBC # BLD AUTO: 3.16 10E6/UL (ref 3.8–5.2)
SODIUM SERPL-SCNC: 140 MMOL/L (ref 135–145)
TACROLIMUS BLD-MCNC: 6.2 UG/L (ref 5–15)
TME LAST DOSE: NORMAL H
TME LAST DOSE: NORMAL H
WBC # BLD AUTO: 5.5 10E3/UL (ref 4–11)

## 2025-07-14 PROCEDURE — 85027 COMPLETE CBC AUTOMATED: CPT | Performed by: PATHOLOGY

## 2025-07-14 PROCEDURE — 82248 BILIRUBIN DIRECT: CPT | Performed by: PATHOLOGY

## 2025-07-14 PROCEDURE — G0463 HOSPITAL OUTPT CLINIC VISIT: HCPCS | Performed by: TRANSPLANT SURGERY

## 2025-07-14 PROCEDURE — 84100 ASSAY OF PHOSPHORUS: CPT | Performed by: PATHOLOGY

## 2025-07-14 PROCEDURE — 80197 ASSAY OF TACROLIMUS: CPT | Performed by: TRANSPLANT SURGERY

## 2025-07-14 PROCEDURE — 80053 COMPREHEN METABOLIC PANEL: CPT | Performed by: PATHOLOGY

## 2025-07-14 PROCEDURE — 36415 COLL VENOUS BLD VENIPUNCTURE: CPT | Performed by: PATHOLOGY

## 2025-07-14 PROCEDURE — 83735 ASSAY OF MAGNESIUM: CPT | Performed by: PATHOLOGY

## 2025-07-14 PROCEDURE — 99000 SPECIMEN HANDLING OFFICE-LAB: CPT | Performed by: PATHOLOGY

## 2025-07-14 NOTE — PROGRESS NOTES
Transplant Surgery -OUTPATIENT IMMUNOSUPPRESSION PROGRESS NOTE    Date of Visit: 07/14/2025    Transplants:  6/19/2025 (Liver); Postoperative day:  25  ASSESMENT AND PLAN:  1.Graft Function:Liver allograft: no rejection or technical problems. Today's labs pending    2.Immunosuppression Management:keep tacrolimus levels 8 ng/dL for renal sparing  3.Hypertension:ok  4.Renal Function:creatinine still elevated but better  5.Lab frequency:twice weekly  6.Other:  Wound healthy, remove staples today    Date: July 14, 2025    Transplant:  [x]                             Liver [x]                              Kidney []                             Pancreas []                              Other:             Chief Complaint:Follow Up (Liver transplant follow-up)    Doing well    History of Present Illness:  Patient Active Problem List   Diagnosis    Psoriatic arthropathy (H)    Major depressive disorder, recurrent episode, in full remission    Hypothyroidism    Allergic rhinitis    Rosacea    Hyperlipidemia    Living will, counseling/discussion    Chronic fatigue fibromyalgia syndrome    Morbid obesity (H)    Controlled substance agreement signed    Idiopathic thrombocytopenia (H)    Osteoarthritis cervical spine    Moderate obstructive sleep apnea    DDD (degenerative disc disease), lumbar    ASCUS of cervix with negative high risk HPV    Other pulmonary embolism without acute cor pulmonale (H)    Unspecified diastolic (congestive) heart failure (H)    Hypervolemia, unspecified hypervolemia type    Liver replaced by transplant (H)    Nausea    Hyperkalemia    Diarrhea    Acute renal failure on dialysis    Immunosuppressed status     SOCIAL /FAMILY HISTORY: [x]                  No recent change    Past Medical History:   Diagnosis Date    Cirrhosis of liver with ascites (H) 08/05/2022    Fibromyalgia 11/12/2018    Hyperlipidemia 03/17/2005     Problem list name updated by automated process. Provider to review    Hypothyroidism  "2002     Problem list name updated by automated process. Provider to review    Major depressive disorder, recurrent episode, in full remission 2002    Metabolic dysfunction-associated steatohepatitis (MASH) 2025    Morbid obesity (H) 2019    Muscle pain 2012    Osteoarthritis 2024    Psoriatic arthropathy (H) 2002    Pulmonary embolism and infarction (H) 2002    Problem list name updated by automated process. Provider to review       Past Surgical History:   Procedure Laterality Date    BIOPSY  Aug 2024    COLONOSCOPY  2016    mild colitis/ repeat 10 yrs    ESOPHAGOSCOPY, GASTROSCOPY, DUODENOSCOPY (EGD), COMBINED N/A 2024    Procedure: ESOPHAGOGASTRODUODENOSCOPY for variceal surveillance;  Surgeon: Guru Jacquelyn Rees MD;  Location:  OR    ESOPHAGOSCOPY, GASTROSCOPY, DUODENOSCOPY (EGD), COMBINED N/A 2024    Procedure: ESOPHAGOGASTRODUODENOSCOPY, WITH FINE NEEDLE ASPIRATION BIOPSY, WITH ENDOSCOPIC ULTRASOUND GUIDANCE liver and lymph node biopsy;  Surgeon: Guru Jacquelyn Rees MD;  Location: [facility]     REMOVAL OF TONSILS,<10 Y/O      Tonsils <11y.o.    IR LUMBAR PUNCTURE  2023    IR THORACENTESIS  2025    LASIK  2004    \"lasek\"    THORACENTESIS Left 2025    Procedure: Thoracentesis;  Surgeon: Jaden Martinez MD;  Location: [facility] OR    TRANSPLANT LIVER RECIPIENT  DONOR N/A 2025    Procedure: Transplant liver recipient  donor;  Surgeon: Max Rebolledo MD;  Location:  OR     Social History     Socioeconomic History    Marital status:      Spouse name: Not on file    Number of children: 1    Years of education: Not on file    Highest education level: Not on file   Occupational History    Not on file   Tobacco Use    Smoking status: Never    Smokeless tobacco: Never   Substance and Sexual Activity    Alcohol use: Not Currently     Comment: last drink 3 years " ago    Drug use: Never    Sexual activity: Not Currently     Partners: Male   Other Topics Concern    Parent/sibling w/ CABG, MI or angioplasty before 65F 55M? Yes     Comment: Father   Social History Narrative    Not on file     Social Drivers of Health     Financial Resource Strain: Low Risk  (6/18/2025)    Financial Resource Strain     Within the past 12 months, have you or your family members you live with been unable to get utilities (heat, electricity) when it was really needed?: No   Food Insecurity: High Risk (6/18/2025)    Food Insecurity     Within the past 12 months, did you worry that your food would run out before you got money to buy more?: Yes     Within the past 12 months, did the food you bought just not last and you didn t have money to get more?: Yes   Transportation Needs: Low Risk  (6/18/2025)    Transportation Needs     Within the past 12 months, has lack of transportation kept you from medical appointments, getting your medicines, non-medical meetings or appointments, work, or from getting things that you need?: No   Physical Activity: Not on file   Stress: Not on file   Social Connections: Not on file   Interpersonal Safety: Low Risk  (6/19/2025)    Interpersonal Safety     Do you feel physically and emotionally safe where you currently live?: Yes     Within the past 12 months, have you been hit, slapped, kicked or otherwise physically hurt by someone?: No     Within the past 12 months, have you been humiliated or emotionally abused in other ways by your partner or ex-partner?: No   Housing Stability: High Risk (6/18/2025)    Housing Stability     Do you have housing? : No     Are you worried about losing your housing?: Yes     Prescription Medications as of 7/14/2025         Rx Number Disp Refills Start End Last Dispensed Date Next Fill Date Owning Pharmacy    acetaminophen (TYLENOL) 325 MG tablet  100 tablet 0 7/1/2025 --   [facility] - [place], [place] - [address]    Sig: Take 2 tablets (650 mg) by mouth every 6 hours as needed for pain.    Class: E-Prescribe    Route: Oral    Renewals       Renewal requests to authorizing provider (Lindsey Boyd APRN CNP) <b>prohibited</b>            aspirin (ASA) 325 MG tablet  90 tablet 0 7/2/2025 9/30/2025   [address]    Sig: Take 1 tablet (325 mg) by mouth daily.    Class: E-Prescribe    Route: Oral    Renewals       Renewal requests to authorizing provider (Lindsey Boyd APRN CNP) <b>prohibited</b>            atorvastatin (LIPITOR) 40 MG tablet  30 tablet 11 7/1/2025 --   [address]    Sig: Take 1 tablet (40 mg) by mouth daily.    Class: E-Prescribe    Route: Oral    Renewals       Renewal requests to authorizing provider (Lindsey Boyd APRN CNP) <b>prohibited</b>            bulk laxative (BENEFIBER DRINK MIX) packet  60 packet 3 7/1/2025 --   [address]    Sig: Take 1 packet by mouth 2 times daily.    Class: E-Prescribe    Route: Oral    Renewals       Renewal requests to authorizing provider (Lindsey Boyd APRN CNP) <b>prohibited</b>            citalopram (CELEXA) 20 MG tablet  90 tablet 3 3/31/2025 --   [id number] [place] [facility] [place] [address] [address]    Sig: Take 1 tablet (20 mg) by mouth daily.    Class: E-Prescribe    Route: Oral    hydrOXYzine HCl (ATARAX) 25 MG tablet  20 tablet 0 7/1/2025 --   [address]    Sig: Take 1 tablet (25 mg) by mouth every 6 hours as needed for other (adjuvant pain).    Class: E-Prescribe    Route: Oral    Renewals       Renewal requests to authorizing provider (Lindsey Boyd APRN CNP) <b>prohibited</b>            ixekizumab (TALTZ) 80 MG/ML SOAJ auto-injector  -- -- 7/1/2025 --       Sig: 160 mg once, followed by  80 mg at weeks 2, 4, 6, 8, 10, and 12, and then 80 mg every 4 weeks. Follow up with your Rheumatologist regarding this medication and your new immunosuppressant medications.    Class: No Print Out    levothyroxine (SYNTHROID/LEVOTHROID) 112 MCG tablet  90 tablet 3 3/31/2025 --   [facility] [id number] [place] [facility] [address] [address]    Sig: Take 1 tablet (112 mcg) by mouth daily.    Class: E-Prescribe    Route: Oral    Lidocaine (LIDOCARE) 4 % Patch  -- --  --       Sig: Place 1 patch onto the skin daily as needed for moderate pain.    Class: Historical    Route: Transdermal    loperamide (IMODIUM) 2 MG capsule  60 capsule 0 7/1/2025 --   [address]    Sig: Take 1 capsule (2 mg) by mouth 4 times daily as needed for diarrhea.    Class: E-Prescribe    Route: Oral    Renewals       Renewal requests to authorizing provider (Lindsey Boyd APRN CNP) <b>prohibited</b>            methocarbamol (ROBAXIN) 750 MG tablet  30 tablet 0 7/1/2025 --   [address]    Sig: Take 1 tablet (750 mg) by mouth every 6 hours as needed for muscle spasms.    Class: E-Prescribe    Route: Oral    Renewals       Renewal requests to authorizing provider (Lindsey Boyd APRN CNP) <b>prohibited</b>            mometasone (ELOCON) 0.1 % external solution  -- -- 10/2/2023 --       Sig: Apply thin layer twice a day as needed to rash on scalp    Class: Historical    multivitamin w/minerals (THERA-VIT-M) tablet  30 tablet 0 7/2/2025 --   [address]    Sig: Take 1 tablet by mouth daily.    Class: E-Prescribe    Route: Oral    Renewals       Renewal requests to authorizing provider (Lindsey Boyd APRN CNP) <b>prohibited</b>            mycophenolic acid (GENERIC EQUIVALENT) 180 MG EC tablet  240 tablet 5 7/3/2025 --   [facility]  [phone]    Sig: Take 4 tablets (720 mg) by mouth 2 times daily.    Class: E-Prescribe    Notes to Pharmacy: TXP DT 6/19/2025 (Liver) TXP Dischg DT 7/1/2025 DX Liver replaced by transplant Z94.4 [facility] ([place], [place])    Route: Oral    ondansetron (ZOFRAN ODT) 4 MG ODT tab  90 tablet 1 7/1/2025 --   [address]    Sig: Take 1 tablet (4 mg) by mouth 3 times daily as needed for nausea or vomiting.    Class: E-Prescribe    Route: Oral    Renewals       Renewal requests to authorizing provider (Lindsey Boyd APRN CNP) <b>prohibited</b>            predniSONE (DELTASONE) 5 MG tablet  30 tablet 2 7/1/2025 --   [address]    Sig: Take 1 tablet (5 mg) by mouth daily.    Class: E-Prescribe    Route: Oral    Renewals       Renewal requests to authorizing provider (Lindsey Boyd APRN CNP) <b>prohibited</b>            sodium bicarbonate 650 MG tablet  90 tablet 11 7/3/2025 --   [phone]    Sig: Take 1 tablet (650 mg) by mouth 3 times daily.    Class: E-Prescribe    Route: Oral    tacrolimus (GENERIC EQUIVALENT) 0.5 MG capsule  60 capsule 0 7/1/2025 --   [address]    Sig: Take 1 cap by mouth twice daily as directed by Transplant Center for dose changes.    Class: E-Prescribe    Renewals       Renewal requests to authorizing provider (Lindsey Boyd APRN CNP) <b>prohibited</b>            tacrolimus (GENERIC EQUIVALENT) 1 MG capsule  300 capsule 11 7/1/2025 --   [address]    Sig: Take 5 capsules (5 mg) by mouth 2 times daily.    Class: E-Prescribe    Route: Oral    Renewals       Renewal requests to authorizing  provider (Lindsey Boyd APRN CNP) <b>prohibited</b>            tacrolimus (PROTOPIC) 0.1 % external ointment  -- -- 1/9/2020 --       Sig: Apply topically as needed    Class: Historical    Route: Topical    traZODone (DESYREL) 100 MG tablet  90 tablet 3 3/31/2025 --   [facility] [place] [facility] [address] [address]    Sig: Take 1 tablet (100 mg) by mouth at bedtime.    Class: E-Prescribe    Route: Oral    triamcinolone (KENALOG) 0.1 % external cream  -- -- 1/9/2020 --       Sig: Apply topically as needed    Class: Historical    Route: Topical    ursodiol (ACTIGALL) 300 MG capsule  60 capsule 2 7/1/2025 --   [address]    Sig: Take 1 capsule (300 mg) by mouth 2 times daily.    Class: E-Prescribe    Route: Oral    Renewals       Renewal requests to authorizing provider (Lindsey Boyd APRN CNP) <b>prohibited</b>            valGANciclovir (VALCYTE) 450 MG tablet  78 tablet 0 7/1/2025 9/17/2025   [address]    Sig: Take 1 tablet (450 mg) by mouth daily.    Class: E-Prescribe    Route: Oral    Renewals       Renewal requests to authorizing provider (Lindsey Boyd APRN CNP) <b>prohibited</b>                  Sumatriptan succinate   REVIEW OF SYSTEMS (check box if normal)  [x]               GENERAL  [x]                 PULMONARY [x]                GENITOURINARY  [x]                CNS                 [x]                 CARDIAC  [x]                 ENDOCRINE  [x]                EARS,NOSE,THROAT [x]                 GASTROINTESTINAL [x]                 NEUROLOGIC    [x]                MUSCLOSKELTAL  [x]                  HEMATOLOGY      PHYSICAL EXAM (check box if normal)BP (!) 152/77 (BP Location: Right arm, Patient Position: Chair, Cuff Size: Adult Regular)   Pulse 60   Temp 98.8  F (37.1  C) (Oral)   Resp 16   Wt (!) 143.3 kg (316 lb)   SpO2 93%   BMI 44.07 kg/m           [x]            GENERAL:    [x]       EYES:  ICTERIC   []        YES  []                    NO  [x]           EXTREMITIES: Clubbing []       Y     [x]           N    [x]           EARS, NOSE, THROAT: Membranes Moist    YES   [x]                   NO []                  [x]           LUNGS:  CLEAR    YES       [x]                  NO    []                                [x]           SKIN: Jaundice           YES       []                  NO    [x]                   Rash: YES       []                  NO    [x]                                     [x]             HEART: Regular Rate          YES       [x]                  NO    []                   Incision Clean:  YES       [x]                  NO    []                                [x]                    ABDOMEN: Wound healthy Organomegaly YES       []                  NO    [x]                       [x]                    NEUROLOGICAL:  Nonfocal  YES       [x]                  NO    []                       [x]                    Hernia YES       []                  NO    [x]                   PSYCHIATRIC:  Appropriate  YES       [x]                  NO    []                       OTHER:                                                                                                   PAIN SCALE:: 3

## 2025-07-14 NOTE — NURSING NOTE
Staples Removal    BP (!) 152/77 (BP Location: Right arm, Patient Position: Chair, Cuff Size: Adult Regular)   Pulse 60   Temp 98.8  F (37.1  C) (Oral)   Resp 16   Wt (!) 143.3 kg (316 lb)   SpO2 93%   BMI 44.07 kg/m      Removed patients staples. Surgical incision site was Clean, dry, and intact.    Educated patient on continued care around incision site.     Sudarshan Benjamin RN on 7/14/2025 at 10:50 AM

## 2025-07-14 NOTE — NURSING NOTE
"Chief Complaint   Patient presents with    Follow Up     Liver transplant follow-up     Vital signs:  Temp: 98.8  F (37.1  C) Temp src: Oral BP: (!) 152/77 Pulse: 60   Resp: 16 SpO2: 93 %       Weight: (!) 143.3 kg (316 lb)  Estimated body mass index is 44.07 kg/m  as calculated from the following:    Height as of 6/7/25: 1.803 m (5' 11\").    Weight as of this encounter: 143.3 kg (316 lb).      Sudarshan Benjamin RN on 7/14/2025 at 10:14 AM    "

## 2025-07-14 NOTE — LETTER
7/14/2025      Karlene Yun  [address]  [facility] [address]      Dear Colleague,    Thank you for referring your patient, Karlene Yun, to the [facility]PL[facility]. Please see a copy of my visit note below.    Transplant Surgery -OUTPATIENT IMMUNOSUPPRESSION PROGRESS NOTE    Date of Visit: 07/14/2025    Transplants:  6/19/2025 (Liver); Postoperative day:  25  ASSESMENT AND PLAN:  1.Graft Function:Liver allograft: no rejection or technical problems. Today's labs pending    2.Immunosuppression Management:keep tacrolimus levels 8 ng/dL for renal sparing  3.Hypertension:ok  4.Renal Function:creatinine still elevated but better  5.Lab frequency:twice weekly  6.Other:  Wound healthy, remove staples today    Date: July 14, 2025    Transplant:  [x]                             Liver [x]                              Kidney []                             Pancreas []                              Other:             Chief Complaint:Follow Up (Liver transplant follow-up)    Doing well    History of Present Illness:  Patient Active Problem List   Diagnosis     Psoriatic arthropathy (H)     Major depressive disorder, recurrent episode, in full remission     Hypothyroidism     Allergic rhinitis     Rosacea     Hyperlipidemia     Living will, counseling/discussion     Chronic fatigue fibromyalgia syndrome     Morbid obesity (H)     Controlled substance agreement signed     Idiopathic thrombocytopenia (H)     Osteoarthritis cervical spine     Moderate obstructive sleep apnea     DDD (degenerative disc disease), lumbar     ASCUS of cervix with negative high risk HPV     Other pulmonary embolism without acute cor pulmonale (H)     Unspecified diastolic (congestive) heart failure (H)     Hypervolemia, unspecified hypervolemia type     Liver replaced by transplant (H)     Nausea     Hyperkalemia     Diarrhea     Acute renal failure on dialysis     Immunosuppressed status     SOCIAL /FAMILY HISTORY:  "[x]                  No recent change    Past Medical History:   Diagnosis Date     Cirrhosis of liver with ascites (H) 2022     Fibromyalgia 2018     Hyperlipidemia 2005     Problem list name updated by automated process. Provider to review     Hypothyroidism 2002     Problem list name updated by automated process. Provider to review     Major depressive disorder, recurrent episode, in full remission 2002     Metabolic dysfunction-associated steatohepatitis (MASH) 2025     Morbid obesity (H) 2019     Muscle pain 2012     Osteoarthritis 2024     Psoriatic arthropathy (H) 2002     Pulmonary embolism and infarction (H) 2002    Problem list name updated by automated process. Provider to review       Past Surgical History:   Procedure Laterality Date     BIOPSY  Aug 2024     COLONOSCOPY  2016    mild colitis/ repeat 10 yrs     ESOPHAGOSCOPY, GASTROSCOPY, DUODENOSCOPY (EGD), COMBINED N/A 2024    Procedure: ESOPHAGOGASTRODUODENOSCOPY for variceal surveillance;  Surgeon: Guru Jacquelyn Rees MD;  Location:  OR     ESOPHAGOSCOPY, GASTROSCOPY, DUODENOSCOPY (EGD), COMBINED N/A 2024    Procedure: ESOPHAGOGASTRODUODENOSCOPY, WITH FINE NEEDLE ASPIRATION BIOPSY, WITH ENDOSCOPIC ULTRASOUND GUIDANCE liver and lymph node biopsy;  Surgeon: Guru Jacquelyn Rees MD;  Location: [facility] OR      REMOVAL OF TONSILS,<13 Y/O      Tonsils <11y.o.     IR LUMBAR PUNCTURE  2023     IR THORACENTESIS  2025     LASIK  2004    \"lasek\"     THORACENTESIS Left 2025    Procedure: Thoracentesis;  Surgeon: Jaden Martinez MD;  Location: [facility] OR     TRANSPLANT LIVER RECIPIENT  DONOR N/A 2025    Procedure: Transplant liver recipient  donor;  Surgeon: Max Rebolledo MD;  Location:  OR     Social History     Socioeconomic History     Marital status:      Spouse name: Not on file "     Number of children: 1     Years of education: Not on file     Highest education level: Not on file   Occupational History     Not on file   Tobacco Use     Smoking status: Never     Smokeless tobacco: Never   Substance and Sexual Activity     Alcohol use: Not Currently     Comment: last drink 3 years ago     Drug use: Never     Sexual activity: Not Currently     Partners: Male   Other Topics Concern     Parent/sibling w/ CABG, MI or angioplasty before 65F 55M? Yes     Comment: Father   Social History Narrative     Not on file     Social Drivers of Health     Financial Resource Strain: Low Risk  (6/18/2025)    Financial Resource Strain      Within the past 12 months, have you or your family members you live with been unable to get utilities (heat, electricity) when it was really needed?: No   Food Insecurity: High Risk (6/18/2025)    Food Insecurity      Within the past 12 months, did you worry that your food would run out before you got money to buy more?: Yes      Within the past 12 months, did the food you bought just not last and you didn t have money to get more?: Yes   Transportation Needs: Low Risk  (6/18/2025)    Transportation Needs      Within the past 12 months, has lack of transportation kept you from medical appointments, getting your medicines, non-medical meetings or appointments, work, or from getting things that you need?: No   Physical Activity: Not on file   Stress: Not on file   Social Connections: Not on file   Interpersonal Safety: Low Risk  (6/19/2025)    Interpersonal Safety      Do you feel physically and emotionally safe where you currently live?: Yes      Within the past 12 months, have you been hit, slapped, kicked or otherwise physically hurt by someone?: No      Within the past 12 months, have you been humiliated or emotionally abused in other ways by your partner or ex-partner?: No   Housing Stability: High Risk (6/18/2025)    Housing Stability      Do you have housing? : No       Are you worried about losing your housing?: Yes     Prescription Medications as of 7/14/2025         Rx Number Disp Refills Start End Last Dispensed Date Next Fill Date Owning Pharmacy    acetaminophen (TYLENOL) 325 MG tablet  100 tablet 0 7/1/2025 --   [address]    Sig: Take 2 tablets (650 mg) by mouth every 6 hours as needed for pain.    Class: E-Prescribe    Route: Oral    Renewals       Renewal requests to authorizing provider (Lindsey Boyd APRN CNP) <b>prohibited</b>            aspirin (ASA) 325 MG tablet  90 tablet 0 7/2/2025 9/30/2025   [address]    Sig: Take 1 tablet (325 mg) by mouth daily.    Class: E-Prescribe    Route: Oral    Renewals       Renewal requests to authorizing provider (Lindsey Boyd APRN CNP) <b>prohibited</b>            atorvastatin (LIPITOR) 40 MG tablet  30 tablet 11 7/1/2025 --   [address]    Sig: Take 1 tablet (40 mg) by mouth daily.    Class: E-Prescribe    Route: Oral    Renewals       Renewal requests to authorizing provider (Lindsey Boyd APRN CNP) <b>prohibited</b>            bulk laxative (BENEFIBER DRINK MIX) packet  60 packet 3 7/1/2025 --   [address]    Sig: Take 1 packet by mouth 2 times daily.    Class: E-Prescribe    Route: Oral    Renewals       Renewal requests to authorizing provider (Lindsey Boyd APRN CNP) <b>prohibited</b>            citalopram (CELEXA) 20 MG tablet  90 tablet 3 3/31/2025 --   [facility] - [place], [place] - [address] [address]    Sig: Take 1 tablet (20 mg) by mouth daily.    Class: E-Prescribe    Route: Oral    hydrOXYzine HCl (ATARAX) 25 MG tablet  20 tablet 0 7/1/2025 --   [address]    Sig: Take 1 tablet (25  mg) by mouth every 6 hours as needed for other (adjuvant pain).    Class: E-Prescribe    Route: Oral    Renewals       Renewal requests to authorizing provider (Lindsey Boyd APRN CNP) <b>prohibited</b>            ixekizumab (TALTZ) 80 MG/ML SOAJ auto-injector  -- -- 7/1/2025 --       Sig: 160 mg once, followed by 80 mg at weeks 2, 4, 6, 8, 10, and 12, and then 80 mg every 4 weeks. Follow up with your Rheumatologist regarding this medication and your new immunosuppressant medications.    Class: No Print Out    levothyroxine (SYNTHROID/LEVOTHROID) 112 MCG tablet  90 tablet 3 3/31/2025 --   [facility] [address] [place] [address]     Sig: Take 1 tablet (112 mcg) by mouth daily.    Class: E-Prescribe    Route: Oral    Lidocaine (LIDOCARE) 4 % Patch  -- --  --       Sig: Place 1 patch onto the skin daily as needed for moderate pain.    Class: Historical    Route: Transdermal    loperamide (IMODIUM) 2 MG capsule  60 capsule 0 7/1/2025 --   [address]    Sig: Take 1 capsule (2 mg) by mouth 4 times daily as needed for diarrhea.    Class: E-Prescribe    Route: Oral    Renewals       Renewal requests to authorizing provider (Lindsey Boyd APRN CNP) <b>prohibited</b>            methocarbamol (ROBAXIN) 750 MG tablet  30 tablet 0 7/1/2025 --   [address]    Sig: Take 1 tablet (750 mg) by mouth every 6 hours as needed for muscle spasms.    Class: E-Prescribe    Route: Oral    Renewals       Renewal requests to authorizing provider (Lindsey Boyd APRN CNP) <b>prohibited</b>            mometasone (ELOCON) 0.1 % external solution  -- -- 10/2/2023 --       Sig: Apply thin layer twice a day as needed to rash on scalp    Class: Historical    multivitamin w/minerals (THERA-VIT-M) tablet  30 tablet 0 7/2/2025 --   [address]  [facility]    Sig: Take 1 tablet by mouth daily.    Class: E-Prescribe    Route: Oral    Renewals       Renewal requests to authorizing provider (Lindsey Boyd APRN CNP) <b>prohibited</b>            mycophenolic acid (GENERIC EQUIVALENT) 180 MG EC tablet  240 tablet 5 7/3/2025 --   [address]    Sig: Take 4 tablets (720 mg) by mouth 2 times daily.    Class: E-Prescribe    Notes to Pharmacy: TXP DT 6/19/2025 (Liver) TXP Dischg DT 7/1/2025 DX Liver replaced by transplant Z94.4 TX [facility] ([place], [place])    Route: Oral    ondansetron (ZOFRAN ODT) 4 MG ODT tab  90 tablet 1 7/1/2025 --   [address]    Sig: Take 1 tablet (4 mg) by mouth 3 times daily as needed for nausea or vomiting.    Class: E-Prescribe    Route: Oral    Renewals       Renewal requests to authorizing provider (Lindsey Boyd APRN CNP) <b>prohibited</b>            predniSONE (DELTASONE) 5 MG tablet  30 tablet 2 7/1/2025 --   [address]    Sig: Take 1 tablet (5 mg) by mouth daily.    Class: E-Prescribe    Route: Oral    Renewals       Renewal requests to authorizing provider (Lindsey Boyd APRN CNP) <b>prohibited</b>            sodium bicarbonate 650 MG tablet  90 tablet 11 7/3/2025 --   [address]    Sig: Take 1 tablet (650 mg) by mouth 3 times daily.    Class: E-Prescribe    Route: Oral    tacrolimus (GENERIC EQUIVALENT) 0.5 MG capsule  60 capsule 0 7/1/2025 --   [address]    Sig: Take 1 cap by mouth twice daily as directed by Transplant Center for dose changes.    Class: E-Prescribe    Renewals       Renewal requests to authorizing provider (Lindsey Boyd  APRN CNP) <b>prohibited</b>            tacrolimus (GENERIC EQUIVALENT) 1 MG capsule  300 capsule 11 7/1/2025 --   [address]    Sig: Take 5 capsules (5 mg) by mouth 2 times daily.    Class: E-Prescribe    Route: Oral    Renewals       Renewal requests to authorizing provider (Lindsey Boyd APRN CNP) <b>prohibited</b>            tacrolimus (PROTOPIC) 0.1 % external ointment  -- -- 1/9/2020 --       Sig: Apply topically as needed    Class: Historical    Route: Topical    traZODone (DESYREL) 100 MG tablet  90 tablet 3 3/31/2025 --   [facility] [address] [place] [address] [address]    Sig: Take 1 tablet (100 mg) by mouth at bedtime.    Class: E-Prescribe    Route: Oral    triamcinolone (KENALOG) 0.1 % external cream  -- -- 1/9/2020 --       Sig: Apply topically as needed    Class: Historical    Route: Topical    ursodiol (ACTIGALL) 300 MG capsule  60 capsule 2 7/1/2025 --   [address]    Sig: Take 1 capsule (300 mg) by mouth 2 times daily.    Class: E-Prescribe    Route: Oral    Renewals       Renewal requests to authorizing provider (Lindsey Boyd APRN CNP) <b>prohibited</b>            valGANciclovir (VALCYTE) 450 MG tablet  78 tablet 0 7/1/2025 9/17/2025   [address]    Sig: Take 1 tablet (450 mg) by mouth daily.    Class: E-Prescribe    Route: Oral    Renewals       Renewal requests to authorizing provider (Lindsey Boyd APRN CNP) <b>prohibited</b>                  Sumatriptan succinate   REVIEW OF SYSTEMS (check box if normal)  [x]               GENERAL  [x]                 PULMONARY [x]                GENITOURINARY  [x]                CNS                 [x]                 CARDIAC  [x]                 ENDOCRINE  [x]                EARS,NOSE,THROAT [x]                 GASTROINTESTINAL [x]                  NEUROLOGIC    [x]                MUSCLOSKELTAL  [x]                  HEMATOLOGY      PHYSICAL EXAM (check box if normal)BP (!) 152/77 (BP Location: Right arm, Patient Position: Chair, Cuff Size: Adult Regular)   Pulse 60   Temp 98.8  F (37.1  C) (Oral)   Resp 16   Wt (!) 143.3 kg (316 lb)   SpO2 93%   BMI 44.07 kg/m          [x]            GENERAL:    [x]       EYES:  ICTERIC   []        YES  []                    NO  [x]           EXTREMITIES: Clubbing []       Y     [x]           N    [x]           EARS, NOSE, THROAT: Membranes Moist    YES   [x]                   NO []                  [x]           LUNGS:  CLEAR    YES       [x]                  NO    []                                [x]           SKIN: Jaundice           YES       []                  NO    [x]                   Rash: YES       []                  NO    [x]                                     [x]             HEART: Regular Rate          YES       [x]                  NO    []                   Incision Clean:  YES       [x]                  NO    []                                [x]                    ABDOMEN: Wound healthy Organomegaly YES       []                  NO    [x]                       [x]                    NEUROLOGICAL:  Nonfocal  YES       [x]                  NO    []                       [x]                    Hernia YES       []                  NO    [x]                   PSYCHIATRIC:  Appropriate  YES       [x]                  NO    []                       OTHER:                                                                                                   PAIN SCALE:: 3     Again, thank you for allowing me to participate in the care of your patient.        Sincerely,        Max Rebolledo MD    Electronically signed

## 2025-07-15 ENCOUNTER — VIRTUAL VISIT (OUTPATIENT)
Dept: PHARMACY | Facility: CLINIC | Age: 60
End: 2025-07-15
Payer: COMMERCIAL

## 2025-07-15 DIAGNOSIS — E03.9 ACQUIRED HYPOTHYROIDISM: ICD-10-CM

## 2025-07-15 DIAGNOSIS — R19.5 LOOSE STOOLS: ICD-10-CM

## 2025-07-15 DIAGNOSIS — R52 PAIN: ICD-10-CM

## 2025-07-15 DIAGNOSIS — Z94.4 LIVER TRANSPLANTED (H): Primary | ICD-10-CM

## 2025-07-15 DIAGNOSIS — E78.5 DYSLIPIDEMIA: ICD-10-CM

## 2025-07-15 DIAGNOSIS — F33.42 MAJOR DEPRESSIVE DISORDER, RECURRENT EPISODE, IN FULL REMISSION: ICD-10-CM

## 2025-07-15 DIAGNOSIS — L40.50 PSORIATIC ARTHROPATHY (H): ICD-10-CM

## 2025-07-15 DIAGNOSIS — Z78.9 TAKES DIETARY SUPPLEMENTS: ICD-10-CM

## 2025-07-15 PROCEDURE — 99605 MTMS BY PHARM NP 15 MIN: CPT | Mod: 93 | Performed by: PHARMACIST

## 2025-07-15 PROCEDURE — 99607 MTMS BY PHARM ADDL 15 MIN: CPT | Mod: 93 | Performed by: PHARMACIST

## 2025-07-15 PROCEDURE — 1111F DSCHRG MED/CURRENT MED MERGE: CPT | Mod: 93 | Performed by: PHARMACIST

## 2025-07-15 RX ORDER — HYDROXYZINE HYDROCHLORIDE 25 MG/1
25 TABLET, FILM COATED ORAL EVERY 6 HOURS PRN
Qty: 20 TABLET | Refills: 0 | OUTPATIENT
Start: 2025-07-15

## 2025-07-15 NOTE — PROGRESS NOTES
"Medication Therapy Management (MTM) Encounter    ASSESSMENT:                            Medication Adherence/Access: No issues identified.    Liver Transplant:    Pt reports she is unsure on her stools, but they currently are not black and tarry. Reviewed signs of a gastrointestinal bleed and importance of ED visit if she suspects she has a gastrointestinal bleed.     Supplements:   Stable    Loose stools  Recommended she start fiber supplement to help with diarrhea.    Hyperlipidemia   Stable.     Mental Health   Anxiety, Depression, and Insomnia  Stable.     Hypothyroidism   Stable.     Plaque Psoriasis   Stable.    Pain   Patient may use methocarbamol for her back pain if needed.   PLAN:                            Start Metamucil 1 teaspoonful once daily. If this does not work can use the Benefiber.   If stools become black/ sticky, or bloody go to the ED to eval. This could be a stomach/GI bleed.   Can take methocarbamol as needed for back pain.  This is a muscle relaxer and works well for back pain.   [facility] order will call you three weeks post discharge, but if your dose changes, call the number on the back of the pill box to talk to them earlier, [phone]. If your doctor sends over a new prescription to the mail order pharmacy you will have to call them to set up the order. They have an [place] called \"[facility]\" as well.     Follow-up: 3 months    SUBJECTIVE/OBJECTIVE:                          Karlene Yun is a 59 year old female seen for a transitions of care visit. She was discharged from [facility] on 7/1 for Liver txp.      Reason for visit: initial post transplant med review.    Allergies/ADRs: Reviewed in chart  Past Medical History: Reviewed in chart  Tobacco: She reports that she has never smoked. She has never used smokeless tobacco.  Alcohol: not currently using  THC/CBD: None    Medication Adherence/Access: Patient uses pill box(es) and has assistance with medication administration: " family member, daughter and KORY have helped.  Patient takes medications 4 time(s) per day. 8am, 12pm, 8pm, 10pm  Per patient, misses medication 0 time(s) per week.   The patient fills medications at [facility]: YES.    Liver Transplant:    Tacrolimus 5 mg twice daily  Mycophenolic acid 360 mg 4 times daily  Prednisone 5 mg every morning.    Pt reports Diarrhea, tremors predates transplant, left hand worse.   Transplant date: 6/19/25  Vascular Prophylaxis: Aspirin 325mg daily. Hard to tell per patient due to loose stools, but currently no  gastrointestinal bleed sx  Other meds: Ursodiol 300mg twice daily   CMV prophylaxis: CrCl >40 mL/minute: Valcyte 450 mg once daily Donor (+), Recipient (-), treat 6 months post tx (IgM positive for recipient, but no IgG antibodies)  Valcyte birth defects discussed: Yes   PJP prophylaxis: Inhaled Pentamidine 300mg q once, then planning on switching for 6 months post txp  Tx Coordinator: Nedra Cuevas MD: Dr. Morejon, Dr. Rebolledo, Using Med Card: Yes  Immunizations: annual flu shot 2024; [id number]:  2016; Prevnar 20: 2025; TDaP:  2020; Shingrix: x2, HBV: No immunity , COVID: 24-25 x2   Estimated Creatinine Clearance: 50.4 mL/min (A) (based on SCr of 1.87 mg/dL (H)).    Supplements:   MVI daily  Sodium Bicarbonate 650mg 3 times daily  .  Lab Results   Component Value Date    MAG 2.0 07/14/2025     Loose stools  Loperamide 2mg when she has loose stools  Having 3 loose stools daily. 1 firm one this morning.     Hyperlipidemia   Atorvastatin 40mg daily  Patient reports no significant myalgias or other side effects.  The 10-year ASCVD risk score (Liseth ACOSTA, et al., 2019) is: 4%    Values used to calculate the score:      Age: 59 years      Sex: Female      Is Non- : No      Diabetic: No      Tobacco smoker: No      Systolic Blood Pressure: 152 mmHg      Is BP treated: No      HDL Cholesterol: 58 mg/dL      Total Cholesterol: 175 mg/dL     Mental Health    Anxiety, Depression, and Insomnia  Citalopram 20mg once daily.   Trazodone 100mg at bedtime   Patient reports no current medication side effects. Not sleeping as well as she is not in her own bed.      Hypothyroidism   Levothyroxine 112 mcg daily.   Patient is having the following symptoms: both hot and cold. .      Plaque Psoriasis   Holding Taltz injection.    Pain   Just back pain at this point, just uses a heating pad. Has not needed Acetaminophen, Methocarbamol, Hydroxyzine  Hx of fibromyalgias  Pain type/location: Back  Patient feels that current therapy is effective.      Today's Vitals: There were no vitals taken for this visit.  ----------------  Post Discharge Medication Reconciliation Status: discharge medications reconciled and changed, per note/orders.    I spent 26 minutes with this patient today. All changes were made via collaborative practice agreement with Dr. Rebolledo.     A summary of these recommendations was sent via [employer].    Maynor WhitlockD  [facility] Pharmacist    Phone: [phone]     Telemedicine Visit Details  The patient's medications can be safely assessed via a telemedicine encounter.  Type of service:  Telephone visit  Originating Location (pt. Location): Home    Distant Location (provider location):  On-site  Start Time: 11:06 AM  End Time: 11:32 AM     Medication Therapy Recommendations  Loose stools   1 Current Medication: loperamide (IMODIUM) 2 MG capsule   Current Medication Sig: Take 1 capsule (2 mg) by mouth 4 times daily as needed for diarrhea.   Rationale: More effective medication available - Ineffective medication - Effectiveness   Recommendation: Change Medication - Metamucil 48.57 % Powd   Status: Accepted per CPA   Identified Date: 7/15/2025 Completed Date: 7/15/2025         Pain   1 Rationale: Untreated condition - Needs additional medication therapy - Indication   Recommendation: Start Medication - methocarbamol 500 MG tablet   Status: Accepted - no CPA Needed    Identified Date: 7/15/2025 Completed Date: 7/15/2025

## 2025-07-15 NOTE — TELEPHONE ENCOUNTER
Last Written Prescription:  hydrOXYzine HCl (ATARAX) 25 MG tablet 20 tablet 0 7/1/2025 -- No   Sig - Route: Take 1 tablet (25 mg) by mouth every 6 hours as needed for other (adjuvant pain). - Oral     _____________    Last Office Visit: 7/14/2025  [facility]      Future Office Visit:    7/21/2025 9:50 AM (10 min)  [place]   Arrive by:  9:35 AM   LIVER POST RETURN TXP SURG   [facility] ([facility])   Max Rebolledo MD     _____________        Refill Decision: Medication unable to be refilled by RN due to: Medication not included in refill protocol policy              Request from pharmacy:  Requested Prescriptions   Pending Prescriptions Disp Refills    hydrOXYzine HCl (ATARAX) 25 MG tablet 20 tablet 0     Sig: Take 1 tablet (25 mg) by mouth every 6 hours as needed for other (adjuvant pain).       Antihistamines Protocol Failed - 7/15/2025  9:15 AM        Failed - Medication indicated for associated diagnosis     The medication is associated with one or more of the following diagnoses:  Allergies  Rhinitis  Upper respiratory tract allergy  Urticaria  Itching  Cystic Fibrosis  Bronchiectasis          Passed - Patient is age 3 or older     Apply age and/or weight-based dosing for peds patients age 3 and older.    Forward request to provider for patients under the age of 3.          Passed - Medication is active on med list and the sig matches. RN to manually verify dose and sig if red X/fail.     If the protocol passes (green check), you do not need to verify med dose and sig.    A prescription matches if they are the same clinical intention.    For Example: once daily and every morning are the same.    The protocol can not identify upper and lower case letters as matching and will fail.     For Example: Take 1 tablet (50 mg) by mouth daily     TAKE 1 TABLET (50 MG) BY MOUTH DAILY    For all fails (red x), verify dose and sig.    If the refill does match what is on file, the RN can still proceed to  approve the refill request.       If they do not match, route to the appropriate provider.             Passed - Recent (12 month) or future (90 days) visit with authorizing provider's specialty (provided they have been seen in the past 15 months)     The patient must have completed an in-person or virtual visit within the past 12 months or has a future visit scheduled within the next 90 days with the authorizing provider s specialty.  Urgent care and e-visits do not qualify as an office visit for this protocol.

## 2025-07-15 NOTE — PATIENT INSTRUCTIONS
"Recommendations from today's MTM visit:                                                      Start Metamucil 1 teaspoonful once daily. If this does not work can use the Benefiber.   If stools become black/ sticky, or bloody go to the ED to eval. This could be a stomach/GI bleed.   Can take methocarbamol as needed for back pain.  This is a muscle relaxer and works well for back pain.   [facility] mail order will call you three weeks post discharge, but if your dose changes, call the number on the back of the pill box to talk to them earlier, [phone]. If your doctor sends over a new prescription to the mail order pharmacy you will have to call them to set up the order. They have an [facility] called \"[facility]\" as well.     Follow-up: 3 months    It was great speaking with you today.  I value your experience and would be very thankful for your time in providing feedback in our clinic survey. In the next few days, you may receive an email or text message from [employer] with a link to a survey related to your  clinical pharmacist.\"     To schedule another MTM appointment, please call the clinic directly or you may call the MTM scheduling line at [phone] or toll-free at [phone].     My Clinical Pharmacist's contact information:                                                      Please feel free to contact me with any questions or concerns you have.      Oscar Lynn, PharmD  MTM Pharmacist    Phone: [phone]     "

## 2025-07-16 ENCOUNTER — TELEPHONE (OUTPATIENT)
Dept: OBGYN | Facility: CLINIC | Age: 60
End: 2025-07-16
Payer: COMMERCIAL

## 2025-07-17 ENCOUNTER — LAB REQUISITION (OUTPATIENT)
Dept: LAB | Facility: CLINIC | Age: 60
End: 2025-07-17
Payer: COMMERCIAL

## 2025-07-17 ENCOUNTER — TELEPHONE (OUTPATIENT)
Dept: PHARMACY | Facility: CLINIC | Age: 60
End: 2025-07-17

## 2025-07-17 ENCOUNTER — TELEPHONE (OUTPATIENT)
Dept: TRANSPLANT | Facility: CLINIC | Age: 60
End: 2025-07-17

## 2025-07-17 ENCOUNTER — MEDICAL CORRESPONDENCE (OUTPATIENT)
Dept: HEALTH INFORMATION MANAGEMENT | Facility: CLINIC | Age: 60
End: 2025-07-17

## 2025-07-17 DIAGNOSIS — Z48.23 ENCOUNTER FOR AFTERCARE FOLLOWING LIVER TRANSPLANT (H): ICD-10-CM

## 2025-07-17 LAB
ALBUMIN SERPL BCG-MCNC: 3.5 G/DL (ref 3.5–5.2)
ALP SERPL-CCNC: 74 U/L (ref 40–150)
ALT SERPL W P-5'-P-CCNC: 11 U/L (ref 0–50)
ANION GAP SERPL CALCULATED.3IONS-SCNC: 12 MMOL/L (ref 7–15)
AST SERPL W P-5'-P-CCNC: 17 U/L (ref 0–45)
BILIRUB DIRECT SERPL-MCNC: 0.29 MG/DL (ref 0–0.45)
BILIRUB SERPL-MCNC: 0.5 MG/DL
BUN SERPL-MCNC: 21.5 MG/DL (ref 8–23)
CALCIUM SERPL-MCNC: 8.8 MG/DL (ref 8.8–10.4)
CHLORIDE SERPL-SCNC: 107 MMOL/L (ref 98–107)
CREAT SERPL-MCNC: 1.85 MG/DL (ref 0.51–0.95)
EGFRCR SERPLBLD CKD-EPI 2021: 31 ML/MIN/1.73M2
ERYTHROCYTE [DISTWIDTH] IN BLOOD BY AUTOMATED COUNT: 17.6 % (ref 10–15)
GLUCOSE SERPL-MCNC: 125 MG/DL (ref 70–99)
HCO3 SERPL-SCNC: 22 MMOL/L (ref 22–29)
HCT VFR BLD AUTO: 28.2 % (ref 35–47)
HGB BLD-MCNC: 9.4 G/DL (ref 11.7–15.7)
MAGNESIUM SERPL-MCNC: 1.9 MG/DL (ref 1.7–2.3)
MCH RBC QN AUTO: 30.8 PG (ref 26.5–33)
MCHC RBC AUTO-ENTMCNC: 33.3 G/DL (ref 31.5–36.5)
MCV RBC AUTO: 93 FL (ref 78–100)
PHOSPHATE SERPL-MCNC: 4.4 MG/DL (ref 2.5–4.5)
PLATELET # BLD AUTO: 193 10E3/UL (ref 150–450)
POTASSIUM SERPL-SCNC: 4.5 MMOL/L (ref 3.4–5.3)
PROT SERPL-MCNC: 6.1 G/DL (ref 6.4–8.3)
RBC # BLD AUTO: 3.05 10E6/UL (ref 3.8–5.2)
SODIUM SERPL-SCNC: 141 MMOL/L (ref 135–145)
TACROLIMUS BLD-MCNC: 6.5 UG/L (ref 5–15)
TME LAST DOSE: NORMAL H
TME LAST DOSE: NORMAL H
WBC # BLD AUTO: 5.4 10E3/UL (ref 4–11)

## 2025-07-17 PROCEDURE — 85027 COMPLETE CBC AUTOMATED: CPT | Mod: ORL | Performed by: TRANSPLANT SURGERY

## 2025-07-17 PROCEDURE — 83735 ASSAY OF MAGNESIUM: CPT | Mod: ORL | Performed by: TRANSPLANT SURGERY

## 2025-07-17 PROCEDURE — 82248 BILIRUBIN DIRECT: CPT | Mod: ORL | Performed by: TRANSPLANT SURGERY

## 2025-07-17 PROCEDURE — 80197 ASSAY OF TACROLIMUS: CPT | Mod: ORL | Performed by: TRANSPLANT SURGERY

## 2025-07-17 PROCEDURE — 84100 ASSAY OF PHOSPHORUS: CPT | Mod: ORL | Performed by: TRANSPLANT SURGERY

## 2025-07-17 NOTE — TELEPHONE ENCOUNTER
Pt returned MyC outreach with a call. Jose David reports recently undergoing a liver transplant on June 19th and [facility] is on a pause.     Routing to Loulou to see how far out she'd like to push this one

## 2025-07-18 ENCOUNTER — APPOINTMENT (OUTPATIENT)
Dept: OBGYN | Facility: CLINIC | Age: 60
End: 2025-07-18
Attending: STUDENT IN AN ORGANIZED HEALTH CARE EDUCATION/TRAINING PROGRAM
Payer: COMMERCIAL

## 2025-07-21 ENCOUNTER — TELEPHONE (OUTPATIENT)
Dept: TRANSPLANT | Facility: CLINIC | Age: 60
End: 2025-07-21

## 2025-07-21 ENCOUNTER — LAB (OUTPATIENT)
Dept: LAB | Facility: CLINIC | Age: 60
End: 2025-07-21
Attending: TRANSPLANT SURGERY
Payer: COMMERCIAL

## 2025-07-21 ENCOUNTER — OFFICE VISIT (OUTPATIENT)
Dept: TRANSPLANT | Facility: CLINIC | Age: 60
End: 2025-07-21
Attending: TRANSPLANT SURGERY
Payer: COMMERCIAL

## 2025-07-21 ENCOUNTER — DOCUMENTATION ONLY (OUTPATIENT)
Dept: PHYSICAL THERAPY | Facility: CLINIC | Age: 60
End: 2025-07-21

## 2025-07-21 VITALS
SYSTOLIC BLOOD PRESSURE: 168 MMHG | HEART RATE: 59 BPM | BODY MASS INDEX: 43.13 KG/M2 | WEIGHT: 293 LBS | OXYGEN SATURATION: 96 % | DIASTOLIC BLOOD PRESSURE: 86 MMHG

## 2025-07-21 DIAGNOSIS — Z94.4 LIVER TRANSPLANTED (H): Primary | ICD-10-CM

## 2025-07-21 DIAGNOSIS — Z94.4 LIVER TRANSPLANTED (H): ICD-10-CM

## 2025-07-21 DIAGNOSIS — R26.89 OTHER ABNORMALITIES OF GAIT AND MOBILITY: Primary | ICD-10-CM

## 2025-07-21 DIAGNOSIS — Z94.4 LIVER REPLACED BY TRANSPLANT (H): ICD-10-CM

## 2025-07-21 LAB
ALBUMIN SERPL BCG-MCNC: 3.9 G/DL (ref 3.5–5.2)
ALP SERPL-CCNC: 81 U/L (ref 40–150)
ALT SERPL W P-5'-P-CCNC: 11 U/L (ref 0–50)
ANION GAP SERPL CALCULATED.3IONS-SCNC: 11 MMOL/L (ref 7–15)
AST SERPL W P-5'-P-CCNC: 18 U/L (ref 0–45)
BILIRUB SERPL-MCNC: 0.7 MG/DL
BILIRUBIN DIRECT (ROCHE PRO & PURE): 0.33 MG/DL (ref 0–0.45)
BUN SERPL-MCNC: 18 MG/DL (ref 8–23)
CALCIUM SERPL-MCNC: 9.2 MG/DL (ref 8.8–10.4)
CHLORIDE SERPL-SCNC: 106 MMOL/L (ref 98–107)
CREAT SERPL-MCNC: 1.74 MG/DL (ref 0.51–0.95)
EGFRCR SERPLBLD CKD-EPI 2021: 33 ML/MIN/1.73M2
ERYTHROCYTE [DISTWIDTH] IN BLOOD BY AUTOMATED COUNT: 17.4 % (ref 10–15)
GLUCOSE SERPL-MCNC: 129 MG/DL (ref 70–99)
HCO3 SERPL-SCNC: 24 MMOL/L (ref 22–29)
HCT VFR BLD AUTO: 31.4 % (ref 35–47)
HGB BLD-MCNC: 10.4 G/DL (ref 11.7–15.7)
MAGNESIUM SERPL-MCNC: 1.8 MG/DL (ref 1.7–2.3)
MCH RBC QN AUTO: 31 PG (ref 26.5–33)
MCHC RBC AUTO-ENTMCNC: 33.1 G/DL (ref 31.5–36.5)
MCV RBC AUTO: 94 FL (ref 78–100)
PHOSPHATE SERPL-MCNC: 4.2 MG/DL (ref 2.5–4.5)
PLATELET # BLD AUTO: 159 10E3/UL (ref 150–450)
POTASSIUM SERPL-SCNC: 4.7 MMOL/L (ref 3.4–5.3)
PROT SERPL-MCNC: 6.4 G/DL (ref 6.4–8.3)
RBC # BLD AUTO: 3.35 10E6/UL (ref 3.8–5.2)
SODIUM SERPL-SCNC: 141 MMOL/L (ref 135–145)
TACROLIMUS BLD-MCNC: 5.7 UG/L (ref 5–15)
TME LAST DOSE: NORMAL H
TME LAST DOSE: NORMAL H
WBC # BLD AUTO: 6.5 10E3/UL (ref 4–11)

## 2025-07-21 PROCEDURE — 99000 SPECIMEN HANDLING OFFICE-LAB: CPT | Performed by: PATHOLOGY

## 2025-07-21 PROCEDURE — 36415 COLL VENOUS BLD VENIPUNCTURE: CPT | Performed by: PATHOLOGY

## 2025-07-21 PROCEDURE — 83735 ASSAY OF MAGNESIUM: CPT | Performed by: PATHOLOGY

## 2025-07-21 PROCEDURE — G0463 HOSPITAL OUTPT CLINIC VISIT: HCPCS | Performed by: TRANSPLANT SURGERY

## 2025-07-21 PROCEDURE — 80197 ASSAY OF TACROLIMUS: CPT | Performed by: TRANSPLANT SURGERY

## 2025-07-21 PROCEDURE — 99213 OFFICE O/P EST LOW 20 MIN: CPT | Mod: 24 | Performed by: TRANSPLANT SURGERY

## 2025-07-21 PROCEDURE — 3077F SYST BP >= 140 MM HG: CPT | Performed by: TRANSPLANT SURGERY

## 2025-07-21 PROCEDURE — 85027 COMPLETE CBC AUTOMATED: CPT | Performed by: PATHOLOGY

## 2025-07-21 PROCEDURE — 82248 BILIRUBIN DIRECT: CPT | Performed by: PATHOLOGY

## 2025-07-21 PROCEDURE — 80053 COMPREHEN METABOLIC PANEL: CPT | Performed by: PATHOLOGY

## 2025-07-21 PROCEDURE — 84100 ASSAY OF PHOSPHORUS: CPT | Performed by: PATHOLOGY

## 2025-07-21 PROCEDURE — 3079F DIAST BP 80-89 MM HG: CPT | Performed by: TRANSPLANT SURGERY

## 2025-07-21 RX ORDER — MYCOPHENOLIC ACID 180 MG/1
540 TABLET, DELAYED RELEASE ORAL 2 TIMES DAILY
Qty: 180 TABLET | Refills: 5 | Status: SHIPPED | OUTPATIENT
Start: 2025-07-21

## 2025-07-21 RX ORDER — TACROLIMUS 5 MG/1
5 CAPSULE ORAL 2 TIMES DAILY
Qty: 180 CAPSULE | Refills: 3 | Status: SHIPPED | OUTPATIENT
Start: 2025-07-21

## 2025-07-21 RX ORDER — TACROLIMUS 1 MG/1
1 CAPSULE ORAL 2 TIMES DAILY
Qty: 180 CAPSULE | Refills: 3 | Status: SHIPPED | OUTPATIENT
Start: 2025-07-21

## 2025-07-21 NOTE — TELEPHONE ENCOUNTER
ISSUE:   Tacrolimus IR level 5.7 on 7/21 at about 13 hours, goal 8, dose 5 mg BID.    PLAN:   Call Patient and confirm this was an accurate 12-hour trough.   Verify Tacrolimus IR dose 5 mg BID.   Confirm no new medications or or missed doses.   Confirm no new illness / infection / diarrhea.   If accurate trough and accurate dose, increase Tacrolimus IR dose to 6 mg BID     Decrease mycophenolic acid to 540 mg BID.     Is this more than a 50% increase or decrease in current IS dose: No    Repeat labs in 3 days.      OUTCOME:   Spoke with Patient, they confirm accurate trough level and current dose 5 mg BID.   Patient confirmed tacrolimus dose change to 6 mg BID. Confirmed mycophenolic acid dose change to 540 mg BID.  Patient agreed to repeat labs in 3 days.   Orders sent to preferred pharmacy for dose change and lab for repeat labs.   Patient voiced understanding of plan.

## 2025-07-21 NOTE — PROGRESS NOTES
Transplant Surgery -OUTPATIENT IMMUNOSUPPRESSION PROGRESS NOTE    Date of Visit: 07/21/2025    Transplants:  6/19/2025 (Liver); Postoperative day:  32  ASSESMENT AND PLAN:  1.Graft Function:Liver allograft: no rejection or technical problems.   2.Immunosuppression Management:keep tacrolimus levels at 8-10 ng/dL  3.Hypertension:ok  4.Renal Function:better  5.Lab frequency:twice twice weekly  6.wound healing well    Date: July 21, 2025    Transplant:  [x]                             Liver []                              Kidney []                             Pancreas []                              Other:             Chief Complaint:Post-op Visit (Liver 32 days ago)    History of Present Illness:  Patient Active Problem List   Diagnosis    Psoriatic arthropathy (H)    Major depressive disorder, recurrent episode, in full remission    Hypothyroidism    Allergic rhinitis    Rosacea    Hyperlipidemia    Living will, counseling/discussion    Chronic fatigue fibromyalgia syndrome    Morbid obesity (H)    Controlled substance agreement signed    Idiopathic thrombocytopenia (H)    Osteoarthritis cervical spine    Moderate obstructive sleep apnea    DDD (degenerative disc disease), lumbar    ASCUS of cervix with negative high risk HPV    Other pulmonary embolism without acute cor pulmonale (H)    Unspecified diastolic (congestive) heart failure (H)    Hypervolemia, unspecified hypervolemia type    Liver replaced by transplant (H)    Nausea    Hyperkalemia    Diarrhea    Acute renal failure on dialysis    Immunosuppressed status     SOCIAL /FAMILY HISTORY: [x]                  No recent change    Past Medical History:   Diagnosis Date    Cirrhosis of liver with ascites (H) 08/05/2022    Fibromyalgia 11/12/2018    Hyperlipidemia 03/17/2005     Problem list name updated by automated process. Provider to review    Hypothyroidism 06/26/2002     Problem list name updated by automated process. Provider to review    Major depressive  "disorder, recurrent episode, in full remission 2002    Metabolic dysfunction-associated steatohepatitis (MASH) 2025    Morbid obesity (H) 2019    Muscle pain 2012    Osteoarthritis 2024    Psoriatic arthropathy (H) 2002    Pulmonary embolism and infarction (H) 2002    Problem list name updated by automated process. Provider to review       Past Surgical History:   Procedure Laterality Date    BIOPSY  Aug 2024    COLONOSCOPY      mild colitis/ repeat 10 yrs    ESOPHAGOSCOPY, GASTROSCOPY, DUODENOSCOPY (EGD), COMBINED N/A 2024    Procedure: ESOPHAGOGASTRODUODENOSCOPY for variceal surveillance;  Surgeon: Guru Jacquelyn Rees MD;  Location:  OR    ESOPHAGOSCOPY, GASTROSCOPY, DUODENOSCOPY (EGD), COMBINED N/A 2024    Procedure: ESOPHAGOGASTRODUODENOSCOPY, WITH FINE NEEDLE ASPIRATION BIOPSY, WITH ENDOSCOPIC ULTRASOUND GUIDANCE liver and lymph node biopsy;  Surgeon: Guru Jacquelyn Rees MD;  Location:  OR     REMOVAL OF TONSILS,<11 Y/O      Tonsils <11y.o.    IR LUMBAR PUNCTURE  2023    IR THORACENTESIS  2025    LASIK  2004    \"lasek\"    THORACENTESIS Left 2025    Procedure: Thoracentesis;  Surgeon: Jaden Martinez MD;  Location: [facility] OR    TRANSPLANT LIVER RECIPIENT  DONOR N/A 2025    Procedure: Transplant liver recipient  donor;  Surgeon: Max Rebolledo MD;  Location:  OR     Social History     Socioeconomic History    Marital status:      Spouse name: Not on file    Number of children: 1    Years of education: Not on file    Highest education level: Not on file   Occupational History    Not on file   Tobacco Use    Smoking status: Never    Smokeless tobacco: Never   Substance and Sexual Activity    Alcohol use: Not Currently     Comment: last drink 3 years ago    Drug use: Never    Sexual activity: Not Currently     Partners: Male   Other Topics Concern    " Parent/sibling w/ CABG, MI or angioplasty before 65F 55M? Yes     Comment: Father   Social History Narrative    Not on file     Social Drivers of Health     Financial Resource Strain: Low Risk  (6/18/2025)    Financial Resource Strain     Within the past 12 months, have you or your family members you live with been unable to get utilities (heat, electricity) when it was really needed?: No   Food Insecurity: High Risk (6/18/2025)    Food Insecurity     Within the past 12 months, did you worry that your food would run out before you got money to buy more?: Yes     Within the past 12 months, did the food you bought just not last and you didn t have money to get more?: Yes   Transportation Needs: Low Risk  (6/18/2025)    Transportation Needs     Within the past 12 months, has lack of transportation kept you from medical appointments, getting your medicines, non-medical meetings or appointments, work, or from getting things that you need?: No   Physical Activity: Not on file   Stress: Not on file   Social Connections: Not on file   Interpersonal Safety: Low Risk  (6/19/2025)    Interpersonal Safety     Do you feel physically and emotionally safe where you currently live?: Yes     Within the past 12 months, have you been hit, slapped, kicked or otherwise physically hurt by someone?: No     Within the past 12 months, have you been humiliated or emotionally abused in other ways by your partner or ex-partner?: No   Housing Stability: High Risk (6/18/2025)    Housing Stability     Do you have housing? : No     Are you worried about losing your housing?: Yes     Prescription Medications as of 7/21/2025         Rx Number Disp Refills Start End Last Dispensed Date Next Fill Date Owning Pharmacy    acetaminophen (TYLENOL) 325 MG tablet  100 tablet 0 7/1/2025 --   [facility] - [place], [place] - [address] [facility]    Sig: Take 2 tablets (650 mg) by mouth every 6 hours as needed for pain.    Class: E-Prescribe     Route: Oral    Renewals       Renewal requests to authorizing provider (Lindsey Boyd APRN CNP) <b>prohibited</b>            aspirin (ASA) 325 MG tablet  90 tablet 0 7/2/2025 9/30/2025   [address]    Sig: Take 1 tablet (325 mg) by mouth daily.    Class: E-Prescribe    Route: Oral    Renewals       Renewal requests to authorizing provider (Lindsey Boyd APRN CNP) <b>prohibited</b>            atorvastatin (LIPITOR) 40 MG tablet  30 tablet 11 7/1/2025 --   [address]    Sig: Take 1 tablet (40 mg) by mouth daily.    Class: E-Prescribe    Route: Oral    Renewals       Renewal requests to authorizing provider (Lindsey Boyd APRN CNP) <b>prohibited</b>            bulk laxative (BENEFIBER DRINK MIX) packet  60 packet 3 7/1/2025 --   [address]    Sig: Take 1 packet by mouth 2 times daily.    Class: E-Prescribe    Route: Oral    Renewals       Renewal requests to authorizing provider (Lindsey Boyd APRN CNP) <b>prohibited</b>            citalopram (CELEXA) 20 MG tablet  90 tablet 3 3/31/2025 --   [id number] [place] [address] [address]    Sig: Take 1 tablet (20 mg) by mouth daily.    Class: E-Prescribe    Route: Oral    ixekizumab (TALTZ) 80 MG/ML SOAJ auto-injector  -- -- 7/1/2025 --       Sig: 160 mg once, followed by 80 mg at weeks 2, 4, 6, 8, 10, and 12, and then 80 mg every 4 weeks. Follow up with your Rheumatologist regarding this medication and your new immunosuppressant medications.    Class: No Print Out    levothyroxine (SYNTHROID/LEVOTHROID) 112 MCG tablet  90 tablet 3 3/31/2025 --   [id number] [place] [address] [address]    Sig: Take 1 tablet (112 mcg) by mouth daily.    Class: E-Prescribe    Route: Oral    Lidocaine (LIDOCARE) 4 % Patch  -- --  --       Sig: Place  1 patch onto the skin daily as needed for moderate pain.    Class: Historical    Route: Transdermal    loperamide (IMODIUM) 2 MG capsule  60 capsule 0 7/1/2025 --   [address]    Sig: Take 1 capsule (2 mg) by mouth 4 times daily as needed for diarrhea.    Class: E-Prescribe    Route: Oral    Renewals       Renewal requests to authorizing provider (Lindsey Boyd APRN CNP) <b>prohibited</b>            methocarbamol (ROBAXIN) 750 MG tablet  30 tablet 0 7/1/2025 --   [address]    Sig: Take 1 tablet (750 mg) by mouth every 6 hours as needed for muscle spasms.    Class: E-Prescribe    Route: Oral    Renewals       Renewal requests to authorizing provider (Lindsey Boyd APRN CNP) <b>prohibited</b>            mometasone (ELOCON) 0.1 % external solution  -- -- 10/2/2023 --       Sig: Apply thin layer twice a day as needed to rash on scalp    Class: Historical    multivitamin w/minerals (THERA-VIT-M) tablet  30 tablet 0 7/2/2025 --   [address]    Sig: Take 1 tablet by mouth daily.    Class: E-Prescribe    Route: Oral    Renewals       Renewal requests to authorizing provider (Lindsey Boyd APRN CNP) <b>prohibited</b>            mycophenolic acid (GENERIC EQUIVALENT) 180 MG EC tablet  240 tablet 5 7/3/2025 --   [facility] [address] [place] [address] [phone]    Sig: Take 4 tablets (720 mg) by mouth 2 times daily.    Class: E-Prescribe    Notes to Pharmacy: TXP DT 6/19/2025 (Liver) TXP Dischg DT 7/1/2025 DX Liver replaced by transplant Z94.4 [facility] ([place], [place])    Route: Oral    ondansetron (ZOFRAN ODT) 4 MG ODT tab  90 tablet 1 7/1/2025 --   [address]    Sig: Take 1 tablet  (4 mg) by mouth 3 times daily as needed for nausea or vomiting.    Class: E-Prescribe    Route: Oral    Renewals       Renewal requests to authorizing provider (Lindsey Boyd APRN CNP) <b>prohibited</b>            predniSONE (DELTASONE) 5 MG tablet  30 tablet 2 7/1/2025 --   [address]    Sig: Take 1 tablet (5 mg) by mouth daily.    Class: E-Prescribe    Route: Oral    Renewals       Renewal requests to authorizing provider (Lindsey Boyd APRN CNP) <b>prohibited</b>            psyllium (METAMUCIL/KONSYL) 58.6 % powder  500 g 1 7/15/2025 --   [phone]    Sig: Take 6 g (1 teaspoonful) by mouth daily.    Class: E-Prescribe    Route: Oral    sodium bicarbonate 650 MG tablet  90 tablet 11 7/3/2025 --   [phone]    Sig: Take 1 tablet (650 mg) by mouth 3 times daily.    Class: E-Prescribe    Route: Oral    tacrolimus (GENERIC EQUIVALENT) 0.5 MG capsule  60 capsule 0 7/1/2025 --   [address]    Sig: Take 1 cap by mouth twice daily as directed by Transplant Center for dose changes.    Class: E-Prescribe    Renewals       Renewal requests to authorizing provider (Lindsey Boyd APRN CNP) <b>prohibited</b>            tacrolimus (GENERIC EQUIVALENT) 1 MG capsule  300 capsule 11 7/1/2025 --   [address]    Sig: Take 5 capsules (5 mg) by mouth 2 times daily.    Class: E-Prescribe    Route: Oral    Renewals       Renewal requests to authorizing provider (Lindsey Boyd APRN CNP) <b>prohibited</b>            tacrolimus (PROTOPIC) 0.1 % external ointment  -- -- 1/9/2020 --       Sig: Apply topically as needed    Class: Historical    Route: Topical    traZODone (DESYREL) 100 MG  tablet  90 tablet 3 3/31/2025 --   [id number] [address] [address]    Sig: Take 1 tablet (100 mg) by mouth at bedtime.    Class: E-Prescribe    Route: Oral    triamcinolone (KENALOG) 0.1 % external cream  -- -- 1/9/2020 --       Sig: Apply topically as needed    Class: Historical    Route: Topical    ursodiol (ACTIGALL) 300 MG capsule  60 capsule 2 7/1/2025 --   [place], [place] - [address] [place] [address]    Sig: Take 1 capsule (300 mg) by mouth 2 times daily.    Class: E-Prescribe    Route: Oral    Renewals       Renewal requests to authorizing provider (Lindsey Boyd APRN CNP) <b>prohibited</b>            valGANciclovir (VALCYTE) 450 MG tablet  78 tablet 0 7/1/2025 9/17/2025   [address]    Sig: Take 1 tablet (450 mg) by mouth daily.    Class: E-Prescribe    Route: Oral    Renewals       Renewal requests to authorizing provider (Lindsey Boyd APRN CNP) <b>prohibited</b>                  Sumatriptan succinate   REVIEW OF SYSTEMS (check box if normal)  [x]               GENERAL  [x]                 PULMONARY [x]                GENITOURINARY  [x]                CNS                 [x]                 CARDIAC  [x]                 ENDOCRINE  [x]                EARS,NOSE,THROAT [x]                 GASTROINTESTINAL [x]                 NEUROLOGIC    [x]                MUSCLOSKELTAL  [x]                  HEMATOLOGY      PHYSICAL EXAM (check box if normal)BP (!) 168/86   Pulse 59   Wt (!) 140.2 kg (309 lb 1.6 oz)   SpO2 96%   BMI 43.13 kg/m          [x]            GENERAL:    [x]       EYES:  ICTERIC   []        YES  []                    NO  [x]           EXTREMITIES: Clubbing []       Y     [x]           N    [x]           EARS, NOSE, THROAT: Membranes Moist    YES   [x]                   NO []                  [x]           LUNGS:  CLEAR    YES       [x]                  NO    []                                 [x]           SKIN: Jaundice           YES       []                  NO    [x]                   Rash: YES       []                  NO    [x]                                     [x]             HEART: Regular Rate          YES       [x]                  NO    []                   Incision Clean:  YES       [x]                  NO    []                                [x]                    ABDOMEN: Organomegaly YES       []                  NO    [x]                       [x]                    NEUROLOGICAL:  Nonfocal  YES       [x]                  NO    []                       [x]                    Hernia YES       []                  NO    [x]                   PSYCHIATRIC:  Appropriate  YES       [x]                  NO    []                       OTHER:                                                                                                   PAIN SCALE:: 3

## 2025-07-21 NOTE — LETTER
7/21/2025      Karlene Yun  [address]  [facility] [address]      Dear Colleague,    Thank you for referring your patient, Karlene Yun, to the [facility]. Please see a copy of my visit note below.    Transplant Surgery -OUTPATIENT IMMUNOSUPPRESSION PROGRESS NOTE    Date of Visit: 07/21/2025    Transplants:  6/19/2025 (Liver); Postoperative day:  32  ASSESMENT AND PLAN:  1.Graft Function:Liver allograft: no rejection or technical problems.   2.Immunosuppression Management:keep tacrolimus levels at 8-10 ng/dL  3.Hypertension:ok  4.Renal Function:better  5.Lab frequency:twice twice weekly  6.wound healing well    Date: July 21, 2025    Transplant:  [x]                             Liver []                              Kidney []                             Pancreas []                              Other:             Chief Complaint:Post-op Visit (Liver 32 days ago)    History of Present Illness:  Patient Active Problem List   Diagnosis     Psoriatic arthropathy (H)     Major depressive disorder, recurrent episode, in full remission     Hypothyroidism     Allergic rhinitis     Rosacea     Hyperlipidemia     Living will, counseling/discussion     Chronic fatigue fibromyalgia syndrome     Morbid obesity (H)     Controlled substance agreement signed     Idiopathic thrombocytopenia (H)     Osteoarthritis cervical spine     Moderate obstructive sleep apnea     DDD (degenerative disc disease), lumbar     ASCUS of cervix with negative high risk HPV     Other pulmonary embolism without acute cor pulmonale (H)     Unspecified diastolic (congestive) heart failure (H)     Hypervolemia, unspecified hypervolemia type     Liver replaced by transplant (H)     Nausea     Hyperkalemia     Diarrhea     Acute renal failure on dialysis     Immunosuppressed status     SOCIAL /FAMILY HISTORY: [x]                  No recent change    Past Medical History:   Diagnosis Date     Cirrhosis of liver  "with ascites (H) 2022     Fibromyalgia 2018     Hyperlipidemia 2005     Problem list name updated by automated process. Provider to review     Hypothyroidism 2002     Problem list name updated by automated process. Provider to review     Major depressive disorder, recurrent episode, in full remission 2002     Metabolic dysfunction-associated steatohepatitis (MASH) 2025     Morbid obesity (H) 2019     Muscle pain 2012     Osteoarthritis 2024     Psoriatic arthropathy (H) 2002     Pulmonary embolism and infarction (H) 2002    Problem list name updated by automated process. Provider to review       Past Surgical History:   Procedure Laterality Date     BIOPSY  Aug 2024     COLONOSCOPY      mild colitis/ repeat 10 yrs     ESOPHAGOSCOPY, GASTROSCOPY, DUODENOSCOPY (EGD), COMBINED N/A 2024    Procedure: ESOPHAGOGASTRODUODENOSCOPY for variceal surveillance;  Surgeon: Guru Jacquelyn Rees MD;  Location:  OR     ESOPHAGOSCOPY, GASTROSCOPY, DUODENOSCOPY (EGD), COMBINED N/A 2024    Procedure: ESOPHAGOGASTRODUODENOSCOPY, WITH FINE NEEDLE ASPIRATION BIOPSY, WITH ENDOSCOPIC ULTRASOUND GUIDANCE liver and lymph node biopsy;  Surgeon: Guru Jacquelyn Rees MD;  Location: [facility]      REMOVAL OF TONSILS,<11 Y/O      Tonsils <11y.o.     IR LUMBAR PUNCTURE  2023     IR THORACENTESIS  2025     LASIK  2004    \"lasek\"     THORACENTESIS Left 2025    Procedure: Thoracentesis;  Surgeon: Jaden Martinez MD;  [place] [facility] [place]     TRANSPLANT LIVER RECIPIENT  DONOR N/A 2025    Procedure: Transplant liver recipient  donor;  Surgeon: Max Rebolledo MD;  Location:  OR     Social History     Socioeconomic History     Marital status:      Spouse name: Not on file     Number of children: 1     Years of education: Not on file     Highest education level: Not on file "   Occupational History     Not on file   Tobacco Use     Smoking status: Never     Smokeless tobacco: Never   Substance and Sexual Activity     Alcohol use: Not Currently     Comment: last drink 3 years ago     Drug use: Never     Sexual activity: Not Currently     Partners: Male   Other Topics Concern     Parent/sibling w/ CABG, MI or angioplasty before 65F 55M? Yes     Comment: Father   Social History Narrative     Not on file     Social Drivers of Health     Financial Resource Strain: Low Risk  (6/18/2025)    Financial Resource Strain      Within the past 12 months, have you or your family members you live with been unable to get utilities (heat, electricity) when it was really needed?: No   Food Insecurity: High Risk (6/18/2025)    Food Insecurity      Within the past 12 months, did you worry that your food would run out before you got money to buy more?: Yes      Within the past 12 months, did the food you bought just not last and you didn t have money to get more?: Yes   Transportation Needs: Low Risk  (6/18/2025)    Transportation Needs      Within the past 12 months, has lack of transportation kept you from medical appointments, getting your medicines, non-medical meetings or appointments, work, or from getting things that you need?: No   Physical Activity: Not on file   Stress: Not on file   Social Connections: Not on file   Interpersonal Safety: Low Risk  (6/19/2025)    Interpersonal Safety      Do you feel physically and emotionally safe where you currently live?: Yes      Within the past 12 months, have you been hit, slapped, kicked or otherwise physically hurt by someone?: No      Within the past 12 months, have you been humiliated or emotionally abused in other ways by your partner or ex-partner?: No   Housing Stability: High Risk (6/18/2025)    Housing Stability      Do you have housing? : No      Are you worried about losing your housing?: Yes     Prescription Medications as of 7/21/2025         Rx  Number Disp Refills Start End Last Dispensed Date Next Fill Date Owning Pharmacy    acetaminophen (TYLENOL) 325 MG tablet  100 tablet 0 7/1/2025 --   [address]    Sig: Take 2 tablets (650 mg) by mouth every 6 hours as needed for pain.    Class: E-Prescribe    Route: Oral    Renewals       Renewal requests to authorizing provider (Lindsey Boyd APRN CNP) <b>prohibited</b>            aspirin (ASA) 325 MG tablet  90 tablet 0 7/2/2025 9/30/2025   [address]    Sig: Take 1 tablet (325 mg) by mouth daily.    Class: E-Prescribe    Route: Oral    Renewals       Renewal requests to authorizing provider (Lindsey Boyd APRN CNP) <b>prohibited</b>            atorvastatin (LIPITOR) 40 MG tablet  30 tablet 11 7/1/2025 --   [address]    Sig: Take 1 tablet (40 mg) by mouth daily.    Class: E-Prescribe    Route: Oral    Renewals       Renewal requests to authorizing provider (Lindsey Boyd APRN CNP) <b>prohibited</b>            bulk laxative (BENEFIBER DRINK MIX) packet  60 packet 3 7/1/2025 --   [address]    Sig: Take 1 packet by mouth 2 times daily.    Class: E-Prescribe    Route: Oral    Renewals       Renewal requests to authorizing provider (Lindsey Boyd APRN CNP) <b>prohibited</b>            citalopram (CELEXA) 20 MG tablet  90 tablet 3 3/31/2025 --   [id number] - [place], [place] - [address] [address]    Sig: Take 1 tablet (20 mg) by mouth daily.    Class: E-Prescribe    Route: Oral    ixekizumab (TALTZ) 80 MG/ML SOAJ auto-injector  -- -- 7/1/2025 --       Sig: 160 mg once, followed by 80 mg at weeks 2, 4, 6, 8, 10, and 12, and then 80 mg every 4 weeks. Follow up with your Rheumatologist regarding this medication and your new immunosuppressant  medications.    Class: No Print Out    levothyroxine (SYNTHROID/LEVOTHROID) 112 MCG tablet  90 tablet 3 3/31/2025 --   [id number] [address] [address]    Sig: Take 1 tablet (112 mcg) by mouth daily.    Class: E-Prescribe    Route: Oral    Lidocaine (LIDOCARE) 4 % Patch  -- --  --       Sig: Place 1 patch onto the skin daily as needed for moderate pain.    Class: Historical    Route: Transdermal    loperamide (IMODIUM) 2 MG capsule  60 capsule 0 7/1/2025 --   [address]    Sig: Take 1 capsule (2 mg) by mouth 4 times daily as needed for diarrhea.    Class: E-Prescribe    Route: Oral    Renewals       Renewal requests to authorizing provider (Lindsey Boyd APRN CNP) <b>prohibited</b>            methocarbamol (ROBAXIN) 750 MG tablet  30 tablet 0 7/1/2025 --   [address]    Sig: Take 1 tablet (750 mg) by mouth every 6 hours as needed for muscle spasms.    Class: E-Prescribe    Route: Oral    Renewals       Renewal requests to authorizing provider (Lindsey Boyd APRN CNP) <b>prohibited</b>            mometasone (ELOCON) 0.1 % external solution  -- -- 10/2/2023 --       Sig: Apply thin layer twice a day as needed to rash on scalp    Class: Historical    multivitamin w/minerals (THERA-VIT-M) tablet  30 tablet 0 7/2/2025 --   [address]    Sig: Take 1 tablet by mouth daily.    Class: E-Prescribe    Route: Oral    Renewals       Renewal requests to authorizing provider (Lindsey Boyd APRN CNP) <b>prohibited</b>            mycophenolic acid (GENERIC EQUIVALENT) 180 MG EC tablet  240 tablet 5 7/3/2025 --   [facility] [address] [facility] [phone]    Sig: Take 4 tablets (720 mg) by mouth 2 times daily.    Class: E-Prescribe    Notes to Pharmacy: KOLBY DT 6/19/2025  (Liver) TXP Dischg DT 7/1/2025 DX Liver replaced by transplant Z94.4 TX Center [facility] ([place], [place])    Route: Oral    ondansetron (ZOFRAN ODT) 4 MG ODT tab  90 tablet 1 7/1/2025 --   [address]    Sig: Take 1 tablet (4 mg) by mouth 3 times daily as needed for nausea or vomiting.    Class: E-Prescribe    Route: Oral    Renewals       Renewal requests to authorizing provider (Lindsey Boyd APRN CNP) <b>prohibited</b>            predniSONE (DELTASONE) 5 MG tablet  30 tablet 2 7/1/2025 --   [address]    Sig: Take 1 tablet (5 mg) by mouth daily.    Class: E-Prescribe    Route: Oral    Renewals       Renewal requests to authorizing provider (Lindsey Boyd APRN CNP) <b>prohibited</b>            psyllium (METAMUCIL/KONSYL) 58.6 % powder  500 g 1 7/15/2025 --   [phone]    Sig: Take 6 g (1 teaspoonful) by mouth daily.    Class: E-Prescribe    Route: Oral    sodium bicarbonate 650 MG tablet  90 tablet 11 7/3/2025 --   [phone]    Sig: Take 1 tablet (650 mg) by mouth 3 times daily.    Class: E-Prescribe    Route: Oral    tacrolimus (GENERIC EQUIVALENT) 0.5 MG capsule  60 capsule 0 7/1/2025 --   [address]    Sig: Take 1 cap by mouth twice daily as directed by Transplant Center for dose changes.    Class: E-Prescribe    Renewals       Renewal requests to authorizing provider (Lindsey Boyd APRN CNP) <b>prohibited</b>            tacrolimus (GENERIC EQUIVALENT) 1 MG capsule  300 capsule 11 7/1/2025 --   [address]    Sig: Take 5 capsules (5 mg) by mouth 2 times daily.    Class:  E-Prescribe    Route: Oral    Renewals       Renewal requests to authorizing provider (Lindsey Boyd APRN CNP) <b>prohibited</b>            tacrolimus (PROTOPIC) 0.1 % external ointment  -- -- 1/9/2020 --       Sig: Apply topically as needed    Class: Historical    Route: Topical    traZODone (DESYREL) 100 MG tablet  90 tablet 3 3/31/2025 --   [facility] [address] [address] [place] [address] [address]    Sig: Take 1 tablet (100 mg) by mouth at bedtime.    Class: E-Prescribe    Route: Oral    triamcinolone (KENALOG) 0.1 % external cream  -- -- 1/9/2020 --       Sig: Apply topically as needed    Class: Historical    Route: Topical    ursodiol (ACTIGALL) 300 MG capsule  60 capsule 2 7/1/2025 --   [address]    Sig: Take 1 capsule (300 mg) by mouth 2 times daily.    Class: E-Prescribe    Route: Oral    Renewals       Renewal requests to authorizing provider (Lindsey Boyd APRN CNP) <b>prohibited</b>            valGANciclovir (VALCYTE) 450 MG tablet  78 tablet 0 7/1/2025 9/17/2025   [address]    Sig: Take 1 tablet (450 mg) by mouth daily.    Class: E-Prescribe    Route: Oral    Renewals       Renewal requests to authorizing provider (Lindsey Boyd APRN CNP) <b>prohibited</b>                  Sumatriptan succinate   REVIEW OF SYSTEMS (check box if normal)  [x]               GENERAL  [x]                 PULMONARY [x]                GENITOURINARY  [x]                CNS                 [x]                 CARDIAC  [x]                 ENDOCRINE  [x]                EARS,NOSE,THROAT [x]                 GASTROINTESTINAL [x]                 NEUROLOGIC    [x]                MUSCLOSKELTAL  [x]                  HEMATOLOGY      PHYSICAL EXAM (check box if normal)BP (!) 168/86   Pulse 59   Wt (!) 140.2 kg (309 lb 1.6 oz)   SpO2 96%   BMI 43.13 kg/m          [x]            GENERAL:    [x]        EYES:  ICTERIC   []        YES  []                    NO  [x]           EXTREMITIES: Clubbing []       Y     [x]           N    [x]           EARS, NOSE, THROAT: Membranes Moist    YES   [x]                   NO []                  [x]           LUNGS:  CLEAR    YES       [x]                  NO    []                                [x]           SKIN: Jaundice           YES       []                  NO    [x]                   Rash: YES       []                  NO    [x]                                     [x]             HEART: Regular Rate          YES       [x]                  NO    []                   Incision Clean:  YES       [x]                  NO    []                                [x]                    ABDOMEN: Organomegaly YES       []                  NO    [x]                       [x]                    NEUROLOGICAL:  Nonfocal  YES       [x]                  NO    []                       [x]                    Hernia YES       []                  NO    [x]                   PSYCHIATRIC:  Appropriate  YES       [x]                  NO    []                       OTHER:                                                                                                   PAIN SCALE:: 3     Again, thank you for allowing me to participate in the care of your patient.        Sincerely,        Mxa Rebolledo MD    Electronically signed

## 2025-07-23 ENCOUNTER — PATIENT OUTREACH (OUTPATIENT)
Dept: OBGYN | Facility: CLINIC | Age: 60
End: 2025-07-23
Payer: COMMERCIAL

## 2025-07-23 ENCOUNTER — MYC REFILL (OUTPATIENT)
Dept: INTERNAL MEDICINE | Facility: CLINIC | Age: 60
End: 2025-07-23
Payer: COMMERCIAL

## 2025-07-23 ENCOUNTER — MYC REFILL (OUTPATIENT)
Dept: TRANSPLANT | Facility: CLINIC | Age: 60
End: 2025-07-23
Payer: COMMERCIAL

## 2025-07-23 DIAGNOSIS — E87.5 HYPERKALEMIA: ICD-10-CM

## 2025-07-23 DIAGNOSIS — Z94.4 LIVER REPLACED BY TRANSPLANT (H): ICD-10-CM

## 2025-07-23 RX ORDER — PREDNISONE 5 MG/1
5 TABLET ORAL DAILY
Qty: 90 TABLET | Refills: 0 | Status: SHIPPED | OUTPATIENT
Start: 2025-07-23

## 2025-07-23 RX ORDER — MULTIPLE VITAMINS W/ MINERALS TAB 9MG-400MCG
1 TAB ORAL DAILY
Qty: 90 TABLET | Refills: 3 | Status: SHIPPED | OUTPATIENT
Start: 2025-07-23

## 2025-07-23 RX ORDER — ASPIRIN 325 MG
325 TABLET ORAL DAILY
Qty: 60 TABLET | Refills: 0 | Status: SHIPPED | OUTPATIENT
Start: 2025-07-23

## 2025-07-23 RX ORDER — SODIUM BICARBONATE 650 MG/1
650 TABLET ORAL 3 TIMES DAILY
Qty: 90 TABLET | Refills: 2 | Status: SHIPPED | OUTPATIENT
Start: 2025-07-23

## 2025-07-23 RX ORDER — MYCOPHENOLIC ACID 180 MG/1
540 TABLET, DELAYED RELEASE ORAL 2 TIMES DAILY
Qty: 180 TABLET | Refills: 5 | OUTPATIENT
Start: 2025-07-23

## 2025-07-23 RX ORDER — URSODIOL 300 MG/1
300 CAPSULE ORAL 2 TIMES DAILY
Qty: 180 CAPSULE | Refills: 1 | Status: SHIPPED | OUTPATIENT
Start: 2025-07-23

## 2025-07-23 RX ORDER — VALGANCICLOVIR 450 MG/1
450 TABLET, FILM COATED ORAL DAILY
Qty: 60 TABLET | Refills: 0 | Status: SHIPPED | OUTPATIENT
Start: 2025-07-23

## 2025-07-23 NOTE — PROGRESS NOTES
Karlene needed refills for her meds. Requesting methocarbamol refill, will discuss with Dr. Rebolledo during meeting tomorrow morning.

## 2025-07-24 ENCOUNTER — TELEPHONE (OUTPATIENT)
Dept: TRANSPLANT | Facility: CLINIC | Age: 60
End: 2025-07-24

## 2025-07-24 ENCOUNTER — LAB REQUISITION (OUTPATIENT)
Dept: LAB | Facility: CLINIC | Age: 60
End: 2025-07-24
Payer: COMMERCIAL

## 2025-07-24 DIAGNOSIS — Z94.4 LIVER REPLACED BY TRANSPLANT (H): ICD-10-CM

## 2025-07-24 DIAGNOSIS — Z48.23 ENCOUNTER FOR AFTERCARE FOLLOWING LIVER TRANSPLANT (H): ICD-10-CM

## 2025-07-24 LAB
ALBUMIN SERPL BCG-MCNC: 3.4 G/DL (ref 3.5–5.2)
ALP SERPL-CCNC: 69 U/L (ref 40–150)
ALT SERPL W P-5'-P-CCNC: 11 U/L (ref 0–50)
ANION GAP SERPL CALCULATED.3IONS-SCNC: 11 MMOL/L (ref 7–15)
AST SERPL W P-5'-P-CCNC: 18 U/L (ref 0–45)
BILIRUB DIRECT SERPL-MCNC: 0.24 MG/DL (ref 0–0.3)
BILIRUB SERPL-MCNC: 0.6 MG/DL
BUN SERPL-MCNC: 16.3 MG/DL (ref 8–23)
CALCIUM SERPL-MCNC: 8.4 MG/DL (ref 8.8–10.4)
CHLORIDE SERPL-SCNC: 104 MMOL/L (ref 98–107)
CREAT SERPL-MCNC: 1.58 MG/DL (ref 0.51–0.95)
EGFRCR SERPLBLD CKD-EPI 2021: 37 ML/MIN/1.73M2
ERYTHROCYTE [DISTWIDTH] IN BLOOD BY AUTOMATED COUNT: 17.3 % (ref 10–15)
GLUCOSE SERPL-MCNC: 135 MG/DL (ref 70–99)
HCO3 SERPL-SCNC: 23 MMOL/L (ref 22–29)
HCT VFR BLD AUTO: 26.8 % (ref 35–47)
HGB BLD-MCNC: 9 G/DL (ref 11.7–15.7)
HOLD SPECIMEN: NORMAL
MAGNESIUM SERPL-MCNC: 1.8 MG/DL (ref 1.7–2.3)
MCH RBC QN AUTO: 30.8 PG (ref 26.5–33)
MCHC RBC AUTO-ENTMCNC: 33.6 G/DL (ref 31.5–36.5)
MCV RBC AUTO: 92 FL (ref 78–100)
PHOSPHATE SERPL-MCNC: 3.2 MG/DL (ref 2.5–4.5)
PLATELET # BLD AUTO: 115 10E3/UL (ref 150–450)
POTASSIUM SERPL-SCNC: 3.9 MMOL/L (ref 3.4–5.3)
PROT SERPL-MCNC: 6 G/DL (ref 6.4–8.3)
RBC # BLD AUTO: 2.92 10E6/UL (ref 3.8–5.2)
SODIUM SERPL-SCNC: 138 MMOL/L (ref 135–145)
TACROLIMUS BLD-MCNC: 5.3 UG/L (ref 5–15)
TME LAST DOSE: NORMAL H
TME LAST DOSE: NORMAL H
WBC # BLD AUTO: 4.1 10E3/UL (ref 4–11)

## 2025-07-24 PROCEDURE — 80197 ASSAY OF TACROLIMUS: CPT | Mod: ORL | Performed by: TRANSPLANT SURGERY

## 2025-07-24 PROCEDURE — 84100 ASSAY OF PHOSPHORUS: CPT | Mod: ORL | Performed by: TRANSPLANT SURGERY

## 2025-07-24 PROCEDURE — 85027 COMPLETE CBC AUTOMATED: CPT | Mod: ORL | Performed by: TRANSPLANT SURGERY

## 2025-07-24 PROCEDURE — 82248 BILIRUBIN DIRECT: CPT | Mod: ORL | Performed by: TRANSPLANT SURGERY

## 2025-07-24 PROCEDURE — 83735 ASSAY OF MAGNESIUM: CPT | Mod: ORL | Performed by: TRANSPLANT SURGERY

## 2025-07-24 PROCEDURE — 80053 COMPREHEN METABOLIC PANEL: CPT | Mod: ORL | Performed by: TRANSPLANT SURGERY

## 2025-07-24 RX ORDER — METHOCARBAMOL 750 MG/1
750 TABLET, FILM COATED ORAL EVERY 6 HOURS PRN
Qty: 30 TABLET | Refills: 0 | Status: SHIPPED | OUTPATIENT
Start: 2025-07-24

## 2025-07-24 ASSESSMENT — PATIENT HEALTH QUESTIONNAIRE - PHQ9: SUM OF ALL RESPONSES TO PHQ QUESTIONS 1-9: 7

## 2025-07-24 NOTE — TELEPHONE ENCOUNTER
Informed the pharmacist that valcyte can stay at one tab daily. Increased the supply to #90 x 2 fills.

## 2025-07-24 NOTE — TELEPHONE ENCOUNTER
Spoke to the pharmacist who states that pt should be taking valcyte 450mg 2 tabs daily. Please advise.

## 2025-07-24 NOTE — TELEPHONE ENCOUNTER
Post Liver Transplant Team Conference  Date: 7/24/2025  Transplant Coordinator: Shaila Murillo  Transplant Surgeon: Amauri Jacobs, Max Rebolledo      Attendees:  [] Dr. London [x] Dania Goldberg, FRANSICO []       [] Dr. Rebolledo [x] Pamela Beckett LPN []    [] Dr. Castillo [x] Shaila Murillo RN []    [] Dr. Perez [] EDER Brantley []    [] DR. Jefferson [x] Moira Ayala RN []       [] Dr. Loving [] Yvonne Tang RN []       [] Dr. Negron [x] Cate Jeong, FRANSICO []       [x] Dr. LUCY Jacobs [x] Felicia Dahl RN []       [x] Oscar Lynn, pharm [] Julieta Sandhu RN []       [x] Coby López, CODEY [] Osvaldo Wang RN []         Verbal Plan Read Back:   Ok to refill methocarbamol.     Routed to RN Coordinator   Shaila Murillo RN

## 2025-07-24 NOTE — TELEPHONE ENCOUNTER
Patient Call: General  Route to LPN    Reason for call: Gordon from [facility] called to touch base about patient medication and needing a dosage change for valGANciclovir (VALCYTE) 450 MG tablet. More details with call back.       Call back needed? Yes    Return Call Needed  Same as documented in contacts section  When to return call?: Same day: Route High Priority

## 2025-07-25 NOTE — TELEPHONE ENCOUNTER
"Last Written Prescription:   Disp Refills Start End JACKELIN   acetaminophen (TYLENOL) 325 MG tablet 100 tablet 0 7/1/2025 -- No   Sig - Route: Take 2 tablets (650 mg) by mouth every 6 hours as needed for pain. - Oral   Patient not taking: Reported on 7/15/2025     ----------------------  Last Visit Date:   3/31/2025  [facility] [place]    Future Visit Date:    7/30/2025 10:30 AM (30 min)  [place]   Arrive by: 10:15 AM   ED/HOSP FOLLOW UP   [facility] ([facility]   María Hanson MD     ----------------------      Refill decision:   [x] Medication unable to be refilled by RN due to: Verification - order discrepancy, clarification needed, Sig modification needed and Other: : Patient not taking: Reported on 7/15/2025        Request from pharmacy:  Requested Prescriptions   Pending Prescriptions Disp Refills    acetaminophen (TYLENOL) 325 MG tablet 100 tablet 0     Sig: Take 2 tablets (650 mg) by mouth every 6 hours as needed for pain.       Analgesics (Non-Narcotic Tylenol and ASA Only) Failed - 7/25/2025 10:27 AM        Failed - Medication matches indication     Acetaminophen is associated with one or more of the following diagnoses:  Pain  Fever          Passed - Patient is 7 months old or older     If patient is a peds patient of the age 7 mos -12 years, ok to refill using weight-based dosing.     If >3g daily and/or sig is not \"prn\", check for liver enzymes. If normal in the last year, ok to refill.  If not, refer to the provider.          Passed - Medication is active on med list and the sig matches. RN to manually verify dose and sig if red X/fail.     If the protocol passes (green check), you do not need to verify med dose and sig.    A prescription matches if they are the same clinical intention.    For Example: once daily and every morning are the same.    The protocol can not identify upper and lower case letters as matching and will fail.     For Example: Take 1 tablet (50 mg) by " mouth daily     TAKE 1 TABLET (50 MG) BY MOUTH DAILY    For all fails (red x), verify dose and sig.    If the refill does match what is on file, the RN can still proceed to approve the refill request.       If they do not match, route to the appropriate provider.             Passed - Recent (12 month) or future (90 days) visit with authorizing provider's specialty (provided they have been seen in the past 15 months)     The patient must have completed an in-person or virtual visit within the past 12 months or has a future visit scheduled within the next 90 days with the authorizing provider s specialty.  Urgent care and e-visits do not qualify as an office visit for this protocol.

## 2025-07-26 RX ORDER — ACETAMINOPHEN 325 MG/1
650 TABLET ORAL EVERY 6 HOURS PRN
Qty: 100 TABLET | Refills: 2 | Status: SHIPPED | OUTPATIENT
Start: 2025-07-26

## 2025-07-28 ENCOUNTER — OFFICE VISIT (OUTPATIENT)
Dept: TRANSPLANT | Facility: CLINIC | Age: 60
End: 2025-07-28
Attending: TRANSPLANT SURGERY
Payer: COMMERCIAL

## 2025-07-28 ENCOUNTER — LAB (OUTPATIENT)
Dept: LAB | Facility: CLINIC | Age: 60
End: 2025-07-28
Attending: TRANSPLANT SURGERY
Payer: COMMERCIAL

## 2025-07-28 VITALS
DIASTOLIC BLOOD PRESSURE: 80 MMHG | SYSTOLIC BLOOD PRESSURE: 186 MMHG | HEART RATE: 63 BPM | WEIGHT: 293 LBS | BODY MASS INDEX: 43 KG/M2 | OXYGEN SATURATION: 94 %

## 2025-07-28 DIAGNOSIS — Z94.4 LIVER REPLACED BY TRANSPLANT (H): ICD-10-CM

## 2025-07-28 DIAGNOSIS — Z94.4 LIVER TRANSPLANTED (H): ICD-10-CM

## 2025-07-28 LAB
ALBUMIN SERPL BCG-MCNC: 3.6 G/DL (ref 3.5–5.2)
ALP SERPL-CCNC: 76 U/L (ref 40–150)
ALT SERPL W P-5'-P-CCNC: 13 U/L (ref 0–50)
ANION GAP SERPL CALCULATED.3IONS-SCNC: 12 MMOL/L (ref 7–15)
AST SERPL W P-5'-P-CCNC: 20 U/L (ref 0–45)
BILIRUB SERPL-MCNC: 0.8 MG/DL
BILIRUBIN DIRECT (ROCHE PRO & PURE): 0.39 MG/DL (ref 0–0.45)
BUN SERPL-MCNC: 21.3 MG/DL (ref 8–23)
CALCIUM SERPL-MCNC: 9 MG/DL (ref 8.8–10.4)
CHLORIDE SERPL-SCNC: 104 MMOL/L (ref 98–107)
CREAT SERPL-MCNC: 1.6 MG/DL (ref 0.51–0.95)
EGFRCR SERPLBLD CKD-EPI 2021: 37 ML/MIN/1.73M2
ERYTHROCYTE [DISTWIDTH] IN BLOOD BY AUTOMATED COUNT: 17 % (ref 10–15)
GLUCOSE SERPL-MCNC: 152 MG/DL (ref 70–99)
HCO3 SERPL-SCNC: 21 MMOL/L (ref 22–29)
HCT VFR BLD AUTO: 27.9 % (ref 35–47)
HGB BLD-MCNC: 9.3 G/DL (ref 11.7–15.7)
MAGNESIUM SERPL-MCNC: 1.7 MG/DL (ref 1.7–2.3)
MCH RBC QN AUTO: 30.3 PG (ref 26.5–33)
MCHC RBC AUTO-ENTMCNC: 33.3 G/DL (ref 31.5–36.5)
MCV RBC AUTO: 91 FL (ref 78–100)
PHOSPHATE SERPL-MCNC: 3.3 MG/DL (ref 2.5–4.5)
PLATELET # BLD AUTO: 128 10E3/UL (ref 150–450)
POTASSIUM SERPL-SCNC: 4.2 MMOL/L (ref 3.4–5.3)
PROT SERPL-MCNC: 6.3 G/DL (ref 6.4–8.3)
RBC # BLD AUTO: 3.07 10E6/UL (ref 3.8–5.2)
SODIUM SERPL-SCNC: 137 MMOL/L (ref 135–145)
TACROLIMUS BLD-MCNC: 10.6 UG/L (ref 5–15)
TME LAST DOSE: NORMAL H
TME LAST DOSE: NORMAL H
WBC # BLD AUTO: 5.4 10E3/UL (ref 4–11)

## 2025-07-28 PROCEDURE — 87521 HEPATITIS C PROBE&RVRS TRNSC: CPT | Performed by: TRANSPLANT SURGERY

## 2025-07-28 PROCEDURE — 3077F SYST BP >= 140 MM HG: CPT | Performed by: TRANSPLANT SURGERY

## 2025-07-28 PROCEDURE — 36415 COLL VENOUS BLD VENIPUNCTURE: CPT | Performed by: PATHOLOGY

## 2025-07-28 PROCEDURE — 85027 COMPLETE CBC AUTOMATED: CPT | Performed by: PATHOLOGY

## 2025-07-28 PROCEDURE — 82248 BILIRUBIN DIRECT: CPT | Performed by: PATHOLOGY

## 2025-07-28 PROCEDURE — 99214 OFFICE O/P EST MOD 30 MIN: CPT | Mod: 24 | Performed by: TRANSPLANT SURGERY

## 2025-07-28 PROCEDURE — 80053 COMPREHEN METABOLIC PANEL: CPT | Performed by: PATHOLOGY

## 2025-07-28 PROCEDURE — 83735 ASSAY OF MAGNESIUM: CPT | Performed by: PATHOLOGY

## 2025-07-28 PROCEDURE — 80197 ASSAY OF TACROLIMUS: CPT | Performed by: TRANSPLANT SURGERY

## 2025-07-28 PROCEDURE — 84100 ASSAY OF PHOSPHORUS: CPT | Performed by: PATHOLOGY

## 2025-07-28 PROCEDURE — 99000 SPECIMEN HANDLING OFFICE-LAB: CPT | Performed by: PATHOLOGY

## 2025-07-28 PROCEDURE — G0463 HOSPITAL OUTPT CLINIC VISIT: HCPCS | Performed by: TRANSPLANT SURGERY

## 2025-07-28 PROCEDURE — 3079F DIAST BP 80-89 MM HG: CPT | Performed by: TRANSPLANT SURGERY

## 2025-07-28 RX ORDER — METHOCARBAMOL 750 MG/1
750 TABLET, FILM COATED ORAL EVERY 6 HOURS PRN
Qty: 30 TABLET | Refills: 0 | Status: SHIPPED | OUTPATIENT
Start: 2025-07-28 | End: 2025-07-30

## 2025-07-28 RX ORDER — AMLODIPINE BESYLATE 5 MG/1
5 TABLET ORAL DAILY
Qty: 30 TABLET | Refills: 3 | Status: SHIPPED | OUTPATIENT
Start: 2025-07-28

## 2025-07-28 NOTE — TELEPHONE ENCOUNTER
ISSUE:   Tacrolimus IR level 10.6 on 7/28, goal 8, dose 6 mg BID.    PLAN:   Call Patient and confirm this was an accurate 12-hour trough.   Verify Tacrolimus IR dose 6 mg BID.   Confirm no new medications or or missed doses.   Confirm no new illness / infection / diarrhea.   If accurate trough and accurate dose, decrease Tacrolimus IR dose to 6 mg qAM 5 mg qPM     Is this more than a 50% increase or decrease in current IS dose: No  If YES, justification: n/a    Repeat labs in 1 weeks.  *If > 50% change in immunosuppression dose, repeat labs in 1 week.     OUTCOME:   Spoke with Patient, they confirm accurate trough level and current dose 7 mg BID.   Patient confirmed dose change to 6 mg BID.  Patient agreed to repeat labs in 1 weeks.   Orders sent to preferred pharmacy for dose change and lab for repeat labs.   Patient voiced understanding of plan.     Pamela Beckett LPN

## 2025-07-28 NOTE — PROGRESS NOTES
Transplant Surgery -OUTPATIENT IMMUNOSUPPRESSION PROGRESS NOTE    Date of Visit: 07/28/2025    Transplants:  6/19/2025 (Liver); Postoperative day:  39  ASSESMENT AND PLAN:  1.Graft Function:Liver allograft: no rejection or technical problems.    2.Immunosuppression Management: reduce tacrolimus goal to 6-8 ng/dL for renal sparing  3.Hypertension: start norvasc 5mg po daily and see PCP  4.Renal Function:stable  5.Lab frequency:twice weekly  6.Other:  Wound healed well    Date: July 28, 2025    Transplant:  [x]                             Liver [x]                              Kidney []                             Pancreas []                              Other:             Chief Complaint:Post-op Visit (39 days ago - post )    Doing well     History of Present Illness:  Patient Active Problem List   Diagnosis    Psoriatic arthropathy (H)    Major depressive disorder, recurrent episode, in full remission    Hypothyroidism    Allergic rhinitis    Rosacea    Hyperlipidemia    Living will, counseling/discussion    Chronic fatigue fibromyalgia syndrome    Morbid obesity (H)    Controlled substance agreement signed    Idiopathic thrombocytopenia (H)    Osteoarthritis cervical spine    Moderate obstructive sleep apnea    DDD (degenerative disc disease), lumbar    ASCUS of cervix with negative high risk HPV    Other pulmonary embolism without acute cor pulmonale (H)    Unspecified diastolic (congestive) heart failure (H)    Hypervolemia, unspecified hypervolemia type    Liver replaced by transplant (H)    Nausea    Hyperkalemia    Diarrhea    Acute renal failure on dialysis    Immunosuppressed status     SOCIAL /FAMILY HISTORY: [x]                  No recent change    Past Medical History:   Diagnosis Date    Cirrhosis of liver with ascites (H) 08/05/2022    Fibromyalgia 11/12/2018    Hyperlipidemia 03/17/2005     Problem list name updated by automated process. Provider to review    Hypothyroidism 06/26/2002     Problem list  "name updated by automated process. Provider to review    Major depressive disorder, recurrent episode, in full remission 2002    Metabolic dysfunction-associated steatohepatitis (MASH) 2025    Morbid obesity (H) 2019    Muscle pain 2012    Osteoarthritis 2024    Psoriatic arthropathy (H) 2002    Pulmonary embolism and infarction (H) 2002    Problem list name updated by automated process. Provider to review       Past Surgical History:   Procedure Laterality Date    BIOPSY  Aug 2024    COLONOSCOPY      mild colitis/ repeat 10 yrs    ESOPHAGOSCOPY, GASTROSCOPY, DUODENOSCOPY (EGD), COMBINED N/A 2024    Procedure: ESOPHAGOGASTRODUODENOSCOPY for variceal surveillance;  Surgeon: Guru Jacquelyn Rees MD;  [facility]    ESOPHAGOSCOPY, GASTROSCOPY, DUODENOSCOPY (EGD), COMBINED N/A 2024    Procedure: ESOPHAGOGASTRODUODENOSCOPY, WITH FINE NEEDLE ASPIRATION BIOPSY, WITH ENDOSCOPIC ULTRASOUND GUIDANCE liver and lymph node biopsy;  Surgeon: Guru Jacquelyn Rees MD;  Location: [facility] OR     REMOVAL OF TONSILS,<10 Y/O      Tonsils <11y.o.    IR LUMBAR PUNCTURE  2023    IR THORACENTESIS  2025    LASIK  2004    \"lasek\"    THORACENTESIS Left 2025    Procedure: Thoracentesis;  Surgeon: Jaden Martinez MD;  Location: [facility] OR    TRANSPLANT LIVER RECIPIENT  DONOR N/A 2025    Procedure: Transplant liver recipient  donor;  Surgeon: Max Rebolledo MD;  Location:  OR     Social History     Socioeconomic History    Marital status:      Spouse name: Not on file    Number of children: 1    Years of education: Not on file    Highest education level: Not on file   Occupational History    Not on file   Tobacco Use    Smoking status: Never    Smokeless tobacco: Never   Substance and Sexual Activity    Alcohol use: Not Currently     Comment: last drink 3 years ago    Drug use: Never    Sexual " activity: Not Currently     Partners: Male   Other Topics Concern    Parent/sibling w/ CABG, MI or angioplasty before 65F 55M? Yes     Comment: Father   Social History Narrative    Not on file     Social Drivers of Health     Financial Resource Strain: Low Risk  (6/18/2025)    Financial Resource Strain     Within the past 12 months, have you or your family members you live with been unable to get utilities (heat, electricity) when it was really needed?: No   Food Insecurity: High Risk (6/18/2025)    Food Insecurity     Within the past 12 months, did you worry that your food would run out before you got money to buy more?: Yes     Within the past 12 months, did the food you bought just not last and you didn t have money to get more?: Yes   Transportation Needs: Low Risk  (6/18/2025)    Transportation Needs     Within the past 12 months, has lack of transportation kept you from medical appointments, getting your medicines, non-medical meetings or appointments, work, or from getting things that you need?: No   Physical Activity: Not on file   Stress: Not on file   Social Connections: Not on file   Interpersonal Safety: Low Risk  (6/19/2025)    Interpersonal Safety     Do you feel physically and emotionally safe where you currently live?: Yes     Within the past 12 months, have you been hit, slapped, kicked or otherwise physically hurt by someone?: No     Within the past 12 months, have you been humiliated or emotionally abused in other ways by your partner or ex-partner?: No   Housing Stability: High Risk (6/18/2025)    Housing Stability     Do you have housing? : No     Are you worried about losing your housing?: Yes     Prescription Medications as of 7/28/2025         Rx Number Disp Refills Start End Last Dispensed Date Next Fill Date Owning Pharmacy    acetaminophen (TYLENOL) 325 MG tablet  100 tablet 2 7/26/2025 --   [facility] - [place], [place] - [address]    Sig: Take 2 tablets (650  mg) by mouth every 6 hours as needed for pain.    Class: E-Prescribe    Route: Oral    aspirin (ASA) 325 MG tablet  60 tablet 0 7/23/2025 --   [facility] [address] [place] [address] [address]    Sig: Take 1 tablet (325 mg) by mouth daily.    Class: E-Prescribe    Route: Oral    atorvastatin (LIPITOR) 40 MG tablet  30 tablet 11 7/1/2025 --   [address]    Sig: Take 1 tablet (40 mg) by mouth daily.    Class: E-Prescribe    Route: Oral    Renewals       Renewal requests to authorizing provider (Lindsey Boyd APRN CNP) <b>prohibited</b>            bulk laxative (BENEFIBER DRINK MIX) packet  60 packet 3 7/1/2025 --   [address]    Sig: Take 1 packet by mouth 2 times daily.    Class: E-Prescribe    Route: Oral    Renewals       Renewal requests to authorizing provider (Lindsey Boyd APRN CNP) <b>prohibited</b>            citalopram (CELEXA) 20 MG tablet  90 tablet 3 3/31/2025 --   [facility] [address] [place] [address] [address]    Sig: Take 1 tablet (20 mg) by mouth daily.    Class: E-Prescribe    Route: Oral    ixekizumab (TALTZ) 80 MG/ML SOAJ auto-injector  -- -- 7/1/2025 --       Sig: 160 mg once, followed by 80 mg at weeks 2, 4, 6, 8, 10, and 12, and then 80 mg every 4 weeks. Follow up with your Rheumatologist regarding this medication and your new immunosuppressant medications.    Class: No Print Out    levothyroxine (SYNTHROID/LEVOTHROID) 112 MCG tablet  90 tablet 3 3/31/2025 --   [facility] [address] [place] [address] [address]    Sig: Take 1 tablet (112 mcg) by mouth daily.    Class: E-Prescribe    Route: Oral    Lidocaine (LIDOCARE) 4 % Patch  -- --  --       Sig: Place 1 patch onto the skin daily as needed for moderate pain.    Class: Historical    Route: Transdermal    loperamide (IMODIUM) 2 MG capsule  60 capsule 0 7/1/2025 --   [facility]  [address]    Sig: Take 1 capsule (2 mg) by mouth 4 times daily as needed for diarrhea.    Class: E-Prescribe    Route: Oral    Renewals       Renewal requests to authorizing provider (Lindsey Boyd APRN CNP) <b>prohibited</b>            methocarbamol (ROBAXIN) 750 MG tablet  30 tablet 0 7/24/2025 --   [facility] [address] [place] [address] [address]    Sig: Take 1 tablet (750 mg) by mouth every 6 hours as needed for muscle spasms.    Class: E-Prescribe    Route: Oral    mometasone (ELOCON) 0.1 % external solution  -- -- 10/2/2023 --       Sig: Apply thin layer twice a day as needed to rash on scalp    Class: Historical    multivitamin w/minerals (THERA-VIT-M) tablet  90 tablet 3 7/23/2025 --   [facility] [address] [place] [address] [address]    Sig: Take 1 tablet by mouth daily.    Class: E-Prescribe    Route: Oral    mycophenolic acid (GENERIC EQUIVALENT) 180 MG EC tablet  180 tablet 5 7/21/2025 --   [facility] [address] [place] [address] [address]    Sig: Take 3 tablets (540 mg) by mouth 2 times daily.    Class: E-Prescribe    Notes to Pharmacy: Profile Rx: patient will contact pharmacy when needed    Route: Oral    ondansetron (ZOFRAN ODT) 4 MG ODT tab  90 tablet 1 7/1/2025 --   [facility] [address]    Sig: Take 1 tablet (4 mg) by mouth 3 times daily as needed for nausea or vomiting.    Class: E-Prescribe    Route: Oral    Renewals       Renewal requests to authorizing provider (Lindsey Boyd APRN CNP) <b>prohibited</b>            predniSONE (DELTASONE) 5 MG tablet  90 tablet 0 7/23/2025 --   [facility] [address] [place] [address] [address]    Sig: Take 1 tablet (5 mg) by mouth daily.    Class: E-Prescribe    Notes to Pharmacy: TXP DT 6/19/2025 (Liver) TXP Dischg DT 7/1/2025 DX Liver replaced by transplant Z94.4 TX [facility]  ([place], [place])    Route: Oral    psyllium (METAMUCIL/KONSYL) 58.6 % powder  500 g 1 7/15/2025 --   [facility] [address] [facility] [phone]    Sig: Take 6 g (1 teaspoonful) by mouth daily.    Class: E-Prescribe    Route: Oral    sodium bicarbonate 650 MG tablet  90 tablet 2 7/23/2025 --   [facility] [address] [place] [address] [address]    Sig: Take 1 tablet (650 mg) by mouth 3 times daily.    Class: E-Prescribe    Route: Oral    tacrolimus (GENERIC EQUIVALENT) 0.5 MG capsule  60 capsule 0 7/1/2025 --   [facility] - [place], [place] - [address] [facility]    Sig: Take 1 cap by mouth twice daily as directed by Transplant Center for dose changes.    Class: E-Prescribe    Renewals       Renewal requests to authorizing provider (Lindsey Boyd, APRN CNP) <b>prohibited</b>            tacrolimus (GENERIC EQUIVALENT) 1 MG capsule  180 capsule 3 7/25/2025 --   [facility] [address] [place] [address] [address]    Sig: Take 2 capsules (2 mg) by mouth every 12 hours. Total dose 7 mg BID    Class: E-Prescribe    Notes to Pharmacy: TXP DT 6/19/2025 (Liver) TXP Dischg DT 7/1/2025 DX Liver replaced by transplant Z94.4 TX [facility] ([place], [place])    Route: Oral    tacrolimus (GENERIC EQUIVALENT) 5 MG capsule  180 capsule 3 7/25/2025 --   [facility] [address] [place] [address] [address]    Sig: Take 1 capsule (5 mg) by mouth 2 times daily. Total dose 7 mg BID    Class: E-Prescribe    Notes to Pharmacy: TXP DT 6/19/2025 (Liver) TXP Dischg DT 7/1/2025 DX Liver replaced by transplant Z94.4 TX [facility] ([place], [place])    Route: Oral    tacrolimus (PROTOPIC) 0.1 % external ointment  -- -- 1/9/2020 --       Sig: Apply topically as needed    Class: Historical    Route: Topical    traZODone (DESYREL) 100 MG tablet  90 tablet 3 3/31/2025 --   [facility] [address]  [place] [address] [address]    Sig: Take 1 tablet (100 mg) by mouth at bedtime.    Class: E-Prescribe    Route: Oral    triamcinolone (KENALOG) 0.1 % external cream  -- -- 1/9/2020 --       Sig: Apply topically as needed    Class: Historical    Route: Topical    ursodiol (ACTIGALL) 300 MG capsule  180 capsule 1 7/23/2025 --   [facility] [address] [place] [address] [address]    Sig: Take 1 capsule (300 mg) by mouth 2 times daily.    Class: E-Prescribe    Route: Oral    valGANciclovir (VALCYTE) 450 MG tablet  60 tablet 0 7/23/2025 --   [facility] [address] [place] [address] [address]    Sig: Take 1 tablet (450 mg) by mouth daily.    Class: E-Prescribe    Route: Oral          Sumatriptan succinate   REVIEW OF SYSTEMS (check box if normal)  [x]               GENERAL  [x]                 PULMONARY [x]                GENITOURINARY  [x]                CNS                 [x]                 CARDIAC  [x]                 ENDOCRINE  [x]                EARS,NOSE,THROAT [x]                 GASTROINTESTINAL [x]                 NEUROLOGIC    [x]                MUSCLOSKELTAL  [x]                  HEMATOLOGY      PHYSICAL EXAM (check box if normal)BP (!) 186/80   Pulse 63   Wt (!) 139.8 kg (308 lb 3.2 oz)   SpO2 94%   BMI 43.00 kg/m          [x]            GENERAL:    [x]       EYES:  ICTERIC   []        YES  []                    NO  [x]           EXTREMITIES: Clubbing []       Y     [x]           N    [x]           EARS, NOSE, THROAT: Membranes Moist    YES   [x]                   NO []                  [x]           LUNGS:  CLEAR    YES       [x]                  NO    []                                [x]           SKIN: Jaundice           YES       []                  NO    [x]                   Rash: YES       []                  NO    [x]                                     [x]             HEART: Regular Rate          YES       [x]                  NO    []                   Incision  Clean:  YES       [x]                  NO    []                                [x]                    ABDOMEN: Wound healthy Organomegaly YES       []                  NO    [x]                       [x]                    NEUROLOGICAL:  Nonfocal  YES       [x]                  NO    []                       [x]                    Hernia YES       []                  NO    [x]                   PSYCHIATRIC:  Appropriate  YES       [x]                  NO    []                       OTHER:                                                                                                   PAIN SCALE:: 3

## 2025-07-28 NOTE — LETTER
7/28/2025      Karlene Yun  [address]  [facility] [address]      Dear Colleague,    Thank you for referring your patient, Karlene Yun, to the [facility]. Please see a copy of my visit note below.    Transplant Surgery -OUTPATIENT IMMUNOSUPPRESSION PROGRESS NOTE    Date of Visit: 07/28/2025    Transplants:  6/19/2025 (Liver); Postoperative day:  39  ASSESMENT AND PLAN:  1.Graft Function:Liver allograft: no rejection or technical problems.    2.Immunosuppression Management: reduce tacrolimus goal to 6-8 ng/dL for renal sparing  3.Hypertension: start norvasc 5mg po daily and see PCP  4.Renal Function:stable  5.Lab frequency:twice weekly  6.Other:  Wound healed well    Date: July 28, 2025    Transplant:  [x]                             Liver [x]                              Kidney []                             Pancreas []                              Other:             Chief Complaint:Post-op Visit (39 days ago - post )    Doing well     History of Present Illness:  Patient Active Problem List   Diagnosis     Psoriatic arthropathy (H)     Major depressive disorder, recurrent episode, in full remission     Hypothyroidism     Allergic rhinitis     Rosacea     Hyperlipidemia     Living will, counseling/discussion     Chronic fatigue fibromyalgia syndrome     Morbid obesity (H)     Controlled substance agreement signed     Idiopathic thrombocytopenia (H)     Osteoarthritis cervical spine     Moderate obstructive sleep apnea     DDD (degenerative disc disease), lumbar     ASCUS of cervix with negative high risk HPV     Other pulmonary embolism without acute cor pulmonale (H)     Unspecified diastolic (congestive) heart failure (H)     Hypervolemia, unspecified hypervolemia type     Liver replaced by transplant (H)     Nausea     Hyperkalemia     Diarrhea     Acute renal failure on dialysis     Immunosuppressed status     SOCIAL /FAMILY HISTORY: [x]                  No  "recent change    Past Medical History:   Diagnosis Date     Cirrhosis of liver with ascites (H) 2022     Fibromyalgia 2018     Hyperlipidemia 2005     Problem list name updated by automated process. Provider to review     Hypothyroidism 2002     Problem list name updated by automated process. Provider to review     Major depressive disorder, recurrent episode, in full remission 2002     Metabolic dysfunction-associated steatohepatitis (MASH) 2025     Morbid obesity (H) 2019     Muscle pain 2012     Osteoarthritis 2024     Psoriatic arthropathy (H) 2002     Pulmonary embolism and infarction (H) 2002    Problem list name updated by automated process. Provider to review       Past Surgical History:   Procedure Laterality Date     BIOPSY  Aug 2024     COLONOSCOPY      mild colitis/ repeat 10 yrs     ESOPHAGOSCOPY, GASTROSCOPY, DUODENOSCOPY (EGD), COMBINED N/A 2024    Procedure: ESOPHAGOGASTRODUODENOSCOPY for variceal surveillance;  Surgeon: Guru Jacquelyn Rees MD;  Location:  OR     ESOPHAGOSCOPY, GASTROSCOPY, DUODENOSCOPY (EGD), COMBINED N/A 2024    Procedure: ESOPHAGOGASTRODUODENOSCOPY, WITH FINE NEEDLE ASPIRATION BIOPSY, WITH ENDOSCOPIC ULTRASOUND GUIDANCE liver and lymph node biopsy;  Surgeon: Guru Jacquelyn Rees MD;  Location:  OR      REMOVAL OF TONSILS,<13 Y/O      Tonsils <11y.o.     IR LUMBAR PUNCTURE  2023     IR THORACENTESIS  2025     LASIK  2004    \"lasek\"     THORACENTESIS Left 2025    Procedure: Thoracentesis;  Surgeon: Jadne Martinez MD;  Location: [facility] OR     TRANSPLANT LIVER RECIPIENT  DONOR N/A 2025    Procedure: Transplant liver recipient  donor;  Surgeon: Max Rebolledo MD;  Location:  OR     Social History     Socioeconomic History     Marital status:      Spouse name: Not on file     Number of children: 1     " Years of education: Not on file     Highest education level: Not on file   Occupational History     Not on file   Tobacco Use     Smoking status: Never     Smokeless tobacco: Never   Substance and Sexual Activity     Alcohol use: Not Currently     Comment: last drink 3 years ago     Drug use: Never     Sexual activity: Not Currently     Partners: Male   Other Topics Concern     Parent/sibling w/ CABG, MI or angioplasty before 65F 55M? Yes     Comment: Father   Social History Narrative     Not on file     Social Drivers of Health     Financial Resource Strain: Low Risk  (6/18/2025)    Financial Resource Strain      Within the past 12 months, have you or your family members you live with been unable to get utilities (heat, electricity) when it was really needed?: No   Food Insecurity: High Risk (6/18/2025)    Food Insecurity      Within the past 12 months, did you worry that your food would run out before you got money to buy more?: Yes      Within the past 12 months, did the food you bought just not last and you didn t have money to get more?: Yes   Transportation Needs: Low Risk  (6/18/2025)    Transportation Needs      Within the past 12 months, has lack of transportation kept you from medical appointments, getting your medicines, non-medical meetings or appointments, work, or from getting things that you need?: No   Physical Activity: Not on file   Stress: Not on file   Social Connections: Not on file   Interpersonal Safety: Low Risk  (6/19/2025)    Interpersonal Safety      Do you feel physically and emotionally safe where you currently live?: Yes      Within the past 12 months, have you been hit, slapped, kicked or otherwise physically hurt by someone?: No      Within the past 12 months, have you been humiliated or emotionally abused in other ways by your partner or ex-partner?: No   Housing Stability: High Risk (6/18/2025)    Housing Stability      Do you have housing? : No      Are you worried about losing  your housing?: Yes     Prescription Medications as of 7/28/2025         Rx Number Disp Refills Start End Last Dispensed Date Next Fill Date Owning Pharmacy    acetaminophen (TYLENOL) 325 MG tablet  100 tablet 2 7/26/2025 --   [address]    Sig: Take 2 tablets (650 mg) by mouth every 6 hours as needed for pain.    Class: E-Prescribe    Route: Oral    aspirin (ASA) 325 MG tablet  60 tablet 0 7/23/2025 --   [facility] [address] [place] [address] [address]    Sig: Take 1 tablet (325 mg) by mouth daily.    Class: E-Prescribe    Route: Oral    atorvastatin (LIPITOR) 40 MG tablet  30 tablet 11 7/1/2025 --   [address]    Sig: Take 1 tablet (40 mg) by mouth daily.    Class: E-Prescribe    Route: Oral    Renewals       Renewal requests to authorizing provider (Lindsey Boyd APRN CNP) <b>prohibited</b>            bulk laxative (BENEFIBER DRINK MIX) packet  60 packet 3 7/1/2025 --   [address]    Sig: Take 1 packet by mouth 2 times daily.    Class: E-Prescribe    Route: Oral    Renewals       Renewal requests to authorizing provider (Lindsey Boyd APRN CNP) <b>prohibited</b>            citalopram (CELEXA) 20 MG tablet  90 tablet 3 3/31/2025 --   [facility] [place] [address] [address]    Sig: Take 1 tablet (20 mg) by mouth daily.    Class: E-Prescribe    Route: Oral    ixekizumab (TALTZ) 80 MG/ML SOAJ auto-injector  -- -- 7/1/2025 --       Sig: 160 mg once, followed by 80 mg at weeks 2, 4, 6, 8, 10, and 12, and then 80 mg every 4 weeks. Follow up with your Rheumatologist regarding this medication and your new immunosuppressant medications.    Class: No Print Out    levothyroxine (SYNTHROID/LEVOTHROID) 112 MCG tablet  90 tablet 3 3/31/2025 --   [facility] [address] [place] [address] [address]    Sig:  Take 1 tablet (112 mcg) by mouth daily.    Class: E-Prescribe    Route: Oral    Lidocaine (LIDOCARE) 4 % Patch  -- --  --       Sig: Place 1 patch onto the skin daily as needed for moderate pain.    Class: Historical    Route: Transdermal    loperamide (IMODIUM) 2 MG capsule  60 capsule 0 7/1/2025 --   [address]    Sig: Take 1 capsule (2 mg) by mouth 4 times daily as needed for diarrhea.    Class: E-Prescribe    Route: Oral    Renewals       Renewal requests to authorizing provider (Lindsey Boyd APRN CNP) <b>prohibited</b>            methocarbamol (ROBAXIN) 750 MG tablet  30 tablet 0 7/24/2025 --   [facility] [address] [place] [address] [address]    Sig: Take 1 tablet (750 mg) by mouth every 6 hours as needed for muscle spasms.    Class: E-Prescribe    Route: Oral    mometasone (ELOCON) 0.1 % external solution  -- -- 10/2/2023 --       Sig: Apply thin layer twice a day as needed to rash on scalp    Class: Historical    multivitamin w/minerals (THERA-VIT-M) tablet  90 tablet 3 7/23/2025 --   [facility] [address] [place] [address] [address]    Sig: Take 1 tablet by mouth daily.    Class: E-Prescribe    Route: Oral    mycophenolic acid (GENERIC EQUIVALENT) 180 MG EC tablet  180 tablet 5 7/21/2025 --   [facility] [address] [place] [address] [address]    Sig: Take 3 tablets (540 mg) by mouth 2 times daily.    Class: E-Prescribe    Notes to Pharmacy: Profile Rx: patient will contact pharmacy when needed    Route: Oral    ondansetron (ZOFRAN ODT) 4 MG ODT tab  90 tablet 1 7/1/2025 --   [address]    Sig: Take 1 tablet (4 mg) by mouth 3 times daily as needed for nausea or vomiting.    Class: E-Prescribe    Route: Oral    Renewals       Renewal requests to authorizing provider (Lindsey Boyd APRN CNP) <b>prohibited</b>            predniSONE (DELTASONE) 5 MG  tablet  90 tablet 0 7/23/2025 --   [facility] [address] [place] [facility] [address]    Sig: Take 1 tablet (5 mg) by mouth daily.    Class: E-Prescribe    Notes to Pharmacy: TXP DT 6/19/2025 (Liver) TXP Dischg DT 7/1/2025 DX Liver replaced by transplant Z94.4 TX [facility] ([place], [place])    Route: Oral    psyllium (METAMUCIL/KONSYL) 58.6 % powder  500 g 1 7/15/2025 --   [facility] [place], [place] - [address] [facility] [address]    Sig: Take 6 g (1 teaspoonful) by mouth daily.    Class: E-Prescribe    Route: Oral    sodium bicarbonate 650 MG tablet  90 tablet 2 7/23/2025 --   [facility] [address] [place] [facility] [address]    Sig: Take 1 tablet (650 mg) by mouth 3 times daily.    Class: E-Prescribe    Route: Oral    tacrolimus (GENERIC EQUIVALENT) 0.5 MG capsule  60 capsule 0 7/1/2025 --   [facility] - [place], [place] - [address] [facility] [address] [facility]    Sig: Take 1 cap by mouth twice daily as directed by Transplant Center for dose changes.    Class: E-Prescribe    Renewals       Renewal requests to authorizing provider (Lindsey Boyd, APRN CNP) <b>prohibited</b>            tacrolimus (GENERIC EQUIVALENT) 1 MG capsule  180 capsule 3 7/25/2025 --   [facility] [place] [facility] [id number]    Sig: Take 2 capsules (2 mg) by mouth every 12 hours. Total dose 7 mg BID    Class: E-Prescribe    Notes to Pharmacy: TXP DT 6/19/2025 (Liver) TXP Dischg DT 7/1/2025 DX Liver replaced by transplant Z94.4 TX [facility] ([place], [place])    Route: Oral    tacrolimus (GENERIC EQUIVALENT) 5 MG capsule  180 capsule 3 7/25/2025 --   [facility] [address] [place] [facility] [address] [address]    Sig: Take 1 capsule (5 mg) by mouth 2 times daily. Total dose 7 mg BID    Class: E-Prescribe    Notes to Pharmacy: TXP DT 6/19/2025 (Liver) TXP Dischg DT 7/1/2025 DX Liver replaced by  transplant Z94.4 [place] [facility] ([place], [place])    Route: Oral    tacrolimus (PROTOPIC) 0.1 % external ointment  -- -- 1/9/2020 --       Sig: Apply topically as needed    Class: Historical    Route: Topical    traZODone (DESYREL) 100 MG tablet  90 tablet 3 3/31/2025 --   [facility] [address] [place] [address] [address]    Sig: Take 1 tablet (100 mg) by mouth at bedtime.    Class: E-Prescribe    Route: Oral    triamcinolone (KENALOG) 0.1 % external cream  -- -- 1/9/2020 --       Sig: Apply topically as needed    Class: Historical    Route: Topical    ursodiol (ACTIGALL) 300 MG capsule  180 capsule 1 7/23/2025 --   [facility] [address] [place] [address] [address]    Sig: Take 1 capsule (300 mg) by mouth 2 times daily.    Class: E-Prescribe    Route: Oral    valGANciclovir (VALCYTE) 450 MG tablet  60 tablet 0 7/23/2025 --   [facility] [address] [place] [address] [address]    Sig: Take 1 tablet (450 mg) by mouth daily.    Class: E-Prescribe    Route: Oral          Sumatriptan succinate   REVIEW OF SYSTEMS (check box if normal)  [x]               GENERAL  [x]                 PULMONARY [x]                GENITOURINARY  [x]                CNS                 [x]                 CARDIAC  [x]                 ENDOCRINE  [x]                EARS,NOSE,THROAT [x]                 GASTROINTESTINAL [x]                 NEUROLOGIC    [x]                MUSCLOSKELTAL  [x]                  HEMATOLOGY      PHYSICAL EXAM (check box if normal)BP (!) 186/80   Pulse 63   Wt (!) 139.8 kg (308 lb 3.2 oz)   SpO2 94%   BMI 43.00 kg/m          [x]            GENERAL:    [x]       EYES:  ICTERIC   []        YES  []                    NO  [x]           EXTREMITIES: Clubbing []       Y     [x]           N    [x]           EARS, NOSE, THROAT: Membranes Moist    YES   [x]                   NO []                  [x]           LUNGS:  CLEAR    YES       [x]                  NO     []                                [x]           SKIN: Jaundice           YES       []                  NO    [x]                   Rash: YES       []                  NO    [x]                                     [x]             HEART: Regular Rate          YES       [x]                  NO    []                   Incision Clean:  YES       [x]                  NO    []                                [x]                    ABDOMEN: Wound healthy Organomegaly YES       []                  NO    [x]                       [x]                    NEUROLOGICAL:  Nonfocal  YES       [x]                  NO    []                       [x]                    Hernia YES       []                  NO    [x]                   PSYCHIATRIC:  Appropriate  YES       [x]                  NO    []                       OTHER:                                                                                                   PAIN SCALE:: 3     Again, thank you for allowing me to participate in the care of your patient.        Sincerely,        Max Rebolledo MD    Electronically signed

## 2025-07-29 ENCOUNTER — TELEPHONE (OUTPATIENT)
Dept: TRANSPLANT | Facility: CLINIC | Age: 60
End: 2025-07-29
Payer: COMMERCIAL

## 2025-07-29 ENCOUNTER — DOCUMENTATION ONLY (OUTPATIENT)
Dept: TRANSPLANT | Facility: CLINIC | Age: 60
End: 2025-07-29
Payer: COMMERCIAL

## 2025-07-29 LAB
HBV DNA SERPL QL NAA+PROBE: NORMAL
HCV RNA SERPL QL NAA+PROBE: NORMAL
HIV1+2 RNA SERPL QL NAA+PROBE: NORMAL

## 2025-07-29 RX ORDER — TACROLIMUS 5 MG/1
5 CAPSULE ORAL 2 TIMES DAILY
Qty: 180 CAPSULE | Refills: 3 | Status: SHIPPED | OUTPATIENT
Start: 2025-07-29

## 2025-07-29 RX ORDER — TACROLIMUS 1 MG/1
1 CAPSULE ORAL EVERY 12 HOURS
Qty: 180 CAPSULE | Refills: 3 | Status: SHIPPED | OUTPATIENT
Start: 2025-07-29

## 2025-07-29 NOTE — CONFIDENTIAL NOTE
Message sent to Dr. Rebolledo for review of vital signs and symptoms from phone call with nurse triage this afternoon, awaiting response. Patient is aware of when to report to ER, will follow up with any recommendations.

## 2025-07-29 NOTE — TELEPHONE ENCOUNTER
TIM. from [facility] calls to discuss pt's blood pressure. Asked Veronica that he call PCP for direction. Therapist will.     Pt has an appt with PCP on 7/30/2025.

## 2025-07-30 ENCOUNTER — MYC REFILL (OUTPATIENT)
Dept: TRANSPLANT | Facility: CLINIC | Age: 60
End: 2025-07-30

## 2025-07-30 ENCOUNTER — TELEPHONE (OUTPATIENT)
Dept: INTERNAL MEDICINE | Facility: CLINIC | Age: 60
End: 2025-07-30

## 2025-07-30 ENCOUNTER — OFFICE VISIT (OUTPATIENT)
Dept: INTERNAL MEDICINE | Facility: CLINIC | Age: 60
End: 2025-07-30
Payer: COMMERCIAL

## 2025-07-30 VITALS
TEMPERATURE: 98.1 F | HEART RATE: 69 BPM | HEIGHT: 71 IN | SYSTOLIC BLOOD PRESSURE: 137 MMHG | DIASTOLIC BLOOD PRESSURE: 71 MMHG | RESPIRATION RATE: 16 BRPM | WEIGHT: 293 LBS | OXYGEN SATURATION: 95 % | BODY MASS INDEX: 41.02 KG/M2

## 2025-07-30 DIAGNOSIS — I10 BENIGN ESSENTIAL HYPERTENSION: ICD-10-CM

## 2025-07-30 DIAGNOSIS — Z94.4 LIVER REPLACED BY TRANSPLANT (H): ICD-10-CM

## 2025-07-30 DIAGNOSIS — G89.29 CHRONIC MIDLINE THORACIC BACK PAIN: Primary | ICD-10-CM

## 2025-07-30 DIAGNOSIS — M54.6 CHRONIC MIDLINE THORACIC BACK PAIN: Primary | ICD-10-CM

## 2025-07-30 DIAGNOSIS — H57.89 BURNING SENSATION OF EYE: ICD-10-CM

## 2025-07-30 PROCEDURE — 99214 OFFICE O/P EST MOD 30 MIN: CPT | Mod: 24 | Performed by: INTERNAL MEDICINE

## 2025-07-30 PROCEDURE — 3078F DIAST BP <80 MM HG: CPT | Performed by: INTERNAL MEDICINE

## 2025-07-30 PROCEDURE — 3075F SYST BP GE 130 - 139MM HG: CPT | Performed by: INTERNAL MEDICINE

## 2025-07-30 RX ORDER — CAPSAICIN 0.025 %
CREAM (GRAM) TOPICAL
Qty: 237 ML | Refills: 3 | Status: SHIPPED | OUTPATIENT
Start: 2025-07-30

## 2025-07-30 RX ORDER — METHOCARBAMOL 750 MG/1
750 TABLET, FILM COATED ORAL EVERY 6 HOURS PRN
Qty: 30 TABLET | Refills: 0 | Status: SHIPPED | OUTPATIENT
Start: 2025-07-30

## 2025-07-30 ASSESSMENT — PATIENT HEALTH QUESTIONNAIRE - PHQ9
SUM OF ALL RESPONSES TO PHQ QUESTIONS 1-9: 7
10. IF YOU CHECKED OFF ANY PROBLEMS, HOW DIFFICULT HAVE THESE PROBLEMS MADE IT FOR YOU TO DO YOUR WORK, TAKE CARE OF THINGS AT HOME, OR GET ALONG WITH OTHER PEOPLE: SOMEWHAT DIFFICULT

## 2025-07-30 NOTE — PROGRESS NOTES
Assessment & Plan   Karlene Yun is a 59 year-old F with a PMHx significant for immunosuppression s/p liver transplant (6/19/25), essential hypertension, hyperlipidemia, chronic midline thoracic back pain, chronic fatigue fibromyalgia syndrome, hypothyroidism on levothyroxine, insomnia, and depression who presents for hospital follow-up after her liver transplant.    Liver replaced by transplant  - Post-op day 41 from liver transplant, seen by Surgery for follow-up 2 days ago  - Doing well except for high blood pressure    Essential hypertension  - Patient's BP was 186/80 at her Surgery follow-up two days ago. Started on amlodipine 5 mg  - No issues with this new medication. Home BPs yesterday were in the 170s systolic  - BPs today are 147/63, 152/73, and 137/71  - Discussed continuing amlodipine at current dose for one week. If BP not at goal after that, will increase the dose  - Follow-up in 2 weeks    Headache  - Patient reports three weeks of a global headache, worse over the past few weeks and better since starting amlodipine  - No numbness, weakness, lightheadedness, dizziness, or confusion  - Discussed with the patient that her headache may be related to her recent high blood pressures. Will continue on amlodipine and reassess   - Follow-up in 2 weeks    Chronic midline thoracic back pain  - Patient reports her back pain is worsening, she is also having back spasms since surgery  - Patient has seen pain management for this in the past and received steroid injections - she is no longer connected with them  - Last MRI was done at an outside facility in 2023  - Pain Management  Referral  - Recommended the patient continue using lidocaine patches and heating pads  - Ordered capsaicin (ZOSTRIX) 0.025% external cream  Dispense: 237 mL; Refill: 3  - Refilled methocarbamol (ROBAXIN) 750 MG tablet  Dispense: 30 tablet; Refill: 0    Burning sensation of the bilateral eyes  - Patient reports several months  "of dry eyes, \"like my eyes are burning\"  - Cannot remember when her last eye exam was  - Adult Eye  Referral      I spent a total of 30 minutes on the day of the visit.   Time spent by me today doing chart review, history and exam, documentation and further activities per the note    Subjective   Karlene is a 59 year old, presenting for the following health issues:  Hospital F/U        7/30/2025    10:59 AM   Additional Questions   Roomed by Mari PINEDA   Accompanied by N/A     History of Present Illness  Karlene Yun is a 59 year-old F with a PMHx significant for immunosuppression s/p liver transplant (6/19/25), essential hypertension, hyperlipidemia, chronic midline thoracic back pain, chronic fatigue fibromyalgia syndrome, hypothyroidism on levothyroxine, insomnia, and depression who presents for hospital follow-up after her liver transplant.    Karlene is post-op day 41 from her liver transplant. At her Surgery follow-up two days ago, her blood pressure was 186/80, so she was started on amlodipine 5 mg. No issues with this new medication. Home BPs yesterday were in the 170s systolic.     Karlene reports three weeks of a global headache, worse over the past few weeks and better since starting amlodipine. Some dyspnea on exertion. No chest pain, numbness, weakness, lightheadedness, dizziness, or confusion.    Karlene also reports chronic midline thoracic back pain with new back spasms since surgery. The pain is daily, constant, and sharp. Worse with walking, improved with rest. Makes sleeping difficult. Karlene has seen pain management for this in the past and received steroid injections, but she is no longer connected with them. Recently, she has tried using lidocaine patches and heating pads with minimal relief of symptoms. She also notes several months of dry eyes, \"like my eyes are burning.\"    Karlene further endorses diarrhea, vomiting at night, and changes to her taste buds since " "transplant surgery. The vomiting at night occurs 4-5x per month. Vomit is clear green color. Her surgeon said these things can happen after liver transplant surgery and is hopeful they will self-resolve. No fevers, chills, cough, rhinorrhea, or other symptoms at this time.    Review of Systems  Constitutional, HEENT, cardiovascular, pulmonary, gi and gu systems are negative, except as otherwise noted above.      Objective    /71 (BP Location: Right arm, Patient Position: Sitting, Cuff Size: Adult Regular)   Pulse 69   Temp 98.1  F (36.7  C)   Resp 16   Ht 1.803 m (5' 10.98\")   Wt (!) 138.9 kg (306 lb 4.8 oz)   SpO2 95%   BMI 42.74 kg/m    Body mass index is 42.74 kg/m .  Physical Exam   GENERAL: Alert and no distress  EYES: Eyes grossly normal to inspection, conjunctivae and sclerae normal  NECK: No adenopathy, no asymmetry, no masses  RESP: Lungs clear to auscultation - no rales, rhonchi or wheezes  CV: Regular rate and rhythm, normal S1 S2, no S3 or S4, no murmur, click or rub  ABDOMEN: Clean, well-healing surgical scars, including upper abdominal Mercedes incision and RLQ drain scar. LLQ with blue and brown bruise, approximately 4x4 cm  NEURO: Normal strength and tone, mentation intact and speech normal  BACK: No CVA tenderness, no paralumbar tenderness  PSYCH: Mentation appears normal, affect normal    Sarah Hernández, MS3    Physician Attestation   I, María Hanson MD, was present with the medical/JOCELIN student who participated in the service and in the documentation of the note.  I have verified the history and personally performed the physical exam and medical decision making.  I agree with the assessment and plan of care as documented in the note.      Items personally reviewed: vitals and labs.      Signed Electronically by: María Hanson MD    "

## 2025-07-30 NOTE — TELEPHONE ENCOUNTER
SARAHM and callback offering to reschedule pt's appt to 11am today 7/30 with Dr. Hanson (appt on hold).

## 2025-07-31 ENCOUNTER — LAB REQUISITION (OUTPATIENT)
Dept: LAB | Facility: CLINIC | Age: 60
End: 2025-07-31
Payer: COMMERCIAL

## 2025-07-31 ENCOUNTER — TELEPHONE (OUTPATIENT)
Dept: TRANSPLANT | Facility: CLINIC | Age: 60
End: 2025-07-31

## 2025-07-31 ENCOUNTER — PATIENT OUTREACH (OUTPATIENT)
Dept: CARE COORDINATION | Facility: CLINIC | Age: 60
End: 2025-07-31

## 2025-07-31 LAB
ALBUMIN SERPL BCG-MCNC: 3.4 G/DL (ref 3.5–5.2)
ALP SERPL-CCNC: 88 U/L (ref 40–150)
ALT SERPL W P-5'-P-CCNC: 15 U/L (ref 0–50)
ANION GAP SERPL CALCULATED.3IONS-SCNC: 11 MMOL/L (ref 7–15)
AST SERPL W P-5'-P-CCNC: 23 U/L (ref 0–45)
BASOPHILS # BLD AUTO: 0 10E3/UL (ref 0–0.2)
BASOPHILS NFR BLD AUTO: 1 %
BILIRUB DIRECT SERPL-MCNC: 0.38 MG/DL (ref 0–0.45)
BILIRUB SERPL-MCNC: 0.7 MG/DL
BUN SERPL-MCNC: 20.7 MG/DL (ref 8–23)
CALCIUM SERPL-MCNC: 8.9 MG/DL (ref 8.8–10.4)
CHLORIDE SERPL-SCNC: 102 MMOL/L (ref 98–107)
CREAT SERPL-MCNC: 1.5 MG/DL (ref 0.51–0.95)
EGFRCR SERPLBLD CKD-EPI 2021: 39 ML/MIN/1.73M2
EOSINOPHIL # BLD AUTO: 0.1 10E3/UL (ref 0–0.7)
EOSINOPHIL NFR BLD AUTO: 3 %
ERYTHROCYTE [DISTWIDTH] IN BLOOD BY AUTOMATED COUNT: 16.5 % (ref 10–15)
GLUCOSE SERPL-MCNC: 160 MG/DL (ref 70–99)
HCO3 SERPL-SCNC: 21 MMOL/L (ref 22–29)
HCT VFR BLD AUTO: 24.6 % (ref 35–47)
HGB BLD-MCNC: 8.2 G/DL (ref 11.7–15.7)
HOLD SPECIMEN: NORMAL
HOLD SPECIMEN: NORMAL
IMM GRANULOCYTES # BLD: 0.1 10E3/UL
IMM GRANULOCYTES NFR BLD: 1 %
LYMPHOCYTES # BLD AUTO: 0.5 10E3/UL (ref 0.8–5.3)
LYMPHOCYTES NFR BLD AUTO: 11 %
MAGNESIUM SERPL-MCNC: 1.7 MG/DL (ref 1.7–2.3)
MCH RBC QN AUTO: 30.1 PG (ref 26.5–33)
MCHC RBC AUTO-ENTMCNC: 33.3 G/DL (ref 31.5–36.5)
MCV RBC AUTO: 90 FL (ref 78–100)
MONOCYTES # BLD AUTO: 0.3 10E3/UL (ref 0–1.3)
MONOCYTES NFR BLD AUTO: 7 %
NEUTROPHILS # BLD AUTO: 3.4 10E3/UL (ref 1.6–8.3)
NEUTROPHILS NFR BLD AUTO: 78 %
NRBC # BLD AUTO: 0 10E3/UL
NRBC BLD AUTO-RTO: 0 /100
PHOSPHATE SERPL-MCNC: 3.2 MG/DL (ref 2.5–4.5)
PLATELET # BLD AUTO: 157 10E3/UL (ref 150–450)
POTASSIUM SERPL-SCNC: 4 MMOL/L (ref 3.4–5.3)
PROT SERPL-MCNC: 6.1 G/DL (ref 6.4–8.3)
RBC # BLD AUTO: 2.72 10E6/UL (ref 3.8–5.2)
SODIUM SERPL-SCNC: 134 MMOL/L (ref 135–145)
TACROLIMUS BLD-MCNC: 8.7 UG/L (ref 5–15)
TME LAST DOSE: NORMAL H
TME LAST DOSE: NORMAL H
WBC # BLD AUTO: 4.4 10E3/UL (ref 4–11)

## 2025-07-31 PROCEDURE — 84100 ASSAY OF PHOSPHORUS: CPT | Mod: ORL | Performed by: TRANSPLANT SURGERY

## 2025-07-31 PROCEDURE — 85025 COMPLETE CBC W/AUTO DIFF WBC: CPT | Mod: ORL | Performed by: TRANSPLANT SURGERY

## 2025-07-31 PROCEDURE — 80197 ASSAY OF TACROLIMUS: CPT | Mod: ORL | Performed by: TRANSPLANT SURGERY

## 2025-07-31 PROCEDURE — 82248 BILIRUBIN DIRECT: CPT | Mod: ORL | Performed by: TRANSPLANT SURGERY

## 2025-07-31 PROCEDURE — 83735 ASSAY OF MAGNESIUM: CPT | Mod: ORL | Performed by: TRANSPLANT SURGERY

## 2025-07-31 PROCEDURE — 80053 COMPREHEN METABOLIC PANEL: CPT | Mod: ORL | Performed by: TRANSPLANT SURGERY

## 2025-07-31 RX ORDER — VALGANCICLOVIR 450 MG/1
450 TABLET, FILM COATED ORAL DAILY
Qty: 60 TABLET | Refills: 0 | OUTPATIENT
Start: 2025-07-31

## 2025-07-31 NOTE — TELEPHONE ENCOUNTER
ISSUE: received call from Long major RN stating SBP was 164/94    PLAN:  Updated RN patient just started amlodipine, will continue to monitor    OUTCOME:   Will notify us next week if SBP remains elevated    Shaila Diaz RN   Transplant Coordinator  [phone]

## 2025-07-31 NOTE — TELEPHONE ENCOUNTER
Long BATEMAN with [facility] wanting to report some abnormal BP readings, stated patient blood pressure was 164/94 today. Requesting to have a call back today if possible.

## 2025-08-04 ENCOUNTER — LAB REQUISITION (OUTPATIENT)
Dept: LAB | Facility: CLINIC | Age: 60
End: 2025-08-04
Payer: COMMERCIAL

## 2025-08-04 ENCOUNTER — TELEPHONE (OUTPATIENT)
Dept: TRANSPLANT | Facility: CLINIC | Age: 60
End: 2025-08-04
Payer: COMMERCIAL

## 2025-08-04 ENCOUNTER — PATIENT OUTREACH (OUTPATIENT)
Dept: CARE COORDINATION | Facility: CLINIC | Age: 60
End: 2025-08-04

## 2025-08-04 ENCOUNTER — NURSE TRIAGE (OUTPATIENT)
Dept: NURSING | Facility: CLINIC | Age: 60
End: 2025-08-04

## 2025-08-04 DIAGNOSIS — Z94.4 LIVER TRANSPLANTED (H): ICD-10-CM

## 2025-08-04 DIAGNOSIS — Z94.4 LIVER TRANSPLANT STATUS (H): ICD-10-CM

## 2025-08-04 DIAGNOSIS — Z94.4 LIVER REPLACED BY TRANSPLANT (H): ICD-10-CM

## 2025-08-04 DIAGNOSIS — Z48.23 ENCOUNTER FOR AFTERCARE FOLLOWING LIVER TRANSPLANT (H): ICD-10-CM

## 2025-08-04 LAB
ALBUMIN SERPL BCG-MCNC: 3.6 G/DL (ref 3.5–5.2)
ALP SERPL-CCNC: 86 U/L (ref 40–150)
ALT SERPL W P-5'-P-CCNC: 14 U/L (ref 0–50)
ANION GAP SERPL CALCULATED.3IONS-SCNC: 12 MMOL/L (ref 7–15)
AST SERPL W P-5'-P-CCNC: 15 U/L (ref 0–45)
BILIRUB DIRECT SERPL-MCNC: 0.24 MG/DL (ref 0–0.3)
BILIRUB SERPL-MCNC: 0.5 MG/DL
BUN SERPL-MCNC: 21.5 MG/DL (ref 8–23)
CALCIUM SERPL-MCNC: 8.9 MG/DL (ref 8.8–10.4)
CHLORIDE SERPL-SCNC: 104 MMOL/L (ref 98–107)
CREAT SERPL-MCNC: 1.43 MG/DL (ref 0.51–0.95)
EGFRCR SERPLBLD CKD-EPI 2021: 42 ML/MIN/1.73M2
ERYTHROCYTE [DISTWIDTH] IN BLOOD BY AUTOMATED COUNT: 16.2 % (ref 10–15)
GLUCOSE SERPL-MCNC: 139 MG/DL (ref 70–99)
HCO3 SERPL-SCNC: 24 MMOL/L (ref 22–29)
HCT VFR BLD AUTO: 24.5 % (ref 35–47)
HGB BLD-MCNC: 8.2 G/DL (ref 11.7–15.7)
HOLD SPECIMEN: NORMAL
MAGNESIUM SERPL-MCNC: 1.9 MG/DL (ref 1.7–2.3)
MCH RBC QN AUTO: 30.1 PG (ref 26.5–33)
MCHC RBC AUTO-ENTMCNC: 33.5 G/DL (ref 31.5–36.5)
MCV RBC AUTO: 90 FL (ref 78–100)
PHOSPHATE SERPL-MCNC: 4 MG/DL (ref 2.5–4.5)
PLATELET # BLD AUTO: 233 10E3/UL (ref 150–450)
POTASSIUM SERPL-SCNC: 4.3 MMOL/L (ref 3.4–5.3)
PROT SERPL-MCNC: 6.4 G/DL (ref 6.4–8.3)
RBC # BLD AUTO: 2.72 10E6/UL (ref 3.8–5.2)
SODIUM SERPL-SCNC: 140 MMOL/L (ref 135–145)
TACROLIMUS BLD-MCNC: 8.4 UG/L (ref 5–15)
TME LAST DOSE: NORMAL H
TME LAST DOSE: NORMAL H
WBC # BLD AUTO: 6 10E3/UL (ref 4–11)

## 2025-08-04 PROCEDURE — 84100 ASSAY OF PHOSPHORUS: CPT | Mod: ORL | Performed by: TRANSPLANT SURGERY

## 2025-08-04 PROCEDURE — 80197 ASSAY OF TACROLIMUS: CPT | Mod: ORL | Performed by: TRANSPLANT SURGERY

## 2025-08-04 PROCEDURE — 82248 BILIRUBIN DIRECT: CPT | Mod: ORL | Performed by: TRANSPLANT SURGERY

## 2025-08-04 PROCEDURE — 80053 COMPREHEN METABOLIC PANEL: CPT | Mod: ORL | Performed by: TRANSPLANT SURGERY

## 2025-08-04 PROCEDURE — 83735 ASSAY OF MAGNESIUM: CPT | Mod: ORL | Performed by: TRANSPLANT SURGERY

## 2025-08-04 PROCEDURE — 85027 COMPLETE CBC AUTOMATED: CPT | Mod: ORL | Performed by: TRANSPLANT SURGERY

## 2025-08-04 RX ORDER — ATORVASTATIN CALCIUM 40 MG/1
40 TABLET, FILM COATED ORAL DAILY
Qty: 90 TABLET | Refills: 3 | Status: SHIPPED | OUTPATIENT
Start: 2025-08-04

## 2025-08-04 RX ORDER — AMLODIPINE BESYLATE 10 MG/1
10 TABLET ORAL DAILY
Qty: 30 TABLET | Refills: 2 | Status: SHIPPED | OUTPATIENT
Start: 2025-08-04

## 2025-08-07 ENCOUNTER — LAB REQUISITION (OUTPATIENT)
Dept: LAB | Facility: CLINIC | Age: 60
End: 2025-08-07
Payer: COMMERCIAL

## 2025-08-07 DIAGNOSIS — Z48.23 ENCOUNTER FOR AFTERCARE FOLLOWING LIVER TRANSPLANT (H): ICD-10-CM

## 2025-08-07 LAB
ALBUMIN SERPL BCG-MCNC: 3.6 G/DL (ref 3.5–5.2)
ALP SERPL-CCNC: 82 U/L (ref 40–150)
ALT SERPL W P-5'-P-CCNC: 15 U/L (ref 0–50)
ANION GAP SERPL CALCULATED.3IONS-SCNC: 12 MMOL/L (ref 7–15)
AST SERPL W P-5'-P-CCNC: 18 U/L (ref 0–45)
BASOPHILS # BLD AUTO: 0 10E3/UL (ref 0–0.2)
BASOPHILS NFR BLD AUTO: 1 %
BILIRUB DIRECT SERPL-MCNC: 0.27 MG/DL (ref 0–0.3)
BILIRUB DIRECT SERPL-MCNC: 0.27 MG/DL (ref 0–0.3)
BILIRUB SERPL-MCNC: 0.7 MG/DL
BUN SERPL-MCNC: 18.9 MG/DL (ref 8–23)
CALCIUM SERPL-MCNC: 8.8 MG/DL (ref 8.8–10.4)
CHLORIDE SERPL-SCNC: 105 MMOL/L (ref 98–107)
CREAT SERPL-MCNC: 1.39 MG/DL (ref 0.51–0.95)
EGFRCR SERPLBLD CKD-EPI 2021: 43 ML/MIN/1.73M2
EOSINOPHIL # BLD AUTO: 0.2 10E3/UL (ref 0–0.7)
EOSINOPHIL NFR BLD AUTO: 4 %
ERYTHROCYTE [DISTWIDTH] IN BLOOD BY AUTOMATED COUNT: 16.1 % (ref 10–15)
FASTING STATUS PATIENT QL REPORTED: ABNORMAL
GLUCOSE SERPL-MCNC: 145 MG/DL (ref 70–99)
HCO3 SERPL-SCNC: 23 MMOL/L (ref 22–29)
HCT VFR BLD AUTO: 25.9 % (ref 35–47)
HGB BLD-MCNC: 8.5 G/DL (ref 11.7–15.7)
IMM GRANULOCYTES # BLD: 0.1 10E3/UL
IMM GRANULOCYTES NFR BLD: 2 %
LYMPHOCYTES # BLD AUTO: 0.8 10E3/UL (ref 0.8–5.3)
LYMPHOCYTES NFR BLD AUTO: 16 %
MAGNESIUM SERPL-MCNC: 1.7 MG/DL (ref 1.7–2.3)
MCH RBC QN AUTO: 29.9 PG (ref 26.5–33)
MCHC RBC AUTO-ENTMCNC: 32.8 G/DL (ref 31.5–36.5)
MCV RBC AUTO: 91 FL (ref 78–100)
MONOCYTES # BLD AUTO: 0.2 10E3/UL (ref 0–1.3)
MONOCYTES NFR BLD AUTO: 4 %
NEUTROPHILS # BLD AUTO: 3.8 10E3/UL (ref 1.6–8.3)
NEUTROPHILS NFR BLD AUTO: 74 %
NRBC # BLD AUTO: 0 10E3/UL
NRBC BLD AUTO-RTO: 0 /100
PHOSPHATE SERPL-MCNC: 4.5 MG/DL (ref 2.5–4.5)
PLATELET # BLD AUTO: 251 10E3/UL (ref 150–450)
POTASSIUM SERPL-SCNC: 4.2 MMOL/L (ref 3.4–5.3)
PREALB SERPL-MCNC: 19.1 MG/DL (ref 20–40)
PROT SERPL-MCNC: 6.1 G/DL (ref 6.4–8.3)
RBC # BLD AUTO: 2.84 10E6/UL (ref 3.8–5.2)
SODIUM SERPL-SCNC: 140 MMOL/L (ref 135–145)
TACROLIMUS BLD-MCNC: 8.5 UG/L (ref 5–15)
TME LAST DOSE: NORMAL H
TME LAST DOSE: NORMAL H
TRIGL SERPL-MCNC: 189 MG/DL
WBC # BLD AUTO: 5.1 10E3/UL (ref 4–11)

## 2025-08-07 PROCEDURE — 84478 ASSAY OF TRIGLYCERIDES: CPT | Mod: ORL | Performed by: TRANSPLANT SURGERY

## 2025-08-07 PROCEDURE — 82248 BILIRUBIN DIRECT: CPT | Mod: ORL | Performed by: TRANSPLANT SURGERY

## 2025-08-07 PROCEDURE — 84100 ASSAY OF PHOSPHORUS: CPT | Mod: ORL | Performed by: TRANSPLANT SURGERY

## 2025-08-07 PROCEDURE — 84134 ASSAY OF PREALBUMIN: CPT | Mod: ORL | Performed by: TRANSPLANT SURGERY

## 2025-08-07 PROCEDURE — 83735 ASSAY OF MAGNESIUM: CPT | Mod: ORL | Performed by: TRANSPLANT SURGERY

## 2025-08-07 PROCEDURE — 85025 COMPLETE CBC W/AUTO DIFF WBC: CPT | Mod: ORL | Performed by: TRANSPLANT SURGERY

## 2025-08-07 PROCEDURE — 80197 ASSAY OF TACROLIMUS: CPT | Mod: ORL | Performed by: TRANSPLANT SURGERY

## 2025-08-07 PROCEDURE — 80053 COMPREHEN METABOLIC PANEL: CPT | Mod: ORL | Performed by: TRANSPLANT SURGERY

## 2025-08-11 ENCOUNTER — TELEPHONE (OUTPATIENT)
Dept: TRANSPLANT | Facility: CLINIC | Age: 60
End: 2025-08-11

## 2025-08-11 ENCOUNTER — LAB (OUTPATIENT)
Dept: LAB | Facility: CLINIC | Age: 60
End: 2025-08-11
Attending: TRANSPLANT SURGERY
Payer: COMMERCIAL

## 2025-08-11 ENCOUNTER — ALLIED HEALTH/NURSE VISIT (OUTPATIENT)
Dept: INTERNAL MEDICINE | Facility: CLINIC | Age: 60
End: 2025-08-11
Payer: COMMERCIAL

## 2025-08-11 ENCOUNTER — OFFICE VISIT (OUTPATIENT)
Dept: TRANSPLANT | Facility: CLINIC | Age: 60
End: 2025-08-11
Attending: TRANSPLANT SURGERY
Payer: COMMERCIAL

## 2025-08-11 VITALS
BODY MASS INDEX: 41.46 KG/M2 | HEART RATE: 64 BPM | OXYGEN SATURATION: 98 % | RESPIRATION RATE: 16 BRPM | TEMPERATURE: 98.4 F | DIASTOLIC BLOOD PRESSURE: 81 MMHG | SYSTOLIC BLOOD PRESSURE: 135 MMHG | WEIGHT: 293 LBS

## 2025-08-11 VITALS
DIASTOLIC BLOOD PRESSURE: 81 MMHG | OXYGEN SATURATION: 97 % | HEART RATE: 64 BPM | RESPIRATION RATE: 16 BRPM | SYSTOLIC BLOOD PRESSURE: 135 MMHG | TEMPERATURE: 98.4 F

## 2025-08-11 DIAGNOSIS — Z94.4 LIVER TRANSPLANTED (H): ICD-10-CM

## 2025-08-11 DIAGNOSIS — Z94.4 LIVER REPLACED BY TRANSPLANT (H): ICD-10-CM

## 2025-08-11 DIAGNOSIS — I10 BENIGN ESSENTIAL HYPERTENSION: Primary | ICD-10-CM

## 2025-08-11 LAB
ALBUMIN SERPL BCG-MCNC: 4.1 G/DL (ref 3.5–5.2)
ALP SERPL-CCNC: 82 U/L (ref 40–150)
ALT SERPL W P-5'-P-CCNC: 12 U/L (ref 0–50)
ANION GAP SERPL CALCULATED.3IONS-SCNC: 14 MMOL/L (ref 7–15)
AST SERPL W P-5'-P-CCNC: 19 U/L (ref 0–45)
BILIRUB SERPL-MCNC: 0.7 MG/DL
BILIRUBIN DIRECT (ROCHE PRO & PURE): 0.32 MG/DL (ref 0–0.45)
BUN SERPL-MCNC: 26.2 MG/DL (ref 8–23)
CALCIUM SERPL-MCNC: 9.6 MG/DL (ref 8.8–10.4)
CHLORIDE SERPL-SCNC: 102 MMOL/L (ref 98–107)
CREAT SERPL-MCNC: 1.39 MG/DL (ref 0.51–0.95)
EGFRCR SERPLBLD CKD-EPI 2021: 43 ML/MIN/1.73M2
ERYTHROCYTE [DISTWIDTH] IN BLOOD BY AUTOMATED COUNT: 15.8 % (ref 10–15)
GLUCOSE SERPL-MCNC: 138 MG/DL (ref 70–99)
HCO3 SERPL-SCNC: 23 MMOL/L (ref 22–29)
HCT VFR BLD AUTO: 29.7 % (ref 35–47)
HGB BLD-MCNC: 9.8 G/DL (ref 11.7–15.7)
MAGNESIUM SERPL-MCNC: 1.6 MG/DL (ref 1.7–2.3)
MCH RBC QN AUTO: 29.9 PG (ref 26.5–33)
MCHC RBC AUTO-ENTMCNC: 33 G/DL (ref 31.5–36.5)
MCV RBC AUTO: 91 FL (ref 78–100)
PHOSPHATE SERPL-MCNC: 4.2 MG/DL (ref 2.5–4.5)
PLATELET # BLD AUTO: 221 10E3/UL (ref 150–450)
POTASSIUM SERPL-SCNC: 4.4 MMOL/L (ref 3.4–5.3)
PROT SERPL-MCNC: 7 G/DL (ref 6.4–8.3)
RBC # BLD AUTO: 3.28 10E6/UL (ref 3.8–5.2)
SODIUM SERPL-SCNC: 139 MMOL/L (ref 135–145)
TACROLIMUS BLD-MCNC: 7.9 UG/L (ref 5–15)
TME LAST DOSE: NORMAL H
TME LAST DOSE: NORMAL H
WBC # BLD AUTO: 6.7 10E3/UL (ref 4–11)

## 2025-08-11 PROCEDURE — 99207 PR NO CHARGE NURSE ONLY: CPT

## 2025-08-11 PROCEDURE — 3079F DIAST BP 80-89 MM HG: CPT | Performed by: NURSE PRACTITIONER

## 2025-08-11 PROCEDURE — 80053 COMPREHEN METABOLIC PANEL: CPT | Performed by: PATHOLOGY

## 2025-08-11 PROCEDURE — 80197 ASSAY OF TACROLIMUS: CPT | Performed by: TRANSPLANT SURGERY

## 2025-08-11 PROCEDURE — 84100 ASSAY OF PHOSPHORUS: CPT | Performed by: PATHOLOGY

## 2025-08-11 PROCEDURE — G0463 HOSPITAL OUTPT CLINIC VISIT: HCPCS | Performed by: NURSE PRACTITIONER

## 2025-08-11 PROCEDURE — 99213 OFFICE O/P EST LOW 20 MIN: CPT | Mod: 24 | Performed by: NURSE PRACTITIONER

## 2025-08-11 PROCEDURE — 99000 SPECIMEN HANDLING OFFICE-LAB: CPT | Performed by: PATHOLOGY

## 2025-08-11 PROCEDURE — 3075F SYST BP GE 130 - 139MM HG: CPT | Performed by: NURSE PRACTITIONER

## 2025-08-11 PROCEDURE — 85027 COMPLETE CBC AUTOMATED: CPT | Performed by: PATHOLOGY

## 2025-08-11 PROCEDURE — 83735 ASSAY OF MAGNESIUM: CPT | Performed by: PATHOLOGY

## 2025-08-11 PROCEDURE — 82248 BILIRUBIN DIRECT: CPT | Performed by: PATHOLOGY

## 2025-08-11 PROCEDURE — 36415 COLL VENOUS BLD VENIPUNCTURE: CPT | Performed by: PATHOLOGY

## 2025-08-11 ASSESSMENT — PAIN SCALES - GENERAL: PAINLEVEL_OUTOF10: MODERATE PAIN (4)

## 2025-08-14 ENCOUNTER — TELEPHONE (OUTPATIENT)
Dept: TRANSPLANT | Facility: CLINIC | Age: 60
End: 2025-08-14

## 2025-08-14 ENCOUNTER — LAB REQUISITION (OUTPATIENT)
Dept: LAB | Facility: CLINIC | Age: 60
End: 2025-08-14
Payer: COMMERCIAL

## 2025-08-18 ENCOUNTER — LAB REQUISITION (OUTPATIENT)
Dept: LAB | Facility: CLINIC | Age: 60
End: 2025-08-18
Payer: COMMERCIAL

## 2025-08-18 DIAGNOSIS — E87.5 HYPERKALEMIA: ICD-10-CM

## 2025-08-18 RX ORDER — SODIUM BICARBONATE 650 MG/1
650 TABLET ORAL 3 TIMES DAILY
Qty: 90 TABLET | Refills: 2 | OUTPATIENT
Start: 2025-08-18

## 2025-08-20 DIAGNOSIS — Z94.4 LIVER REPLACED BY TRANSPLANT (H): ICD-10-CM

## 2025-08-20 DIAGNOSIS — E87.5 HYPERKALEMIA: ICD-10-CM

## 2025-08-20 RX ORDER — SODIUM BICARBONATE 650 MG/1
650 TABLET ORAL 3 TIMES DAILY
Qty: 90 TABLET | Refills: 2 | OUTPATIENT
Start: 2025-08-20

## 2025-08-21 ENCOUNTER — TELEPHONE (OUTPATIENT)
Dept: TRANSPLANT | Facility: CLINIC | Age: 60
End: 2025-08-21
Payer: COMMERCIAL

## 2025-08-21 RX ORDER — MYCOPHENOLIC ACID 180 MG/1
540 TABLET, DELAYED RELEASE ORAL 2 TIMES DAILY
Qty: 180 TABLET | Refills: 5 | OUTPATIENT
Start: 2025-08-21

## 2025-08-25 ENCOUNTER — TELEPHONE (OUTPATIENT)
Dept: TRANSPLANT | Facility: CLINIC | Age: 60
End: 2025-08-25

## 2025-08-25 ENCOUNTER — LAB (OUTPATIENT)
Dept: LAB | Facility: CLINIC | Age: 60
End: 2025-08-25
Attending: TRANSPLANT SURGERY
Payer: COMMERCIAL

## 2025-08-25 ENCOUNTER — OFFICE VISIT (OUTPATIENT)
Dept: TRANSPLANT | Facility: CLINIC | Age: 60
End: 2025-08-25
Attending: TRANSPLANT SURGERY
Payer: COMMERCIAL

## 2025-08-25 DIAGNOSIS — Z94.4 LIVER REPLACED BY TRANSPLANT (H): ICD-10-CM

## 2025-08-25 DIAGNOSIS — Z94.4 LIVER TRANSPLANTED (H): ICD-10-CM

## 2025-08-25 LAB
ALBUMIN SERPL BCG-MCNC: 4.3 G/DL (ref 3.5–5.2)
ALBUMIN UR-MCNC: 50 MG/DL
ALP SERPL-CCNC: 93 U/L (ref 40–150)
ALT SERPL W P-5'-P-CCNC: 18 U/L (ref 0–50)
ANION GAP SERPL CALCULATED.3IONS-SCNC: 17 MMOL/L (ref 7–15)
APPEARANCE UR: ABNORMAL
AST SERPL W P-5'-P-CCNC: 22 U/L (ref 0–45)
BACTERIA #/AREA URNS HPF: ABNORMAL /HPF
BILIRUB SERPL-MCNC: 0.8 MG/DL
BILIRUB UR QL STRIP: NEGATIVE
BILIRUBIN DIRECT (ROCHE PRO & PURE): 0.36 MG/DL (ref 0–0.45)
BUN SERPL-MCNC: 34.4 MG/DL (ref 8–23)
CALCIUM SERPL-MCNC: 9.9 MG/DL (ref 8.8–10.4)
CHLORIDE SERPL-SCNC: 102 MMOL/L (ref 98–107)
COLOR UR AUTO: YELLOW
CREAT SERPL-MCNC: 1.66 MG/DL (ref 0.51–0.95)
EGFRCR SERPLBLD CKD-EPI 2021: 35 ML/MIN/1.73M2
ERYTHROCYTE [DISTWIDTH] IN BLOOD BY AUTOMATED COUNT: 14.6 % (ref 10–15)
GLUCOSE SERPL-MCNC: 175 MG/DL (ref 70–99)
GLUCOSE UR STRIP-MCNC: NEGATIVE MG/DL
HCO3 SERPL-SCNC: 21 MMOL/L (ref 22–29)
HCT VFR BLD AUTO: 34.2 % (ref 35–47)
HGB BLD-MCNC: 11.6 G/DL (ref 11.7–15.7)
HGB UR QL STRIP: ABNORMAL
HYALINE CASTS: 15 /LPF
KETONES UR STRIP-MCNC: NEGATIVE MG/DL
LEUKOCYTE ESTERASE UR QL STRIP: ABNORMAL
MAGNESIUM SERPL-MCNC: 1.6 MG/DL (ref 1.7–2.3)
MCH RBC QN AUTO: 29.9 PG (ref 26.5–33)
MCHC RBC AUTO-ENTMCNC: 33.9 G/DL (ref 31.5–36.5)
MCV RBC AUTO: 88.1 FL (ref 78–100)
MUCOUS THREADS #/AREA URNS LPF: PRESENT /LPF
NITRATE UR QL: NEGATIVE
PH UR STRIP: 5.5 [PH] (ref 5–7)
PHOSPHATE SERPL-MCNC: 4.6 MG/DL (ref 2.5–4.5)
PLATELET # BLD AUTO: 189 10E3/UL (ref 150–450)
POTASSIUM SERPL-SCNC: 4.2 MMOL/L (ref 3.4–5.3)
PROT SERPL-MCNC: 7.2 G/DL (ref 6.4–8.3)
RBC # BLD AUTO: 3.88 10E6/UL (ref 3.8–5.2)
RBC URINE: 21 /HPF
SODIUM SERPL-SCNC: 140 MMOL/L (ref 135–145)
SP GR UR STRIP: 1.02 (ref 1–1.03)
SQUAMOUS EPITHELIAL: 10 /HPF
TACROLIMUS BLD-MCNC: 10.3 UG/L (ref 5–15)
TME LAST DOSE: NORMAL H
TME LAST DOSE: NORMAL H
UROBILINOGEN UR STRIP-MCNC: NORMAL MG/DL
WBC # BLD AUTO: 5.78 10E3/UL (ref 4–11)
WBC CLUMPS #/AREA URNS HPF: PRESENT /HPF
WBC URINE: >182 /HPF

## 2025-08-25 PROCEDURE — 81001 URINALYSIS AUTO W/SCOPE: CPT | Performed by: TRANSPLANT SURGERY

## 2025-08-25 PROCEDURE — 82248 BILIRUBIN DIRECT: CPT | Performed by: PATHOLOGY

## 2025-08-25 PROCEDURE — 87086 URINE CULTURE/COLONY COUNT: CPT | Performed by: TRANSPLANT SURGERY

## 2025-08-25 PROCEDURE — 80053 COMPREHEN METABOLIC PANEL: CPT | Performed by: PATHOLOGY

## 2025-08-25 PROCEDURE — 36415 COLL VENOUS BLD VENIPUNCTURE: CPT | Performed by: PATHOLOGY

## 2025-08-25 PROCEDURE — 83735 ASSAY OF MAGNESIUM: CPT | Performed by: PATHOLOGY

## 2025-08-25 PROCEDURE — 85027 COMPLETE CBC AUTOMATED: CPT | Performed by: PATHOLOGY

## 2025-08-25 PROCEDURE — 99000 SPECIMEN HANDLING OFFICE-LAB: CPT | Performed by: PATHOLOGY

## 2025-08-25 PROCEDURE — 99213 OFFICE O/P EST LOW 20 MIN: CPT | Mod: 24 | Performed by: TRANSPLANT SURGERY

## 2025-08-25 PROCEDURE — 80197 ASSAY OF TACROLIMUS: CPT | Performed by: TRANSPLANT SURGERY

## 2025-08-25 PROCEDURE — 84100 ASSAY OF PHOSPHORUS: CPT | Performed by: PATHOLOGY

## 2025-08-25 RX ORDER — TACROLIMUS 5 MG/1
5 CAPSULE ORAL 2 TIMES DAILY
Qty: 180 CAPSULE | Refills: 3 | Status: SHIPPED | OUTPATIENT
Start: 2025-08-25

## 2025-08-25 RX ORDER — TACROLIMUS 1 MG/1
1 CAPSULE ORAL PRN
Status: SHIPPED
Start: 2025-08-25

## 2025-08-26 LAB — BACTERIA UR CULT: NORMAL

## 2025-08-28 DIAGNOSIS — Z94.4 LIVER TRANSPLANTED (H): ICD-10-CM

## 2025-08-28 DIAGNOSIS — I10 ESSENTIAL HYPERTENSION: Primary | ICD-10-CM

## 2025-08-28 ASSESSMENT — PAIN SCALES - PAIN ENJOYMENT GENERAL ACTIVITY SCALE (PEG)
AVG_PAIN_PASTWEEK: 7
INTERFERED_GENERAL_ACTIVITY: 9
AVG_PAIN_PASTWEEK: 7
PEG_TOTALSCORE: 8.33
PEG_TOTALSCORE: 8.33
INTERFERED_GENERAL_ACTIVITY: 9
INTERFERED_ENJOYMENT_LIFE: 9
INTERFERED_ENJOYMENT_LIFE: 9

## 2025-08-28 ASSESSMENT — ANXIETY QUESTIONNAIRES
8. IF YOU CHECKED OFF ANY PROBLEMS, HOW DIFFICULT HAVE THESE MADE IT FOR YOU TO DO YOUR WORK, TAKE CARE OF THINGS AT HOME, OR GET ALONG WITH OTHER PEOPLE?: SOMEWHAT DIFFICULT
3. WORRYING TOO MUCH ABOUT DIFFERENT THINGS: SEVERAL DAYS
2. NOT BEING ABLE TO STOP OR CONTROL WORRYING: SEVERAL DAYS
4. TROUBLE RELAXING: SEVERAL DAYS
GAD7 TOTAL SCORE: 5
5. BEING SO RESTLESS THAT IT IS HARD TO SIT STILL: NOT AT ALL
GAD7 TOTAL SCORE: 5
6. BECOMING EASILY ANNOYED OR IRRITABLE: NOT AT ALL
GAD7 TOTAL SCORE: 5
7. FEELING AFRAID AS IF SOMETHING AWFUL MIGHT HAPPEN: NOT AT ALL
1. FEELING NERVOUS, ANXIOUS, OR ON EDGE: MORE THAN HALF THE DAYS
7. FEELING AFRAID AS IF SOMETHING AWFUL MIGHT HAPPEN: NOT AT ALL
IF YOU CHECKED OFF ANY PROBLEMS ON THIS QUESTIONNAIRE, HOW DIFFICULT HAVE THESE PROBLEMS MADE IT FOR YOU TO DO YOUR WORK, TAKE CARE OF THINGS AT HOME, OR GET ALONG WITH OTHER PEOPLE: SOMEWHAT DIFFICULT

## 2025-08-29 ENCOUNTER — MEDICAL CORRESPONDENCE (OUTPATIENT)
Dept: HEALTH INFORMATION MANAGEMENT | Facility: CLINIC | Age: 60
End: 2025-08-29
Payer: COMMERCIAL

## 2025-09-02 ENCOUNTER — LAB (OUTPATIENT)
Dept: LAB | Facility: CLINIC | Age: 60
End: 2025-09-02
Payer: COMMERCIAL

## 2025-09-02 ENCOUNTER — OFFICE VISIT (OUTPATIENT)
Dept: ANESTHESIOLOGY | Facility: CLINIC | Age: 60
End: 2025-09-02
Attending: INTERNAL MEDICINE
Payer: COMMERCIAL

## 2025-09-02 VITALS — DIASTOLIC BLOOD PRESSURE: 77 MMHG | OXYGEN SATURATION: 98 % | SYSTOLIC BLOOD PRESSURE: 122 MMHG | HEART RATE: 78 BPM

## 2025-09-02 DIAGNOSIS — M53.3 SACROILIAC JOINT PAIN: ICD-10-CM

## 2025-09-02 DIAGNOSIS — M47.814 THORACIC SPONDYLOSIS: Primary | ICD-10-CM

## 2025-09-02 DIAGNOSIS — Z94.4 LIVER REPLACED BY TRANSPLANT (H): ICD-10-CM

## 2025-09-02 DIAGNOSIS — G89.29 CHRONIC MIDLINE THORACIC BACK PAIN: ICD-10-CM

## 2025-09-02 DIAGNOSIS — M54.6 CHRONIC MIDLINE THORACIC BACK PAIN: ICD-10-CM

## 2025-09-02 DIAGNOSIS — M47.816 LUMBAR SPONDYLOSIS: ICD-10-CM

## 2025-09-02 DIAGNOSIS — M79.18 MYOFASCIAL MUSCLE PAIN: ICD-10-CM

## 2025-09-02 LAB
ALBUMIN SERPL BCG-MCNC: 4.1 G/DL (ref 3.5–5.2)
ALP SERPL-CCNC: 96 U/L (ref 40–150)
ALT SERPL W P-5'-P-CCNC: 16 U/L (ref 0–50)
ANION GAP SERPL CALCULATED.3IONS-SCNC: 17 MMOL/L (ref 7–15)
AST SERPL W P-5'-P-CCNC: 23 U/L (ref 0–45)
BILIRUB SERPL-MCNC: 0.9 MG/DL
BILIRUBIN DIRECT (ROCHE PRO & PURE): 0.36 MG/DL (ref 0–0.45)
BUN SERPL-MCNC: 35 MG/DL (ref 8–23)
CALCIUM SERPL-MCNC: 9.6 MG/DL (ref 8.8–10.4)
CHLORIDE SERPL-SCNC: 100 MMOL/L (ref 98–107)
CREAT SERPL-MCNC: 1.6 MG/DL (ref 0.51–0.95)
EGFRCR SERPLBLD CKD-EPI 2021: 37 ML/MIN/1.73M2
ERYTHROCYTE [DISTWIDTH] IN BLOOD BY AUTOMATED COUNT: 14.1 % (ref 10–15)
GLUCOSE SERPL-MCNC: 187 MG/DL (ref 70–99)
HCO3 SERPL-SCNC: 20 MMOL/L (ref 22–29)
HCT VFR BLD AUTO: 30.3 % (ref 35–47)
HGB BLD-MCNC: 10.4 G/DL (ref 11.7–15.7)
MAGNESIUM SERPL-MCNC: 1.5 MG/DL (ref 1.7–2.3)
MCH RBC QN AUTO: 29.5 PG (ref 26.5–33)
MCHC RBC AUTO-ENTMCNC: 34.3 G/DL (ref 31.5–36.5)
MCV RBC AUTO: 86.1 FL (ref 78–100)
PHOSPHATE SERPL-MCNC: 4.4 MG/DL (ref 2.5–4.5)
PLATELET # BLD AUTO: 193 10E3/UL (ref 150–450)
POTASSIUM SERPL-SCNC: 4.3 MMOL/L (ref 3.4–5.3)
PROT SERPL-MCNC: 7.1 G/DL (ref 6.4–8.3)
RBC # BLD AUTO: 3.52 10E6/UL (ref 3.8–5.2)
SODIUM SERPL-SCNC: 137 MMOL/L (ref 135–145)
TACROLIMUS BLD-MCNC: 8.5 UG/L (ref 5–15)
TME LAST DOSE: NORMAL H
TME LAST DOSE: NORMAL H
WBC # BLD AUTO: 5.39 10E3/UL (ref 4–11)

## 2025-09-02 PROCEDURE — 1125F AMNT PAIN NOTED PAIN PRSNT: CPT | Performed by: ANESTHESIOLOGY

## 2025-09-02 PROCEDURE — 36415 COLL VENOUS BLD VENIPUNCTURE: CPT | Performed by: PATHOLOGY

## 2025-09-02 PROCEDURE — 80053 COMPREHEN METABOLIC PANEL: CPT | Performed by: PATHOLOGY

## 2025-09-02 PROCEDURE — 83735 ASSAY OF MAGNESIUM: CPT | Performed by: PATHOLOGY

## 2025-09-02 PROCEDURE — 3074F SYST BP LT 130 MM HG: CPT | Performed by: ANESTHESIOLOGY

## 2025-09-02 PROCEDURE — 3078F DIAST BP <80 MM HG: CPT | Performed by: ANESTHESIOLOGY

## 2025-09-02 PROCEDURE — 80197 ASSAY OF TACROLIMUS: CPT | Performed by: TRANSPLANT SURGERY

## 2025-09-02 PROCEDURE — 84100 ASSAY OF PHOSPHORUS: CPT | Performed by: PATHOLOGY

## 2025-09-02 PROCEDURE — 85027 COMPLETE CBC AUTOMATED: CPT | Performed by: PATHOLOGY

## 2025-09-02 PROCEDURE — 99203 OFFICE O/P NEW LOW 30 MIN: CPT | Mod: GC | Performed by: ANESTHESIOLOGY

## 2025-09-02 PROCEDURE — 82248 BILIRUBIN DIRECT: CPT | Performed by: PATHOLOGY

## 2025-09-02 PROCEDURE — 99000 SPECIMEN HANDLING OFFICE-LAB: CPT | Performed by: PATHOLOGY

## 2025-09-02 RX ORDER — METHOCARBAMOL 750 MG/1
750 TABLET, FILM COATED ORAL EVERY 6 HOURS PRN
Qty: 30 TABLET | Refills: 0 | Status: SHIPPED | OUTPATIENT
Start: 2025-09-02

## 2025-09-02 ASSESSMENT — PAIN SCALES - GENERAL: PAINLEVEL_OUTOF10: MODERATE PAIN (6)

## 2025-09-03 ENCOUNTER — TELEPHONE (OUTPATIENT)
Dept: OPHTHALMOLOGY | Facility: CLINIC | Age: 60
End: 2025-09-03
Payer: COMMERCIAL

## 2025-09-04 RX ORDER — AMLODIPINE BESYLATE 10 MG/1
10 TABLET ORAL DAILY
Qty: 90 TABLET | Refills: 1 | Status: SHIPPED | OUTPATIENT
Start: 2025-09-04

## (undated) DEVICE — KIT ENDO FIRST STEP DISINFECTANT 200ML W/POUCH EP-4

## (undated) DEVICE — ENDO PROBE COVER ULTRASOUND BALLOON LATEX  MAJ-249

## (undated) DEVICE — PACK ENDOSCOPY GI CUSTOM UMMC

## (undated) DEVICE — GLOVE BIOGEL PI ULTRATOUCH G SZ 7.5 42175

## (undated) DEVICE — ENDO NDL ASPIRATION ULTRASOUND 19GA ACQUIRE M00555580

## (undated) DEVICE — ENDO TUBING CO2 SMARTCAP STERILE DISP 100145CO2EXT

## (undated) DEVICE — Device

## (undated) DEVICE — SPECIMEN CONTAINER 3OZ W/FORMALIN 59901

## (undated) DEVICE — ENDO CAP AND TUBING STERILE FOR ENDOGATOR  100130

## (undated) DEVICE — SUCTION MANIFOLD NEPTUNE 2 SYS 4 PORT 0702-020-000

## (undated) DEVICE — ENDO BITE BLOCK ADULT OMNI-BLOC

## (undated) DEVICE — SOL WATER IRRIG 1000ML BOTTLE 2F7114

## (undated) RX ORDER — LIDOCAINE HYDROCHLORIDE 10 MG/ML
INJECTION, SOLUTION EPIDURAL; INFILTRATION; INTRACAUDAL; PERINEURAL
Status: DISPENSED
Start: 2025-02-19

## (undated) RX ORDER — TRIAMCINOLONE ACETONIDE 40 MG/ML
INJECTION, SUSPENSION INTRA-ARTICULAR; INTRAMUSCULAR
Status: DISPENSED
Start: 2025-02-19

## (undated) RX ORDER — PROPOFOL 10 MG/ML
INJECTION, EMULSION INTRAVENOUS
Status: DISPENSED
Start: 2024-08-09

## (undated) RX ORDER — LIDOCAINE HYDROCHLORIDE 10 MG/ML
INJECTION, SOLUTION EPIDURAL; INFILTRATION; INTRACAUDAL; PERINEURAL
Status: DISPENSED
Start: 2024-10-30

## (undated) RX ORDER — METOPROLOL TARTRATE 25 MG/1
TABLET, FILM COATED ORAL
Status: DISPENSED
Start: 2024-12-27

## (undated) RX ORDER — HEPARIN SODIUM,PORCINE 10 UNIT/ML
VIAL (ML) INTRAVENOUS
Status: DISPENSED
Start: 2024-08-09

## (undated) RX ORDER — FENTANYL CITRATE 50 UG/ML
INJECTION, SOLUTION INTRAMUSCULAR; INTRAVENOUS
Status: DISPENSED
Start: 2024-08-09

## (undated) RX ORDER — ONDANSETRON 2 MG/ML
INJECTION INTRAMUSCULAR; INTRAVENOUS
Status: DISPENSED
Start: 2024-08-09

## (undated) RX ORDER — HEPARIN SODIUM (PORCINE) LOCK FLUSH IV SOLN 100 UNIT/ML 100 UNIT/ML
SOLUTION INTRAVENOUS
Status: DISPENSED
Start: 2024-08-09

## (undated) RX ORDER — DEXAMETHASONE SODIUM PHOSPHATE 4 MG/ML
INJECTION, SOLUTION INTRA-ARTICULAR; INTRALESIONAL; INTRAMUSCULAR; INTRAVENOUS; SOFT TISSUE
Status: DISPENSED
Start: 2024-08-09

## (undated) RX ORDER — TRIAMCINOLONE ACETONIDE 40 MG/ML
INJECTION, SUSPENSION INTRA-ARTICULAR; INTRAMUSCULAR
Status: DISPENSED
Start: 2024-10-30

## (undated) RX ORDER — ACETAMINOPHEN 325 MG/1
TABLET ORAL
Status: DISPENSED
Start: 2025-07-18

## (undated) RX ORDER — ACETIC ACID 5 %
LIQUID (ML) MISCELLANEOUS
Status: DISPENSED
Start: 2025-07-18

## (undated) RX ORDER — ONDANSETRON 4 MG/1
TABLET, ORALLY DISINTEGRATING ORAL
Status: DISPENSED
Start: 2024-08-09